# Patient Record
Sex: FEMALE | Race: WHITE | Employment: FULL TIME | URBAN - METROPOLITAN AREA
[De-identification: names, ages, dates, MRNs, and addresses within clinical notes are randomized per-mention and may not be internally consistent; named-entity substitution may affect disease eponyms.]

---

## 2017-06-13 ENCOUNTER — HOSPITAL ENCOUNTER (OUTPATIENT)
Dept: MAMMOGRAPHY | Age: 47
Discharge: HOME | End: 2017-06-13
Attending: OBSTETRICS & GYNECOLOGY
Payer: COMMERCIAL

## 2017-06-13 DIAGNOSIS — Z12.31 VISIT FOR SCREENING MAMMOGRAM: ICD-10-CM

## 2017-06-13 PROCEDURE — 77067 SCR MAMMO BI INCL CAD: CPT

## 2018-07-16 ENCOUNTER — HOSPITAL ENCOUNTER (OUTPATIENT)
Dept: MAMMOGRAPHY | Age: 48
Discharge: HOME | End: 2018-07-16
Attending: OBSTETRICS & GYNECOLOGY
Payer: COMMERCIAL

## 2018-07-16 DIAGNOSIS — Z12.39 SCREENING BREAST EXAMINATION: ICD-10-CM

## 2018-07-16 PROCEDURE — 77067 SCR MAMMO BI INCL CAD: CPT

## 2018-07-21 ENCOUNTER — APPOINTMENT (OUTPATIENT)
Dept: ULTRASOUND IMAGING | Age: 48
End: 2018-07-21
Attending: EMERGENCY MEDICINE
Payer: COMMERCIAL

## 2018-07-21 ENCOUNTER — HOSPITAL ENCOUNTER (EMERGENCY)
Age: 48
Discharge: HOME | End: 2018-07-21
Attending: EMERGENCY MEDICINE
Payer: COMMERCIAL

## 2018-07-21 VITALS
WEIGHT: 161.38 LBS | HEIGHT: 63 IN | DIASTOLIC BLOOD PRESSURE: 64 MMHG | SYSTOLIC BLOOD PRESSURE: 118 MMHG | OXYGEN SATURATION: 99 % | TEMPERATURE: 98.2 F | BODY MASS INDEX: 28.59 KG/M2 | RESPIRATION RATE: 16 BRPM | HEART RATE: 94 BPM

## 2018-07-21 DIAGNOSIS — R11.2 NAUSEA AND VOMITING, INTRACTABILITY OF VOMITING NOT SPECIFIED, UNSPECIFIED VOMITING TYPE: Primary | ICD-10-CM

## 2018-07-21 LAB
ALBUMIN SERPL-MCNC: 3.2 G/DL (ref 3.5–5)
ALBUMIN/GLOB SERPL: 0.9 {RATIO} (ref 1.1–2.2)
ALP SERPL-CCNC: 52 U/L (ref 45–117)
ALT SERPL-CCNC: 16 U/L (ref 12–78)
ANION GAP SERPL CALC-SCNC: 10 MMOL/L (ref 5–15)
APPEARANCE UR: ABNORMAL
AST SERPL-CCNC: 10 U/L (ref 15–37)
BACTERIA URNS QL MICRO: NEGATIVE /HPF
BASOPHILS # BLD: 0 K/UL (ref 0–0.1)
BASOPHILS NFR BLD: 0 % (ref 0–1)
BILIRUB SERPL-MCNC: 0.4 MG/DL (ref 0.2–1)
BILIRUB UR QL: NEGATIVE
BUN SERPL-MCNC: 9 MG/DL (ref 6–20)
BUN/CREAT SERPL: 13 (ref 12–20)
CALCIUM SERPL-MCNC: 8.6 MG/DL (ref 8.5–10.1)
CHLORIDE SERPL-SCNC: 106 MMOL/L (ref 97–108)
CO2 SERPL-SCNC: 23 MMOL/L (ref 21–32)
COLOR UR: YELLOW
CREAT SERPL-MCNC: 0.71 MG/DL (ref 0.55–1.02)
DIFFERENTIAL METHOD BLD: ABNORMAL
EOSINOPHIL # BLD: 0.1 K/UL (ref 0–0.4)
EOSINOPHIL NFR BLD: 1 % (ref 0–7)
EPITH CASTS URNS QL MICRO: ABNORMAL /LPF
ERYTHROCYTE [DISTWIDTH] IN BLOOD BY AUTOMATED COUNT: 12.4 % (ref 11.5–14.5)
GLOBULIN SER CALC-MCNC: 3.6 G/DL (ref 2–4)
GLUCOSE SERPL-MCNC: 122 MG/DL (ref 65–100)
GLUCOSE UR STRIP.AUTO-MCNC: NEGATIVE MG/DL
HCT VFR BLD AUTO: 42.1 % (ref 35–47)
HGB BLD-MCNC: 14.8 G/DL (ref 11.5–16)
HGB UR QL STRIP: ABNORMAL
HYALINE CASTS URNS QL MICRO: ABNORMAL /LPF (ref 0–5)
IMM GRANULOCYTES # BLD: 0 K/UL (ref 0–0.04)
IMM GRANULOCYTES NFR BLD AUTO: 0 % (ref 0–0.5)
KETONES UR QL STRIP.AUTO: 15 MG/DL
LACTATE SERPL-SCNC: 1.7 MMOL/L (ref 0.4–2)
LEUKOCYTE ESTERASE UR QL STRIP.AUTO: NEGATIVE
LIPASE SERPL-CCNC: 200 U/L (ref 73–393)
LYMPHOCYTES # BLD: 1.6 K/UL (ref 0.8–3.5)
LYMPHOCYTES NFR BLD: 13 % (ref 12–49)
MCH RBC QN AUTO: 31.2 PG (ref 26–34)
MCHC RBC AUTO-ENTMCNC: 35.2 G/DL (ref 30–36.5)
MCV RBC AUTO: 88.8 FL (ref 80–99)
MONOCYTES # BLD: 0.4 K/UL (ref 0–1)
MONOCYTES NFR BLD: 4 % (ref 5–13)
NEUTS SEG # BLD: 9.7 K/UL (ref 1.8–8)
NEUTS SEG NFR BLD: 82 % (ref 32–75)
NITRITE UR QL STRIP.AUTO: NEGATIVE
NRBC # BLD: 0 K/UL (ref 0–0.01)
NRBC BLD-RTO: 0 PER 100 WBC
PH UR STRIP: 6 [PH] (ref 5–8)
PLATELET # BLD AUTO: 334 K/UL (ref 150–400)
PMV BLD AUTO: 11.5 FL (ref 8.9–12.9)
POTASSIUM SERPL-SCNC: 3.8 MMOL/L (ref 3.5–5.1)
PROT SERPL-MCNC: 6.8 G/DL (ref 6.4–8.2)
PROT UR STRIP-MCNC: 30 MG/DL
RBC # BLD AUTO: 4.74 M/UL (ref 3.8–5.2)
RBC #/AREA URNS HPF: ABNORMAL /HPF (ref 0–5)
SODIUM SERPL-SCNC: 139 MMOL/L (ref 136–145)
SP GR UR REFRACTOMETRY: 1.03 (ref 1–1.03)
UA: UC IF INDICATED,UAUC: ABNORMAL
UROBILINOGEN UR QL STRIP.AUTO: 0.2 EU/DL (ref 0.2–1)
WBC # BLD AUTO: 11.9 K/UL (ref 3.6–11)
WBC URNS QL MICRO: ABNORMAL /HPF (ref 0–4)

## 2018-07-21 PROCEDURE — 83690 ASSAY OF LIPASE: CPT | Performed by: EMERGENCY MEDICINE

## 2018-07-21 PROCEDURE — C9113 INJ PANTOPRAZOLE SODIUM, VIA: HCPCS | Performed by: EMERGENCY MEDICINE

## 2018-07-21 PROCEDURE — 36415 COLL VENOUS BLD VENIPUNCTURE: CPT | Performed by: EMERGENCY MEDICINE

## 2018-07-21 PROCEDURE — 81001 URINALYSIS AUTO W/SCOPE: CPT | Performed by: EMERGENCY MEDICINE

## 2018-07-21 PROCEDURE — 96375 TX/PRO/DX INJ NEW DRUG ADDON: CPT

## 2018-07-21 PROCEDURE — 99283 EMERGENCY DEPT VISIT LOW MDM: CPT

## 2018-07-21 PROCEDURE — 80053 COMPREHEN METABOLIC PANEL: CPT | Performed by: EMERGENCY MEDICINE

## 2018-07-21 PROCEDURE — 74011250636 HC RX REV CODE- 250/636: Performed by: EMERGENCY MEDICINE

## 2018-07-21 PROCEDURE — 96361 HYDRATE IV INFUSION ADD-ON: CPT

## 2018-07-21 PROCEDURE — 76705 ECHO EXAM OF ABDOMEN: CPT

## 2018-07-21 PROCEDURE — 96374 THER/PROPH/DIAG INJ IV PUSH: CPT

## 2018-07-21 PROCEDURE — 96372 THER/PROPH/DIAG INJ SC/IM: CPT

## 2018-07-21 PROCEDURE — 83605 ASSAY OF LACTIC ACID: CPT | Performed by: EMERGENCY MEDICINE

## 2018-07-21 PROCEDURE — 85025 COMPLETE CBC W/AUTO DIFF WBC: CPT | Performed by: EMERGENCY MEDICINE

## 2018-07-21 RX ORDER — PROCHLORPERAZINE EDISYLATE 5 MG/ML
10 INJECTION INTRAMUSCULAR; INTRAVENOUS
Status: COMPLETED | OUTPATIENT
Start: 2018-07-21 | End: 2018-07-21

## 2018-07-21 RX ORDER — DICYCLOMINE HYDROCHLORIDE 10 MG/ML
20 INJECTION INTRAMUSCULAR
Status: COMPLETED | OUTPATIENT
Start: 2018-07-21 | End: 2018-07-21

## 2018-07-21 RX ORDER — ONDANSETRON 2 MG/ML
4 INJECTION INTRAMUSCULAR; INTRAVENOUS
Status: COMPLETED | OUTPATIENT
Start: 2018-07-21 | End: 2018-07-21

## 2018-07-21 RX ORDER — PANTOPRAZOLE SODIUM 40 MG/10ML
40 INJECTION, POWDER, LYOPHILIZED, FOR SOLUTION INTRAVENOUS
Status: COMPLETED | OUTPATIENT
Start: 2018-07-21 | End: 2018-07-21

## 2018-07-21 RX ORDER — METOCLOPRAMIDE HYDROCHLORIDE 5 MG/ML
10 INJECTION INTRAMUSCULAR; INTRAVENOUS
Status: COMPLETED | OUTPATIENT
Start: 2018-07-21 | End: 2018-07-21

## 2018-07-21 RX ORDER — OMEPRAZOLE 40 MG/1
40 CAPSULE, DELAYED RELEASE ORAL DAILY
Qty: 30 CAP | Refills: 0 | Status: SHIPPED | OUTPATIENT
Start: 2018-07-21 | End: 2019-09-19

## 2018-07-21 RX ORDER — DICYCLOMINE HYDROCHLORIDE 20 MG/1
20 TABLET ORAL EVERY 6 HOURS
Qty: 20 TAB | Refills: 0 | Status: SHIPPED | OUTPATIENT
Start: 2018-07-21 | End: 2018-07-26

## 2018-07-21 RX ORDER — LORAZEPAM 2 MG/ML
1 INJECTION INTRAMUSCULAR
Status: COMPLETED | OUTPATIENT
Start: 2018-07-21 | End: 2018-07-21

## 2018-07-21 RX ORDER — ONDANSETRON 4 MG/1
4 TABLET, ORALLY DISINTEGRATING ORAL
Qty: 10 TAB | Refills: 0 | Status: SHIPPED | OUTPATIENT
Start: 2018-07-21 | End: 2019-09-19

## 2018-07-21 RX ADMIN — METOCLOPRAMIDE 10 MG: 5 INJECTION, SOLUTION INTRAMUSCULAR; INTRAVENOUS at 09:40

## 2018-07-21 RX ADMIN — PANTOPRAZOLE SODIUM 40 MG: 40 INJECTION, POWDER, FOR SOLUTION INTRAVENOUS at 06:13

## 2018-07-21 RX ADMIN — SODIUM CHLORIDE 1000 ML: 900 INJECTION, SOLUTION INTRAVENOUS at 06:42

## 2018-07-21 RX ADMIN — SODIUM CHLORIDE 1000 ML: 900 INJECTION, SOLUTION INTRAVENOUS at 05:30

## 2018-07-21 RX ADMIN — ONDANSETRON 4 MG: 2 INJECTION INTRAMUSCULAR; INTRAVENOUS at 05:30

## 2018-07-21 RX ADMIN — DICYCLOMINE HYDROCHLORIDE 20 MG: 20 INJECTION, SOLUTION INTRAMUSCULAR at 06:00

## 2018-07-21 RX ADMIN — LORAZEPAM 1 MG: 2 INJECTION INTRAMUSCULAR; INTRAVENOUS at 06:04

## 2018-07-21 RX ADMIN — PROCHLORPERAZINE EDISYLATE 10 MG: 5 INJECTION INTRAMUSCULAR; INTRAVENOUS at 07:25

## 2018-07-21 NOTE — DISCHARGE INSTRUCTIONS
Nausea and Vomiting: Care Instructions  Your Care Instructions    When you are nauseated, you may feel weak and sweaty and notice a lot of saliva in your mouth. Nausea often leads to vomiting. Most of the time you do not need to worry about nausea and vomiting, but they can be signs of other illnesses. Two common causes of nausea and vomiting are stomach flu and food poisoning. Nausea and vomiting from viral stomach flu will usually start to improve within 24 hours. Nausea and vomiting from food poisoning may last from 12 to 48 hours. The doctor has checked you carefully, but problems can develop later. If you notice any problems or new symptoms, get medical treatment right away. Follow-up care is a key part of your treatment and safety. Be sure to make and go to all appointments, and call your doctor if you are having problems. It's also a good idea to know your test results and keep a list of the medicines you take. How can you care for yourself at home? · To prevent dehydration, drink plenty of fluids, enough so that your urine is light yellow or clear like water. Choose water and other caffeine-free clear liquids until you feel better. If you have kidney, heart, or liver disease and have to limit fluids, talk with your doctor before you increase the amount of fluids you drink. · Rest in bed until you feel better. · When you are able to eat, try clear soups, mild foods, and liquids until all symptoms are gone for 12 to 48 hours. Other good choices include dry toast, crackers, cooked cereal, and gelatin dessert, such as Jell-O. When should you call for help? Call 911 anytime you think you may need emergency care. For example, call if:    · You passed out (lost consciousness).    Call your doctor now or seek immediate medical care if:    · You have symptoms of dehydration, such as:  ¨ Dry eyes and a dry mouth. ¨ Passing only a little dark urine.   ¨ Feeling thirstier than usual.     · You have new or worsening belly pain.     · You have a new or higher fever.     · You vomit blood or what looks like coffee grounds.    Watch closely for changes in your health, and be sure to contact your doctor if:    · You have ongoing nausea and vomiting.     · Your vomiting is getting worse.     · Your vomiting lasts longer than 2 days.     · You are not getting better as expected. Where can you learn more? Go to http://dominic-arnold.info/. Enter 25 [id number] in the search box to learn more about \"Nausea and Vomiting: Care Instructions. \"  Current as of: November 20, 2017  Content Version: 11.7  © 5824-6997 [employer]. Care instructions adapted under license by "[employer] (which disclaims liability or warranty for this information). If you have questions about a medical condition or this instruction, always ask your healthcare professional. [address] any warranty or liability for your use of this information.

## 2018-07-21 NOTE — ED NOTES
Assumed care of pt from triage. Pt complaining of generalized abdominal pain x12 hr and 4-5 episodes vomiting in past 5 hr. Pt denies fever. Pt reports taking zantac and ibuprofen at home. Pt in no acute distress at this time. VSS. Will continue to monitor. Call bell within reach.

## 2018-07-21 NOTE — ED NOTES
Bedside shift change report given to Johnell Phoenix., RN (oncoming nurse) by Nika Goel RN (offgoing nurse). Report included the following information SBAR, Kardex, ED Summary, Intake/Output, MAR and Recent Results.

## 2018-07-21 NOTE — ED PROVIDER NOTES
EMERGENCY DEPARTMENT HISTORY AND PHYSICAL EXAM 
 
 
Date: 7/21/2018 Patient Name: Clare Mendez History of Presenting Illness Chief Complaint Patient presents with  Abdominal Pain Ambulatory c/o \"spasms\" to upper abd x 12 hrs with vomiting x last 6 hrs History Provided By: Patient HPI: Clare Mendez, 46 y.o. female presents ambulatory to the ED with cc of gradual onset abdominal pain, that pt describes as spasmatic and cramping, became onset at ~1800 on 07/20/2018. Pt reports she had similar sxs ~3-4 months ago. Pt reports that at ~0000 pta she experienced nausea and 1 episode of vomiting. Pt reports having gallbladder and appendix. Pt reports eating out on 07/20/2018 for lunch at ~1400 for lunch. Pt reports her LNMP was ~2 weeks ago and denies pregnancy. She specifically denies any fevers, chills, chest pain, shortness of breath, headache, rash, diarrhea, sweating or weight loss. There are no other complaints, changes, or physical findings at this time. PCP: Keke Beverly MD 
 
Current Outpatient Prescriptions Medication Sig Dispense Refill  ondansetron (ZOFRAN ODT) 4 mg disintegrating tablet Take 1 Tab by mouth every eight (8) hours as needed for Nausea. 10 Tab 0  
 omeprazole (PRILOSEC) 40 mg capsule Take 1 Cap by mouth daily. 30 Cap 0  
 dicyclomine (BENTYL) 20 mg tablet Take 1 Tab by mouth every six (6) hours for 20 doses. 20 Tab 0 Past History Past Medical History: No past medical history on file. Past Surgical History: No past surgical history on file. Family History: 
Family History Problem Relation Age of Onset  Breast Cancer Paternal Grandmother 56 Social History: 
Social History Substance Use Topics  Smoking status: Not on file  Smokeless tobacco: Not on file  Alcohol use Not on file Allergies: 
No Known Allergies Review of Systems Review of Systems Constitutional: Negative for activity change, appetite change, fatigue and fever. HENT: Negative. Negative for congestion, rhinorrhea and sore throat. Respiratory: Negative. Negative for cough, shortness of breath and wheezing. Cardiovascular: Negative. Negative for chest pain and leg swelling. Gastrointestinal: Positive for abdominal pain (upper), nausea and vomiting. Negative for abdominal distention, constipation and diarrhea. Endocrine: Negative. Genitourinary: Negative for difficulty urinating, dysuria, menstrual problem, vaginal bleeding and vaginal discharge. Musculoskeletal: Negative. Negative for arthralgias, joint swelling and myalgias. Skin: Negative. Negative for rash. Neurological: Negative. Negative for dizziness, weakness, light-headedness and headaches. Psychiatric/Behavioral: Negative. Physical Exam  
Physical Exam  
Constitutional: She is oriented to person, place, and time. She appears well-developed and well-nourished. tired appearing but non-toxic; stable vital signs HENT:  
Head: Atraumatic. Eyes: EOM are normal.  
Cardiovascular: Normal rate, regular rhythm, normal heart sounds and intact distal pulses. Exam reveals no gallop and no friction rub. No murmur heard. Pulmonary/Chest: Effort normal and breath sounds normal. No respiratory distress. She has no wheezes. She has no rales. She exhibits no tenderness. Abdominal: Soft. Bowel sounds are normal. She exhibits no distension and no mass. There is tenderness in the epigastric area. There is no rebound and no guarding. Musculoskeletal: Normal range of motion. She exhibits no edema or tenderness. Neurological: She is oriented to person, place, and time. Skin: Skin is warm. Psychiatric: She has a normal mood and affect. Nursing note and vitals reviewed. Diagnostic Study Results Labs - Recent Results (from the past 12 hour(s)) CBC WITH AUTOMATED DIFF  Collection Time: 07/21/18  5:21 AM  
Result Value Ref Range WBC 11.9 (H) 3.6 - 11.0 K/uL  
 RBC 4.74 3.80 - 5.20 M/uL  
 HGB 14.8 11.5 - 16.0 g/dL HCT 42.1 35.0 - 47.0 % MCV 88.8 80.0 - 99.0 FL  
 MCH 31.2 26.0 - 34.0 PG  
 MCHC 35.2 30.0 - 36.5 g/dL  
 RDW 12.4 11.5 - 14.5 % PLATELET 812 670 - 527 K/uL MPV 11.5 8.9 - 12.9 FL  
 NRBC 0.0 0  WBC ABSOLUTE NRBC 0.00 0.00 - 0.01 K/uL NEUTROPHILS 82 (H) 32 - 75 % LYMPHOCYTES 13 12 - 49 % MONOCYTES 4 (L) 5 - 13 % EOSINOPHILS 1 0 - 7 % BASOPHILS 0 0 - 1 % IMMATURE GRANULOCYTES 0 0.0 - 0.5 % ABS. NEUTROPHILS 9.7 (H) 1.8 - 8.0 K/UL  
 ABS. LYMPHOCYTES 1.6 0.8 - 3.5 K/UL  
 ABS. MONOCYTES 0.4 0.0 - 1.0 K/UL  
 ABS. EOSINOPHILS 0.1 0.0 - 0.4 K/UL  
 ABS. BASOPHILS 0.0 0.0 - 0.1 K/UL  
 ABS. IMM. GRANS. 0.0 0.00 - 0.04 K/UL  
 DF AUTOMATED URINALYSIS W/ REFLEX CULTURE Collection Time: 07/21/18  5:21 AM  
Result Value Ref Range Color YELLOW Appearance CLOUDY (A) CLEAR Specific gravity 1.027 1.003 - 1.030    
 pH (UA) 6.0 5.0 - 8.0 Protein 30 (A) NEG mg/dL Glucose NEGATIVE  NEG mg/dL Ketone 15 (A) NEG mg/dL Bilirubin NEGATIVE  NEG Blood TRACE (A) NEG Urobilinogen 0.2 0.2 - 1.0 EU/dL Nitrites NEGATIVE  NEG Leukocyte Esterase NEGATIVE  NEG    
 WBC 0-4 0 - 4 /hpf  
 RBC 0-5 0 - 5 /hpf Epithelial cells FEW FEW /lpf Bacteria NEGATIVE  NEG /hpf  
 UA:UC IF INDICATED CULTURE NOT INDICATED BY UA RESULT CNI Hyaline cast 0-2 0 - 5 /lpf LACTIC ACID Collection Time: 07/21/18  5:25 AM  
Result Value Ref Range Lactic acid 1.7 0.4 - 2.0 MMOL/L  
METABOLIC PANEL, COMPREHENSIVE Collection Time: 07/21/18  5:55 AM  
Result Value Ref Range Sodium 139 136 - 145 mmol/L Potassium 3.8 3.5 - 5.1 mmol/L Chloride 106 97 - 108 mmol/L  
 CO2 23 21 - 32 mmol/L Anion gap 10 5 - 15 mmol/L Glucose 122 (H) 65 - 100 mg/dL BUN 9 6 - 20 MG/DL Creatinine 0.71 0.55 - 1.02 MG/DL  
 BUN/Creatinine ratio 13 12 - 20  GFR est AA >60 >60 ml/min/1.73m2 GFR est non-AA >60 >60 ml/min/1.73m2 Calcium 8.6 8.5 - 10.1 MG/DL Bilirubin, total 0.4 0.2 - 1.0 MG/DL  
 ALT (SGPT) 16 12 - 78 U/L  
 AST (SGOT) 10 (L) 15 - 37 U/L Alk. phosphatase 52 45 - 117 U/L Protein, total 6.8 6.4 - 8.2 g/dL Albumin 3.2 (L) 3.5 - 5.0 g/dL Globulin 3.6 2.0 - 4.0 g/dL A-G Ratio 0.9 (L) 1.1 - 2.2 LIPASE Collection Time: 07/21/18  5:55 AM  
Result Value Ref Range Lipase 200 73 - 393 U/L Radiologic Studies -  
US ABD LTD Final Result IMPRESSION: No acute findings. Medical Decision Making I am the first provider for this patient. I reviewed the vital signs, available nursing notes, past medical history, past surgical history, family history and social history. Vital Signs-Reviewed the patient's vital signs. Patient Vitals for the past 12 hrs: 
 Temp Pulse Resp BP SpO2  
07/21/18 0658 - - - - 99 % 07/21/18 0630 - - - 118/64 96 %  
07/21/18 0502 98.2 °F (36.8 °C) 94 16 (!) 148/98 99 % Pulse Oximetry Analysis - 99% on RA Cardiac Monitor:  
Rate: 94 bpm 
Rhythm: Normal Sinus Rhythm Records Reviewed: Nursing Notes and Old Medical Records Provider Notes (Medical Decision Making):  
Gastritis, PUD, GERD, viral syndrome, food poisoning, pancreatitis, lower suspicion for colicystitis as pt has no RUQ tenderness; dehydration, electrolyte abnormality. ED Course:  
Initial assessment performed. The patients presenting problems have been discussed, and they are in agreement with the care plan formulated and outlined with them. I have encouraged them to ask questions as they arise throughout their visit. Progress Note: 
7:10 AM 
Pt was feeling better, but on way to bathroom she felt nauseated. Will re-administer auto medic and sign-out. SIGN OUT: 
9:01 AM 
Patient's presentation, labs/imaging and plan of care was reviewed with Robert Lira MD as part of sign out.   They will re-evaluate the pt and disposition as part of the plan discussed with the patient. Robert Cazares MD's assistance in completion of this plan is greatly appreciated but it should be noted that I will be the provider of record for this patient. Jefry Villarreal MD 
 
9:01 AM 
Will additionally order US of abdomen. Disposition: 
Discharge Note: 
10:41 AM 
The pt is ready for discharge. The pt's signs, symptoms, diagnosis, and discharge instructions have been discussed and pt has conveyed their understanding. The pt is to follow up as recommended or return to ER should their symptoms worsen. Plan has been discussed and pt is in agreement. PLAN: 
1. Current Discharge Medication List  
  
START taking these medications Details  
ondansetron (ZOFRAN ODT) 4 mg disintegrating tablet Take 1 Tab by mouth every eight (8) hours as needed for Nausea. Qty: 10 Tab, Refills: 0  
  
omeprazole (PRILOSEC) 40 mg capsule Take 1 Cap by mouth daily. Qty: 30 Cap, Refills: 0  
  
dicyclomine (BENTYL) 20 mg tablet Take 1 Tab by mouth every six (6) hours for 20 doses. Qty: 20 Tab, Refills: 0  
  
  
 
2. Follow-up Information None Return to ED if worse Diagnosis Clinical Impression: 1. Nausea and vomiting, intractability of vomiting not specified, unspecified vomiting type Attestations: This note is prepared by Blanca Arzola, acting as Scribe for Jefry Villarreal MD. 
 
Jefry Villarreal MD: The scribe's documentation has been prepared under my direction and personally reviewed by me in its entirety. I confirm that the note above accurately reflects all work, treatment, procedures, and medical decision making performed by me.

## 2018-07-21 NOTE — ED NOTES
Discharge instructions reviewed w/ pt and copy given by Dr. Orville Anne. Pt discharged ambulatory from the ED to the care of .

## 2019-09-18 ENCOUNTER — OFFICE VISIT (OUTPATIENT)
Dept: SURGERY | Age: 49
End: 2019-09-18

## 2019-09-18 VITALS
HEIGHT: 63 IN | HEART RATE: 79 BPM | WEIGHT: 141.5 LBS | BODY MASS INDEX: 25.07 KG/M2 | OXYGEN SATURATION: 98 % | TEMPERATURE: 97.8 F | DIASTOLIC BLOOD PRESSURE: 64 MMHG | SYSTOLIC BLOOD PRESSURE: 108 MMHG | RESPIRATION RATE: 16 BRPM

## 2019-09-18 DIAGNOSIS — K80.20 SYMPTOMATIC CHOLELITHIASIS: Primary | ICD-10-CM

## 2019-09-18 RX ORDER — BISMUTH SUBSALICYLATE 262 MG
1 TABLET,CHEWABLE ORAL DAILY
COMMUNITY
End: 2019-10-15

## 2019-09-18 RX ORDER — CETIRIZINE HCL 10 MG
TABLET ORAL
COMMUNITY
End: 2019-10-15

## 2019-09-18 RX ORDER — ETHYNODIOL DIACETATE AND ETHINYL ESTRADIOL 1 MG-35MCG
KIT ORAL DAILY
Refills: 2 | COMMUNITY
Start: 2019-08-29 | End: 2019-10-15

## 2019-09-18 NOTE — LETTER
9/18/19 Patient: Radha Judge YOB: 1970 Date of Visit: 9/18/2019 Ronit Queen MD 
[address] VIA Facsimile: [phone] Dear Ronit Queen MD, Thank you for referring Ms. Radha Judge to [address] [facility] for evaluation. My notes for this consultation are attached. If you have questions, please do not hesitate to call me. I look forward to following your patient along with you. Sincerely, Medina Simons MD

## 2019-09-18 NOTE — PROGRESS NOTES
Subjective:      Jonathan Olmstead  is a 44 y.o.  female who was referred by Napoleon TELLO for evaluation of gallbladder. Pt notes that she had first episode of abdominal pain about 1 year ago with nausea, vomiting, and pain lasting 4-5 hours. She notes these episodes were happening every 3-4 months, and has had 2 bad episodes in 7 day period. Pt reports the pain from the last episode never fully subsided. HIDA scan on 09/16/19 showed slightly delayed gallbladder emptying with no gallbladder response to CCK. Past Medical History:   Diagnosis Date    Symptomatic cholelithiasis 9/18/2019       No past surgical history on file. Social History     Tobacco Use    Smoking status: Never Smoker    Smokeless tobacco: Never Used   Substance Use Topics    Alcohol use: Yes     Comment: once a month       Family History   Problem Relation Age of Onset    Breast Cancer Paternal Grandmother 61       Current Outpatient Medications on File Prior to Visit   Medication Sig Dispense Refill    ZOVIA 1/35E, 28, 1-35 mg-mcg tab TAKE 1 TABLET BY MOUTH EVERY DAY  2    cetirizine (ZYRTEC) 10 mg tablet Take  by mouth.  multivitamin (ONE A DAY) tablet Take 1 Tab by mouth daily.  ondansetron (ZOFRAN ODT) 4 mg disintegrating tablet Take 1 Tab by mouth every eight (8) hours as needed for Nausea. 10 Tab 0    omeprazole (PRILOSEC) 40 mg capsule Take 1 Cap by mouth daily. 30 Cap 0     No current facility-administered medications on file prior to visit. No Known Allergies    Review of Systems:    Pertinent items are noted in the History of Present Illness.     Objective:     Visit Vitals  /64 (BP 1 Location: Right arm, BP Patient Position: Sitting)   Pulse 79   Temp 97.8 °F (36.6 °C) (Oral)   Resp 16   Ht 5' 3\" (1.6 m)   Wt 141 lb 8 oz (64.2 kg)   SpO2 98%   BMI 25.07 kg/m²        Physical Exam:  GENERAL: alert, cooperative, no distress, appears stated age  LUNG: clear to auscultation bilaterally  HEART: regular rate and rhythm, S1, S2 normal, no murmur, click, rub or gallop   ABDOMEN: Tender in the epigastrium. Labs: No results found for this or any previous visit (from the past 24 hour(s)). Assessment and Plan:       ICD-10-CM ICD-9-CM    1. Symptomatic cholelithiasis K80.20 574.20        I recommend pt have a laparoscopic cholecystectomy to be done as an outpatient. I thoroughly explained their diagnosis, discussed the procedure, and discussed what they should expect for recovery. I advised them to take a least 3-4 days off from work to allow ample time for them to recover. This can be done at their convenience. All questions were answered. She agrees with this plan and will schedule this accordingly. This document was scribed by José Frank as dictated by Dr. Susan Rod.      Signed By: Talia Mills MD     09/18/19

## 2019-09-18 NOTE — PROGRESS NOTES
1. Have you been to the ER, urgent care clinic since your last visit? Hospitalized since your last visit? No    2. Have you seen or consulted any other health care providers outside of the [facility] since your last visit? Include any pap smears or colon screening.  GI for abdominal pain work up

## 2019-09-19 ENCOUNTER — DOCUMENTATION ONLY (OUTPATIENT)
Dept: SURGERY | Age: 49
End: 2019-09-19

## 2019-09-19 NOTE — PERIOP NOTES
PAT PHONE INTERVIEW COMPLETED WITH PT.  PT WAS GIVEN INFECTION PREVENTION INFORMATION VERBALLY; PT VOICED UNDERSTANDING. PT WAS GIVEN THE OPPORTUNITY TO ASK ADDITIONAL QUESTIONS. PT ADVISED TO GET 2 BOTTLES OF CHG SOAP TO USE THE NIGHT BEFORE SURGERY, AND THE MORNING OF SURGERY. ALL DIRECTIONS FOR HOW TO USE SOAP PRE OP GIVEN TO PT. 
 
PT WOULD NEED A CBC PER ANESTHESIA PROTOCOL, MESSAGE SENT TO EDEN GONCALVES-NURSE FOR DR. RALPH TO SEE IF HE WANTS TO ENTER THE ORDER FOR CBC, AND ANYTHING ELSE HE MAY WANT; OR SEND ME THE ORDERS.

## 2019-09-19 NOTE — PROGRESS NOTES
Chelsea Tena, RN  Eddie Villar, LPN             PT WILL NEED AT LEAST A CBC PER ANESTHESIA PROTOCOL, WOULD YOU MIND CHECKING WITH DR. RALPH TO SEE IF HE WOULD LIKE TO ENTER THIS AND ANYTHING ELSE, OR SEND ME AN ORDER? Rell Pi RN   [phone]      I returned Alie's call from pre admission testing [phone] and left a message letting her know I was faxing CBC, CMP NM Hepabil imaging from [facility] to her to fax # [phone]. This was done and confirmation was received.  This was given to Consuela Lombard to scan into the system

## 2019-09-20 ENCOUNTER — HOSPITAL ENCOUNTER (OUTPATIENT)
Age: 49
Setting detail: OUTPATIENT SURGERY
Discharge: HOME | End: 2019-09-20
Attending: SURGERY | Admitting: SURGERY
Payer: COMMERCIAL

## 2019-09-20 ENCOUNTER — ANESTHESIA (OUTPATIENT)
Dept: SURGERY | Age: 49
End: 2019-09-20
Payer: COMMERCIAL

## 2019-09-20 ENCOUNTER — ANESTHESIA EVENT (OUTPATIENT)
Dept: SURGERY | Age: 49
End: 2019-09-20
Payer: COMMERCIAL

## 2019-09-20 VITALS
BODY MASS INDEX: 24.98 KG/M2 | HEART RATE: 89 BPM | WEIGHT: 141 LBS | HEIGHT: 63 IN | DIASTOLIC BLOOD PRESSURE: 74 MMHG | TEMPERATURE: 98.2 F | OXYGEN SATURATION: 97 % | SYSTOLIC BLOOD PRESSURE: 118 MMHG | RESPIRATION RATE: 11 BRPM

## 2019-09-20 DIAGNOSIS — C56.3 MALIGNANT NEOPLASM OF BOTH OVARIES (HCC): Primary | ICD-10-CM

## 2019-09-20 DIAGNOSIS — C56.3 MALIGNANT NEOPLASM OF BOTH OVARIES (HCC): ICD-10-CM

## 2019-09-20 DIAGNOSIS — R10.84 GENERALIZED ABDOMINAL PAIN: Primary | ICD-10-CM

## 2019-09-20 LAB — HCG UR QL: NEGATIVE

## 2019-09-20 PROCEDURE — 76010000149 HC OR TIME 1 TO 1.5 HR: Performed by: SURGERY

## 2019-09-20 PROCEDURE — 77030040361 HC SLV COMPR DVT MDII -B: Performed by: SURGERY

## 2019-09-20 PROCEDURE — 88305 TISSUE EXAM BY PATHOLOGIST: CPT

## 2019-09-20 PROCEDURE — 74011250636 HC RX REV CODE- 250/636: Performed by: NURSE ANESTHETIST, CERTIFIED REGISTERED

## 2019-09-20 PROCEDURE — 77030018876 HC APPL CLP LIG4 COVD -B: Performed by: SURGERY

## 2019-09-20 PROCEDURE — 81025 URINE PREGNANCY TEST: CPT

## 2019-09-20 PROCEDURE — 77030039266 HC ADH SKN EXOFIN S2SG -A: Performed by: SURGERY

## 2019-09-20 PROCEDURE — 77030002895 HC DEV VASC CLOSR COVD -B: Performed by: SURGERY

## 2019-09-20 PROCEDURE — 77030037032 HC INSRT SCIS CLICKLLINE DISP STOR -B: Performed by: SURGERY

## 2019-09-20 PROCEDURE — 74011250637 HC RX REV CODE- 250/637: Performed by: NURSE ANESTHETIST, CERTIFIED REGISTERED

## 2019-09-20 PROCEDURE — 74011250636 HC RX REV CODE- 250/636: Performed by: ANESTHESIOLOGY

## 2019-09-20 PROCEDURE — 77030035029 HC NDL INSUF VERES DISP COVD -B: Performed by: SURGERY

## 2019-09-20 PROCEDURE — 77030017006 HC DISECTR CRV1 J&J -B: Performed by: SURGERY

## 2019-09-20 PROCEDURE — 77030008684 HC TU ET CUF COVD -B: Performed by: ANESTHESIOLOGY

## 2019-09-20 PROCEDURE — 77030009852 HC PCH RTVR ENDOSC COVD -B: Performed by: SURGERY

## 2019-09-20 PROCEDURE — 77030003481 HC NDL BIOP GUN BARD -B: Performed by: SURGERY

## 2019-09-20 PROCEDURE — 77030011640 HC PAD GRND REM COVD -A: Performed by: SURGERY

## 2019-09-20 PROCEDURE — 88307 TISSUE EXAM BY PATHOLOGIST: CPT

## 2019-09-20 PROCEDURE — 77030020782 HC GWN BAIR PAWS FLX 3M -B

## 2019-09-20 PROCEDURE — 77030020829: Performed by: SURGERY

## 2019-09-20 PROCEDURE — 74011000250 HC RX REV CODE- 250: Performed by: SURGERY

## 2019-09-20 PROCEDURE — 74011250637 HC RX REV CODE- 250/637: Performed by: ANESTHESIOLOGY

## 2019-09-20 PROCEDURE — 88342 IMHCHEM/IMCYTCHM 1ST ANTB: CPT

## 2019-09-20 PROCEDURE — 77030008608 HC TRCR ENDOSC SMTH AMR -B: Performed by: SURGERY

## 2019-09-20 PROCEDURE — 76210000021 HC REC RM PH II 0.5 TO 1 HR: Performed by: SURGERY

## 2019-09-20 PROCEDURE — 77030026438 HC STYL ET INTUB CARD -A: Performed by: ANESTHESIOLOGY

## 2019-09-20 PROCEDURE — 77030012770 HC TRCR OPT FX AMR -B: Performed by: SURGERY

## 2019-09-20 PROCEDURE — 76210000017 HC OR PH I REC 1.5 TO 2 HR: Performed by: SURGERY

## 2019-09-20 PROCEDURE — 88112 CYTOPATH CELL ENHANCE TECH: CPT

## 2019-09-20 PROCEDURE — 88341 IMHCHEM/IMCYTCHM EA ADD ANTB: CPT

## 2019-09-20 PROCEDURE — 76060000033 HC ANESTHESIA 1 TO 1.5 HR: Performed by: SURGERY

## 2019-09-20 PROCEDURE — 74011250636 HC RX REV CODE- 250/636: Performed by: SURGERY

## 2019-09-20 PROCEDURE — 77030031139 HC SUT VCRL2 J&J -A: Performed by: SURGERY

## 2019-09-20 PROCEDURE — 77030002933 HC SUT MCRYL J&J -A: Performed by: SURGERY

## 2019-09-20 PROCEDURE — 77030018836 HC SOL IRR NACL ICUM -A: Performed by: SURGERY

## 2019-09-20 PROCEDURE — 74011000250 HC RX REV CODE- 250: Performed by: NURSE ANESTHETIST, CERTIFIED REGISTERED

## 2019-09-20 PROCEDURE — 77030008756 HC TU IRR SUC STRY -B: Performed by: SURGERY

## 2019-09-20 PROCEDURE — 77030020747 HC TU INSUF ENDOSC TELE -A: Performed by: SURGERY

## 2019-09-20 PROCEDURE — 77030010507 HC ADH SKN DERMBND J&J -B: Performed by: SURGERY

## 2019-09-20 RX ORDER — DEXMEDETOMIDINE HYDROCHLORIDE 100 UG/ML
INJECTION, SOLUTION INTRAVENOUS AS NEEDED
Status: DISCONTINUED | OUTPATIENT
Start: 2019-09-20 | End: 2019-09-20 | Stop reason: HOSPADM

## 2019-09-20 RX ORDER — NEOSTIGMINE METHYLSULFATE 1 MG/ML
INJECTION INTRAVENOUS AS NEEDED
Status: DISCONTINUED | OUTPATIENT
Start: 2019-09-20 | End: 2019-09-20 | Stop reason: HOSPADM

## 2019-09-20 RX ORDER — CEFAZOLIN SODIUM/WATER 2 G/20 ML
2 SYRINGE (ML) INTRAVENOUS ONCE
Status: COMPLETED | OUTPATIENT
Start: 2019-09-20 | End: 2019-09-20

## 2019-09-20 RX ORDER — DIPHENHYDRAMINE HYDROCHLORIDE 50 MG/ML
12.5 INJECTION, SOLUTION INTRAMUSCULAR; INTRAVENOUS AS NEEDED
Status: DISCONTINUED | OUTPATIENT
Start: 2019-09-20 | End: 2019-09-20 | Stop reason: HOSPADM

## 2019-09-20 RX ORDER — SODIUM CHLORIDE, SODIUM LACTATE, POTASSIUM CHLORIDE, CALCIUM CHLORIDE 600; 310; 30; 20 MG/100ML; MG/100ML; MG/100ML; MG/100ML
125 INJECTION, SOLUTION INTRAVENOUS CONTINUOUS
Status: DISCONTINUED | OUTPATIENT
Start: 2019-09-20 | End: 2019-09-20 | Stop reason: HOSPADM

## 2019-09-20 RX ORDER — MIDAZOLAM HYDROCHLORIDE 1 MG/ML
0.5 INJECTION, SOLUTION INTRAMUSCULAR; INTRAVENOUS
Status: DISCONTINUED | OUTPATIENT
Start: 2019-09-20 | End: 2019-09-20 | Stop reason: HOSPADM

## 2019-09-20 RX ORDER — MIDAZOLAM HYDROCHLORIDE 1 MG/ML
1 INJECTION, SOLUTION INTRAMUSCULAR; INTRAVENOUS AS NEEDED
Status: DISCONTINUED | OUTPATIENT
Start: 2019-09-20 | End: 2019-09-20 | Stop reason: HOSPADM

## 2019-09-20 RX ORDER — SODIUM CHLORIDE 0.9 % (FLUSH) 0.9 %
5-40 SYRINGE (ML) INJECTION EVERY 8 HOURS
Status: DISCONTINUED | OUTPATIENT
Start: 2019-09-20 | End: 2019-09-20 | Stop reason: HOSPADM

## 2019-09-20 RX ORDER — ONDANSETRON 2 MG/ML
INJECTION INTRAMUSCULAR; INTRAVENOUS AS NEEDED
Status: DISCONTINUED | OUTPATIENT
Start: 2019-09-20 | End: 2019-09-20 | Stop reason: HOSPADM

## 2019-09-20 RX ORDER — HYDROMORPHONE HYDROCHLORIDE 1 MG/ML
0.2 INJECTION, SOLUTION INTRAMUSCULAR; INTRAVENOUS; SUBCUTANEOUS
Status: DISCONTINUED | OUTPATIENT
Start: 2019-09-20 | End: 2019-09-20 | Stop reason: HOSPADM

## 2019-09-20 RX ORDER — SODIUM CHLORIDE 9 MG/ML
1000 INJECTION, SOLUTION INTRAVENOUS CONTINUOUS
Status: DISCONTINUED | OUTPATIENT
Start: 2019-09-20 | End: 2019-09-20 | Stop reason: HOSPADM

## 2019-09-20 RX ORDER — ONDANSETRON 2 MG/ML
4 INJECTION INTRAMUSCULAR; INTRAVENOUS AS NEEDED
Status: DISCONTINUED | OUTPATIENT
Start: 2019-09-20 | End: 2019-09-20 | Stop reason: HOSPADM

## 2019-09-20 RX ORDER — LIDOCAINE HYDROCHLORIDE 10 MG/ML
0.1 INJECTION, SOLUTION EPIDURAL; INFILTRATION; INTRACAUDAL; PERINEURAL AS NEEDED
Status: DISCONTINUED | OUTPATIENT
Start: 2019-09-20 | End: 2019-09-20 | Stop reason: HOSPADM

## 2019-09-20 RX ORDER — SODIUM CHLORIDE 0.9 % (FLUSH) 0.9 %
5-40 SYRINGE (ML) INJECTION AS NEEDED
Status: DISCONTINUED | OUTPATIENT
Start: 2019-09-20 | End: 2019-09-20 | Stop reason: HOSPADM

## 2019-09-20 RX ORDER — MORPHINE SULFATE 2 MG/ML
2 INJECTION, SOLUTION INTRAMUSCULAR; INTRAVENOUS
Status: DISCONTINUED | OUTPATIENT
Start: 2019-09-20 | End: 2019-09-20 | Stop reason: HOSPADM

## 2019-09-20 RX ORDER — KETOROLAC TROMETHAMINE 30 MG/ML
INJECTION, SOLUTION INTRAMUSCULAR; INTRAVENOUS AS NEEDED
Status: DISCONTINUED | OUTPATIENT
Start: 2019-09-20 | End: 2019-09-20 | Stop reason: HOSPADM

## 2019-09-20 RX ORDER — FENTANYL CITRATE 50 UG/ML
50 INJECTION, SOLUTION INTRAMUSCULAR; INTRAVENOUS AS NEEDED
Status: DISCONTINUED | OUTPATIENT
Start: 2019-09-20 | End: 2019-09-20 | Stop reason: HOSPADM

## 2019-09-20 RX ORDER — ROCURONIUM BROMIDE 10 MG/ML
INJECTION, SOLUTION INTRAVENOUS AS NEEDED
Status: DISCONTINUED | OUTPATIENT
Start: 2019-09-20 | End: 2019-09-20 | Stop reason: HOSPADM

## 2019-09-20 RX ORDER — ACETAMINOPHEN 325 MG/1
650 TABLET ORAL ONCE
Status: COMPLETED | OUTPATIENT
Start: 2019-09-20 | End: 2019-09-20

## 2019-09-20 RX ORDER — HYDROCODONE BITARTRATE AND ACETAMINOPHEN 5; 325 MG/1; MG/1
1 TABLET ORAL
Qty: 60 TAB | Refills: 0 | Status: SHIPPED | OUTPATIENT
Start: 2019-09-20 | End: 2019-10-03

## 2019-09-20 RX ORDER — PROPOFOL 10 MG/ML
INJECTION, EMULSION INTRAVENOUS AS NEEDED
Status: DISCONTINUED | OUTPATIENT
Start: 2019-09-20 | End: 2019-09-20 | Stop reason: HOSPADM

## 2019-09-20 RX ORDER — SCOLOPAMINE TRANSDERMAL SYSTEM 1 MG/1
PATCH, EXTENDED RELEASE TRANSDERMAL AS NEEDED
Status: DISCONTINUED | OUTPATIENT
Start: 2019-09-20 | End: 2019-09-20 | Stop reason: HOSPADM

## 2019-09-20 RX ORDER — OXYCODONE AND ACETAMINOPHEN 5; 325 MG/1; MG/1
1 TABLET ORAL AS NEEDED
Status: DISCONTINUED | OUTPATIENT
Start: 2019-09-20 | End: 2019-09-20 | Stop reason: HOSPADM

## 2019-09-20 RX ORDER — SODIUM CHLORIDE, SODIUM LACTATE, POTASSIUM CHLORIDE, CALCIUM CHLORIDE 600; 310; 30; 20 MG/100ML; MG/100ML; MG/100ML; MG/100ML
INJECTION, SOLUTION INTRAVENOUS
Status: DISCONTINUED | OUTPATIENT
Start: 2019-09-20 | End: 2019-09-20 | Stop reason: HOSPADM

## 2019-09-20 RX ORDER — FENTANYL CITRATE 50 UG/ML
INJECTION, SOLUTION INTRAMUSCULAR; INTRAVENOUS AS NEEDED
Status: DISCONTINUED | OUTPATIENT
Start: 2019-09-20 | End: 2019-09-20 | Stop reason: HOSPADM

## 2019-09-20 RX ORDER — GLYCOPYRROLATE 0.2 MG/ML
INJECTION INTRAMUSCULAR; INTRAVENOUS AS NEEDED
Status: DISCONTINUED | OUTPATIENT
Start: 2019-09-20 | End: 2019-09-20 | Stop reason: HOSPADM

## 2019-09-20 RX ORDER — FENTANYL CITRATE 50 UG/ML
25 INJECTION, SOLUTION INTRAMUSCULAR; INTRAVENOUS
Status: DISCONTINUED | OUTPATIENT
Start: 2019-09-20 | End: 2019-09-20 | Stop reason: HOSPADM

## 2019-09-20 RX ORDER — SODIUM CHLORIDE 9 MG/ML
50 INJECTION, SOLUTION INTRAVENOUS CONTINUOUS
Status: DISCONTINUED | OUTPATIENT
Start: 2019-09-20 | End: 2019-09-20 | Stop reason: HOSPADM

## 2019-09-20 RX ORDER — BUPIVACAINE HYDROCHLORIDE AND EPINEPHRINE 5; 5 MG/ML; UG/ML
30 INJECTION, SOLUTION EPIDURAL; INTRACAUDAL; PERINEURAL ONCE
Status: COMPLETED | OUTPATIENT
Start: 2019-09-20 | End: 2019-09-20

## 2019-09-20 RX ORDER — EPHEDRINE SULFATE/0.9% NACL/PF 50 MG/5 ML
5 SYRINGE (ML) INTRAVENOUS AS NEEDED
Status: DISCONTINUED | OUTPATIENT
Start: 2019-09-20 | End: 2019-09-20 | Stop reason: HOSPADM

## 2019-09-20 RX ORDER — DEXAMETHASONE SODIUM PHOSPHATE 4 MG/ML
INJECTION, SOLUTION INTRA-ARTICULAR; INTRALESIONAL; INTRAMUSCULAR; INTRAVENOUS; SOFT TISSUE AS NEEDED
Status: DISCONTINUED | OUTPATIENT
Start: 2019-09-20 | End: 2019-09-20 | Stop reason: HOSPADM

## 2019-09-20 RX ORDER — LIDOCAINE HYDROCHLORIDE 20 MG/ML
INJECTION, SOLUTION EPIDURAL; INFILTRATION; INTRACAUDAL; PERINEURAL AS NEEDED
Status: DISCONTINUED | OUTPATIENT
Start: 2019-09-20 | End: 2019-09-20 | Stop reason: HOSPADM

## 2019-09-20 RX ORDER — ONDANSETRON 4 MG/1
4 TABLET, ORALLY DISINTEGRATING ORAL
Qty: 42 TAB | Refills: 1 | Status: SHIPPED | OUTPATIENT
Start: 2019-09-20 | End: 2019-10-04

## 2019-09-20 RX ADMIN — ONDANSETRON HYDROCHLORIDE 4 MG: 2 INJECTION, SOLUTION INTRAMUSCULAR; INTRAVENOUS at 13:17

## 2019-09-20 RX ADMIN — DEXAMETHASONE SODIUM PHOSPHATE 4 MG: 4 INJECTION, SOLUTION INTRAMUSCULAR; INTRAVENOUS at 13:17

## 2019-09-20 RX ADMIN — DEXMEDETOMIDINE HYDROCHLORIDE 4 MCG: 100 INJECTION, SOLUTION, CONCENTRATE INTRAVENOUS at 14:01

## 2019-09-20 RX ADMIN — FENTANYL CITRATE 50 MCG: 50 INJECTION, SOLUTION INTRAMUSCULAR; INTRAVENOUS at 13:52

## 2019-09-20 RX ADMIN — DEXMEDETOMIDINE HYDROCHLORIDE 4 MCG: 100 INJECTION, SOLUTION, CONCENTRATE INTRAVENOUS at 14:00

## 2019-09-20 RX ADMIN — LIDOCAINE HYDROCHLORIDE 60 MG: 20 INJECTION, SOLUTION EPIDURAL; INFILTRATION; INTRACAUDAL; PERINEURAL at 13:08

## 2019-09-20 RX ADMIN — DEXMEDETOMIDINE HYDROCHLORIDE 4 MCG: 100 INJECTION, SOLUTION, CONCENTRATE INTRAVENOUS at 14:02

## 2019-09-20 RX ADMIN — ACETAMINOPHEN 650 MG: 325 TABLET, FILM COATED ORAL at 11:50

## 2019-09-20 RX ADMIN — SODIUM CHLORIDE, POTASSIUM CHLORIDE, SODIUM LACTATE AND CALCIUM CHLORIDE: 600; 310; 30; 20 INJECTION, SOLUTION INTRAVENOUS at 13:00

## 2019-09-20 RX ADMIN — KETOROLAC TROMETHAMINE 30 MG: 30 INJECTION, SOLUTION INTRAMUSCULAR; INTRAVENOUS at 14:06

## 2019-09-20 RX ADMIN — PROPOFOL 150 MG: 10 INJECTION, EMULSION INTRAVENOUS at 13:08

## 2019-09-20 RX ADMIN — SCOPALAMINE 1 PATCH: 1 PATCH, EXTENDED RELEASE TRANSDERMAL at 13:03

## 2019-09-20 RX ADMIN — PROPOFOL 50 MG: 10 INJECTION, EMULSION INTRAVENOUS at 13:52

## 2019-09-20 RX ADMIN — NEOSTIGMINE METHYLSULFATE 2 MG: 1 INJECTION, SOLUTION INTRAMUSCULAR; INTRAVENOUS; SUBCUTANEOUS at 14:04

## 2019-09-20 RX ADMIN — FENTANYL CITRATE 50 MCG: 50 INJECTION, SOLUTION INTRAMUSCULAR; INTRAVENOUS at 13:08

## 2019-09-20 RX ADMIN — ROCURONIUM BROMIDE 30 MG: 10 SOLUTION INTRAVENOUS at 13:08

## 2019-09-20 RX ADMIN — SODIUM CHLORIDE, SODIUM LACTATE, POTASSIUM CHLORIDE, AND CALCIUM CHLORIDE 125 ML/HR: 600; 310; 30; 20 INJECTION, SOLUTION INTRAVENOUS at 11:47

## 2019-09-20 RX ADMIN — GLYCOPYRROLATE 0.4 MG: 0.2 INJECTION, SOLUTION INTRAMUSCULAR; INTRAVENOUS at 14:04

## 2019-09-20 RX ADMIN — HYDROMORPHONE HYDROCHLORIDE 0.2 MG: 1 INJECTION, SOLUTION INTRAMUSCULAR; INTRAVENOUS; SUBCUTANEOUS at 14:47

## 2019-09-20 RX ADMIN — Medication 2 G: at 13:17

## 2019-09-20 NOTE — DISCHARGE INSTRUCTIONS
Patient Discharge Instructions    Ashlyn David / [id number] : 1970    Admitted 2019 Discharged: 2019     Take Home Medications            · It is important that you take the medication exactly as they are prescribed. · Keep your medication in the bottles provided by the pharmacist and keep a list of the medication names, dosages, and times to be taken in your wallet. · Do not take other medications without consulting your doctor. · Do not take additional tylenol/acetaminophen at the same time as the prescribed hydrocodone with acetaminophen. If you desire to look up any additional information, Dr. Jose Barajas recommends the [address] [place], [place] [address] website and the [facility] website. What to do at Home    Recommended diet: Regular Diet,     Recommended activity: Activity as tolerated, may shower whenever you wish          Follow-up Appointments   Procedures    FOLLOW UP VISIT Appointment in: One Week With Dr. Jose Barajas     With Dr. Jose Barajas     Standing Status:   Standing     Number of Occurrences:   1     Order Specific Question:   Appointment in     Answer: One Week           Information obtained by :  I understand that if any problems occur once I am at home I am to contact my physician. I understand and acknowledge receipt of the instructions indicated above.                                                                                                                                            Physician's or R.N.'s Signature                                                                  Date/Time                                                                                                                                              Patient or Representative Signature                                                          Date/Time

## 2019-09-20 NOTE — PERIOP NOTES
Patient: Mary Knife MRN: [id number]  SSN: [id number] YOB: 1970  Age: 44 y.o. Sex: female Patient is status post Procedure(s): 
Diagnostic Laparoscopy, Biopsy of  right Ovary, Aspiration of peritoneal fluid. [facility] Surgeon(s) and Role: Ani Celaya MD - Primary Salem Burkitt, MD - Assisting Peripheral IV 09/20/19 Right Hand (Active) Dressing/Packing:  Wound Abdomen Anterior-Dressing Type: Topical skin adhesive/glue(4 lapsites with dermabond. ) (09/20/19 1410) Splint/Cast:  ]

## 2019-09-20 NOTE — OP NOTES
Gynecologic Oncology Operative Report Dwight Briscoe 9/20/2019 Pre-operative dx:  Cholelithiasis Post-operative dx:  Ovarian cancer Procedure:  Diagnostic laparoscopy, right ovarian biopsy Surgeon:  Zenaida Pruitt MD 
 
Assistant:  Windy Chacko MD  
 
Anesthesia:  GETA 
 
EBL:  5 cc Complications:  None Implants:  None Specimens:   
ID Type Source Tests Collected by Time Destination 1 : Right ovary biopsy  Fresh Ovary  Stana Dandy, MD 9/20/2019 1351 Pathology 1 : Peritoneal fluid  Fresh Abdomen  Stana Dandy, MD 9/20/2019 1354 Cytology Operative indications:  43 yo WF with presumed symptomatic cholelithiasis. She had been having abdominal pain associated with nausea/vomiting for the last year and has been seen in the ER on several occasions. She had a HIDA scan on 09/16/19 that showed slightly delayed gallbladder emptying with no gallbladder response to CCK. Dr. Marguerite King had recommended a laparoscopic cholecystectomy. Operative findings:  Small volume ascites consisting of cloudy yellow fluid. Miliary peritoneal implants along the anterior abdominal wall, bladder and uterine serosa, and bilateral diaphragmatic surfaces. Bilaterally enlarged, complex ovarian masses, consistent with ovarian carcinoma. Omentum densely adherent to the right ovary and contained some disease . Visualized portions of small and large bowel appeared normal.  Liver surface appeared normal.  Spleen not visualized. Procedure in detail:  I was consulted intraoperatively by Dr. Windy Chacko due to a likely diagnosis of ovarian cancer. At the time that I scrubbed into the procedure, Dr. Marguerite King had already placed 4 laparoscopic trocars in preparation for cholecystectomy. She was in slight reverse Trendelenburg position at the time. The above mentioned upper abdominal findings were noted.   I then had anesthesia place her in Trendelenburg so that I could better evaluate the pelvis. The above mentioned pelvic findings were noted. The omentum was the freed from its attachments to the right ovary using laparoscopic scissors and cautery as needed for hemostasis. The right ovary was then biopsied with a cup biopsy forcep. Several biopsies were obtained to ensure adequate volume of material.  Since I did not have a confirmed diagnosis at that time, and she was not counseled for a debulking procedure, I felt that the best thing to do would be to wait on the confirmatory biopsies before making any decision on surgery. The case was handed back over to Dr. Carie Hoff for closure.    
 
Kenny Nobles MD 
9/20/2019 
2:43 PM

## 2019-09-20 NOTE — H&P
Subjective:  
  
Christ Dickerson  is a 44 y.o.  female who was referred by [facility] for evaluation of gallbladder. Pt notes that she had first episode of abdominal pain about 1 year ago with nausea, vomiting, and pain lasting 4-5 hours. She notes these episodes were happening every 3-4 months, and has had 2 bad episodes in 7 day period. Pt reports the pain from the last episode never fully subsided. HIDA scan on 09/16/19 showed slightly delayed gallbladder emptying with no gallbladder response to CCK. Past Medical History:  
Diagnosis Date  Symptomatic cholelithiasis 9/18/2019 No past surgical history on file. Social History Tobacco Use  Smoking status: Never Smoker  Smokeless tobacco: Never Used Substance Use Topics  Alcohol use: Yes Comment: once a month Family History Problem Relation Age of Onset  Breast Cancer Paternal Grandmother 56 Current Outpatient Medications on File Prior to Visit Medication Sig Dispense Refill  ZOVIA 1/35E, 28, 1-35 mg-mcg tab TAKE 1 TABLET BY MOUTH EVERY DAY   2  
 cetirizine (ZYRTEC) 10 mg tablet Take  by mouth.  multivitamin (ONE A DAY) tablet Take 1 Tab by mouth daily.  ondansetron (ZOFRAN ODT) 4 mg disintegrating tablet Take 1 Tab by mouth every eight (8) hours as needed for Nausea. 10 Tab 0  
 omeprazole (PRILOSEC) 40 mg capsule Take 1 Cap by mouth daily. 30 Cap 0 No current facility-administered medications on file prior to visit. No Known Allergies Review of Systems:   
Pertinent items are noted in the History of Present Illness. Objective:  
  
Visit Vitals /64 (BP 1 Location: Right arm, BP Patient Position: Sitting) Pulse 79 Temp 97.8 °F (36.6 °C) (Oral) Resp 16 Ht 5' 3\" (1.6 m) Wt 141 lb 8 oz (64.2 kg) SpO2 98% BMI 25.07 kg/m² Physical Exam: GENERAL: alert, cooperative, no distress, appears stated age LUNG: clear to auscultation bilaterally HEART: regular rate and rhythm, S1, S2 normal, no murmur, click, rub or gallop ABDOMEN: Tender in the epigastrium. Labs:   
Recent Results No results found for this or any previous visit (from the past 24 hour(s)). Assessment and Plan: ICD-10-CM ICD-9-CM 1. Symptomatic cholelithiasis K80.20 574.20 I recommend pt have a laparoscopic cholecystectomy to be done as an outpatient. I thoroughly explained their diagnosis, discussed the procedure, and discussed what they should expect for recovery. I advised them to take a least 3-4 days off from work to allow ample time for them to recover. This can be done at their convenience. All questions were answered. She agrees with this plan and will schedule this accordingly.   
  
 
  
   
   
  
   
  
 
 
 
·

## 2019-09-20 NOTE — ANESTHESIA PREPROCEDURE EVALUATION
Relevant Problems No relevant active problems Anesthetic History PONV Review of Systems / Medical History Patient summary reviewed, nursing notes reviewed and pertinent labs reviewed Pulmonary Within defined limits Neuro/Psych Within defined limits Cardiovascular Within defined limits Exercise tolerance: >4 METS 
  
GI/Hepatic/Renal 
Within defined limits Endo/Other Within defined limits Other Findings Physical Exam 
 
Airway Mallampati: I 
TM Distance: > 6 cm Neck ROM: normal range of motion Mouth opening: Normal 
 
 Cardiovascular Regular rate and rhythm,  S1 and S2 normal,  no murmur, click, rub, or gallop Dental 
No notable dental hx Pulmonary Breath sounds clear to auscultation Abdominal 
GI exam deferred Other Findings Anesthetic Plan ASA: 1 Anesthesia type: general 
 
 
 
 
Induction: Intravenous Anesthetic plan and risks discussed with: Patient

## 2019-09-20 NOTE — BRIEF OP NOTE
BRIEF OPERATIVE NOTE Date of Procedure: 9/20/2019 Preoperative Diagnosis: CHOLELITHIASIS Postoperative Diagnosis: CHOLELITHIASIS Procedure(s): 
Diagnostic Laparoscopy, Biopsy of  right Ovary, Aspiration of peritoneal fluid. Surgeon(s) and Role: Kirsten Hawkins MD - Primary Mukesh Menard MD - CoSurgeon Surgical Assistant: Kandis Blue Surgical Staff: 
Circ-Wilver Kellogg RN Scrub Tech-1: Susan Hurtado Surg Asst-1: Burgess Veloz Event Time In Time Out Incision Start  Incision Close 1410 Anesthesia: General  
Estimated Blood Loss: minimal 
Specimens:  
ID Type Source Tests Collected by Time Destination 1 : Right ovary biopsy  Fresh Ovary  Garth Jett MD 9/20/2019 1351 Pathology 1 : Peritoneal fluid  Fresh Abdomen  Garth Jett MD 9/20/2019 1352 Cytology Findings: peritoneal seeding, especially both diaphragms and small akount in the omentum; bilateral ovarian masses. Complications: none Implants: * No implants in log * 
 
[id number]

## 2019-09-21 NOTE — OP NOTES
[address] [facility] 
OPERATIVE REPORT Name:  Dustin Sebastian 
MR#:  [id number] :  1970 ACCOUNT #:  [de-identified] DATE OF SERVICE:  2019 PREOPERATIVE DIAGNOSES:  Biliary dyskinesia, possible cholelithiasis. POSTOPERATIVE DIAGNOSIS:  Stage III ovarian cancer. PROCEDURES PERFORMED:  Diagnostic laparoscopy, aspiration of ascitic fluid, and (by Dr. Adina Bautista) biopsy of right ovary. SURGEON:  Arelis Orlando MD 
 
CO-SURGEON:  Adina Bautista MD 
 
ASSISTANT:  Stephani Kinney ANESTHESIA:  General, supplemented with 0.5% Marcaine with epinephrine. COMPLICATIONS:  None. SPECIMENS REMOVED:  Right ovarian biopsies and peritoneal fluid. IMPLANTS:  None. ESTIMATED BLOOD LOSS:  Minimal. 
 
CONDITION:  Good to the PACU. FINDINGS:  Peritoneal seeding, especially in both diaphragms, right greater than left. Minimal seeding in the omentum and bilateral ovarian masses with some omentum stuck to the right ovary. PROCEDURE:  With the patient supine and suitably anesthetized, the abdomen was prepared with ChloraPrep and draped as a field. Marcaine 0.5% with epinephrine was infiltrated in appropriate places. A subumbilical incision was made. Skin was grasped with Kocher clamps, Veress needle was inserted, and pneumoperitoneum was established. The needle was removed, and a 5 mm trocar was placed and then the camera was inserted, and two 5 mm trocars were placed in the right upper quadrant and 12 mm was placed in the midline superiorly, and then upon quick inspection, it was clear there was a lot of peritoneal fluid in there and thick ascites. There was studding of the right and left diaphragms. The gallbladder appeared to be absolutely normal.  There was no encroachment on the small-bowel by nodules.   At this point, I asked Dr. Whitney Olmstead to consult and he entered the operating room, scrubbed in, and he will, I am sure, dictate a separate note for what he did. Basically, a biopsy of the right ovary and did a thorough assessment, especially with camera and laparoscopy. At this point, we decided to close and to leave the gallbladder alone, assuming it was just malfunctioning because of the underlying ovarian cancer. Therefore, the 12 mm defect site was closed with 0 Vicryl suture passed with the Endoclose device. Operative trocars were removed under direct vision, there was no bleeding seen. The pneumoperitoneum was released and the umbilical port was removed. All the port sites were re-infiltrated with 0.5% Marcaine with epinephrine, and then they were closed with subcuticular Monocryl, followed by Dermabond. At the termination of the procedure, all counts were correct. The patient tolerated this well, was brought to the PACU in satisfactory condition. Mesha Peralta MD 
 
 
[id number] 
D:  09/20/2019 15:27 
T:  09/20/2019 23:13 
JOB #:  [id number] CC:  MD Laila Maciel MD

## 2019-09-23 ENCOUNTER — TELEPHONE (OUTPATIENT)
Dept: GYNECOLOGY | Age: 49
End: 2019-09-23

## 2019-09-23 ENCOUNTER — HOSPITAL ENCOUNTER (OUTPATIENT)
Dept: CT IMAGING | Age: 49
Discharge: HOME | End: 2019-09-23
Attending: OBSTETRICS & GYNECOLOGY
Payer: COMMERCIAL

## 2019-09-23 DIAGNOSIS — C56.3 MALIGNANT NEOPLASM OF BOTH OVARIES (HCC): ICD-10-CM

## 2019-09-23 DIAGNOSIS — C56.3 MALIGNANT NEOPLASM OF BOTH OVARIES (HCC): Primary | ICD-10-CM

## 2019-09-23 PROCEDURE — 74011636320 HC RX REV CODE- 636/320: Performed by: RADIOLOGY

## 2019-09-23 PROCEDURE — 74177 CT ABD & PELVIS W/CONTRAST: CPT

## 2019-09-23 PROCEDURE — 71260 CT THORAX DX C+: CPT

## 2019-09-23 PROCEDURE — 74011000258 HC RX REV CODE- 258: Performed by: RADIOLOGY

## 2019-09-23 RX ORDER — SODIUM CHLORIDE 0.9 % (FLUSH) 0.9 %
10 SYRINGE (ML) INJECTION
Status: COMPLETED | OUTPATIENT
Start: 2019-09-23 | End: 2019-09-23

## 2019-09-23 RX ORDER — PROCHLORPERAZINE MALEATE 10 MG
10 TABLET ORAL
Qty: 30 TAB | Refills: 1 | Status: SHIPPED | OUTPATIENT
Start: 2019-09-23 | End: 2019-10-15

## 2019-09-23 RX ADMIN — SODIUM CHLORIDE 100 ML: 900 INJECTION, SOLUTION INTRAVENOUS at 15:13

## 2019-09-23 RX ADMIN — Medication 10 ML: at 15:13

## 2019-09-23 RX ADMIN — IOPAMIDOL 100 ML: 755 INJECTION, SOLUTION INTRAVENOUS at 15:13

## 2019-09-23 NOTE — TELEPHONE ENCOUNTER
Patient having CT scan today and c/o n/v. Per terrance from  a prescription for Compazine was sent to the [facility] pharmacy.

## 2019-09-24 ENCOUNTER — OFFICE VISIT (OUTPATIENT)
Dept: GYNECOLOGY | Age: 49
End: 2019-09-24

## 2019-09-24 ENCOUNTER — HOSPITAL ENCOUNTER (INPATIENT)
Age: 49
LOS: 9 days | Discharge: HOME | DRG: 330 | End: 2019-10-03
Attending: OBSTETRICS & GYNECOLOGY | Admitting: OBSTETRICS & GYNECOLOGY
Payer: COMMERCIAL

## 2019-09-24 VITALS
WEIGHT: 141 LBS | HEIGHT: 63 IN | DIASTOLIC BLOOD PRESSURE: 87 MMHG | BODY MASS INDEX: 24.98 KG/M2 | SYSTOLIC BLOOD PRESSURE: 128 MMHG | HEART RATE: 85 BPM

## 2019-09-24 DIAGNOSIS — G89.18 POSTOPERATIVE PAIN: Primary | ICD-10-CM

## 2019-09-24 DIAGNOSIS — C56.3 MALIGNANT NEOPLASM OF BOTH OVARIES (HCC): Primary | ICD-10-CM

## 2019-09-24 PROBLEM — C56.9 OVARIAN CANCER (HCC): Status: ACTIVE | Noted: 2019-09-24

## 2019-09-24 PROBLEM — K56.609 SMALL BOWEL OBSTRUCTION (HCC): Status: ACTIVE | Noted: 2019-09-24

## 2019-09-24 PROCEDURE — 86900 BLOOD TYPING SEROLOGIC ABO: CPT

## 2019-09-24 PROCEDURE — 74011250636 HC RX REV CODE- 250/636: Performed by: OBSTETRICS & GYNECOLOGY

## 2019-09-24 PROCEDURE — 36415 COLL VENOUS BLD VENIPUNCTURE: CPT

## 2019-09-24 PROCEDURE — 65410000002 HC RM PRIVATE OB

## 2019-09-24 PROCEDURE — 86923 COMPATIBILITY TEST ELECTRIC: CPT

## 2019-09-24 RX ORDER — DIPHENHYDRAMINE HYDROCHLORIDE 50 MG/ML
12.5 INJECTION, SOLUTION INTRAMUSCULAR; INTRAVENOUS
Status: DISCONTINUED | OUTPATIENT
Start: 2019-09-24 | End: 2019-09-26

## 2019-09-24 RX ORDER — HYDROMORPHONE HYDROCHLORIDE 1 MG/ML
1 INJECTION, SOLUTION INTRAMUSCULAR; INTRAVENOUS; SUBCUTANEOUS
Status: DISCONTINUED | OUTPATIENT
Start: 2019-09-24 | End: 2019-09-26

## 2019-09-24 RX ORDER — ONDANSETRON 2 MG/ML
4 INJECTION INTRAMUSCULAR; INTRAVENOUS
Status: DISCONTINUED | OUTPATIENT
Start: 2019-09-24 | End: 2019-09-26

## 2019-09-24 RX ORDER — SODIUM CHLORIDE 0.9 % (FLUSH) 0.9 %
5-40 SYRINGE (ML) INJECTION AS NEEDED
Status: DISCONTINUED | OUTPATIENT
Start: 2019-09-24 | End: 2019-09-26

## 2019-09-24 RX ORDER — LORAZEPAM 2 MG/ML
1 INJECTION INTRAMUSCULAR
Status: DISCONTINUED | OUTPATIENT
Start: 2019-09-24 | End: 2019-09-26

## 2019-09-24 RX ORDER — KETOROLAC TROMETHAMINE 30 MG/ML
30 INJECTION, SOLUTION INTRAMUSCULAR; INTRAVENOUS
Status: DISCONTINUED | OUTPATIENT
Start: 2019-09-24 | End: 2019-09-26

## 2019-09-24 RX ORDER — OXYCODONE AND ACETAMINOPHEN 5; 325 MG/1; MG/1
1 TABLET ORAL
Status: DISCONTINUED | OUTPATIENT
Start: 2019-09-24 | End: 2019-09-26

## 2019-09-24 RX ORDER — SODIUM CHLORIDE AND POTASSIUM CHLORIDE .9; .15 G/100ML; G/100ML
SOLUTION INTRAVENOUS CONTINUOUS
Status: DISCONTINUED | OUTPATIENT
Start: 2019-09-24 | End: 2019-09-26

## 2019-09-24 RX ORDER — PROCHLORPERAZINE EDISYLATE 5 MG/ML
10 INJECTION INTRAMUSCULAR; INTRAVENOUS
Status: DISCONTINUED | OUTPATIENT
Start: 2019-09-24 | End: 2019-09-24 | Stop reason: SDUPTHER

## 2019-09-24 RX ORDER — SODIUM CHLORIDE 0.9 % (FLUSH) 0.9 %
5-40 SYRINGE (ML) INJECTION EVERY 8 HOURS
Status: DISCONTINUED | OUTPATIENT
Start: 2019-09-24 | End: 2019-09-26

## 2019-09-24 RX ADMIN — ONDANSETRON 4 MG: 2 INJECTION INTRAMUSCULAR; INTRAVENOUS at 20:59

## 2019-09-24 RX ADMIN — POTASSIUM CHLORIDE AND SODIUM CHLORIDE: 900; 150 INJECTION, SOLUTION INTRAVENOUS at 22:02

## 2019-09-24 RX ADMIN — Medication 10 ML: at 22:03

## 2019-09-24 RX ADMIN — HYDROMORPHONE HYDROCHLORIDE 1 MG: 1 INJECTION, SOLUTION INTRAMUSCULAR; INTRAVENOUS; SUBCUTANEOUS at 20:59

## 2019-09-24 NOTE — LETTER
2019 4:16 PM 
 
Patient:  Nan Garvey YOB: 1970 Date of Visit: 2019 Dear Ovidio Sanchez MD 
[address] [facility]  [facility] [address] [id number] VIA In Basket MD Lavonne GranadosJersey Shore University Medical Centernickie  [address] [facility]. VIA Facsimile: [phone] Abby Rangel MD 
[address] [facility] [address] [address] VIA In Basket 
 : Thank you for referring Ms. Nan Garvey to me for evaluation/treatment. Below are the relevant portions of my assessment and plan of care. Hospital follow up to review ct scan results. Patient c/o nausea and vomiting. She is taking the Compazine. [address], [address] [facility], [address] 
P [phone]  F [phone] Office Note Patient ID: 
Name:  Nan Garvey MRN:  [id number] :  1970/49 y.o. Date:  2019 HISTORY OF PRESENT ILLNESS: 
Nan Garvey is a 44 y.o.  perimenopausal female who I was consulted on intraoperatively by Dr. Eva Lawson for what appeared to be an ovarian carcinoma. She had been having nausea/vomiting and abdominal pain for months. A HIDA scan suggested that her gallbladder was likely the source of her pain and Dr. Zuleyma Barber took her to the OR for a laparoscopic cholecystectomy. At the time of surgery she was noted to have peritoneal carcinomatosis with bilaterally enlarged ovaries. Cytology was obtained and I performed a biopsy of the right ovary. We decided that it would be best to wait on the final pathology before proceeding with any aggressive debulking surgery, as we might also consider neoadjuvant chemotherapy. I arranged for her to have a CT of the chest/abdomen/pelvis following her laparoscopy. I ordered a CA-125 to be done in the PACU, but it was not drawn. She is followed by Dr. Artemio Florian with [phone] for her gynecologic care. She was actually seen and evaluated there in June 2019 for some irregular bleeding. She had been on OCPs to help control her symptoms. A pelvic ultrasound demonstrated a normal appearing uterus. The endometrium was not thickened. Her right ovary contained a 3.2 cm simple cyst.  The left ovary was not seen. There was a small amount of free fluid. She presents today to review her CT and pathology results and to discuss a definitive plan. The CT scan demonstrates dilated loops of small bowel, suggesting partial obstruction. There is mucosal thickening of the terminal ileum. The appendix is not visualized. In addition to the gastrointestinal findings, there is bilateral ovarian enlargement, omental thickening, carcinomatosis, and ascites. Radiologic findings and operative findings would suggest and ovarian or peritoneal carcinoma. Preliminary pathology is a bit confusing, however. I spoke with Dr. Kip Bowman from pathology. The right ovarian biopsy demonstrated an adenocarcinoma, but it wasn't clear histologically as to what it was. The tumor was CK7 and PAX8 negative, while also being CK20 positive. These findings would go against it being an ovarian cancer. ROS: 
 and GI review:  Negative Cardiopulmonary review:  Negative Musculoskeletal:  Negative Pertinent items are noted in the History of Present Illness. , 10 point ROS 
 
 
OB/GYN ROS: 
[id number] There is no history of significant gyn problems or procedures. Patient denies any abnormal bleeding or vaginal discharge. Problem List: 
Patient Active Problem List  
 Diagnosis Date Noted  Malignant neoplasm of both ovaries ([address] 09/20/2019  Symptomatic cholelithiasis 09/18/2019 PMH: 
Past Medical History:  
Diagnosis Date  Nausea & vomiting  Symptomatic cholelithiasis 9/18/2019 PSH: 
Past Surgical History:  
Procedure Laterality Date  HX GYN  2003 CYST ON OVARY Social History: 
Social History Tobacco Use  Smoking status: Never Smoker  Smokeless tobacco: Never Used Substance Use Topics  Alcohol use: Yes Comment: once a month Family History: 
Family History Problem Relation Age of Onset  Breast Cancer Paternal Grandmother 56  Crohn's Disease Mother  Heart Disease Mother  Cancer Father BLADDER  
 Diabetes Father  No Known Problems Son  No Known Problems Daughter  Anesth Problems Neg Hx Medications: (reviewed) Current Outpatient Medications Medication Sig  prochlorperazine (COMPAZINE) 10 mg tablet Take 1 Tab by mouth every six (6) hours as needed for Nausea for up to 15 doses. Indications: Nausea and Vomiting  HYDROcodone-acetaminophen (NORCO) 5-325 mg per tablet Take 1 Tab by mouth every six (6) hours as needed for Pain for up to 14 days. Max Daily Amount: 4 Tabs.  ondansetron (ZOFRAN ODT) 4 mg disintegrating tablet Take 1 Tab by mouth every eight (8) hours as needed for Nausea for up to 14 days.  ZOVIA 1/35E, 28, 1-35 mg-mcg tab daily.  cetirizine (ZYRTEC) 10 mg tablet Take  by mouth daily as needed.  multivitamin (ONE A DAY) tablet Take 1 Tab by mouth daily. No current facility-administered medications for this visit. Allergies: (reviewed) No Known Allergies OBJECTIVE: 
 
Physical Exam: VITAL SIGNS: Vitals:  
 09/24/19 1439 BP: 128/87 Pulse: 85 Weight: 141 lb (64 kg) Height: 5' 2.99\" (1.6 m) Body mass index is 24.98 kg/m². GENERAL MARTINA: Conversant, alert, oriented. No acute distress. HEENT: HEENT. No thyroid enlargement. No JVD. Neck: Supple without restrictions. RESPIRATORY: Clear to auscultation and percussion to the bases. No CVAT. CARDIOVASC: RRR without murmur/rub. GASTROINT: Mildly distended, diffusely tender. MUSCULOSKEL: no joint tenderness, deformity or swelling EXTREMITIES: extremities normal, atraumatic, no cyanosis or edema PELVIC: Deferred RECTAL: Deferred SRIDEVI SURVEY: No suspicious lymphadenopathy or edema noted. NEURO: Grossly intact. No acute deficit. Lab Data: 
 
Lab Results Component Value Date/Time WBC 11.9 (H) 07/21/2018 05:21 AM  
 HGB 14.8 07/21/2018 05:21 AM  
 HCT 42.1 07/21/2018 05:21 AM  
 PLATELET 055 59/57/1582 05:21 AM  
 MCV 88.8 07/21/2018 05:21 AM  
 
Lab Results Component Value Date/Time Sodium 139 07/21/2018 05:55 AM  
 Potassium 3.8 07/21/2018 05:55 AM  
 Chloride 106 07/21/2018 05:55 AM  
 CO2 23 07/21/2018 05:55 AM  
 Anion gap 10 07/21/2018 05:55 AM  
 Glucose 122 (H) 07/21/2018 05:55 AM  
 BUN 9 07/21/2018 05:55 AM  
 Creatinine 0.71 07/21/2018 05:55 AM  
 BUN/Creatinine ratio 13 07/21/2018 05:55 AM  
 GFR est AA >60 07/21/2018 05:55 AM  
 GFR est non-AA >60 07/21/2018 05:55 AM  
 Calcium 8.6 07/21/2018 05:55 AM  
No results found for: Ty Escobedo, [id number] CT of chest/abdomen/pelvis (9/23/19) THYROID: No nodule. MEDIASTINUM: No mass or lymphadenopathy. ABNER: No mass or lymphadenopathy. THORACIC AORTA: No aneurysm. MAIN PULMONARY ARTERY: Normal in caliber. No central pulmonary embolism. HEART: Normal in size. ESOPHAGUS: Small sliding-type hiatal hernia. Enteric contrast in the esophagus 
may represent gastroesophageal reflux. TRACHEA/BRONCHI: Patent. PLEURA: No effusion or pneumothorax. LUNGS: Mild dependent atelectasis in the bilateral lower lobes. No lung mass or 
suspicious nodule. No pneumonia. 
  
Liver: There are nonenhancing cysts in the left hepatic lobe. Largest cyst is 
lateral in segment 3 and measures 2 cm. There is focal fatty infiltration 
adjacent to the falciform ligament. No evidence of pathologically enhancing 
hepatic mass. Gallbladder: Unremarkable. Spleen: Normal size and enhancement. Pancreas: No mass or ductal dilatation. Adrenals: Unremarkable. Kidneys: No mass, calculus, or hydronephrosis. Stomach: Unremarkable. Small bowel: Duodenum is not dilated. Small bowel is dilated from the mid jejunum to the distal ileum. Maximum diameter of fluid filled small bowel 
measures 2.8 cm. There is mural thickening of the terminal ileum. Colon: Incomplete distention limits evaluation. No obstruction. Appendix: Not clearly visible, difficult to differentiate from right ovarian 
mass. Peritoneum: Trace ascites. Pneumoperitoneum is related to recent laparoscopic 
surgery. There are innumerable soft tissue nodules in the greater omentum. No 
measurable omental mass. Retroperitoneum: No lymphadenopathy or aortic aneurysm. Reproductive organs: Uterus is mildly heterogeneous. Heterogeneously enhancing 
mass in the right adnexa measures approximately 5.7 x 4.8 x 4.5 cm. Close 
approximation to the nonvisualized appendix and thickened terminal ileum as well 
as the nondistended cecum. Heterogeneously enhancing mass in the left adnexa measures 4.2 x 3.8 x 4.0 cm. Urinary bladder: Nodular mural thickening of the dome of the urinary bladder may 
represent metastatic implants. Bones: No destructive bone lesion. Additional comments: Edema in the right oblique musculature is related to recent 
surgery and may cause pain. No abscess. 
  
IMPRESSION:  
1. Ovarian carcinoma with peritoneal metastasis. Right ovarian mass measures 5.7 
x 4.8 x 4.5 cm. Left ovarian mass measures 4.2 x 3.8 x 4.0 cm. 
2. Small bowel obstruction due to terminal ileum mucosal thickening, likely 
involved by ovarian carcinoma. 3. Omental carcinomatosis. Trace ascites. 4. Right oblique soft tissue edema due to recent surgery. No drainable abscess. However, this could be a source of right abdominal pain. 5. Hepatic cysts. No evidence of hepatic metastatic disease. 6. No evidence of osseous, pulmonary, or thoracic metastatic disease. IMPRESSION/PLAN: 
Radha Judge is a 44 y.o. female with a working diagnosis of likely ovarian cancer, but pathology is not definitive.   I reviewed with Veronica Merritt Lin Cho her medical records, physical exam, and review of symptoms. She is becoming more and more symptomatic and appears to be developing a distal small bowel obstruction. Based upon symptoms alone, she needs surgery to correct her obstruction. She also need surgery to help definitively identify the source of her malignancy. I have recommended X-lap, SIERRA, BSO, omentectomy, tumor debulking, with likely ileocecal resection. She was counseled on the risks, benefits, indications, and alternatives of the procedure. Her questions were answered and she wishes to proceed as planned. I am going to check tumor markers before surgery, specifically CA-125, CEA, CA 19-9, inhibin A, and inhibin B. Due to her difficulty in keeping things down, I don't think she will tolerate a bowel prep. She hasn't been eating much the last few days anyway. I am going to admit her to the hospital prior to surgery so that we make sure she is adequately hydrated before her procedure. We will also make sure she has plenty of IV antiemetics and analgesics available for her. Signed By: Lucina Knowles MD   
 9/24/2019/9:46 AM  
 
 
 
If you have questions, please do not hesitate to call me. I look forward to following Ms. Lin Cho along with you.  
 
 
 
Sincerely, 
 
 
Lucina Knowles MD

## 2019-09-24 NOTE — H&P
[facility] [address]  P [phone]  F [phone]      Patient ID:  Name:  Chase Elias  MRN:  [id number]  :  1970/49 y.o. Date:  2019      HISTORY OF PRESENT ILLNESS:  Chase Elais is a 44 y.o.  perimenopausal female who I was consulted on intraoperatively by Dr. Amaury Neri for what appeared to be an ovarian carcinoma. She had been having nausea/vomiting and abdominal pain for months. A HIDA scan suggested that her gallbladder was likely the source of her pain and Dr. Missy Cruz took her to the OR for a laparoscopic cholecystectomy. At the time of surgery she was noted to have peritoneal carcinomatosis with bilaterally enlarged ovaries. Cytology was obtained and I performed a biopsy of the right ovary. We decided that it would be best to wait on the final pathology before proceeding with any aggressive debulking surgery, as we might also consider neoadjuvant chemotherapy. I arranged for her to have a CT of the chest/abdomen/pelvis following her laparoscopy. I ordered a CA-125 to be done in the PACU, but it was not drawn. She is followed by Dr. Willa Flanagan with [phone] for her gynecologic care. She was actually seen and evaluated there in 2019 for some irregular bleeding. She had been on OCPs to help control her symptoms. A pelvic ultrasound demonstrated a normal appearing uterus. The endometrium was not thickened. Her right ovary contained a 3.2 cm simple cyst.  The left ovary was not seen. There was a small amount of free fluid. She presented to the office today to review her CT and pathology results and to discuss a definitive plan. The CT scan demonstrates dilated loops of small bowel, suggesting partial obstruction. There is mucosal thickening of the terminal ileum. The appendix is not visualized.   In addition to the gastrointestinal findings, there is bilateral ovarian enlargement, omental thickening, carcinomatosis, and ascites. Radiologic findings and operative findings would suggest and ovarian or peritoneal carcinoma. Preliminary pathology is a bit confusing, however. I spoke with Dr. Aminta Iraheta from pathology. The right ovarian biopsy demonstrated an adenocarcinoma, but it wasn't clear histologically as to what it was. The tumor was CK7 and PAX8 negative, while also being CK20 positive. These findings would go against it being an ovarian cancer. ROS:   and GI review:  Negative  Cardiopulmonary review:  Negative   Musculoskeletal:  Negative    Pertinent items are noted in the History of Present Illness. , 10 point ROS      OB/GYN ROS:    There is no history of significant gyn problems or procedures. Patient denies any abnormal bleeding or vaginal discharge. Problem List:  Patient Active Problem List    Diagnosis Date Noted    Malignant neoplasm of both ovaries ([facility] [address] 2019    Symptomatic cholelithiasis 2019     PMH:  Past Medical History:   Diagnosis Date    Nausea & vomiting     Symptomatic cholelithiasis 2019      PSH:  Past Surgical History:   Procedure Laterality Date    HX GYN  2003    CYST ON OVARY      Social History:  Social History     Tobacco Use    Smoking status: Never Smoker    Smokeless tobacco: Never Used   Substance Use Topics    Alcohol use: Yes     Comment: once a month      Family History:  Family History   Problem Relation Age of Onset    Breast Cancer Paternal Grandmother 61    Crohn's Disease Mother     Heart Disease Mother     Cancer Father         BLADDER    Diabetes Father     No Known Problems Son     No Known Problems Daughter     Anesth Problems Neg Hx       Medications: (reviewed)  No current facility-administered medications for this encounter.       Current Outpatient Medications   Medication Sig    prochlorperazine (COMPAZINE) 10 mg tablet Take 1 Tab by mouth every six (6) hours as needed for Nausea for up to 15 doses. Indications: Nausea and Vomiting    HYDROcodone-acetaminophen (NORCO) 5-325 mg per tablet Take 1 Tab by mouth every six (6) hours as needed for Pain for up to 14 days. Max Daily Amount: 4 Tabs.  ondansetron (ZOFRAN ODT) 4 mg disintegrating tablet Take 1 Tab by mouth every eight (8) hours as needed for Nausea for up to 14 days.  ZOVIA 1/35E, 28, 1-35 mg-mcg tab daily.  cetirizine (ZYRTEC) 10 mg tablet Take  by mouth daily as needed.  multivitamin (ONE A DAY) tablet Take 1 Tab by mouth daily. Allergies: (reviewed)  No Known Allergies       OBJECTIVE:    Physical Exam:  VITAL SIGNS: There were no vitals filed for this visit. There is no height or weight on file to calculate BMI. GENERAL MARTINA: Conversant, alert, oriented. No acute distress. HEENT: HEENT. No thyroid enlargement. No JVD. Neck: Supple without restrictions. RESPIRATORY: Clear to auscultation and percussion to the bases. No CVAT. CARDIOVASC: RRR without murmur/rub. GASTROINT: Mildly distended, diffusely tender. MUSCULOSKEL: no joint tenderness, deformity or swelling   EXTREMITIES: extremities normal, atraumatic, no cyanosis or edema   PELVIC: Deferred   RECTAL: Deferred   SRIDEVI SURVEY: No suspicious lymphadenopathy or edema noted. NEURO: Grossly intact. No acute deficit.        Lab Data:    Lab Results   Component Value Date/Time    WBC 11.9 (H) 07/21/2018 05:21 AM    HGB 14.8 07/21/2018 05:21 AM    HCT 42.1 07/21/2018 05:21 AM    PLATELET 442 70/61/7138 05:21 AM    MCV 88.8 07/21/2018 05:21 AM     Lab Results   Component Value Date/Time    Sodium 139 07/21/2018 05:55 AM    Potassium 3.8 07/21/2018 05:55 AM    Chloride 106 07/21/2018 05:55 AM    CO2 23 07/21/2018 05:55 AM    Anion gap 10 07/21/2018 05:55 AM    Glucose 122 (H) 07/21/2018 05:55 AM    BUN 9 07/21/2018 05:55 AM    Creatinine 0.71 07/21/2018 05:55 AM    BUN/Creatinine ratio 13 07/21/2018 05:55 AM    GFR est AA >60 07/21/2018 05:55 AM    GFR est non-AA >60 07/21/2018 05:55 AM    Calcium 8.6 07/21/2018 05:55 AM   No results found for: C125, FFEV400      CT of chest/abdomen/pelvis (9/23/19)  THYROID: No nodule. MEDIASTINUM: No mass or lymphadenopathy. BANER: No mass or lymphadenopathy. THORACIC AORTA: No aneurysm. MAIN PULMONARY ARTERY: Normal in caliber. No central pulmonary embolism. HEART: Normal in size. ESOPHAGUS: Small sliding-type hiatal hernia. Enteric contrast in the esophagus  may represent gastroesophageal reflux. TRACHEA/BRONCHI: Patent. PLEURA: No effusion or pneumothorax. LUNGS: Mild dependent atelectasis in the bilateral lower lobes. No lung mass or  suspicious nodule. No pneumonia.     Liver: There are nonenhancing cysts in the left hepatic lobe. Largest cyst is  lateral in segment 3 and measures 2 cm. There is focal fatty infiltration  adjacent to the falciform ligament. No evidence of pathologically enhancing  hepatic mass. Gallbladder: Unremarkable. Spleen: Normal size and enhancement. Pancreas: No mass or ductal dilatation. Adrenals: Unremarkable. Kidneys: No mass, calculus, or hydronephrosis. Stomach: Unremarkable. Small bowel: Duodenum is not dilated. Small bowel is dilated from the mid  jejunum to the distal ileum. Maximum diameter of fluid filled small bowel  measures 2.8 cm. There is mural thickening of the terminal ileum. Colon: Incomplete distention limits evaluation. No obstruction. Appendix: Not clearly visible, difficult to differentiate from right ovarian  mass. Peritoneum: Trace ascites. Pneumoperitoneum is related to recent laparoscopic  surgery. There are innumerable soft tissue nodules in the greater omentum. No  measurable omental mass. Retroperitoneum: No lymphadenopathy or aortic aneurysm. Reproductive organs: Uterus is mildly heterogeneous. Heterogeneously enhancing  mass in the right adnexa measures approximately 5.7 x 4.8 x 4.5 cm.  Close  approximation to the nonvisualized appendix and thickened terminal ileum as well  as the nondistended cecum. Heterogeneously enhancing mass in the left adnexa measures 4.2 x 3.8 x 4.0 cm. Urinary bladder: Nodular mural thickening of the dome of the urinary bladder may  represent metastatic implants. Bones: No destructive bone lesion. Additional comments: Edema in the right oblique musculature is related to recent  surgery and may cause pain. No abscess.     IMPRESSION:   1. Ovarian carcinoma with peritoneal metastasis. Right ovarian mass measures 5.7  x 4.8 x 4.5 cm. Left ovarian mass measures 4.2 x 3.8 x 4.0 cm.  2. Small bowel obstruction due to terminal ileum mucosal thickening, likely  involved by ovarian carcinoma. 3. Omental carcinomatosis. Trace ascites. 4. Right oblique soft tissue edema due to recent surgery. No drainable abscess. However, this could be a source of right abdominal pain. 5. Hepatic cysts. No evidence of hepatic metastatic disease. 6. No evidence of osseous, pulmonary, or thoracic metastatic disease. IMPRESSION/PLAN:  Yvon Montiel is a 44 y.o. female with a working diagnosis of likely ovarian cancer, but pathology is not definitive. I reviewed with Yvon Montiel her medical records, physical exam, and review of symptoms. She is becoming more and more symptomatic and appears to be developing a distal small bowel obstruction. Based upon symptoms alone, she needs surgery to correct her obstruction. She also need surgery to help definitively identify the source of her malignancy. I have recommended X-lap, SIERRA, BSO, omentectomy, tumor debulking, with likely ileocecal resection. She was counseled on the risks, benefits, indications, and alternatives of the procedure. Her questions were answered and she wishes to proceed as planned. I am going to check tumor markers before surgery, specifically CA-125, CEA, CA 19-9, inhibin A, and inhibin B.     Due to her difficulty in keeping things down, I don't think she will tolerate a bowel prep. She hasn't been eating much the last few days anyway. I am going to admit her to the hospital prior to surgery so that we make sure she is adequately hydrated before her procedure. We will also make sure she has plenty of IV antiemetics and analgesics available for her.         Signed By: Kacy Mendes MD     9/24/2019/9:46 AM

## 2019-09-24 NOTE — PROGRESS NOTES
Hospital follow up to review ct scan results. Patient c/o nausea and vomiting. She is taking the Compazine.

## 2019-09-24 NOTE — PROGRESS NOTES
[address], [address]  P [phone]  F [phone]    Office Note  Patient ID:  Name:  Ca Chi  MRN:  [id number]  :  1970/49 y.o. Date:  2019      HISTORY OF PRESENT ILLNESS:  Ca Chi is a 44 y.o.  perimenopausal female who I was consulted on intraoperatively by Dr. Joelle Sotelo for what appeared to be an ovarian carcinoma. She had been having nausea/vomiting and abdominal pain for months. A HIDA scan suggested that her gallbladder was likely the source of her pain and Dr. Keren Malcolm took her to the OR for a laparoscopic cholecystectomy. At the time of surgery she was noted to have peritoneal carcinomatosis with bilaterally enlarged ovaries. Cytology was obtained and I performed a biopsy of the right ovary. We decided that it would be best to wait on the final pathology before proceeding with any aggressive debulking surgery, as we might also consider neoadjuvant chemotherapy. I arranged for her to have a CT of the chest/abdomen/pelvis following her laparoscopy. I ordered a CA-125 to be done in the PACU, but it was not drawn. She is followed by Dr. Miguelito Ascencio with [phone] for her gynecologic care. She was actually seen and evaluated there in 2019 for some irregular bleeding. She had been on OCPs to help control her symptoms. A pelvic ultrasound demonstrated a normal appearing uterus. The endometrium was not thickened. Her right ovary contained a 3.2 cm simple cyst.  The left ovary was not seen. There was a small amount of free fluid. She presents today to review her CT and pathology results and to discuss a definitive plan. The CT scan demonstrates dilated loops of small bowel, suggesting partial obstruction. There is mucosal thickening of the terminal ileum. The appendix is not visualized.   In addition to the gastrointestinal findings, there is bilateral ovarian enlargement, omental thickening, carcinomatosis, and ascites. Radiologic findings and operative findings would suggest and ovarian or peritoneal carcinoma. Preliminary pathology is a bit confusing, however. I spoke with Dr. Naida Reza from pathology. The right ovarian biopsy demonstrated an adenocarcinoma, but it wasn't clear histologically as to what it was. The tumor was CK7 and PAX8 negative, while also being CK20 positive. These findings would go against it being an ovarian cancer. ROS:   and GI review:  Negative  Cardiopulmonary review:  Negative   Musculoskeletal:  Negative    Pertinent items are noted in the History of Present Illness. , 10 point ROS      OB/GYN ROS:    There is no history of significant gyn problems or procedures. Patient denies any abnormal bleeding or vaginal discharge. Problem List:  Patient Active Problem List    Diagnosis Date Noted    Malignant neoplasm of both ovaries ([facility] [address] 2019    Symptomatic cholelithiasis 2019     PMH:  Past Medical History:   Diagnosis Date    Nausea & vomiting     Symptomatic cholelithiasis 2019      PSH:  Past Surgical History:   Procedure Laterality Date    HX GYN  2003    CYST ON OVARY      Social History:  Social History     Tobacco Use    Smoking status: Never Smoker    Smokeless tobacco: Never Used   Substance Use Topics    Alcohol use: Yes     Comment: once a month      Family History:  Family History   Problem Relation Age of Onset    Breast Cancer Paternal Grandmother 61    Crohn's Disease Mother     Heart Disease Mother     Cancer Father         BLADDER    Diabetes Father     No Known Problems Son     No Known Problems Daughter     Anesth Problems Neg Hx       Medications: (reviewed)  Current Outpatient Medications   Medication Sig    prochlorperazine (COMPAZINE) 10 mg tablet Take 1 Tab by mouth every six (6) hours as needed for Nausea for up to 15 doses.  Indications: Nausea and Vomiting    HYDROcodone-acetaminophen (NORCO) 5-325 mg per tablet Take 1 Tab by mouth every six (6) hours as needed for Pain for up to 14 days. Max Daily Amount: 4 Tabs.  ondansetron (ZOFRAN ODT) 4 mg disintegrating tablet Take 1 Tab by mouth every eight (8) hours as needed for Nausea for up to 14 days.  ZOVIA 1/35E, 28, 1-35 mg-mcg tab daily.  cetirizine (ZYRTEC) 10 mg tablet Take  by mouth daily as needed.  multivitamin (ONE A DAY) tablet Take 1 Tab by mouth daily. No current facility-administered medications for this visit. Allergies: (reviewed)  No Known Allergies       OBJECTIVE:    Physical Exam:  VITAL SIGNS: Vitals:    09/24/19 1439   BP: 128/87   Pulse: 85   Weight: 141 lb (64 kg)   Height: 5' 2.99\" (1.6 m)     Body mass index is 24.98 kg/m². GENERAL MARTINA: Conversant, alert, oriented. No acute distress. HEENT: HEENT. No thyroid enlargement. No JVD. Neck: Supple without restrictions. RESPIRATORY: Clear to auscultation and percussion to the bases. No CVAT. CARDIOVASC: RRR without murmur/rub. GASTROINT: Mildly distended, diffusely tender. MUSCULOSKEL: no joint tenderness, deformity or swelling   EXTREMITIES: extremities normal, atraumatic, no cyanosis or edema   PELVIC: Deferred   RECTAL: Deferred   SRIDEVI SURVEY: No suspicious lymphadenopathy or edema noted. NEURO: Grossly intact. No acute deficit.        Lab Data:    Lab Results   Component Value Date/Time    WBC 11.9 (H) 07/21/2018 05:21 AM    HGB 14.8 07/21/2018 05:21 AM    HCT 42.1 07/21/2018 05:21 AM    PLATELET 358 02/70/3972 05:21 AM    MCV 88.8 07/21/2018 05:21 AM     Lab Results   Component Value Date/Time    Sodium 139 07/21/2018 05:55 AM    Potassium 3.8 07/21/2018 05:55 AM    Chloride 106 07/21/2018 05:55 AM    CO2 23 07/21/2018 05:55 AM    Anion gap 10 07/21/2018 05:55 AM    Glucose 122 (H) 07/21/2018 05:55 AM    BUN 9 07/21/2018 05:55 AM    Creatinine 0.71 07/21/2018 05:55 AM    BUN/Creatinine ratio 13 07/21/2018 05:55 AM GFR est AA >60 07/21/2018 05:55 AM    GFR est non-AA >60 07/21/2018 05:55 AM    Calcium 8.6 07/21/2018 05:55 AM   No results found for: C125, [id number]      CT of chest/abdomen/pelvis (9/23/19)  THYROID: No nodule. MEDIASTINUM: No mass or lymphadenopathy. ABNER: No mass or lymphadenopathy. THORACIC AORTA: No aneurysm. MAIN PULMONARY ARTERY: Normal in caliber. No central pulmonary embolism. HEART: Normal in size. ESOPHAGUS: Small sliding-type hiatal hernia. Enteric contrast in the esophagus  may represent gastroesophageal reflux. TRACHEA/BRONCHI: Patent. PLEURA: No effusion or pneumothorax. LUNGS: Mild dependent atelectasis in the bilateral lower lobes. No lung mass or  suspicious nodule. No pneumonia.     Liver: There are nonenhancing cysts in the left hepatic lobe. Largest cyst is  lateral in segment 3 and measures 2 cm. There is focal fatty infiltration  adjacent to the falciform ligament. No evidence of pathologically enhancing  hepatic mass. Gallbladder: Unremarkable. Spleen: Normal size and enhancement. Pancreas: No mass or ductal dilatation. Adrenals: Unremarkable. Kidneys: No mass, calculus, or hydronephrosis. Stomach: Unremarkable. Small bowel: Duodenum is not dilated. Small bowel is dilated from the mid  jejunum to the distal ileum. Maximum diameter of fluid filled small bowel  measures 2.8 cm. There is mural thickening of the terminal ileum. Colon: Incomplete distention limits evaluation. No obstruction. Appendix: Not clearly visible, difficult to differentiate from right ovarian  mass. Peritoneum: Trace ascites. Pneumoperitoneum is related to recent laparoscopic  surgery. There are innumerable soft tissue nodules in the greater omentum. No  measurable omental mass. Retroperitoneum: No lymphadenopathy or aortic aneurysm. Reproductive organs: Uterus is mildly heterogeneous. Heterogeneously enhancing  mass in the right adnexa measures approximately 5.7 x 4.8 x 4.5 cm. Close  approximation to the nonvisualized appendix and thickened terminal ileum as well  as the nondistended cecum. Heterogeneously enhancing mass in the left adnexa measures 4.2 x 3.8 x 4.0 cm. Urinary bladder: Nodular mural thickening of the dome of the urinary bladder may  represent metastatic implants. Bones: No destructive bone lesion. Additional comments: Edema in the right oblique musculature is related to recent  surgery and may cause pain. No abscess.     IMPRESSION:   1. Ovarian carcinoma with peritoneal metastasis. Right ovarian mass measures 5.7  x 4.8 x 4.5 cm. Left ovarian mass measures 4.2 x 3.8 x 4.0 cm.  2. Small bowel obstruction due to terminal ileum mucosal thickening, likely  involved by ovarian carcinoma. 3. Omental carcinomatosis. Trace ascites. 4. Right oblique soft tissue edema due to recent surgery. No drainable abscess. However, this could be a source of right abdominal pain. 5. Hepatic cysts. No evidence of hepatic metastatic disease. 6. No evidence of osseous, pulmonary, or thoracic metastatic disease. IMPRESSION/PLAN:  Ca Chi is a 44 y.o. female with a working diagnosis of likely ovarian cancer, but pathology is not definitive. I reviewed with Ca Chi her medical records, physical exam, and review of symptoms. She is becoming more and more symptomatic and appears to be developing a distal small bowel obstruction. Based upon symptoms alone, she needs surgery to correct her obstruction. She also need surgery to help definitively identify the source of her malignancy. I have recommended X-lap, SIERRA, BSO, omentectomy, tumor debulking, with likely ileocecal resection. She was counseled on the risks, benefits, indications, and alternatives of the procedure. Her questions were answered and she wishes to proceed as planned. I am going to check tumor markers before surgery, specifically CA-125, CEA, CA 19-9, inhibin A, and inhibin B.     Due to her difficulty in keeping things down, I don't think she will tolerate a bowel prep. She hasn't been eating much the last few days anyway. I am going to admit her to the hospital prior to surgery so that we make sure she is adequately hydrated before her procedure. We will also make sure she has plenty of IV antiemetics and analgesics available for her.         Signed By: Rachel Duke MD     9/24/2019/9:46 AM

## 2019-09-25 ENCOUNTER — ANESTHESIA (OUTPATIENT)
Dept: SURGERY | Age: 49
DRG: 330 | End: 2019-09-25
Payer: COMMERCIAL

## 2019-09-25 ENCOUNTER — ANESTHESIA EVENT (OUTPATIENT)
Dept: SURGERY | Age: 49
DRG: 330 | End: 2019-09-25
Payer: COMMERCIAL

## 2019-09-25 LAB
ANION GAP BLD CALC-SCNC: 15 MMOL/L (ref 10–20)
BUN BLD-MCNC: 4 MG/DL (ref 9–20)
CA-I BLD-MCNC: 1.14 MMOL/L (ref 1.12–1.32)
CHLORIDE BLD-SCNC: 105 MMOL/L (ref 98–107)
CO2 BLD-SCNC: 22 MMOL/L (ref 21–32)
COMMENT, HOLDF: NORMAL
CREAT BLD-MCNC: 0.5 MG/DL (ref 0.6–1.3)
GLUCOSE BLD-MCNC: 85 MG/DL (ref 65–100)
HCT VFR BLD AUTO: 32.3 % (ref 35–47)
HCT VFR BLD CALC: 32 % (ref 35–47)
HGB BLD-MCNC: 11 G/DL (ref 11.5–16)
POTASSIUM BLD-SCNC: 3.7 MMOL/L (ref 3.5–5.1)
SAMPLES BEING HELD,HOLD: NORMAL
SERVICE CMNT-IMP: ABNORMAL
SODIUM BLD-SCNC: 137 MMOL/L (ref 136–145)

## 2019-09-25 PROCEDURE — 88360 TUMOR IMMUNOHISTOCHEM/MANUAL: CPT

## 2019-09-25 PROCEDURE — 77030002966 HC SUT PDS J&J -A: Performed by: OBSTETRICS & GYNECOLOGY

## 2019-09-25 PROCEDURE — 77030014008 HC SPNG HEMSTAT J&J -C: Performed by: OBSTETRICS & GYNECOLOGY

## 2019-09-25 PROCEDURE — 65410000002 HC RM PRIVATE OB

## 2019-09-25 PROCEDURE — 0UT70ZZ RESECTION OF BILATERAL FALLOPIAN TUBES, OPEN APPROACH: ICD-10-PCS | Performed by: OBSTETRICS & GYNECOLOGY

## 2019-09-25 PROCEDURE — 77030032060 HC PWDR HEMSTAT ARISTA ASRB 3GM BARD -C: Performed by: OBSTETRICS & GYNECOLOGY

## 2019-09-25 PROCEDURE — 77030036731 HC STPLR ENDOSC J&J -F: Performed by: OBSTETRICS & GYNECOLOGY

## 2019-09-25 PROCEDURE — 74011000250 HC RX REV CODE- 250: Performed by: NURSE ANESTHETIST, CERTIFIED REGISTERED

## 2019-09-25 PROCEDURE — 77030026438 HC STYL ET INTUB CARD -A: Performed by: ANESTHESIOLOGY

## 2019-09-25 PROCEDURE — 77030008771 HC TU NG SALEM SUMP -A: Performed by: ANESTHESIOLOGY

## 2019-09-25 PROCEDURE — 88309 TISSUE EXAM BY PATHOLOGIST: CPT

## 2019-09-25 PROCEDURE — 77030012935 HC DRSG AQUACEL BMS -B: Performed by: OBSTETRICS & GYNECOLOGY

## 2019-09-25 PROCEDURE — 88307 TISSUE EXAM BY PATHOLOGIST: CPT

## 2019-09-25 PROCEDURE — 36600 WITHDRAWAL OF ARTERIAL BLOOD: CPT

## 2019-09-25 PROCEDURE — 77030031139 HC SUT VCRL2 J&J -A: Performed by: OBSTETRICS & GYNECOLOGY

## 2019-09-25 PROCEDURE — 77030008684 HC TU ET CUF COVD -B: Performed by: ANESTHESIOLOGY

## 2019-09-25 PROCEDURE — 77030011640 HC PAD GRND REM COVD -A: Performed by: OBSTETRICS & GYNECOLOGY

## 2019-09-25 PROCEDURE — 88331 PATH CONSLTJ SURG 1 BLK 1SPC: CPT

## 2019-09-25 PROCEDURE — 0DBU0ZZ EXCISION OF OMENTUM, OPEN APPROACH: ICD-10-PCS | Performed by: OBSTETRICS & GYNECOLOGY

## 2019-09-25 PROCEDURE — P9040 RBC LEUKOREDUCED IRRADIATED: HCPCS

## 2019-09-25 PROCEDURE — 77030011278 HC ELECTRD LIG IMPT COVD -F: Performed by: OBSTETRICS & GYNECOLOGY

## 2019-09-25 PROCEDURE — 76010000134 HC OR TIME 3.5 TO 4 HR: Performed by: OBSTETRICS & GYNECOLOGY

## 2019-09-25 PROCEDURE — 74011250636 HC RX REV CODE- 250/636: Performed by: OBSTETRICS & GYNECOLOGY

## 2019-09-25 PROCEDURE — 36415 COLL VENOUS BLD VENIPUNCTURE: CPT

## 2019-09-25 PROCEDURE — 82803 BLOOD GASES ANY COMBINATION: CPT

## 2019-09-25 PROCEDURE — 74011250636 HC RX REV CODE- 250/636: Performed by: NURSE ANESTHETIST, CERTIFIED REGISTERED

## 2019-09-25 PROCEDURE — 77030009979 HC RELD STPLR TCR J&J -C: Performed by: OBSTETRICS & GYNECOLOGY

## 2019-09-25 PROCEDURE — 80047 BASIC METABLC PNL IONIZED CA: CPT

## 2019-09-25 PROCEDURE — 30233K1 TRANSFUSION OF NONAUTOLOGOUS FROZEN PLASMA INTO PERIPHERAL VEIN, PERCUTANEOUS APPROACH: ICD-10-PCS | Performed by: OBSTETRICS & GYNECOLOGY

## 2019-09-25 PROCEDURE — 77030011264 HC ELECTRD BLD EXT COVD -A: Performed by: OBSTETRICS & GYNECOLOGY

## 2019-09-25 PROCEDURE — 74011000250 HC RX REV CODE- 250: Performed by: OBSTETRICS & GYNECOLOGY

## 2019-09-25 PROCEDURE — 0DTH0ZZ RESECTION OF CECUM, OPEN APPROACH: ICD-10-PCS | Performed by: OBSTETRICS & GYNECOLOGY

## 2019-09-25 PROCEDURE — 85018 HEMOGLOBIN: CPT

## 2019-09-25 PROCEDURE — P9045 ALBUMIN (HUMAN), 5%, 250 ML: HCPCS | Performed by: NURSE ANESTHETIST, CERTIFIED REGISTERED

## 2019-09-25 PROCEDURE — 0UT20ZZ RESECTION OF BILATERAL OVARIES, OPEN APPROACH: ICD-10-PCS | Performed by: OBSTETRICS & GYNECOLOGY

## 2019-09-25 PROCEDURE — 83605 ASSAY OF LACTIC ACID: CPT

## 2019-09-25 PROCEDURE — P9059 PLASMA, FRZ BETWEEN 8-24HOUR: HCPCS

## 2019-09-25 PROCEDURE — 86644 CMV ANTIBODY: CPT

## 2019-09-25 PROCEDURE — 76210000016 HC OR PH I REC 1 TO 1.5 HR: Performed by: OBSTETRICS & GYNECOLOGY

## 2019-09-25 PROCEDURE — 77030018846 HC SOL IRR STRL H20 ICUM -A: Performed by: OBSTETRICS & GYNECOLOGY

## 2019-09-25 PROCEDURE — 77030018836 HC SOL IRR NACL ICUM -A: Performed by: OBSTETRICS & GYNECOLOGY

## 2019-09-25 PROCEDURE — 74011250636 HC RX REV CODE- 250/636: Performed by: ANESTHESIOLOGY

## 2019-09-25 PROCEDURE — 74011000258 HC RX REV CODE- 258: Performed by: OBSTETRICS & GYNECOLOGY

## 2019-09-25 PROCEDURE — 0UT90ZL RESECTION OF UTERUS, SUPRACERVICAL, OPEN APPROACH: ICD-10-PCS | Performed by: OBSTETRICS & GYNECOLOGY

## 2019-09-25 PROCEDURE — 77030040830 HC CATH URETH FOL MDII -A: Performed by: OBSTETRICS & GYNECOLOGY

## 2019-09-25 PROCEDURE — 77030008467 HC STPLR SKN COVD -B: Performed by: OBSTETRICS & GYNECOLOGY

## 2019-09-25 PROCEDURE — 76060000038 HC ANESTHESIA 3.5 TO 4 HR: Performed by: OBSTETRICS & GYNECOLOGY

## 2019-09-25 RX ORDER — FENTANYL CITRATE 50 UG/ML
50 INJECTION, SOLUTION INTRAMUSCULAR; INTRAVENOUS AS NEEDED
Status: DISCONTINUED | OUTPATIENT
Start: 2019-09-25 | End: 2019-09-25 | Stop reason: HOSPADM

## 2019-09-25 RX ORDER — SODIUM CHLORIDE 0.9 % (FLUSH) 0.9 %
5-40 SYRINGE (ML) INJECTION AS NEEDED
Status: DISCONTINUED | OUTPATIENT
Start: 2019-09-25 | End: 2019-09-25 | Stop reason: HOSPADM

## 2019-09-25 RX ORDER — SODIUM CHLORIDE 9 MG/ML
75 INJECTION, SOLUTION INTRAVENOUS CONTINUOUS
Status: DISCONTINUED | OUTPATIENT
Start: 2019-09-25 | End: 2019-10-01

## 2019-09-25 RX ORDER — ROCURONIUM BROMIDE 10 MG/ML
INJECTION, SOLUTION INTRAVENOUS AS NEEDED
Status: DISCONTINUED | OUTPATIENT
Start: 2019-09-25 | End: 2019-09-25 | Stop reason: HOSPADM

## 2019-09-25 RX ORDER — SODIUM CHLORIDE 9 MG/ML
25 INJECTION, SOLUTION INTRAVENOUS CONTINUOUS
Status: DISCONTINUED | OUTPATIENT
Start: 2019-09-25 | End: 2019-09-25 | Stop reason: HOSPADM

## 2019-09-25 RX ORDER — SODIUM CHLORIDE, SODIUM LACTATE, POTASSIUM CHLORIDE, CALCIUM CHLORIDE 600; 310; 30; 20 MG/100ML; MG/100ML; MG/100ML; MG/100ML
75 INJECTION, SOLUTION INTRAVENOUS CONTINUOUS
Status: DISCONTINUED | OUTPATIENT
Start: 2019-09-25 | End: 2019-09-26 | Stop reason: HOSPADM

## 2019-09-25 RX ORDER — HYDROMORPHONE HYDROCHLORIDE 1 MG/ML
1 INJECTION, SOLUTION INTRAMUSCULAR; INTRAVENOUS; SUBCUTANEOUS
Status: DISCONTINUED | OUTPATIENT
Start: 2019-09-25 | End: 2019-09-28 | Stop reason: SDUPTHER

## 2019-09-25 RX ORDER — DEXAMETHASONE SODIUM PHOSPHATE 4 MG/ML
INJECTION, SOLUTION INTRA-ARTICULAR; INTRALESIONAL; INTRAMUSCULAR; INTRAVENOUS; SOFT TISSUE AS NEEDED
Status: DISCONTINUED | OUTPATIENT
Start: 2019-09-25 | End: 2019-09-25 | Stop reason: HOSPADM

## 2019-09-25 RX ORDER — SODIUM CHLORIDE 0.9 % (FLUSH) 0.9 %
5-40 SYRINGE (ML) INJECTION EVERY 8 HOURS
Status: DISCONTINUED | OUTPATIENT
Start: 2019-09-25 | End: 2019-09-25 | Stop reason: HOSPADM

## 2019-09-25 RX ORDER — SODIUM CHLORIDE, SODIUM LACTATE, POTASSIUM CHLORIDE, CALCIUM CHLORIDE 600; 310; 30; 20 MG/100ML; MG/100ML; MG/100ML; MG/100ML
125 INJECTION, SOLUTION INTRAVENOUS CONTINUOUS
Status: DISCONTINUED | OUTPATIENT
Start: 2019-09-25 | End: 2019-09-25 | Stop reason: HOSPADM

## 2019-09-25 RX ORDER — MIDAZOLAM HYDROCHLORIDE 1 MG/ML
1 INJECTION, SOLUTION INTRAMUSCULAR; INTRAVENOUS AS NEEDED
Status: DISCONTINUED | OUTPATIENT
Start: 2019-09-25 | End: 2019-09-25 | Stop reason: HOSPADM

## 2019-09-25 RX ORDER — SODIUM CHLORIDE 9 MG/ML
25 INJECTION, SOLUTION INTRAVENOUS CONTINUOUS
Status: DISCONTINUED | OUTPATIENT
Start: 2019-09-25 | End: 2019-09-26 | Stop reason: HOSPADM

## 2019-09-25 RX ORDER — LORAZEPAM 2 MG/ML
1 INJECTION INTRAMUSCULAR
Status: DISCONTINUED | OUTPATIENT
Start: 2019-09-25 | End: 2019-10-03 | Stop reason: HOSPADM

## 2019-09-25 RX ORDER — LIDOCAINE HYDROCHLORIDE 10 MG/ML
0.1 INJECTION, SOLUTION EPIDURAL; INFILTRATION; INTRACAUDAL; PERINEURAL AS NEEDED
Status: DISCONTINUED | OUTPATIENT
Start: 2019-09-25 | End: 2019-09-25 | Stop reason: HOSPADM

## 2019-09-25 RX ORDER — CALCIUM CHLORIDE INJECTION 100 MG/ML
INJECTION, SOLUTION INTRAVENOUS AS NEEDED
Status: DISCONTINUED | OUTPATIENT
Start: 2019-09-25 | End: 2019-09-25 | Stop reason: HOSPADM

## 2019-09-25 RX ORDER — HYDROMORPHONE HYDROCHLORIDE 1 MG/ML
0.2 INJECTION, SOLUTION INTRAMUSCULAR; INTRAVENOUS; SUBCUTANEOUS
Status: DISCONTINUED | OUTPATIENT
Start: 2019-09-25 | End: 2019-09-26 | Stop reason: HOSPADM

## 2019-09-25 RX ORDER — DIPHENHYDRAMINE HYDROCHLORIDE 50 MG/ML
12.5 INJECTION, SOLUTION INTRAMUSCULAR; INTRAVENOUS
Status: DISCONTINUED | OUTPATIENT
Start: 2019-09-25 | End: 2019-10-03 | Stop reason: HOSPADM

## 2019-09-25 RX ORDER — ACETAMINOPHEN 10 MG/ML
1000 INJECTION, SOLUTION INTRAVENOUS EVERY 8 HOURS
Status: COMPLETED | OUTPATIENT
Start: 2019-09-25 | End: 2019-09-26

## 2019-09-25 RX ORDER — PROPOFOL 10 MG/ML
INJECTION, EMULSION INTRAVENOUS AS NEEDED
Status: DISCONTINUED | OUTPATIENT
Start: 2019-09-25 | End: 2019-09-25 | Stop reason: HOSPADM

## 2019-09-25 RX ORDER — NALOXONE HYDROCHLORIDE 0.4 MG/ML
0.4 INJECTION, SOLUTION INTRAMUSCULAR; INTRAVENOUS; SUBCUTANEOUS AS NEEDED
Status: DISCONTINUED | OUTPATIENT
Start: 2019-09-25 | End: 2019-10-03 | Stop reason: HOSPADM

## 2019-09-25 RX ORDER — METRONIDAZOLE 500 MG/100ML
500 INJECTION, SOLUTION INTRAVENOUS
Status: COMPLETED | OUTPATIENT
Start: 2019-09-25 | End: 2019-09-25

## 2019-09-25 RX ORDER — CEFAZOLIN SODIUM/WATER 2 G/20 ML
2 SYRINGE (ML) INTRAVENOUS EVERY 8 HOURS
Status: COMPLETED | OUTPATIENT
Start: 2019-09-26 | End: 2019-09-28

## 2019-09-25 RX ORDER — SODIUM CHLORIDE 0.9 % (FLUSH) 0.9 %
5-40 SYRINGE (ML) INJECTION EVERY 8 HOURS
Status: DISCONTINUED | OUTPATIENT
Start: 2019-09-25 | End: 2019-09-26 | Stop reason: HOSPADM

## 2019-09-25 RX ORDER — FENTANYL CITRATE 50 UG/ML
INJECTION, SOLUTION INTRAMUSCULAR; INTRAVENOUS AS NEEDED
Status: DISCONTINUED | OUTPATIENT
Start: 2019-09-25 | End: 2019-09-25 | Stop reason: HOSPADM

## 2019-09-25 RX ORDER — HYDROMORPHONE HCL/0.9% NACL/PF 0.5 MG/ML
PLASTIC BAG, INJECTION (ML) INTRAVENOUS CONTINUOUS
Status: DISCONTINUED | OUTPATIENT
Start: 2019-09-25 | End: 2019-09-28

## 2019-09-25 RX ORDER — DIPHENHYDRAMINE HYDROCHLORIDE 50 MG/ML
12.5 INJECTION, SOLUTION INTRAMUSCULAR; INTRAVENOUS AS NEEDED
Status: ACTIVE | OUTPATIENT
Start: 2019-09-25 | End: 2019-09-25

## 2019-09-25 RX ORDER — SODIUM CHLORIDE 9 MG/ML
INJECTION, SOLUTION INTRAVENOUS
Status: DISCONTINUED | OUTPATIENT
Start: 2019-09-25 | End: 2019-09-25 | Stop reason: HOSPADM

## 2019-09-25 RX ORDER — MORPHINE SULFATE 10 MG/ML
2 INJECTION, SOLUTION INTRAMUSCULAR; INTRAVENOUS
Status: DISCONTINUED | OUTPATIENT
Start: 2019-09-25 | End: 2019-09-26 | Stop reason: HOSPADM

## 2019-09-25 RX ORDER — SODIUM CHLORIDE 0.9 % (FLUSH) 0.9 %
5-40 SYRINGE (ML) INJECTION AS NEEDED
Status: DISCONTINUED | OUTPATIENT
Start: 2019-09-25 | End: 2019-09-26 | Stop reason: HOSPADM

## 2019-09-25 RX ORDER — ALBUMIN HUMAN 50 G/1000ML
SOLUTION INTRAVENOUS AS NEEDED
Status: DISCONTINUED | OUTPATIENT
Start: 2019-09-25 | End: 2019-09-25 | Stop reason: HOSPADM

## 2019-09-25 RX ORDER — ROPIVACAINE HYDROCHLORIDE 5 MG/ML
30 INJECTION, SOLUTION EPIDURAL; INFILTRATION; PERINEURAL ONCE
Status: DISCONTINUED | OUTPATIENT
Start: 2019-09-25 | End: 2019-09-25 | Stop reason: HOSPADM

## 2019-09-25 RX ORDER — SODIUM CHLORIDE 0.9 % (FLUSH) 0.9 %
5-40 SYRINGE (ML) INJECTION EVERY 8 HOURS
Status: DISCONTINUED | OUTPATIENT
Start: 2019-09-25 | End: 2019-10-03 | Stop reason: HOSPADM

## 2019-09-25 RX ORDER — ONDANSETRON 2 MG/ML
4 INJECTION INTRAMUSCULAR; INTRAVENOUS AS NEEDED
Status: DISCONTINUED | OUTPATIENT
Start: 2019-09-25 | End: 2019-09-26 | Stop reason: HOSPADM

## 2019-09-25 RX ORDER — ACETAMINOPHEN 325 MG/1
650 TABLET ORAL ONCE
Status: DISCONTINUED | OUTPATIENT
Start: 2019-09-25 | End: 2019-09-25 | Stop reason: HOSPADM

## 2019-09-25 RX ORDER — LIDOCAINE HYDROCHLORIDE 20 MG/ML
INJECTION, SOLUTION EPIDURAL; INFILTRATION; INTRACAUDAL; PERINEURAL AS NEEDED
Status: DISCONTINUED | OUTPATIENT
Start: 2019-09-25 | End: 2019-09-25 | Stop reason: HOSPADM

## 2019-09-25 RX ORDER — METRONIDAZOLE 500 MG/100ML
500 INJECTION, SOLUTION INTRAVENOUS EVERY 12 HOURS
Status: COMPLETED | OUTPATIENT
Start: 2019-09-26 | End: 2019-09-28

## 2019-09-25 RX ORDER — MIDAZOLAM HYDROCHLORIDE 1 MG/ML
0.5 INJECTION, SOLUTION INTRAMUSCULAR; INTRAVENOUS
Status: COMPLETED | OUTPATIENT
Start: 2019-09-25 | End: 2019-09-25

## 2019-09-25 RX ORDER — SODIUM CHLORIDE 9 MG/ML
250 INJECTION, SOLUTION INTRAVENOUS AS NEEDED
Status: DISCONTINUED | OUTPATIENT
Start: 2019-09-25 | End: 2019-09-26

## 2019-09-25 RX ORDER — DEXMEDETOMIDINE HYDROCHLORIDE 100 UG/ML
INJECTION, SOLUTION INTRAVENOUS AS NEEDED
Status: DISCONTINUED | OUTPATIENT
Start: 2019-09-25 | End: 2019-09-25 | Stop reason: HOSPADM

## 2019-09-25 RX ORDER — ONDANSETRON 2 MG/ML
INJECTION INTRAMUSCULAR; INTRAVENOUS AS NEEDED
Status: DISCONTINUED | OUTPATIENT
Start: 2019-09-25 | End: 2019-09-25 | Stop reason: HOSPADM

## 2019-09-25 RX ORDER — SODIUM CHLORIDE, SODIUM LACTATE, POTASSIUM CHLORIDE, CALCIUM CHLORIDE 600; 310; 30; 20 MG/100ML; MG/100ML; MG/100ML; MG/100ML
INJECTION, SOLUTION INTRAVENOUS
Status: DISCONTINUED | OUTPATIENT
Start: 2019-09-25 | End: 2019-09-25 | Stop reason: HOSPADM

## 2019-09-25 RX ORDER — HYDROMORPHONE HCL/0.9% NACL/PF 0.5 MG/ML
PLASTIC BAG, INJECTION (ML) INTRAVENOUS
Status: DISCONTINUED
Start: 2019-09-25 | End: 2019-09-26 | Stop reason: HOSPADM

## 2019-09-25 RX ORDER — ONDANSETRON 2 MG/ML
4 INJECTION INTRAMUSCULAR; INTRAVENOUS
Status: DISCONTINUED | OUTPATIENT
Start: 2019-09-25 | End: 2019-10-03 | Stop reason: HOSPADM

## 2019-09-25 RX ORDER — MIDAZOLAM HYDROCHLORIDE 1 MG/ML
INJECTION, SOLUTION INTRAMUSCULAR; INTRAVENOUS AS NEEDED
Status: DISCONTINUED | OUTPATIENT
Start: 2019-09-25 | End: 2019-09-25 | Stop reason: HOSPADM

## 2019-09-25 RX ORDER — SODIUM CHLORIDE 0.9 % (FLUSH) 0.9 %
5-40 SYRINGE (ML) INJECTION AS NEEDED
Status: DISCONTINUED | OUTPATIENT
Start: 2019-09-25 | End: 2019-10-03 | Stop reason: HOSPADM

## 2019-09-25 RX ORDER — FENTANYL CITRATE 50 UG/ML
25 INJECTION, SOLUTION INTRAMUSCULAR; INTRAVENOUS
Status: COMPLETED | OUTPATIENT
Start: 2019-09-25 | End: 2019-09-25

## 2019-09-25 RX ORDER — KETOROLAC TROMETHAMINE 30 MG/ML
30 INJECTION, SOLUTION INTRAMUSCULAR; INTRAVENOUS EVERY 6 HOURS
Status: DISPENSED | OUTPATIENT
Start: 2019-09-26 | End: 2019-09-30

## 2019-09-25 RX ORDER — PROCHLORPERAZINE EDISYLATE 5 MG/ML
10 INJECTION INTRAMUSCULAR; INTRAVENOUS
Status: DISCONTINUED | OUTPATIENT
Start: 2019-09-25 | End: 2019-09-25 | Stop reason: SDUPTHER

## 2019-09-25 RX ORDER — ACETAMINOPHEN 10 MG/ML
INJECTION, SOLUTION INTRAVENOUS AS NEEDED
Status: DISCONTINUED | OUTPATIENT
Start: 2019-09-25 | End: 2019-09-25 | Stop reason: HOSPADM

## 2019-09-25 RX ADMIN — POTASSIUM CHLORIDE AND SODIUM CHLORIDE: 900; 150 INJECTION, SOLUTION INTRAVENOUS at 06:25

## 2019-09-25 RX ADMIN — PHENYLEPHRINE HYDROCHLORIDE 120 MCG: 10 INJECTION INTRAVENOUS at 14:06

## 2019-09-25 RX ADMIN — SODIUM CHLORIDE, POTASSIUM CHLORIDE, SODIUM LACTATE AND CALCIUM CHLORIDE: 600; 310; 30; 20 INJECTION, SOLUTION INTRAVENOUS at 16:30

## 2019-09-25 RX ADMIN — PROPOFOL 150 MG: 10 INJECTION, EMULSION INTRAVENOUS at 13:34

## 2019-09-25 RX ADMIN — Medication: at 17:29

## 2019-09-25 RX ADMIN — HYDROMORPHONE HYDROCHLORIDE 1 MG: 1 INJECTION, SOLUTION INTRAMUSCULAR; INTRAVENOUS; SUBCUTANEOUS at 00:49

## 2019-09-25 RX ADMIN — MIDAZOLAM 2 MG: 1 INJECTION INTRAMUSCULAR; INTRAVENOUS at 13:26

## 2019-09-25 RX ADMIN — ONDANSETRON HYDROCHLORIDE 4 MG: 2 INJECTION, SOLUTION INTRAMUSCULAR; INTRAVENOUS at 13:54

## 2019-09-25 RX ADMIN — Medication 10 ML: at 22:00

## 2019-09-25 RX ADMIN — MIDAZOLAM 0.5 MG: 1 INJECTION INTRAMUSCULAR; INTRAVENOUS at 20:10

## 2019-09-25 RX ADMIN — SODIUM CHLORIDE, POTASSIUM CHLORIDE, SODIUM LACTATE AND CALCIUM CHLORIDE: 600; 310; 30; 20 INJECTION, SOLUTION INTRAVENOUS at 13:26

## 2019-09-25 RX ADMIN — HYDROMORPHONE HYDROCHLORIDE 0.2 MG: 1 INJECTION, SOLUTION INTRAMUSCULAR; INTRAVENOUS; SUBCUTANEOUS at 17:59

## 2019-09-25 RX ADMIN — MIDAZOLAM 0.5 MG: 1 INJECTION INTRAMUSCULAR; INTRAVENOUS at 18:39

## 2019-09-25 RX ADMIN — HYDROMORPHONE HYDROCHLORIDE 1 MG: 1 INJECTION, SOLUTION INTRAMUSCULAR; INTRAVENOUS; SUBCUTANEOUS at 09:34

## 2019-09-25 RX ADMIN — MORPHINE SULFATE 2 MG: 10 INJECTION, SOLUTION INTRAMUSCULAR; INTRAVENOUS at 18:15

## 2019-09-25 RX ADMIN — ROCURONIUM BROMIDE 20 MG: 10 SOLUTION INTRAVENOUS at 15:28

## 2019-09-25 RX ADMIN — MORPHINE SULFATE 2 MG: 10 INJECTION, SOLUTION INTRAMUSCULAR; INTRAVENOUS at 18:26

## 2019-09-25 RX ADMIN — PHENYLEPHRINE HYDROCHLORIDE 80 MCG/MIN: 10 INJECTION INTRAVENOUS at 14:08

## 2019-09-25 RX ADMIN — ACETAMINOPHEN 1000 MG: 10 INJECTION, SOLUTION INTRAVENOUS at 21:55

## 2019-09-25 RX ADMIN — FENTANYL CITRATE 50 MCG: 50 INJECTION, SOLUTION INTRAMUSCULAR; INTRAVENOUS at 17:09

## 2019-09-25 RX ADMIN — FENTANYL CITRATE 50 MCG: 50 INJECTION, SOLUTION INTRAMUSCULAR; INTRAVENOUS at 13:34

## 2019-09-25 RX ADMIN — HYDROMORPHONE HYDROCHLORIDE 0.2 MG: 1 INJECTION, SOLUTION INTRAMUSCULAR; INTRAVENOUS; SUBCUTANEOUS at 18:42

## 2019-09-25 RX ADMIN — CEFOTETAN DISODIUM 2 G: 2 INJECTION, POWDER, FOR SOLUTION INTRAMUSCULAR; INTRAVENOUS at 15:53

## 2019-09-25 RX ADMIN — HYDROMORPHONE HYDROCHLORIDE 0.2 MG: 1 INJECTION, SOLUTION INTRAMUSCULAR; INTRAVENOUS; SUBCUTANEOUS at 19:03

## 2019-09-25 RX ADMIN — CEFOTETAN DISODIUM 2 G: 2 INJECTION, POWDER, FOR SOLUTION INTRAMUSCULAR; INTRAVENOUS at 13:45

## 2019-09-25 RX ADMIN — FENTANYL CITRATE 25 MCG: 50 INJECTION INTRAMUSCULAR; INTRAVENOUS at 17:46

## 2019-09-25 RX ADMIN — ALBUMIN (HUMAN) 250 ML: 12.5 INJECTION, SOLUTION INTRAVENOUS at 15:15

## 2019-09-25 RX ADMIN — MORPHINE SULFATE 2 MG: 10 INJECTION, SOLUTION INTRAMUSCULAR; INTRAVENOUS at 19:03

## 2019-09-25 RX ADMIN — ROCURONIUM BROMIDE 50 MG: 10 SOLUTION INTRAVENOUS at 13:34

## 2019-09-25 RX ADMIN — HYDROMORPHONE HYDROCHLORIDE 0.2 MG: 1 INJECTION, SOLUTION INTRAMUSCULAR; INTRAVENOUS; SUBCUTANEOUS at 18:08

## 2019-09-25 RX ADMIN — FENTANYL CITRATE 25 MCG: 50 INJECTION INTRAMUSCULAR; INTRAVENOUS at 17:52

## 2019-09-25 RX ADMIN — LIDOCAINE HYDROCHLORIDE 60 MG: 20 INJECTION, SOLUTION EPIDURAL; INFILTRATION; INTRACAUDAL; PERINEURAL at 13:34

## 2019-09-25 RX ADMIN — ROCURONIUM BROMIDE 10 MG: 10 SOLUTION INTRAVENOUS at 14:40

## 2019-09-25 RX ADMIN — ALBUMIN (HUMAN) 250 ML: 12.5 INJECTION, SOLUTION INTRAVENOUS at 14:10

## 2019-09-25 RX ADMIN — SODIUM CHLORIDE 125 ML/HR: 900 INJECTION, SOLUTION INTRAVENOUS at 18:00

## 2019-09-25 RX ADMIN — FENTANYL CITRATE 25 MCG: 50 INJECTION INTRAMUSCULAR; INTRAVENOUS at 18:02

## 2019-09-25 RX ADMIN — SODIUM CHLORIDE: 900 INJECTION, SOLUTION INTRAVENOUS at 14:55

## 2019-09-25 RX ADMIN — Medication 10 ML: at 06:27

## 2019-09-25 RX ADMIN — SODIUM CHLORIDE, POTASSIUM CHLORIDE, SODIUM LACTATE AND CALCIUM CHLORIDE: 600; 310; 30; 20 INJECTION, SOLUTION INTRAVENOUS at 15:20

## 2019-09-25 RX ADMIN — DEXMEDETOMIDINE HYDROCHLORIDE 5 MCG: 100 INJECTION, SOLUTION, CONCENTRATE INTRAVENOUS at 14:22

## 2019-09-25 RX ADMIN — MIDAZOLAM 0.5 MG: 1 INJECTION INTRAMUSCULAR; INTRAVENOUS at 18:19

## 2019-09-25 RX ADMIN — HYDROMORPHONE HYDROCHLORIDE 0.2 MG: 1 INJECTION, SOLUTION INTRAMUSCULAR; INTRAVENOUS; SUBCUTANEOUS at 18:20

## 2019-09-25 RX ADMIN — MIDAZOLAM 0.5 MG: 1 INJECTION INTRAMUSCULAR; INTRAVENOUS at 18:50

## 2019-09-25 RX ADMIN — ALBUMIN (HUMAN) 250 ML: 12.5 INJECTION, SOLUTION INTRAVENOUS at 15:00

## 2019-09-25 RX ADMIN — FENTANYL CITRATE 25 MCG: 50 INJECTION INTRAMUSCULAR; INTRAVENOUS at 17:35

## 2019-09-25 RX ADMIN — DEXMEDETOMIDINE HYDROCHLORIDE 5 MCG: 100 INJECTION, SOLUTION, CONCENTRATE INTRAVENOUS at 14:40

## 2019-09-25 RX ADMIN — SUGAMMADEX 256 MG: 100 INJECTION, SOLUTION INTRAVENOUS at 16:51

## 2019-09-25 RX ADMIN — MORPHINE SULFATE 2 MG: 10 INJECTION, SOLUTION INTRAMUSCULAR; INTRAVENOUS at 18:43

## 2019-09-25 RX ADMIN — ONDANSETRON 4 MG: 2 INJECTION INTRAMUSCULAR; INTRAVENOUS at 09:34

## 2019-09-25 RX ADMIN — ALBUMIN (HUMAN) 250 ML: 12.5 INJECTION, SOLUTION INTRAVENOUS at 15:50

## 2019-09-25 RX ADMIN — ACETAMINOPHEN 1000 MG: 10 INJECTION, SOLUTION INTRAVENOUS at 14:20

## 2019-09-25 RX ADMIN — PHENYLEPHRINE HYDROCHLORIDE 40 MCG: 10 INJECTION INTRAVENOUS at 13:45

## 2019-09-25 RX ADMIN — DEXAMETHASONE SODIUM PHOSPHATE 4 MG: 4 INJECTION, SOLUTION INTRAMUSCULAR; INTRAVENOUS at 13:54

## 2019-09-25 RX ADMIN — METRONIDAZOLE 500 MG: 500 SOLUTION INTRAVENOUS at 13:53

## 2019-09-25 RX ADMIN — SODIUM CHLORIDE, POTASSIUM CHLORIDE, SODIUM LACTATE AND CALCIUM CHLORIDE: 600; 310; 30; 20 INJECTION, SOLUTION INTRAVENOUS at 16:49

## 2019-09-25 RX ADMIN — CALCIUM CHLORIDE 1 G: 100 INJECTION, SOLUTION INTRAVENOUS; INTRAVENTRICULAR at 15:55

## 2019-09-25 RX ADMIN — METRONIDAZOLE 500 MG: 500 SOLUTION INTRAVENOUS at 15:53

## 2019-09-25 RX ADMIN — ROCURONIUM BROMIDE 10 MG: 10 SOLUTION INTRAVENOUS at 16:06

## 2019-09-25 RX ADMIN — MORPHINE SULFATE 2 MG: 10 INJECTION, SOLUTION INTRAMUSCULAR; INTRAVENOUS at 18:02

## 2019-09-25 RX ADMIN — ONDANSETRON HYDROCHLORIDE 4 MG: 2 INJECTION, SOLUTION INTRAMUSCULAR; INTRAVENOUS at 17:05

## 2019-09-25 NOTE — ROUTINE PROCESS
Patient: Miguelina Randall MRN: [id number]  SSN: [id number]   YOB: 1970  Age: 44 y.o. Sex: female     Patient is status post Procedure(s):  EXPLORATORY LAPAROTOMY SIERRA BSO, TUMOR DEBULKING: BOWEL RESECTION. Surgeon(s) and Role:     Moshe Guevara MD - Primary    Local/Dose/Irrigation:                    Peripheral IV 09/25/19 Left Hand (Active)       Peripheral IV 09/25/19 Right Hand (Active)      Arterial Line 09/25/19 Left Radial artery (Active)        Orogastric Tube 09/25/19 (Active)      Airway - Endotracheal Tube 09/25/19 Oral (Active)                   Dressing/Packing:  Wound Abdomen-Dressing Type:  Other (Comment)(aquacel) (09/25/19 1762)    Splint/Cast:  ]    Other:  Pham; scds; arterial line, needs 1 channel pump; MD wants ICU/higher level care bed please

## 2019-09-25 NOTE — PROGRESS NOTES
Patient arrived on unit, Vital signs and assessment performed at this time. Called Dr. Vivien Saxena and orders were given.

## 2019-09-25 NOTE — PROGRESS NOTES
TRANSFER - IN REPORT:    Verbal report received from [facility] RN(name) on Stan Colbert  being received from [facility] for ordered procedure      Report consisted of patients Situation, Background, Assessment and   Recommendations(SBAR). Information from the following report(s) SBAR, Kardex, OR Summary, Procedure Summary, Intake/Output, MAR, Accordion, Recent Results and Med Rec Status was reviewed with the receiving nurse. Opportunity for questions and clarification was provided. Assessment completed upon patients arrival to unit and care assumed.

## 2019-09-25 NOTE — BRIEF OP NOTE
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    Procedure(s): EXPLORATORY LAPAROTOMY TOTAL ABDOMINAL HYSTERECTOMY, BILATERAL SALPINGOOOPHORECTOMY, OMENTECTOMY, TUMOR DEBULKING, ILEOCECAL RESECTION WITH ANASTOMOSIS  Surgeon(s) and Role:     Kayla Burk MD - Primary         Surgical Assistant: Tori Perez, [address] [facility]    Surgical Staff:  Circ-1: Loly Stewart RN  Circ-Relief: Irasema Lopez  Circ-Intern: Elizabeth Person  Physician Assistant: Gaylen Najjar, PA-C  Scrub Tech-1: Milagros Lui  Scrub RN-Relief: José Miguel Conley RN  Surg Asst-1: Darylene Hammersmith  Float Staff: José Miguel Conley RN  Event Time In Time Out   Incision Start 09/25/2019 1353    Incision Close 09/25/2019 1651      Anesthesia: General   Estimated Blood Loss: 1300 cc  Ascites: 500 cc  Fluid replacement: 3000 cc crystalloid, 1000 cc albumin, 3 units PRBCs, 2 units FFP  Specimens:   ID Type Source Tests Collected by Time Destination   1 : OMENTUM Fresh Omentum  Tomas Mendez MD 9/25/2019 1424 Pathology   2 : TERMINAL ILEUM, CECUM, AND APPENDIX FOR FROZEN Frozen Section Abdomen  Tomas Mendez MD 9/25/2019 1450 Pathology   3 : Uterus, supracervical hysterectomy, bilateral tubes, bilateral ovaries Frozen Section Uterus with Bilateral Fallopian tubes and Mylinda Coad, Vaishali Akins MD 9/25/2019 1516 Pathology   4 : Cervix Fresh Cervix  Tomas Mendez MD 9/25/2019 1536 Pathology      Findings: On bimanual pelvic exam she was noted to have a rock hard cervix and uterus, worrisome for disease infiltration. It was mobile. No parametrial involvement. The uterus was enlarged. Bilateral adnexal masses. The bladder was densely adherent the lower uterine segment and cervix. Mildly dilated small bowel. Rock hard appendix scarred to the underside of the cecum with tethering of the small bowel at that point.   Implants noted in the distal omentum. Small volume peritoneal disease scattered throughout the abdominal cavity involving the anterior abdominal wall, diaphragms, small and large bowel mesenteries. Frozen section pathology of the ileocecal resection suggested that this was the primary site of disease. There were some signet ring features noted. Frozen section pathology of the uterus demonstrated infiltration of the myometrium with the same process.   Complications: None  Implants: * No implants in log *    Tej Andrews MD

## 2019-09-25 NOTE — PERIOP NOTES
56 - Patient's family member (spouse, Katerina Roberts) called and updated on patient progression and start of procedure.

## 2019-09-25 NOTE — PROGRESS NOTES
Problem: Falls - Risk of  Goal: *Absence of Falls  Description  Document Nafisa Curtis Fall Risk and appropriate interventions in the flowsheet. Outcome: Progressing Towards Goal  Note:   Fall Risk Interventions:            Medication Interventions: Teach patient to arise slowly                   Problem: Patient Education: Go to Patient Education Activity  Goal: Patient/Family Education  Outcome: Progressing Towards Goal     Problem: Pain  Goal: *Control of Pain  Outcome: Progressing Towards Goal  Goal: *PALLIATIVE CARE:  Alleviation of Pain  Outcome: Progressing Towards Goal     Problem: Patient Education: Go to Patient Education Activity  Goal: Patient/Family Education  Outcome: Progressing Towards Goal     Problem: Pressure Injury - Risk of  Goal: *Prevention of pressure injury  Description  Document Temo Scale and appropriate interventions in the flowsheet.   Outcome: Progressing Towards Goal  Note:   Pressure Injury Interventions:                                            Problem: Patient Education: Go to Patient Education Activity  Goal: Patient/Family Education  Outcome: Progressing Towards Goal     Problem: General Infection Care Plan (Adult and Pediatric)  Goal: Improvement in signs and symptoms of infection  Outcome: Progressing Towards Goal  Goal: *Optimize nutritional status  Outcome: Progressing Towards Goal     Problem: Patient Education: Go to Patient Education Activity  Goal: Patient/Family Education  Outcome: Progressing Towards Goal     Problem: Discharge Planning  Goal: *Discharge to safe environment  Outcome: Progressing Towards Goal  Goal: *Knowledge of medication management  Outcome: Progressing Towards Goal  Goal: *Knowledge of discharge instructions  Outcome: Progressing Towards Goal     Problem: Patient Education: Go to Patient Education Activity  Goal: Patient/Family Education  Outcome: Progressing Towards Goal

## 2019-09-25 NOTE — ANESTHESIA PROCEDURE NOTES
Arterial Line Placement Start time: 9/25/2019 3:34 PM 
End time: 9/25/2019 3:38 PM 
Performed by: Preston Duane, MD 
Authorized by: Preston Duane, MD  
 
Pre-Procedure Indications:  Arterial pressure monitoring and blood sampling Preanesthetic Checklist: patient identified, risks and benefits discussed, patient being monitored, timeout performed and patient being monitored Timeout Time: 15:34 Procedure:  
Seldinger Technique?: Yes Orientation:  Left Location:  Radial artery Catheter size:  20 G Number of attempts:  1 Cont Cardiac Output Sensor: No   
 
Assessment:  
Post-procedure:  Line secured and sterile dressing applied Patient Tolerance:  Patient tolerated the procedure well with no immediate complications

## 2019-09-25 NOTE — PROGRESS NOTES
[address], [address], [address]  P [phone]  F [phone]       Patient Name: Yolande Sharp   Admit Date: 9/24/2019   OR Date: * No surgery found *   Visit Date: 9/25/2019        SUBJECTIVE:    Feels OK this morning. Had a good night. Received IV nausea medicine once and pain medicine twice. No emesis. OBJECTIVE:    Patient Vitals for the past 24 hrs:   Temp Pulse Resp BP SpO2   09/25/19 0411 98.2 °F (36.8 °C) 67 16 115/75 96 %   09/24/19 2014 98 °F (36.7 °C) 79 16 122/83 98 %       Date 09/24/19 0700 - 09/25/19 0659 09/25/19 0700 - 09/26/19 0659   Shift 8595-6251 4933-2528 24 Hour Total 3750-2255 3465-1681 24 Hour Total   INTAKE   I.V.  768.8 768.8        Volume (0.9% sodium chloride with KCl 20 mEq/L infusion)  768.8 768.8      Shift Total  768.8 768.8      OUTPUT   Urine           Urine Occurrence(s)  3 x 3 x      Shift Total         NET  768.8 768.8      Weight (kg)             Physical Exam     General:  alert, cooperative, no distress     Cardiac:  Regular rate and rhythm        Lungs:  clear to auscultation bilaterally  Abdomen:  Soft, mildly distended, diffusely tender       Wound:  clean, dry, no drainage   Extremity: extremities normal, atraumatic, no cyanosis or edema    Data Review  Lab Results   Component Value Date/Time    WBC 11.9 (H) 07/21/2018 05:21 AM    ABS.  NEUTROPHILS 9.7 (H) 07/21/2018 05:21 AM    HGB 14.8 07/21/2018 05:21 AM    HCT 42.1 07/21/2018 05:21 AM    MCV 88.8 07/21/2018 05:21 AM    MCH 31.2 07/21/2018 05:21 AM    PLATELET 523 31/46/4866 05:21 AM     Lab Results   Component Value Date/Time    Sodium 139 07/21/2018 05:55 AM    Potassium 3.8 07/21/2018 05:55 AM    Chloride 106 07/21/2018 05:55 AM    CO2 23 07/21/2018 05:55 AM    Glucose 122 (H) 07/21/2018 05:55 AM    BUN 9 07/21/2018 05:55 AM    Creatinine 0.71 07/21/2018 05:55 AM    Calcium 8.6 07/21/2018 05:55 AM    Albumin 3.2 (L) 07/21/2018 05:55 AM    Bilirubin, total 0.4 07/21/2018 05:55 AM    AST (SGOT) 10 (L) 07/21/2018 05:55 AM    ALT (SGPT) 16 07/21/2018 05:55 AM    Alk. phosphatase 52 07/21/2018 05:55 AM     IMPRESSION/PLAN:    * No surgery found *  for * No surgery found *    Oncologic:  Presumed ovarian cancer based upon imaging and findings at laparoscopy, however, preliminary pathology is not clear. Could be small bowel or appendiceal in origin. Negative colonoscopy one year ago. Tumor markers from yesterday are pending. Heme/CV:  Hemodynamically stable. Renal:  Normal creatinine. FEN/GI:  Has been NPO since midnight for surgery. On IVF. ID/Wound:  Afebrile. No evidence of infection. PPX:  SCDs. Dispostion:  Plan for OR later today for X-lap, SIERRA, BSO, omentectomy, tumor debulking, probable ileocecal resection. Questions again invited and answered.         Melina Isaacs MD

## 2019-09-25 NOTE — PERIOP NOTES
2,000 mL of 0.9% Normal Saline added to sterile field for PRN irrigation throughout procedure. 1,000 mL of Sterile Water added to field for cleaning of instrumentation.

## 2019-09-25 NOTE — ANESTHESIA PREPROCEDURE EVALUATION
Relevant Problems No relevant active problems Anesthetic History No history of anesthetic complications Review of Systems / Medical History Patient summary reviewed, nursing notes reviewed and pertinent labs reviewed Pulmonary Within defined limits Neuro/Psych Within defined limits Cardiovascular Within defined limits GI/Hepatic/Renal 
Within defined limits Endo/Other Within defined limits Other Findings Physical Exam 
 
Airway Mallampati: I 
TM Distance: > 6 cm Neck ROM: normal range of motion Mouth opening: Normal 
 
 Cardiovascular Regular rate and rhythm,  S1 and S2 normal,  no murmur, click, rub, or gallop Dental 
No notable dental hx Pulmonary Breath sounds clear to auscultation Abdominal 
GI exam deferred Other Findings Anesthetic Plan ASA: 2 Anesthesia type: general 
 
Monitoring Plan: Arterial line Induction: Intravenous Anesthetic plan and risks discussed with: Patient

## 2019-09-26 LAB
ABO + RH BLD: NORMAL
ANION GAP SERPL CALC-SCNC: 9 MMOL/L (ref 5–15)
ARTERIAL PATENCY WRIST A: ABNORMAL
BASE DEFICIT BLD-SCNC: 8 MMOL/L
BASOPHILS # BLD: 0 K/UL (ref 0–0.1)
BASOPHILS NFR BLD: 0 % (ref 0–1)
BDY SITE: ABNORMAL
BLD PROD TYP BPU: NORMAL
BLOOD GROUP ANTIBODIES SERPL: NORMAL
BPU ID: NORMAL
BUN SERPL-MCNC: 6 MG/DL (ref 6–20)
BUN/CREAT SERPL: 13 (ref 12–20)
CALCIUM SERPL-MCNC: 7.6 MG/DL (ref 8.5–10.1)
CHLORIDE SERPL-SCNC: 109 MMOL/L (ref 97–108)
CO2 SERPL-SCNC: 23 MMOL/L (ref 21–32)
CREAT SERPL-MCNC: 0.46 MG/DL (ref 0.55–1.02)
CROSSMATCH RESULT,%XM: NORMAL
DIFFERENTIAL METHOD BLD: ABNORMAL
EOSINOPHIL # BLD: 0 K/UL (ref 0–0.4)
EOSINOPHIL NFR BLD: 0 % (ref 0–7)
ERYTHROCYTE [DISTWIDTH] IN BLOOD BY AUTOMATED COUNT: 14.5 % (ref 11.5–14.5)
GAS FLOW.O2 O2 DELIVERY SYS: ABNORMAL L/MIN
GLUCOSE SERPL-MCNC: 127 MG/DL (ref 65–100)
HCO3 BLD-SCNC: 17.4 MMOL/L (ref 22–26)
HCT VFR BLD AUTO: 29.4 % (ref 35–47)
HGB BLD-MCNC: 10.1 G/DL (ref 11.5–16)
HGB BLD-MCNC: 10.8 G/DL (ref 11.5–16)
HGB BLD-MCNC: 12.1 G/DL (ref 11.5–16)
HGB BLD-MCNC: 8.3 G/DL (ref 11.5–16)
IMM GRANULOCYTES # BLD AUTO: 0 K/UL
IMM GRANULOCYTES NFR BLD AUTO: 0 %
LACTATE BLD-SCNC: 1.19 MMOL/L (ref 0.4–2)
LYMPHOCYTES # BLD: 0.3 K/UL (ref 0.8–3.5)
LYMPHOCYTES NFR BLD: 2 % (ref 12–49)
MCH RBC QN AUTO: 30.1 PG (ref 26–34)
MCHC RBC AUTO-ENTMCNC: 34.4 G/DL (ref 30–36.5)
MCV RBC AUTO: 87.8 FL (ref 80–99)
MONOCYTES # BLD: 0.6 K/UL (ref 0–1)
MONOCYTES NFR BLD: 5 % (ref 5–13)
NEUTS BAND NFR BLD MANUAL: 5 % (ref 0–6)
NEUTS SEG # BLD: 12 K/UL (ref 1.8–8)
NEUTS SEG NFR BLD: 88 % (ref 32–75)
NRBC # BLD: 0 K/UL (ref 0–0.01)
NRBC BLD-RTO: 0 PER 100 WBC
O2/TOTAL GAS SETTING VFR VENT: 0.6 %
PCO2 BLD: 30.1 MMHG (ref 35–45)
PH BLD: 7.37 [PH] (ref 7.35–7.45)
PLATELET # BLD AUTO: 181 K/UL (ref 150–400)
PMV BLD AUTO: 11.2 FL (ref 8.9–12.9)
PO2 BLD: 226 MMHG (ref 80–100)
POTASSIUM SERPL-SCNC: 3.8 MMOL/L (ref 3.5–5.1)
RBC # BLD AUTO: 3.35 M/UL (ref 3.8–5.2)
RBC MORPH BLD: ABNORMAL
RBC MORPH BLD: ABNORMAL
SAO2 % BLD: 100 % (ref 92–97)
SODIUM SERPL-SCNC: 141 MMOL/L (ref 136–145)
SPECIMEN EXP DATE BLD: NORMAL
SPECIMEN TYPE: ABNORMAL
STATUS OF UNIT,%ST: NORMAL
TOTAL RESP. RATE, ITRR: 14
UNIT DIVISION, %UDIV: 0
WBC # BLD AUTO: 12.9 K/UL (ref 3.6–11)

## 2019-09-26 PROCEDURE — 77030027138 HC INCENT SPIROMETER -A

## 2019-09-26 PROCEDURE — 36415 COLL VENOUS BLD VENIPUNCTURE: CPT

## 2019-09-26 PROCEDURE — 80048 BASIC METABOLIC PNL TOTAL CA: CPT

## 2019-09-26 PROCEDURE — 85025 COMPLETE CBC W/AUTO DIFF WBC: CPT

## 2019-09-26 PROCEDURE — 65410000002 HC RM PRIVATE OB

## 2019-09-26 PROCEDURE — 74011250636 HC RX REV CODE- 250/636: Performed by: OBSTETRICS & GYNECOLOGY

## 2019-09-26 RX ADMIN — Medication 10 ML: at 14:15

## 2019-09-26 RX ADMIN — Medication 2 G: at 23:55

## 2019-09-26 RX ADMIN — Medication 10 ML: at 05:08

## 2019-09-26 RX ADMIN — KETOROLAC TROMETHAMINE 30 MG: 30 INJECTION, SOLUTION INTRAMUSCULAR; INTRAVENOUS at 23:56

## 2019-09-26 RX ADMIN — KETOROLAC TROMETHAMINE 30 MG: 30 INJECTION, SOLUTION INTRAMUSCULAR; INTRAVENOUS at 05:01

## 2019-09-26 RX ADMIN — METRONIDAZOLE 500 MG: 500 INJECTION, SOLUTION INTRAVENOUS at 17:54

## 2019-09-26 RX ADMIN — KETOROLAC TROMETHAMINE 30 MG: 30 INJECTION, SOLUTION INTRAMUSCULAR; INTRAVENOUS at 11:33

## 2019-09-26 RX ADMIN — ACETAMINOPHEN 1000 MG: 10 INJECTION, SOLUTION INTRAVENOUS at 14:15

## 2019-09-26 RX ADMIN — SODIUM CHLORIDE 125 ML/HR: 900 INJECTION, SOLUTION INTRAVENOUS at 09:37

## 2019-09-26 RX ADMIN — KETOROLAC TROMETHAMINE 30 MG: 30 INJECTION, SOLUTION INTRAMUSCULAR; INTRAVENOUS at 00:29

## 2019-09-26 RX ADMIN — Medication 2 G: at 16:28

## 2019-09-26 RX ADMIN — Medication 20 ML: at 23:56

## 2019-09-26 RX ADMIN — SODIUM CHLORIDE 1000 ML: 900 INJECTION, SOLUTION INTRAVENOUS at 16:54

## 2019-09-26 RX ADMIN — Medication 2 G: at 08:23

## 2019-09-26 RX ADMIN — SODIUM CHLORIDE 125 ML/HR: 900 INJECTION, SOLUTION INTRAVENOUS at 16:26

## 2019-09-26 RX ADMIN — METRONIDAZOLE 500 MG: 500 INJECTION, SOLUTION INTRAVENOUS at 05:09

## 2019-09-26 RX ADMIN — Medication 2 G: at 00:29

## 2019-09-26 RX ADMIN — ACETAMINOPHEN 1000 MG: 10 INJECTION, SOLUTION INTRAVENOUS at 05:03

## 2019-09-26 RX ADMIN — KETOROLAC TROMETHAMINE 30 MG: 30 INJECTION, SOLUTION INTRAMUSCULAR; INTRAVENOUS at 17:58

## 2019-09-26 RX ADMIN — SODIUM CHLORIDE 125 ML/HR: 900 INJECTION, SOLUTION INTRAVENOUS at 17:53

## 2019-09-26 NOTE — PROGRESS NOTES
Bedside and Verbal shift change report given to Andrea Brown RN (oncoming nurse) by Tu Rolon (offgoing nurse). Report included the following information SBAR, Kardex, Intake/Output, MAR and Recent Results. 1020: Re-taught importance of early and frequent ambulation after surgery.  Pt voiced understanding but stated she wanted to wait until her next Toradol administration at 1200.     1145: pt ambulate in hallway, tolerated well

## 2019-09-26 NOTE — PERIOP NOTES
TRANSFER - OUT REPORT:    Verbal report given to Bekah Hilario RN on Mateochristiano Ellis  being transferred to [facility] for routine post - op       Report consisted of patients Situation, Background, Assessment and   Recommendations(SBAR). Time Pre op antibiotic given:1553  Anesthesia Stop time: 3416  Pham Present on Transfer to floor:yes  Order for Pham on Chart:yes  Discharge Prescriptions with Chart:no    Information from the following report(s) SBAR, OR Summary, Intake/Output, MAR and Recent Results was reviewed with the receiving nurse. Opportunity for questions and clarification was provided. Is the patient on 02? YES       L/Min 1         Other -    Is the patient on a monitor? NO    Is the nurse transporting with the patient? NO    Surgical Waiting Area notified of patient's transfer from PACU? YES      The following personal items collected during your admission accompanied patient upon transfer:   Dental Appliance: Dental Appliances: None  Vision:    Hearing Aid:    Jewelry: Jewelry: None  Clothing: Clothing: Other (comment)(Sent home with family)  Other Valuables: Other Valuables:  At bedside, Eyeglasses, With patient  Valuables sent to safe:

## 2019-09-26 NOTE — OP NOTES
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 carcinoma    Procedure:  Exploratory laparotomy, ileocecal resection with anastomosis, total abdominal hysterectomy, bilateral salpingooophorectomy, omentectomy, tumor debulking    Surgeon:  Jenelle Sims MD    Assistant:  Monica Keys SA     Anesthesia:  GETA    EBL:  1300 cc    Ascites:  500 cc    Fluid replacement:  3000 cc crystalloid, 1000 cc albumin, 3 units PRBCs, 2 units FFP    Complications:  None    Implants:  None    Specimens:   ID Type Source Tests Collected by Time Destination   1 : OMENTUM Fresh Omentum   Monica Mendez MD 9/25/2019 1424 Pathology   2 : TERMINAL ILEUM, CECUM, AND APPENDIX FOR FROZEN Frozen Section Abdomen   Monica Mendez MD 9/25/2019 1450 Pathology   3 : Uterus, supracervical hysterectomy, bilateral tubes, bilateral ovaries Frozen Section Uterus with Bilateral Fallopian tubes and Ovaries   Monica Mendez MD 9/25/2019 1516 Pathology   4 : Cervix Fresh Cervix   Monica Mendez MD 9/25/2019 1536 Pathology     Operative indications:  43 yo WF with advanced malignancy. Biopsy of right ovary at time of laparoscopy for cholecystectomy demonstrated adenocarcinoma. Site of primary disease was uncertain, but presume to be of ovarian origin. She was also having symptoms of small bowel obstruction. She was recommended laparotomy with tumor debulking, presuming her disease was an ovarian primary. Operative findings: On bimanual pelvic exam she was noted to have a rock hard cervix and uterus, worrisome for disease infiltration. It was mobile. No parametrial involvement. The uterus was enlarged. Bilateral adnexal masses. The bladder was densely adherent the lower uterine segment and cervix. Mildly dilated small bowel. Rock hard appendix scarred to the underside of the cecum with tethering of the small bowel at that point. Implants noted in the distal omentum. Small volume peritoneal disease scattered throughout the abdominal cavity involving the anterior abdominal wall, diaphragms, small and large bowel mesenteries. Frozen section pathology of the ileocecal resection suggested that this was the primary site of disease. There were some signet ring features noted. Frozen section pathology of the uterus demonstrated infiltration of the myometrium with the same process. Procedure in detail:    After the risks, benefits, indications, and alternatives of the procedure were discussed with the patient and informed consent was obtained, the patient was taken to the operating room where she was identified as the correct patient. She was then administered general anesthesia and placed in the dorsal lithotomy position in [address] [place] [address]. A pelvic exam under anesthesia was then performed. She was then prepped and draped in the usual fashion. A Pham catheter was inserted. The abdomen was then entered via vertical midline skin incision extending from the pubic symphysis to a several centimeters above the umbilicus. Upon entering the abdominal cavity, the above-mentioned findings were noted. A Bookwalter retractor was placed, and pelvic washings were obtained. We began by performing an omentectomy. There was some nodules noted in the distal omentum consistent with metastatic disease. The omentum as freed from its avascular attachments to the transverse colon with cautery. This allowed entry into the lesser sac. The omentum was somewhat adherent to the mesentery of the transverse colon. Once these adhesions were freed we then divided the omentum along the greater curvature of the stomach using the Ligasure Impact device. We worked from her right side toward the left side until we reached the hilum of the spleen. The remaining omentum was then divided with the Ligasure.   There was some oozing noted in this area and cautery was used for hemostasis. I then applied Surgicel and packed the area. We then packed the small bowel into the upper abdomen with the Bookwalter and then directed our attention to the right lower quadrant and pelvis. On inspection of the cecum and terminal ileum, there appeared to be a rock hard appendix on the underside of the cecum. This was likely what was causing her small bowel obstruction as it narrowed the terminal ileum at that point. I decided to proceed with ileocecal resection at that point. The peritoneum was opened lateral to the cecum. We actually started at the level of the round ligament on the right. It was divided, allowing entry into the retroperitoneal space. We continued opening the peritoneum along the lateral aspect of the cecum and ascending colon. I also mobilized the hepatic flexure. I then created a window through the mesentery of the ascending colon with cautery, immediately adjacent to the bowel. The colon was then divided with an Ethicon stapling device. A window was then created in the mesentery of the terminal ileum, immediately adjacent to the bowel. The small bowel was then divided with an Ethicon stapling device. The intervening mesentery was then divided with the Ligasure Enseal device. The edges of the staple lines were then cleared of fat. An opening was made at the antimesenteric border of the staple line on both segments of bowel. An Ethicon stapling device was then used to perform an anastomosis. The distal end of the staple line was reinforced with interrupted 3-0 silk sutures to prevent a zippering effect. The remaining enterotomy at the staple line was then close in two layers. First a 3-0 Vicryl was used to close the bowel, followed by a layer of interrupted 3-0 silk sutures to imbricate the Vycril stitch. The bowel was packed into the upper abdomen with moist lap sponges. We started with our dissection on the left side of the patient.  The left round ligament was identified. It was coagulated and transected with electrocautery, allowing entry into the retroperitoneal space, where the left ureter was identified. The left ovarian vessels were then isolated. A window was created in the posterior leaf of the broad ligament between the ureter and the left ovarian vessels. The ovarian vessels were then doubly clamped with curved Zeppelin clamps, transected in between, and then ligated on either side with Vicryl sutures. We then moved to the right side of the patient. The right round ligament had already been divided. We bluntly opened the retroperitoneal space, where the right ureter was identified. A window was then created in the posterior leaf of the broad ligament between the right ovarian vessels and the right ureter. The right ovarian vessels were then doubly clamped with curved Zeppelin clamps, transected in between, and then ligated on either side with Vicryl suture. We then moved anteriorly and began sharply dissecting the bladder off the lower uterine segment and cervix. The bladder was very adherent to the lower uterine segment and cervix. We then skeletonized the uterine arteries bilaterally along the lateral aspect of the cervix. Curved Zeppelin clamps were placed at the level of the internal cervical os on each side. The pedicles were transected and then ligated with Vicryl suture. We then moved down the cervix, taking straight Zeppelin clamp bites on each side of the cervix, staying medial to the last bite. Each of these pedicles was transected and ligated with Vicryl suture. Due to mobility difficulty and visualization, we elected to perform a supracervical hysterectomy at that point. The uterine fundus was amputated with cautery. The cervical stump was grasped with Kocher clamps. I place a finger in the vagina to help delineate the upper aspect of the anterior vaginal fornix. The vagina was then entered with cautery.   A circumferntial colpotomy was then performed with cautery onto my finger. The vaginal edges were then grasped with long Allis clamps. The vaginal cuff was then closed with a running 0 Vicryl suture. Additional hemostatic figure-of-eight sutures were required for hemostasis. Due to the amount of dissection with the bladder, the bladder was backfilled. There was no evidence of leak, but there was one area where the wall was then. This area was reinforced with a running 2-0 Vicryl suture. At this point, we irrigated the pelvis and made sure there was no evidence of active bleeding. Laina powder was then placed in the pelvis. We then removed all lap sponges and retractors, and the abdominal wall was then closed with a number 1 looped PDS suture in a running, nonlocking fashion. Subcutaneous tissues were made hemostatic with electrocautery, and the skin was closed with stainless steel staples. A sterile pressure dressing was then applied. The patient was then awakened from anesthesia and taken to the recovery room in stable condition. All sponge, lap, and needle counts were correct.       Jenelle Sims MD  9/26/2019  10:46 AM

## 2019-09-26 NOTE — PROGRESS NOTES
27 [facility] [address] [phone]  F [phone]       Patient Name: Mateo Ellis   Admit Date: 9/24/2019   OR Date: 9/25/2019   Visit Date: 9/26/2019        SUBJECTIVE:    Reports some left sided abdominal pain this morning. No nausea/vomiting.       OBJECTIVE:    Patient Vitals for the past 24 hrs:   Temp Pulse Resp BP SpO2   09/26/19 0700 -- 76 11 95/58 99 %   09/26/19 0600 -- 77 10 (!) 89/57 99 %   09/26/19 0500 -- 89 25 101/64 100 %   09/26/19 0400 97.9 °F (36.6 °C) 73 13 100/59 100 %   09/26/19 0300 -- 73 12 101/69 99 %   09/26/19 0200 -- 78 10 98/64 99 %   09/26/19 0100 -- 76 9 103/68 98 %   09/26/19 0000 -- 76 8 97/64 99 %   09/25/19 2307 -- (!) 111 17 -- 99 %   09/25/19 2305 98 °F (36.7 °C) (!) 103 19 109/71 99 %   09/25/19 2300 -- 85 8 -- 98 %   09/25/19 2230 -- 88 (!) 7 -- 98 %   09/25/19 2200 -- 89 9 -- 98 %   09/25/19 2130 -- (!) 103 10 -- 94 %   09/25/19 2100 -- (!) 104 8 101/67 92 %   09/25/19 2030 -- (!) 115 10 104/68 98 %   09/25/19 2015 -- (!) 104 8 99/68 98 %   09/25/19 2000 -- (!) 103 10 102/75 98 %   09/25/19 1945 -- 100 10 110/73 98 %   09/25/19 1930 98.8 °F (37.1 °C) (!) 109 11 109/71 98 %   09/25/19 1915 -- (!) 108 11 101/66 97 %   09/25/19 1900 -- (!) 114 13 105/72 98 %   09/25/19 1845 -- 99 12 104/65 98 %   09/25/19 1830 -- (!) 104 13 107/71 99 %   09/25/19 1815 -- 83 14 104/63 99 %   09/25/19 1800 -- 77 18 112/63 99 %   09/25/19 1745 -- 75 20 102/61 100 %   09/25/19 1730 99.4 °F (37.4 °C) 78 21 99/60 100 %   09/25/19 1725 99.7 °F (37.6 °C) 78 16 111/56 100 %   09/25/19 1720 -- 78 25 111/53 100 %   09/25/19 1715 -- 77 20 112/73 100 %   09/25/19 1710 -- -- -- 116/55 --   09/25/19 1143 -- 66 14 103/68 95 %   09/25/19 0943 98.1 °F (36.7 °C) 80 16 125/79 96 %       Date 09/25/19 0700 - 09/26/19 0659 09/26/19 0700 - 09/27/19 0659   Shift 4815-6135 1539-9483 24 Hour Total 4004-4758 9962-9196 24 Hour Total   INTAKE   I.V. 3500 1250 4750 375  375     Volume (lactated Ringers infusion) 2500  2500        Volume (0.9% sodium chloride infusion) 1000  1000        Volume (0.9% sodium chloride infusion)  1250 1250 375  375   Blood 1550  1550        Autotransfused 620  620        Volume (Packed RBC's) 310  310        Volume (Packed RBC's) 310  310        Volume (Packed RBC's) 310  310      Shift Total 5050 1250 6300 375  375   OUTPUT   Urine 500 1275 1775 300  300     Urine Output 500  500        Urine Output (mL) (Urinary Catheter 09/25/19 2- way; Pham)  1275 1275 300  300   Other 500  500        Other Output 500  500      Blood 1800  1800        Estimated Blood Loss 1800  1800      Shift Total 2800 1275 4075 300  300   NET 2250 -25 2225 75  75   Weight (kg)             Physical Exam     General:  alert, cooperative, no distress     Cardiac:  Regular rate and rhythm        Lungs:  clear to auscultation bilaterally  Abdomen:  soft, non-distended, appropriately tender       Wound:  clean, dry, no drainage   Extremity: extremities normal, atraumatic, no cyanosis or edema    Data Review  Lab Results   Component Value Date/Time    WBC 12.9 (H) 09/26/2019 05:12 AM    ABS. NEUTROPHILS 12.0 (H) 09/26/2019 05:12 AM    HGB 10.1 (L) 09/26/2019 05:12 AM    HCT 29.4 (L) 09/26/2019 05:12 AM    MCV 87.8 09/26/2019 05:12 AM    MCH 30.1 09/26/2019 05:12 AM    PLATELET 406 96/47/2741 05:12 AM     Lab Results   Component Value Date/Time    Sodium 141 09/26/2019 05:12 AM    Potassium 3.8 09/26/2019 05:12 AM    Chloride 109 (H) 09/26/2019 05:12 AM    CO2 23 09/26/2019 05:12 AM    Glucose 127 (H) 09/26/2019 05:12 AM    BUN 6 09/26/2019 05:12 AM    Creatinine 0.46 (L) 09/26/2019 05:12 AM    Calcium 7.6 (L) 09/26/2019 05:12 AM    Albumin 3.2 (L) 07/21/2018 05:55 AM    Bilirubin, total 0.4 07/21/2018 05:55 AM    AST (SGOT) 10 (L) 07/21/2018 05:55 AM    ALT (SGPT) 16 07/21/2018 05:55 AM    Alk.  phosphatase 52 07/21/2018 05:55 AM     IMPRESSION/PLAN:    1 Day Post-Op Procedure(s):  EXPLORATORY LAPAROTOMY SIERRA BSO, TUMOR DEBULKING: ILEOCECAL RESECTION; OMENTECTOMY for OVARIAN CANCER    Oncologic:  Advanced malignancy of unclear etiology. Initially thought to be of ovarian origin, but now looking more like an appendiceal primary, as appendix, terminal ileum, and cecum were densely involved with partial obstruction of terminal ileum. Bilateral adnexa, uterus, and cervix were also involved with tumor. Remainder of small bowel, colon, and stomach appeared normal.  Await final pathology. Heme/CV:  Hemodynamically stable. EBL of 1300 cc. Received 3 units PRBCs and 2 units FFP intraoperatively. Postoperative Hbg appropriate. BPs ok. No tachycardia. Will continue to monitor. Renal:  Good UOP. Normal creatinine. FEN/GI:  Will allow clears today. Await return of bowel function before advancing fully. ID/Wound:  Afebrile. Mildly elevated WBC not unexpected. No evidence of infection. Will monitor. PPX:  SCDs, IS, ambulation. Will hold Lovenox for another 24 hours. Dispostion:  Doing well postoperatively. Will transfer to [address] [place] [address] today, as she doesn't appear to need ICU level of care.           Kassidy Gerber MD

## 2019-09-26 NOTE — ANESTHESIA POSTPROCEDURE EVALUATION
Post-Anesthesia Evaluation and Assessment Patient: Jonathan Olmstead MRN: [id number]  SSN: [id number] YOB: 1970  Age: 44 y.o. Sex: female I have evaluated the patient and they are stable and ready for discharge from the PACU. Cardiovascular Function/Vital Signs Visit Vitals /67 Pulse (!) 103 Temp 37.1 °C (98.8 °F) Resp 10 SpO2 94% Patient is status post General anesthesia for Procedure(s): EXPLORATORY LAPAROTOMY SIERRA BSO, TUMOR DEBULKING: ILEOCECAL RESECTION; OMENTECTOMY. Nausea/Vomiting: None Postoperative hydration reviewed and adequate. Pain: 
Pain Scale 1: Numeric (0 - 10) (09/25/19 1930) Pain Intensity 1: 4 (09/25/19 1930) Managed Neurological Status:  
Neuro (WDL): Within Defined Limits (09/25/19 1930) Neuro LUE Motor Response: Purposeful (09/25/19 1930) LLE Motor Response: Purposeful (09/25/19 1930) RUE Motor Response: Purposeful (09/25/19 1930) RLE Motor Response: Purposeful (09/25/19 1930) At baseline Mental Status, Level of Consciousness: Alert and  oriented to person, place, and time Pulmonary Status:  
O2 Device: Room air (09/25/19 1930) Adequate oxygenation and airway patent Complications related to anesthesia: None Post-anesthesia assessment completed. No concerns Signed By: Thaddeus Padilla MD   
 September 25, 2019 Procedure(s): EXPLORATORY LAPAROTOMY SIERRA BSO, TUMOR DEBULKING: ILEOCECAL RESECTION; OMENTECTOMY. general 
 
<BSHSIANPOST> Vitals Value Taken Time /63 9/25/2019  6:15 PM  
Temp 37.4 °C (99.4 °F) 9/25/2019  5:30 PM  
Pulse 83 9/25/2019  6:19 PM  
Resp 13 9/25/2019  6:19 PM  
SpO2 99 % 9/25/2019  6:19 PM  
Vitals shown include unvalidated device data.

## 2019-09-26 NOTE — PERIOP NOTES
Dr. Alma Henderson on the floor rounding. Update of the evening was given. New order to return to [address] was given order noted.

## 2019-09-27 LAB
ANION GAP SERPL CALC-SCNC: 10 MMOL/L (ref 5–15)
BASOPHILS # BLD: 0 K/UL (ref 0–0.1)
BASOPHILS NFR BLD: 0 % (ref 0–1)
BUN SERPL-MCNC: 5 MG/DL (ref 6–20)
BUN/CREAT SERPL: 11 (ref 12–20)
CALCIUM SERPL-MCNC: 7.2 MG/DL (ref 8.5–10.1)
CHLORIDE SERPL-SCNC: 105 MMOL/L (ref 97–108)
CO2 SERPL-SCNC: 21 MMOL/L (ref 21–32)
CREAT SERPL-MCNC: 0.44 MG/DL (ref 0.55–1.02)
DIFFERENTIAL METHOD BLD: ABNORMAL
EOSINOPHIL # BLD: 0.2 K/UL (ref 0–0.4)
EOSINOPHIL NFR BLD: 1 % (ref 0–7)
ERYTHROCYTE [DISTWIDTH] IN BLOOD BY AUTOMATED COUNT: 15.1 % (ref 11.5–14.5)
GLUCOSE SERPL-MCNC: 103 MG/DL (ref 65–100)
HCT VFR BLD AUTO: 26.9 % (ref 35–47)
HGB BLD-MCNC: 9.1 G/DL (ref 11.5–16)
IMM GRANULOCYTES # BLD AUTO: 0 K/UL
IMM GRANULOCYTES NFR BLD AUTO: 0 %
LYMPHOCYTES # BLD: 0 K/UL (ref 0.8–3.5)
LYMPHOCYTES NFR BLD: 0 % (ref 12–49)
MCH RBC QN AUTO: 30.2 PG (ref 26–34)
MCHC RBC AUTO-ENTMCNC: 33.8 G/DL (ref 30–36.5)
MCV RBC AUTO: 89.4 FL (ref 80–99)
MONOCYTES # BLD: 0.6 K/UL (ref 0–1)
MONOCYTES NFR BLD: 3 % (ref 5–13)
NEUTS SEG # BLD: 20 K/UL (ref 1.8–8)
NEUTS SEG NFR BLD: 96 % (ref 32–75)
NRBC # BLD: 0 K/UL (ref 0–0.01)
NRBC BLD-RTO: 0 PER 100 WBC
PLATELET # BLD AUTO: 217 K/UL (ref 150–400)
PMV BLD AUTO: 11.6 FL (ref 8.9–12.9)
POTASSIUM SERPL-SCNC: 3.4 MMOL/L (ref 3.5–5.1)
RBC # BLD AUTO: 3.01 M/UL (ref 3.8–5.2)
RBC MORPH BLD: ABNORMAL
RBC MORPH BLD: ABNORMAL
SODIUM SERPL-SCNC: 136 MMOL/L (ref 136–145)
WBC # BLD AUTO: 20.8 K/UL (ref 3.6–11)

## 2019-09-27 PROCEDURE — 80048 BASIC METABOLIC PNL TOTAL CA: CPT

## 2019-09-27 PROCEDURE — 36415 COLL VENOUS BLD VENIPUNCTURE: CPT

## 2019-09-27 PROCEDURE — 74011000250 HC RX REV CODE- 250: Performed by: OBSTETRICS & GYNECOLOGY

## 2019-09-27 PROCEDURE — 74011250636 HC RX REV CODE- 250/636: Performed by: OBSTETRICS & GYNECOLOGY

## 2019-09-27 PROCEDURE — 65410000002 HC RM PRIVATE OB

## 2019-09-27 PROCEDURE — C9113 INJ PANTOPRAZOLE SODIUM, VIA: HCPCS | Performed by: OBSTETRICS & GYNECOLOGY

## 2019-09-27 PROCEDURE — 85025 COMPLETE CBC W/AUTO DIFF WBC: CPT

## 2019-09-27 RX ORDER — PANTOPRAZOLE SODIUM 40 MG/10ML
40 INJECTION, POWDER, LYOPHILIZED, FOR SOLUTION INTRAVENOUS DAILY
Status: DISCONTINUED | OUTPATIENT
Start: 2019-09-27 | End: 2019-09-27 | Stop reason: SDUPTHER

## 2019-09-27 RX ADMIN — KETOROLAC TROMETHAMINE 30 MG: 30 INJECTION, SOLUTION INTRAMUSCULAR; INTRAVENOUS at 23:59

## 2019-09-27 RX ADMIN — SODIUM CHLORIDE 125 ML/HR: 900 INJECTION, SOLUTION INTRAVENOUS at 10:23

## 2019-09-27 RX ADMIN — METRONIDAZOLE 500 MG: 500 INJECTION, SOLUTION INTRAVENOUS at 17:42

## 2019-09-27 RX ADMIN — SODIUM CHLORIDE 125 ML/HR: 900 INJECTION, SOLUTION INTRAVENOUS at 16:55

## 2019-09-27 RX ADMIN — Medication 10 ML: at 06:12

## 2019-09-27 RX ADMIN — METRONIDAZOLE 500 MG: 500 INJECTION, SOLUTION INTRAVENOUS at 06:10

## 2019-09-27 RX ADMIN — Medication 2 G: at 23:59

## 2019-09-27 RX ADMIN — Medication 10 ML: at 21:05

## 2019-09-27 RX ADMIN — SODIUM CHLORIDE 40 MG: 9 INJECTION, SOLUTION INTRAMUSCULAR; INTRAVENOUS; SUBCUTANEOUS at 10:23

## 2019-09-27 RX ADMIN — Medication 10 ML: at 17:43

## 2019-09-27 RX ADMIN — KETOROLAC TROMETHAMINE 30 MG: 30 INJECTION, SOLUTION INTRAMUSCULAR; INTRAVENOUS at 06:10

## 2019-09-27 RX ADMIN — Medication 2 G: at 08:29

## 2019-09-27 RX ADMIN — Medication 2 G: at 17:00

## 2019-09-27 RX ADMIN — KETOROLAC TROMETHAMINE 30 MG: 30 INJECTION, SOLUTION INTRAMUSCULAR; INTRAVENOUS at 16:55

## 2019-09-27 NOTE — PROGRESS NOTES
09/27/19 1205   Vitals   Temp 98.2 °F (36.8 °C)   Temp Source Oral   Pulse (Heart Rate) (!) 102   Heart Rate Source Monitor   Resp Rate 16   O2 Sat (%) 98 %   Level of Consciousness Alert   /69   MAP (Calculated) 79   BP 1 Location Right arm   BP 1 Method Automatic   BP Patient Position At rest   MEWS Score 3   Dr Yolanda Richards called    Dr Magda Servin aware of above data and also aware of sepsis screen pop up due to WBC

## 2019-09-27 NOTE — PROGRESS NOTES
27 [facility] [address], [address] [phone]  F [phone]       Patient Name: Rubi Valle   Admit Date: 9/24/2019   OR Date: 9/25/2019   Visit Date: 9/27/2019        SUBJECTIVE:    Feeling a little better this morning. Pain controlled. Ambulated in hallway twice yesterday. She notes some indigestion. No nausea/vomiting. No flatus or BM. OBJECTIVE:    Patient Vitals for the past 24 hrs:   Temp Pulse Resp BP SpO2   09/27/19 0416 98.1 °F (36.7 °C) 99 16 99/66 97 %   09/26/19 2356 98.8 °F (37.1 °C) 100 18 98/62 96 %   09/26/19 2001 98.3 °F (36.8 °C) 100 16 101/71 96 %   09/26/19 1922 -- 98 -- 101/71 --   09/26/19 1602 97.8 °F (36.6 °C) 98 16 97/68 98 %   09/26/19 1215 -- -- -- -- 97 %   09/26/19 1210 97.8 °F (36.6 °C) (!) 109 18 93/67 92 %   09/26/19 1132 98 °F (36.7 °C) (!) 104 18 104/74 96 %   09/26/19 1105 97.6 °F (36.4 °C) (!) 101 20 (!) 89/65 97 %   09/26/19 1005 97.5 °F (36.4 °C) 76 18 105/53 97 %   09/26/19 0908 97.9 °F (36.6 °C) (!) 102 -- 104/70 96 %   09/26/19 0808 98.3 °F (36.8 °C) -- -- -- 97 %   09/26/19 0800 -- 90 21 102/61 97 %   09/26/19 0700 -- 76 11 95/58 99 %       Date 09/26/19 0700 - 09/27/19 0659 09/27/19 0700 - 09/28/19 0659   Shift 2071-6211 8457-3101 24 Hour Total 7078-7826 2842-7670 24 Hour Total   INTAKE   P.O.  240 240        P. O.  240 240      I.V. 1002.1 2031. 3 3033.3        Volume (0.9% sodium chloride infusion) 1002.1 2031. 3 3033.3      Shift Total 1002.1 2271.3 3273.3      OUTPUT   Urine 475 500 975        Urine Output (mL) (Urinary Catheter 09/25/19 2- way; Pham) 475 500 975      Shift Total 475 500 975      .1 1771.3 2298.3      Weight (kg)             Physical Exam     General:  alert, cooperative, no distress     Cardiac:  Regular rate and rhythm        Lungs:  clear to auscultation bilaterally  Abdomen:  soft, non-distended, appropriately tender       Wound:  clean, dry, no drainage   Extremity: extremities normal, atraumatic, no cyanosis or edema    Data Review  Lab Results   Component Value Date/Time    WBC 20.8 (H) 09/27/2019 04:26 AM    ABS. NEUTROPHILS 20.0 (H) 09/27/2019 04:26 AM    HGB 9.1 (L) 09/27/2019 04:26 AM    HCT 26.9 (L) 09/27/2019 04:26 AM    MCV 89.4 09/27/2019 04:26 AM    MCH 30.2 09/27/2019 04:26 AM    PLATELET 425 39/88/9547 04:26 AM     Lab Results   Component Value Date/Time    Sodium 136 09/27/2019 04:26 AM    Potassium 3.4 (L) 09/27/2019 04:26 AM    Chloride 105 09/27/2019 04:26 AM    CO2 21 09/27/2019 04:26 AM    Glucose 103 (H) 09/27/2019 04:26 AM    BUN 5 (L) 09/27/2019 04:26 AM    Creatinine 0.44 (L) 09/27/2019 04:26 AM    Calcium 7.2 (L) 09/27/2019 04:26 AM    Albumin 3.2 (L) 07/21/2018 05:55 AM    Bilirubin, total 0.4 07/21/2018 05:55 AM    AST (SGOT) 10 (L) 07/21/2018 05:55 AM    ALT (SGPT) 16 07/21/2018 05:55 AM    Alk. phosphatase 52 07/21/2018 05:55 AM     IMPRESSION/PLAN:    2 Day Post-Op Procedure(s):  EXPLORATORY LAPAROTOMY SIERRA BSO, TUMOR DEBULKING: ILEOCECAL RESECTION; OMENTECTOMY for OVARIAN CANCER    Oncologic:  Advanced malignancy of unclear etiology. Initially thought to be of ovarian origin, but now looking more like an appendiceal primary, as appendix, terminal ileum, and cecum were densely involved with partial obstruction of terminal ileum. Bilateral adnexa, uterus, and cervix were also involved with tumor. Remainder of small bowel, colon, and stomach appeared normal.  Await final pathology. Heme/CV:  Hemodynamically stable. EBL of 1300 cc. Received 3 units PRBCs and 2 units FFP intraoperatively. Hgb down to 9.1 this AM.  BPs ok. No tachycardia. Will continue to monitor. Renal:  Adequate UOP. Urine is a little ivelisse colored. Normal creatinine. FEN/GI:  Continue clears. Continue IV fluids. Await return of bowel function before advancing fully. Will add Protonix for GI prophylaxis. ID/Wound:  Afebrile.   WBC up to 20.8 this AM.  No obvious infection. I suspect this is still reactive and tumor related. She had a mildly elevated WBC before surgery. Will continue perioperative antibiotics and monitor. PPX:  SCDs, IS, ambulation. Will continue to hold Lovenox. Dispostion:  Doing as expected postoperatively. Awaiting return of bowel function. Ambulation encouraged. Will plan on presenting her at cancer conference once final pathology is back.             Lj Joseph MD

## 2019-09-27 NOTE — PROGRESS NOTES
Music Therapy Assessment  [facility]    Yvon Montiel [id number]     1970  44 y.o.  female    Patient Telephone Number: [phone] (home)   Rastafari Affiliation: Yarsanism   Language: English   Patient Active Problem List    Diagnosis Date Noted    Ovarian cancer ([facility] [address] 09/24/2019    Small bowel obstruction ([facility] [address] [facility] 09/24/2019    Malignant neoplasm of both ovaries ([facility] [address] 09/20/2019    Symptomatic cholelithiasis 09/18/2019        Date: 9/27/2019            Total Time (in minutes): 40          [facility] [facility]    Mental Status:   [X] Alert [  ] Jenean Estrella [  ]  Confused  [  ] Minimally responsive  [  ] Sleeping    Communication Status: N/A: Please see Session Observations below. [  ] Impaired Speech [  ] Nonverbal     Physical Status:   N/A: Please see Session Observations below.    [  ] Oxygen in use [  ] Hard of Hearing [  ] Vision Impaired  [  ] Ambulatory  [  ] Ambulatory with assistance [  ] Non-ambulatory     Music Preferences, Background: Folk-Rock    Clinical Problem addressed: _Socio-emotional Support    Goal(s) met in session:  Physical/Pain management (Scale of 1-10):    Pre-session rating ___________    Post-session rating __________   [ X] Increased relaxation   [  ] Affected breathing patterns  [  ] Decreased muscle tension   [  ] Decreased agitation  [  ] Affected heart rate    [  ] Increased alertness     Emotional/Psychological:  [  ] Increased self-expression   [  ] Decreased aggressive behavior   [  ] Decreased feelings of stress  [  ] Discussed healthy coping skills     [  ] Improved mood    [  ] Decreased withdrawn behavior     Social:  [  ] Decreased feelings of isolation/loneliness [X] Positive social interaction   [ X ] Provided support and/or comfort for family/friends    Spiritual:  [  ] Spiritual support    [  ] Expressed peace  [  ] Expressed hay    [  ] Discussed beliefs    Techniques Utilized (Check all that apply):   [  ] Procedural support MARY Lukei ] Music for relaxation [  ] Patient preferred music  [  ] Gracia analysis  [  ] Jose David Dubois choice  [  ] Music for validation  [  ] Entrainment  [  ] Movement to music [  ] Guided visualization  [  ] Maude Ann  [  ] Patient instrument playing [  ] Jose David Dubois writing  [X] Rico Bellis along   [  ] Isidor Leticia  [  ] Sensory stimulation  [  ] Active Listening  [  ] Music for spiritual support [  ] Making of CDs as gifts    Session Observations:  Referred by [facility] Patient Advocate. Ms. Alfredo Gibbs was in bed, with a visitor standing nearby. Patient Alfredo Gibbs welcomed the MT into the room, and stated she was feeling better today than she did yesterday. She preferred to hear [employer] rock, so [employer] Girl was played, and nurse, patient and visitor sang along on the chorus. After some discussion, the suggestion of Nicki Horne came up, and Angelica was played, with the visitor keeping time on the egg shaker. After more conversation and social interaction, MT provided resource of the [facility] to Ms. Alfredo Gibbs. Will follow up with MT as able.     Rey Powell MT-BC (Music Therapist-Board Certified)  Spiritual Care Services  Referral- based service

## 2019-09-27 NOTE — PROGRESS NOTES
Bedside and Verbal shift change report given to VEDA Estrada RN (oncoming nurse) by Naina Schulte RN (offgoing nurse). Report included the following information SBAR, Kardex, OR Summary, Procedure Summary, Intake/Output, MAR, Accordion and Recent Results.

## 2019-09-27 NOTE — PROGRESS NOTES
Bedside and Verbal shift change report given to Liana Hanks RN (oncoming nurse) by Justin Maldonado RN (offgoing nurse). Report included the following information SBAR, Kardex, Intake/Output, MAR and Recent Results.

## 2019-09-27 NOTE — PROGRESS NOTES
Bedside and Verbal shift change report given to Tushar Sousa (oncoming nurse) by Alison Luna (offgoing nurse). Report included the following information SBAR, Kardex, OR Summary, Procedure Summary, Intake/Output, MAR and Recent Results.

## 2019-09-28 LAB
ANION GAP SERPL CALC-SCNC: 9 MMOL/L (ref 5–15)
BASOPHILS # BLD: 0 K/UL (ref 0–0.1)
BASOPHILS NFR BLD: 0 % (ref 0–1)
BUN SERPL-MCNC: 5 MG/DL (ref 6–20)
BUN/CREAT SERPL: 13 (ref 12–20)
CALCIUM SERPL-MCNC: 7.4 MG/DL (ref 8.5–10.1)
CHLORIDE SERPL-SCNC: 107 MMOL/L (ref 97–108)
CO2 SERPL-SCNC: 21 MMOL/L (ref 21–32)
CREAT SERPL-MCNC: 0.4 MG/DL (ref 0.55–1.02)
DIFFERENTIAL METHOD BLD: ABNORMAL
EOSINOPHIL # BLD: 0.6 K/UL (ref 0–0.4)
EOSINOPHIL NFR BLD: 3 % (ref 0–7)
ERYTHROCYTE [DISTWIDTH] IN BLOOD BY AUTOMATED COUNT: 14.7 % (ref 11.5–14.5)
GLUCOSE SERPL-MCNC: 85 MG/DL (ref 65–100)
HCT VFR BLD AUTO: 21.3 % (ref 35–47)
HGB BLD-MCNC: 7.3 G/DL (ref 11.5–16)
IMM GRANULOCYTES # BLD AUTO: 0 K/UL
IMM GRANULOCYTES NFR BLD AUTO: 0 %
LYMPHOCYTES # BLD: 0.8 K/UL (ref 0.8–3.5)
LYMPHOCYTES NFR BLD: 4 % (ref 12–49)
MCH RBC QN AUTO: 30.4 PG (ref 26–34)
MCHC RBC AUTO-ENTMCNC: 34.3 G/DL (ref 30–36.5)
MCV RBC AUTO: 88.8 FL (ref 80–99)
MONOCYTES # BLD: 1 K/UL (ref 0–1)
MONOCYTES NFR BLD: 5 % (ref 5–13)
NEUTS SEG # BLD: 17.6 K/UL (ref 1.8–8)
NEUTS SEG NFR BLD: 88 % (ref 32–75)
NRBC # BLD: 0 K/UL (ref 0–0.01)
NRBC BLD-RTO: 0 PER 100 WBC
PLATELET # BLD AUTO: 290 K/UL (ref 150–400)
PMV BLD AUTO: 10.4 FL (ref 8.9–12.9)
POTASSIUM SERPL-SCNC: 2.9 MMOL/L (ref 3.5–5.1)
RBC # BLD AUTO: 2.4 M/UL (ref 3.8–5.2)
RBC MORPH BLD: ABNORMAL
SODIUM SERPL-SCNC: 137 MMOL/L (ref 136–145)
WBC # BLD AUTO: 20 K/UL (ref 3.6–11)

## 2019-09-28 PROCEDURE — 30233N1 TRANSFUSION OF NONAUTOLOGOUS RED BLOOD CELLS INTO PERIPHERAL VEIN, PERCUTANEOUS APPROACH: ICD-10-PCS | Performed by: OBSTETRICS & GYNECOLOGY

## 2019-09-28 PROCEDURE — 86644 CMV ANTIBODY: CPT

## 2019-09-28 PROCEDURE — 86900 BLOOD TYPING SEROLOGIC ABO: CPT

## 2019-09-28 PROCEDURE — 65410000002 HC RM PRIVATE OB

## 2019-09-28 PROCEDURE — 74011250637 HC RX REV CODE- 250/637: Performed by: OBSTETRICS & GYNECOLOGY

## 2019-09-28 PROCEDURE — 86923 COMPATIBILITY TEST ELECTRIC: CPT

## 2019-09-28 PROCEDURE — 77030012890

## 2019-09-28 PROCEDURE — 74011250636 HC RX REV CODE- 250/636: Performed by: OBSTETRICS & GYNECOLOGY

## 2019-09-28 PROCEDURE — 74011000250 HC RX REV CODE- 250: Performed by: OBSTETRICS & GYNECOLOGY

## 2019-09-28 PROCEDURE — C9113 INJ PANTOPRAZOLE SODIUM, VIA: HCPCS | Performed by: OBSTETRICS & GYNECOLOGY

## 2019-09-28 PROCEDURE — 36430 TRANSFUSION BLD/BLD COMPNT: CPT

## 2019-09-28 PROCEDURE — 36415 COLL VENOUS BLD VENIPUNCTURE: CPT

## 2019-09-28 PROCEDURE — 85025 COMPLETE CBC W/AUTO DIFF WBC: CPT

## 2019-09-28 PROCEDURE — P9040 RBC LEUKOREDUCED IRRADIATED: HCPCS

## 2019-09-28 PROCEDURE — 80048 BASIC METABOLIC PNL TOTAL CA: CPT

## 2019-09-28 RX ORDER — ACETAMINOPHEN 325 MG/1
975 TABLET ORAL EVERY 6 HOURS
Status: DISCONTINUED | OUTPATIENT
Start: 2019-09-29 | End: 2019-09-28

## 2019-09-28 RX ORDER — ACETAMINOPHEN 325 MG/1
650 TABLET ORAL ONCE
Status: COMPLETED | OUTPATIENT
Start: 2019-09-28 | End: 2019-09-28

## 2019-09-28 RX ORDER — OXYCODONE HYDROCHLORIDE 5 MG/1
5 TABLET ORAL
Status: DISCONTINUED | OUTPATIENT
Start: 2019-09-28 | End: 2019-10-03 | Stop reason: HOSPADM

## 2019-09-28 RX ORDER — POTASSIUM CHLORIDE 7.45 MG/ML
10 INJECTION INTRAVENOUS
Status: COMPLETED | OUTPATIENT
Start: 2019-09-28 | End: 2019-09-28

## 2019-09-28 RX ORDER — SIMETHICONE 80 MG
80 TABLET,CHEWABLE ORAL
Status: DISCONTINUED | OUTPATIENT
Start: 2019-09-28 | End: 2019-10-03 | Stop reason: HOSPADM

## 2019-09-28 RX ORDER — HYDROMORPHONE HYDROCHLORIDE 1 MG/ML
1 INJECTION, SOLUTION INTRAMUSCULAR; INTRAVENOUS; SUBCUTANEOUS
Status: DISCONTINUED | OUTPATIENT
Start: 2019-09-28 | End: 2019-10-03 | Stop reason: HOSPADM

## 2019-09-28 RX ORDER — POTASSIUM CHLORIDE 29.8 MG/ML
40 INJECTION INTRAVENOUS ONCE
Status: DISCONTINUED | OUTPATIENT
Start: 2019-09-28 | End: 2019-09-28 | Stop reason: SDUPTHER

## 2019-09-28 RX ORDER — ACETAMINOPHEN 325 MG/1
650 TABLET ORAL
Status: DISCONTINUED | OUTPATIENT
Start: 2019-09-28 | End: 2019-09-28

## 2019-09-28 RX ORDER — SODIUM CHLORIDE 9 MG/ML
250 INJECTION, SOLUTION INTRAVENOUS AS NEEDED
Status: DISCONTINUED | OUTPATIENT
Start: 2019-09-28 | End: 2019-10-03 | Stop reason: HOSPADM

## 2019-09-28 RX ORDER — POTASSIUM CHLORIDE 29.8 MG/ML
20 INJECTION INTRAVENOUS
Status: DISCONTINUED | OUTPATIENT
Start: 2019-09-28 | End: 2019-09-28 | Stop reason: SDUPTHER

## 2019-09-28 RX ORDER — ACETAMINOPHEN 325 MG/1
975 TABLET ORAL EVERY 6 HOURS
Status: DISCONTINUED | OUTPATIENT
Start: 2019-09-29 | End: 2019-10-03 | Stop reason: HOSPADM

## 2019-09-28 RX ORDER — DIPHENHYDRAMINE HCL 25 MG
25 CAPSULE ORAL ONCE
Status: COMPLETED | OUTPATIENT
Start: 2019-09-28 | End: 2019-09-28

## 2019-09-28 RX ORDER — POTASSIUM CHLORIDE 750 MG/1
20 TABLET, FILM COATED, EXTENDED RELEASE ORAL DAILY
Status: DISCONTINUED | OUTPATIENT
Start: 2019-09-28 | End: 2019-10-01 | Stop reason: SDUPTHER

## 2019-09-28 RX ADMIN — ACETAMINOPHEN 650 MG: 325 TABLET, FILM COATED ORAL at 14:10

## 2019-09-28 RX ADMIN — SODIUM CHLORIDE 40 MG: 9 INJECTION, SOLUTION INTRAMUSCULAR; INTRAVENOUS; SUBCUTANEOUS at 09:37

## 2019-09-28 RX ADMIN — SODIUM CHLORIDE 125 ML/HR: 900 INJECTION, SOLUTION INTRAVENOUS at 03:01

## 2019-09-28 RX ADMIN — METRONIDAZOLE 500 MG: 500 INJECTION, SOLUTION INTRAVENOUS at 18:57

## 2019-09-28 RX ADMIN — KETOROLAC TROMETHAMINE 30 MG: 30 INJECTION, SOLUTION INTRAMUSCULAR; INTRAVENOUS at 05:37

## 2019-09-28 RX ADMIN — ACETAMINOPHEN 650 MG: 325 TABLET, FILM COATED ORAL at 20:06

## 2019-09-28 RX ADMIN — Medication 10 ML: at 20:20

## 2019-09-28 RX ADMIN — KETOROLAC TROMETHAMINE 30 MG: 30 INJECTION, SOLUTION INTRAMUSCULAR; INTRAVENOUS at 11:58

## 2019-09-28 RX ADMIN — Medication 10 ML: at 08:30

## 2019-09-28 RX ADMIN — Medication 2 G: at 18:13

## 2019-09-28 RX ADMIN — ONDANSETRON 4 MG: 2 INJECTION INTRAMUSCULAR; INTRAVENOUS at 14:05

## 2019-09-28 RX ADMIN — POTASSIUM CHLORIDE 10 MEQ: 10 INJECTION, SOLUTION INTRAVENOUS at 11:58

## 2019-09-28 RX ADMIN — POTASSIUM CHLORIDE 10 MEQ: 10 INJECTION, SOLUTION INTRAVENOUS at 08:14

## 2019-09-28 RX ADMIN — Medication 10 ML: at 05:39

## 2019-09-28 RX ADMIN — POTASSIUM CHLORIDE 20 MEQ: 750 TABLET, FILM COATED, EXTENDED RELEASE ORAL at 16:28

## 2019-09-28 RX ADMIN — POTASSIUM CHLORIDE 10 MEQ: 10 INJECTION, SOLUTION INTRAVENOUS at 18:18

## 2019-09-28 RX ADMIN — Medication 2 G: at 08:30

## 2019-09-28 RX ADMIN — DIPHENHYDRAMINE HYDROCHLORIDE 25 MG: 25 CAPSULE ORAL at 14:10

## 2019-09-28 RX ADMIN — OXYCODONE HYDROCHLORIDE 5 MG: 5 TABLET ORAL at 18:36

## 2019-09-28 RX ADMIN — Medication 10 ML: at 14:07

## 2019-09-28 RX ADMIN — Medication 10 ML: at 18:14

## 2019-09-28 RX ADMIN — METRONIDAZOLE 500 MG: 500 INJECTION, SOLUTION INTRAVENOUS at 05:37

## 2019-09-28 RX ADMIN — POTASSIUM CHLORIDE 10 MEQ: 10 INJECTION, SOLUTION INTRAVENOUS at 10:15

## 2019-09-28 RX ADMIN — KETOROLAC TROMETHAMINE 30 MG: 30 INJECTION, SOLUTION INTRAMUSCULAR; INTRAVENOUS at 18:14

## 2019-09-28 NOTE — PROGRESS NOTES
Problem: Falls - Risk of  Goal: *Absence of Falls  Description  Document [facility] [place] Fall Risk and appropriate interventions in the flowsheet.   Outcome: Progressing Towards Goal  Note:   Fall Risk Interventions:  Mobility Interventions: Patient to call before getting OOB         Medication Interventions: Patient to call before getting OOB, Teach patient to arise slowly    Elimination Interventions: Call light in reach              Problem: Patient Education: Go to Patient Education Activity  Goal: Patient/Family Education  Outcome: Progressing Towards Goal     Problem: Patient Education: Go to Patient Education Activity  Goal: Patient/Family Education  Outcome: Progressing Towards Goal

## 2019-09-28 NOTE — PROGRESS NOTES
27 [facility] [address] [phone]  F [phone]       Patient Name: Chase Elias   Admit Date: 9/24/2019   OR Date: 9/25/2019   Visit Date: 9/28/2019        SUBJECTIVE:    Continues to feel better each day. Pain controlled. Ambulating well (4 times yesterday). Reports flatus. Denies BM. Denies fevers/chills. Denies nausea/vomiting. OBJECTIVE:    Patient Vitals for the past 24 hrs:   Temp Pulse Resp BP SpO2   09/28/19 0537 98.5 °F (36.9 °C) 93 16 100/64 95 %   09/27/19 2359 98.9 °F (37.2 °C) 98 16 101/66 96 %   09/27/19 2005 98.2 °F (36.8 °C) (!) 103 16 100/66 99 %   09/27/19 1615 99.5 °F (37.5 °C) (!) 103 16 102/69 98 %   09/27/19 1227 -- 92 16 -- --   09/27/19 1205 98.2 °F (36.8 °C) (!) 102 16 100/69 98 %   09/27/19 0802 98.1 °F (36.7 °C) 87 16 99/66 96 %       Date 09/27/19 0700 - 09/28/19 0659 09/28/19 0700 - 09/29/19 0659   Shift 8660-5926 1175-6763 24 Hour Total 7640-9967 3262-8000 24 Hour Total   INTAKE   P.O. 450 240 690        P. O. 450 240 690      I.V.  3168.8 3168.8        Volume (0.9% sodium chloride infusion)  3168.8 3168.8      Shift Total 450 3408.8 3858.8      OUTPUT   Urine 550 1400 1950        Urine Output (mL) (Urinary Catheter 09/25/19 2- way; Pham) 440 [facility] [employer] Total 550 330 [facility] 2008. 8 1908.8      Weight (kg)             Physical Exam     General:  alert, cooperative, no distress     Cardiac:  Regular rate and rhythm        Lungs:  clear to auscultation bilaterally  Abdomen:  soft, non-distended, appropriately tender, hypoactive bowel sounds       Wound:  clean, dry, no drainage   Extremity: extremities normal, atraumatic, no cyanosis or edema    Data Review  Lab Results   Component Value Date/Time    WBC 20.0 (H) 09/28/2019 05:48 AM    ABS.  NEUTROPHILS 17.6 (H) 09/28/2019 05:48 AM    HGB 7.3 (L) 09/28/2019 05:48 AM    HCT 21.3 (L) 09/28/2019 05:48 AM    MCV 88.8 09/28/2019 05:48 AM    MCH 30.4 09/28/2019 05:48 AM    PLATELET 345 13/05/8615 05:48 AM     Lab Results   Component Value Date/Time    Sodium 137 09/28/2019 05:48 AM    Potassium 2.9 (L) 09/28/2019 05:48 AM    Chloride 107 09/28/2019 05:48 AM    CO2 21 09/28/2019 05:48 AM    Glucose 85 09/28/2019 05:48 AM    BUN 5 (L) 09/28/2019 05:48 AM    Creatinine 0.40 (L) 09/28/2019 05:48 AM    Calcium 7.4 (L) 09/28/2019 05:48 AM    Albumin 3.2 (L) 07/21/2018 05:55 AM    Bilirubin, total 0.4 07/21/2018 05:55 AM    AST (SGOT) 10 (L) 07/21/2018 05:55 AM    ALT (SGPT) 16 07/21/2018 05:55 AM    Alk. phosphatase 52 07/21/2018 05:55 AM     IMPRESSION/PLAN:    2 Day Post-Op Procedure(s):  EXPLORATORY LAPAROTOMY SIERRA BSO, TUMOR DEBULKING: ILEOCECAL RESECTION; OMENTECTOMY for OVARIAN CANCER    Oncologic:  Advanced malignancy of unclear etiology. Initially thought to be of ovarian origin, but now looking more like an appendiceal primary, as appendix, terminal ileum, and cecum were densely involved with partial obstruction of terminal ileum. Bilateral adnexa, uterus, and cervix were also involved with tumor. Remainder of small bowel, colon, and stomach appeared normal.  Await final pathology. Heme/CV:  Hemodynamically stable. EBL of 1300 cc. Received 3 units PRBCs and 2 units FFP intraoperatively. Hgb down to 9.1 yesterday, 7.3 today. BPs ok. No tachycardia. Plan 1 unit PRBC today to aid healing. She is likely equilibrating now. Renal:  Adequate UOP. Appears to be self diuresing. Normal creatinine. Pham to remain in today, likely to remove tomorrow morning. FEN/GI:  Tolerating clears. Advance to full liquid diet. Decrease IVFs to 100cc/hr. Await return of bowel function before advancing fully. Continue Protonix for GI prophylaxis. ID/Wound:  Afebrile. Leukocytosis still at 20. No obvious infection. Patient appears well. Likely inflammatory from surgery and possibly tumor related. She had a mildly elevated WBC before surgery.   Will continue perioperative antibiotics and monitor. PPX:  SCDs, IS, ambulation. Will continue to hold Lovenox until recheck on Hgb following transfusion. Dispostion:  Doing as expected postoperatively. + Flatus, no BM. Awaiting return of bowel function. Tolerating PO. Will discontinue PCA and transition to PO pain regimen. Encourage ambulation and IS. Consider Lovenox VTE prophylaxis is Hgb stable after transfusion. Will plan on presenting her at cancer conference once final pathology is back.             Kristy Bacon MD

## 2019-09-28 NOTE — PROGRESS NOTES
Bedside and Verbal shift change report given to Mery Brown RN (oncoming nurse) by Jill Mendoza RN (offgoing nurse). Report included the following information SBAR, Kardex, Intake/Output, MAR and Recent Results.

## 2019-09-28 NOTE — PROGRESS NOTES
Bedside and Verbal shift change report given to Vikas Maldonado RN and Leatha YEN (oncoming nurse) by Jorge Vazquez (offgoing nurse). Report included the following information SBAR, Kardex, OR Summary, Intake/Output, MAR and Recent Results.

## 2019-09-28 NOTE — PROGRESS NOTES
Blood bank called PRBC not ready. @ 12:55 pm Blood bank called back to [address] blood is ready.

## 2019-09-29 LAB
ABO + RH BLD: NORMAL
ANION GAP SERPL CALC-SCNC: 11 MMOL/L (ref 5–15)
BASOPHILS # BLD: 0 K/UL (ref 0–0.1)
BASOPHILS # BLD: 0 K/UL (ref 0–0.1)
BASOPHILS NFR BLD: 0 % (ref 0–1)
BASOPHILS NFR BLD: 0 % (ref 0–1)
BLD PROD TYP BPU: NORMAL
BLOOD GROUP ANTIBODIES SERPL: NORMAL
BPU ID: NORMAL
BUN SERPL-MCNC: 5 MG/DL (ref 6–20)
BUN/CREAT SERPL: 14 (ref 12–20)
CALCIUM SERPL-MCNC: 7.6 MG/DL (ref 8.5–10.1)
CHLORIDE SERPL-SCNC: 110 MMOL/L (ref 97–108)
CO2 SERPL-SCNC: 19 MMOL/L (ref 21–32)
CREAT SERPL-MCNC: 0.36 MG/DL (ref 0.55–1.02)
CROSSMATCH RESULT,%XM: NORMAL
DIFFERENTIAL METHOD BLD: ABNORMAL
DIFFERENTIAL METHOD BLD: ABNORMAL
EOSINOPHIL # BLD: 0 K/UL (ref 0–0.4)
EOSINOPHIL # BLD: 0 K/UL (ref 0–0.4)
EOSINOPHIL NFR BLD: 0 % (ref 0–7)
EOSINOPHIL NFR BLD: 0 % (ref 0–7)
ERYTHROCYTE [DISTWIDTH] IN BLOOD BY AUTOMATED COUNT: 14.5 % (ref 11.5–14.5)
ERYTHROCYTE [DISTWIDTH] IN BLOOD BY AUTOMATED COUNT: 15.3 % (ref 11.5–14.5)
GLUCOSE SERPL-MCNC: 90 MG/DL (ref 65–100)
HCT VFR BLD AUTO: 27.5 % (ref 35–47)
HCT VFR BLD AUTO: 29.3 % (ref 35–47)
HGB BLD-MCNC: 9.6 G/DL (ref 11.5–16)
HGB BLD-MCNC: 9.8 G/DL (ref 11.5–16)
IMM GRANULOCYTES # BLD AUTO: 0 K/UL
IMM GRANULOCYTES # BLD AUTO: 0 K/UL
IMM GRANULOCYTES NFR BLD AUTO: 0 %
IMM GRANULOCYTES NFR BLD AUTO: 0 %
LYMPHOCYTES # BLD: 0.6 K/UL (ref 0.8–3.5)
LYMPHOCYTES # BLD: 0.8 K/UL (ref 0.8–3.5)
LYMPHOCYTES NFR BLD: 4 % (ref 12–49)
LYMPHOCYTES NFR BLD: 6 % (ref 12–49)
MCH RBC QN AUTO: 30.1 PG (ref 26–34)
MCH RBC QN AUTO: 30.2 PG (ref 26–34)
MCHC RBC AUTO-ENTMCNC: 33.4 G/DL (ref 30–36.5)
MCHC RBC AUTO-ENTMCNC: 34.9 G/DL (ref 30–36.5)
MCV RBC AUTO: 86.5 FL (ref 80–99)
MCV RBC AUTO: 89.9 FL (ref 80–99)
MONOCYTES # BLD: 0.5 K/UL (ref 0–1)
MONOCYTES # BLD: 1.2 K/UL (ref 0–1)
MONOCYTES NFR BLD: 5 % (ref 5–13)
MONOCYTES NFR BLD: 6 % (ref 5–13)
NEUTS BAND NFR BLD MANUAL: 2 % (ref 0–6)
NEUTS BAND NFR BLD MANUAL: 3 % (ref 0–6)
NEUTS SEG # BLD: 18.1 K/UL (ref 1.8–8)
NEUTS SEG # BLD: 8.9 K/UL (ref 1.8–8)
NEUTS SEG NFR BLD: 87 % (ref 32–75)
NEUTS SEG NFR BLD: 87 % (ref 32–75)
NRBC # BLD: 0.03 K/UL (ref 0–0.01)
NRBC # BLD: 0.05 K/UL (ref 0–0.01)
NRBC BLD-RTO: 0.2 PER 100 WBC
NRBC BLD-RTO: 0.3 PER 100 WBC
PHYSICIAN INSTRUCTIO,%PI: NORMAL
PLATELET # BLD AUTO: 382 K/UL (ref 150–400)
PLATELET # BLD AUTO: 387 K/UL (ref 150–400)
PMV BLD AUTO: 9.8 FL (ref 8.9–12.9)
PMV BLD AUTO: 9.8 FL (ref 8.9–12.9)
POTASSIUM SERPL-SCNC: 3.3 MMOL/L (ref 3.5–5.1)
RBC # BLD AUTO: 3.18 M/UL (ref 3.8–5.2)
RBC # BLD AUTO: 3.26 M/UL (ref 3.8–5.2)
RBC MORPH BLD: ABNORMAL
SODIUM SERPL-SCNC: 140 MMOL/L (ref 136–145)
SPECIMEN EXP DATE BLD: NORMAL
STATUS OF UNIT,%ST: NORMAL
UNIT DIVISION, %UDIV: 0
WBC # BLD AUTO: 10 K/UL (ref 3.6–11)
WBC # BLD AUTO: 20.1 K/UL (ref 3.6–11)

## 2019-09-29 PROCEDURE — 74011250636 HC RX REV CODE- 250/636: Performed by: OBSTETRICS & GYNECOLOGY

## 2019-09-29 PROCEDURE — 36415 COLL VENOUS BLD VENIPUNCTURE: CPT

## 2019-09-29 PROCEDURE — 85025 COMPLETE CBC W/AUTO DIFF WBC: CPT

## 2019-09-29 PROCEDURE — 74011000250 HC RX REV CODE- 250: Performed by: OBSTETRICS & GYNECOLOGY

## 2019-09-29 PROCEDURE — 74011250637 HC RX REV CODE- 250/637: Performed by: OBSTETRICS & GYNECOLOGY

## 2019-09-29 PROCEDURE — 80048 BASIC METABOLIC PNL TOTAL CA: CPT

## 2019-09-29 PROCEDURE — C9113 INJ PANTOPRAZOLE SODIUM, VIA: HCPCS | Performed by: OBSTETRICS & GYNECOLOGY

## 2019-09-29 PROCEDURE — 65410000002 HC RM PRIVATE OB

## 2019-09-29 RX ORDER — ENOXAPARIN SODIUM 100 MG/ML
40 INJECTION SUBCUTANEOUS EVERY 24 HOURS
Status: DISCONTINUED | OUTPATIENT
Start: 2019-09-29 | End: 2019-10-03 | Stop reason: HOSPADM

## 2019-09-29 RX ADMIN — Medication 10 ML: at 23:45

## 2019-09-29 RX ADMIN — KETOROLAC TROMETHAMINE 30 MG: 30 INJECTION, SOLUTION INTRAMUSCULAR; INTRAVENOUS at 17:22

## 2019-09-29 RX ADMIN — ACETAMINOPHEN 975 MG: 325 TABLET, FILM COATED ORAL at 20:34

## 2019-09-29 RX ADMIN — KETOROLAC TROMETHAMINE 30 MG: 30 INJECTION, SOLUTION INTRAMUSCULAR; INTRAVENOUS at 00:10

## 2019-09-29 RX ADMIN — SODIUM CHLORIDE 40 MG: 9 INJECTION, SOLUTION INTRAMUSCULAR; INTRAVENOUS; SUBCUTANEOUS at 09:11

## 2019-09-29 RX ADMIN — KETOROLAC TROMETHAMINE 30 MG: 30 INJECTION, SOLUTION INTRAMUSCULAR; INTRAVENOUS at 12:35

## 2019-09-29 RX ADMIN — ENOXAPARIN SODIUM 40 MG: 40 INJECTION SUBCUTANEOUS at 09:12

## 2019-09-29 RX ADMIN — KETOROLAC TROMETHAMINE 30 MG: 30 INJECTION, SOLUTION INTRAMUSCULAR; INTRAVENOUS at 06:14

## 2019-09-29 RX ADMIN — ACETAMINOPHEN 975 MG: 325 TABLET, FILM COATED ORAL at 15:36

## 2019-09-29 RX ADMIN — OXYCODONE HYDROCHLORIDE 5 MG: 5 TABLET ORAL at 06:57

## 2019-09-29 RX ADMIN — KETOROLAC TROMETHAMINE 30 MG: 30 INJECTION, SOLUTION INTRAMUSCULAR; INTRAVENOUS at 23:45

## 2019-09-29 RX ADMIN — ACETAMINOPHEN 975 MG: 325 TABLET, FILM COATED ORAL at 03:07

## 2019-09-29 RX ADMIN — ONDANSETRON 4 MG: 2 INJECTION INTRAMUSCULAR; INTRAVENOUS at 17:22

## 2019-09-29 RX ADMIN — Medication 10 ML: at 06:14

## 2019-09-29 RX ADMIN — POTASSIUM CHLORIDE 20 MEQ: 750 TABLET, FILM COATED, EXTENDED RELEASE ORAL at 09:11

## 2019-09-29 RX ADMIN — ACETAMINOPHEN 975 MG: 325 TABLET, FILM COATED ORAL at 09:11

## 2019-09-29 NOTE — PROGRESS NOTES
Bedside and Verbal shift change report given to VEDA Mckeon RN (oncoming nurse) by Dennis Iverson RN, Kahlil Coreas SN (offgoing nurse). Report included the following information SBAR.

## 2019-09-29 NOTE — PROGRESS NOTES
[address] [facility] [place] [address] [phone]  F [phone]       Patient Name: Yolande Sharp   Admit Date: 9/24/2019   OR Date: 9/25/2019   Visit Date: 9/29/2019        SUBJECTIVE:    Continues to feel better each day. Pain controlled. Ambulating well (4 times yesterday). No BM. Denies fevers/chills. Some mild nausea with lunch yesterday. No further nausea. Tolerated some broth for dinner. OBJECTIVE:    Patient Vitals for the past 24 hrs:   Temp Pulse Resp BP SpO2   09/29/19 0307 98 °F (36.7 °C) 98 16 109/67 96 %   09/28/19 1905 98.7 °F (37.1 °C) 96 14 97/66 94 %   09/28/19 1804 98.2 °F (36.8 °C) 92 18 103/68 97 %   09/28/19 1742 98.1 °F (36.7 °C) 93 16 101/67 97 %   09/28/19 1643 98.1 °F (36.7 °C) 89 18 102/68 96 %   09/28/19 1545 98.8 °F (37.1 °C) 94 18 94/64 100 %   09/28/19 1523 99.7 °F (37.6 °C) 98 18 95/65 100 %   09/28/19 1508 99.6 °F (37.6 °C) 94 16 97/64 98 %   09/28/19 1443 98 °F (36.7 °C) 94 16 106/71 99 %   09/28/19 1206 98.7 °F (37.1 °C) 96 16 99/66 100 %   09/28/19 0802 98.4 °F (36.9 °C) 97 16 (!) 85/58 97 %       Date 09/28/19 0700 - 09/29/19 0659 09/29/19 0700 - 09/30/19 0659   Shift 8240-0171 9312-7424 24 Hour Total 6941-4499 3524-9028 24 Hour Total   INTAKE   P.O.  480 480        P. O.  480 480      I.V.  2193.3 2193.3        Volume (0.9% sodium chloride infusion)  2193.3 2193.3      Blood 310  310        Volume (TRANSFUSE PACKED RBC'S) 310  310      Shift Total(mL/kg) 310 2673.3 2983.3      OUTPUT   Urine 1100 1550 2650        Urine Output (mL) ([REMOVED] Urinary Catheter 09/25/19 2- way; Pham) 1100 1550 2650      Shift Total(mL/kg) 1100 1550 2650      NET -790 1123.3 333.3      Weight (kg)    64 64 64       Physical Exam     General:  alert, cooperative, no distress     Cardiac:  Regular rate and rhythm        Lungs:  clear to auscultation bilaterally  Abdomen:  soft, non-distended, appropriately tender, hypoactive/absent bowel sounds       Wound:  clean, dry, no drainage   Extremity: extremities normal, atraumatic, no cyanosis or edema    Data Review  Lab Results   Component Value Date/Time    WBC 20.1 (H) 09/29/2019 06:28 AM    ABS. NEUTROPHILS 18.1 (H) 09/29/2019 06:28 AM    HGB 9.8 (L) 09/29/2019 06:28 AM    HCT 29.3 (L) 09/29/2019 06:28 AM    MCV 89.9 09/29/2019 06:28 AM    MCH 30.1 09/29/2019 06:28 AM    PLATELET 973 26/89/6684 06:28 AM     Lab Results   Component Value Date/Time    Sodium 137 09/28/2019 05:48 AM    Potassium 2.9 (L) 09/28/2019 05:48 AM    Chloride 107 09/28/2019 05:48 AM    CO2 21 09/28/2019 05:48 AM    Glucose 85 09/28/2019 05:48 AM    BUN 5 (L) 09/28/2019 05:48 AM    Creatinine 0.40 (L) 09/28/2019 05:48 AM    Calcium 7.4 (L) 09/28/2019 05:48 AM    Albumin 3.2 (L) 07/21/2018 05:55 AM    Bilirubin, total 0.4 07/21/2018 05:55 AM    AST (SGOT) 10 (L) 07/21/2018 05:55 AM    ALT (SGPT) 16 07/21/2018 05:55 AM    Alk. phosphatase 52 07/21/2018 05:55 AM     IMPRESSION/PLAN:    2 Day Post-Op Procedure(s):  EXPLORATORY LAPAROTOMY SIERRA BSO, TUMOR DEBULKING: ILEOCECAL RESECTION; OMENTECTOMY for OVARIAN CANCER    Oncologic:  Advanced malignancy of unclear etiology. Initially thought to be of ovarian origin, but now looking more like an appendiceal primary, as appendix, terminal ileum, and cecum were densely involved with partial obstruction of terminal ileum. Bilateral adnexa, uterus, and cervix were also involved with tumor. Remainder of small bowel, colon, and stomach appeared normal.  Await final pathology. Heme/CV:  Hemodynamically stable. EBL of 1300 cc. Received 3 units PRBCs and 2 units FFP intraoperatively. Hgb down to 9.1 yesterday, 7.3 9/28/19; now s/p additional 1 unit PRBC, hgb 9.8 today. VSS WNL. Renal:  Adequate UOP. Appears to be self diuresing some. Pham out this morning. Awaiting void trial. F/u morning BMP       FEN/GI:  Tolerating some full liquid diet. Will not advance right now. Continue IVFs. Await return of bowel function before advancing fully. Continue Protonix for GI prophylaxis. ID/Wound:  Afebrile. Leukocytosis still at 20. No obvious infection. Patient appears well. Likely inflammatory from surgery and possibly tumor related. She had a mildly elevated WBC before surgery. Will continue perioperative antibiotics and monitor. PPX:  SCDs, IS, ambulation. CBC stable. Will add Lovenox VTE prophylaxis. Dispostion:  Doing as expected postoperatively. Awaiting return of bowel function. Tolerating PO full liquid diet. Tolerating PO pain regimen. Encourage ambulation and IS. Lovenox VTE prophylaxis. Dr. Jeremiah Graf plans on presenting her at cancer conference once final pathology is back.             Yaz Souza MD

## 2019-09-29 NOTE — PROGRESS NOTES
Bedside and Verbal shift change report given to [address] [place] [address] [address] (oncoming nurse) by Raj Ortiz RN (offgoing nurse). Report included the following information SBAR, Kardex, Intake/Output, MAR and Recent Results.

## 2019-09-29 NOTE — PROGRESS NOTES
Problem: Pain  Goal: *Control of Pain  Outcome: Progressing Towards Goal     Problem: Surgical Pathway Post-Op Day 2 through Discharge  Goal: Activity/Safety  Outcome: Progressing Towards Goal

## 2019-09-29 NOTE — PROGRESS NOTES
1541 Bedside and Verbal shift change report given to Alma John RN (oncoming nurse) by Sha Isidro RN (offgoing nurse). Report included the following information SBAR, Kardex, Procedure Summary, Intake/Output, MAR, Accordion, Recent Results and Med Rec Status.

## 2019-09-30 LAB
ANION GAP SERPL CALC-SCNC: 12 MMOL/L (ref 5–15)
BASOPHILS # BLD: 0 K/UL (ref 0–0.1)
BASOPHILS NFR BLD: 0 % (ref 0–1)
BUN SERPL-MCNC: 4 MG/DL (ref 6–20)
BUN/CREAT SERPL: 12 (ref 12–20)
CALCIUM SERPL-MCNC: 8 MG/DL (ref 8.5–10.1)
CHLORIDE SERPL-SCNC: 107 MMOL/L (ref 97–108)
CO2 SERPL-SCNC: 20 MMOL/L (ref 21–32)
CREAT SERPL-MCNC: 0.33 MG/DL (ref 0.55–1.02)
DIFFERENTIAL METHOD BLD: ABNORMAL
EOSINOPHIL # BLD: 0.3 K/UL (ref 0–0.4)
EOSINOPHIL NFR BLD: 1 % (ref 0–7)
ERYTHROCYTE [DISTWIDTH] IN BLOOD BY AUTOMATED COUNT: 14.6 % (ref 11.5–14.5)
GLUCOSE SERPL-MCNC: 73 MG/DL (ref 65–100)
HCT VFR BLD AUTO: 28.2 % (ref 35–47)
HGB BLD-MCNC: 9.7 G/DL (ref 11.5–16)
IMM GRANULOCYTES # BLD AUTO: 0 K/UL
IMM GRANULOCYTES NFR BLD AUTO: 0 %
LYMPHOCYTES # BLD: 1 K/UL (ref 0.8–3.5)
LYMPHOCYTES NFR BLD: 4 % (ref 12–49)
MCH RBC QN AUTO: 29.8 PG (ref 26–34)
MCHC RBC AUTO-ENTMCNC: 34.4 G/DL (ref 30–36.5)
MCV RBC AUTO: 86.8 FL (ref 80–99)
MONOCYTES # BLD: 1.8 K/UL (ref 0–1)
MONOCYTES NFR BLD: 7 % (ref 5–13)
NEUTS BAND NFR BLD MANUAL: 3 % (ref 0–6)
NEUTS SEG # BLD: 22.9 K/UL (ref 1.8–8)
NEUTS SEG NFR BLD: 85 % (ref 32–75)
NRBC # BLD: 0.07 K/UL (ref 0–0.01)
NRBC BLD-RTO: 0.3 PER 100 WBC
PLATELET # BLD AUTO: 457 K/UL (ref 150–400)
PMV BLD AUTO: 10.4 FL (ref 8.9–12.9)
POTASSIUM SERPL-SCNC: 2.8 MMOL/L (ref 3.5–5.1)
RBC # BLD AUTO: 3.25 M/UL (ref 3.8–5.2)
RBC MORPH BLD: ABNORMAL
SODIUM SERPL-SCNC: 139 MMOL/L (ref 136–145)
WBC # BLD AUTO: 26 K/UL (ref 3.6–11)

## 2019-09-30 PROCEDURE — 74011250636 HC RX REV CODE- 250/636: Performed by: OBSTETRICS & GYNECOLOGY

## 2019-09-30 PROCEDURE — 85025 COMPLETE CBC W/AUTO DIFF WBC: CPT

## 2019-09-30 PROCEDURE — 74011250637 HC RX REV CODE- 250/637: Performed by: OBSTETRICS & GYNECOLOGY

## 2019-09-30 PROCEDURE — 80048 BASIC METABOLIC PNL TOTAL CA: CPT

## 2019-09-30 PROCEDURE — 74011000250 HC RX REV CODE- 250: Performed by: OBSTETRICS & GYNECOLOGY

## 2019-09-30 PROCEDURE — 65410000002 HC RM PRIVATE OB

## 2019-09-30 PROCEDURE — 36415 COLL VENOUS BLD VENIPUNCTURE: CPT

## 2019-09-30 PROCEDURE — C9113 INJ PANTOPRAZOLE SODIUM, VIA: HCPCS | Performed by: OBSTETRICS & GYNECOLOGY

## 2019-09-30 RX ORDER — POTASSIUM CHLORIDE 14.9 MG/ML
10 INJECTION INTRAVENOUS
Status: COMPLETED | OUTPATIENT
Start: 2019-09-30 | End: 2019-09-30

## 2019-09-30 RX ORDER — KETOROLAC TROMETHAMINE 30 MG/ML
30 INJECTION, SOLUTION INTRAMUSCULAR; INTRAVENOUS EVERY 6 HOURS
Status: COMPLETED | OUTPATIENT
Start: 2019-09-30 | End: 2019-10-03

## 2019-09-30 RX ORDER — FUROSEMIDE 10 MG/ML
20 INJECTION INTRAMUSCULAR; INTRAVENOUS ONCE
Status: COMPLETED | OUTPATIENT
Start: 2019-09-30 | End: 2019-09-30

## 2019-09-30 RX ADMIN — ACETAMINOPHEN 975 MG: 325 TABLET, FILM COATED ORAL at 14:28

## 2019-09-30 RX ADMIN — KETOROLAC TROMETHAMINE 30 MG: 30 INJECTION, SOLUTION INTRAMUSCULAR at 20:32

## 2019-09-30 RX ADMIN — POTASSIUM CHLORIDE 10 MEQ: 200 INJECTION, SOLUTION INTRAVENOUS at 17:17

## 2019-09-30 RX ADMIN — SODIUM CHLORIDE 75 ML/HR: 900 INJECTION, SOLUTION INTRAVENOUS at 23:27

## 2019-09-30 RX ADMIN — ACETAMINOPHEN 975 MG: 325 TABLET, FILM COATED ORAL at 03:55

## 2019-09-30 RX ADMIN — ACETAMINOPHEN 975 MG: 325 TABLET, FILM COATED ORAL at 09:16

## 2019-09-30 RX ADMIN — KETOROLAC TROMETHAMINE 30 MG: 30 INJECTION, SOLUTION INTRAMUSCULAR; INTRAVENOUS at 07:12

## 2019-09-30 RX ADMIN — POTASSIUM CHLORIDE 10 MEQ: 200 INJECTION, SOLUTION INTRAVENOUS at 11:25

## 2019-09-30 RX ADMIN — ENOXAPARIN SODIUM 40 MG: 40 INJECTION SUBCUTANEOUS at 09:24

## 2019-09-30 RX ADMIN — FUROSEMIDE 20 MG: 10 INJECTION, SOLUTION INTRAMUSCULAR; INTRAVENOUS at 09:21

## 2019-09-30 RX ADMIN — Medication 10 ML: at 14:30

## 2019-09-30 RX ADMIN — SODIUM CHLORIDE 40 MG: 9 INJECTION, SOLUTION INTRAMUSCULAR; INTRAVENOUS; SUBCUTANEOUS at 09:17

## 2019-09-30 RX ADMIN — POTASSIUM CHLORIDE 10 MEQ: 200 INJECTION, SOLUTION INTRAVENOUS at 13:00

## 2019-09-30 RX ADMIN — Medication 10 ML: at 07:14

## 2019-09-30 RX ADMIN — POTASSIUM CHLORIDE 20 MEQ: 750 TABLET, FILM COATED, EXTENDED RELEASE ORAL at 09:15

## 2019-09-30 RX ADMIN — ONDANSETRON 4 MG: 2 INJECTION INTRAMUSCULAR; INTRAVENOUS at 03:59

## 2019-09-30 RX ADMIN — KETOROLAC TROMETHAMINE 30 MG: 30 INJECTION, SOLUTION INTRAMUSCULAR at 14:29

## 2019-09-30 RX ADMIN — SODIUM CHLORIDE 75 ML/HR: 900 INJECTION, SOLUTION INTRAVENOUS at 09:16

## 2019-09-30 RX ADMIN — POTASSIUM CHLORIDE 10 MEQ: 200 INJECTION, SOLUTION INTRAVENOUS at 13:19

## 2019-09-30 RX ADMIN — Medication 10 ML: at 22:00

## 2019-09-30 NOTE — PROGRESS NOTES
27 [facility] [address] [place] [address] [phone]  F [phone]       Patient Name: Michelle Jason   Admit Date: 9/24/2019   OR Date: 9/25/2019   Visit Date: 9/30/2019        SUBJECTIVE:    Continues to feel better each day. Pain controlled. Ambulating well. No BM, but does report flatus. Denies fevers/chills. An episode of emesis yesterday, but none since. OBJECTIVE:    Patient Vitals for the past 24 hrs:   Temp Pulse Resp BP SpO2   09/30/19 0038 98.2 °F (36.8 °C) 74 14 97/62 95 %   09/29/19 1603 97.7 °F (36.5 °C) 85 14 96/58 98 %   09/29/19 1223 98.2 °F (36.8 °C) 95 16 102/69 97 %   09/29/19 0753 97.8 °F (36.6 °C) 97 16 99/68 95 %       Date 09/29/19 0700 - 09/30/19 0659 09/30/19 0700 - 10/01/19 0659   Shift 7463-7682 7333-8989 24 Hour Total 6623-3834 2980-8615 24 Hour Total   INTAKE   P.O.  500 500        P. O.  500 500      I. V.(mL/kg/hr) 676.3(0.9)  676.3(0.4)        Volume (0.9% sodium chloride infusion) 676.3  676.3      Shift Total(mL/kg) 676. 3(10.6) 500(7.8) 1176. 3(18.4)      OUTPUT   Urine(mL/kg/hr) 600(0.8) 600(0.8) 1200(0.8)        Urine Voided       Emesis/NG output 200  200        Emesis 200  200      Shift Total(mL/kg) 800(12.5) 600(9.4) 1400(21.9)      NET -123.8 -100 -223.8      Weight (kg) 64 64 64 64 64 64       Physical Exam     General:  alert, cooperative, no distress     Cardiac:  Regular rate and rhythm        Lungs:  clear to auscultation bilaterally  Abdomen:  soft, non-distended, appropriately tender, hypoactive/absent bowel sounds       Wound:  clean, dry, no drainage   Extremity: extremities normal, atraumatic, no cyanosis or edema    Data Review  Lab Results   Component Value Date/Time    WBC 26.0 (H) 09/30/2019 04:07 AM    ABS.  NEUTROPHILS 22.9 (H) 09/30/2019 04:07 AM    HGB 9.7 (L) 09/30/2019 04:07 AM    HCT 28.2 (L) 09/30/2019 04:07 AM    MCV 86.8 09/30/2019 04:07 AM    MCH 29.8 09/30/2019 04:07 AM PLATELET 612 (H) 77/50/5547 04:07 AM     Lab Results   Component Value Date/Time    Sodium 139 09/30/2019 04:07 AM    Potassium 2.8 (L) 09/30/2019 04:07 AM    Chloride 107 09/30/2019 04:07 AM    CO2 20 (L) 09/30/2019 04:07 AM    Glucose 73 09/30/2019 04:07 AM    BUN 4 (L) 09/30/2019 04:07 AM    Creatinine 0.33 (L) 09/30/2019 04:07 AM    Calcium 8.0 (L) 09/30/2019 04:07 AM    Albumin 3.2 (L) 07/21/2018 05:55 AM    Bilirubin, total 0.4 07/21/2018 05:55 AM    AST (SGOT) 10 (L) 07/21/2018 05:55 AM    ALT (SGPT) 16 07/21/2018 05:55 AM    Alk. phosphatase 52 07/21/2018 05:55 AM     IMPRESSION/PLAN:    4 Day Post-Op Procedure(s):  EXPLORATORY LAPAROTOMY SIERRA BSO, TUMOR DEBULKING: ILEOCECAL RESECTION; OMENTECTOMY for OVARIAN CANCER    Oncologic:  Advanced malignancy of unclear etiology. Initially thought to be of ovarian origin, but now looking more like an appendiceal primary, as appendix, terminal ileum, and cecum were densely involved with partial obstruction of terminal ileum. Bilateral adnexa, uterus, and cervix were also involved with tumor. Remainder of small bowel, colon, and stomach appeared normal.    FINAL PATHOLOGIC DIAGNOSIS   1. 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 SPECIMEN   Procedure: Ileocecectomy   TUMOR   Tumor Site: Diffusely involving appendix   Histologic Type: Mucinous adenocarcinoma   Histologic Type Comments: Extensive signet ring cell component   Histologic Grade: G3: Poorly differentiated   Tumor Size: 4.5 cm   Tumor Deposits: Present   Number of Deposits: 1   Tumor Extension: Tumor directly invades adjacent organs or structures: Uterus, cervix,   bilateral ovaries and fallopian tubes, and omentum   Lymphovascular Invasion: Not identified   Perineural Invasion: Present, extensive   MARGINS   Proximal, distal and radial margins uninvolved by invasive carcinoma.    LYMPH NODES   Number of Lymph Nodes Involved: 11   Number of Lymph Nodes Examined: 11   PATHOLOGIC STAGE CLASSIFICATION (pTNM, AJCC 8th Edition)   Primary Tumor (pT): pT4b   Regional Lymph Nodes (pN): pN2   Distant Metastasis (pM): pM1c   Site(s): Uterus, bilateral fallopian tubes, and ovaries   2. Uterus, bilateral fallopian tubes and ovaries, supracervical hysterectomy bilateral salpingo-oophorectomy:   Serosa: Positive for metastatic mucinous adenocarcinoma involving fibrous adhesions. Myometrium: Extensive transmural involvement by metastatic mucinous adenocarcinoma. Adenomyosis. Endometrium: Inactive endometrium with metastatic mucinous adenocarcinoma. Bilateral fallopian tubes: Metastatic mucinous adenocarcinoma. Bilateral fallopian tubes: Metastatic mucinous adenocarcinoma. 3. Omentum, omentectomy:   Positive for metastatic mucinous adenocarcinoma. Splenule. 4. Cervix, completion total hysterectomy:   Positive for metastatic mucinous adenocarcinoma. Comment   Immunohistochemical staining of the previously submitted as ovarian biopsy supports the diagnosis. Ancillary studies are pending and will be reported. Heme/CV:  Hemodynamically stable. EBL of 1300 cc. Received 3 units PRBCs and 2 units FFP intraoperatively. Hgb down to 9.1 yesterday, 7.3 9/28/19; now s/p additional 1 unit PRBC. Hgb stable. VSS WNL. Renal:  Adequate UOP. Creatinine stable. Pham out. FEN/GI:  Tolerating some full liquid diet. Will not advance right now, but will allow some crackers. Continue IVFs. Await return of bowel function before advancing fully. Continue Protonix for GI prophylaxis. Will replace potassium. ID/Wound:  Remains febrile, though leukocytosis up to 26. No obvious infection and patient overall appears well. Likely inflammatory from surgery and possibly tumor related. She did have a mildly elevated WBC even before surgery. Will continue to monitor. PPX:  SCDs, IS, ambulation. CBC stable.  Will add Lovenox VTE prophylaxis. Dispostion:  Doing as expected postoperatively. Awaiting return of bowel function. Tolerating PO full liquid diet. Tolerating PO pain regimen. Encourage ambulation and IS. Lovenox VTE prophylaxis. Pathology discussed with patient.           Jimi Dos Santos MD

## 2019-09-30 NOTE — PROGRESS NOTES
Bedside shift change report given to SARA Champion Rn (oncoming nurse) by Cherry Belcher RN (offgoing nurse). Report included the following information SBAR, Kardex, OR Summary, Procedure Summary, Intake/Output and MAR.

## 2019-09-30 NOTE — PROGRESS NOTES
0750 Bedside and Verbal shift change report given to Willian Mccray RN / Milli [place], [address] [facility] (oncoming nurse) by Renaldo Michelle RN (offgoing nurse). Report included the following information SBAR, Kardex, Procedure Summary, Intake/Output, MAR, Accordion, Recent Results and Med Rec Status. 1130 Potassium Chloride 10 mEq IV ordered x4. First dose started at 994 27 783, pt immediately complains of discomfort at IV site and requested rate to be slowed. Lowered rate to 25 ml/hr. 1200 Pt continues to complain of IV site pain from potassium. Stopped IV and applied ice. 1215 Restarted IV Potassium and diluted with continuous fluids. Pt seems to tolerate better.

## 2019-10-01 LAB
ALBUMIN SERPL-MCNC: 1.3 G/DL (ref 3.5–5)
ALBUMIN/GLOB SERPL: 0.4 {RATIO} (ref 1.1–2.2)
ALP SERPL-CCNC: 82 U/L (ref 45–117)
ALT SERPL-CCNC: 7 U/L (ref 12–78)
ANION GAP SERPL CALC-SCNC: 10 MMOL/L (ref 5–15)
AST SERPL-CCNC: 14 U/L (ref 15–37)
BASOPHILS # BLD: 0 K/UL (ref 0–0.1)
BASOPHILS NFR BLD: 0 % (ref 0–1)
BILIRUB SERPL-MCNC: 0.5 MG/DL (ref 0.2–1)
BUN SERPL-MCNC: 3 MG/DL (ref 6–20)
BUN/CREAT SERPL: 13 (ref 12–20)
CALCIUM SERPL-MCNC: 7.6 MG/DL (ref 8.5–10.1)
CANCER AG125 SERPL-ACNC: 253 U/ML (ref 0–38.1)
CANCER AG19-9 SERPL-ACNC: 5 U/ML (ref 0–35)
CEA SERPL-MCNC: 0.6 NG/ML (ref 0–4.7)
CHLORIDE SERPL-SCNC: 109 MMOL/L (ref 97–108)
CO2 SERPL-SCNC: 21 MMOL/L (ref 21–32)
CREAT SERPL-MCNC: 0.23 MG/DL (ref 0.55–1.02)
DIFFERENTIAL METHOD BLD: ABNORMAL
EOSINOPHIL # BLD: 0.5 K/UL (ref 0–0.4)
EOSINOPHIL NFR BLD: 2 % (ref 0–7)
ERYTHROCYTE [DISTWIDTH] IN BLOOD BY AUTOMATED COUNT: 14.5 % (ref 11.5–14.5)
GLOBULIN SER CALC-MCNC: 3.2 G/DL (ref 2–4)
GLUCOSE SERPL-MCNC: 84 MG/DL (ref 65–100)
HCT VFR BLD AUTO: 25.2 % (ref 35–47)
HGB BLD-MCNC: 8.9 G/DL (ref 11.5–16)
IMM GRANULOCYTES # BLD AUTO: 0 K/UL
IMM GRANULOCYTES NFR BLD AUTO: 0 %
INHIBIN A SERPL-MCNC: 1.3 PG/ML
INHIBIN B SERPL-MCNC: <7 PG/ML (ref 0–16.9)
LYMPHOCYTES # BLD: 0.8 K/UL (ref 0.8–3.5)
LYMPHOCYTES NFR BLD: 3 % (ref 12–49)
MCH RBC QN AUTO: 30.1 PG (ref 26–34)
MCHC RBC AUTO-ENTMCNC: 35.3 G/DL (ref 30–36.5)
MCV RBC AUTO: 85.1 FL (ref 80–99)
MONOCYTES # BLD: 1.3 K/UL (ref 0–1)
MONOCYTES NFR BLD: 5 % (ref 5–13)
NEUTS BAND NFR BLD MANUAL: 1 % (ref 0–6)
NEUTS SEG # BLD: 23.5 K/UL (ref 1.8–8)
NEUTS SEG NFR BLD: 89 % (ref 32–75)
NRBC # BLD: 0.08 K/UL (ref 0–0.01)
NRBC BLD-RTO: 0.3 PER 100 WBC
PLATELET # BLD AUTO: 531 K/UL (ref 150–400)
PLATELET COMMENTS,PCOM: ABNORMAL
PMV BLD AUTO: 10.2 FL (ref 8.9–12.9)
POTASSIUM SERPL-SCNC: 3 MMOL/L (ref 3.5–5.1)
PROT SERPL-MCNC: 4.5 G/DL (ref 6.4–8.2)
RBC # BLD AUTO: 2.96 M/UL (ref 3.8–5.2)
RBC MORPH BLD: ABNORMAL
RBC MORPH BLD: ABNORMAL
SODIUM SERPL-SCNC: 140 MMOL/L (ref 136–145)
WBC # BLD AUTO: 26.1 K/UL (ref 3.6–11)
WBC MORPH BLD: ABNORMAL

## 2019-10-01 PROCEDURE — C9113 INJ PANTOPRAZOLE SODIUM, VIA: HCPCS | Performed by: OBSTETRICS & GYNECOLOGY

## 2019-10-01 PROCEDURE — 74011250637 HC RX REV CODE- 250/637: Performed by: OBSTETRICS & GYNECOLOGY

## 2019-10-01 PROCEDURE — 80053 COMPREHEN METABOLIC PANEL: CPT

## 2019-10-01 PROCEDURE — 65410000002 HC RM PRIVATE OB

## 2019-10-01 PROCEDURE — 74011000250 HC RX REV CODE- 250: Performed by: OBSTETRICS & GYNECOLOGY

## 2019-10-01 PROCEDURE — 85025 COMPLETE CBC W/AUTO DIFF WBC: CPT

## 2019-10-01 PROCEDURE — 74011250636 HC RX REV CODE- 250/636: Performed by: OBSTETRICS & GYNECOLOGY

## 2019-10-01 PROCEDURE — 36415 COLL VENOUS BLD VENIPUNCTURE: CPT

## 2019-10-01 RX ORDER — POTASSIUM CHLORIDE AND SODIUM CHLORIDE 900; 300 MG/100ML; MG/100ML
INJECTION, SOLUTION INTRAVENOUS CONTINUOUS
Status: DISCONTINUED | OUTPATIENT
Start: 2019-10-01 | End: 2019-10-03 | Stop reason: HOSPADM

## 2019-10-01 RX ORDER — FUROSEMIDE 10 MG/ML
20 INJECTION INTRAMUSCULAR; INTRAVENOUS ONCE
Status: COMPLETED | OUTPATIENT
Start: 2019-10-01 | End: 2019-10-01

## 2019-10-01 RX ORDER — POTASSIUM CHLORIDE 750 MG/1
20 TABLET, FILM COATED, EXTENDED RELEASE ORAL 2 TIMES DAILY
Status: DISCONTINUED | OUTPATIENT
Start: 2019-10-01 | End: 2019-10-03 | Stop reason: HOSPADM

## 2019-10-01 RX ADMIN — ACETAMINOPHEN 975 MG: 325 TABLET, FILM COATED ORAL at 14:21

## 2019-10-01 RX ADMIN — ACETAMINOPHEN 975 MG: 325 TABLET, FILM COATED ORAL at 08:40

## 2019-10-01 RX ADMIN — SODIUM CHLORIDE AND POTASSIUM CHLORIDE: 9; 2.98 INJECTION, SOLUTION INTRAVENOUS at 22:24

## 2019-10-01 RX ADMIN — SODIUM CHLORIDE 40 MG: 9 INJECTION, SOLUTION INTRAMUSCULAR; INTRAVENOUS; SUBCUTANEOUS at 08:04

## 2019-10-01 RX ADMIN — KETOROLAC TROMETHAMINE 30 MG: 30 INJECTION, SOLUTION INTRAMUSCULAR at 02:37

## 2019-10-01 RX ADMIN — ONDANSETRON 4 MG: 2 INJECTION INTRAMUSCULAR; INTRAVENOUS at 08:40

## 2019-10-01 RX ADMIN — POTASSIUM CHLORIDE 20 MEQ: 750 TABLET, FILM COATED, EXTENDED RELEASE ORAL at 08:05

## 2019-10-01 RX ADMIN — POTASSIUM CHLORIDE 20 MEQ: 750 TABLET, FILM COATED, EXTENDED RELEASE ORAL at 19:07

## 2019-10-01 RX ADMIN — Medication 10 ML: at 14:21

## 2019-10-01 RX ADMIN — ACETAMINOPHEN 975 MG: 325 TABLET, FILM COATED ORAL at 02:38

## 2019-10-01 RX ADMIN — KETOROLAC TROMETHAMINE 30 MG: 30 INJECTION, SOLUTION INTRAMUSCULAR at 19:07

## 2019-10-01 RX ADMIN — KETOROLAC TROMETHAMINE 30 MG: 30 INJECTION, SOLUTION INTRAMUSCULAR at 14:21

## 2019-10-01 RX ADMIN — ENOXAPARIN SODIUM 40 MG: 40 INJECTION SUBCUTANEOUS at 08:05

## 2019-10-01 RX ADMIN — Medication 10 ML: at 21:10

## 2019-10-01 RX ADMIN — KETOROLAC TROMETHAMINE 30 MG: 30 INJECTION, SOLUTION INTRAMUSCULAR at 08:05

## 2019-10-01 RX ADMIN — FUROSEMIDE 20 MG: 10 INJECTION, SOLUTION INTRAMUSCULAR; INTRAVENOUS at 08:05

## 2019-10-01 RX ADMIN — Medication 10 ML: at 02:42

## 2019-10-01 RX ADMIN — SODIUM CHLORIDE AND POTASSIUM CHLORIDE: 9; 2.98 INJECTION, SOLUTION INTRAVENOUS at 08:40

## 2019-10-01 RX ADMIN — ACETAMINOPHEN 975 MG: 325 TABLET, FILM COATED ORAL at 21:09

## 2019-10-01 NOTE — PROGRESS NOTES
Bedside and Verbal shift change report given to Charles Sharma RN (oncoming nurse) by Kierra Briceño (offgoing nurse). Report included the following information SBAR, Kardex, OR Summary, Procedure Summary, Intake/Output, MAR, Accordion and Recent Results.

## 2019-10-01 NOTE — PROGRESS NOTES
Bedside and Verbal shift change report given to VEDA Rodrigues RN (oncoming nurse) by Usman Bourgeois RN (offgoing nurse). Report included the following information SBAR, Kardex, Procedure Summary, Intake/Output, MAR, Accordion and Recent Results.

## 2019-10-01 NOTE — PROGRESS NOTES
Bedside and Verbal shift change report given to Edilma Farnsworth. (oncoming nurse) by Darlene Salomon (offgoing nurse). Report included the following information SBAR, Kardex, OR Summary, Procedure Summary, Intake/Output, MAR and Recent Results.

## 2019-10-01 NOTE — PROGRESS NOTES
27 [facility] [address] [phone]  F [phone]       Patient Name: Africa Graham   Admit Date: 9/24/2019   OR Date: 9/25/2019   Visit Date: 10/1/2019        SUBJECTIVE:    Rested well last night. Pain controlled. Ambulating well. No BM, but does report flatus, although not much in the last 24 hours. No nausea/vomiting. Denies fevers/chills. OBJECTIVE:    Patient Vitals for the past 24 hrs:   Temp Pulse Resp BP SpO2   10/01/19 0252 98.7 °F (37.1 °C) 86 16 110/71 97 %   09/30/19 2001 98.3 °F (36.8 °C) 84 15 113/71 98 %   09/30/19 1444 97.7 °F (36.5 °C) 97 14 107/72 98 %   09/30/19 0920 97.6 °F (36.4 °C) 87 16 109/73 99 %       Date 09/30/19 0700 - 10/01/19 0659 10/01/19 0700 - 10/02/19 0659   Shift 5511-8537 8675-2225 24 Hour Total 2794-9995 1797-7527 24 Hour Total   INTAKE   P.O.  360 360        P. O.  360 360      I. V.(mL/kg/hr) 700(0.9) 688.8(0.9) 1388.8(0.9)        Volume (0.9% sodium chloride infusion) 600 638.8 1238.8        Volume (potassium chloride 10 mEq in 50 ml IVPB) 100 50 150      Shift Total(mL/kg) 700(10.9) 1048.8(16.4) 1748.8(27.3)      OUTPUT   Urine(mL/kg/hr) 1600(2.1) 600(0.8) 2200(1.4)        Urine Voided 6606 385 7464        Urine Occurrence(s) 3 x  3 x      Shift Total(mL/kg) 1600(25) 600(9.4) 2200(34.4)      NET -900 448.8 -451. 3      Weight (kg) 64 64 64 64 64 64       Physical Exam     General:  alert, cooperative, no distress     Cardiac:  Regular rate and rhythm        Lungs:  clear to auscultation bilaterally  Abdomen:  soft, non-distended, appropriately tender, hypoactive/absent bowel sounds       Wound:  clean, dry, no drainage   Extremity: extremities normal, atraumatic, no cyanosis or edema    Data Review  Lab Results   Component Value Date/Time    WBC 26.0 (H) 09/30/2019 04:07 AM    ABS.  NEUTROPHILS 22.9 (H) 09/30/2019 04:07 AM    HGB 9.7 (L) 09/30/2019 04:07 AM    HCT 28.2 (L) 09/30/2019 04:07 AM MCV 86.8 09/30/2019 04:07 AM    MCH 29.8 09/30/2019 04:07 AM    PLATELET 139 (H) 47/65/3892 04:07 AM     Lab Results   Component Value Date/Time    Sodium 140 10/01/2019 06:33 AM    Potassium 3.0 (L) 10/01/2019 06:33 AM    Chloride 109 (H) 10/01/2019 06:33 AM    CO2 21 10/01/2019 06:33 AM    Glucose 84 10/01/2019 06:33 AM    BUN 3 (L) 10/01/2019 06:33 AM    Creatinine 0.23 (L) 10/01/2019 06:33 AM    Calcium 7.6 (L) 10/01/2019 06:33 AM    Albumin 1.3 (L) 10/01/2019 06:33 AM    Bilirubin, total 0.5 10/01/2019 06:33 AM    AST (SGOT) 14 (L) 10/01/2019 06:33 AM    ALT (SGPT) 7 (L) 10/01/2019 06:33 AM    Alk. phosphatase 82 10/01/2019 06:33 AM     IMPRESSION/PLAN:    5 Day Post-Op Procedure(s):  EXPLORATORY LAPAROTOMY SIERRA BSO, TUMOR DEBULKING: ILEOCECAL RESECTION; OMENTECTOMY for OVARIAN CANCER    Oncologic:  Advanced malignancy of unclear etiology. Initially thought to be of ovarian origin, but now looking more like an appendiceal primary, as appendix, terminal ileum, and cecum were densely involved with partial obstruction of terminal ileum. Bilateral adnexa, uterus, and cervix were also involved with tumor. Remainder of small bowel, colon, and stomach appeared normal.  Will plan on her seeing medical oncology once she has recovered from her surgery. FINAL PATHOLOGIC DIAGNOSIS   1. 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    SPECIMEN   Procedure: Ileocecectomy   TUMOR   Tumor Site: Diffusely involving appendix   Histologic Type: Mucinous adenocarcinoma   Histologic Type Comments: Extensive signet ring cell component   Histologic Grade: G3: Poorly differentiated   Tumor Size: 4.5 cm   Tumor Deposits: Present   Number of Deposits: 1   Tumor Extension: Tumor directly invades adjacent organs or structures: Uterus, cervix,   bilateral ovaries and fallopian tubes, and omentum   Lymphovascular Invasion: Not identified Perineural Invasion: Present, extensive   MARGINS   Proximal, distal and radial margins uninvolved by invasive carcinoma. LYMPH NODES   Number of Lymph Nodes Involved: 11   Number of Lymph Nodes Examined: 11   PATHOLOGIC STAGE CLASSIFICATION (pTNM, AJCC 8th Edition)   Primary Tumor (pT): pT4b   Regional Lymph Nodes (pN): pN2   Distant Metastasis (pM): pM1c   Site(s): Uterus, bilateral fallopian tubes, and ovaries   2. Uterus, bilateral fallopian tubes and ovaries, supracervical hysterectomy bilateral salpingo-oophorectomy:   Serosa: Positive for metastatic mucinous adenocarcinoma involving fibrous adhesions. Myometrium: Extensive transmural involvement by metastatic mucinous adenocarcinoma. Adenomyosis. Endometrium: Inactive endometrium with metastatic mucinous adenocarcinoma. Bilateral fallopian tubes: Metastatic mucinous adenocarcinoma. Bilateral fallopian tubes: Metastatic mucinous adenocarcinoma. 3. Omentum, omentectomy:   Positive for metastatic mucinous adenocarcinoma. Splenule. 4. Cervix, completion total hysterectomy:   Positive for metastatic mucinous adenocarcinoma. Comment   Immunohistochemical staining of the previously submitted as ovarian biopsy supports the diagnosis. Ancillary studies are pending and will be reported. Heme/CV:  Hemodynamically stable. EBL of 1300 cc. Received 3 units PRBCs and 2 units FFP intraoperatively, and an additional unit PRBCs postoperatively. Hgb stable. VSS WNL. Renal:  Adequate UOP. Creatinine normal.  Pham out. FEN/GI:  Tolerating full liquid diet. Will not advance right now, but will allow some crackers. Continue IVFs. Will add potassium to fluids, as well as PO supplement. Await return of bowel function before advancing fully. Continue Protonix for GI prophylaxis. ID/Wound:  Remains febrile, though leukocytosis up to 26 yesterday. WBC pending this AM.  No obvious infection and patient overall appears well.   Likely inflammatory from surgery and possibly tumor related. She did have a mildly elevated WBC even before surgery. Will continue to monitor. PPX:  SCDs, IS, ambulation. CBC stable. Will add Lovenox VTE prophylaxis. Dispostion:  Doing as expected postoperatively. Awaiting return of bowel function. Tolerating PO full liquid diet. Tolerating PO pain regimen. Encourage ambulation and IS. Lovenox VTE prophylaxis. Pathology discussed with patient. Mingo Garcia MD     Addendum  WBC stable at 26.1. Will continue to monitor.

## 2019-10-02 LAB
ALBUMIN SERPL-MCNC: 1.4 G/DL (ref 3.5–5)
ALBUMIN/GLOB SERPL: 0.4 {RATIO} (ref 1.1–2.2)
ALP SERPL-CCNC: 108 U/L (ref 45–117)
ALT SERPL-CCNC: 10 U/L (ref 12–78)
ANION GAP SERPL CALC-SCNC: 11 MMOL/L (ref 5–15)
AST SERPL-CCNC: 24 U/L (ref 15–37)
BASOPHILS # BLD: 0 K/UL (ref 0–0.1)
BASOPHILS NFR BLD: 0 % (ref 0–1)
BILIRUB SERPL-MCNC: 0.6 MG/DL (ref 0.2–1)
BUN SERPL-MCNC: 3 MG/DL (ref 6–20)
BUN/CREAT SERPL: 13 (ref 12–20)
CALCIUM SERPL-MCNC: 7.7 MG/DL (ref 8.5–10.1)
CHLORIDE SERPL-SCNC: 107 MMOL/L (ref 97–108)
CO2 SERPL-SCNC: 22 MMOL/L (ref 21–32)
CREAT SERPL-MCNC: 0.23 MG/DL (ref 0.55–1.02)
DIFFERENTIAL METHOD BLD: ABNORMAL
EOSINOPHIL # BLD: 0.5 K/UL (ref 0–0.4)
EOSINOPHIL NFR BLD: 2 % (ref 0–7)
ERYTHROCYTE [DISTWIDTH] IN BLOOD BY AUTOMATED COUNT: 14.7 % (ref 11.5–14.5)
GLOBULIN SER CALC-MCNC: 3.2 G/DL (ref 2–4)
GLUCOSE SERPL-MCNC: 82 MG/DL (ref 65–100)
HCT VFR BLD AUTO: 24.9 % (ref 35–47)
HGB BLD-MCNC: 8.6 G/DL (ref 11.5–16)
IMM GRANULOCYTES # BLD AUTO: 0 K/UL
IMM GRANULOCYTES NFR BLD AUTO: 0 %
LYMPHOCYTES # BLD: 0.9 K/UL (ref 0.8–3.5)
LYMPHOCYTES NFR BLD: 4 % (ref 12–49)
MAGNESIUM SERPL-MCNC: 1.6 MG/DL (ref 1.6–2.4)
MCH RBC QN AUTO: 30.2 PG (ref 26–34)
MCHC RBC AUTO-ENTMCNC: 34.5 G/DL (ref 30–36.5)
MCV RBC AUTO: 87.4 FL (ref 80–99)
MONOCYTES # BLD: 1.6 K/UL (ref 0–1)
MONOCYTES NFR BLD: 7 % (ref 5–13)
NEUTS SEG # BLD: 19.6 K/UL (ref 1.8–8)
NEUTS SEG NFR BLD: 87 % (ref 32–75)
NRBC # BLD: 0.11 K/UL (ref 0–0.01)
NRBC BLD-RTO: 0.5 PER 100 WBC
PLATELET # BLD AUTO: 603 K/UL (ref 150–400)
PMV BLD AUTO: 10.2 FL (ref 8.9–12.9)
POTASSIUM SERPL-SCNC: 3.4 MMOL/L (ref 3.5–5.1)
PROT SERPL-MCNC: 4.6 G/DL (ref 6.4–8.2)
RBC # BLD AUTO: 2.85 M/UL (ref 3.8–5.2)
RBC MORPH BLD: ABNORMAL
SODIUM SERPL-SCNC: 140 MMOL/L (ref 136–145)
WBC # BLD AUTO: 22.6 K/UL (ref 3.6–11)

## 2019-10-02 PROCEDURE — 65410000002 HC RM PRIVATE OB

## 2019-10-02 PROCEDURE — 74011250636 HC RX REV CODE- 250/636: Performed by: PHYSICIAN ASSISTANT

## 2019-10-02 PROCEDURE — 74011250637 HC RX REV CODE- 250/637: Performed by: OBSTETRICS & GYNECOLOGY

## 2019-10-02 PROCEDURE — 74011000250 HC RX REV CODE- 250: Performed by: OBSTETRICS & GYNECOLOGY

## 2019-10-02 PROCEDURE — 74011250637 HC RX REV CODE- 250/637: Performed by: PHYSICIAN ASSISTANT

## 2019-10-02 PROCEDURE — 74011250636 HC RX REV CODE- 250/636: Performed by: OBSTETRICS & GYNECOLOGY

## 2019-10-02 PROCEDURE — 80053 COMPREHEN METABOLIC PANEL: CPT

## 2019-10-02 PROCEDURE — 83735 ASSAY OF MAGNESIUM: CPT

## 2019-10-02 PROCEDURE — 36415 COLL VENOUS BLD VENIPUNCTURE: CPT

## 2019-10-02 PROCEDURE — 85025 COMPLETE CBC W/AUTO DIFF WBC: CPT

## 2019-10-02 RX ORDER — FACIAL-BODY WIPES
10 EACH TOPICAL ONCE
Status: COMPLETED | OUTPATIENT
Start: 2019-10-02 | End: 2019-10-02

## 2019-10-02 RX ORDER — PANTOPRAZOLE SODIUM 40 MG/1
40 TABLET, DELAYED RELEASE ORAL DAILY
Status: DISCONTINUED | OUTPATIENT
Start: 2019-10-02 | End: 2019-10-03 | Stop reason: HOSPADM

## 2019-10-02 RX ADMIN — POTASSIUM CHLORIDE 20 MEQ: 750 TABLET, FILM COATED, EXTENDED RELEASE ORAL at 18:44

## 2019-10-02 RX ADMIN — KETOROLAC TROMETHAMINE 30 MG: 30 INJECTION, SOLUTION INTRAMUSCULAR at 14:12

## 2019-10-02 RX ADMIN — ACETAMINOPHEN 975 MG: 325 TABLET, FILM COATED ORAL at 03:03

## 2019-10-02 RX ADMIN — PROCHLORPERAZINE EDISYLATE 10 MG: 5 INJECTION INTRAMUSCULAR; INTRAVENOUS at 08:44

## 2019-10-02 RX ADMIN — BISACODYL 10 MG: 10 SUPPOSITORY RECTAL at 07:09

## 2019-10-02 RX ADMIN — ACETAMINOPHEN 975 MG: 325 TABLET, FILM COATED ORAL at 08:43

## 2019-10-02 RX ADMIN — ACETAMINOPHEN 975 MG: 325 TABLET, FILM COATED ORAL at 22:06

## 2019-10-02 RX ADMIN — SODIUM CHLORIDE AND POTASSIUM CHLORIDE: 9; 2.98 INJECTION, SOLUTION INTRAVENOUS at 22:06

## 2019-10-02 RX ADMIN — ACETAMINOPHEN 975 MG: 325 TABLET, FILM COATED ORAL at 15:46

## 2019-10-02 RX ADMIN — PANTOPRAZOLE SODIUM 40 MG: 40 TABLET, DELAYED RELEASE ORAL at 08:43

## 2019-10-02 RX ADMIN — KETOROLAC TROMETHAMINE 30 MG: 30 INJECTION, SOLUTION INTRAMUSCULAR at 18:44

## 2019-10-02 RX ADMIN — POTASSIUM CHLORIDE 20 MEQ: 750 TABLET, FILM COATED, EXTENDED RELEASE ORAL at 08:43

## 2019-10-02 RX ADMIN — ENOXAPARIN SODIUM 40 MG: 40 INJECTION SUBCUTANEOUS at 08:43

## 2019-10-02 RX ADMIN — KETOROLAC TROMETHAMINE 30 MG: 30 INJECTION, SOLUTION INTRAMUSCULAR at 07:01

## 2019-10-02 RX ADMIN — KETOROLAC TROMETHAMINE 30 MG: 30 INJECTION, SOLUTION INTRAMUSCULAR at 00:55

## 2019-10-02 NOTE — PROGRESS NOTES
Bedside and Verbal shift change report given to VEDA Reddy RN, STEFANY Francis RN (oncoming nurse) by Jaiden Granados RN (offgoing nurse). Report included the following information SBAR, Kardex, Procedure Summary, Intake/Output, MAR, Accordion and Recent Results.

## 2019-10-02 NOTE — PROGRESS NOTES
Bedside and Verbal shift change report given to Parth LAI (oncoming nurse) by Maged Beckman (offgoing nurse). Report included the following information SBAR, Kardex, OR Summary, Procedure Summary, Intake/Output, MAR and Recent Results. Pt had small BM. Request if diet could be advanced,     0815- PA Loc notified in regards to pt BM, wishes to hold diet at full liquids at this time and code status order given.

## 2019-10-02 NOTE — PROGRESS NOTES
27 [facility] [address]  P [phone]  F [phone]       Patient Name: Sal Bailey   Admit Date: 9/24/2019   OR Date: 9/25/2019   Visit Date: 10/2/2019        SUBJECTIVE:    No pain. No N/V, but no appetite. Tolerating liquids, modest intake. +flatus, feels she needs to have a BM. No F/C. Ambulating x 4. Feels swollen. No SOB. Using IS somewhat. OBJECTIVE:    Patient Vitals for the past 24 hrs:   Temp Pulse Resp BP SpO2   10/02/19 0304 98.1 °F (36.7 °C) 88 14 112/74 97 %   10/01/19 2340 98 °F (36.7 °C) 94 12 122/76 96 %   10/01/19 2038 98.3 °F (36.8 °C) 87 16 112/75 97 %   10/01/19 1640 98.1 °F (36.7 °C) (!) 103 16 112/75 98 %   10/01/19 0750 97.7 °F (36.5 °C) 86 16 112/75 98 %       Date 10/01/19 0700 - 10/02/19 0659 10/02/19 0700 - 10/03/19 0659   Shift 9816-8463 5872-0745 24 Hour Total 8855-9063 8763-9157 24 Hour Total   INTAKE   P.O.  480 480        P. O.  480 480      I. V.(mL/kg/hr)  1380(1.8) 1380(0.9)        Volume (0.9% sodium chloride with KCl 40 mEq/L infusion)  1380 1380      Shift Total(mL/kg)  1860(29.1) 1860(29.1)      OUTPUT   Urine(mL/kg/hr) 1600(2.1)  1600(1)        Urine Voided 1600  1600        Urine Occurrence(s)  4 x 4 x      Shift Total(mL/kg) 1600(25)  1600(25)      NET -1600 1860 260      Weight (kg) 64 64 64 64 64 64       Physical Exam     General:  alert, cooperative, no distress     Cardiac:  Regular rate and rhythm        Lungs:  Light crackles left base. Otherwise CTA. Abdomen:  soft, non-distended, non TTP.  BS+. Wound:  clean, dry, no drainage   Extremity: 1+ edema to thighs. Compression hose in place. Data Review  Lab Results   Component Value Date/Time    WBC 22.6 (H) 10/02/2019 03:07 AM    ABS.  NEUTROPHILS 19.6 (H) 10/02/2019 03:07 AM    HGB 8.6 (L) 10/02/2019 03:07 AM    HCT 24.9 (L) 10/02/2019 03:07 AM    MCV 87.4 10/02/2019 03:07 AM    MCH 30.2 10/02/2019 03:07 AM    PLATELET 526 (H) 10/02/2019 03:07 AM     Lab Results   Component Value Date/Time    Sodium 140 10/02/2019 03:07 AM    Potassium 3.4 (L) 10/02/2019 03:07 AM    Chloride 107 10/02/2019 03:07 AM    CO2 22 10/02/2019 03:07 AM    Glucose 82 10/02/2019 03:07 AM    BUN 3 (L) 10/02/2019 03:07 AM    Creatinine 0.23 (L) 10/02/2019 03:07 AM    Calcium 7.7 (L) 10/02/2019 03:07 AM    Albumin 1.4 (L) 10/02/2019 03:07 AM    Bilirubin, total 0.6 10/02/2019 03:07 AM    AST (SGOT) 24 10/02/2019 03:07 AM    ALT (SGPT) 10 (L) 10/02/2019 03:07 AM    Alk. phosphatase 108 10/02/2019 03:07 AM     IMPRESSION/PLAN:    6 Day Post-Op Procedure(s):  EXPLORATORY LAPAROTOMY SIERRA BSO, TUMOR DEBULKING: ILEOCECAL RESECTION; OMENTECTOMY for OVARIAN CANCER    Oncologic:  Advanced malignancy of unclear etiology. Initially thought to be of ovarian origin, but now looking more like an appendiceal primary, as appendix, terminal ileum, and cecum were densely involved with partial obstruction of terminal ileum. Bilateral adnexa, uterus, and cervix were also involved with tumor. Remainder of small bowel, colon, and stomach appeared normal.  Will plan on her seeing medical oncology once she has recovered from her surgery. FINAL PATHOLOGIC DIAGNOSIS   1. 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    SPECIMEN   Procedure: Ileocecectomy   TUMOR   Tumor Site: Diffusely involving appendix   Histologic Type: Mucinous adenocarcinoma   Histologic Type Comments: Extensive signet ring cell component   Histologic Grade: G3: Poorly differentiated   Tumor Size: 4.5 cm   Tumor Deposits: Present   Number of Deposits: 1   Tumor Extension: Tumor directly invades adjacent organs or structures: Uterus, cervix,   bilateral ovaries and fallopian tubes, and omentum   Lymphovascular Invasion: Not identified   Perineural Invasion: Present, extensive   MARGINS   Proximal, distal and radial margins uninvolved by invasive carcinoma. LYMPH NODES   Number of Lymph Nodes Involved: 11   Number of Lymph Nodes Examined: 11   PATHOLOGIC STAGE CLASSIFICATION (pTNM, AJCC 8th Edition)   Primary Tumor (pT): pT4b   Regional Lymph Nodes (pN): pN2   Distant Metastasis (pM): pM1c   Site(s): Uterus, bilateral fallopian tubes, and ovaries   2. Uterus, bilateral fallopian tubes and ovaries, supracervical hysterectomy bilateral salpingo-oophorectomy:   Serosa: Positive for metastatic mucinous adenocarcinoma involving fibrous adhesions. Myometrium: Extensive transmural involvement by metastatic mucinous adenocarcinoma. Adenomyosis. Endometrium: Inactive endometrium with metastatic mucinous adenocarcinoma. Bilateral fallopian tubes: Metastatic mucinous adenocarcinoma. Bilateral fallopian tubes: Metastatic mucinous adenocarcinoma. 3. Omentum, omentectomy:   Positive for metastatic mucinous adenocarcinoma. Splenule. 4. Cervix, completion total hysterectomy:   Positive for metastatic mucinous adenocarcinoma. Comment   Immunohistochemical staining of the previously submitted as ovarian biopsy supports the diagnosis. Ancillary studies are pending and will be reported. Heme/CV:  Hemodynamically stable. EBL of 1300 cc. Received 3 units PRBCs and 2 units FFP intraoperatively, and an additional unit PRBCs postoperatively. Hgb stable. VSS WNL. Renal:  Adequate UOP. Creatinine normal.  Pham out. FEN/GI:  Tolerating full liquid diet. Will not advance right now, but will allow some crackers/hard candy. Reduce IVFs. Hypokalemia supplement. Hold on diuresis today. Protonix while on toradol. Rectal suppository. GI function in tact. Constipation could be causing her anorexia. ID/Wound: Remains febrile, leukocytosis improved to 20. Afebrile, abd benign appearing. Follow. She did have a mildly elevated WBC even before surgery. PPX:  SCDs/compression hose, IS, ambulation.   Lovenox VTE prophylaxis. Dispostion:  Doing as expected postoperatively. Awaiting better appetite, leukocytosis improvement. Pathology discussed with patient. To f/u with heme/onc on discharge.          OMER Skaggs

## 2019-10-02 NOTE — PROGRESS NOTES
Bedside and Verbal shift change report given to Aris. (oncoming nurse) by Mauro Melgar (offgoing nurse). Report included the following information SBAR, Kardex, OR Summary, Procedure Summary, Intake/Output, MAR and Recent Results.

## 2019-10-03 VITALS
SYSTOLIC BLOOD PRESSURE: 120 MMHG | DIASTOLIC BLOOD PRESSURE: 76 MMHG | OXYGEN SATURATION: 96 % | TEMPERATURE: 98.2 F | BODY MASS INDEX: 24.98 KG/M2 | RESPIRATION RATE: 16 BRPM | HEART RATE: 100 BPM | WEIGHT: 141 LBS

## 2019-10-03 PROBLEM — C18.1 MUCINOUS ADENOCARCINOMA OF APPENDIX (HCC): Status: ACTIVE | Noted: 2019-09-24

## 2019-10-03 PROBLEM — C79.60 MALIGNANT NEOPLASM METASTATIC TO OVARY (HCC): Status: ACTIVE | Noted: 2019-09-20

## 2019-10-03 LAB
ALBUMIN SERPL-MCNC: 1.5 G/DL (ref 3.5–5)
ALBUMIN/GLOB SERPL: 0.6 {RATIO} (ref 1.1–2.2)
ALP SERPL-CCNC: 136 U/L (ref 45–117)
ALT SERPL-CCNC: 16 U/L (ref 12–78)
ANION GAP SERPL CALC-SCNC: 9 MMOL/L (ref 5–15)
AST SERPL-CCNC: 30 U/L (ref 15–37)
BASOPHILS # BLD: 0 K/UL (ref 0–0.1)
BASOPHILS NFR BLD: 0 % (ref 0–1)
BILIRUB SERPL-MCNC: 0.5 MG/DL (ref 0.2–1)
BUN SERPL-MCNC: 3 MG/DL (ref 6–20)
BUN/CREAT SERPL: 16 (ref 12–20)
CALCIUM SERPL-MCNC: 7.8 MG/DL (ref 8.5–10.1)
CHLORIDE SERPL-SCNC: 105 MMOL/L (ref 97–108)
CO2 SERPL-SCNC: 24 MMOL/L (ref 21–32)
CREAT SERPL-MCNC: 0.19 MG/DL (ref 0.55–1.02)
DIFFERENTIAL METHOD BLD: ABNORMAL
EOSINOPHIL # BLD: 0.5 K/UL (ref 0–0.4)
EOSINOPHIL NFR BLD: 3 % (ref 0–7)
ERYTHROCYTE [DISTWIDTH] IN BLOOD BY AUTOMATED COUNT: 14.6 % (ref 11.5–14.5)
GLOBULIN SER CALC-MCNC: 2.7 G/DL (ref 2–4)
GLUCOSE SERPL-MCNC: 84 MG/DL (ref 65–100)
HCT VFR BLD AUTO: 24.9 % (ref 35–47)
HGB BLD-MCNC: 8.6 G/DL (ref 11.5–16)
IMM GRANULOCYTES # BLD AUTO: 0.7 K/UL (ref 0–0.04)
IMM GRANULOCYTES NFR BLD AUTO: 4 % (ref 0–0.5)
LYMPHOCYTES # BLD: 1.4 K/UL (ref 0.8–3.5)
LYMPHOCYTES NFR BLD: 8 % (ref 12–49)
MCH RBC QN AUTO: 29.6 PG (ref 26–34)
MCHC RBC AUTO-ENTMCNC: 34.5 G/DL (ref 30–36.5)
MCV RBC AUTO: 85.6 FL (ref 80–99)
MONOCYTES # BLD: 1.2 K/UL (ref 0–1)
MONOCYTES NFR BLD: 7 % (ref 5–13)
NEUTS SEG # BLD: 13.5 K/UL (ref 1.8–8)
NEUTS SEG NFR BLD: 78 % (ref 32–75)
NRBC # BLD: 0.22 K/UL (ref 0–0.01)
NRBC BLD-RTO: 1.3 PER 100 WBC
PLATELET # BLD AUTO: 593 K/UL (ref 150–400)
PMV BLD AUTO: 10 FL (ref 8.9–12.9)
POTASSIUM SERPL-SCNC: 4.1 MMOL/L (ref 3.5–5.1)
PROT SERPL-MCNC: 4.2 G/DL (ref 6.4–8.2)
RBC # BLD AUTO: 2.91 M/UL (ref 3.8–5.2)
RBC MORPH BLD: ABNORMAL
RBC MORPH BLD: ABNORMAL
SODIUM SERPL-SCNC: 138 MMOL/L (ref 136–145)
WBC # BLD AUTO: 17.3 K/UL (ref 3.6–11)

## 2019-10-03 PROCEDURE — 74011250637 HC RX REV CODE- 250/637: Performed by: OBSTETRICS & GYNECOLOGY

## 2019-10-03 PROCEDURE — 74011250636 HC RX REV CODE- 250/636: Performed by: OBSTETRICS & GYNECOLOGY

## 2019-10-03 PROCEDURE — 85025 COMPLETE CBC W/AUTO DIFF WBC: CPT

## 2019-10-03 PROCEDURE — 36415 COLL VENOUS BLD VENIPUNCTURE: CPT

## 2019-10-03 PROCEDURE — 74011250637 HC RX REV CODE- 250/637: Performed by: PHYSICIAN ASSISTANT

## 2019-10-03 PROCEDURE — 80053 COMPREHEN METABOLIC PANEL: CPT

## 2019-10-03 RX ORDER — ENOXAPARIN SODIUM 100 MG/ML
40 INJECTION SUBCUTANEOUS DAILY
Qty: 21 SYRINGE | Refills: 0 | Status: SHIPPED | OUTPATIENT
Start: 2019-10-03 | End: 2019-10-24

## 2019-10-03 RX ORDER — OXYCODONE HYDROCHLORIDE 5 MG/1
5 TABLET ORAL
Qty: 25 TAB | Refills: 0 | Status: SHIPPED | OUTPATIENT
Start: 2019-10-03 | End: 2019-10-08

## 2019-10-03 RX ORDER — IBUPROFEN 200 MG
600 TABLET ORAL
Qty: 50 TAB | Refills: 0 | Status: SHIPPED | OUTPATIENT
Start: 2019-10-03 | End: 2020-01-01

## 2019-10-03 RX ORDER — ACETAMINOPHEN 325 MG/1
650 TABLET ORAL
Qty: 60 TAB | Refills: 0 | Status: SHIPPED | OUTPATIENT
Start: 2019-10-03 | End: 2019-10-07

## 2019-10-03 RX ORDER — AMOXICILLIN 250 MG
1 CAPSULE ORAL DAILY
Status: DISCONTINUED | OUTPATIENT
Start: 2019-10-03 | End: 2019-10-03 | Stop reason: HOSPADM

## 2019-10-03 RX ORDER — AMOXICILLIN 250 MG
1 CAPSULE ORAL DAILY
Qty: 30 TAB | Refills: 1 | Status: SHIPPED | OUTPATIENT
Start: 2019-10-03 | End: 2019-10-15

## 2019-10-03 RX ADMIN — ACETAMINOPHEN 975 MG: 325 TABLET, FILM COATED ORAL at 09:31

## 2019-10-03 RX ADMIN — ENOXAPARIN SODIUM 40 MG: 40 INJECTION SUBCUTANEOUS at 09:31

## 2019-10-03 RX ADMIN — POTASSIUM CHLORIDE 20 MEQ: 750 TABLET, FILM COATED, EXTENDED RELEASE ORAL at 09:31

## 2019-10-03 RX ADMIN — PANTOPRAZOLE SODIUM 40 MG: 40 TABLET, DELAYED RELEASE ORAL at 09:31

## 2019-10-03 RX ADMIN — KETOROLAC TROMETHAMINE 30 MG: 30 INJECTION, SOLUTION INTRAMUSCULAR at 01:21

## 2019-10-03 RX ADMIN — SENNOSIDES, DOCUSATE SODIUM 1 TABLET: 50; 8.6 TABLET, FILM COATED ORAL at 09:31

## 2019-10-03 RX ADMIN — KETOROLAC TROMETHAMINE 30 MG: 30 INJECTION, SOLUTION INTRAMUSCULAR at 07:21

## 2019-10-03 NOTE — DISCHARGE SUMMARY
27 [facility] [address], [address]  P [phone]  F [phone]        Discharge Summary  Patient ID:  Carla Saenz  [id number]  76 y.o.  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 found to have ovarian masses present at time of attempted cholecystectomy. Workup suggested an ovarian or other primary site of disease. She developed symptomatic partial SBO and was admitted with anticipated diagnostic and therapeutic debulking procedure. She was admitted and taken to surgery the following day. See op note for details, but briefly On bimanual pelvic exam she was noted to have a rock hard cervix and uterus, worrisome for disease infiltration. It was mobile. No parametrial involvement. The uterus was enlarged. Bilateral adnexal masses. The bladder was densely adherent the lower uterine segment and cervix. Mildly dilated small bowel. Rock hard appendix scarred to the underside of the cecum with tethering of the small bowel at that point. Implants noted in the distal omentum.  Small volume peritoneal disease scattered throughout the abdominal cavity involving the anterior abdominal wall, diaphragms, small and large bowel mesenteries. Frozen section pathology of the ileocecal resection suggested that this was the primary site of disease. There were some signet ring features noted. Frozen section pathology of the uterus demonstrated infiltration of the myometrium with the same process. Final path returned as below. She convalesced postop on general hernandez. Received 3 units PRBCs and 2 units FFP intraoperatively, and an additional unit PRBCs postoperatively. Her diet was advanced slowly until signs of GI function. Her abdomen remained benign. Remained afebrile, leukocytosis improving and decreasing to 17 on day of discharge. She did have a mildly elevated WBC before surgery, likely cytokine mediated/reactive. She was deemed ready for release on POD 7 afebrile with a benign abd and wound. Followup, meds and education provided as below. Pathology: FINAL PATHOLOGIC DIAGNOSIS   1. 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 SPECIMEN   Procedure: Ileocecectomy   TUMOR   Tumor Site: Diffusely involving appendix   Histologic Type: Mucinous adenocarcinoma   Histologic Type Comments: Extensive signet ring cell component   Histologic Grade: G3: Poorly differentiated   Tumor Size: 4.5 cm   Tumor Deposits: Present   Number of Deposits: 1   Tumor Extension: Tumor directly invades adjacent organs or structures: Uterus, cervix,   bilateral ovaries and fallopian tubes, and omentum   Lymphovascular Invasion: Not identified   Perineural Invasion: Present, extensive   MARGINS   Proximal, distal and radial margins uninvolved by invasive carcinoma.    LYMPH NODES   Number of Lymph Nodes Involved: 11   Number of Lymph Nodes Examined: 11   PATHOLOGIC STAGE CLASSIFICATION (pTNM, AJCC 8th Edition)   Primary Tumor (pT): pT4b   Regional Lymph Nodes (pN): pN2   Distant Metastasis (pM): pM1c Site(s): Uterus, bilateral fallopian tubes, and ovaries   2. Uterus, bilateral fallopian tubes and ovaries, supracervical hysterectomy bilateral salpingo-oophorectomy:   Serosa: Positive for metastatic mucinous adenocarcinoma involving fibrous adhesions. Myometrium: Extensive transmural involvement by metastatic mucinous adenocarcinoma. Adenomyosis. Endometrium: Inactive endometrium with metastatic mucinous adenocarcinoma. Bilateral fallopian tubes: Metastatic mucinous adenocarcinoma. Bilateral fallopian tubes: Metastatic mucinous adenocarcinoma. 3. Omentum, omentectomy:   Positive for metastatic mucinous adenocarcinoma. Splenule. 4. Cervix, completion total hysterectomy:   Positive for metastatic mucinous adenocarcinoma. Comment   Immunohistochemical staining of the previously submitted as ovarian biopsy supports the diagnosis. Ancillary studies are pending and will be reported. Consults: None    Significant Diagnostic Studies:  Lab Results   Component Value Date/Time    WBC 17.3 (H) 10/03/2019 07:09 AM    ABS. NEUTROPHILS 13.5 (H) 10/03/2019 07:09 AM    HGB 8.6 (L) 10/03/2019 07:09 AM    HCT 24.9 (L) 10/03/2019 07:09 AM    MCV 85.6 10/03/2019 07:09 AM    MCH 29.6 10/03/2019 07:09 AM    PLATELET 050 (H) 99/13/9532 07:09 AM     Lab Results   Component Value Date/Time    Sodium 138 10/03/2019 07:09 AM    Potassium 4.1 10/03/2019 07:09 AM    Chloride 105 10/03/2019 07:09 AM    CO2 24 10/03/2019 07:09 AM    Glucose 84 10/03/2019 07:09 AM    BUN 3 (L) 10/03/2019 07:09 AM    Creatinine 0.19 (L) 10/03/2019 07:09 AM    Calcium 7.8 (L) 10/03/2019 07:09 AM    Albumin 1.5 (L) 10/03/2019 07:09 AM    Bilirubin, total 0.5 10/03/2019 07:09 AM    AST (SGOT) 30 10/03/2019 07:09 AM    ALT (SGPT) 16 10/03/2019 07:09 AM    Alk.  phosphatase 136 (H) 10/03/2019 07:09 AM       Condition on Discharge: good    Disposition: home    Patient Instructions:   Current Discharge Medication List      START taking these medications    Details   oxyCODONE IR (ROXICODONE) 5 mg immediate release tablet Take 1 Tab by mouth every six (6) hours as needed for Pain for up to 5 days. Max Daily Amount: 20 mg.  Qty: 25 Tab, Refills: 0    Associated Diagnoses: Postoperative pain      acetaminophen (TYLENOL) 325 mg tablet Take 2 Tabs by mouth every four (4) hours as needed for Pain for up to 4 days. Indications: pain  Qty: 60 Tab, Refills: 0      ibuprofen (MOTRIN) 200 mg tablet Take 3 Tabs by mouth every eight (8) hours as needed for Pain. Qty: 50 Tab, Refills: 0      enoxaparin (LOVENOX) 40 mg/0.4 mL 0.4 mL by SubCUTAneous route daily for 21 days. Avoid incisions, rotate sites. Indications: Deep Vein Thrombosis Prevention  Qty: 21 Syringe, Refills: 0      senna-docusate (PERICOLACE) 8.6-50 mg per tablet Take 1 Tab by mouth daily. Hold for loose stools  Qty: 30 Tab, Refills: 1         CONTINUE these medications which have NOT CHANGED    Details   prochlorperazine (COMPAZINE) 10 mg tablet Take 1 Tab by mouth every six (6) hours as needed for Nausea for up to 15 doses. Indications: Nausea and Vomiting  Qty: 30 Tab, Refills: 1    Associated Diagnoses: Malignant neoplasm of both ovaries (HCC)      ondansetron (ZOFRAN ODT) 4 mg disintegrating tablet Take 1 Tab by mouth every eight (8) hours as needed for Nausea for up to 14 days. Qty: 42 Tab, Refills: 1    Associated Diagnoses: Malignant neoplasm of both ovaries ([address]      ZOVIA 1/35E, 28, 1-35 mg-mcg tab daily. Refills: 2      cetirizine (ZYRTEC) 10 mg tablet Take  by mouth daily as needed. multivitamin (ONE A DAY) tablet Take 1 Tab by mouth daily. STOP taking these medications       HYDROcodone-acetaminophen (NORCO) 5-325 mg per tablet Comments:   Reason for Stopping:             Activity: no driving for 2 weeks and/or while on analgesics, no heavy lifting for 6 weeks.   Nothing per vagina for 6 weeks  Walk four or more times daily  Showers okay, no tub bathing for 2 weeks if surgery  Stool softner for constipation  Diet: Regular Diet and Encourage fluids  Wound Care: Keep wound clean and dry    Follow-up with Coco Villaseñor MD in 10 days.     Signed:  OMER Mendoza  10/3/2019/7:20 AM

## 2019-10-03 NOTE — PROGRESS NOTES
Bedside and Verbal shift change report given to VEAD Bah RN (oncoming nurse) by Eve Pelayo RN (offgoing nurse). Report included the following information SBAR, Kardex, Procedure Summary, Intake/Output, MAR and Accordion.

## 2019-10-03 NOTE — DISCHARGE INSTRUCTIONS
27 [facility] [address]  P [phone]  F [phone]     [place] [address]      Dear Ms. Glenny Meade,      Please review your instructions with your nurse and ask any questions so you have all the information you need to recover well at home. If you do not feel you have everything you need to succeed and be safe after you leave the hospital, please discuss these concerns with your nurse. As always, call for any questions at home. Your doctor: Clementina Beckford MD  Diagnosis: Small bowel obstruction ([address] [S24.502]  [facility]var[facility] ([facility] [C56.9]  Procedure: Procedure(s):  EXPLORATORY LAPAROTOMY SIERRA BSO, TUMOR DEBULKING: ILEOCECAL RESECTION; OMENTECTOMY  Date of Discharge: 10/3/2019      Take Home Medications     See Discharge Medication Review provided to you by your nurse. If you did not receive one, request this prior to your discharge. · It is important that you take your medications as they are prescribed. · Keep your medications in the bottles provided by the pharmacist and keep a list of the medication names, dosages, times to be taken and what they are for in your wallet. · Do not take other medications without consulting your doctor. · If you are prescribed enoxaparin (Lovenox) and are taking a baby aspirin daily, please hold this medication until your course of enoxaparin is completed. · If you no longer need your prescribed pain medication, you may take Tylenol or Aleve alone. · You should take a daily gentle stool softener such as a colace pill or dulcolax suppository for constipation as this is not uncommon after surgery and/or while on pain medication. If not prescribed, this can be found over the counter. If constipation persists for >24 hours you should take a dose of Milk of Magnesia. Call if your constipation continues. Diet    · Stay hydrated and drink fluids such as gatorade and water.  This will also help prevent constipation and dehydration. Limit somewhat any usual caffeine intake of beverages such as soda, tea and coffee as this may serve to dehydrate you. · For the first 2-3 days keep a low fiber diet avoiding raw vegetables or fruits with skin. A diet consisting of soup, cereal, yogurt, eggs, fish, Boost/Ensure. Avoid fatty/greasy foods. · If nauseated, keep your diet limited to liquids and call if this persists. Activity    · If possible, have someone with you at all times until you feel stable. · Gradually increase your activity each day. There are generally no restrictions on walking, climbing stairs or riding in a car. Try to walk at least 4 times per day. · Showers are okay. If you have an incision, no tub bathing/swimming for two weeks. Walking will help reduce the risk of blood clots and constipation. · No driving for 2 week and/or while on pain medication. · The following restrictions apply:  1. No heavy lifting greater than 15 lbs for 8 weeks. 2. Nothing per vagina for 8 weeks. 3. You may experience vaginal spotting. Should the bleeding require more that 4 pads a day please call our office. Incision    · You should expect some discomfort in the area of your incision, particularly as you increase your activity. If you notice an area of increasing redness or new drainage, please call your doctor. · If you have staples, they are generally removed in 10-14 days at your appointment   · Many incisions will have buried, absorbable sutures, which do not need to be removed and are covered by protective glue. This will come off over time. Causes For Concern    If any of the following occur, please call our office and speak with the Nurse/aid who will help you with your problem or ask the doctor to call you.  Problems with the incision, including increasing pain, swelling, redness or drainage.     Inability to pass urine    Increasing abdominal pain despite medication   Persisting nausea or vomiting.  Fever or chills and a temperature >101F   Constipation (no bowel movement for three days).  Diarrhea (more than three watery stools within 24 hours).  Excessive vaginal or wound bleeding.  If after hours and you cannot reach an on-call physician, call 911. Follow-Up    Call [phone] to schedule an appointment with Moshe Leigh MD in 10 days for staple removal.   We will discuss referral to hematology/oncology at your appointment. Information obtained by :  I understand that if any problems occur once I am at home I am to contact my physician. I understand and acknowledge receipt of the instructions indicated above. Physician's or R.N.'s Signature                                                                  Date/Time                                                                                                                                              Patient or Representative Signature                                                          Date/Time    [employer] Activation    Thank you for requesting access to [employer]. Please follow the instructions below to securely access and download your online medical record. [employer] allows you to send messages to your doctor, view your test results, renew your prescriptions, schedule appointments, and more. How Do I Sign Up? 1. In your internet browser, go to [email or web address]  2. Click on the First Time User? Click Here link in the Sign In box. You will be redirect to the New Member Sign Up page. 3. Enter your [employer] Access Code exactly as it appears below. You will not need to use this code after youve completed the sign-up process. If you do not sign up before the expiration date, you must request a new code.     [employer] Access Code: [email or web address]  Expires: 10/31/2019  7:59 AM (This is the date your [employer] access code will )    4. Enter the last four digits of your Social Security Number (xxxx) and Date of Birth (mm/dd/yyyy) as indicated and click Submit. You will be taken to the next sign-up page. 5. Create a [employer] ID. This will be your [employer] login ID and cannot be changed, so think of one that is secure and easy to remember. 6. Create a [employer] password. You can change your password at any time. 7. Enter your Password Reset Question and Answer. This can be used at a later time if you forget your password. 8. Enter your e-mail address. You will receive e-mail notification when new information is available in [address] 9. Click Sign Up. You can now view and download portions of your medical record. 10. Click the Download Summary menu link to download a portable copy of your medical information. Additional Information    If you have questions, please visit the Frequently Asked Questions section of the [employer] website at [email or web address] Remember, [employer] is NOT to be used for urgent needs. For medical emergencies, dial 911.

## 2019-10-03 NOTE — PROGRESS NOTES
[facility] [address], [address]  P [phone]  F [phone]       Patient Name: Miguelina Late   Admit Date: 9/24/2019   OR Date: 9/25/2019   Visit Date: 10/3/2019        SUBJECTIVE:  +Bm, ate well, no N/V. Tolerating reg diet. +flatus. No F/C. Ambulating well. Feels swollen. No SOB. OBJECTIVE:    Patient Vitals for the past 24 hrs:   Temp Pulse Resp BP SpO2   10/03/19 0130 98 °F (36.7 °C) 88 18 112/73 99 %   10/02/19 2035 98.1 °F (36.7 °C) 95 16 116/78 98 %   10/02/19 1323 97.5 °F (36.4 °C) 86 16 113/73 98 %   10/02/19 0915 97.4 °F (36.3 °C) 100 14 111/73 98 %       Date 10/02/19 0700 - 10/03/19 0659 10/03/19 0700 - 10/04/19 0659   Shift 8036-6395 8214-0147 24 Hour Total 3538-4273 0386-9993 24 Hour Total   INTAKE   P.O. 360 240 600        P. O. 360 240 600      I. V.(mL/kg/hr) 526.7(0.7) 229.2(0.3) 755.8(0.5)        Volume (0.9% sodium chloride with KCl 40 mEq/L infusion) 526.7 229.2 755.8      Shift Total(mL/kg) 886. 7(13.9) 469.2(7.3) 1355. 8(21.2)      OUTPUT   Urine(mL/kg/hr)           Urine Occurrence(s) 1 x 1 x 2 x      Stool           Stool Occurrence(s) 1 x  1 x      Shift Total(mL/kg)         .7 469.2 1355.8      Weight (kg) 64 64 64 64 64 64       Physical Exam     General:  alert, cooperative, no distress     Cardiac:  Regular rate and rhythm        Lungs:  CTA. Abdomen:  soft, non-distended, non TTP.  BS+. Wound:  clean, dry, no drainage   Extremity: 1+ edema to thighs. Compression hose in place. Data Review  Lab Results   Component Value Date/Time    WBC 22.6 (H) 10/02/2019 03:07 AM    ABS.  NEUTROPHILS 19.6 (H) 10/02/2019 03:07 AM    HGB 8.6 (L) 10/02/2019 03:07 AM    HCT 24.9 (L) 10/02/2019 03:07 AM    MCV 87.4 10/02/2019 03:07 AM    MCH 30.2 10/02/2019 03:07 AM    PLATELET 107 (H) 36/16/7832 03:07 AM     Lab Results   Component Value Date/Time    Sodium 140 10/02/2019 03:07 AM    Potassium 3.4 (L) 10/02/2019 03:07 AM    Chloride 107 10/02/2019 03:07 AM    CO2 22 10/02/2019 03:07 AM    Glucose 82 10/02/2019 03:07 AM    BUN 3 (L) 10/02/2019 03:07 AM    Creatinine 0.23 (L) 10/02/2019 03:07 AM    Calcium 7.7 (L) 10/02/2019 03:07 AM    Albumin 1.4 (L) 10/02/2019 03:07 AM    Bilirubin, total 0.6 10/02/2019 03:07 AM    AST (SGOT) 24 10/02/2019 03:07 AM    ALT (SGPT) 10 (L) 10/02/2019 03:07 AM    Alk. phosphatase 108 10/02/2019 03:07 AM     IMPRESSION/PLAN:    7 Day Post-Op Procedure(s):  EXPLORATORY LAPAROTOMY SIERRA BSO, TUMOR DEBULKING: ILEOCECAL RESECTION; OMENTECTOMY for OVARIAN CANCER    Oncologic:  Advanced malignancy of unclear etiology. Initially thought to be of ovarian origin, but now looking more like an appendiceal primary, as appendix, terminal ileum, and cecum were densely involved with partial obstruction of terminal ileum. Bilateral adnexa, uterus, and cervix were also involved with tumor. Remainder of small bowel, colon, and stomach appeared normal.  Will plan on her seeing medical oncology once she has recovered from her surgery. FINAL PATHOLOGIC DIAGNOSIS   1. 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 SPECIMEN   Procedure: Ileocecectomy   TUMOR   Tumor Site: Diffusely involving appendix   Histologic Type: Mucinous adenocarcinoma   Histologic Type Comments: Extensive signet ring cell component   Histologic Grade: G3: Poorly differentiated   Tumor Size: 4.5 cm   Tumor Deposits: Present   Number of Deposits: 1   Tumor Extension: Tumor directly invades adjacent organs or structures: Uterus, cervix,   bilateral ovaries and fallopian tubes, and omentum   Lymphovascular Invasion: Not identified   Perineural Invasion: Present, extensive   MARGINS   Proximal, distal and radial margins uninvolved by invasive carcinoma.    LYMPH NODES   Number of Lymph Nodes Involved: 11   Number of Lymph Nodes Examined: 21 PATHOLOGIC STAGE CLASSIFICATION (pTNM, AJCC 8th Edition)   Primary Tumor (pT): pT4b   Regional Lymph Nodes (pN): pN2   Distant Metastasis (pM): pM1c   Site(s): Uterus, bilateral fallopian tubes, and ovaries   2. Uterus, bilateral fallopian tubes and ovaries, supracervical hysterectomy bilateral salpingo-oophorectomy:   Serosa: Positive for metastatic mucinous adenocarcinoma involving fibrous adhesions. Myometrium: Extensive transmural involvement by metastatic mucinous adenocarcinoma. Adenomyosis. Endometrium: Inactive endometrium with metastatic mucinous adenocarcinoma. Bilateral fallopian tubes: Metastatic mucinous adenocarcinoma. Bilateral fallopian tubes: Metastatic mucinous adenocarcinoma. 3. Omentum, omentectomy:   Positive for metastatic mucinous adenocarcinoma. Splenule. 4. Cervix, completion total hysterectomy:   Positive for metastatic mucinous adenocarcinoma. Comment   Immunohistochemical staining of the previously submitted as ovarian biopsy supports the diagnosis. Ancillary studies are pending and will be reported. Heme/CV:  Hemodynamically stable. EBL of 1300 cc. Received 3 units PRBCs and 2 units FFP intraoperatively, and an additional unit PRBCs postoperatively. Hgb stable. VSS WNL. Renal:  Adequate UOP. Creatinine normal.  Pham out. FEN/GI:  Tolerating full liquid diet. Will not advance right now, but will allow some crackers/hard candy. Reduce IVFs. Hypokalemia supplement. Hold on diuresis. Protonix while on toradol. GI function in tact. Continue stool cathartics. ID/Wound: Remains febrile, leukocytosis improving, awaiting labs today. Afebrile, abd benign appearing. Follow. She did have a mildly elevated WBC before surgery. PPX:  SCDs/compression hose, IS, ambulation. Lovenox VTE prophylaxis home 2-3 weeks. Dispostion:  Doing as expected postoperatively. Labs pending today. If well, expect DC.  Meds, Instructions, f/u and referral to heme/oncology discussed. F/u for staple removal. Pathology was discussed with patient.           OMER Reynolds

## 2019-10-09 ENCOUNTER — OFFICE VISIT (OUTPATIENT)
Dept: GYNECOLOGY | Age: 49
End: 2019-10-09

## 2019-10-09 VITALS
DIASTOLIC BLOOD PRESSURE: 73 MMHG | HEIGHT: 63 IN | HEART RATE: 93 BPM | BODY MASS INDEX: 26.54 KG/M2 | SYSTOLIC BLOOD PRESSURE: 106 MMHG | WEIGHT: 149.8 LBS

## 2019-10-09 DIAGNOSIS — C18.1 MUCINOUS ADENOCARCINOMA OF APPENDIX (HCC): Primary | ICD-10-CM

## 2019-10-09 NOTE — PROGRESS NOTES
Staple Removal Nursing Note:    Staples removed without difficulty, steri strips applied. Reviewed wound care, diet, and bowel regimen. There was not evidence of infection. Wound edges were well approximated. 2 week surgical follow up appointment made.

## 2019-10-14 ENCOUNTER — DOCUMENTATION ONLY (OUTPATIENT)
Dept: ONCOLOGY | Age: 49
End: 2019-10-14

## 2019-10-14 ENCOUNTER — HOSPITAL ENCOUNTER (OUTPATIENT)
Dept: GENERAL RADIOLOGY | Age: 49
Discharge: HOME | End: 2019-10-14
Payer: COMMERCIAL

## 2019-10-14 ENCOUNTER — OFFICE VISIT (OUTPATIENT)
Dept: ONCOLOGY | Age: 49
End: 2019-10-14

## 2019-10-14 VITALS
SYSTOLIC BLOOD PRESSURE: 117 MMHG | DIASTOLIC BLOOD PRESSURE: 83 MMHG | TEMPERATURE: 98.8 F | HEART RATE: 99 BPM | OXYGEN SATURATION: 98 % | HEIGHT: 62 IN | WEIGHT: 143.8 LBS | BODY MASS INDEX: 26.46 KG/M2

## 2019-10-14 DIAGNOSIS — C18.1 MUCINOUS ADENOCARCINOMA OF APPENDIX (HCC): Primary | ICD-10-CM

## 2019-10-14 DIAGNOSIS — K56.609 SMALL BOWEL OBSTRUCTION (HCC): ICD-10-CM

## 2019-10-14 DIAGNOSIS — R11.2 NAUSEA AND VOMITING, INTRACTABILITY OF VOMITING NOT SPECIFIED, UNSPECIFIED VOMITING TYPE: ICD-10-CM

## 2019-10-14 DIAGNOSIS — C18.1 MUCINOUS ADENOCARCINOMA OF APPENDIX (HCC): ICD-10-CM

## 2019-10-14 DIAGNOSIS — R07.81 RIB PAIN ON LEFT SIDE: ICD-10-CM

## 2019-10-14 DIAGNOSIS — R06.02 SOB (SHORTNESS OF BREATH): ICD-10-CM

## 2019-10-14 DIAGNOSIS — Z98.890 STATUS POST SURGERY: ICD-10-CM

## 2019-10-14 DIAGNOSIS — J90 PLEURAL EFFUSION ON LEFT: ICD-10-CM

## 2019-10-14 DIAGNOSIS — G47.00 INSOMNIA, UNSPECIFIED TYPE: ICD-10-CM

## 2019-10-14 PROCEDURE — 71110 X-RAY EXAM RIBS BIL 3 VIEWS: CPT

## 2019-10-14 PROCEDURE — 71046 X-RAY EXAM CHEST 2 VIEWS: CPT

## 2019-10-14 RX ORDER — LORAZEPAM 0.5 MG/1
0.5 TABLET ORAL
Qty: 30 TAB | Refills: 0 | Status: SHIPPED | OUTPATIENT
Start: 2019-10-14 | End: 2019-11-06

## 2019-10-14 NOTE — LETTER
10/15/2019 8:19 AM 
 
Patient:  Nicho Ge YOB: 1970 Date of Visit: 10/14/2019 Dear No Recipients: Thank you for referring Ms. Nicho Ge to me for evaluation/treatment. Below are the relevant portions of my assessment and plan of care. If you have questions, please do not hesitate to call me. I look forward to following Ms. Laron Lantigua along with you. Sincerely, Jemal Cee, DO

## 2019-10-14 NOTE — LETTER
10/15/19 Patient: Michelle Jason YOB: 1970 Date of Visit: 10/14/2019 Yumiko Villarreal MD 
[address] VIA Facsimile: [phone] Dear Yumiko Villarreal MD, Thank you for referring Ms. Michelle Jason to [address] [place] [address] for evaluation. My notes for this consultation are attached. If you have questions, please do not hesitate to call me. I look forward to following your patient along with you. Sincerely, Lucero Villalobos, DO

## 2019-10-14 NOTE — PROGRESS NOTES
[facility] [place] at [address]  [address] [facility], [address] [place], [address] [place]: [phone]  F: [phone]    Reason for Visit:   Africa Graham is a 44 y.o. female who is seen in consultation at the request of Dr. Brian Pierre for evaluation of appendix cancer. Treatment History:   EXPLORATORY LAPAROTOMY SIERRA BSO, TUMOR DEBULKING: ILEOCECAL RESECTION; OMENTECTOMY    STAGE: 4 with carcinomatosis    History of Present Illness:     Pt seen today for office consult for metastatic appendiceal cancer post EXPLORATORY LAPAROTOMY SIERRA BSO, TUMOR DEBULKING: ILEOCECAL RESECTION; OMENTECTOMY. Pt was initially thought to have ovarian cancer. She had been having nausea/vomiting and abdominal pain for months. A HIDA scan suggested that her gallbladder was likely the source of her pain and Dr. Kemal Yang took her to the OR for a laparoscopic cholecystectomy. At the time of surgery she was noted to have peritoneal carcinomatosis with bilaterally enlarged ovaries. Pt was seen by GYN/ONC intraoperatively. Biopsy of ovary 9/20/19:     Right ovary, biopsy:   Positive for malignancy, poorly differentiated adenocarcinoma with scattered signet ring cells. Comment   The histologic section has fragments of fibrous tissue infiltrated by poorly differentiated malignant cells and rare signet ring cells. The malignant cells are positive for cytokeratin 20 and CDX2 and negative for cytokeratin 7, PAX8, GATA3, p63, ER, VA, LCA, CD 56, and . The overall findings favor metastasis from a lower gastrointestinal tract primary neoplasm. CYTOLOGIC INTERPRETATION:   Peritoneal fluid, ThinPrep:   Benign mesothelial cells. CTs done 9/23/19. Pt has obstruction and could not eat. IMPRESSION:   1. Ovarian carcinoma with peritoneal metastasis. Right ovarian mass measures 5.7  x 4.8 x 4.5 cm.  Left ovarian mass measures 4.2 x 3.8 x 4.0 cm.  2. Small bowel obstruction due to terminal ileum mucosal thickening, likely  involved by ovarian carcinoma. 3. Omental carcinomatosis. Trace ascites. 4. Right oblique soft tissue edema due to recent surgery. No drainable abscess. However, this could be a source of right abdominal pain. 5. Hepatic cysts. No evidence of hepatic metastatic disease. 6. No evidence of osseous, pulmonary, or thoracic metastatic disease. EPIC email sent to Dr. Dominique Dominguez at the time of the dictation. Due to obstruction/ symptoms, pt went to OR>   Pt then had EXPLORATORY LAPAROTOMY SIERRA BSO, TUMOR DEBULKING: ILEOCECAL RESECTION; OMENTECTOMY on 9/25/19. FINAL PATHOLOGIC DIAGNOSIS   1. 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 SPECIMEN   Procedure: Ileocecectomy   TUMOR   Tumor Site: Diffusely involving appendix   Histologic Type: Mucinous adenocarcinoma   Histologic Type Comments: Extensive signet ring cell component   Histologic Grade: G3: Poorly differentiated   Tumor Size: 4.5 cm   Tumor Deposits: Present   Number of Deposits: 1   Tumor Extension: Tumor directly invades adjacent organs or structures: Uterus, cervix,   bilateral ovaries and fallopian tubes, and omentum   Lymphovascular Invasion: Not identified   Perineural Invasion: Present, extensive   MARGINS   Proximal, distal and radial margins uninvolved by invasive carcinoma. LYMPH NODES   Number of Lymph Nodes Involved: 11   Number of Lymph Nodes Examined: 11   PATHOLOGIC STAGE CLASSIFICATION (pTNM, AJCC 8th Edition)   Primary Tumor (pT): pT4b   Regional Lymph Nodes (pN): pN2   Distant Metastasis (pM): pM1c   Site(s): Uterus, bilateral fallopian tubes, and ovaries   2. Uterus, bilateral fallopian tubes and ovaries, supracervical hysterectomy bilateral salpingo-oophorectomy:   Serosa: Positive for metastatic mucinous adenocarcinoma involving fibrous adhesions.    Myometrium: Extensive transmural involvement by metastatic mucinous adenocarcinoma. Adenomyosis. Endometrium: Inactive endometrium with metastatic mucinous adenocarcinoma. [facility] Patient Name: Orquidea Jorge Specimen #: [id number]   Patient Name: Orquidea Jorge Page 3 of 5 Printed: 09/30/19 12:51 PM SURGICAL PATHOLOGY REPORT  Bilateral fallopian tubes: Metastatic mucinous adenocarcinoma. Bilateral fallopian tubes: Metastatic mucinous adenocarcinoma. 3. Omentum, omentectomy:   Positive for metastatic mucinous adenocarcinoma. Splenule. 4. Cervix, completion total hysterectomy:   Positive for metastatic mucinous adenocarcinoma. Path report c/w metastatic appendix cancer with omental involvement. Pt is still recovering from surgery. Feels weak and tried overall. Vomiting since Friday once a day. Able to eat some. C/o left rib pain so bad cannot sleep. Unsure about rib pain. C/o SOB also with moving. Able to walk slowly but gets SOB. Here with  who is supportive. Pt is going to [facility] for another opinion. [facility] going to [facility]. Pt/  are open to all treatment options. No port yet. Asking about PET. Past Medical History:   Diagnosis Date    Nausea & vomiting     Symptomatic cholelithiasis 9/18/2019      Past Surgical History:   Procedure Laterality Date    HX GYN  2003    CYST ON OVARY      Social History     Tobacco Use    Smoking status: Never Smoker    Smokeless tobacco: Never Used   Substance Use Topics    Alcohol use: Yes     Comment: once a month      Family History   Problem Relation Age of Onset    Breast Cancer Paternal Grandmother 61    Crohn's Disease Mother     Heart Disease Mother     Cancer Father         BLADDER    Diabetes Father     No Known Problems Son     No Known Problems Daughter     Anesth Problems Neg Hx      Current Outpatient Medications   Medication Sig    LORazepam (ATIVAN) 0.5 mg tablet Take 1 Tab by mouth every eight (8) hours as needed for Anxiety. Max Daily Amount: 1.5 mg. Indications: Nausea and Vomiting caused by Cancer Drugs, difficulty sleeping    enoxaparin (LOVENOX) 40 mg/0.4 mL 0.4 mL by SubCUTAneous route daily for 21 days. Avoid incisions, rotate sites. Indications: Deep Vein Thrombosis Prevention    senna-docusate (PERICOLACE) 8.6-50 mg per tablet Take 1 Tab by mouth daily. Hold for loose stools    ZOVIA 1/35E, 28, 1-35 mg-mcg tab daily.  multivitamin (ONE A DAY) tablet Take 1 Tab by mouth daily.  ibuprofen (MOTRIN) 200 mg tablet Take 3 Tabs by mouth every eight (8) hours as needed for Pain.  prochlorperazine (COMPAZINE) 10 mg tablet Take 1 Tab by mouth every six (6) hours as needed for Nausea for up to 15 doses. Indications: Nausea and Vomiting    cetirizine (ZYRTEC) 10 mg tablet Take  by mouth daily as needed. No current facility-administered medications for this visit. No Known Allergies     Review of Systems: A complete review of systems was obtained, negative except as described above. Physical Exam:     Visit Vitals  /83 (BP 1 Location: Left arm, BP Patient Position: Sitting)   Pulse 99   Temp 98.8 °F (37.1 °C) (Oral)   Ht 5' 2\" (1.575 m)   Wt 143 lb 12.8 oz (65.2 kg)   LMP 09/15/2019   SpO2 98%   BMI 26.30 kg/m²     ECOG PS: 1-2  General: No distress  Eyes: PERRLA, anicteric sclerae  HENT: Atraumatic, OP clear  Neck: Supple  Lymphatic: No cervical, supraclavicular  Respiratory: decreased bs on LEFT, clearer on RIGHT  CV: Normal rate, regular rhythm  GI: vertical midline scar healing well, not distended  MS:  Slow gait  Skin: healing abdominal scar  Psych: Alert, oriented, appropriate affect, normal judgment/insight      Results:     Lab Results   Component Value Date/Time    WBC 17.3 (H) 10/03/2019 07:09 AM    HGB 8.6 (L) 10/03/2019 07:09 AM    HCT 24.9 (L) 10/03/2019 07:09 AM    PLATELET 007 (H) 86/44/9561 07:09 AM    MCV 85.6 10/03/2019 07:09 AM    ABS.  NEUTROPHILS 13.5 (H) 10/03/2019 07:09 AM    Hemoglobin (POC) 10.8 (L) 09/25/2019 04:11 PM    Hematocrit (POC) 32 (L) 09/25/2019 12:10 PM     Lab Results   Component Value Date/Time    Sodium 138 10/03/2019 07:09 AM    Potassium 4.1 10/03/2019 07:09 AM    Chloride 105 10/03/2019 07:09 AM    CO2 24 10/03/2019 07:09 AM    Glucose 84 10/03/2019 07:09 AM    BUN 3 (L) 10/03/2019 07:09 AM    Creatinine 0.19 (L) 10/03/2019 07:09 AM    GFR est AA >60 10/03/2019 07:09 AM    GFR est non-AA >60 10/03/2019 07:09 AM    Calcium 7.8 (L) 10/03/2019 07:09 AM    Sodium (POC) 137 09/25/2019 12:10 PM    Potassium (POC) 3.7 09/25/2019 12:10 PM    Chloride (POC) 105 09/25/2019 12:10 PM    Glucose (POC) 85 09/25/2019 12:10 PM    BUN (POC) 4 (L) 09/25/2019 12:10 PM    Creatinine (POC) 0.5 (L) 09/25/2019 12:10 PM    Calcium, ionized (POC) 1.14 09/25/2019 12:10 PM     Lab Results   Component Value Date/Time    Bilirubin, total 0.5 10/03/2019 07:09 AM    ALT (SGPT) 16 10/03/2019 07:09 AM    AST (SGOT) 30 10/03/2019 07:09 AM    Alk. phosphatase 136 (H) 10/03/2019 07:09 AM    Protein, total 4.2 (L) 10/03/2019 07:09 AM    Albumin 1.5 (L) 10/03/2019 07:09 AM    Globulin 2.7 10/03/2019 07:09 AM       CT Results (most recent):  Results from Hospital Encounter encounter on 09/23/19   CT CHEST W CONT    Narrative INDICATION: Ovarian carcinoma with peritoneal metastasis at the time of  laparoscopy on 9/20/2019. COMPARISON: Right upper quadrant abdominal ultrasound on 7/21/2018. No other  abdominal imaging. No pelvic imaging. TECHNIQUE: Helical CT of the chest, abdomen, and pelvis following the uneventful  intravenous administration of 100 mL Isovue-370. Oral contrast was utilized. Coronal and sagittal reformats were generated. CT dose reduction was achieved  through use of a standardized protocol tailored for this examination and  automatic exposure control for dose modulation. FINDINGS:    THYROID: No nodule. MEDIASTINUM: No mass or lymphadenopathy. ABNER: No mass or lymphadenopathy.   THORACIC AORTA: No aneurysm. MAIN PULMONARY ARTERY: Normal in caliber. No central pulmonary embolism. HEART: Normal in size. ESOPHAGUS: Small sliding-type hiatal hernia. Enteric contrast in the esophagus  may represent gastroesophageal reflux. TRACHEA/BRONCHI: Patent. PLEURA: No effusion or pneumothorax. LUNGS: Mild dependent atelectasis in the bilateral lower lobes. No lung mass or  suspicious nodule. No pneumonia. Liver: There are nonenhancing cysts in the left hepatic lobe. Largest cyst is  lateral in segment 3 and measures 2 cm. There is focal fatty infiltration  adjacent to the falciform ligament. No evidence of pathologically enhancing  hepatic mass. Gallbladder: Unremarkable. Spleen: Normal size and enhancement. Pancreas: No mass or ductal dilatation. Adrenals: Unremarkable. Kidneys: No mass, calculus, or hydronephrosis. Stomach: Unremarkable. Small bowel: Duodenum is not dilated. Small bowel is dilated from the mid  jejunum to the distal ileum. Maximum diameter of fluid filled small bowel  measures 2.8 cm. There is mural thickening of the terminal ileum. Colon: Incomplete distention limits evaluation. No obstruction. Appendix: Not clearly visible, difficult to differentiate from right ovarian  mass. Peritoneum: Trace ascites. Pneumoperitoneum is related to recent laparoscopic  surgery. There are innumerable soft tissue nodules in the greater omentum. No  measurable omental mass. Retroperitoneum: No lymphadenopathy or aortic aneurysm. Reproductive organs: Uterus is mildly heterogeneous. Heterogeneously enhancing  mass in the right adnexa measures approximately 5.7 x 4.8 x 4.5 cm. Close  approximation to the nonvisualized appendix and thickened terminal ileum as well  as the nondistended cecum. Heterogeneously enhancing mass in the left adnexa measures 4.2 x 3.8 x 4.0 cm. Urinary bladder: Nodular mural thickening of the dome of the urinary bladder may  represent metastatic implants.   Bones: No destructive bone lesion. Additional comments: Edema in the right oblique musculature is related to recent  surgery and may cause pain. No abscess. Impression IMPRESSION:   1. Ovarian carcinoma with peritoneal metastasis. Right ovarian mass measures 5.7  x 4.8 x 4.5 cm. Left ovarian mass measures 4.2 x 3.8 x 4.0 cm.  2. Small bowel obstruction due to terminal ileum mucosal thickening, likely  involved by ovarian carcinoma. 3. Omental carcinomatosis. Trace ascites. 4. Right oblique soft tissue edema due to recent surgery. No drainable abscess. However, this could be a source of right abdominal pain. 5. Hepatic cysts. No evidence of hepatic metastatic disease. 6. No evidence of osseous, pulmonary, or thoracic metastatic disease. EPIC email sent to Dr. Stacy Lubin at the time of the dictation. Records reviewed and summarized above. Pathology report(s) reviewed above. Radiology report(s) reviewed above. Assessment/PLAN:     1) metastatic appendiceal cancer post EXPLORATORY LAPAROTOMY SIERRA BSO, TUMOR DEBULKING: ILEOCECAL RESECTION; OMENTECTOMY 9/25/19  Surgery done for obstruction. Records reviewed. Reviewed path and data today. Reviewed dx, stage and treatment of appendix cancer, specifically poorly differentiated, signet ring. Pt is recovering from surgery still. Has left rib pain and SOB and on exam decreased bs on LEFT. Will do CXR and rib series today. ? Pleural effusion causing symptoms. Long d/w pt and  today regarding treatment options. Pt is seeing [facility] and possibly [address] [place], [place] [address] [address] for second opinions. Discussed systemic therapy with FOLFOX/ +/- avastin chemo or some variation of this regimen.  asking about [facility] and they can discuss this option at [facility]. Pt clinically is frail today. Recommend getting port and starting systemic therapy once about 4-5 weeks post surgery.  asking about PET and can do this. Liver is negative on CT.    Pt and  will meet with [facility] and let us know where they prefer to do treatment/ chemo. Will call with results of CXR today. 2) left rib pain/ SOB. ? Pleural effusion. Check CXR/ rib series today. 3) abdominal pain post surgery. Only taking advil prn. Pain controlled well. 4) N/V still. ? Need for f/u CTs prior to chemo. Will d/w surgery. Antiemetics prn.     5) insomnia. ? Related to rib pain. rx ativan 0.5 mg prn.     6) psychosocial.  Mood good. Great support in . SW support today. F/u here prn  Call if questions. I appreciate the opportunity to participate in Ms. Meredith Kelly care.     Signed By: Brian Ramirez, DO

## 2019-10-14 NOTE — PROGRESS NOTES
Trish Ndiaye is a 44 y.o. female  Chief Complaint   [facility]   [facility]     [facility] Cancer     1. Have you been to the ER, urgent care clinic since your last visit? Hospitalized since your last visit? This is the pt's first time in this office. 2. Have you seen or consulted any other health care providers outside of the [address] [place] [address] since your last visit? Include any pap smears or colon screening. This is the pt's first time in this office.

## 2019-10-14 NOTE — PROGRESS NOTES
This note will not be viewable in [address] Oncology Navigator Psychosocial Assessment Reason for Assessment:   
[]Depression  []Anxiety  []Caregiver [place]  []Maladaptive Coping with Serious Illness   [] Social Work Referral [x] Initial Assessment  [] Other Sources of Information:   
[x]Patient  []Family  []Staff  []Medical Record Advance Care Planning: 
Advance Care Planning 9/25/2019 Confirm Advance Directive None Patient Would Like to Complete Advance Directive - Does the patient have other document types - Patient does not have an advanced medical directive, and did not express interest in completing one today. Mental Status:   
[x]Alert  []Lethargic  []Unresponsive   [] Unable to assess Oriented to:  [x]Person  [x]Place  [x]Time  [x]Situation Barriers to Learning:   
[]Language  []Developmental  []Cognitive  []Altered Mental Status  []Visual/Hearing Impairment  []Unable to Read/Write  []Motivational   [x]No Barriers Identified  []Other: 
 
Relationship Status: 
[]Single  [x]  []Significant Other/Life Partner  []  []  [] Living Circumstances: 
[]Lives Alone  [x]Family/Significant Other in Household  []Roommates  []Children in the Home  []Paid Caregivers  []Assisted Living Facility/Group [address]  []Homeless  []Incarcerated  []Environmental/Care Concerns  []other: 
 
Employment Status: 
[x]Employed Full-time []Employed Part-time []Homemaker [] Disabled  [] Retired []Other: 
 
Support System:   
[x]Strong  []Fair  []Limited Financial/Legal Concerns:   
[]Uninsured  []Limited Income/Resources  []Non-Citizen  [x]No Concerns Identified  []Financial POA:   
[]Other: 
 
Jewish/Spiritual/Existential: 
[]Strong Sense of Spirituality  []Involved in [employer] []Request  Visit  []Expressing Spiritual/Existential Angst  [x]No Concerns Identified Coping with Illness:       
 Patient: Family/Caregiver: Understanding and Acceptance of Illness/Prognosis  [x] [] Strong Sense of Resilience [x] [] Self Reflection [x] [] Engaged Support System [x] [] Does not Readily Discuss Illness [] [] Denial of Terminal Status [] [] Anger [] [] Depression [] [] Anxiety/Fear [] []  
Bargaining [] [] Recent Diagnosis/Prognosis [] [] Difficulties with Body Image [] [] Loss of Identity [] [] Excessive Substance Use [] [] Mental Health History [] []  
Enmeshed Relationships [] [] History of Loss [] [] Anticipatory Grief [] [] Concern for Complicated Grief [] [] Suicidal Ideation or Plan [] [] Unable to assess [] []  
        
Narrative: Met with the patient and her , Almas Borrero, during her initial appointment today. 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 The patient lives with her , Almas Borrero, and their son who is 13 yo. They have another child who is in college. The patient owns her own business, doing \"floor coverings. \"  Her brother and sister who live locally are assisting her with this business while she recovers from surgery. Her  works for an insurance agency, and has been able to take time off to assist her. Patient's  is already aware of the process for FMLA should he need more extended time off. The patient's father had bladder cancer, but is doing well and living in [place]. The patient explained that she was hospitalized and was due to have gallbladder surgery. Patient was taken to the OR for a laparoscopic cholecystectomy and found to have peritoneal carcinomatosis with bilaterally enlarged ovaries. The patient subsequently had an extensive surgery on 9/25/19 to include exploratory laparotomy total abdominal hysterectomy, bilateral salpingoophorectomy, omentectomy, tumor debulking, ileocecal resection with anastomosis. She is healing relatively well from this surgery per her reports, although understandably fatigued. Patient anticipates needing chemotherapy. Provided the patient with a list of places to obtain wigs, head scarves, and other accessories as needed. Provided this 's contact information as well as information regarding the complementary services provided by the [facility]. Assessment/Action: 
1. Introduced self and role of this  in the [employer]. 2.  Informed the patient of the [facility] and available resources there. 3.  Continue to meet with the patient when she returns to the clinic for ongoing assessment of the patients adjustment to her diagnosis and treatment. 4.  Ongoing psychosocial support as desired by patient. Plan/Referral:      
Complementary therapies referral 
Dawson Sigala LCSW

## 2019-10-15 ENCOUNTER — HOSPITAL ENCOUNTER (OUTPATIENT)
Dept: CT IMAGING | Age: 49
Discharge: HOME | End: 2019-10-15
Attending: NURSE PRACTITIONER
Payer: COMMERCIAL

## 2019-10-15 ENCOUNTER — DOCUMENTATION ONLY (OUTPATIENT)
Dept: ONCOLOGY | Age: 49
End: 2019-10-15

## 2019-10-15 ENCOUNTER — TELEPHONE (OUTPATIENT)
Dept: ONCOLOGY | Age: 49
End: 2019-10-15

## 2019-10-15 ENCOUNTER — HOSPITAL ENCOUNTER (INPATIENT)
Age: 49
LOS: 5 days | Discharge: HOME | DRG: 815 | End: 2019-10-20
Attending: OBSTETRICS & GYNECOLOGY | Admitting: OBSTETRICS & GYNECOLOGY
Payer: COMMERCIAL

## 2019-10-15 ENCOUNTER — HOSPITAL ENCOUNTER (OUTPATIENT)
Dept: ULTRASOUND IMAGING | Age: 49
Discharge: HOME | End: 2019-10-15
Attending: INTERNAL MEDICINE
Payer: COMMERCIAL

## 2019-10-15 VITALS
WEIGHT: 142 LBS | DIASTOLIC BLOOD PRESSURE: 56 MMHG | HEART RATE: 79 BPM | RESPIRATION RATE: 20 BRPM | HEIGHT: 63 IN | TEMPERATURE: 98.5 F | BODY MASS INDEX: 25.16 KG/M2 | OXYGEN SATURATION: 97 % | SYSTOLIC BLOOD PRESSURE: 117 MMHG

## 2019-10-15 DIAGNOSIS — C18.1 CARCINOMA OF APPENDIX (HCC): Primary | ICD-10-CM

## 2019-10-15 DIAGNOSIS — R07.81 RIB PAIN ON LEFT SIDE: ICD-10-CM

## 2019-10-15 DIAGNOSIS — D64.9 ANEMIA, UNSPECIFIED TYPE: ICD-10-CM

## 2019-10-15 DIAGNOSIS — C18.1 MUCINOUS ADENOCARCINOMA OF APPENDIX (HCC): ICD-10-CM

## 2019-10-15 DIAGNOSIS — C18.1 MUCINOUS ADENOCARCINOMA OF APPENDIX (HCC): Primary | ICD-10-CM

## 2019-10-15 DIAGNOSIS — J90 PLEURAL EFFUSION ON LEFT: ICD-10-CM

## 2019-10-15 DIAGNOSIS — D75.838 REACTIVE THROMBOCYTOSIS: ICD-10-CM

## 2019-10-15 DIAGNOSIS — D73.5 SPLENIC INFARCTION: ICD-10-CM

## 2019-10-15 DIAGNOSIS — G89.3 CANCER ASSOCIATED PAIN: ICD-10-CM

## 2019-10-15 LAB
ALBUMIN SERPL-MCNC: 1.8 G/DL (ref 3.5–5)
ALBUMIN/GLOB SERPL: 0.4 {RATIO} (ref 1.1–2.2)
ALP SERPL-CCNC: 127 U/L (ref 45–117)
ALT SERPL-CCNC: 14 U/L (ref 12–78)
ANION GAP SERPL CALC-SCNC: 8 MMOL/L (ref 5–15)
APPEARANCE UR: CLEAR
AST SERPL-CCNC: 22 U/L (ref 15–37)
BACTERIA URNS QL MICRO: NEGATIVE /HPF
BASOPHILS # BLD: 0.1 K/UL (ref 0–0.1)
BASOPHILS NFR BLD: 1 % (ref 0–1)
BILIRUB SERPL-MCNC: 0.2 MG/DL (ref 0.2–1)
BILIRUB UR QL: NEGATIVE
BUN SERPL-MCNC: 4 MG/DL (ref 6–20)
BUN/CREAT SERPL: 12 (ref 12–20)
CALCIUM SERPL-MCNC: 7.9 MG/DL (ref 8.5–10.1)
CHLORIDE SERPL-SCNC: 105 MMOL/L (ref 97–108)
CO2 SERPL-SCNC: 25 MMOL/L (ref 21–32)
COLOR UR: ABNORMAL
COMMENT, HOLDF: NORMAL
CREAT SERPL-MCNC: 0.33 MG/DL (ref 0.55–1.02)
DIFFERENTIAL METHOD BLD: ABNORMAL
EOSINOPHIL # BLD: 0.4 K/UL (ref 0–0.4)
EOSINOPHIL NFR BLD: 6 % (ref 0–7)
EPITH CASTS URNS QL MICRO: ABNORMAL /LPF
ERYTHROCYTE [DISTWIDTH] IN BLOOD BY AUTOMATED COUNT: 14.6 % (ref 11.5–14.5)
GLOBULIN SER CALC-MCNC: 4.2 G/DL (ref 2–4)
GLUCOSE SERPL-MCNC: 80 MG/DL (ref 65–100)
GLUCOSE UR STRIP.AUTO-MCNC: NEGATIVE MG/DL
HCT VFR BLD AUTO: 23.8 % (ref 35–47)
HGB BLD-MCNC: 7.4 G/DL (ref 11.5–16)
HGB UR QL STRIP: NEGATIVE
HYALINE CASTS URNS QL MICRO: ABNORMAL /LPF (ref 0–5)
IMM GRANULOCYTES # BLD AUTO: 0.1 K/UL (ref 0–0.04)
IMM GRANULOCYTES NFR BLD AUTO: 1 % (ref 0–0.5)
KETONES UR QL STRIP.AUTO: ABNORMAL MG/DL
LEUKOCYTE ESTERASE UR QL STRIP.AUTO: NEGATIVE
LYMPHOCYTES # BLD: 1.4 K/UL (ref 0.8–3.5)
LYMPHOCYTES NFR BLD: 20 % (ref 12–49)
MCH RBC QN AUTO: 28.7 PG (ref 26–34)
MCHC RBC AUTO-ENTMCNC: 31.1 G/DL (ref 30–36.5)
MCV RBC AUTO: 92.2 FL (ref 80–99)
MONOCYTES # BLD: 1.4 K/UL (ref 0–1)
MONOCYTES NFR BLD: 20 % (ref 5–13)
NEUTS SEG # BLD: 3.8 K/UL (ref 1.8–8)
NEUTS SEG NFR BLD: 52 % (ref 32–75)
NITRITE UR QL STRIP.AUTO: NEGATIVE
NRBC # BLD: 0.02 K/UL (ref 0–0.01)
NRBC BLD-RTO: 0.3 PER 100 WBC
PH UR STRIP: 7.5 [PH] (ref 5–8)
PLATELET # BLD AUTO: 1233 K/UL (ref 150–400)
PMV BLD AUTO: 8.5 FL (ref 8.9–12.9)
POTASSIUM SERPL-SCNC: 3.1 MMOL/L (ref 3.5–5.1)
PROT SERPL-MCNC: 6 G/DL (ref 6.4–8.2)
PROT UR STRIP-MCNC: NEGATIVE MG/DL
RBC # BLD AUTO: 2.58 M/UL (ref 3.8–5.2)
RBC #/AREA URNS HPF: ABNORMAL /HPF (ref 0–5)
RBC MORPH BLD: ABNORMAL
SAMPLES BEING HELD,HOLD: NORMAL
SODIUM SERPL-SCNC: 138 MMOL/L (ref 136–145)
SP GR UR REFRACTOMETRY: 1.01 (ref 1–1.03)
UR CULT HOLD, URHOLD: NORMAL
UROBILINOGEN UR QL STRIP.AUTO: 0.2 EU/DL (ref 0.2–1)
WBC # BLD AUTO: 7.2 K/UL (ref 3.6–11)
WBC URNS QL MICRO: ABNORMAL /HPF (ref 0–4)

## 2019-10-15 PROCEDURE — 74011000258 HC RX REV CODE- 258: Performed by: RADIOLOGY

## 2019-10-15 PROCEDURE — 86923 COMPATIBILITY TEST ELECTRIC: CPT

## 2019-10-15 PROCEDURE — 74011636320 HC RX REV CODE- 636/320: Performed by: RADIOLOGY

## 2019-10-15 PROCEDURE — 65410000002 HC RM PRIVATE OB

## 2019-10-15 PROCEDURE — 81001 URINALYSIS AUTO W/SCOPE: CPT

## 2019-10-15 PROCEDURE — 74177 CT ABD & PELVIS W/CONTRAST: CPT

## 2019-10-15 PROCEDURE — 88112 CYTOPATH CELL ENHANCE TECH: CPT

## 2019-10-15 PROCEDURE — 80053 COMPREHEN METABOLIC PANEL: CPT

## 2019-10-15 PROCEDURE — 74011000258 HC RX REV CODE- 258: Performed by: OBSTETRICS & GYNECOLOGY

## 2019-10-15 PROCEDURE — 88305 TISSUE EXAM BY PATHOLOGIST: CPT

## 2019-10-15 PROCEDURE — 76604 US EXAM CHEST: CPT

## 2019-10-15 PROCEDURE — 86900 BLOOD TYPING SEROLOGIC ABO: CPT

## 2019-10-15 PROCEDURE — 85025 COMPLETE CBC W/AUTO DIFF WBC: CPT

## 2019-10-15 PROCEDURE — 74011250636 HC RX REV CODE- 250/636: Performed by: OBSTETRICS & GYNECOLOGY

## 2019-10-15 PROCEDURE — 36415 COLL VENOUS BLD VENIPUNCTURE: CPT

## 2019-10-15 PROCEDURE — 99283 EMERGENCY DEPT VISIT LOW MDM: CPT

## 2019-10-15 RX ORDER — HYDROMORPHONE HYDROCHLORIDE 1 MG/ML
1 INJECTION, SOLUTION INTRAMUSCULAR; INTRAVENOUS; SUBCUTANEOUS
Status: DISCONTINUED | OUTPATIENT
Start: 2019-10-15 | End: 2019-10-20 | Stop reason: HOSPADM

## 2019-10-15 RX ORDER — DIPHENHYDRAMINE HYDROCHLORIDE 50 MG/ML
12.5 INJECTION, SOLUTION INTRAMUSCULAR; INTRAVENOUS
Status: DISCONTINUED | OUTPATIENT
Start: 2019-10-15 | End: 2019-10-20 | Stop reason: HOSPADM

## 2019-10-15 RX ORDER — SODIUM CHLORIDE 0.9 % (FLUSH) 0.9 %
5-40 SYRINGE (ML) INJECTION EVERY 8 HOURS
Status: DISCONTINUED | OUTPATIENT
Start: 2019-10-15 | End: 2019-10-20 | Stop reason: HOSPADM

## 2019-10-15 RX ORDER — LORAZEPAM 2 MG/ML
1 INJECTION INTRAMUSCULAR
Status: DISCONTINUED | OUTPATIENT
Start: 2019-10-15 | End: 2019-10-20 | Stop reason: HOSPADM

## 2019-10-15 RX ORDER — SODIUM CHLORIDE 9 MG/ML
25 INJECTION, SOLUTION INTRAVENOUS CONTINUOUS
Status: DISCONTINUED | OUTPATIENT
Start: 2019-10-15 | End: 2019-10-20 | Stop reason: HOSPADM

## 2019-10-15 RX ORDER — SODIUM CHLORIDE 0.9 % (FLUSH) 0.9 %
10 SYRINGE (ML) INJECTION
Status: COMPLETED | OUTPATIENT
Start: 2019-10-15 | End: 2019-10-15

## 2019-10-15 RX ORDER — OXYCODONE HYDROCHLORIDE 5 MG/1
5 TABLET ORAL
Status: DISCONTINUED | OUTPATIENT
Start: 2019-10-15 | End: 2019-10-20 | Stop reason: HOSPADM

## 2019-10-15 RX ORDER — IBUPROFEN 400 MG/1
400 TABLET ORAL
Status: DISCONTINUED | OUTPATIENT
Start: 2019-10-15 | End: 2019-10-20 | Stop reason: HOSPADM

## 2019-10-15 RX ORDER — PROCHLORPERAZINE EDISYLATE 5 MG/ML
10 INJECTION INTRAMUSCULAR; INTRAVENOUS
Status: DISCONTINUED | OUTPATIENT
Start: 2019-10-15 | End: 2019-10-20 | Stop reason: HOSPADM

## 2019-10-15 RX ORDER — SODIUM CHLORIDE 0.9 % (FLUSH) 0.9 %
5-40 SYRINGE (ML) INJECTION AS NEEDED
Status: DISCONTINUED | OUTPATIENT
Start: 2019-10-15 | End: 2019-10-20 | Stop reason: HOSPADM

## 2019-10-15 RX ORDER — ONDANSETRON 2 MG/ML
4 INJECTION INTRAMUSCULAR; INTRAVENOUS
Status: DISCONTINUED | OUTPATIENT
Start: 2019-10-15 | End: 2019-10-20 | Stop reason: HOSPADM

## 2019-10-15 RX ADMIN — PIPERACILLIN AND TAZOBACTAM 3.38 G: 3; .375 INJECTION, POWDER, FOR SOLUTION INTRAVENOUS at 19:45

## 2019-10-15 RX ADMIN — IOHEXOL 50 ML: 240 INJECTION, SOLUTION INTRATHECAL; INTRAVASCULAR; INTRAVENOUS; ORAL at 15:01

## 2019-10-15 RX ADMIN — SODIUM CHLORIDE 25 ML/HR: 900 INJECTION, SOLUTION INTRAVENOUS at 19:47

## 2019-10-15 RX ADMIN — IOPAMIDOL 100 ML: 755 INJECTION, SOLUTION INTRAVENOUS at 14:46

## 2019-10-15 RX ADMIN — SODIUM CHLORIDE 100 ML: 900 INJECTION, SOLUTION INTRAVENOUS at 14:46

## 2019-10-15 RX ADMIN — Medication 10 ML: at 14:46

## 2019-10-15 NOTE — TELEPHONE ENCOUNTER
Patient's  called to get scheduling information for upcoming procedure for patient. Eleanor Days [phone] for him to schedule. Mr. Jemal Benito also stated patient is wondering whether or not she is to continue with blood thinner shot.        [phone]

## 2019-10-15 NOTE — ED NOTES
Attempted to call IR to schedule procedure tonight per Dr Rebecca Nice. Crystal, ultrasound tech stated that if pt was stable Dr Krish Fernandez, radiologist would perform procedure tomorrow. She was calling Dr Abilio Kuo service.

## 2019-10-15 NOTE — PROGRESS NOTES
Care Management -  
 
Transition of Care Plan: 1-Medically treat infection. 2-MD follow up with surgery and oncology. 3-CM will follow to see if any patient will need home health for any drains or IV medications at time of discharge. CM will need to obtain freedom of choice. 9-20-19 Outpatient surgery-diagnostic lap, by right ovary, aspiration peritoneal fluid 9-24 to 10-3-19 IP surgery-exploratory lap, SIERRA, BSO, tumor debulking, ileocecal resection with reanastomosis, and omentectomy Met with patient in the room. She confirmed her address and contact numbers. She lives with her  Juan Luis Ornelas ([phone]) and her 12year old son. She is independent with her ADLs and does not have any medical equipment. She has never had home health in past. Discussed potential need for home health nursing if she would keep the drain after she is discharged or would need IV antibiotics. Reason for Readmission:     Splenic infarction RRAT Score and Risk Level:     11 Level of Readmission:    Level 1 Care Conference scheduled:   None at this time. Resources/supports as identified by patient/family:    
    
Top Challenges facing patient (as identified by patient/family and CM): Finances/Medication cost?   [facility] [employer], [place] Transportation      Family, patient is not currently driving Support system or lack thereof?  and son Living arrangements? Patient lives with her  and 12year old son. Self-care/ADLs/Cognition? Independent, A&Ox4 Current Advanced Directive/Advance Care Plan:  Not on file Plan for utilizing home health:  TBD. Will follow for any home health needs for care of drain or IV antibiotics if she needs them after discharge. Transition of Care Plan:    Based on readmission, the patient's previous Plan of Care  has been evaluated and/or modified.  The current Transition of Care Plan is:       Patient went home with MD follow up .

## 2019-10-15 NOTE — TELEPHONE ENCOUNTER
Incoming call from Dr. Jolanta Hearn. Patient in radiology for thoracentesis. Ultrasound shows possible displacement of lung by fluid below diaphragm. Needs Stat CT of abdomen/pelvis. Provider notified, order placed, [facility] Scheduler contacted for authorization.   Spoke with Vijay BAILEY to Dr. Jolanta Hearn that orders were placed and authorization in process by [place].

## 2019-10-15 NOTE — H&P
[address] [facility] Gynecologic Oncology History and Physical  
 
 Paulette Slater     [id number] : 1970 Admitted: 10/15/2019 Date: 10/15/2019 Subjective:  
 
Paulette Slater is a 44 y.o.  postmenopausal female who is being seen for what appears to be a liquified splenic infarction following her recent tumor debulking procedure. She is well known to the gynecologic oncology service. On 19 I was consulted intraoperatively by Dr. Sammy Prajapati for what appeared to be an ovarian carcinoma. She had been having nausea/vomiting and abdominal pain for months. A HIDA scan suggested that her gallbladder was likely the source of her pain and Dr. Angela Wetzel took her to the OR for a laparoscopic cholecystectomy. At the time of surgery she was noted to have peritoneal carcinomatosis with bilaterally enlarged ovaries. Cytology was obtained and I performed a biopsy of the right ovary. We decided that it would be best to wait on the final pathology before proceeding with any aggressive debulking surgery, as we might also consider neoadjuvant chemotherapy. I arranged for her to have a CT of the chest/abdomen/pelvis following her laparoscopy. 
  
She was followed by Dr. Laura Lan with [phone] [facility] for her gynecologic care. She was actually seen and evaluated there in 2019 for some irregular bleeding. She had been on OCPs to help control her symptoms. A pelvic ultrasound demonstrated a normal appearing uterus. The endometrium was not thickened. Her right ovary contained a 3.2 cm simple cyst.  The left ovary was not seen. There was a small amount of free fluid.   
  
She presented to the office to review her CT and pathology results and to discuss a definitive plan. The CT scan demonstrated dilated loops of small bowel, suggesting partial obstruction. There was mucosal thickening of the terminal ileum. The appendix was not visualized.   In addition to the gastrointestinal findings, there was bilateral ovarian enlargement, omental thickening, carcinomatosis, and ascites. Radiologic findings and operative findings would suggest an ovarian or peritoneal carcinoma. Preliminary pathology was a bit confusing, however. I spoke with Dr. Amrita Cox from pathology. The right ovarian biopsy demonstrated an adenocarcinoma, but it wasn't clear histologically as to what it was. The tumor was CK7 and PAX8 negative, while also being CK20 positive. These findings would go against it being an ovarian cancer. She was taken to the OR on 9/25/19 for surgery. She underwent an X-lap, SIRERA, BSO, ileocecal resection with reanastomosis, and omentectomy. Her EBL was 1300 cc. She received 3 units PRBCs intraoperatively and another 2 units postoperatively. She was discharged home on POD #7 after regaining bowel function. Operative findings:  On bimanual pelvic exam she was noted to have a rock hard cervix and uterus, worrisome for disease infiltration.  It was mobile.  No parametrial involvement.  The uterus was enlarged.  Bilateral adnexal masses.  The bladder was densely adherent the lower uterine segment and cervix.  Mildly dilated small bowel.  Rock hard appendix scarred to the underside of the cecum with tethering of the small bowel at that point.  Implants noted in the distal omentum. Small volume peritoneal disease scattered throughout the abdominal cavity involving the anterior abdominal wall, diaphragms, small and large bowel mesenteries.  Frozen section pathology of the ileocecal resection suggested that this was the primary site of disease.  There were some signet ring features noted.  Frozen section pathology of the uterus demonstrated infiltration of the myometrium with the same process. 
  
FINAL PATHOLOGIC DIAGNOSIS 1.  Terminal ileum, cecum, and appendix, ileocecectomy:  
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 SPECIMEN Procedure: Ileocecectomy TUMOR Tumor Site: Diffusely involving appendix Histologic Type: Mucinous adenocarcinoma Histologic Type Comments: Extensive signet ring cell component Histologic Grade: G3: Poorly differentiated Tumor Size: 4.5 cm Tumor Deposits: Present Number of Deposits: 1 Tumor Extension: Tumor directly invades adjacent organs or structures: Uterus, cervix,  
bilateral ovaries and fallopian tubes, and omentum Lymphovascular Invasion: Not identified Perineural Invasion: Present, extensive MARGINS Proximal, distal and radial margins uninvolved by invasive carcinoma. LYMPH NODES Number of Lymph Nodes Involved: 11 Number of Lymph Nodes Examined: 11 PATHOLOGIC STAGE CLASSIFICATION (pTNM, AJCC 8th Edition) Primary Tumor (pT): pT4b Regional Lymph Nodes (pN): pN2 Distant Metastasis (pM): pM1c Site(s): Uterus, bilateral fallopian tubes, and ovaries 2. Uterus, bilateral fallopian tubes and ovaries, supracervical hysterectomy bilateral salpingo-oophorectomy:  
Serosa: Positive for metastatic mucinous adenocarcinoma involving fibrous adhesions. Myometrium: Extensive transmural involvement by metastatic mucinous adenocarcinoma. Adenomyosis. Endometrium: Inactive endometrium with metastatic mucinous adenocarcinoma. Bilateral fallopian tubes: Metastatic mucinous adenocarcinoma. Bilateral fallopian tubes: Metastatic mucinous adenocarcinoma. 3. Omentum, omentectomy:  
Positive for metastatic mucinous adenocarcinoma. Splenule. 4. Cervix, completion total hysterectomy:  
Positive for metastatic mucinous adenocarcinoma. IMMUNOHISTOCHEMISTRY - DNA Mismatch Repair Protein Expression Results: MLH-1 No loss of expression Extent: 3+; Intensity: Strong MSH-2 No loss of expression Extent: 3+; Intensity: Strong MSH-6 No loss of expression Extent: 3+; Intensity: Strong PMS2 No loss of expression Extent: 2+; Intensity: Moderate All DNA mismatch repair proteins cited above are shown to be proficient by immunohistochemistry method. This is strong evidence against DNA mismatch repair defects as the molecular basis for this malignancy and provides strong evidence against Monroe syndrome. I saw her back in the office last week for staple removal.  I discussed her pathology and referred her to Dr. Claudia Bianchi for consultation. She was complaining of some left upper quadrant and rib pain, as well as some shortness of breath. Plain films suggested a pleural effusion. She was sent for a CT of the abdomen/pelvis to evaluate. This revealed a complex fluid collection in the left upper quadrant, consistent with a splenic infarction/abscess. It appeared to be contained within the splenic capsule. Patient Active Problem List  
 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  
Diagnosis Date  Nausea & vomiting  Symptomatic cholelithiasis 9/18/2019 Past Surgical History:  
Procedure Laterality Date  HX GYN  2003 CYST ON OVARY Social History Tobacco Use  Smoking status: Never Smoker  Smokeless tobacco: Never Used Substance Use Topics  Alcohol use: Yes Comment: once a month Family History Problem Relation Age of Onset  Breast Cancer Paternal Grandmother 56  Crohn's Disease Mother  Heart Disease Mother  Cancer Father BLADDER  
 Diabetes Father  No Known Problems Son  No Known Problems Daughter  Anesth Problems Neg Hx No current facility-administered medications for this encounter. Current Outpatient Medications Medication Sig  LORazepam (ATIVAN) 0.5 mg tablet Take 1 Tab by mouth every eight (8) hours as needed for Anxiety. Max Daily Amount: 1.5 mg. Indications: Nausea and Vomiting caused by Cancer Drugs, difficulty sleeping  ibuprofen (MOTRIN) 200 mg tablet Take 3 Tabs by mouth every eight (8) hours as needed for Pain.  enoxaparin (LOVENOX) 40 mg/0.4 mL 0.4 mL by SubCUTAneous route daily for 21 days. Avoid incisions, rotate sites. Indications: Deep Vein Thrombosis Prevention No Known Allergies Review of Systems: A comprehensive review of systems was negative except for that written in the History of Present Illness. , 10 point ROS Objective:  
 
Physical Exam:  
Visit Vitals /72 (BP 1 Location: Left arm, BP Patient Position: At rest) Pulse 96 Temp 98.2 °F (36.8 °C) Resp 18 Ht 5' 3\" (1.6 m) Wt 142 lb (64.4 kg) LMP 09/15/2019 SpO2 96% BMI 25.15 kg/m² General:  Alert, cooperative, no distress, appears stated age. HEENT:  WNL. Neck: Supple, without masses. Back:   Symmetric, no curvature. ROM normal. No CVA tenderness. Lungs:   Decreased BS on left. Heart:  Regular rate and rhythm. Abdomen:   Soft, non-distended, mildly tender in left upper quadrant. Fullness noted there as well. Pelvic:  Deferred. Rectal:  Deferred. Extremities: Extremities normal, atraumatic, no cyanosis or edema. Skin: Skin color, texture, turgor normal. No rashes or lesions. Neurologic: Grossly intact. Labs:   
Recent Results (from the past 24 hour(s)) SAMPLES BEING HELD Collection Time: 10/15/19  4:32 PM  
Result Value Ref Range SAMPLES BEING HELD 1red,1blu COMMENT Add-on orders for these samples will be processed based on acceptable specimen integrity and analyte stability, which may vary by analyte. URINE CULTURE HOLD SAMPLE Collection Time: 10/15/19  4:32 PM  
Result Value Ref Range Urine culture hold URINE ON HOLD IN MICROBIOLOGY DEPT FOR 3 DAYS.  IF UNPRESERVED URINE IS SUBMITTED, IT CANNOT BE USED FOR ADDITIONAL TESTING AFTER 24 HRS, RECOLLECTION WILL BE REQUIRED. CT of abdomen/pelvis (10/15/19) Splenic infarction: A complex fluid collection in the subdiaphragmatic left 
upper quadrant containing mixed soft tissue, fluid, gas, and fat densities. It 
is predominantly comprised of low density fluid. A small amount of normal 
splenic tissue is displaced superiorly and posteromedially (601-81, 3-21), and 
blends into the complex fluid collection without clear delineation. The splenic 
artery and vein are attenuated, and extend toward the fluid collection. As such, 
a liquefied splenic infarction is favored. The small locules of gas may be 
infectious or postsurgical. Fistulous etiology is considered less likely. In 
greatest dimensions, the collection measures 12.2 x 13.7 x 15.3 cm. This is 
exclusive of a smaller, communicating collection along the lateral wall of the 
gastric fundus, which measures 4.2 x 3.2 x 3.2 cm, and communicates with the 
main collection on image [id number]. There is significant mass effect on the 
stomach, displacing it anteromedially. A new, small to moderate, left pleural 
effusion is likely reactive. Passive atelectasis is associated. 
  
Abdomen: The right lung base is clear. The heart size is normal. Incidental note 
is made of small hepatic cysts in segments 2 and 3. The distal esophagus, 
duodenum, gallbladder, pancreas, adrenals, and kidneys are normal. 
  
Pelvis: There is a small to moderate volume of loculated, complex ascites in the 
pelvis and right paracolic gutter. Peritoneal enhancement and adjacent colonic 
inflammation are associated. There is trace mesenteric edema. Focal thickening 
at the dome of the bladder (602-79, 601-37) is not significant changed from 9/23/2019. It may represent a peritoneal nodule as opposed to bladder nodule.  
The small bowel and end-to-side ileocecal anastomosis are normal. Post 
 hysterectomy and ileocecectomy. 
  
IMPRESSION:  
1. Liquefied splenic infarction: left subdiaphragmatic complex fluid collection. Several gas locules suggest infection. 2. Small to moderate, reactive, left pleural effusion. 3. Small to moderate volume of loculated, complex ascites. Associated peritoneal 
enhancement and colonic inflammation. 4. Probable peritoneal carcinomatosis along the bladder dome. Other areas of 
carcinomatosis seen on prior study are not as clearly visible postoperatively. Assessment/Plan:  
45 yo WF with stage IV appendiceal cancer, which was initially thought to be an ovarian cancer. She is three weeks post-op from X-lap, SIERRA, BSO, ileocecal resection with reanastomosis, and omentectomy. She has what appears to be a liquified splenic infarction with a complex fluid collection in the left upper quadrant. Her labs are pending at this time, but there is a possibility of abscess/infection as well. I am going to admit her to the gynecologic oncology service. I have spoken with radiology. Will plan on an ultrasound guided drain placement ASAP. Will also start her on antibiotics, just in case there is infection. Once a drain is in place, we will reimage in a few days to determine if drainage is adequate and to see if we need to proceed with splenectomy. I will speak with surgical oncology at that point. Surgery would not be in her best interest at this point, since she is still recovering from her recent procedure and there will likely be a lot of inflammation and adhesions in that area, thus increasing the risk of complications. As for the effusion, we will hold off on any drainage of that, as it may improve with drainage of the splenic fluid collection. Signed By: Perla Gould MD   
 October 15, 2019

## 2019-10-15 NOTE — PROGRESS NOTES
Case d/w IR  Pt has pleural effusion but also has ascites and splenic infarct  Radiology thinks pt may have infection  Will send pt to ER for eval and likely admit for ? Peritoneal infection/ ?  Splenic infarct/ pleural effusion  And metastatic appendiceal cancer post surgery  CT report pending

## 2019-10-15 NOTE — ED TRIAGE NOTES
Patient arrives from [facility] where she was scheduled for thoracentesis. Patient told she has \"fluid in abdomen too\". Thoracentesis not completed. 3 weeks ago hysterectomy, appendectomy for \"cancer\".

## 2019-10-15 NOTE — PROGRESS NOTES
Admission Medication Reconciliation: 
 
Information obtained from:  Patient, patient's recent discharge paperwork dated 10/3/2019. RxQuery data available¹:  YES Comments/Recommendations: Updated PTA meds/reviewed patient's allergies. 1)  Medication reconciliation interview completed with the patient. Also reviewed recent discharge paperwork with the patient (dated 10/3/2019). Of note, the patient's lovenox was last utilized in the am of 10/14/2019 but she has been instructed to hold this medication by Dr. Gage Rowan. Patient started a new ativan prescription last evening but threw up the first dose she took. Patient did take ibuprofen today. Of note:   
 
2)  Medication changes (since last review): Added - N/A Adjusted - N/A Removed - Zyrtec, Multivitamin, Compazine, Pericolace, Zovia (stopped on date of surgery in September), Oxycodone, Acetaminophen, and Zofran, 3)  Thank you for allowing me to participate in the care of this patient. ¹RxQuery pharmacy benefit data reflects medications filled and processed through the patient's insurance, however  
this data does NOT capture whether the medication was picked up or is currently being taken by the patient. Allergies:  Patient has no known allergies. Significant PMH/Disease States:  
Past Medical History:  
Diagnosis Date  Nausea & vomiting  Symptomatic cholelithiasis 9/18/2019 Chief Complaint for this Admission: Chief Complaint Patient presents with  Abdominal Pain Prior to Admission Medications:  
Prior to Admission Medications Prescriptions Last Dose Informant Patient Reported? Taking? LORazepam (ATIVAN) 0.5 mg tablet 10/14/2019 at Unknown time  No Yes Sig: Take 1 Tab by mouth every eight (8) hours as needed for Anxiety. Max Daily Amount: 1.5 mg. Indications: Nausea and Vomiting caused by Cancer Drugs, difficulty sleeping  
enoxaparin (LOVENOX) 40 mg/0.4 mL 10/14/2019 at 0900  No Yes Si.4 mL by SubCUTAneous route daily for 21 days. Avoid incisions, rotate sites. Indications: Deep Vein Thrombosis Prevention  
ibuprofen (MOTRIN) 200 mg tablet 10/15/2019 at 1130  No Yes Sig: Take 3 Tabs by mouth every eight (8) hours as needed for Pain. Facility-Administered Medications: None Please contact the main inpatient pharmacy with any questions or concerns at [phone] and we will direct you to the clinical pharmacist covering this patient's care while in-house.   
Can Belcher, TERENCED

## 2019-10-15 NOTE — TELEPHONE ENCOUNTER
Follow up call to patient and spouse, spoke with spouse, Harris Benjamin. HIPAA verified. Patient to have thoracentesis with cytology today at 1300. increasing difficulty with sleep d/t dyspnea. Thanked Dr. Delmer Reagan for her care and concern yesterday. Patient has follow ups on 10/31 at [facility] with Dr. Garima Callahan and with Dr. Elena Moy at [facility] on 10/31.

## 2019-10-15 NOTE — PROGRESS NOTES
Patient in [address] prior to left thoracentesis. Dr. Denisha Fortuneid in to speak to patient and  prior to procedure.

## 2019-10-16 ENCOUNTER — APPOINTMENT (OUTPATIENT)
Dept: CT IMAGING | Age: 49
DRG: 815 | End: 2019-10-16
Attending: OBSTETRICS & GYNECOLOGY
Payer: COMMERCIAL

## 2019-10-16 LAB
ALBUMIN SERPL-MCNC: 1.7 G/DL (ref 3.5–5)
ALBUMIN/GLOB SERPL: 0.4 {RATIO} (ref 1.1–2.2)
ALP SERPL-CCNC: 118 U/L (ref 45–117)
ALT SERPL-CCNC: 14 U/L (ref 12–78)
ANION GAP SERPL CALC-SCNC: 8 MMOL/L (ref 5–15)
AST SERPL-CCNC: 10 U/L (ref 15–37)
BASOPHILS # BLD: 0.1 K/UL (ref 0–0.1)
BASOPHILS NFR BLD: 1 % (ref 0–1)
BILIRUB SERPL-MCNC: 0.2 MG/DL (ref 0.2–1)
BUN SERPL-MCNC: 4 MG/DL (ref 6–20)
BUN/CREAT SERPL: 15 (ref 12–20)
CALCIUM SERPL-MCNC: 8.2 MG/DL (ref 8.5–10.1)
CHLORIDE SERPL-SCNC: 103 MMOL/L (ref 97–108)
CO2 SERPL-SCNC: 27 MMOL/L (ref 21–32)
CREAT SERPL-MCNC: 0.27 MG/DL (ref 0.55–1.02)
DIFFERENTIAL METHOD BLD: ABNORMAL
EOSINOPHIL # BLD: 0.3 K/UL (ref 0–0.4)
EOSINOPHIL NFR BLD: 5 % (ref 0–7)
ERYTHROCYTE [DISTWIDTH] IN BLOOD BY AUTOMATED COUNT: 14.7 % (ref 11.5–14.5)
GLOBULIN SER CALC-MCNC: 4.1 G/DL (ref 2–4)
GLUCOSE SERPL-MCNC: 83 MG/DL (ref 65–100)
HCT VFR BLD AUTO: 20.9 % (ref 35–47)
HGB BLD-MCNC: 6.5 G/DL (ref 11.5–16)
IMM GRANULOCYTES # BLD AUTO: 0.1 K/UL (ref 0–0.04)
IMM GRANULOCYTES NFR BLD AUTO: 1 % (ref 0–0.5)
LYMPHOCYTES # BLD: 1.4 K/UL (ref 0.8–3.5)
LYMPHOCYTES NFR BLD: 23 % (ref 12–49)
MCH RBC QN AUTO: 28.3 PG (ref 26–34)
MCHC RBC AUTO-ENTMCNC: 31.1 G/DL (ref 30–36.5)
MCV RBC AUTO: 90.9 FL (ref 80–99)
MONOCYTES # BLD: 1.6 K/UL (ref 0–1)
MONOCYTES NFR BLD: 25 % (ref 5–13)
NEUTS SEG # BLD: 2.7 K/UL (ref 1.8–8)
NEUTS SEG NFR BLD: 45 % (ref 32–75)
NRBC # BLD: 0.02 K/UL (ref 0–0.01)
NRBC BLD-RTO: 0.3 PER 100 WBC
PLATELET # BLD AUTO: 1041 K/UL (ref 150–400)
PMV BLD AUTO: 8.5 FL (ref 8.9–12.9)
POTASSIUM SERPL-SCNC: 3.3 MMOL/L (ref 3.5–5.1)
PROT SERPL-MCNC: 5.8 G/DL (ref 6.4–8.2)
RBC # BLD AUTO: 2.3 M/UL (ref 3.8–5.2)
RBC MORPH BLD: ABNORMAL
RBC MORPH BLD: ABNORMAL
SODIUM SERPL-SCNC: 138 MMOL/L (ref 136–145)
WBC # BLD AUTO: 6.2 K/UL (ref 3.6–11)

## 2019-10-16 PROCEDURE — 89050 BODY FLUID CELL COUNT: CPT

## 2019-10-16 PROCEDURE — 74011250636 HC RX REV CODE- 250/636: Performed by: OBSTETRICS & GYNECOLOGY

## 2019-10-16 PROCEDURE — 87077 CULTURE AEROBIC IDENTIFY: CPT

## 2019-10-16 PROCEDURE — 86644 CMV ANTIBODY: CPT

## 2019-10-16 PROCEDURE — 85025 COMPLETE CBC W/AUTO DIFF WBC: CPT

## 2019-10-16 PROCEDURE — 87205 SMEAR GRAM STAIN: CPT

## 2019-10-16 PROCEDURE — 87186 SC STD MICRODIL/AGAR DIL: CPT

## 2019-10-16 PROCEDURE — 94760 N-INVAS EAR/PLS OXIMETRY 1: CPT

## 2019-10-16 PROCEDURE — 36415 COLL VENOUS BLD VENIPUNCTURE: CPT

## 2019-10-16 PROCEDURE — 0W9G30Z DRAINAGE OF PERITONEAL CAVITY WITH DRAINAGE DEVICE, PERCUTANEOUS APPROACH: ICD-10-PCS | Performed by: RADIOLOGY

## 2019-10-16 PROCEDURE — 74011250637 HC RX REV CODE- 250/637: Performed by: OBSTETRICS & GYNECOLOGY

## 2019-10-16 PROCEDURE — 80053 COMPREHEN METABOLIC PANEL: CPT

## 2019-10-16 PROCEDURE — 74011000258 HC RX REV CODE- 258: Performed by: OBSTETRICS & GYNECOLOGY

## 2019-10-16 PROCEDURE — BW201ZZ COMPUTERIZED TOMOGRAPHY (CT SCAN) OF ABDOMEN USING LOW OSMOLAR CONTRAST: ICD-10-PCS | Performed by: RADIOLOGY

## 2019-10-16 PROCEDURE — 36430 TRANSFUSION BLD/BLD COMPNT: CPT

## 2019-10-16 PROCEDURE — P9040 RBC LEUKOREDUCED IRRADIATED: HCPCS

## 2019-10-16 PROCEDURE — 87075 CULTR BACTERIA EXCEPT BLOOD: CPT

## 2019-10-16 PROCEDURE — 99152 MOD SED SAME PHYS/QHP 5/>YRS: CPT

## 2019-10-16 PROCEDURE — 65410000002 HC RM PRIVATE OB

## 2019-10-16 PROCEDURE — C1729 CATH, DRAINAGE: HCPCS

## 2019-10-16 PROCEDURE — 77030020268 HC MISC GENERAL SUPPLY

## 2019-10-16 PROCEDURE — 74011250636 HC RX REV CODE- 250/636: Performed by: RADIOLOGY

## 2019-10-16 PROCEDURE — 77030010546 HC BG URIN DRNG URES -B

## 2019-10-16 RX ORDER — DIPHENHYDRAMINE HYDROCHLORIDE 50 MG/ML
25 INJECTION, SOLUTION INTRAMUSCULAR; INTRAVENOUS ONCE
Status: COMPLETED | OUTPATIENT
Start: 2019-10-16 | End: 2019-10-16

## 2019-10-16 RX ORDER — FENTANYL CITRATE 50 UG/ML
25 INJECTION, SOLUTION INTRAMUSCULAR; INTRAVENOUS
Status: DISCONTINUED | OUTPATIENT
Start: 2019-10-16 | End: 2019-10-16

## 2019-10-16 RX ORDER — SODIUM CHLORIDE 9 MG/ML
25 INJECTION, SOLUTION INTRAVENOUS ONCE
Status: COMPLETED | OUTPATIENT
Start: 2019-10-16 | End: 2019-10-16

## 2019-10-16 RX ORDER — ACETAMINOPHEN 325 MG/1
650 TABLET ORAL
Status: DISCONTINUED | OUTPATIENT
Start: 2019-10-16 | End: 2019-10-20 | Stop reason: HOSPADM

## 2019-10-16 RX ORDER — SODIUM CHLORIDE 9 MG/ML
250 INJECTION, SOLUTION INTRAVENOUS AS NEEDED
Status: DISCONTINUED | OUTPATIENT
Start: 2019-10-16 | End: 2019-10-20 | Stop reason: HOSPADM

## 2019-10-16 RX ORDER — ENOXAPARIN SODIUM 100 MG/ML
40 INJECTION SUBCUTANEOUS EVERY 24 HOURS
Status: DISCONTINUED | OUTPATIENT
Start: 2019-10-16 | End: 2019-10-20 | Stop reason: HOSPADM

## 2019-10-16 RX ORDER — MIDAZOLAM HYDROCHLORIDE 1 MG/ML
1 INJECTION, SOLUTION INTRAMUSCULAR; INTRAVENOUS
Status: DISCONTINUED | OUTPATIENT
Start: 2019-10-16 | End: 2019-10-16

## 2019-10-16 RX ADMIN — MIDAZOLAM 1 MG: 1 INJECTION INTRAMUSCULAR; INTRAVENOUS at 15:35

## 2019-10-16 RX ADMIN — FENTANYL CITRATE 25 MCG: 50 INJECTION INTRAMUSCULAR; INTRAVENOUS at 15:00

## 2019-10-16 RX ADMIN — ACETAMINOPHEN 650 MG: 325 TABLET, FILM COATED ORAL at 14:00

## 2019-10-16 RX ADMIN — FENTANYL CITRATE 25 MCG: 50 INJECTION INTRAMUSCULAR; INTRAVENOUS at 16:20

## 2019-10-16 RX ADMIN — Medication 10 ML: at 06:12

## 2019-10-16 RX ADMIN — OXYCODONE HYDROCHLORIDE 5 MG: 5 TABLET ORAL at 23:50

## 2019-10-16 RX ADMIN — LORAZEPAM 1 MG: 2 INJECTION INTRAMUSCULAR; INTRAVENOUS at 06:11

## 2019-10-16 RX ADMIN — DIPHENHYDRAMINE HYDROCHLORIDE 25 MG: 50 INJECTION, SOLUTION INTRAMUSCULAR; INTRAVENOUS at 09:14

## 2019-10-16 RX ADMIN — HYDROMORPHONE HYDROCHLORIDE 1 MG: 1 INJECTION, SOLUTION INTRAMUSCULAR; INTRAVENOUS; SUBCUTANEOUS at 00:40

## 2019-10-16 RX ADMIN — FENTANYL CITRATE 25 MCG: 50 INJECTION INTRAMUSCULAR; INTRAVENOUS at 15:47

## 2019-10-16 RX ADMIN — ONDANSETRON 4 MG: 2 INJECTION INTRAMUSCULAR; INTRAVENOUS at 00:40

## 2019-10-16 RX ADMIN — SODIUM CHLORIDE 25 ML/HR: 900 INJECTION, SOLUTION INTRAVENOUS at 19:16

## 2019-10-16 RX ADMIN — PIPERACILLIN AND TAZOBACTAM 3.38 G: 3; .375 INJECTION, POWDER, FOR SOLUTION INTRAVENOUS at 22:03

## 2019-10-16 RX ADMIN — MIDAZOLAM 1 MG: 1 INJECTION INTRAMUSCULAR; INTRAVENOUS at 15:25

## 2019-10-16 RX ADMIN — SODIUM CHLORIDE 25 ML/HR: 900 INJECTION, SOLUTION INTRAVENOUS at 14:45

## 2019-10-16 RX ADMIN — FENTANYL CITRATE 25 MCG: 50 INJECTION INTRAMUSCULAR; INTRAVENOUS at 15:25

## 2019-10-16 RX ADMIN — PIPERACILLIN AND TAZOBACTAM 3.38 G: 3; .375 INJECTION, POWDER, FOR SOLUTION INTRAVENOUS at 05:41

## 2019-10-16 RX ADMIN — MIDAZOLAM 1 MG: 1 INJECTION INTRAMUSCULAR; INTRAVENOUS at 15:47

## 2019-10-16 RX ADMIN — ENOXAPARIN SODIUM 40 MG: 40 INJECTION SUBCUTANEOUS at 18:55

## 2019-10-16 NOTE — ROUTINE PROCESS
TRANSFER - OUT REPORT: 
 
Verbal report given to Darien Alvarez RN(name) on Blaine Henry  being transferred to [facility](unit) for routine progression of care Report consisted of patients Situation, Background, Assessment and  
Recommendations(SBAR). Information from the following report(s) SBAR, Kardex, Intake/Output, MAR and Recent Results was reviewed with the receiving nurse. Lines:  
Peripheral IV 10/15/19 Right Antecubital (Active) Site Assessment Clean, dry, & intact 10/15/2019  4:08 PM  
Phlebitis Assessment 0 10/15/2019  4:08 PM  
Infiltration Assessment 0 10/15/2019  4:08 PM  
Dressing Status Clean, dry, & intact 10/15/2019  4:08 PM  
Hub Color/Line Status Blue;Capped 10/15/2019  4:08 PM  
Action Taken Blood drawn 10/15/2019  4:08 PM  
  
 
Opportunity for questions and clarification was provided.

## 2019-10-16 NOTE — H&P
Interventional and Vascular Radiology History and Physical 
 
Patient: Sal Bailey 44 y.o. female Chief Complaint: Abdominal Pain History of Present Illness: left peritoneal fluid collection History: 
 
Past Medical History:  
Diagnosis Date  Nausea & vomiting  Symptomatic cholelithiasis 9/18/2019 Family History Problem Relation Age of Onset  Breast Cancer Paternal Grandmother 56  Crohn's Disease Mother  Heart Disease Mother  Cancer Father BLADDER  
 Diabetes Father  No Known Problems Son  No Known Problems Daughter  Anesth Problems Neg Hx Social History Socioeconomic History  Marital status:  Spouse name: Not on file  Number of children: Not on file  Years of education: Not on file  Highest education level: Not on file Occupational History  Not on file Social Needs  Financial resource strain: Not on file  Food insecurity:  
  Worry: Not on file Inability: Not on file  Transportation needs:  
  Medical: Not on file Non-medical: Not on file Tobacco Use  Smoking status: Never Smoker  Smokeless tobacco: Never Used Substance and Sexual Activity  Alcohol use: Yes Comment: once a month  Drug use: Not Currently  Sexual activity: Not on file Lifestyle  Physical activity:  
  Days per week: Not on file Minutes per session: Not on file  Stress: Not on file Relationships  Social connections:  
  Talks on phone: Not on file Gets together: Not on file Attends Latter day service: Not on file Active member of club or organization: Not on file Attends meetings of clubs or organizations: Not on file Relationship status: Not on file  Intimate partner violence:  
  Fear of current or ex partner: Not on file Emotionally abused: Not on file Physically abused: Not on file Forced sexual activity: Not on file Other Topics Concern  Not on file Social History Narrative  Not on file Allergies: No Known Allergies Current Medications: 
Current Facility-Administered Medications Medication Dose Route Frequency  0.9% sodium chloride infusion 250 mL  250 mL IntraVENous PRN  
 acetaminophen (TYLENOL) tablet 650 mg  650 mg Oral Q4H PRN  
 fentaNYL citrate (PF) injection 25 mcg  25 mcg IntraVENous RAD PRN  
 midazolam (VERSED) injection 1 mg  1 mg IntraVENous RAD PRN  
 sodium chloride (NS) flush 5-40 mL  5-40 mL IntraVENous Q8H  
 sodium chloride (NS) flush 5-40 mL  5-40 mL IntraVENous PRN  
 0.9% sodium chloride infusion  25 mL/hr IntraVENous CONTINUOUS  
 HYDROmorphone (PF) (DILAUDID) injection 1 mg  1 mg IntraVENous Q2H PRN  
 oxyCODONE IR (ROXICODONE) tablet 5 mg  5 mg Oral Q4H PRN  
 diphenhydrAMINE (BENADRYL) injection 12.5 mg  12.5 mg IntraVENous Q4H PRN  
 ondansetron (ZOFRAN) injection 4 mg  4 mg IntraVENous Q4H PRN  prochlorperazine (COMPAZINE) injection 10 mg  10 mg IntraVENous Q6H PRN  
 LORazepam (ATIVAN) injection 1 mg  1 mg IntraVENous Q6H PRN  
 ibuprofen (MOTRIN) tablet 400 mg  400 mg Oral Q4H PRN  piperacillin-tazobactam (ZOSYN) 3.375 g in 0.9% sodium chloride (MBP/ADV) 100 mL  3.375 g IntraVENous Q8H Physical Exam: 
Blood pressure 122/63, pulse 96, temperature 98.5 °F (36.9 °C), resp. rate 23, height 5' 3\" (1.6 m), weight 64.4 kg (142 lb), SpO2 99 %. LUNG: clear to auscultation bilaterally, HEART: regular rate and rhythm, S1, S2 normal, no murmur, click, rub or gallop Alerts:   
Hospital Problems  Date Reviewed: 10/9/2019 Codes Class Noted POA Splenic infarction ICD-10-CM: D73.5 ICD-9-CM: 289.59  10/15/2019 Unknown Carcinoma of appendix ([facility] [address] ICD-10-CM: C18.1 ICD-9-CM: 153.5  10/15/2019 Unknown Laboratory:     
Recent Labs 10/16/19 
4070 HGB 6.5* HCT 20.9* WBC 6.2 PLT 1,041* BUN 4*  
CREA 0.27* K 3.3*  
 
 
 
 Plan of Care/Planned Procedure: 
Risks, benefits, and alternatives reviewed with patient and she agrees to proceed with the procedure. Conscious sedation will be performed with IV fentanyl and versed. Plan is for CT guided abdominal drainage Karis Valencia MD

## 2019-10-16 NOTE — PROGRESS NOTES
Bedside and Verbal shift change report given to [address]  (oncoming nurse) by Eloisa Urena (offgoing nurse). Report included the following information SBAR and Kardex.

## 2019-10-16 NOTE — PROGRESS NOTES
Report called to [facility] regarding drain placement, medications given, VS, pain level, and post orders. Blood transfusion completed. States pain is better, \"I can just feel like something is here\" pointing to the drain site. To 319 via stretcher.

## 2019-10-16 NOTE — PROGRESS NOTES
[address], [address] [place], [address] 
P [phone]  F [phone] Patient Name: Stan Colbert Admit Date: 10/15/2019 OR Date: * No surgery found * Visit Date: 10/16/2019 SUBJECTIVE: 
 
Feels OK this morning. Pain minimal.  No nausea/vomiting. OBJECTIVE: 
 
Patient Vitals for the past 24 hrs: 
 Temp Pulse Resp BP SpO2  
10/16/19 0543 98.8 °F (37.1 °C) 91 16 113/71 94 % 10/15/19 2102 98.6 °F (37 °C) (!) 104 16 128/80 96 % 10/15/19 2000 99.7 °F (37.6 °C) (!) 106 18 138/78 96 % 10/15/19 1945    127/77 96 % 10/15/19 1924 99.5 °F (37.5 °C) (!) 109 16 132/70 95 % 10/15/19 1815    116/65 95 % 10/15/19 1800    121/53 95 % 10/15/19 1730    129/70 98 % 10/15/19 1715    129/68 97 % 10/15/19 1700    128/69 95 % 10/15/19 1645    128/72 95 % 10/15/19 1605 98.2 °F (36.8 °C) 96 18 124/72 96 % Date 10/15/19 0700 - 10/16/19 8165 10/16/19 0700 - 10/17/19 4741 Shift 3016-1203 3597-6079 24 Hour Total 4079-6384 4368-3007 24 Hour Total  
INTAKE  
I.V.(mL/kg/hr)  0(0) 0(0) Volume (0.9% sodium chloride infusion)  0 0 Shift Total(mL/kg)  0(0) 0(0) OUTPUT Shift Total(mL/kg) NET  0 0 Weight (kg) 64.4 64.4 64.4 64.4 64.4 64.4 Physical Exam 
   General:  alert, cooperative, no distress Cardiac:  Regular rate and rhythm Lungs:  Decreased BS on left. Abdomen:  Soft, mildly tender in LUQ; fullness in LUQ Wound:  clean, dry, no drainage Extremity: extremities normal, atraumatic, no cyanosis or edema Data Review Lab Results Component Value Date/Time WBC 6.2 10/16/2019 05:53 AM  
 ABS. NEUTROPHILS PENDING 10/16/2019 05:53 AM  
 HGB 6.5 (L) 10/16/2019 05:53 AM  
 HCT 20.9 (L) 10/16/2019 05:53 AM  
 MCV 90.9 10/16/2019 05:53 AM  
 MCH 28.3 10/16/2019 05:53 AM  
 PLATELET 7,145 (Ashok Mendez) 26/95/6742 05:53 AM  
 
Lab Results Component Value Date/Time Sodium 138 10/16/2019 05:53 AM  
 Potassium 3.3 (L) 10/16/2019 05:53 AM  
 Chloride 103 10/16/2019 05:53 AM  
 CO2 27 10/16/2019 05:53 AM  
 Glucose 83 10/16/2019 05:53 AM  
 BUN 4 (L) 10/16/2019 05:53 AM  
 Creatinine 0.27 (L) 10/16/2019 05:53 AM  
 Calcium 8.2 (L) 10/16/2019 05:53 AM  
 Albumin 1.7 (L) 10/16/2019 05:53 AM  
 Bilirubin, total 0.2 10/16/2019 05:53 AM  
 AST (SGOT) 10 (L) 10/16/2019 05:53 AM  
 ALT (SGPT) 14 10/16/2019 05:53 AM  
 Alk. phosphatase 118 (H) 10/16/2019 05:53 AM  
 
IMPRESSION/PLAN: 
 
Oncologic:  Stage IV appendiceal cancer. 3 weeks s/p tumor debulking. Extensive disease. Has seen Dr. Lucy Valdez with medical oncology. Recommendations would be FOLFOX/Avastin. Seeking a second opinion. 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 Hgb down to 6.5. Will transfuse 2 units. 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 Etiology unclear, but likely due to disruption of blood supply during omentectomy. IR to place a drain today. May need to consider splenectomy, although I would like to avoid surgery at this time. Will start on anticoagulation after the procedure. Will likely need to be on long-term aspirin after we have ruled out taking her back to the OR. We may also need to vaccinate for encapsulated bacteria. Renal:  Normal creatinine. FEN/GI:  NPO for procedure. Will allow diet after that. ID/Wound:  Afebrile. Normal WBC. Started on Zosyn yesterday out of concern for abscess. We will see what drains from the collection today. If sterile, or shows no signs of abscess, we will stop the antibiotic. PPX:  Ambulation. Holding Lovenox for procedure. Will resume after that is complete. Dispostion:  Clinically stable. Will speak with surgical oncology today to see what their thoughts are.   If drain is placed today, will plan on repeat CT scan in a few days to follow resolution and determine the need for additional procedures.    
 
  
Elzbieta Madison MD

## 2019-10-16 NOTE — PROGRESS NOTES
Problem: Falls - Risk of 
Goal: *Absence of Falls Description Document Ottoniel Horn Fall Risk and appropriate interventions in the flowsheet. Outcome: Progressing Towards Goal 
Note:  
Fall Risk Interventions: 
  
 
  
 
Medication Interventions: Patient to call before getting OOB

## 2019-10-16 NOTE — PROGRESS NOTES
Bedside and Verbal shift change report given to Parth LAI (oncoming nurse) by Urbano Mauricio RN  (offgoing nurse). Report included the following information SBAR, Kardex, ED Summary, Intake/Output, MAR and Recent Results. 5463- blood bank called in regards to status of blood processing, will call when blood is ready for  1030- Angio called in regards to procedure ordered, was made aware order placed wrong and needs to be updated. 1038- Orders for drain placement updated. 1040- Reach out to CT, made aware of STAT procedure ordered. Informed pt. Will be fit in.  
 
1044- MD Melgoza updated with status of CT procedure. 0- MD Melgoza update in regards to pt. Temp. Slowly rising, orders to give PO Tylenol. . Tylenol given. 1415- pt off floor for procedure. Receiving RN transporting and updated with pt status.

## 2019-10-16 NOTE — PROGRESS NOTES
[facility] [place] [address] [address], [address] W: [phone]  F: [phone] Reason for Visit:  
Lang Suarez is a 44 y.o. female who is seen in consultation at the request of Dr. Bebeto Flores for evaluation of appendix cancer. Treatment History:  
EXPLORATORY LAPAROTOMY SIERRA BSO, TUMOR DEBULKING: ILEOCECAL RESECTION; OMENTECTOMY STAGE: 4 with carcinomatosis History of Present Illness:  
 
Pt seen today for hospital consult for metastatic appendiceal cancer admitted with peritoneal fluid collection/ left side pain and thrombocytosis and anemia. Pt is on GYN/ONC service. Pt had ultrasound then CT done due to pain and this revealed fluid collection. Likely for IR drain. Pt is in bed. Quiet. More comfortable. Visit in office for record from 10/14/19:  
Pt seen today for office consult for metastatic appendiceal cancer post EXPLORATORY LAPAROTOMY SIERRA BSO, TUMOR DEBULKING: ILEOCECAL RESECTION; OMENTECTOMY. Pt was initially thought to have ovarian cancer. She had been having nausea/vomiting and abdominal pain for months. A HIDA scan suggested that her gallbladder was likely the source of her pain and Dr. Poornima Merrill took her to the OR for a laparoscopic cholecystectomy. At the time of surgery she was noted to have peritoneal carcinomatosis with bilaterally enlarged ovaries. Pt was seen by GYN/ONC intraoperatively. Biopsy of ovary 9/20/19: 
 
 Right ovary, biopsy:  
Positive for malignancy, poorly differentiated adenocarcinoma with scattered signet ring cells. Comment The histologic section has fragments of fibrous tissue infiltrated by poorly differentiated malignant cells and rare signet ring cells. The malignant cells are positive for cytokeratin 20 and CDX2 and negative for cytokeratin 7, PAX8, GATA3, p63, ER, FL, LCA, CD 56, and . The overall findings favor metastasis from a lower gastrointestinal tract primary neoplasm. CYTOLOGIC INTERPRETATION:  
Peritoneal fluid, ThinPrep:  
Benign mesothelial cells. CTs done 9/23/19. Pt has obstruction and could not eat. IMPRESSION:  
1. Ovarian carcinoma with peritoneal metastasis. Right ovarian mass measures 5.7 
x 4.8 x 4.5 cm. Left ovarian mass measures 4.2 x 3.8 x 4.0 cm. 
2. Small bowel obstruction due to terminal ileum mucosal thickening, likely 
involved by ovarian carcinoma. 3. Omental carcinomatosis. Trace ascites. 4. Right oblique soft tissue edema due to recent surgery. No drainable abscess. However, this could be a source of right abdominal pain. 5. Hepatic cysts. No evidence of hepatic metastatic disease. 6. No evidence of osseous, pulmonary, or thoracic metastatic disease. EPIC email sent to Dr. Jeremiah Graf at the time of the dictation. Due to obstruction/ symptoms, pt went to OR> Pt then had EXPLORATORY LAPAROTOMY SIERRA BSO, TUMOR DEBULKING: ILEOCECAL RESECTION; OMENTECTOMY on 9/25/19. FINAL PATHOLOGIC DIAGNOSIS 1. Terminal ileum, cecum, and appendix, ileocecectomy:  
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 SPECIMEN Procedure: Ileocecectomy TUMOR Tumor Site: Diffusely involving appendix Histologic Type: Mucinous adenocarcinoma Histologic Type Comments: Extensive signet ring cell component Histologic Grade: G3: Poorly differentiated Tumor Size: 4.5 cm Tumor Deposits: Present Number of Deposits: 1 Tumor Extension: Tumor directly invades adjacent organs or structures: Uterus, cervix,  
bilateral ovaries and fallopian tubes, and omentum Lymphovascular Invasion: Not identified Perineural Invasion: Present, extensive MARGINS Proximal, distal and radial margins uninvolved by invasive carcinoma. LYMPH NODES Number of Lymph Nodes Involved: 11 Number of Lymph Nodes Examined: 11 PATHOLOGIC STAGE CLASSIFICATION (pTNM, AJCC 8th Edition) Primary Tumor (pT): pT4b Regional Lymph Nodes (pN): pN2 Distant Metastasis (pM): pM1c Site(s): Uterus, bilateral fallopian tubes, and ovaries 2. Uterus, bilateral fallopian tubes and ovaries, supracervical hysterectomy bilateral salpingo-oophorectomy:  
Serosa: Positive for metastatic mucinous adenocarcinoma involving fibrous adhesions. Myometrium: Extensive transmural involvement by metastatic mucinous adenocarcinoma. Adenomyosis. Endometrium: Inactive endometrium with metastatic mucinous adenocarcinoma. [address] [facility] Patient Name: Madison Gonzalez Specimen #: [id number] Patient Name: Madison Gonzalez Page 3 of 5 Printed: 09/30/19 12:51 PM SURGICAL PATHOLOGY REPORT Bilateral fallopian tubes: Metastatic mucinous adenocarcinoma. Bilateral fallopian tubes: Metastatic mucinous adenocarcinoma. 3. Omentum, omentectomy:  
Positive for metastatic mucinous adenocarcinoma. Splenule. 4. Cervix, completion total hysterectomy:  
Positive for metastatic mucinous adenocarcinoma. Path report c/w metastatic appendix cancer with omental involvement. Pt is still recovering from surgery. Feels weak and tried overall. Vomiting since Friday once a day. Able to eat some. C/o left rib pain so bad cannot sleep. Unsure about rib pain. C/o SOB also with moving. Able to walk slowly but gets SOB. Here with  who is supportive. Pt is going to [facility] for another opinion. [facility] going to [facility]. Pt/  are open to all treatment options. No port yet. Asking about PET. Past Medical History:  
Diagnosis Date  Nausea & vomiting  Symptomatic cholelithiasis 9/18/2019 Past Surgical History:  
Procedure Laterality Date  HX GYN  2003 CYST ON OVARY Social History Tobacco Use  Smoking status: Never Smoker  Smokeless tobacco: Never Used Substance Use Topics  Alcohol use: Yes Comment: once a month Family History Problem Relation Age of Onset  Breast Cancer Paternal Grandmother 56  Crohn's Disease Mother  Heart Disease Mother  Cancer Father BLADDER  
 Diabetes Father  No Known Problems Son  No Known Problems Daughter  Anesth Problems Neg Hx Current Facility-Administered Medications Medication Dose Route Frequency  0.9% sodium chloride infusion 250 mL  250 mL IntraVENous PRN  
 acetaminophen (TYLENOL) tablet 650 mg  650 mg Oral Q4H PRN  
 enoxaparin (LOVENOX) injection 40 mg  40 mg SubCUTAneous Q24H  
 sodium chloride (NS) flush 5-40 mL  5-40 mL IntraVENous Q8H  
 sodium chloride (NS) flush 5-40 mL  5-40 mL IntraVENous PRN  
 0.9% sodium chloride infusion  25 mL/hr IntraVENous CONTINUOUS  
 HYDROmorphone (PF) (DILAUDID) injection 1 mg  1 mg IntraVENous Q2H PRN  
 oxyCODONE IR (ROXICODONE) tablet 5 mg  5 mg Oral Q4H PRN  
 diphenhydrAMINE (BENADRYL) injection 12.5 mg  12.5 mg IntraVENous Q4H PRN  
 ondansetron (ZOFRAN) injection 4 mg  4 mg IntraVENous Q4H PRN  prochlorperazine (COMPAZINE) injection 10 mg  10 mg IntraVENous Q6H PRN  
 LORazepam (ATIVAN) injection 1 mg  1 mg IntraVENous Q6H PRN  
 ibuprofen (MOTRIN) tablet 400 mg  400 mg Oral Q4H PRN  piperacillin-tazobactam (ZOSYN) 3.375 g in 0.9% sodium chloride (MBP/ADV) 100 mL  3.375 g IntraVENous Q8H No Known Allergies Review of Systems: A complete review of systems was obtained, negative except as described above. Physical Exam:  
 
Visit Vitals /80 (BP 1 Location: Right arm, BP Patient Position: At rest) Pulse 100 Temp 98.3 °F (36.8 °C) Resp 20 Ht 5' 3\" (1.6 m) Wt 142 lb (64.4 kg) SpO2 94% BMI 25.15 kg/m² ECOG PS: 1-2 General: No distress Eyes:  anicteric sclerae HENT: Atraumatic Neck: Supple Respiratory: decreased bs on LEFT, clearer on RIGHT 
CV: Normal rate, regular rhythm GI: vertical midline scar healing well, not distended MS: in bed moving Skin: warm dry Psych: Alert, oriented, appropriate affect, normal judgment/insight Results:  
 
Lab Results Component Value Date/Time WBC 6.2 10/16/2019 05:53 AM  
 HGB 6.5 (L) 10/16/2019 05:53 AM  
 HCT 20.9 (L) 10/16/2019 05:53 AM  
 PLATELET 0,683 (HH) 43/62/7804 05:53 AM  
 MCV 90.9 10/16/2019 05:53 AM  
 ABS. NEUTROPHILS 2.7 10/16/2019 05:53 AM  
 Hemoglobin (POC) 10.8 (L) 09/25/2019 04:11 PM  
 Hematocrit (POC) 32 (L) 09/25/2019 12:10 PM  
 
Lab Results Component Value Date/Time Sodium 138 10/16/2019 05:53 AM  
 Potassium 3.3 (L) 10/16/2019 05:53 AM  
 Chloride 103 10/16/2019 05:53 AM  
 CO2 27 10/16/2019 05:53 AM  
 Glucose 83 10/16/2019 05:53 AM  
 BUN 4 (L) 10/16/2019 05:53 AM  
 Creatinine 0.27 (L) 10/16/2019 05:53 AM  
 GFR est AA >60 10/16/2019 05:53 AM  
 GFR est non-AA >60 10/16/2019 05:53 AM  
 Calcium 8.2 (L) 10/16/2019 05:53 AM  
 Sodium (POC) 137 09/25/2019 12:10 PM  
 Potassium (POC) 3.7 09/25/2019 12:10 PM  
 Chloride (POC) 105 09/25/2019 12:10 PM  
 Glucose (POC) 85 09/25/2019 12:10 PM  
 BUN (POC) 4 (L) 09/25/2019 12:10 PM  
 Creatinine (POC) 0.5 (L) 09/25/2019 12:10 PM  
 Calcium, ionized (POC) 1.14 09/25/2019 12:10 PM  
 
Lab Results Component Value Date/Time Bilirubin, total 0.2 10/16/2019 05:53 AM  
 ALT (SGPT) 14 10/16/2019 05:53 AM  
 AST (SGOT) 10 (L) 10/16/2019 05:53 AM  
 Alk. phosphatase 118 (H) 10/16/2019 05:53 AM  
 Protein, total 5.8 (L) 10/16/2019 05:53 AM  
 Albumin 1.7 (L) 10/16/2019 05:53 AM  
 Globulin 4.1 (H) 10/16/2019 05:53 AM  
 
 
CT Results (most recent): 
Results from Hospital Encounter encounter on 10/15/19 CT GUIDE CATH FLUID DRAIN SOFT TISSUE Narrative PROCEDURE: CT-guided abdominal drainage ESTIMATED BLOOD LOSS: Less than 5mL OPERATING PHYSICIAN: JESSA Johnson Riis: 900 cc of bloody fluid PRE PROCEDURE DIAGNOSIS:  Large left subdiaphragmatic peritoneal fluid 
collection POST PROCEDURE DIAGNOSIS: SAME Procedure and findings: CT dose reduction was achieved through use of a 
standardized protocol tailored for this examination and automatic exposure 
control for dose modulation. The risks and benefits of the procedure were 
discussed with the patient. Written consent was obtained. Preliminary CT imaging 
of the abdomen again demonstrated large left subdiaphragmatic peritoneal fluid 
collection . An appropriate site for drainage was marked on the skin. The 
patient was prepped and draped in sterile fashion. 1% lidocaine was injected 
locally. A small dermatotomy was made. An 18 gauge coaxial introducer needle was 
then advanced into the fluid collection under CT guidance. A 0.035 inch 
guidewire was then inserted through the needle. The tract was dilated, and a 10 Spanish locking pigtail catheter was placed over the guidewire. The catheter was 
draining bloody fluid. Approximately 900 cc of fluid was obtained. Fluid 
specimens were transmitted to pathology for analysis. A low suction drainage bag 
was connected to the catheter. The catheter was sutured to the skin and dressed 
appropriately. The patient tolerated the procedure well. There were no immediate 
complications. Postprocedure CT imaging of abdomen demonstrated appropriate position of the 
pigtail drainage catheter within the targeted fluid collection which is now near 
completely resolved. Conscious sedation was achieved using intravenous Versed and fentanyl. The 
patient was monitored during the procedure. There were no sedation related 
complications. Impression IMPRESSION:  
 
Successful CT-guided drainage of large left subdiaphragmatic peritoneal fluid 
collection with placement of a 10 Spanish pigtail drainage catheter. Approximately 900 cc of bloody fluid was removed. A sample was sent for culture. There were no immediate complications. Records reviewed and summarized above. Pathology report(s) reviewed above. Radiology report(s) reviewed above. Assessment/PLAN:  
 
1) metastatic appendiceal cancer post EXPLORATORY LAPAROTOMY SIERRA BSO, TUMOR DEBULKING: ILEOCECAL RESECTION; OMENTECTOMY 9/25/19 Surgery done for obstruction. Pt is recovering from surgery still. Had left rib pain and SOB and on exam decreased bs on LEFT. Got CXR which showed pleural effusion then ultrasound which showed elevated diaphragm and ? Free air. Then got CT which shows fluid collection and pt admitted for this. IR seeing pt for possible drain Pt is in bed and comfortable at my visit this am.  
 
Pt is seeing [facility] and possibly [address] [place], [place] [address] for second opinions. Discussed systemic therapy with FOLFOX/ +/- avastin chemo or some variation of this regimen.  asking about [facility] and they can discuss this option at [facility]. 2) left rib pain controlled at my visit. 3)  Thrombocytosis. likely reactive due to infection/ abscess. Monitor and should go down with treatment. 4) N/V still. Antiemetics prn.  
 
5) anemia. Has been low but lower now. Would transfuse per surgery or hgb less than 7 or symptoms. Can do anemia w/u also to see if iron low. We will follow as needed. D/w GYN/ONC. Pt will most likely f/u with [facility] Med/ONC at d/c Call if questions I appreciate the opportunity to participate in Ms. Red sorto.  
 
Signed By: Sherren Mire, DO

## 2019-10-17 LAB
ABO + RH BLD: NORMAL
ALBUMIN SERPL-MCNC: 1.7 G/DL (ref 3.5–5)
ALBUMIN/GLOB SERPL: 0.4 {RATIO} (ref 1.1–2.2)
ALP SERPL-CCNC: 124 U/L (ref 45–117)
ALT SERPL-CCNC: 14 U/L (ref 12–78)
ANION GAP SERPL CALC-SCNC: 6 MMOL/L (ref 5–15)
APPEARANCE FLD: ABNORMAL
AST SERPL-CCNC: 13 U/L (ref 15–37)
BASOPHILS # BLD: 0.1 K/UL (ref 0–0.1)
BASOPHILS NFR BLD: 1 % (ref 0–1)
BILIRUB SERPL-MCNC: 0.4 MG/DL (ref 0.2–1)
BLD PROD TYP BPU: NORMAL
BLD PROD TYP BPU: NORMAL
BLOOD GROUP ANTIBODIES SERPL: NORMAL
BPU ID: NORMAL
BPU ID: NORMAL
BUN SERPL-MCNC: 4 MG/DL (ref 6–20)
BUN/CREAT SERPL: 11 (ref 12–20)
CALCIUM SERPL-MCNC: 8.1 MG/DL (ref 8.5–10.1)
CHLORIDE SERPL-SCNC: 102 MMOL/L (ref 97–108)
CO2 SERPL-SCNC: 27 MMOL/L (ref 21–32)
COLOR FLD: ABNORMAL
CREAT SERPL-MCNC: 0.35 MG/DL (ref 0.55–1.02)
CROSSMATCH RESULT,%XM: NORMAL
CROSSMATCH RESULT,%XM: NORMAL
DIFFERENTIAL METHOD BLD: ABNORMAL
EOSINOPHIL # BLD: 0.5 K/UL (ref 0–0.4)
EOSINOPHIL NFR BLD: 6 % (ref 0–7)
ERYTHROCYTE [DISTWIDTH] IN BLOOD BY AUTOMATED COUNT: 16.1 % (ref 11.5–14.5)
GLOBULIN SER CALC-MCNC: 4.1 G/DL (ref 2–4)
GLUCOSE SERPL-MCNC: 94 MG/DL (ref 65–100)
HCT VFR BLD AUTO: 29.6 % (ref 35–47)
HGB BLD-MCNC: 9.8 G/DL (ref 11.5–16)
IMM GRANULOCYTES # BLD AUTO: 0.2 K/UL (ref 0–0.04)
IMM GRANULOCYTES NFR BLD AUTO: 2 % (ref 0–0.5)
LYMPHOCYTES # BLD: 1.3 K/UL (ref 0.8–3.5)
LYMPHOCYTES NFR BLD: 17 % (ref 12–49)
MCH RBC QN AUTO: 28.5 PG (ref 26–34)
MCHC RBC AUTO-ENTMCNC: 33.1 G/DL (ref 30–36.5)
MCV RBC AUTO: 86 FL (ref 80–99)
MONOCYTES # BLD: 1.3 K/UL (ref 0–1)
MONOCYTES NFR BLD: 17 % (ref 5–13)
NEUTROPHILS NFR FLD: 0 %
NEUTS SEG # BLD: 4.3 K/UL (ref 1.8–8)
NEUTS SEG NFR BLD: 57 % (ref 32–75)
NRBC # BLD: 0.03 K/UL (ref 0–0.01)
NRBC BLD-RTO: 0.4 PER 100 WBC
NUC CELL # FLD: ABNORMAL /CU MM
PHYSICIAN INSTRUCTIO,%PI: NORMAL
PLATELET # BLD AUTO: 696 K/UL (ref 150–400)
PMV BLD AUTO: 8.2 FL (ref 8.9–12.9)
POTASSIUM SERPL-SCNC: 3.1 MMOL/L (ref 3.5–5.1)
PROT SERPL-MCNC: 5.8 G/DL (ref 6.4–8.2)
RBC # BLD AUTO: 3.44 M/UL (ref 3.8–5.2)
RBC # FLD: >100 /CU MM
RBC MORPH BLD: ABNORMAL
SODIUM SERPL-SCNC: 135 MMOL/L (ref 136–145)
SPECIMEN EXP DATE BLD: NORMAL
SPECIMEN SOURCE FLD: ABNORMAL
STATUS OF UNIT,%ST: NORMAL
STATUS OF UNIT,%ST: NORMAL
TOTAL CELLS COUNTED SPEC: 0
UNIT DIVISION, %UDIV: 0
UNIT DIVISION, %UDIV: 0
WBC # BLD AUTO: 7.7 K/UL (ref 3.6–11)

## 2019-10-17 PROCEDURE — 74011250636 HC RX REV CODE- 250/636: Performed by: OBSTETRICS & GYNECOLOGY

## 2019-10-17 PROCEDURE — 36415 COLL VENOUS BLD VENIPUNCTURE: CPT

## 2019-10-17 PROCEDURE — 65410000002 HC RM PRIVATE OB

## 2019-10-17 PROCEDURE — 85025 COMPLETE CBC W/AUTO DIFF WBC: CPT

## 2019-10-17 PROCEDURE — 94760 N-INVAS EAR/PLS OXIMETRY 1: CPT

## 2019-10-17 PROCEDURE — 74011000258 HC RX REV CODE- 258: Performed by: OBSTETRICS & GYNECOLOGY

## 2019-10-17 PROCEDURE — 80053 COMPREHEN METABOLIC PANEL: CPT

## 2019-10-17 PROCEDURE — 74011250637 HC RX REV CODE- 250/637: Performed by: OBSTETRICS & GYNECOLOGY

## 2019-10-17 RX ORDER — POTASSIUM CHLORIDE 750 MG/1
20 TABLET, FILM COATED, EXTENDED RELEASE ORAL 2 TIMES DAILY
Status: DISCONTINUED | OUTPATIENT
Start: 2019-10-17 | End: 2019-10-20 | Stop reason: HOSPADM

## 2019-10-17 RX ADMIN — PIPERACILLIN AND TAZOBACTAM 3.38 G: 3; .375 INJECTION, POWDER, FOR SOLUTION INTRAVENOUS at 14:10

## 2019-10-17 RX ADMIN — ENOXAPARIN SODIUM 40 MG: 40 INJECTION SUBCUTANEOUS at 18:20

## 2019-10-17 RX ADMIN — POTASSIUM CHLORIDE 20 MEQ: 750 TABLET, FILM COATED, EXTENDED RELEASE ORAL at 18:20

## 2019-10-17 RX ADMIN — PIPERACILLIN AND TAZOBACTAM 3.38 G: 3; .375 INJECTION, POWDER, FOR SOLUTION INTRAVENOUS at 06:02

## 2019-10-17 RX ADMIN — HYDROMORPHONE HYDROCHLORIDE 1 MG: 1 INJECTION, SOLUTION INTRAMUSCULAR; INTRAVENOUS; SUBCUTANEOUS at 01:59

## 2019-10-17 RX ADMIN — PIPERACILLIN AND TAZOBACTAM 3.38 G: 3; .375 INJECTION, POWDER, FOR SOLUTION INTRAVENOUS at 21:39

## 2019-10-17 RX ADMIN — POTASSIUM CHLORIDE 20 MEQ: 750 TABLET, FILM COATED, EXTENDED RELEASE ORAL at 10:38

## 2019-10-17 RX ADMIN — Medication 10 ML: at 14:10

## 2019-10-17 RX ADMIN — Medication 10 ML: at 21:39

## 2019-10-17 NOTE — PROGRESS NOTES
Bedside shift change report given to SARA Champion RN (oncoming nurse) by Charlee Murritea. Isiah Leon RN (offgoing nurse). Report included the following information SBAR, Kardex, OR Summary, Procedure Summary, Intake/Output and MAR.  
 
 
1400: RN at bedside to flush and drain pts drain, drainage was dark and thick like sludge. Had to flush the line in both directions, as well as tried to clean the valve in order to allow drainage to flow out. Had to milk pouch to get 75 mL drainage out. Lines flushed easily. RN will continue to monitor.

## 2019-10-17 NOTE — PROGRESS NOTES
[address], [address] [place], [address] 
P [phone]  F [phone] Patient Name: Salud Coello Admit Date: 10/15/2019 OR Date: 9/25/19 (Prior admission) Visit Date: 10/17/2019 SUBJECTIVE: 
 
Feels much better this morning. Pain better. No nausea/vomiting. Was able to eat last night. OBJECTIVE: 
 
Patient Vitals for the past 24 hrs: 
 Temp Pulse Resp BP SpO2  
10/17/19 0540 98.4 °F (36.9 °C) 84 16 129/79 92 % 10/17/19 0007 100.1 °F (37.8 °C) (!) 102 16 143/81 91 % 10/16/19 2030 98.5 °F (36.9 °C) 95 16 138/87 93 % 10/16/19 1950     93 % 10/16/19 1930 98 °F (36.7 °C) 90 16 117/71 95 % 10/16/19 1827 98.1 °F (36.7 °C) 82 18 133/81 94 % 10/16/19 1728 98.3 °F (36.8 °C) 100 20 130/80 94 % 10/16/19 1700  98 20 146/81 97 % 10/16/19 1645  96 20 143/75 97 % 10/16/19 1630  94 20 134/82 96 % 10/16/19 1615  94 22 133/83 97 % 10/16/19 1608  96 23 128/67 100 % 10/16/19 1600 98.1 °F (36.7 °C) 96 23 122/63 99 % 10/16/19 1555  95 25 128/64 99 % 10/16/19 1550  93 24 129/68 98 % 10/16/19 1545  92 24 127/63 98 % 10/16/19 1540  98 26 130/65 99 % 10/16/19 1535  96 24 128/63 100 % 10/16/19 1530  99 24 137/86 100 % 10/16/19 1525  100 28 128/70 99 % 10/16/19 1512  100  130/75 93 % 10/16/19 1500 98.5 °F (36.9 °C) (!) 104  140/82 92 % 10/16/19 1425 98.9 °F (37.2 °C) 100 25 148/80   
10/16/19 1410 99.1 °F (37.3 °C) 100 16 141/82   
10/16/19 1354 100 °F (37.8 °C) 98 16 130/78   
10/16/19 1340 99 °F (37.2 °C) 100 16 134/81   
10/16/19 1305 98.9 °F (37.2 °C) 95 16 138/77   
10/16/19 1229 97.5 °F (36.4 °C) 99 16 133/78 92 % 10/16/19 1145 98.6 °F (37 °C) 100 16 128/77 92 % 10/16/19 1047 97.6 °F (36.4 °C) 95 16 133/76 93 % 10/16/19 1019 97.3 °F (36.3 °C) 98 16 129/72   
10/16/19 1001 98.5 °F (36.9 °C) 97 16 118/73   
10/16/19 0925 98.4 °F (36.9 °C) 95 16 124/74  10/16/19 0859 98.3 °F (36.8 °C) 91 16 121/75 91 % Date 10/16/19 0700 - 10/17/19 0298 10/17/19 0700 - 10/18/19 1479 Shift 2244-0927 4398-3470 24 Hour Total 9802-5804 1898-9259 24 Hour Total  
INTAKE  
I.V.(mL/kg/hr)  897.5(1.2) 897.5(0.6) Volume (0.9% sodium chloride infusion)  597.5 597.5 Volume (piperacillin-tazobactam (ZOSYN) 3.375 g in 0.9% sodium chloride (MBP/ADV) 100 mL)  300 300 Blood 705.8  705.8 Volume (TRANSFUSE PACKED RBC'S) 395.8  395.8 Volume (TRANSFUSE PACKED RBC'S) 310  310 Shift Total(mL/kg) 705.8(11) 897. 5(13.9) 1603. 3(24.9) OUTPUT Urine(mL/kg/hr)  600(0.8) 600(0.4) Urine Voided  600 600 Drains  50 50 Output (ml) (Drain 10 fr Resolve Locking drainage catheter Left Abdomen)  50 50 Other 900  900 Other Output 900  900 Shift Total(mL/kg) 900(14) 650(10.1) 1550(24.1) NET -194.2 247.5 53.3 Weight (kg) 64.4 64.4 64.4 64.4 64.4 64.4 Physical Exam 
   General:  alert, cooperative, no distress Cardiac:  Regular rate and rhythm Lungs:  Decreased BS on left. Abdomen:  Soft, mildly tender in LUQ; fullness in LUQ Wound:  clean, dry, no drainage Extremity: extremities normal, atraumatic, no cyanosis or edema Data Review Recent Results (from the past 24 hour(s)) CULTURE, BODY FLUID W GRAM STAIN Collection Time: 10/16/19  3:45 PM  
Result Value Ref Range Special Requests: NO SPECIAL REQUESTS    
 GRAM STAIN RARE WBCS SEEN    
 GRAM STAIN NO ORGANISMS SEEN Culture result: PENDING   
CELL COUNT, BODY FLUID Collection Time: 10/16/19  3:45 PM  
Result Value Ref Range BODY FLUID TYPE ABDOMINAL FLUID FLUID COLOR RED    
 FLUID APPEARANCE OPAQUE   
 FLUID RBC CT. >100 (H) 0 /cu mm FLUID NUCLEATED CELLS 102,400 /cu mm  
 FLD NEUTROPHILS 0 (A) NRRE % TOTAL CELLS COUNTED 0    
CBC WITH AUTOMATED DIFF Collection Time: 10/17/19  6:10 AM  
Result Value Ref Range WBC 7.7 3.6 - 11.0 K/uL  
 RBC 3.44 (L) 3.80 - 5.20 M/uL HGB 9.8 (L) 11.5 - 16.0 g/dL HCT 29.6 (L) 35.0 - 47.0 % MCV 86.0 80.0 - 99.0 FL  
 MCH 28.5 26.0 - 34.0 PG  
 MCHC 33.1 30.0 - 36.5 g/dL  
 RDW 16.1 (H) 11.5 - 14.5 % PLATELET 074 (H) 059 - 400 K/uL MPV 8.2 (L) 8.9 - 12.9 FL  
 NRBC 0.4 (H) 0  WBC ABSOLUTE NRBC 0.03 (H) 0.00 - 0.01 K/uL NEUTROPHILS 57 32 - 75 % LYMPHOCYTES 17 12 - 49 % MONOCYTES 17 (H) 5 - 13 % EOSINOPHILS 6 0 - 7 % BASOPHILS 1 0 - 1 % IMMATURE GRANULOCYTES 2 (H) 0.0 - 0.5 % ABS. NEUTROPHILS 4.3 1.8 - 8.0 K/UL  
 ABS. LYMPHOCYTES 1.3 0.8 - 3.5 K/UL  
 ABS. MONOCYTES 1.3 (H) 0.0 - 1.0 K/UL  
 ABS. EOSINOPHILS 0.5 (H) 0.0 - 0.4 K/UL  
 ABS. BASOPHILS 0.1 0.0 - 0.1 K/UL  
 ABS. IMM. GRANS. 0.2 (H) 0.00 - 0.04 K/UL  
 DF AUTOMATED    
 RBC COMMENTS ANISOCYTOSIS 1+ 
    
 RBC COMMENTS POLYCHROMASIA PRESENT 
MACROCYTOSIS 1+ 
    
 RBC COMMENTS TARGET CELLS 
PRESENT 
    
METABOLIC PANEL, COMPREHENSIVE Collection Time: 10/17/19  6:10 AM  
Result Value Ref Range Sodium 135 (L) 136 - 145 mmol/L Potassium 3.1 (L) 3.5 - 5.1 mmol/L Chloride 102 97 - 108 mmol/L  
 CO2 27 21 - 32 mmol/L Anion gap 6 5 - 15 mmol/L Glucose 94 65 - 100 mg/dL BUN 4 (L) 6 - 20 MG/DL Creatinine 0.35 (L) 0.55 - 1.02 MG/DL  
 BUN/Creatinine ratio 11 (L) 12 - 20 GFR est AA >60 >60 ml/min/1.73m2 GFR est non-AA >60 >60 ml/min/1.73m2 Calcium 8.1 (L) 8.5 - 10.1 MG/DL Bilirubin, total 0.4 0.2 - 1.0 MG/DL  
 ALT (SGPT) 14 12 - 78 U/L  
 AST (SGOT) 13 (L) 15 - 37 U/L Alk. phosphatase 124 (H) 45 - 117 U/L Protein, total 5.8 (L) 6.4 - 8.2 g/dL Albumin 1.7 (L) 3.5 - 5.0 g/dL Globulin 4.1 (H) 2.0 - 4.0 g/dL A-G Ratio 0.4 (L) 1.1 - 2.2 IMPRESSION/PLAN: 
 
Oncologic:  Stage IV appendiceal cancer. 3 weeks s/p tumor debulking. Extensive disease. Has seen Dr. Irma Crook with medical oncology. Recommendations would be FOLFOX/Avastin. Seeking a second opinion. 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 Hgb down to 6.5 shortly after admission. Now up to 9.8 following 2 units PRBCs. 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 Etiology unclear, but likely due to disruption of blood supply during omentectomy. IR placed a drain on 10/16. It has drained about 1500 cc so far. May need to consider splenectomy, although I would like to avoid surgery at this time. Spoke with surgical oncology. They agree that drainage should be sufficient. Will need to consider long-term aspirin due to the thrombocytosis, after we have ruled out taking her back to the OR. We may also need to vaccinate for encapsulated bacteria. Pulmonary:  Right pleural effusion, which is most likely reactive. Doubtful that it is secondary to malignancy, since it was not present prior to her surgery. Incentive spirometry encouraged to help expand her lung. Expect effusion will resolve spontaneously. Would hold off on thoracentesis. Renal:  Normal creatinine. FEN/GI:  Diet as tolerated. Hypokalemia this morning. Will replace with PO. Mild hyponatremia. Will monitor. ID/Wound:  Afebrile currently. Low-grade temp of 100.1 at midnight last night. Normal WBC. Started on Zosyn at time of admission out of concern for possible abscess. We will await cultures from drainage. If sterile, or shows no signs of abscess, we will stop the antibiotic. Gram stain negative. Appears consistent with a hematoma. PPX:  Ambulation. Started back on Lovenox following procedure. IS. Dispostion:  Clinically stable. Will speak with surgical oncology today to see what their thoughts are.   If drain is placed today, will plan on repeat CT scan in a few days to follow resolution and determine the need for additional procedures.    
 
  
Chico Barajas MD

## 2019-10-18 ENCOUNTER — APPOINTMENT (OUTPATIENT)
Dept: CT IMAGING | Age: 49
DRG: 815 | End: 2019-10-18
Attending: OBSTETRICS & GYNECOLOGY
Payer: COMMERCIAL

## 2019-10-18 LAB
ALBUMIN SERPL-MCNC: 1.6 G/DL (ref 3.5–5)
ALBUMIN/GLOB SERPL: 0.5 {RATIO} (ref 1.1–2.2)
ALP SERPL-CCNC: 119 U/L (ref 45–117)
ALT SERPL-CCNC: 16 U/L (ref 12–78)
ANION GAP SERPL CALC-SCNC: 8 MMOL/L (ref 5–15)
AST SERPL-CCNC: 19 U/L (ref 15–37)
BACTERIA SPEC CULT: ABNORMAL
BACTERIA SPEC CULT: NORMAL
BASOPHILS # BLD: 0.1 K/UL (ref 0–0.1)
BASOPHILS NFR BLD: 1 % (ref 0–1)
BILIRUB SERPL-MCNC: 0.3 MG/DL (ref 0.2–1)
BUN SERPL-MCNC: 3 MG/DL (ref 6–20)
BUN/CREAT SERPL: 10 (ref 12–20)
CALCIUM SERPL-MCNC: 8 MG/DL (ref 8.5–10.1)
CHLORIDE SERPL-SCNC: 104 MMOL/L (ref 97–108)
CO2 SERPL-SCNC: 27 MMOL/L (ref 21–32)
CREAT SERPL-MCNC: 0.31 MG/DL (ref 0.55–1.02)
DIFFERENTIAL METHOD BLD: ABNORMAL
EOSINOPHIL # BLD: 0.6 K/UL (ref 0–0.4)
EOSINOPHIL NFR BLD: 8 % (ref 0–7)
ERYTHROCYTE [DISTWIDTH] IN BLOOD BY AUTOMATED COUNT: 15.8 % (ref 11.5–14.5)
FERRITIN SERPL-MCNC: 483 NG/ML (ref 26–388)
GLOBULIN SER CALC-MCNC: 3.5 G/DL (ref 2–4)
GLUCOSE SERPL-MCNC: 80 MG/DL (ref 65–100)
GRAM STN SPEC: ABNORMAL
GRAM STN SPEC: ABNORMAL
HCT VFR BLD AUTO: 27 % (ref 35–47)
HGB BLD-MCNC: 9 G/DL (ref 11.5–16)
IMM GRANULOCYTES # BLD AUTO: 0.2 K/UL (ref 0–0.04)
IMM GRANULOCYTES NFR BLD AUTO: 3 % (ref 0–0.5)
IRON SERPL-MCNC: 22 UG/DL (ref 35–150)
LYMPHOCYTES # BLD: 1.6 K/UL (ref 0.8–3.5)
LYMPHOCYTES NFR BLD: 22 % (ref 12–49)
MCH RBC QN AUTO: 28.9 PG (ref 26–34)
MCHC RBC AUTO-ENTMCNC: 33.3 G/DL (ref 30–36.5)
MCV RBC AUTO: 86.8 FL (ref 80–99)
MONOCYTES # BLD: 1.3 K/UL (ref 0–1)
MONOCYTES NFR BLD: 18 % (ref 5–13)
NEUTS SEG # BLD: 3.4 K/UL (ref 1.8–8)
NEUTS SEG NFR BLD: 48 % (ref 32–75)
NRBC # BLD: 0 K/UL (ref 0–0.01)
NRBC BLD-RTO: 0 PER 100 WBC
PLATELET # BLD AUTO: 598 K/UL (ref 150–400)
PMV BLD AUTO: 8.7 FL (ref 8.9–12.9)
POTASSIUM SERPL-SCNC: 3.8 MMOL/L (ref 3.5–5.1)
PROT SERPL-MCNC: 5.1 G/DL (ref 6.4–8.2)
RBC # BLD AUTO: 3.11 M/UL (ref 3.8–5.2)
RBC MORPH BLD: ABNORMAL
RBC MORPH BLD: ABNORMAL
SERVICE CMNT-IMP: ABNORMAL
SERVICE CMNT-IMP: NORMAL
SODIUM SERPL-SCNC: 139 MMOL/L (ref 136–145)
WBC # BLD AUTO: 7.2 K/UL (ref 3.6–11)

## 2019-10-18 PROCEDURE — 80053 COMPREHEN METABOLIC PANEL: CPT

## 2019-10-18 PROCEDURE — 36415 COLL VENOUS BLD VENIPUNCTURE: CPT

## 2019-10-18 PROCEDURE — 74011636320 HC RX REV CODE- 636/320: Performed by: RADIOLOGY

## 2019-10-18 PROCEDURE — 74011250637 HC RX REV CODE- 250/637: Performed by: OBSTETRICS & GYNECOLOGY

## 2019-10-18 PROCEDURE — 65410000002 HC RM PRIVATE OB

## 2019-10-18 PROCEDURE — 85025 COMPLETE CBC W/AUTO DIFF WBC: CPT

## 2019-10-18 PROCEDURE — 82728 ASSAY OF FERRITIN: CPT

## 2019-10-18 PROCEDURE — 74177 CT ABD & PELVIS W/CONTRAST: CPT

## 2019-10-18 PROCEDURE — 74011000258 HC RX REV CODE- 258: Performed by: OBSTETRICS & GYNECOLOGY

## 2019-10-18 PROCEDURE — 84466 ASSAY OF TRANSFERRIN: CPT

## 2019-10-18 PROCEDURE — 74011000258 HC RX REV CODE- 258: Performed by: RADIOLOGY

## 2019-10-18 PROCEDURE — 74011250636 HC RX REV CODE- 250/636: Performed by: OBSTETRICS & GYNECOLOGY

## 2019-10-18 PROCEDURE — 83540 ASSAY OF IRON: CPT

## 2019-10-18 RX ORDER — SODIUM CHLORIDE 0.9 % (FLUSH) 0.9 %
10 SYRINGE (ML) INJECTION
Status: COMPLETED | OUTPATIENT
Start: 2019-10-18 | End: 2019-10-18

## 2019-10-18 RX ADMIN — Medication 10 ML: at 14:05

## 2019-10-18 RX ADMIN — PIPERACILLIN AND TAZOBACTAM 3.38 G: 3; .375 INJECTION, POWDER, FOR SOLUTION INTRAVENOUS at 14:05

## 2019-10-18 RX ADMIN — ENOXAPARIN SODIUM 40 MG: 40 INJECTION SUBCUTANEOUS at 18:15

## 2019-10-18 RX ADMIN — Medication 10 ML: at 09:35

## 2019-10-18 RX ADMIN — IOPAMIDOL 100 ML: 755 INJECTION, SOLUTION INTRAVENOUS at 10:19

## 2019-10-18 RX ADMIN — HYDROMORPHONE HYDROCHLORIDE 1 MG: 1 INJECTION, SOLUTION INTRAMUSCULAR; INTRAVENOUS; SUBCUTANEOUS at 00:08

## 2019-10-18 RX ADMIN — HYDROMORPHONE HYDROCHLORIDE 1 MG: 1 INJECTION, SOLUTION INTRAMUSCULAR; INTRAVENOUS; SUBCUTANEOUS at 22:56

## 2019-10-18 RX ADMIN — Medication 10 ML: at 05:41

## 2019-10-18 RX ADMIN — POTASSIUM CHLORIDE 20 MEQ: 750 TABLET, FILM COATED, EXTENDED RELEASE ORAL at 18:15

## 2019-10-18 RX ADMIN — Medication 10 ML: at 18:17

## 2019-10-18 RX ADMIN — SODIUM CHLORIDE 100 ML: 900 INJECTION, SOLUTION INTRAVENOUS at 10:19

## 2019-10-18 RX ADMIN — Medication 10 ML: at 10:19

## 2019-10-18 RX ADMIN — Medication 10 ML: at 22:50

## 2019-10-18 RX ADMIN — POTASSIUM CHLORIDE 20 MEQ: 750 TABLET, FILM COATED, EXTENDED RELEASE ORAL at 12:43

## 2019-10-18 RX ADMIN — PIPERACILLIN AND TAZOBACTAM 3.38 G: 3; .375 INJECTION, POWDER, FOR SOLUTION INTRAVENOUS at 22:50

## 2019-10-18 RX ADMIN — PIPERACILLIN AND TAZOBACTAM 3.38 G: 3; .375 INJECTION, POWDER, FOR SOLUTION INTRAVENOUS at 05:41

## 2019-10-18 NOTE — PROGRESS NOTES
Bedside and Verbal shift change report given to VEDA Rodrigues RN (oncoming nurse) by Barbara Wilks RN (offgoing nurse). Report included the following information SBAR, Kardex, Procedure Summary, Intake/Output, MAR, Accordion and Recent Results.

## 2019-10-18 NOTE — PROGRESS NOTES
Music Therapy Assessment [address] Renetta Ching [phone]    
1970  44 y.o.  female Patient Telephone Number: [phone] (home) [place] Affiliation: [employer]  
Language: [place] Patient Active Problem List  
 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  
 
Physical Status:   Some discomfort in lung area 
[  ] Oxygen in use [  ] Hard of Hearing [  ] Vision Impaired [  ] Ambulatory  [  ] Ambulatory with assistance [  ] Non-ambulatory Music Preferences, Background: Chose relaxing ambiant music Clinical Problem addressed: Socio-emotional Support and Relaxation Goal(s) met in session: 
Physical/Pain management (Scale of 1-10): Pre-session rating ___________    Post-session rating __________  
[X] Increased relaxation   [  ] Affected breathing patterns [  ] Decreased muscle tension   [  ] Decreased agitation [  ] Affected heart rate    [  ] Increased alertness Emotional/Psychological: 
[X] Increased self-expression   [  ] Decreased aggressive behavior [  ] Decreased feelings of stress  [  ] Discussed healthy coping skills [  ] Improved mood    [  ] Decreased withdrawn behavior Social: 
[  ] Decreased feelings of isolation/loneliness [ X] Positive social interaction [  ] Provided support and/or comfort for family/friends Spiritual: 
[  ] Spiritual support    [  ] Expressed peace [  ] Expressed hay    [X] Discussed beliefs Techniques Utilized (Check all that apply):  
[  ] Procedural support MT [X] Music for relaxation [  ] Patient preferred music 
[  ] Gracia analysis  [  ] Song choice  [  ] Music for validation [  ] Entrainment  [  ] Movement to music [X] Guided visualization [  ] Larance Art  [  ] Patient instrument playing [  ] Mason Elias writing [  ] Cristy Holman along   [  ] Marcos Steven  [  ] Sensory stimulation [  ] Active Listening  [  ] Music for spiritual support [  ] Making of CDs as gifts Session Observations:  Ms. Gin Ta is known to MT and [facility] from previous hospital admission. She is currently coping with a new dx and was receptive to MT for a relaxing experience to, in her words, \"escape her troubles. \" Ms. Gin Ta was resting comfortably in her bed, and discussed w/MT what natural setting she would like to visualize. Pt chose the beach, and was encouraged to match her breathing to the sound of the guitar, which represented ocean waves. After the visualization, Ms. Gin Ta stated it was very relaxing, and thanked the MT for the music. She stated that at first, the beach memories of real life experiences made her feel emotional, and as the music continued she was able to find peace and relaxation. After continued discussion about health, family and coping/support services offered for persons with a cancer dx, Ms Gin Ta seemed calm and relaxed. Pt was informed of [facility] supportive services, and was encouraged to participate in these programs if she would like, as part of her wellness plan upon d/c from hospital. 
 
Jesica Lopez MA, MT-BC (Music Therapist-Board Certified) Spiritual Care Services Referral- based service

## 2019-10-18 NOTE — PROGRESS NOTES
Bedside shift change report given to SARA Champion RN (oncoming nurse) by Rosa Isela Vogel RN (offgoing nurse). Report included the following information SBAR, Kardex, OR Summary, Procedure Summary, Intake/Output and MAR.

## 2019-10-18 NOTE — PROGRESS NOTES
Heme/ONC f/u Stopped by to see pt but she was in BR Platelets much better Pt feeling much better To see [facility] next week F/u with us prn

## 2019-10-18 NOTE — PROGRESS NOTES
[address] [facility], [address] 
P [phone]  F [phone] Patient Name: Xiang Ceron Admit Date: 10/15/2019 OR Date: 9/25/19 (Prior admission) Visit Date: 10/18/2019 SUBJECTIVE: 
 
Feels good this morning. Pain continues to improve. No nausea/vomiting. Tolerating PO. OBJECTIVE: 
 
Patient Vitals for the past 24 hrs: 
 Temp Pulse Resp BP SpO2  
10/18/19 0541 98.6 °F (37 °C) 91 16 124/72 98 % 10/17/19 2029 98.3 °F (36.8 °C) 97 16 129/79 96 % 10/17/19 1940     96 % 10/17/19 1648 98.3 °F (36.8 °C) 100 16 113/74 93 % 10/17/19 1245 99 °F (37.2 °C) 85 16 120/79 94 % 10/17/19 0912 98.1 °F (36.7 °C) 97 16 107/69 94 % Date 10/17/19 0700 - 10/18/19 2799 10/18/19 0700 - 10/19/19 9909 Shift 3378-8842 5575-8293 24 Hour Total 2946-3007 0266-8435 24 Hour Total  
INTAKE  
[address]  500     
  P. O. 500  500     
I. V.(mL/kg/hr) 420(0.5) 322.1(0.4) 742.1(0.5) Volume (0.9% sodium chloride infusion) 420 322.1 742.1 NG/GT  10 10 Intake (ml) (Drain 10 fr Resolve Locking drainage catheter Left Abdomen)  10 10 Shift Total(mL/kg) 920(14.3) 332. 1(5.2) 1252. 1(19.4) OUTPUT Urine(mL/kg/hr) Urine Occurrence(s)  1 x 1 x Drains 75 100 175 Output (ml) (Drain 10 fr Resolve Locking drainage catheter Left Abdomen) 75 100 175 Stool Stool Occurrence(s)  3 x 3 x Shift Total(mL/kg) 75(1.2) 100(1.6) 175(2.7)  232.1 1077.1 Weight (kg) 64.4 64.4 64.4 64.4 64.4 64.4 Physical Exam 
   General:  alert, cooperative, no distress Cardiac:  Regular rate and rhythm Lungs:  Decreased BS on left. Abdomen:  Soft, mildly tender in LUQ; fullness in LUQ Wound:  clean, dry, no drainage Extremity: extremities normal, atraumatic, no cyanosis or edema Data Review Recent Results (from the past 24 hour(s)) CBC WITH AUTOMATED DIFF  
 Collection Time: 10/18/19  5:49 AM  
Result Value Ref Range WBC 7.2 3.6 - 11.0 K/uL  
 RBC 3.11 (L) 3.80 - 5.20 M/uL HGB 9.0 (L) 11.5 - 16.0 g/dL HCT 27.0 (L) 35.0 - 47.0 % MCV 86.8 80.0 - 99.0 FL  
 MCH 28.9 26.0 - 34.0 PG  
 MCHC 33.3 30.0 - 36.5 g/dL  
 RDW 15.8 (H) 11.5 - 14.5 % PLATELET 032 (H) 818 - 400 K/uL MPV 8.7 (L) 8.9 - 12.9 FL  
 NRBC 0.0 0  WBC ABSOLUTE NRBC 0.00 0.00 - 0.01 K/uL NEUTROPHILS 48 32 - 75 % LYMPHOCYTES 22 12 - 49 % MONOCYTES 18 (H) 5 - 13 % EOSINOPHILS 8 (H) 0 - 7 % BASOPHILS 1 0 - 1 % IMMATURE GRANULOCYTES 3 (H) 0.0 - 0.5 % ABS. NEUTROPHILS 3.4 1.8 - 8.0 K/UL  
 ABS. LYMPHOCYTES 1.6 0.8 - 3.5 K/UL  
 ABS. MONOCYTES 1.3 (H) 0.0 - 1.0 K/UL  
 ABS. EOSINOPHILS 0.6 (H) 0.0 - 0.4 K/UL  
 ABS. BASOPHILS 0.1 0.0 - 0.1 K/UL  
 ABS. IMM. GRANS. 0.2 (H) 0.00 - 0.04 K/UL  
 DF SMEAR SCANNED    
 RBC COMMENTS ANISOCYTOSIS 1+ 
    
 RBC COMMENTS HYPOCHROMIA 2+ FERRITIN Collection Time: 10/18/19  5:49 AM  
Result Value Ref Range Ferritin 483 (H) 26 - 388 NG/ML  
IRON Collection Time: 10/18/19  5:49 AM  
Result Value Ref Range Iron 22 (L) 35 - 150 ug/dL IMPRESSION/PLAN: 
 
Oncologic:  Stage IV appendiceal cancer. 3 weeks s/p tumor debulking. Extensive disease. Has seen Dr. Cristiano Harper with medical oncology. Recommendations would be FOLFOX/Avastin. Seeking a second opinion. 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 Hgb down to 6.5 shortly after admission. Improved following 2 units PRBCs and appears stable. 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 Etiology unclear, but likely due to disruption of blood supply during omentectomy. Platelet count markedly improved since IR placed a drain on 10/16. It has drained about 1500 cc so far.   Output is decreasing. May need to consider splenectomy, although I would like to avoid surgery at this time. Spoke with surgical oncology. They agree that drainage should be sufficient. Will need to consider long-term aspirin due to the thrombocytosis, after we have ruled out taking her back to the OR. We may also need to vaccinate for encapsulated bacteria. Pulmonary:  Right pleural effusion, which is most likely reactive. Doubtful that it is secondary to malignancy, since it was not present prior to her surgery. Incentive spirometry encouraged to help expand her lung. Expect effusion will resolve spontaneously. Would hold off on thoracentesis. Renal:  Normal creatinine. FEN/GI:  Diet as tolerated. Hypokalemia this morning. Will replace with PO. Mild hyponatremia. Will monitor. ID/Wound:  Afebrile currently. Normal WBC. Started on Zosyn at time of admission out of concern for possible abscess. We will await final cultures from drainage. Preliminary with possible light streptococcus. If sterile, or shows no signs of abscess, we will stop the antibiotic. Gram stain negative. Appears consistent with a hematoma. PPX:  Ambulation. Started back on Lovenox following procedure. IS. Dispostion:  Clinically stable. Plan on repeat CT scan today to follow resolution and determine the need for additional procedures or continued drainage. If no significant fluid remaining, may consider pulling drain and discharging home. Rosey Soler MD  
 
 
Addendum CT of abdomen/pelvis (10/18/19) LIVER: Stable left hepatic lobe cyst and other low-density left hepatic lesion 
too small to characterize. No new focal liver abnormality. No biliary ductal 
dilatation GALLBLADDER: No calcified gallstone SPLEEN: Hypoattenuation involving two thirds of the anterior spleen likely 
evolving infarction.  Interval placement of percutaneous left upper abdominal 
 pigtail drainage catheter with near complete resolution of the previous large 
subdiaphragmatic fluid and gas collection. Small amount of fluid and gas 
surrounding the native spleen. PANCREAS: No mass or ductal dilatation. ADRENALS: Unremarkable. KIDNEYS/URETERS: Normal symmetric nephrograms without evidence for  focal mass. No hydronephrosis PERITONEUM: No abdominal lymphadenopathy. Small amount of ascites. COLON: Mild nonspecific bowel wall thickening involving the descending colon, 
sigmoid colon, and rectum APPENDIX: Not clearly seen. SMALL BOWEL: No small bowel obstruction. Mild bowel wall thickening involving 
the distal small bowel may be reactive. STOMACH: Nonspecific wall thickening involving the posterior gastric body PELVIS: No pelvic lymphadenopathy. Focal fluid in the anterior lower pelvis and 
anterior to the rectum. Focal nodularity anterior to the bladder is unchanged. BONES: No destructive bone lesion. VISUALIZED THORAX: Stable moderate left pleural effusion with lingular and left 
lower lobe airspace disease. ADDITIONAL COMMENTS: N/A IMPRESSION: 
Hypoattenuation involving two thirds of the anterior spleen likely evolving 
infarction. Interval placement of percutaneous left upper abdominal pigtail 
drainage catheter with near complete resolution of the previous large 
subdiaphragmatic fluid and gas collection. Small amount of fluid and gas 
surrounding the residual spleen.  
  
Nonspecific bowel wall thickening involving the distal small bowel and left 
colon which may be reactive. Wall thickening also seen in the posterior gastric 
body. 
  
Small amount of fluid in the pelvis is unchanged. Focal nodularity anterior to 
the bladder is unchanged 
  
Stable moderate left pleural effusion with left basilar airspace disease. A/P:  Will leave drain in place for now. Possible d/c over the weekend. Patient has a second opinion scheduled with [facility] on Monday.   
 
Kamran Longo MD

## 2019-10-19 LAB — TRANSFERRIN SERPL-MCNC: 101 MG/DL (ref 200–370)

## 2019-10-19 PROCEDURE — 74011250637 HC RX REV CODE- 250/637: Performed by: OBSTETRICS & GYNECOLOGY

## 2019-10-19 PROCEDURE — 74011250636 HC RX REV CODE- 250/636: Performed by: OBSTETRICS & GYNECOLOGY

## 2019-10-19 PROCEDURE — 65410000002 HC RM PRIVATE OB

## 2019-10-19 PROCEDURE — 74011000258 HC RX REV CODE- 258: Performed by: OBSTETRICS & GYNECOLOGY

## 2019-10-19 RX ADMIN — Medication 10 ML: at 09:59

## 2019-10-19 RX ADMIN — POTASSIUM CHLORIDE 20 MEQ: 750 TABLET, FILM COATED, EXTENDED RELEASE ORAL at 17:51

## 2019-10-19 RX ADMIN — POTASSIUM CHLORIDE 20 MEQ: 750 TABLET, FILM COATED, EXTENDED RELEASE ORAL at 09:56

## 2019-10-19 RX ADMIN — HYDROMORPHONE HYDROCHLORIDE 1 MG: 1 INJECTION, SOLUTION INTRAMUSCULAR; INTRAVENOUS; SUBCUTANEOUS at 22:01

## 2019-10-19 RX ADMIN — PIPERACILLIN AND TAZOBACTAM 3.38 G: 3; .375 INJECTION, POWDER, FOR SOLUTION INTRAVENOUS at 13:47

## 2019-10-19 RX ADMIN — ENOXAPARIN SODIUM 40 MG: 40 INJECTION SUBCUTANEOUS at 17:51

## 2019-10-19 RX ADMIN — Medication 10 ML: at 09:58

## 2019-10-19 RX ADMIN — Medication 10 ML: at 05:53

## 2019-10-19 RX ADMIN — PIPERACILLIN AND TAZOBACTAM 3.38 G: 3; .375 INJECTION, POWDER, FOR SOLUTION INTRAVENOUS at 05:53

## 2019-10-19 RX ADMIN — PIPERACILLIN AND TAZOBACTAM 3.38 G: 3; .375 INJECTION, POWDER, FOR SOLUTION INTRAVENOUS at 22:01

## 2019-10-19 NOTE — PROGRESS NOTES
Problem: Falls - Risk of 
Goal: *Absence of Falls Description Document Gareth Henley Fall Risk and appropriate interventions in the flowsheet. Outcome: Progressing Towards Goal 
Note:  
Fall Risk Interventions: 
  
 
  
 
Medication Interventions: Teach patient to arise slowly Problem: Pressure Injury - Risk of 
Goal: *Prevention of pressure injury Description Document Etmo Scale and appropriate interventions in the flowsheet. Outcome: Progressing Towards Goal 
Note:  
Pressure Injury Interventions: Activity Interventions: Increase time out of bed Nutrition Interventions: Document food/fluid/supplement intake

## 2019-10-19 NOTE — PROGRESS NOTES
Bedside and Verbal shift change report given to [id number] (oncoming nurse) by Reena Spivey RN and Nickie Mccain (offgoing nurse). Report included SBAR, Kardex, Intake/Output, MAR, Recent Results and Progress of Care. I accept this patient to my care at this time. Any subsequent documentation in this Progress Note is intended to be information adjunct to other components of the patient's electronic medical record. Refer to accompanying timestamp(s) for chronology.

## 2019-10-19 NOTE — PROGRESS NOTES
[address] [facility], [address] [place], [address] [place] [address] 
P [phone]  F [phone] Patient Name: José Miguel Simmons Admit Date: 10/15/2019 OR Date: 9/25/19 (Prior admission) Visit Date: 10/19/2019 SUBJECTIVE: 
 
Feels good this morning. Pain continues to improve. No nausea/vomiting. Tolerating PO. Does report a lot of gas after eating. OBJECTIVE: 
 
Patient Vitals for the past 24 hrs: 
 Temp Pulse Resp BP SpO2  
10/19/19 0757 98.2 °F (36.8 °C) 89 16 124/80 96 % 10/19/19 0604 98.4 °F (36.9 °C)    94 % 10/19/19 0005 98.4 °F (36.9 °C) 89 14 118/75 95 % 10/18/19 2301 98.4 °F (36.9 °C)      
10/18/19 2018 98.2 °F (36.8 °C) 89 16 120/72 97 % Date 10/18/19 0700 - 10/19/19 0343 10/19/19 0700 - 10/20/19 2781 Shift 2421-0680 1123-3712 24 Hour Total 4186-3429 1207-0312 24 Hour Total  
INTAKE  
P.O. 250 150 400     
  P. O. 250 150 400     
I. V.(mL/kg/hr) 96.7(0.1) 1228.8(1.6) 1325.4(0.9) Volume (0.9% sodium chloride infusion) 96.7 528.8 625.4 Volume (piperacillin-tazobactam (ZOSYN) 3.375 g in 0.9% sodium chloride (MBP/ADV) 100 mL)  700 700 NG/GT 10 10 20 Intake (ml) (Drain 10 fr Resolve Locking drainage catheter Left Abdomen) 10 10 20 Shift Total(mL/kg) 356.7(5.5) 2932.7(33.3) 1745. 4(27.1) OUTPUT Urine(mL/kg/hr) Urine Occurrence(s)  3 x 3 x Drains 50 50 100 Output (ml) (Drain 10 fr Resolve Locking drainage catheter Left Abdomen) 50 50 100 Shift Total(mL/kg) 50(0.8) 50(0.8) 100(1.6) .7 1338.8 1645.4 Weight (kg) 64.4 64.4 64.4 64.4 64.4 64.4 Physical Exam 
   General:  alert, cooperative, no distress Cardiac:  Regular rate and rhythm Lungs:  Decreased BS on left. Abdomen:  Soft, mildly tender in LUQ; fullness in LUQ Wound:  clean, dry, no drainage Extremity: extremities normal, atraumatic, no cyanosis or edema Data Review No results found for this or any previous visit (from the past 24 hour(s)). CT of abdomen/pelvis (10/18/19) LIVER: Stable left hepatic lobe cyst and other low-density left hepatic lesion 
too small to characterize. No new focal liver abnormality. No biliary ductal 
dilatation GALLBLADDER: No calcified gallstone SPLEEN: Hypoattenuation involving two thirds of the anterior spleen likely 
evolving infarction. Interval placement of percutaneous left upper abdominal 
pigtail drainage catheter with near complete resolution of the previous large 
subdiaphragmatic fluid and gas collection. Small amount of fluid and gas 
surrounding the native spleen. PANCREAS: No mass or ductal dilatation. ADRENALS: Unremarkable. KIDNEYS/URETERS: Normal symmetric nephrograms without evidence for  focal mass. No hydronephrosis PERITONEUM: No abdominal lymphadenopathy. Small amount of ascites. COLON: Mild nonspecific bowel wall thickening involving the descending colon, 
sigmoid colon, and rectum APPENDIX: Not clearly seen. SMALL BOWEL: No small bowel obstruction. Mild bowel wall thickening involving 
the distal small bowel may be reactive. STOMACH: Nonspecific wall thickening involving the posterior gastric body PELVIS: No pelvic lymphadenopathy. Focal fluid in the anterior lower pelvis and 
anterior to the rectum. Focal nodularity anterior to the bladder is unchanged. BONES: No destructive bone lesion. VISUALIZED THORAX: Stable moderate left pleural effusion with lingular and left 
lower lobe airspace disease. ADDITIONAL COMMENTS: N/A IMPRESSION: 
Hypoattenuation involving two thirds of the anterior spleen likely evolving 
infarction. Interval placement of percutaneous left upper abdominal pigtail 
drainage catheter with near complete resolution of the previous large 
subdiaphragmatic fluid and gas collection.  Small amount of fluid and gas 
surrounding the residual spleen.  
  
 Nonspecific bowel wall thickening involving the distal small bowel and left 
colon which may be reactive. Wall thickening also seen in the posterior gastric 
body. 
  
Small amount of fluid in the pelvis is unchanged. Focal nodularity anterior to 
the bladder is unchanged 
  
Stable moderate left pleural effusion with left basilar airspace disease. IMPRESSION/PLAN: 
 
Oncologic:  Stage IV appendiceal cancer. 3 weeks s/p tumor debulking. Extensive disease. Has seen Dr. Irma Crook with medical oncology. Recommendations would be FOLFOX/Avastin. Seeking a second opinion. 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 Hgb down to 6.5 shortly after admission. Improved following 2 units PRBCs and appears stable. 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 Etiology unclear, but likely due to disruption of blood supply during omentectomy. Platelet count markedly improved since IR placed a drain on 10/16. Output is decreasing. May need to consider splenectomy, although I would like to avoid surgery at this time. Spoke with surgical oncology. They agree that drainage should be sufficient. Will need to consider long-term aspirin due to the thrombocytosis, after we have ruled out taking her back to the OR. We may also need to vaccinate for encapsulated bacteria. Pulmonary:  Right pleural effusion, which is most likely reactive. Doubtful that it is secondary to malignancy, since it was not present prior to her surgery. Incentive spirometry encouraged to help expand her lung. Expect effusion will resolve spontaneously. Would hold off on thoracentesis. Renal:  Normal creatinine. FEN/GI:  Diet as tolerated. ID/Wound:  Afebrile currently. Normal WBC. Started on Zosyn at time of admission out of concern for possible abscess.   Cultures from drainag with light enterococcus durans. Appears consistent with a hematoma. Will stop abx at discharge. PPX:  Ambulation. Started back on Lovenox following procedure. IS. Dispostion:  Clinically stable. Probable d/c home tomorrow, possibly without drain.     
 
  
Stevie Christopher MD

## 2019-10-20 VITALS
WEIGHT: 142 LBS | OXYGEN SATURATION: 94 % | SYSTOLIC BLOOD PRESSURE: 126 MMHG | RESPIRATION RATE: 16 BRPM | TEMPERATURE: 98.1 F | BODY MASS INDEX: 25.16 KG/M2 | HEART RATE: 84 BPM | DIASTOLIC BLOOD PRESSURE: 80 MMHG | HEIGHT: 63 IN

## 2019-10-20 LAB
ALBUMIN SERPL-MCNC: 1.9 G/DL (ref 3.5–5)
ALBUMIN/GLOB SERPL: 0.5 {RATIO} (ref 1.1–2.2)
ALP SERPL-CCNC: 108 U/L (ref 45–117)
ALT SERPL-CCNC: 16 U/L (ref 12–78)
ANION GAP SERPL CALC-SCNC: 8 MMOL/L (ref 5–15)
AST SERPL-CCNC: 14 U/L (ref 15–37)
BASOPHILS # BLD: 0.1 K/UL (ref 0–0.1)
BASOPHILS NFR BLD: 1 % (ref 0–1)
BILIRUB SERPL-MCNC: 0.2 MG/DL (ref 0.2–1)
BUN SERPL-MCNC: 2 MG/DL (ref 6–20)
BUN/CREAT SERPL: 5 (ref 12–20)
CALCIUM SERPL-MCNC: 8.6 MG/DL (ref 8.5–10.1)
CHLORIDE SERPL-SCNC: 106 MMOL/L (ref 97–108)
CO2 SERPL-SCNC: 26 MMOL/L (ref 21–32)
CREAT SERPL-MCNC: 0.39 MG/DL (ref 0.55–1.02)
DIFFERENTIAL METHOD BLD: ABNORMAL
EOSINOPHIL # BLD: 0.7 K/UL (ref 0–0.4)
EOSINOPHIL NFR BLD: 9 % (ref 0–7)
ERYTHROCYTE [DISTWIDTH] IN BLOOD BY AUTOMATED COUNT: 15.1 % (ref 11.5–14.5)
GLOBULIN SER CALC-MCNC: 4.2 G/DL (ref 2–4)
GLUCOSE SERPL-MCNC: 82 MG/DL (ref 65–100)
HCT VFR BLD AUTO: 30.7 % (ref 35–47)
HGB BLD-MCNC: 9.5 G/DL (ref 11.5–16)
IMM GRANULOCYTES # BLD AUTO: 0.3 K/UL (ref 0–0.04)
IMM GRANULOCYTES NFR BLD AUTO: 4 % (ref 0–0.5)
LYMPHOCYTES # BLD: 1.6 K/UL (ref 0.8–3.5)
LYMPHOCYTES NFR BLD: 19 % (ref 12–49)
MCH RBC QN AUTO: 27.7 PG (ref 26–34)
MCHC RBC AUTO-ENTMCNC: 30.9 G/DL (ref 30–36.5)
MCV RBC AUTO: 89.5 FL (ref 80–99)
MONOCYTES # BLD: 1 K/UL (ref 0–1)
MONOCYTES NFR BLD: 12 % (ref 5–13)
NEUTS SEG # BLD: 4.6 K/UL (ref 1.8–8)
NEUTS SEG NFR BLD: 55 % (ref 32–75)
NRBC # BLD: 0 K/UL (ref 0–0.01)
NRBC BLD-RTO: 0 PER 100 WBC
PLATELET # BLD AUTO: 570 K/UL (ref 150–400)
PMV BLD AUTO: 8.9 FL (ref 8.9–12.9)
POTASSIUM SERPL-SCNC: 3.8 MMOL/L (ref 3.5–5.1)
PROT SERPL-MCNC: 6.1 G/DL (ref 6.4–8.2)
RBC # BLD AUTO: 3.43 M/UL (ref 3.8–5.2)
RBC MORPH BLD: ABNORMAL
SODIUM SERPL-SCNC: 140 MMOL/L (ref 136–145)
WBC # BLD AUTO: 8.3 K/UL (ref 3.6–11)

## 2019-10-20 PROCEDURE — 74011250637 HC RX REV CODE- 250/637: Performed by: OBSTETRICS & GYNECOLOGY

## 2019-10-20 PROCEDURE — 74011000258 HC RX REV CODE- 258: Performed by: OBSTETRICS & GYNECOLOGY

## 2019-10-20 PROCEDURE — 80053 COMPREHEN METABOLIC PANEL: CPT

## 2019-10-20 PROCEDURE — 36415 COLL VENOUS BLD VENIPUNCTURE: CPT

## 2019-10-20 PROCEDURE — 74011250636 HC RX REV CODE- 250/636: Performed by: OBSTETRICS & GYNECOLOGY

## 2019-10-20 PROCEDURE — 85025 COMPLETE CBC W/AUTO DIFF WBC: CPT

## 2019-10-20 RX ORDER — OXYCODONE HYDROCHLORIDE 5 MG/1
5 TABLET ORAL
Qty: 30 TAB | Refills: 0 | Status: SHIPPED | OUTPATIENT
Start: 2019-10-20 | End: 2019-10-28

## 2019-10-20 RX ADMIN — POTASSIUM CHLORIDE 20 MEQ: 750 TABLET, FILM COATED, EXTENDED RELEASE ORAL at 10:28

## 2019-10-20 RX ADMIN — PIPERACILLIN AND TAZOBACTAM 3.38 G: 3; .375 INJECTION, POWDER, FOR SOLUTION INTRAVENOUS at 06:26

## 2019-10-20 NOTE — PROGRESS NOTES
Problem: Falls - Risk of 
Goal: *Absence of Falls Description Document Shekhar Alexander Fall Risk and appropriate interventions in the flowsheet. Outcome: Progressing Towards Goal 
Note:  
Fall Risk Interventions: 
  
 
  
 
Medication Interventions: Teach patient to arise slowly Problem: Patient Education: Go to Patient Education Activity Goal: Patient/Family Education Outcome: Progressing Towards Goal 
  
Problem: Pain Goal: *Control of Pain Outcome: Progressing Towards Goal 
  
Problem: Patient Education: Go to Patient Education Activity Goal: Patient/Family Education Outcome: Progressing Towards Goal 
  
Problem: Pressure Injury - Risk of 
Goal: *Prevention of pressure injury Description Document Temo Scale and appropriate interventions in the flowsheet. Outcome: Progressing Towards Goal 
Note:  
Pressure Injury Interventions: Activity Interventions: Increase time out of bed Nutrition Interventions: Document food/fluid/supplement intake Problem: Patient Education: Go to Patient Education Activity Goal: Patient/Family Education Outcome: Progressing Towards Goal 
  
Problem: General Medical Care Plan Goal: *Vital signs within specified parameters Outcome: Progressing Towards Goal 
Goal: *Labs within defined limits Outcome: Progressing Towards Goal 
Goal: *Absence of infection signs and symptoms Outcome: Progressing Towards Goal 
Goal: *Optimal pain control at patient's stated goal 
Outcome: Progressing Towards Goal 
Goal: *Skin integrity maintained Outcome: Progressing Towards Goal 
Goal: *Fluid volume balance Outcome: Progressing Towards Goal 
Goal: *Optimize nutritional status Outcome: Progressing Towards Goal 
Goal: *Anxiety reduced or absent Outcome: Progressing Towards Goal 
Goal: *Progressive mobility and function (eg: ADL's) Outcome: Progressing Towards Goal 
  
Problem: Patient Education: Go to Patient Education Activity Goal: Patient/Family Education Outcome: Progressing Towards Goal

## 2019-10-20 NOTE — DISCHARGE INSTRUCTIONS
[address] [facility] [address] [phone]  F [phone]      Patient Discharge Instructions    Fatuma Richards / [id number] : 1970    Admitted 10/15/2019 Discharged: 10/20/2019     Take Home Medications     No current facility-administered medications on file prior to encounter. Current Outpatient Medications on File Prior to Encounter   Medication Sig Dispense Refill    LORazepam (ATIVAN) 0.5 mg tablet Take 1 Tab by mouth every eight (8) hours as needed for Anxiety. Max Daily Amount: 1.5 mg. Indications: Nausea and Vomiting caused by Cancer Drugs, difficulty sleeping 30 Tab 0    ibuprofen (MOTRIN) 200 mg tablet Take 3 Tabs by mouth every eight (8) hours as needed for Pain. 50 Tab 0    enoxaparin (LOVENOX) 40 mg/0.4 mL 0.4 mL by SubCUTAneous route daily for 21 days. Avoid incisions, rotate sites. Indications: Deep Vein Thrombosis Prevention 21 Syringe 0       · It is important that you take the medication exactly as they are prescribed. · Keep your medication in the bottles provided by the pharmacist and keep a list of the medication names, dosages, and times to be taken in your wallet. · Do not take other medications without consulting your doctor. What to do at Home    Recommended diet: Regular Diet, stay hydrated. You should take a stool softener such as colace for constipation. If constipation persists for >24 hours you should take a dose of Milk of Magnesia. Call if your constipation persists. If you have had a hysterectomy or vaginal surgery you may experience vaginal spotting. This is acceptable. Should the bleeding require more that 4 pads a day please call our office. Nothing per vagina for 6-8 weeks. Recommended activity:   No driving for 1 week and/or while on pain medication  Walk at least 6 times per day  Showers okay, no tub bathing/swimming for two weeks  Activity as able, stairs okay.   If you had a laparoscopic procedure, no lifting greater than 25 lbs for 3 weeks. If you had an open procedure, no heavy lifting greater than 15 lbs for 6 weeks. If you experience any of the following persisting symptoms:    Nausea/vomiting, fever > 101 F, diarrhea/constipation, increasing pain despite medication, increasing vaginal discharge or any concerns, please call our office. Follow-up with Dr. Benita Mendes in 3-5 days. Call [phone] for an appointment. Information obtained by :  I understand that if any problems occur once I am at home I am to contact my physician. I understand and acknowledge receipt of the instructions indicated above.                                                                                                                                            Physician's or R.N.'s Signature                                                                  Date/Time                                                                                                                                              Patient or Representative Signature                                                          Date/Time

## 2019-10-20 NOTE — PROGRESS NOTES
Bedside and Verbal shift change report given to Rebeca and Melyssa YEN (oncoming nurse) by Yovany Longoria RN and Bindu Jovel (offgoing nurse). Report included the following information SBAR, Kardex, Intake/Output, MAR and Recent Results 1130 - I have reviewed discharge instructions with the patient and spouse. The patient and spouse verbalized understanding. Pt. given copy of d/c instructions, accordion drain supplies and prescription. Pt. able to demonstrate proper flushing and empyting of accordion drain.

## 2019-10-20 NOTE — PROGRESS NOTES
[address] [facility], [address] [place], [address] 
P [phone]  F [phone] Patient Name: Lang Suarez Admit Date: 10/15/2019 OR Date: 9/25/19 (Prior admission) Visit Date: 10/20/2019 SUBJECTIVE: 
 
Feels good this morning. Pain continues to improve. No nausea/vomiting. Tolerating PO. Feels ready to go home. Looking forward to her consultation at [facility] tomorrow. OBJECTIVE: 
 
Patient Vitals for the past 24 hrs: 
 Temp Pulse Resp BP SpO2  
10/20/19 0820 98.1 °F (36.7 °C) 84 16 126/80 94 % 10/20/19 0417 98.7 °F (37.1 °C) 81 18 107/69 92 % 10/19/19 1952 98.2 °F (36.8 °C) 86 16 122/74 95 % 10/19/19 1602 98 °F (36.7 °C) 95 16 128/81 96 % Date 10/19/19 0700 - 10/20/19 1373 10/20/19 0700 - 10/21/19 8939 Shift 2613-4808 4409-2910 24 Hour Total 3349-6503 7007-0928 24 Hour Total  
INTAKE  
I.V.(mL/kg/hr)  539.6(0.7) 539.6(0.3) Volume (0.9% sodium chloride infusion)  539.6 539.6 NG/GT 10  10 Intake (ml) (Drain 10 fr Resolve Locking drainage catheter Left Abdomen) 10  10 Shift Total(mL/kg) 10(0.2) 539.6(8.4) 549.6(8.5) OUTPUT Drains 25  25 Output (ml) (Drain 10 fr Resolve Locking drainage catheter Left Abdomen) 25  25 Stool Stool Occurrence(s)  1 x 1 x Shift Total(mL/kg) 25(0.4)  25(0.4) NET -15 539.6 524.6 Weight (kg) 64.4 64.4 64.4 64.4 64.4 64.4 Physical Exam 
   General:  alert, cooperative, no distress Cardiac:  Regular rate and rhythm Lungs:  Decreased BS on left. Abdomen:  Soft, mildly tender in LUQ; fullness in LUQ Wound:  clean, dry, no drainage Extremity: extremities normal, atraumatic, no cyanosis or edema Data Review Recent Results (from the past 24 hour(s)) METABOLIC PANEL, COMPREHENSIVE Collection Time: 10/20/19  4:15 AM  
Result Value Ref Range Sodium 140 136 - 145 mmol/L  Potassium 3.8 3.5 - 5.1 mmol/L  
 Chloride 106 97 - 108 mmol/L  
 CO2 26 21 - 32 mmol/L Anion gap 8 5 - 15 mmol/L Glucose 82 65 - 100 mg/dL BUN 2 (L) 6 - 20 MG/DL Creatinine 0.39 (L) 0.55 - 1.02 MG/DL  
 BUN/Creatinine ratio 5 (L) 12 - 20 GFR est AA >60 >60 ml/min/1.73m2 GFR est non-AA >60 >60 ml/min/1.73m2 Calcium 8.6 8.5 - 10.1 MG/DL Bilirubin, total 0.2 0.2 - 1.0 MG/DL  
 ALT (SGPT) 16 12 - 78 U/L  
 AST (SGOT) 14 (L) 15 - 37 U/L Alk. phosphatase 108 45 - 117 U/L Protein, total 6.1 (L) 6.4 - 8.2 g/dL Albumin 1.9 (L) 3.5 - 5.0 g/dL Globulin 4.2 (H) 2.0 - 4.0 g/dL A-G Ratio 0.5 (L) 1.1 - 2.2    
CBC WITH AUTOMATED DIFF Collection Time: 10/20/19  4:15 AM  
Result Value Ref Range WBC 8.3 3.6 - 11.0 K/uL  
 RBC 3.43 (L) 3.80 - 5.20 M/uL HGB 9.5 (L) 11.5 - 16.0 g/dL HCT 30.7 (L) 35.0 - 47.0 % MCV 89.5 80.0 - 99.0 FL  
 MCH 27.7 26.0 - 34.0 PG  
 MCHC 30.9 30.0 - 36.5 g/dL  
 RDW 15.1 (H) 11.5 - 14.5 % PLATELET 316 (H) 252 - 400 K/uL MPV 8.9 8.9 - 12.9 FL  
 NRBC 0.0 0  WBC ABSOLUTE NRBC 0.00 0.00 - 0.01 K/uL NEUTROPHILS PENDING % LYMPHOCYTES PENDING % MONOCYTES PENDING % EOSINOPHILS PENDING % BASOPHILS PENDING % IMMATURE GRANULOCYTES PENDING %  
 ABS. NEUTROPHILS PENDING K/UL  
 ABS. LYMPHOCYTES PENDING K/UL  
 ABS. MONOCYTES PENDING K/UL  
 ABS. EOSINOPHILS PENDING K/UL  
 ABS. BASOPHILS PENDING K/UL  
 ABS. IMM. GRANS. PENDING K/UL  
 DF PENDING   
  
 
 
CT of abdomen/pelvis (10/18/19) LIVER: Stable left hepatic lobe cyst and other low-density left hepatic lesion 
too small to characterize. No new focal liver abnormality. No biliary ductal 
dilatation GALLBLADDER: No calcified gallstone SPLEEN: Hypoattenuation involving two thirds of the anterior spleen likely 
evolving infarction. Interval placement of percutaneous left upper abdominal 
pigtail drainage catheter with near complete resolution of the previous large subdiaphragmatic fluid and gas collection. Small amount of fluid and gas 
surrounding the native spleen. PANCREAS: No mass or ductal dilatation. ADRENALS: Unremarkable. KIDNEYS/URETERS: Normal symmetric nephrograms without evidence for  focal mass. No hydronephrosis PERITONEUM: No abdominal lymphadenopathy. Small amount of ascites. COLON: Mild nonspecific bowel wall thickening involving the descending colon, 
sigmoid colon, and rectum APPENDIX: Not clearly seen. SMALL BOWEL: No small bowel obstruction. Mild bowel wall thickening involving 
the distal small bowel may be reactive. STOMACH: Nonspecific wall thickening involving the posterior gastric body PELVIS: No pelvic lymphadenopathy. Focal fluid in the anterior lower pelvis and 
anterior to the rectum. Focal nodularity anterior to the bladder is unchanged. BONES: No destructive bone lesion. VISUALIZED THORAX: Stable moderate left pleural effusion with lingular and left 
lower lobe airspace disease. ADDITIONAL COMMENTS: N/A IMPRESSION: 
Hypoattenuation involving two thirds of the anterior spleen likely evolving 
infarction. Interval placement of percutaneous left upper abdominal pigtail 
drainage catheter with near complete resolution of the previous large 
subdiaphragmatic fluid and gas collection. Small amount of fluid and gas 
surrounding the residual spleen.  
  
Nonspecific bowel wall thickening involving the distal small bowel and left 
colon which may be reactive. Wall thickening also seen in the posterior gastric 
body. 
  
Small amount of fluid in the pelvis is unchanged. Focal nodularity anterior to 
the bladder is unchanged 
  
Stable moderate left pleural effusion with left basilar airspace disease. IMPRESSION/PLAN: 
 
Oncologic:  Stage IV appendiceal cancer. 3 weeks s/p tumor debulking. Extensive disease. Has seen Dr. Deb Eldridge with medical oncology. Recommendations would be FOLFOX/Avastin. Seeking a second opinion at [facility] - has an appointment tomorrow. 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 Hgb down to 6.5 shortly after admission. Improved following 2 units PRBCs and has remained stable. 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 Etiology unclear, but possibly due to disruption of blood supply during omentectomy. Platelet count markedly improved since IR placed a drain on 10/16. Output from drain is decreasing. May need to consider splenectomy, although I would like to avoid surgery at this time. I have spoken with surgical oncology. They agree that drainage should be sufficient. Will need to consider long-term aspirin due to the thrombocytosis, after we have ruled out taking her back to the OR. We may also need to vaccinate for encapsulated bacteria. Pulmonary:  Right pleural effusion, which is most likely reactive. Doubtful that it is secondary to malignancy, since it was not present prior to her surgery. Incentive spirometry encouraged to help expand her lung. Expect effusion will resolve spontaneously. Would hold off on thoracentesis. Renal:  Normal creatinine. FEN/GI:  Diet as tolerated. ID/Wound:  Afebrile currently. Normal WBC. Started on Zosyn at time of admission out of concern for possible abscess. Cultures from drainage with  light enterococcus durans. Appears consistent with a hematoma. Will stop abx at discharge. PPX:  Ambulation. Started back on Lovenox following procedure. IS. Dispostion:  Clinically stable. Will d/c home today with drain. She is to follow up later this week for me to evaluate the drain and possibly remove it. She is to record daily output.     
 
  
Mingo Garcia MD

## 2019-10-23 ENCOUNTER — OFFICE VISIT (OUTPATIENT)
Dept: GYNECOLOGY | Age: 49
End: 2019-10-23

## 2019-10-23 VITALS
DIASTOLIC BLOOD PRESSURE: 77 MMHG | HEART RATE: 83 BPM | SYSTOLIC BLOOD PRESSURE: 115 MMHG | WEIGHT: 128 LBS | HEIGHT: 63 IN | BODY MASS INDEX: 22.68 KG/M2

## 2019-10-23 DIAGNOSIS — C18.1 CARCINOMA OF APPENDIX (HCC): Primary | ICD-10-CM

## 2019-10-23 NOTE — PROGRESS NOTES
[address] [facility] [address]  P [phone]  F [phone]    Office Note  Patient ID:  Name:  Eloise Bernard  MRN:  [id number]  :  1970/49 y.o. Date:  10/23/2019      HISTORY OF PRESENT ILLNESS:  Eloise Bernard is a 44 y.o.  postmenopausal female with a diagnosis or stage IV appendiceal cancer. On 19 I was consulted intraoperatively by Dr. Rogers Madsen for what appeared to be an ovarian carcinoma. She had been having nausea/vomiting and abdominal pain for months. A HIDA scan suggested that her gallbladder was likely the source of her pain and Dr. Bogdan Doss took her to the OR for a laparoscopic cholecystectomy. At the time of surgery she was noted to have peritoneal carcinomatosis with bilaterally enlarged ovaries. Cytology was obtained and I performed a biopsy of the right ovary. We decided that it would be best to wait on the final pathology before proceeding with any aggressive debulking surgery, as we might also consider neoadjuvant chemotherapy. I arranged for her to have a CT of the chest/abdomen/pelvis following her laparoscopy.     She was followed by Dr. Ailyn Carroll with [address] [facility] for her gynecologic care. She was actually seen and evaluated there in 2019 for some irregular bleeding. She had been on OCPs to help control her symptoms. A pelvic ultrasound demonstrated a normal appearing uterus. The endometrium was not thickened. Her right ovary contained a 3.2 cm simple cyst.  The left ovary was not seen. There was a small amount of free fluid.       She presented to the office to review her CT and pathology results and to discuss a definitive plan. The CT scan demonstrated dilated loops of small bowel, suggesting partial obstruction. There was mucosal thickening of the terminal ileum. The appendix was not visualized.   In addition to the gastrointestinal findings, there was bilateral ovarian enlargement, omental thickening, carcinomatosis, and ascites. Radiologic findings and operative findings would suggest an ovarian or peritoneal carcinoma. Preliminary pathology was a bit confusing, however. I spoke with Dr. Darek Alfred from pathology. The right ovarian biopsy demonstrated an adenocarcinoma, but it wasn't clear histologically as to what it was. The tumor was CK7 and PAX8 negative, while also being CK20 positive. These findings would go against it being an ovarian cancer. She was taken to the OR on 9/25/19 for surgery. She underwent an X-lap, SIERRA, BSO, ileocecal resection with reanastomosis, and omentectomy. Her EBL was 1300 cc. She received 3 units PRBCs intraoperatively and another 2 units postoperatively. She was discharged home on POD #7 after regaining bowel function. Operative findings:  On bimanual pelvic exam she was noted to have a rock hard cervix and uterus, worrisome for disease infiltration.  It was mobile.  No parametrial involvement.  The uterus was enlarged.  Bilateral adnexal masses.  The bladder was densely adherent the lower uterine segment and cervix.  Mildly dilated small bowel.  Rock hard appendix scarred to the underside of the cecum with tethering of the small bowel at that point.  Implants noted in the distal omentum. Small volume peritoneal disease scattered throughout the abdominal cavity involving the anterior abdominal wall, diaphragms, small and large bowel mesenteries.  Frozen section pathology of the ileocecal resection suggested that this was the primary site of disease.  There were some signet ring features noted.  Frozen section pathology of the uterus demonstrated infiltration of the myometrium with the same process.     FINAL PATHOLOGIC DIAGNOSIS   1. 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    SPECIMEN   Procedure: Ileocecectomy   TUMOR   Tumor Site: Diffusely involving appendix   Histologic Type: Mucinous adenocarcinoma   Histologic Type Comments: Extensive signet ring cell component   Histologic Grade: G3: Poorly differentiated   Tumor Size: 4.5 cm   Tumor Deposits: Present   Number of Deposits: 1   Tumor Extension: Tumor directly invades adjacent organs or structures: Uterus, cervix,   bilateral ovaries and fallopian tubes, and omentum   Lymphovascular Invasion: Not identified   Perineural Invasion: Present, extensive   MARGINS   Proximal, distal and radial margins uninvolved by invasive carcinoma. LYMPH NODES   Number of Lymph Nodes Involved: 11   Number of Lymph Nodes Examined: 11   PATHOLOGIC STAGE CLASSIFICATION (pTNM, AJCC 8th Edition)   Primary Tumor (pT): pT4b   Regional Lymph Nodes (pN): pN2   Distant Metastasis (pM): pM1c   Site(s): Uterus, bilateral fallopian tubes, and ovaries   2. Uterus, bilateral fallopian tubes and ovaries, supracervical hysterectomy bilateral salpingo-oophorectomy:   Serosa: Positive for metastatic mucinous adenocarcinoma involving fibrous adhesions. Myometrium: Extensive transmural involvement by metastatic mucinous adenocarcinoma. Adenomyosis. Endometrium: Inactive endometrium with metastatic mucinous adenocarcinoma. Bilateral fallopian tubes: Metastatic mucinous adenocarcinoma. Bilateral fallopian tubes: Metastatic mucinous adenocarcinoma. 3. Omentum, omentectomy:   Positive for metastatic mucinous adenocarcinoma. Splenule. 4. Cervix, completion total hysterectomy:   Positive for metastatic mucinous adenocarcinoma. IMMUNOHISTOCHEMISTRY - DNA Mismatch Repair Protein Expression   Results:   MLH-1 No loss of expression   Extent: 3+; Intensity: Strong   MSH-2 No loss of expression   Extent: 3+; Intensity: Strong   MSH-6 No loss of expression   Extent: 3+; Intensity: Strong   PMS2 No loss of expression   Extent: 2+;  Intensity: Moderate   All DNA mismatch repair proteins cited above are shown to be proficient by immunohistochemistry method. This is strong evidence against DNA mismatch repair defects as the molecular basis for this malignancy and provides strong evidence against Monroe syndrome. I saw her back in the office on 10/9 for staple removal.  I discussed her pathology and referred her to Dr. Claudia Bianchi for consultation. She was complaining of some left upper quadrant and rib pain, as well as some shortness of breath. Plain films suggested a pleural effusion. She was sent for a CT of the abdomen/pelvis to evaluate. This revealed a complex fluid collection in the left upper quadrant, consistent with a splenic infarction/abscess. It appeared to be contained within the splenic capsule. A CT guided drainage was performed. The fluid which drained appeared consistent with a hematoma. There was no evidence of infection. An accordion drain was left in place. In drained 900 cc immediately and then a few hundred more ccs over the next few days. She was discharged home on 10/20 with the drain in place. The output has steadily decreased  Over the last 24 hours it has produced less than 25 cc. She presents today to have the drain removed. She was seen at [address] [place] [address] on 10/21 for consultation. They recommended the same treatment as Dr. Laurita Renee, FOLFOX +/- Avastin. She is slowly feeling better.       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 ([facility] [address] 09/20/2019    Symptomatic cholelithiasis 09/18/2019     Past Medical History:   Diagnosis Date    Nausea & vomiting     Symptomatic cholelithiasis 9/18/2019      Past Surgical History:   Procedure Laterality Date    HX GYN  2003    CYST ON OVARY      Social History     Tobacco Use    Smoking status: Never Smoker    Smokeless tobacco: Never Used   Substance Use Topics    Alcohol use: Yes     Comment: once a month      Family History   Problem Relation Age of Onset    Breast Cancer Paternal Grandmother 61    Crohn's Disease Mother     Heart Disease Mother     Cancer Father         BLADDER    Diabetes Father     No Known Problems Son     No Known Problems Daughter     Anesth Problems Neg Hx       Current Outpatient Medications   Medication Sig    LORazepam (ATIVAN) 0.5 mg tablet Take 1 Tab by mouth every eight (8) hours as needed for Anxiety. Max Daily Amount: 1.5 mg. Indications: Nausea and Vomiting caused by Cancer Drugs, difficulty sleeping    ibuprofen (MOTRIN) 200 mg tablet Take 3 Tabs by mouth every eight (8) hours as needed for Pain.  enoxaparin (LOVENOX) 40 mg/0.4 mL 0.4 mL by SubCUTAneous route daily for 21 days. Avoid incisions, rotate sites. Indications: Deep Vein Thrombosis Prevention    oxyCODONE IR (ROXICODONE) 5 mg immediate release tablet Take 1 Tab by mouth every four (4) hours as needed for Pain for up to 7 days. Max Daily Amount: 30 mg. No current facility-administered medications for this visit. No Known Allergies     Review of Systems:  A comprehensive review of systems was negative except for that written in the History of Present Illness. , 10 point ROS    Objective:     Physical Exam:   Visit Vitals  /77 (BP 1 Location: Right arm, BP Patient Position: Sitting)   Pulse 83   Ht 5' 2.99\" (1.6 m)   Wt 128 lb (58.1 kg)   BMI 22.68 kg/m²     General:  Alert, cooperative, no distress, appears stated age. HEENT:  WNL. Neck: Supple, without masses. Back:   Symmetric, no curvature. ROM normal. No CVA tenderness. Lungs:   Decreased BS on left. CTA on the right. Heart:  Regular rate and rhythm. Abdomen:   Soft, non-distended, non-tender. Accordion drain in left upper back. This was removed without difficulty. Pelvic:  Deferred. Rectal:  Deferred.    Extremities: Extremities normal, atraumatic, no cyanosis or edema. Skin: Skin color, texture, turgor normal. No rashes or lesions. Neurologic: Grossly intact. Labs:    No results found for this or any previous visit (from the past 24 hour(s)). CT of abdomen/pelvis (10/15/19)  Splenic infarction: A complex fluid collection in the subdiaphragmatic left  upper quadrant containing mixed soft tissue, fluid, gas, and fat densities. It  is predominantly comprised of low density fluid. A small amount of normal  splenic tissue is displaced superiorly and posteromedially (601-81, 3-21), and  blends into the complex fluid collection without clear delineation. The splenic  artery and vein are attenuated, and extend toward the fluid collection. As such,  a liquefied splenic infarction is favored. The small locules of gas may be  infectious or postsurgical. Fistulous etiology is considered less likely. In  greatest dimensions, the collection measures 12.2 x 13.7 x 15.3 cm. This is  exclusive of a smaller, communicating collection along the lateral wall of the  gastric fundus, which measures 4.2 x 3.2 x 3.2 cm, and communicates with the  main collection on image [id number]. There is significant mass effect on the  stomach, displacing it anteromedially. A new, small to moderate, left pleural  effusion is likely reactive. Passive atelectasis is associated.     Abdomen: The right lung base is clear. The heart size is normal. Incidental note  is made of small hepatic cysts in segments 2 and 3. The distal esophagus,  duodenum, gallbladder, pancreas, adrenals, and kidneys are normal.     Pelvis: There is a small to moderate volume of loculated, complex ascites in the  pelvis and right paracolic gutter. Peritoneal enhancement and adjacent colonic  inflammation are associated. There is trace mesenteric edema. Focal thickening  at the dome of the bladder (602-79, 601-37) is not significant changed from  9/23/2019.  It may represent a peritoneal nodule as opposed to bladder nodule. The small bowel and end-to-side ileocecal anastomosis are normal. Post  hysterectomy and ileocecectomy.     IMPRESSION:   1. Liquefied splenic infarction: left subdiaphragmatic complex fluid collection. Several gas locules suggest infection. 2. Small to moderate, reactive, left pleural effusion. 3. Small to moderate volume of loculated, complex ascites. Associated peritoneal  enhancement and colonic inflammation. 4. Probable peritoneal carcinomatosis along the bladder dome. Other areas of  carcinomatosis seen on prior study are not as clearly visible postoperatively. CT of abdomen/pelvis (10/18/19)  LIVER: Stable left hepatic lobe cyst and other low-density left hepatic lesion  too small to characterize. No new focal liver abnormality. No biliary ductal  dilatation   GALLBLADDER: No calcified gallstone  SPLEEN: Hypoattenuation involving two thirds of the anterior spleen likely  evolving infarction. Interval placement of percutaneous left upper abdominal  pigtail drainage catheter with near complete resolution of the previous large  subdiaphragmatic fluid and gas collection. Small amount of fluid and gas  surrounding the native spleen. PANCREAS: No mass or ductal dilatation. ADRENALS: Unremarkable. KIDNEYS/URETERS: Normal symmetric nephrograms without evidence for  focal mass. No hydronephrosis   PERITONEUM: No abdominal lymphadenopathy. Small amount of ascites. COLON: Mild nonspecific bowel wall thickening involving the descending colon,  sigmoid colon, and rectum  APPENDIX: Not clearly seen. SMALL BOWEL: No small bowel obstruction. Mild bowel wall thickening involving  the distal small bowel may be reactive. STOMACH: Nonspecific wall thickening involving the posterior gastric body  PELVIS: No pelvic lymphadenopathy. Focal fluid in the anterior lower pelvis and  anterior to the rectum. Focal nodularity anterior to the bladder is unchanged.   BONES: No destructive bone lesion. VISUALIZED THORAX: Stable moderate left pleural effusion with lingular and left  lower lobe airspace disease. ADDITIONAL COMMENTS: N/A     IMPRESSION:  Hypoattenuation involving two thirds of the anterior spleen likely evolving  infarction. Interval placement of percutaneous left upper abdominal pigtail  drainage catheter with near complete resolution of the previous large  subdiaphragmatic fluid and gas collection. Small amount of fluid and gas  surrounding the residual spleen.      Nonspecific bowel wall thickening involving the distal small bowel and left  colon which may be reactive. Wall thickening also seen in the posterior gastric  body.     Small amount of fluid in the pelvis is unchanged. Focal nodularity anterior to  the bladder is unchanged     Stable moderate left pleural effusion with left basilar airspace disease. Assessment/Plan:   43 yo WF with stage IV appendiceal cancer, which was initially thought to be an ovarian cancer. She is 4 weeks post-op from X-lap, SIERRA, BSO, ileocecal resection with reanastomosis, and omentectomy. The plan is for her to begin FOLFOX chemotherapy sometime in the next few weeks. They just have not decided if they want to have their treatment at [facility] or her at [facility]. She would like to go ahead and have an IV port placed and they have asked that I do that. We can schedule that at their convenience. I would like to check a CXR to see if her effusion is getting better. Clinically it sounds improved. I also want to check a CBC to see if the thrombocytosis has resolved. If the platelet count remains elevated, we may need to start her on an aspirin for clot risk. We also may need to consider vaccinations for encapsulated bacteria. I will probably repeat a CT scan to reevaluate the spleen. She is scheduled for another opinion at [facility] next week.         Signed By: Austyn Live MD     October 23, 2019

## 2019-10-28 ENCOUNTER — HOSPITAL ENCOUNTER (OUTPATIENT)
Dept: GENERAL RADIOLOGY | Age: 49
Discharge: HOME | End: 2019-10-28
Attending: OBSTETRICS & GYNECOLOGY
Payer: COMMERCIAL

## 2019-10-28 ENCOUNTER — HOSPITAL ENCOUNTER (OUTPATIENT)
Dept: PREADMISSION TESTING | Age: 49
Discharge: HOME | End: 2019-10-28
Payer: COMMERCIAL

## 2019-10-28 VITALS
HEART RATE: 94 BPM | HEIGHT: 63 IN | SYSTOLIC BLOOD PRESSURE: 109 MMHG | WEIGHT: 123 LBS | BODY MASS INDEX: 21.79 KG/M2 | DIASTOLIC BLOOD PRESSURE: 75 MMHG | TEMPERATURE: 97.8 F

## 2019-10-28 LAB
ALBUMIN SERPL-MCNC: 2.7 G/DL (ref 3.5–5)
ALBUMIN/GLOB SERPL: 0.6 {RATIO} (ref 1.1–2.2)
ALP SERPL-CCNC: 120 U/L (ref 45–117)
ALT SERPL-CCNC: 18 U/L (ref 12–78)
ANION GAP SERPL CALC-SCNC: 7 MMOL/L (ref 5–15)
AST SERPL-CCNC: 18 U/L (ref 15–37)
BASOPHILS # BLD: 0 K/UL (ref 0–0.1)
BASOPHILS NFR BLD: 0 % (ref 0–1)
BILIRUB SERPL-MCNC: 0.3 MG/DL (ref 0.2–1)
BUN SERPL-MCNC: 5 MG/DL (ref 6–20)
BUN/CREAT SERPL: 12 (ref 12–20)
CALCIUM SERPL-MCNC: 9.4 MG/DL (ref 8.5–10.1)
CHLORIDE SERPL-SCNC: 102 MMOL/L (ref 97–108)
CO2 SERPL-SCNC: 27 MMOL/L (ref 21–32)
CREAT SERPL-MCNC: 0.42 MG/DL (ref 0.55–1.02)
DIFFERENTIAL METHOD BLD: ABNORMAL
EOSINOPHIL # BLD: 0.1 K/UL (ref 0–0.4)
EOSINOPHIL NFR BLD: 1 % (ref 0–7)
ERYTHROCYTE [DISTWIDTH] IN BLOOD BY AUTOMATED COUNT: 14.6 % (ref 11.5–14.5)
GLOBULIN SER CALC-MCNC: 4.5 G/DL (ref 2–4)
GLUCOSE SERPL-MCNC: 95 MG/DL (ref 65–100)
HCT VFR BLD AUTO: 37 % (ref 35–47)
HGB BLD-MCNC: 11.5 G/DL (ref 11.5–16)
IMM GRANULOCYTES # BLD AUTO: 0 K/UL (ref 0–0.04)
IMM GRANULOCYTES NFR BLD AUTO: 0 % (ref 0–0.5)
LYMPHOCYTES # BLD: 1.4 K/UL (ref 0.8–3.5)
LYMPHOCYTES NFR BLD: 11 % (ref 12–49)
MCH RBC QN AUTO: 28 PG (ref 26–34)
MCHC RBC AUTO-ENTMCNC: 31.1 G/DL (ref 30–36.5)
MCV RBC AUTO: 90 FL (ref 80–99)
MONOCYTES # BLD: 0.9 K/UL (ref 0–1)
MONOCYTES NFR BLD: 7 % (ref 5–13)
NEUTS SEG # BLD: 10.3 K/UL (ref 1.8–8)
NEUTS SEG NFR BLD: 81 % (ref 32–75)
NRBC # BLD: 0 K/UL (ref 0–0.01)
NRBC BLD-RTO: 0 PER 100 WBC
PLATELET # BLD AUTO: 722 K/UL (ref 150–400)
PMV BLD AUTO: 9.7 FL (ref 8.9–12.9)
POTASSIUM SERPL-SCNC: 3.8 MMOL/L (ref 3.5–5.1)
PROT SERPL-MCNC: 7.2 G/DL (ref 6.4–8.2)
RBC # BLD AUTO: 4.11 M/UL (ref 3.8–5.2)
RBC MORPH BLD: ABNORMAL
RBC MORPH BLD: ABNORMAL
SODIUM SERPL-SCNC: 136 MMOL/L (ref 136–145)
WBC # BLD AUTO: 12.7 K/UL (ref 3.6–11)

## 2019-10-28 PROCEDURE — 85025 COMPLETE CBC W/AUTO DIFF WBC: CPT

## 2019-10-28 PROCEDURE — 80053 COMPREHEN METABOLIC PANEL: CPT

## 2019-10-28 PROCEDURE — 71046 X-RAY EXAM CHEST 2 VIEWS: CPT

## 2019-10-28 RX ORDER — PROCHLORPERAZINE MALEATE 10 MG
10 TABLET ORAL
COMMUNITY
End: 2019-11-06

## 2019-10-28 NOTE — DISCHARGE SUMMARY
Discharge Summary Patient: Fay Castañeda MRN: [id number]  SSN: [id number] YOB: 1970  Age: 44 y.o. 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  
Problem List as of 10/20/2019 Date Reviewed: 10/9/2019 Codes Class Noted - Resolved Symptomatic cholelithiasis ICD-10-CM: K80.20 ICD-9-CM: 574.20  9/18/2019 - Present Splenic infarction ICD-10-CM: D73.5 ICD-9-CM: 289.59  10/15/2019 - Present Carcinoma of appendix ([facility] [address] ICD-10-CM: C18.1 ICD-9-CM: 153.5  10/15/2019 - Present Mucinous adenocarcinoma of appendix ([facility] [address] ICD-10-CM: C18.1 ICD-9-CM: 153.5  9/24/2019 - Present Small bowel obstruction ([facility] [address] ICD-10-CM: F66.982 ICD-9-CM: 560.9  9/24/2019 - Present 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  postmenopausal female who is being seen for what appears to be a liquified splenic infarction following her recent tumor debulking procedure. She is well known to the gynecologic oncology service. On 9/20/19 I was consulted intraoperatively by Dr. Allyson Lucas for what appeared to be an ovarian carcinoma. She had been having nausea/vomiting and abdominal pain for months. A HIDA scan suggested that her gallbladder was likely the source of her pain and Dr. Igor Dumont took her to the OR for a laparoscopic cholecystectomy. At the time of surgery she was noted to have peritoneal carcinomatosis with bilaterally enlarged ovaries. Cytology was obtained and I performed a biopsy of the right ovary.   We decided that it would be best to wait on the final pathology before proceeding with any aggressive debulking surgery, as we might also consider neoadjuvant chemotherapy. I arranged for her to have a CT of the chest/abdomen/pelvis following her laparoscopy. She was followed by Dr. Thaddeus Pierre with [phone] for her gynecologic care. She was actually seen and evaluated there in June 2019 for some irregular bleeding. She had been on OCPs to help control her symptoms. A pelvic ultrasound demonstrated a normal appearing uterus. The endometrium was not thickened. Her right ovary contained a 3.2 cm simple cyst.  The left ovary was not seen. There was a small amount of free fluid. She presented to the office to review her CT and pathology results and to discuss a definitive plan. The CT scan demonstrated dilated loops of small bowel, suggesting partial obstruction. There was mucosal thickening of the terminal ileum. The appendix was not visualized. In addition to the gastrointestinal findings, there was bilateral ovarian enlargement, omental thickening, carcinomatosis, and ascites. Radiologic findings and operative findings would suggest an ovarian or peritoneal carcinoma. Preliminary pathology was a bit confusing, however. I spoke with Dr. Zoila Keith from pathology. The right ovarian biopsy demonstrated an adenocarcinoma, but it wasn't clear histologically as to what it was. The tumor was CK7 and PAX8 negative, while also being CK20 positive. These findings would go against it being an ovarian cancer. She was taken to the OR on 9/25/19 for surgery. She underwent an X-lap, SIERRA, BSO, ileocecal resection with reanastomosis, and omentectomy. Her EBL was 1300 cc. She received 3 units PRBCs intraoperatively and another 2 units postoperatively. She was discharged home on POD #7 after regaining bowel function. Operative findings: On bimanual pelvic exam she was noted to have a rock hard cervix and uterus, worrisome for disease infiltration.   It was mobile. No parametrial involvement. The uterus was enlarged. Bilateral adnexal masses. The bladder was densely adherent the lower uterine segment and cervix. Mildly dilated small bowel. Rock hard appendix scarred to the underside of the cecum with tethering of the small bowel at that point. Implants noted in the distal omentum. Small volume peritoneal disease scattered throughout the abdominal cavity involving the anterior abdominal wall, diaphragms, small and large bowel mesenteries. Frozen section pathology of the ileocecal resection suggested that this was the primary site of disease. There were some signet ring features noted. Frozen section pathology of the uterus demonstrated infiltration of the myometrium with the same process. FINAL PATHOLOGIC DIAGNOSIS 1. Terminal ileum, cecum, and appendix, ileocecectomy:  
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 SPECIMEN Procedure: Ileocecectomy TUMOR Tumor Site: Diffusely involving appendix Histologic Type: Mucinous adenocarcinoma Histologic Type Comments: Extensive signet ring cell component Histologic Grade: G3: Poorly differentiated Tumor Size: 4.5 cm Tumor Deposits: Present Number of Deposits: 1 Tumor Extension: Tumor directly invades adjacent organs or structures: Uterus, cervix,  
bilateral ovaries and fallopian tubes, and omentum Lymphovascular Invasion: Not identified Perineural Invasion: Present, extensive MARGINS Proximal, distal and radial margins uninvolved by invasive carcinoma. LYMPH NODES Number of Lymph Nodes Involved: 11 Number of Lymph Nodes Examined: 11 PATHOLOGIC STAGE CLASSIFICATION (pTNM, AJCC 8th Edition) Primary Tumor (pT): pT4b Regional Lymph Nodes (pN): pN2 Distant Metastasis (pM): pM1c Site(s): Uterus, bilateral fallopian tubes, and ovaries 2.  Uterus, bilateral fallopian tubes and ovaries, supracervical hysterectomy bilateral salpingo-oophorectomy:  
Serosa: Positive for metastatic mucinous adenocarcinoma involving fibrous adhesions. Myometrium: Extensive transmural involvement by metastatic mucinous adenocarcinoma. Adenomyosis. Endometrium: Inactive endometrium with metastatic mucinous adenocarcinoma. Bilateral fallopian tubes: Metastatic mucinous adenocarcinoma. Bilateral fallopian tubes: Metastatic mucinous adenocarcinoma. 3. Omentum, omentectomy:  
Positive for metastatic mucinous adenocarcinoma. Splenule. 4. Cervix, completion total hysterectomy:  
Positive for metastatic mucinous adenocarcinoma. IMMUNOHISTOCHEMISTRY - DNA Mismatch Repair Protein Expression Results: MLH-1 No loss of expression Extent: 3+; Intensity: Strong MSH-2 No loss of expression Extent: 3+; Intensity: Strong MSH-6 No loss of expression Extent: 3+; Intensity: Strong PMS2 No loss of expression Extent: 2+; Intensity: Moderate All DNA mismatch repair proteins cited above are shown to be proficient by immunohistochemistry method. This is strong evidence against DNA mismatch repair defects as the molecular basis for this malignancy and provides strong evidence against Monroe syndrome. I saw her back in the office last week for staple removal.  I discussed her pathology and referred her to Dr. Elroy Anderson for consultation. She was complaining of some left upper quadrant and rib pain, as well as some shortness of breath. Plain films suggested a pleural effusion. She was sent for a CT of the abdomen/pelvis to evaluate. This revealed a complex fluid collection in the left upper quadrant, consistent with a splenic infarction/abscess. It appeared to be contained within the splenic capsule. She was admitted from the ER and CT guided drainage of the splenic fluid collection was performed.   About 900 cc of thick bloody fluid was removed almost immediately. It drained several hundred more cc's over the course of the next several days. She felt better almost immediately after drainage and her appetite returned. After several days she was still having drainage from the site and I felt it was a bit too much to pull the drain at that time. We decided to send her home with the drain, with her recording daily output. She will follow up with me in the office the upcoming week to consider drain removal. 
 
Consults: None Significant Diagnostic Studies:  
 
CT of abdomen/pelvis (10/15/19) Splenic infarction: A complex fluid collection in the subdiaphragmatic left 
upper quadrant containing mixed soft tissue, fluid, gas, and fat densities. It 
is predominantly comprised of low density fluid. A small amount of normal 
splenic tissue is displaced superiorly and posteromedially (601-81, 3-21), and 
blends into the complex fluid collection without clear delineation. The splenic 
artery and vein are attenuated, and extend toward the fluid collection. As such, 
a liquefied splenic infarction is favored. The small locules of gas may be 
infectious or postsurgical. Fistulous etiology is considered less likely. In 
greatest dimensions, the collection measures 12.2 x 13.7 x 15.3 cm. This is 
exclusive of a smaller, communicating collection along the lateral wall of the 
gastric fundus, which measures 4.2 x 3.2 x 3.2 cm, and communicates with the 
main collection on image [id number]. There is significant mass effect on the 
stomach, displacing it anteromedially. A new, small to moderate, left pleural 
effusion is likely reactive. Passive atelectasis is associated. 
  
Abdomen: The right lung base is clear. The heart size is normal. Incidental note 
is made of small hepatic cysts in segments 2 and 3. The distal esophagus, 
duodenum, gallbladder, pancreas, adrenals, and kidneys are normal. 
  
Pelvis:  There is a small to moderate volume of loculated, complex ascites in the 
pelvis and right paracolic gutter. Peritoneal enhancement and adjacent colonic 
inflammation are associated. There is trace mesenteric edema. Focal thickening 
at the dome of the bladder (602-79, 601-37) is not significant changed from 9/23/2019. It may represent a peritoneal nodule as opposed to bladder nodule. The small bowel and end-to-side ileocecal anastomosis are normal. Post 
hysterectomy and ileocecectomy. 
  
IMPRESSION:  
1. Liquefied splenic infarction: left subdiaphragmatic complex fluid collection. Several gas locules suggest infection. 2. Small to moderate, reactive, left pleural effusion. 3. Small to moderate volume of loculated, complex ascites. Associated peritoneal 
enhancement and colonic inflammation. 4. Probable peritoneal carcinomatosis along the bladder dome. Other areas of 
carcinomatosis seen on prior study are not as clearly visible postoperatively. CT of abdomen/pelvis (10/18/19) LIVER: Stable left hepatic lobe cyst and other low-density left hepatic lesion 
too small to characterize. No new focal liver abnormality. No biliary ductal 
dilatation GALLBLADDER: No calcified gallstone SPLEEN: Hypoattenuation involving two thirds of the anterior spleen likely 
evolving infarction. Interval placement of percutaneous left upper abdominal 
pigtail drainage catheter with near complete resolution of the previous large 
subdiaphragmatic fluid and gas collection. Small amount of fluid and gas 
surrounding the native spleen. PANCREAS: No mass or ductal dilatation. ADRENALS: Unremarkable. KIDNEYS/URETERS: Normal symmetric nephrograms without evidence for  focal mass. No hydronephrosis PERITONEUM: No abdominal lymphadenopathy. Small amount of ascites. COLON: Mild nonspecific bowel wall thickening involving the descending colon, 
sigmoid colon, and rectum APPENDIX: Not clearly seen. SMALL BOWEL: No small bowel obstruction. Mild bowel wall thickening involving the distal small bowel may be reactive. STOMACH: Nonspecific wall thickening involving the posterior gastric body PELVIS: No pelvic lymphadenopathy. Focal fluid in the anterior lower pelvis and 
anterior to the rectum. Focal nodularity anterior to the bladder is unchanged. BONES: No destructive bone lesion. VISUALIZED THORAX: Stable moderate left pleural effusion with lingular and left 
lower lobe airspace disease. ADDITIONAL COMMENTS: N/A 
  
IMPRESSION: 
Hypoattenuation involving two thirds of the anterior spleen likely evolving 
infarction. Interval placement of percutaneous left upper abdominal pigtail 
drainage catheter with near complete resolution of the previous large 
subdiaphragmatic fluid and gas collection. Small amount of fluid and gas 
surrounding the residual spleen.  
  
Nonspecific bowel wall thickening involving the distal small bowel and left 
colon which may be reactive. Wall thickening also seen in the posterior gastric 
body. 
  
Small amount of fluid in the pelvis is unchanged. Focal nodularity anterior to 
the bladder is unchanged 
  
Stable moderate left pleural effusion with left basilar airspace disease. Disposition: home Discharge Medications:  
Cannot display discharge medications since this patient is not currently admitted. Activity: Activity as tolerated Diet: Regular Diet Wound Care: Drain management. Follow-up Appointments Procedures  FOLLOW UP VISIT Appointment in: 3 - 5 Days Drain evaluation/removal.  
  Drain evaluation/removal.  
  Standing Status:   Standing Number of Occurrences:   1 Order Specific Question:   Appointment in Answer:   3 - 5 Days Signed By: Breonna Montalvo MD   
 October 28, 2019

## 2019-10-28 NOTE — PERIOP NOTES
PATIENT GIVEN SURGICAL SITE INFECTION FAQ HANDOUT AND HAND WASHING TIP SHEET. PREOP INSTRUCTIONS REVIEWED AND PATIENT VERBALIZES UNDERSTANDING OF INSTRUCTIONS. PATIENT HAS BEEN GIVEN THE OPPORTUNITY TO ASK ADDITIONAL QUESTIONS. GAVE PATIENT 2 BOTTLES OF CHG CLEANSER AND INSTRUCTIONS, PATIENT VERBALIZED UNDERSTANDING. SENT PATIENT TO [facility] FOR PREOP CHEST XRAY PER SURGEONS ORDERS.

## 2019-10-29 ENCOUNTER — TELEPHONE (OUTPATIENT)
Dept: GYNECOLOGY | Age: 49
End: 2019-10-29

## 2019-10-29 NOTE — PERIOP NOTES
THE FOLLOWING ABNORMAL LABS WERE CALLED TO [facility] IN DR. Kwame Castillo OFFICE: 
 
WBC-12.7 [facility] CHEST XRAY IMPRESSION: MODERATE LEFT PLEURAL EFFUSION, SOMEWHAT DIMINISHED COMPARED TO PRIOR STUDY. ALL LABS AND CXR FAXED TO OFFICE WITH CONFIRMATION RECEIPT RECEIVED. SURGERY IS SCHEDULED FOR 11/01/19. [facility] STATED THAT DR. STEELE IS AWARE OF ALL ABNORMAL RESULTS AND PATIENT'S SURGERY MIGHT BE MOVED TO A LATER DAY NEXT WEEK, POSSIBLY 11/04/19.

## 2019-10-29 NOTE — TELEPHONE ENCOUNTER
Called the patients  to advise per  that it is okay to move the port insertion to next week and that he would like to repeat a scan next week. I advised to begin the baby aspirin after finishing the Lovenox for the elevated platelets. He will call back to reschedule after he speaks with the patient.

## 2019-10-29 NOTE — TELEPHONE ENCOUNTER
The patients  is calling to see about waiting until next week before getting the port inserted. They will be traveling Thursday to [facility] and she is still very weak, continues to lose weight, no appetite and loose stools. He also is calling for her lab results.

## 2019-10-30 DIAGNOSIS — D73.5 SPLENIC INFARCTION: ICD-10-CM

## 2019-10-30 DIAGNOSIS — C18.1 CARCINOMA OF APPENDIX (HCC): Primary | ICD-10-CM

## 2019-11-02 ENCOUNTER — HOSPITAL ENCOUNTER (OUTPATIENT)
Dept: CT IMAGING | Age: 49
Discharge: HOME | End: 2019-11-02
Attending: OBSTETRICS & GYNECOLOGY
Payer: COMMERCIAL

## 2019-11-02 DIAGNOSIS — C18.1 CARCINOMA OF APPENDIX (HCC): ICD-10-CM

## 2019-11-02 DIAGNOSIS — D73.5 SPLENIC INFARCTION: ICD-10-CM

## 2019-11-02 PROCEDURE — 74011000258 HC RX REV CODE- 258: Performed by: RADIOLOGY

## 2019-11-02 PROCEDURE — 74011636320 HC RX REV CODE- 636/320: Performed by: RADIOLOGY

## 2019-11-02 PROCEDURE — 74177 CT ABD & PELVIS W/CONTRAST: CPT

## 2019-11-02 RX ORDER — SODIUM CHLORIDE 0.9 % (FLUSH) 0.9 %
10 SYRINGE (ML) INJECTION
Status: DISCONTINUED | OUTPATIENT
Start: 2019-11-02 | End: 2019-11-02

## 2019-11-02 RX ORDER — SODIUM CHLORIDE 0.9 % (FLUSH) 0.9 %
10 SYRINGE (ML) INJECTION
Status: COMPLETED | OUTPATIENT
Start: 2019-11-02 | End: 2019-11-02

## 2019-11-02 RX ADMIN — SODIUM CHLORIDE 100 ML: 900 INJECTION, SOLUTION INTRAVENOUS at 13:43

## 2019-11-02 RX ADMIN — Medication 10 ML: at 13:43

## 2019-11-02 RX ADMIN — IOHEXOL 50 ML: 240 INJECTION, SOLUTION INTRATHECAL; INTRAVASCULAR; INTRAVENOUS; ORAL at 13:43

## 2019-11-02 RX ADMIN — IOPAMIDOL 100 ML: 755 INJECTION, SOLUTION INTRAVENOUS at 13:43

## 2019-11-04 ENCOUNTER — TELEPHONE (OUTPATIENT)
Dept: GYNECOLOGY | Age: 49
End: 2019-11-04

## 2019-11-04 ENCOUNTER — TELEPHONE (OUTPATIENT)
Dept: ONCOLOGY | Age: 49
End: 2019-11-04

## 2019-11-04 DIAGNOSIS — C79.60 MALIGNANT NEOPLASM METASTATIC TO OVARY, UNSPECIFIED LATERALITY (HCC): Primary | ICD-10-CM

## 2019-11-04 DIAGNOSIS — R50.9 LOW GRADE FEVER: ICD-10-CM

## 2019-11-04 NOTE — TELEPHONE ENCOUNTER
Per phone call from [facility], Dr. Lester Rosas wants repeat labs on patient due to she was running a low grade fever over the weekend, I called and spoke with Clarisa Lozano, ok per DONNIE on file, to find which lab gigi they would like to go to, they will go the the one on [facility] that is not attached to the hospital, fax # [phone]

## 2019-11-05 DIAGNOSIS — C18.1 CARCINOMA OF APPENDIX (HCC): Primary | ICD-10-CM

## 2019-11-05 DIAGNOSIS — D73.5 SPLENIC INFARCTION: ICD-10-CM

## 2019-11-05 LAB
ALBUMIN SERPL-MCNC: 3.9 G/DL (ref 3.5–5.5)
ALBUMIN/GLOB SERPL: 1.1 {RATIO} (ref 1.2–2.2)
ALP SERPL-CCNC: 110 IU/L (ref 39–117)
ALT SERPL-CCNC: 14 IU/L (ref 0–32)
AST SERPL-CCNC: 14 IU/L (ref 0–40)
BASOPHILS # BLD AUTO: 0 X10E3/UL (ref 0–0.2)
BASOPHILS NFR BLD AUTO: 1 %
BILIRUB SERPL-MCNC: <0.2 MG/DL (ref 0–1.2)
BUN SERPL-MCNC: 4 MG/DL (ref 6–24)
BUN/CREAT SERPL: 8 (ref 9–23)
CALCIUM SERPL-MCNC: 10 MG/DL (ref 8.7–10.2)
CHLORIDE SERPL-SCNC: 99 MMOL/L (ref 96–106)
CO2 SERPL-SCNC: 25 MMOL/L (ref 20–29)
CREAT SERPL-MCNC: 0.5 MG/DL (ref 0.57–1)
EOSINOPHIL # BLD AUTO: 0.3 X10E3/UL (ref 0–0.4)
EOSINOPHIL NFR BLD AUTO: 4 %
ERYTHROCYTE [DISTWIDTH] IN BLOOD BY AUTOMATED COUNT: 13.8 % (ref 12.3–15.4)
GLOBULIN SER CALC-MCNC: 3.5 G/DL (ref 1.5–4.5)
GLUCOSE SERPL-MCNC: 90 MG/DL (ref 65–99)
HCT VFR BLD AUTO: 39.3 % (ref 34–46.6)
HGB BLD-MCNC: 12.4 G/DL (ref 11.1–15.9)
IMM GRANULOCYTES # BLD AUTO: 0 X10E3/UL (ref 0–0.1)
IMM GRANULOCYTES NFR BLD AUTO: 1 %
LYMPHOCYTES # BLD AUTO: 1.5 X10E3/UL (ref 0.7–3.1)
LYMPHOCYTES NFR BLD AUTO: 24 %
MCH RBC QN AUTO: 27.7 PG (ref 26.6–33)
MCHC RBC AUTO-ENTMCNC: 31.6 G/DL (ref 31.5–35.7)
MCV RBC AUTO: 88 FL (ref 79–97)
MONOCYTES # BLD AUTO: 0.6 X10E3/UL (ref 0.1–0.9)
MONOCYTES NFR BLD AUTO: 9 %
NEUTROPHILS # BLD AUTO: 4 X10E3/UL (ref 1.4–7)
NEUTROPHILS NFR BLD AUTO: 61 %
PLATELET # BLD AUTO: 857 X10E3/UL (ref 150–450)
POTASSIUM SERPL-SCNC: 4 MMOL/L (ref 3.5–5.2)
PROT SERPL-MCNC: 7.4 G/DL (ref 6–8.5)
RBC # BLD AUTO: 4.47 X10E6/UL (ref 3.77–5.28)
SODIUM SERPL-SCNC: 139 MMOL/L (ref 134–144)
WBC # BLD AUTO: 6.4 X10E3/UL (ref 3.4–10.8)

## 2019-11-05 NOTE — PROGRESS NOTES
Labs from yesterday demonstrate a normal WBC, though her platelet count >570. She continues to have low-grade temps. CT from a few days ago demonstrates persistent fluid collection in the spleen. I recommended repeat CT guided drainage. We will plan to leave the drain in longer this time. She is scheduled for an IV port. We will hold off on that for now. We will reschedule that once she is feeling better.      Arvind Harper MD

## 2019-11-06 ENCOUNTER — HOSPITAL ENCOUNTER (OUTPATIENT)
Dept: GENERAL RADIOLOGY | Age: 49
Discharge: HOME | End: 2019-11-06
Attending: RADIOLOGY
Payer: COMMERCIAL

## 2019-11-06 ENCOUNTER — HOSPITAL ENCOUNTER (OUTPATIENT)
Dept: CT IMAGING | Age: 49
Discharge: HOME | End: 2019-11-06
Attending: OBSTETRICS & GYNECOLOGY
Payer: COMMERCIAL

## 2019-11-06 ENCOUNTER — HOSPITAL ENCOUNTER (OUTPATIENT)
Dept: INTERVENTIONAL RADIOLOGY/VASCULAR | Age: 49
Discharge: HOME | End: 2019-11-06
Attending: OBSTETRICS & GYNECOLOGY
Payer: COMMERCIAL

## 2019-11-06 VITALS
SYSTOLIC BLOOD PRESSURE: 116 MMHG | TEMPERATURE: 97.5 F | HEART RATE: 74 BPM | BODY MASS INDEX: 21.62 KG/M2 | WEIGHT: 122 LBS | OXYGEN SATURATION: 98 % | HEIGHT: 63 IN | DIASTOLIC BLOOD PRESSURE: 78 MMHG | RESPIRATION RATE: 23 BRPM

## 2019-11-06 DIAGNOSIS — D73.5 SPLENIC INFARCTION: ICD-10-CM

## 2019-11-06 DIAGNOSIS — C18.1 CARCINOMA OF APPENDIX (HCC): ICD-10-CM

## 2019-11-06 PROCEDURE — 32555 ASPIRATE PLEURA W/ IMAGING: CPT

## 2019-11-06 PROCEDURE — 71045 X-RAY EXAM CHEST 1 VIEW: CPT

## 2019-11-06 RX ORDER — ACETAMINOPHEN 325 MG/1
650 TABLET ORAL
COMMUNITY
End: 2021-01-01

## 2019-11-06 RX ORDER — FENTANYL CITRATE 50 UG/ML
200 INJECTION, SOLUTION INTRAMUSCULAR; INTRAVENOUS
Status: DISCONTINUED | OUTPATIENT
Start: 2019-11-06 | End: 2019-11-06

## 2019-11-06 RX ORDER — SODIUM CHLORIDE 9 MG/ML
500 INJECTION, SOLUTION INTRAVENOUS CONTINUOUS
Status: DISCONTINUED | OUTPATIENT
Start: 2019-11-06 | End: 2019-11-10 | Stop reason: HOSPADM

## 2019-11-06 RX ORDER — MIDAZOLAM HYDROCHLORIDE 1 MG/ML
5 INJECTION, SOLUTION INTRAMUSCULAR; INTRAVENOUS
Status: DISCONTINUED | OUTPATIENT
Start: 2019-11-06 | End: 2019-11-06

## 2019-11-06 NOTE — PROGRESS NOTES
Dr Mikayla Fan performed ultrasound guided left thoracentesis and drained 200ml clear yellow fluid. Portable cxr obtained post procedure. Instructed to ambulate and pulmonary toilet with incentive spirometer. Band aid applied to site, clean and dry at time of discharge. Discharge instructions reviewed and copy given. Home with .

## 2019-11-06 NOTE — DISCHARGE INSTRUCTIONS
[facility] [address] [place] [address]  Department of Interventional Radiology  [facility]  [phone]  THORACENTESIS DISCHARGE INSTRUCTIONS    General Information:  During this procedures, the doctor will insert a needle into the body to drain fluid from the chest. After the procedure, you will be able to take a deep breath much easier. The site of the puncture may ooze the first day. This will decrease and eventually stop. With the Thoracentesis (draining fluid from the chest), there is a risk of air leaking into the chest around the lung, and risk of bleeding into the chest, with the resulting pressure on the lung possibly making it collapse. A chest x-ray is done after the procedure to detect possible complications. Home Care Instructions:  Keep the puncture site clean and dry. No tub baths or swimming until puncture site heals. Showering is acceptable. Resume your normal diet, and resume your normal activity slowly and as you tolerate. If you are short of breath, rest. If shortness of breath does not ease, please call your ordering doctor. Fluid can re-accumulate in the chest and/or in the abdomen. If this should occur, your doctor needs to know as you may need to have the procedure done again. Call If:     You should call your Physician and/or the Radiology Nurse if you notice any signs of infection, like pus draining, or if it is swollen or reddened. Also call if you have a fever, or if you are bleeding from the puncture site more than a small amount on the dressing. Call if the puncture site keeps draining fluid. Some oozing is to be expected, but should slow and then stop. Call if you feel like you have pressure in your abdomen. SEEK IMMEDIATE CARE OR CALL 911 IF YOU SUDDENLY HAVE TROUBLE BREATHING, OR IF YOUR LIPS TURN BLUE, OR IF YOU NOTICE BLOOD IN YOUR SPUTUM. Follow-Up Instructions: Please see your ordering doctor as he/she has requested. To Reach Us:    Side effects of sedation medications and other medications used today have been reviewed. Notify us of nausea, itching, hives, dizziness, or anything else out of the ordinary. Should you experience any of these significant changes, please call [phone] between the hours of 7:30 am and 10 pm or [phone] after hours.  After hours, ask the  to page the [address] Technologist, and describe the problem to the technologist.     Date: 11/6/2019  Discharging Nurse: Vero Arechiga RN

## 2019-11-08 ENCOUNTER — TELEPHONE (OUTPATIENT)
Dept: ONCOLOGY | Age: 49
End: 2019-11-08

## 2019-11-08 NOTE — TELEPHONE ENCOUNTER
Call to spouse, HIPAA verified. Per spouse, MD suggested that care here could be coordinated with [facility] via Dr. Elodia George. Had recent surgery with Dr. Benita Mendes and continues to run fevers, but is following with them. Would like patient to receive chemo at [facility] here, patient too fragile and unstable to travel for chemo. Not a candidate for HIPEC, [facility] recommends systemic chemo at this time. Dr. Boyce [facility] can be reached at [phone]. To provider.

## 2019-11-11 ENCOUNTER — OFFICE VISIT (OUTPATIENT)
Dept: GYNECOLOGY | Age: 49
End: 2019-11-11

## 2019-11-11 VITALS
DIASTOLIC BLOOD PRESSURE: 75 MMHG | SYSTOLIC BLOOD PRESSURE: 110 MMHG | WEIGHT: 126.2 LBS | HEART RATE: 73 BPM | BODY MASS INDEX: 22.36 KG/M2 | HEIGHT: 63 IN

## 2019-11-11 DIAGNOSIS — C18.1 MUCINOUS ADENOCARCINOMA OF APPENDIX (HCC): Primary | ICD-10-CM

## 2019-11-11 RX ORDER — LORAZEPAM 0.5 MG/1
0.5 TABLET ORAL
COMMUNITY
Start: 2019-10-14 | End: 2019-11-26 | Stop reason: SDUPTHER

## 2019-11-11 NOTE — LETTER
11/11/19 Patient: Africa Graham YOB: 1970 Date of Visit: 11/11/2019 Nora Fernandez MD 
[place] [address] [address] [facility] VIA Facsimile: [phone] Dear Nora Fernandez MD, Thank you for referring Ms. Africa Graham to [facility] for evaluation. My notes for this consultation are attached. If you have questions, please do not hesitate to call me. I look forward to following your patient along with you.  
 
 
Sincerely, 
 
Mingo Garcia MD

## 2019-11-11 NOTE — PROGRESS NOTES
[facility] [address]  P [phone]  F [phone]    Office Note  Patient ID:  Name:  Aramis Messina  MRN:  [id number]  :  1970/49 y.o. Date:  2019      HISTORY OF PRESENT ILLNESS:  Aramis Messina is a 44 y.o.  postmenopausal female with a diagnosis or stage IV appendiceal cancer. On 19 I was consulted intraoperatively by Dr. Mariella Montoya for what appeared to be an ovarian carcinoma. She had been having nausea/vomiting and abdominal pain for months. A HIDA scan suggested that her gallbladder was likely the source of her pain and Dr. Clovis Palacios took her to the OR for a laparoscopic cholecystectomy. At the time of surgery she was noted to have peritoneal carcinomatosis with bilaterally enlarged ovaries. Cytology was obtained and I performed a biopsy of the right ovary. We decided that it would be best to wait on the final pathology before proceeding with any aggressive debulking surgery, as we might also consider neoadjuvant chemotherapy. I arranged for her to have a CT of the chest/abdomen/pelvis following her laparoscopy.     She was followed by Dr. Zahida Rothman with [facility] for her gynecologic care. She was actually seen and evaluated there in 2019 for some irregular bleeding. She had been on OCPs to help control her symptoms. A pelvic ultrasound demonstrated a normal appearing uterus. The endometrium was not thickened. Her right ovary contained a 3.2 cm simple cyst.  The left ovary was not seen. There was a small amount of free fluid.       She presented to the office to review her CT and pathology results and to discuss a definitive plan. The CT scan demonstrated dilated loops of small bowel, suggesting partial obstruction. There was mucosal thickening of the terminal ileum. The appendix was not visualized.   In addition to the gastrointestinal findings, there was bilateral ovarian enlargement, omental thickening, carcinomatosis, and ascites. Radiologic findings and operative findings would suggest an ovarian or peritoneal carcinoma. Preliminary pathology was a bit confusing, however. I spoke with Dr. Flor Ward from pathology. The right ovarian biopsy demonstrated an adenocarcinoma, but it wasn't clear histologically as to what it was. The tumor was CK7 and PAX8 negative, while also being CK20 positive. These findings would go against it being an ovarian cancer. She was taken to the OR on 9/25/19 for surgery. She underwent an X-lap, SIERRA, BSO, ileocecal resection with reanastomosis, and omentectomy. Her EBL was 1300 cc. She received 3 units PRBCs intraoperatively and another 2 units postoperatively. She was discharged home on POD #7 after regaining bowel function. Operative findings:  On bimanual pelvic exam she was noted to have a rock hard cervix and uterus, worrisome for disease infiltration.  It was mobile.  No parametrial involvement.  The uterus was enlarged.  Bilateral adnexal masses.  The bladder was densely adherent the lower uterine segment and cervix.  Mildly dilated small bowel.  Rock hard appendix scarred to the underside of the cecum with tethering of the small bowel at that point.  Implants noted in the distal omentum. Small volume peritoneal disease scattered throughout the abdominal cavity involving the anterior abdominal wall, diaphragms, small and large bowel mesenteries.  Frozen section pathology of the ileocecal resection suggested that this was the primary site of disease.  There were some signet ring features noted.  Frozen section pathology of the uterus demonstrated infiltration of the myometrium with the same process.     FINAL PATHOLOGIC DIAGNOSIS   1. 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    SPECIMEN   Procedure: Ileocecectomy   TUMOR   Tumor Site: Diffusely involving appendix   Histologic Type: Mucinous adenocarcinoma   Histologic Type Comments: Extensive signet ring cell component   Histologic Grade: G3: Poorly differentiated   Tumor Size: 4.5 cm   Tumor Deposits: Present   Number of Deposits: 1   Tumor Extension: Tumor directly invades adjacent organs or structures: Uterus, cervix,   bilateral ovaries and fallopian tubes, and omentum   Lymphovascular Invasion: Not identified   Perineural Invasion: Present, extensive   MARGINS   Proximal, distal and radial margins uninvolved by invasive carcinoma. LYMPH NODES   Number of Lymph Nodes Involved: 11   Number of Lymph Nodes Examined: 11   PATHOLOGIC STAGE CLASSIFICATION (pTNM, AJCC 8th Edition)   Primary Tumor (pT): pT4b   Regional Lymph Nodes (pN): pN2   Distant Metastasis (pM): pM1c   Site(s): Uterus, bilateral fallopian tubes, and ovaries   2. Uterus, bilateral fallopian tubes and ovaries, supracervical hysterectomy bilateral salpingo-oophorectomy:   Serosa: Positive for metastatic mucinous adenocarcinoma involving fibrous adhesions. Myometrium: Extensive transmural involvement by metastatic mucinous adenocarcinoma. Adenomyosis. Endometrium: Inactive endometrium with metastatic mucinous adenocarcinoma. Bilateral fallopian tubes: Metastatic mucinous adenocarcinoma. Bilateral fallopian tubes: Metastatic mucinous adenocarcinoma. 3. Omentum, omentectomy:   Positive for metastatic mucinous adenocarcinoma. Splenule. 4. Cervix, completion total hysterectomy:   Positive for metastatic mucinous adenocarcinoma. IMMUNOHISTOCHEMISTRY - DNA Mismatch Repair Protein Expression   Results:   MLH-1 No loss of expression   Extent: 3+; Intensity: Strong   MSH-2 No loss of expression   Extent: 3+; Intensity: Strong   MSH-6 No loss of expression   Extent: 3+; Intensity: Strong   PMS2 No loss of expression   Extent: 2+;  Intensity: Moderate   All DNA mismatch repair proteins cited above are shown to be proficient by immunohistochemistry method. This is strong evidence against DNA mismatch repair defects as the molecular basis for this malignancy and provides strong evidence against Monroe syndrome. I saw her back in the office on 10/9 for staple removal.  I discussed her pathology and referred her to Dr. Tha Madera for consultation. She was complaining of some left upper quadrant and rib pain, as well as some shortness of breath. Plain films suggested a pleural effusion. She was sent for a CT of the abdomen/pelvis to evaluate. This revealed a complex fluid collection in the left upper quadrant, consistent with a splenic infarction/abscess. It appeared to be contained within the splenic capsule. A CT guided drainage was performed. The fluid which drained appeared consistent with a hematoma and there was no evidence of infection. An accordion drain was left in place. In drained 900 cc immediately and then a few hundred more ccs over the next few days. She was discharged home on 10/20 with the drain in place. The output steadily decreased I removed the drain on 10/23. She was seen at [facility] on 10/21 for consultation and subsequently at [facility]. They both recommended the same treatment as Dr. Carlos Carver, FOLFOX +/- Avastin. I sent her for a repeat CT of the abdomen/pelvis on 11/2/19. The splenic fluid collection was still there. I had ordered another CT guided drainage of the area, however, interventional radiology suggested that we hold off, as it did not appear infected at the time. She did get a left-sided thoracentesis at the time. She is feeling better. Her low-grade temps have resolved. Her appetite is improving.         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 ([facility] [address] 09/20/2019    Symptomatic cholelithiasis 09/18/2019     Past Medical History:   Diagnosis Date    [facility])     APPENDIX CANCER     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 ([facility] [address] 2019    Nausea & vomiting     Splenic infarction 2019    Symptomatic cholelithiasis 9/18/2019      Past Surgical History:   Procedure Laterality Date    HX APPENDECTOMY      HX GI      ILEOCECECTOMY    HX GYN  2003    CYST ON OVARY    HX HYSTERECTOMY      IR THORACENTESIS CATH W IMAGE  11/6/2019      Social History     Tobacco Use    Smoking status: Never Smoker    Smokeless tobacco: Never Used   Substance Use Topics    Alcohol use: Yes     Comment: once a month      Family History   Problem Relation Age of Onset    Breast Cancer Paternal Grandmother 61    Crohn's Disease Mother     Heart Disease Mother     Cancer Father         BLADDER    Diabetes Father     No Known Problems Son     No Known Problems Daughter     Anesth Problems Neg Hx       Current Outpatient Medications   Medication Sig    LORazepam (ATIVAN) 0.5 mg tablet TAKE 1 TABLET BY MOUTH EVERY 8 HOURS AS NEEDED FOR ANXIETY    acetaminophen (TYLENOL) 325 mg tablet Take 650 mg by mouth every four (4) hours as needed for Pain. Indications: pain    ibuprofen (MOTRIN) 200 mg tablet Take 3 Tabs by mouth every eight (8) hours as needed for Pain. No current facility-administered medications for this visit. No Known Allergies     Review of Systems:  A comprehensive review of systems was negative except for that written in the History of Present Illness. , 10 point ROS    Objective:     Physical Exam:   Visit Vitals  /75 (BP 1 Location: Right arm, BP Patient Position: Sitting)   Pulse 73   Ht 5' 2.99\" (1.6 m)   Wt 126 lb 3.2 oz (57.2 kg)   LMP 09/15/2019   BMI 22.36 kg/m²     General:  Alert, cooperative, no distress, appears stated age. HEENT:  WNL. Neck: Supple, without masses. Back:   Symmetric, no curvature.  ROM normal. No CVA tenderness. Lungs:   Decreased BS on left. CTA on the right. Heart:  Regular rate and rhythm. Abdomen:   Soft, non-distended, non-tender. Accordion drain in left upper back. This was removed without difficulty. Pelvic:  Deferred. Rectal:  Deferred. Extremities: Extremities normal, atraumatic, no cyanosis or edema. Skin: Skin color, texture, turgor normal. No rashes or lesions. Neurologic: Grossly intact. Labs:    No results found for this or any previous visit (from the past 24 hour(s)). CT of abdomen/pelvis (10/15/19)  Splenic infarction: A complex fluid collection in the subdiaphragmatic left  upper quadrant containing mixed soft tissue, fluid, gas, and fat densities. It  is predominantly comprised of low density fluid. A small amount of normal  splenic tissue is displaced superiorly and posteromedially (601-81, 3-21), and  blends into the complex fluid collection without clear delineation. The splenic  artery and vein are attenuated, and extend toward the fluid collection. As such,  a liquefied splenic infarction is favored. The small locules of gas may be  infectious or postsurgical. Fistulous etiology is considered less likely. In  greatest dimensions, the collection measures 12.2 x 13.7 x 15.3 cm. This is  exclusive of a smaller, communicating collection along the lateral wall of the  gastric fundus, which measures 4.2 x 3.2 x 3.2 cm, and communicates with the  main collection on image [id number]. There is significant mass effect on the  stomach, displacing it anteromedially. A new, small to moderate, left pleural  effusion is likely reactive. Passive atelectasis is associated.     Abdomen: The right lung base is clear. The heart size is normal. Incidental note  is made of small hepatic cysts in segments 2 and 3. The distal esophagus,  duodenum, gallbladder, pancreas, adrenals, and kidneys are normal.     Pelvis:  There is a small to moderate volume of loculated, complex ascites in the  pelvis and right paracolic gutter. Peritoneal enhancement and adjacent colonic  inflammation are associated. There is trace mesenteric edema. Focal thickening  at the dome of the bladder (602-79, 601-37) is not significant changed from  9/23/2019. It may represent a peritoneal nodule as opposed to bladder nodule. The small bowel and end-to-side ileocecal anastomosis are normal. Post  hysterectomy and ileocecectomy.     IMPRESSION:   1. Liquefied splenic infarction: left subdiaphragmatic complex fluid collection. Several gas locules suggest infection. 2. Small to moderate, reactive, left pleural effusion. 3. Small to moderate volume of loculated, complex ascites. Associated peritoneal  enhancement and colonic inflammation. 4. Probable peritoneal carcinomatosis along the bladder dome. Other areas of  carcinomatosis seen on prior study are not as clearly visible postoperatively. CT of abdomen/pelvis (10/18/19)  LIVER: Stable left hepatic lobe cyst and other low-density left hepatic lesion  too small to characterize. No new focal liver abnormality. No biliary ductal  dilatation   GALLBLADDER: No calcified gallstone  SPLEEN: Hypoattenuation involving two thirds of the anterior spleen likely  evolving infarction. Interval placement of percutaneous left upper abdominal  pigtail drainage catheter with near complete resolution of the previous large  subdiaphragmatic fluid and gas collection. Small amount of fluid and gas  surrounding the native spleen. PANCREAS: No mass or ductal dilatation. ADRENALS: Unremarkable. KIDNEYS/URETERS: Normal symmetric nephrograms without evidence for  focal mass. No hydronephrosis   PERITONEUM: No abdominal lymphadenopathy. Small amount of ascites. COLON: Mild nonspecific bowel wall thickening involving the descending colon,  sigmoid colon, and rectum  APPENDIX: Not clearly seen. SMALL BOWEL: No small bowel obstruction.  Mild bowel wall thickening involving  the distal small bowel may be reactive. STOMACH: Nonspecific wall thickening involving the posterior gastric body  PELVIS: No pelvic lymphadenopathy. Focal fluid in the anterior lower pelvis and  anterior to the rectum. Focal nodularity anterior to the bladder is unchanged. BONES: No destructive bone lesion. VISUALIZED THORAX: Stable moderate left pleural effusion with lingular and left  lower lobe airspace disease. ADDITIONAL COMMENTS: N/A     IMPRESSION:  Hypoattenuation involving two thirds of the anterior spleen likely evolving  infarction. Interval placement of percutaneous left upper abdominal pigtail  drainage catheter with near complete resolution of the previous large  subdiaphragmatic fluid and gas collection. Small amount of fluid and gas  surrounding the residual spleen.      Nonspecific bowel wall thickening involving the distal small bowel and left  colon which may be reactive. Wall thickening also seen in the posterior gastric  body.     Small amount of fluid in the pelvis is unchanged. Focal nodularity anterior to  the bladder is unchanged     Stable moderate left pleural effusion with left basilar airspace disease. CT of abdomen/pelvis (11/2/19)  LUNG BASES: Left pleural effusion and left basilar atelectasis or infiltrate are  stable. INCIDENTALLY IMAGED HEART AND MEDIASTINUM: Unremarkable. LIVER: Tiny hypodensity in the left hepatic lobe too small to characterize but  stable. Stable larger hepatic cyst in the left lobe. No new hepatic abnormality. GALLBLADDER: Unremarkable. SPLEEN: The previously described splenic infarction is again noted, with a  slight increase in volume of fluid and decrease in associated air bubbles. The  left upper lobe collection which was previously drained shows persistent air  bubbles at the site of the drainage catheter and probable slight increase in  volume of fluid at the drainage site.  Small amount of fluid tracks anteriorly  along the gastric wall, also slightly increased. PANCREAS: No mass or ductal dilatation. ADRENALS: Unremarkable. KIDNEYS: No mass, calculus, or hydronephrosis. STOMACH: Unremarkable. SMALL BOWEL: No dilatation or wall thickening. COLON: No dilatation or wall thickening. The splenic flexure may show mild wall  thickening at the site of the left upper quadrant fluid collection. Surgical  clips at the cecal tip where ileocecal ectomy was performed. APPENDIX: Surgically absent. PERITONEUM: Small ascites, stable. RETROPERITONEUM: No lymphadenopathy or aortic aneurysm. REPRODUCTIVE ORGANS: The uterus is surgically absent. URINARY BLADDER: No mass or calculus. BONES: No destructive bone lesion. ADDITIONAL COMMENTS: N/A     IMPRESSION:  1. Slight increase in volume of fluid at the site of splenic infarction and left  upper quadrant collection since the prior study 34/49/6581, of uncertain  significance. Minimal residual gas bubbles at the site of the previous catheter  tip. Continued follow-up suggested. 2. Other stable incidental and postoperative changes, including small ascites,  stable. 3. Stable left pleural effusion and left basilar atelectasis or infiltrate,  probably sympathetic. Assessment/Plan:   43 yo WF with stage IV appendiceal cancer, which was initially thought to be an ovarian cancer. She is 6 weeks post-op from X-lap, SIERRA, BSO, ileocecal resection with reanastomosis, and omentectomy. The plan is for her to begin FOLFOX chemotherapy sometime in the next few weeks. She is likely to have treatment here with Dr. Augusto Perry, but have that coordinated with [place]. She would like to proceed with IV port placement to facilitate treatment and they have asked that I do that. We can schedule that at their convenience. She is hoping to begin chemotherapy by the first week of December.       Signed By: Edgar Laureano MD     November 11, 2019

## 2019-11-14 ENCOUNTER — OFFICE VISIT (OUTPATIENT)
Dept: ONCOLOGY | Age: 49
End: 2019-11-14

## 2019-11-14 ENCOUNTER — DOCUMENTATION ONLY (OUTPATIENT)
Dept: ONCOLOGY | Age: 49
End: 2019-11-14

## 2019-11-14 VITALS
DIASTOLIC BLOOD PRESSURE: 78 MMHG | SYSTOLIC BLOOD PRESSURE: 127 MMHG | WEIGHT: 125.6 LBS | HEART RATE: 77 BPM | TEMPERATURE: 97.9 F | OXYGEN SATURATION: 97 % | BODY MASS INDEX: 23.11 KG/M2 | HEIGHT: 62 IN

## 2019-11-14 DIAGNOSIS — C79.60 MALIGNANT NEOPLASM METASTATIC TO OVARY, UNSPECIFIED LATERALITY (HCC): ICD-10-CM

## 2019-11-14 DIAGNOSIS — D73.5 SPLENIC INFARCTION: ICD-10-CM

## 2019-11-14 DIAGNOSIS — R06.02 SOB (SHORTNESS OF BREATH): ICD-10-CM

## 2019-11-14 DIAGNOSIS — C18.1 MUCINOUS ADENOCARCINOMA OF APPENDIX (HCC): Primary | ICD-10-CM

## 2019-11-14 DIAGNOSIS — J90 PLEURAL EFFUSION ON LEFT: ICD-10-CM

## 2019-11-14 DIAGNOSIS — G47.00 INSOMNIA, UNSPECIFIED TYPE: ICD-10-CM

## 2019-11-14 DIAGNOSIS — C18.1 CARCINOMA OF APPENDIX (HCC): ICD-10-CM

## 2019-11-14 DIAGNOSIS — R10.84 GENERALIZED ABDOMINAL PAIN: ICD-10-CM

## 2019-11-14 DIAGNOSIS — K56.609 SMALL BOWEL OBSTRUCTION (HCC): ICD-10-CM

## 2019-11-14 RX ORDER — DEXAMETHASONE 4 MG/1
TABLET ORAL
Qty: 60 TAB | Refills: 0 | Status: SHIPPED | OUTPATIENT
Start: 2019-11-14 | End: 2020-01-01 | Stop reason: SDUPTHER

## 2019-11-14 RX ORDER — ONDANSETRON 4 MG/1
4 TABLET, ORALLY DISINTEGRATING ORAL
Qty: 60 TAB | Refills: 1 | Status: SHIPPED | OUTPATIENT
Start: 2019-11-14

## 2019-11-14 RX ORDER — LIDOCAINE AND PRILOCAINE 25; 25 MG/G; MG/G
CREAM TOPICAL AS NEEDED
Qty: 30 G | Refills: 0 | Status: SHIPPED | OUTPATIENT
Start: 2019-11-14

## 2019-11-14 RX ORDER — PROCHLORPERAZINE MALEATE 5 MG
TABLET ORAL
Qty: 30 TAB | Refills: 1 | Status: SHIPPED | OUTPATIENT
Start: 2019-11-14 | End: 2020-01-01 | Stop reason: SDUPTHER

## 2019-11-14 NOTE — LETTER
11/14/19 Patient: Fay Castañeda YOB: 1970 Date of Visit: 11/14/2019 Isabella Bocanegra MD 
[place] [address] [address] [place] [address] VIA Facsimile: [phone] Dear Isabella Bocanegra MD, Thank you for referring Ms. Fay Castañeda to [address] [place] [address] for evaluation. My notes for this consultation are attached. If you have questions, please do not hesitate to call me. I look forward to following your patient along with you. Sincerely, Jung Ruiz NP

## 2019-11-14 NOTE — PROGRESS NOTES
[facility] at [address], [address] [place], [address] Patricia Milena: [phone]  F: [phone]    Reason for Visit:   Carmen Blue is a 44 y.o. female who is seen in consultation at the request of Dr. 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 Treatment History:   EXPLORATORY LAPAROTOMY SIERRA BSO, TUMOR DEBULKING: ILEOCECAL RESECTION; OMENTECTOMY    STAGE: 4 with carcinomatosis    History of Present Illness:     Pt seen today for f/u of metastatic appendix cancer. [facility] and recommendations agree with FOLFOX. Had a thoracentesis a few days ago with no cytology sent. Port on Monday. Pt and  want to start FOLFOX after thanksgiving. Having some pain in mid stomach. Thinks may be associated with needing to eat. Takes advil prn. Does not need narcotics. Has diarrhea and takes immodium. Taking lorazepam prn insomnia and working. Last visit: hospital consult for metastatic appendiceal cancer admitted with peritoneal fluid collection/ left side pain and thrombocytosis and anemia. Pt is on GYN/ONC service. Pt had ultrasound then CT done due to pain and this revealed fluid collection. Likely for IR drain. Pt is in bed. Quiet. More comfortable. Visit in office for record from 10/14/19:   Pt seen today for office consult for metastatic appendiceal cancer post EXPLORATORY LAPAROTOMY SIERRA BSO, TUMOR DEBULKING: ILEOCECAL RESECTION; OMENTECTOMY. Pt was initially thought to have ovarian cancer. She had been having nausea/vomiting and abdominal pain for months. A HIDA scan suggested that her gallbladder was likely the source of her pain and Dr. Alyssia Wong took her to the OR for a laparoscopic cholecystectomy. At the time of surgery she was noted to have peritoneal carcinomatosis with bilaterally enlarged ovaries. Pt was seen by GYN/ONC intraoperatively.    Biopsy of ovary 9/20/19:     Right ovary, biopsy:   Positive for malignancy, poorly differentiated adenocarcinoma with scattered signet ring cells. Comment   The histologic section has fragments of fibrous tissue infiltrated by poorly differentiated malignant cells and rare signet ring cells. The malignant cells are positive for cytokeratin 20 and CDX2 and negative for cytokeratin 7, PAX8, GATA3, p63, ER, TX, LCA, CD 56, and . The overall findings favor metastasis from a lower gastrointestinal tract primary neoplasm. CYTOLOGIC INTERPRETATION:   Peritoneal fluid, ThinPrep:   Benign mesothelial cells. CTs done 9/23/19. Pt has obstruction and could not eat. IMPRESSION:   1. Ovarian carcinoma with peritoneal metastasis. Right ovarian mass measures 5.7  x 4.8 x 4.5 cm. Left ovarian mass measures 4.2 x 3.8 x 4.0 cm.  2. Small bowel obstruction due to terminal ileum mucosal thickening, likely  involved by ovarian carcinoma. 3. Omental carcinomatosis. Trace ascites. 4. Right oblique soft tissue edema due to recent surgery. No drainable abscess. However, this could be a source of right abdominal pain. 5. Hepatic cysts. No evidence of hepatic metastatic disease. 6. No evidence of osseous, pulmonary, or thoracic metastatic disease. EPIC email sent to Dr. Laney Woody at the time of the dictation. Due to obstruction/ symptoms, pt went to OR>   Pt then had EXPLORATORY LAPAROTOMY SIERRA BSO, TUMOR DEBULKING: ILEOCECAL RESECTION; OMENTECTOMY on 9/25/19. FINAL PATHOLOGIC DIAGNOSIS   1. 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    SPECIMEN   Procedure: Ileocecectomy   TUMOR   Tumor Site: Diffusely involving appendix   Histologic Type: Mucinous adenocarcinoma   Histologic Type Comments: Extensive signet ring cell component   Histologic Grade: G3: Poorly differentiated   Tumor Size: 4.5 cm   Tumor Deposits: Present   Number of Deposits: 1   Tumor Extension: Tumor directly invades adjacent organs or structures: Uterus, cervix,   bilateral ovaries and fallopian tubes, and omentum   Lymphovascular Invasion: Not identified   Perineural Invasion: Present, extensive   MARGINS   Proximal, distal and radial margins uninvolved by invasive carcinoma. LYMPH NODES   Number of Lymph Nodes Involved: 11   Number of Lymph Nodes Examined: 11   PATHOLOGIC STAGE CLASSIFICATION (pTNM, AJCC 8th Edition)   Primary Tumor (pT): pT4b   Regional Lymph Nodes (pN): pN2   Distant Metastasis (pM): pM1c   Site(s): Uterus, bilateral fallopian tubes, and ovaries   2. Uterus, bilateral fallopian tubes and ovaries, supracervical hysterectomy bilateral salpingo-oophorectomy:   Serosa: Positive for metastatic mucinous adenocarcinoma involving fibrous adhesions. Myometrium: Extensive transmural involvement by metastatic mucinous adenocarcinoma. Adenomyosis. Endometrium: Inactive endometrium with metastatic mucinous adenocarcinoma. [address] [place] [address] Patient Name: Haydee Rivera Specimen #: [id number]   Patient Name: Haydee Rivera Page 3 of 5 Printed: 09/30/19 12:51 PM SURGICAL PATHOLOGY REPORT  Bilateral fallopian tubes: Metastatic mucinous adenocarcinoma. Bilateral fallopian tubes: Metastatic mucinous adenocarcinoma. 3. Omentum, omentectomy:   Positive for metastatic mucinous adenocarcinoma. Splenule. 4. Cervix, completion total hysterectomy:   Positive for metastatic mucinous adenocarcinoma. Path report c/w metastatic appendix cancer with omental involvement. Pt is still recovering from surgery. Feels weak and tried overall. Vomiting since Friday once a day. Able to eat some. C/o left rib pain so bad cannot sleep. Unsure about rib pain. C/o SOB also with moving. Able to walk slowly but gets SOB. Here with  who is supportive. Pt is going to [facility] for another opinion. [facility] also. Pt/  are open to all treatment options. No port yet. Asking about PET. Past Medical History:   Diagnosis Date    Cancer [facility])     APPENDIX CANCER     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 ([facility] [address] 2019    Nausea & vomiting     Splenic infarction 2019    Symptomatic cholelithiasis 9/18/2019      Past Surgical History:   Procedure Laterality Date    HX APPENDECTOMY      HX GI      ILEOCECECTOMY    HX GYN  2003    CYST ON OVARY    HX HYSTERECTOMY      IR THORACENTESIS CATH W IMAGE  11/6/2019      Social History     Tobacco Use    Smoking status: Never Smoker    Smokeless tobacco: Never Used   Substance Use Topics    Alcohol use: Yes     Comment: once a month      Family History   Problem Relation Age of Onset    Breast Cancer Paternal Grandmother 61    Crohn's Disease Mother     Heart Disease Mother     Cancer Father         BLADDER    Diabetes Father     No Known Problems Son     No Known Problems Daughter     Anesth Problems Neg Hx      Current Outpatient Medications   Medication Sig    LORazepam (ATIVAN) 0.5 mg tablet TAKE 1 TABLET BY MOUTH EVERY 8 HOURS AS NEEDED FOR ANXIETY    acetaminophen (TYLENOL) 325 mg tablet Take 650 mg by mouth every four (4) hours as needed for Pain. Indications: pain    ibuprofen (MOTRIN) 200 mg tablet Take 3 Tabs by mouth every eight (8) hours as needed for Pain. No current facility-administered medications for this visit. No Known Allergies     Review of Systems: A complete review of systems was obtained, negative except as described above.     Physical Exam:     Visit Vitals  /78 (BP 1 Location: Right arm, BP Patient Position: Sitting)   Pulse 77   Temp 97.9 °F (36.6 °C) (Oral)   Ht 5' 2\" (1.575 m)   Wt 125 lb 9.6 oz (57 kg)   LMP 09/15/2019   SpO2 97%   BMI 22.97 kg/m²     ECOG PS: 1-2  General: No distress  Eyes:  anicteric sclerae  HENT: Atraumatic  Neck: Supple  Respiratory: slight decrease only in left base, otherwise clear  CV: Normal rate, regular rhythm  GI: soft, not distended NT  MS: ambulatory  Skin: warm dry  Psych: Alert, oriented, appropriate affect, normal judgment/insight      Results:     Lab Results   Component Value Date/Time    WBC 6.4 11/04/2019 11:37 AM    HGB 12.4 11/04/2019 11:37 AM    HCT 39.3 11/04/2019 11:37 AM    PLATELET 404 (HH) 00/71/1117 11:37 AM    MCV 88 11/04/2019 11:37 AM    ABS. NEUTROPHILS 4.0 11/04/2019 11:37 AM    Hemoglobin (POC) 10.8 (L) 09/25/2019 04:11 PM    Hematocrit (POC) 32 (L) 09/25/2019 12:10 PM     Lab Results   Component Value Date/Time    Sodium 139 11/04/2019 11:37 AM    Potassium 4.0 11/04/2019 11:37 AM    Chloride 99 11/04/2019 11:37 AM    CO2 25 11/04/2019 11:37 AM    Glucose 90 11/04/2019 11:37 AM    BUN 4 (L) 11/04/2019 11:37 AM    Creatinine 0.50 (L) 11/04/2019 11:37 AM    GFR est  11/04/2019 11:37 AM    GFR est non- 11/04/2019 11:37 AM    Calcium 10.0 11/04/2019 11:37 AM    Sodium (POC) 137 09/25/2019 12:10 PM    Potassium (POC) 3.7 09/25/2019 12:10 PM    Chloride (POC) 105 09/25/2019 12:10 PM    Glucose (POC) 85 09/25/2019 12:10 PM    BUN (POC) 4 (L) 09/25/2019 12:10 PM    Creatinine (POC) 0.5 (L) 09/25/2019 12:10 PM    Calcium, ionized (POC) 1.14 09/25/2019 12:10 PM     Lab Results   Component Value Date/Time    Bilirubin, total <0.2 11/04/2019 11:37 AM    ALT (SGPT) 14 11/04/2019 11:37 AM    AST (SGOT) 14 11/04/2019 11:37 AM    Alk. phosphatase 110 11/04/2019 11:37 AM    Protein, total 7.4 11/04/2019 11:37 AM    Albumin 3.9 11/04/2019 11:37 AM    Globulin 4.5 (H) 10/28/2019 10:50 AM       CT Results (most recent):  Results from Hospital Encounter encounter on 11/02/19   CT ABD PELV W CONT    Narrative EXAM: CT ABD PELV W CONT    INDICATION: appendix cancer; splenic infarction 5 days status post removal of  left upper quadrant drainage catheter    COMPARISON: 10/18/2019     CONTRAST: 100 mL of Isovue-370.     TECHNIQUE:   Following the uneventful intravenous administration of contrast, thin axial  images were obtained through the abdomen and pelvis. Coronal and sagittal  reconstructions were generated. Oral contrast was administered. CT dose  reduction was achieved through use of a standardized protocol tailored for this  examination and automatic exposure control for dose modulation. FINDINGS:   LUNG BASES: Left pleural effusion and left basilar atelectasis or infiltrate are  stable. INCIDENTALLY IMAGED HEART AND MEDIASTINUM: Unremarkable. LIVER: Tiny hypodensity in the left hepatic lobe too small to characterize but  stable. Stable larger hepatic cyst in the left lobe. No new hepatic abnormality. GALLBLADDER: Unremarkable. SPLEEN: The previously described splenic infarction is again noted, with a  slight increase in volume of fluid and decrease in associated air bubbles. The  left upper lobe collection which was previously drained shows persistent air  bubbles at the site of the drainage catheter and probable slight increase in  volume of fluid at the drainage site. Small amount of fluid tracks anteriorly  along the gastric wall, also slightly increased. PANCREAS: No mass or ductal dilatation. ADRENALS: Unremarkable. KIDNEYS: No mass, calculus, or hydronephrosis. STOMACH: Unremarkable. SMALL BOWEL: No dilatation or wall thickening. COLON: No dilatation or wall thickening. The splenic flexure may show mild wall  thickening at the site of the left upper quadrant fluid collection. Surgical  clips at the cecal tip where ileocecal ectomy was performed. APPENDIX: Surgically absent. PERITONEUM: Small ascites, stable. RETROPERITONEUM: No lymphadenopathy or aortic aneurysm. REPRODUCTIVE ORGANS: The uterus is surgically absent. URINARY BLADDER: No mass or calculus. BONES: No destructive bone lesion. ADDITIONAL COMMENTS: N/A      Impression IMPRESSION:    1. Slight increase in volume of fluid at the site of splenic infarction and left  upper quadrant collection since the prior study 89/43/9429, of uncertain  significance.  Minimal residual gas bubbles at the site of the previous catheter  tip. Continued follow-up suggested. 2. Other stable incidental and postoperative changes, including small ascites,  stable. 3. Stable left pleural effusion and left basilar atelectasis or infiltrate,  probably sympathetic. Records reviewed and summarized above. Pathology report(s) reviewed above. Radiology report(s) reviewed above. Assessment/PLAN:     1) metastatic appendiceal cancer post EXPLORATORY LAPAROTOMY SIERRA BSO, TUMOR DEBULKING: ILEOCECAL RESECTION; OMENTECTOMY 9/25/19  Surgery done for obstruction. Pt is recovering from surgery still. Had left rib pain and SOB and on exam decreased bs on LEFT. Got CXR which showed pleural effusion then ultrasound which showed elevated diaphragm and ? Free air. Then got CT which shows fluid collection and pt admitted for this. IR seeing pt for possible drain   Pt is in bed and comfortable at my visit this am.     Pt is seeing [facility] and possibly [address] for second opinions. Discussed systemic therapy with FOLFOX/ +/- avastin chemo or some variation of this regimen. possible HiPEC     2) left pleural effusion post thora again a few days ago. No cytology. Re check CXR. 3)  Thrombocytosis. likely reactive due to infection/ abscess/ cancer. Monitor and going down. 4) N/V still. Antiemetics prn.     5) anemia. Resolved with hgb 12 last check. 6) psychosocial.  Mood good. Coping well considering difficult disease. Great family support.  here. Call if questions  I appreciate the opportunity to participate in Ms. Thad Gutierrez macario.     Signed By: Rodger Price DO

## 2019-11-14 NOTE — PROGRESS NOTES
Home Cooper is a 44 y.o. female  Chief Complaint   Patient presents with    Follow-up     appendix cancer     1. Have you been to the ER, urgent care clinic since your last visit? Hospitalized since your last visit? No.    2. Have you seen or consulted any other health care providers outside of the [address] [place] [address] since your last visit? Include any pap smears or colon screening. No.    Pt states she had a blood clot in her spleen that had to get drained out and had fluid drained out her left lung.

## 2019-11-14 NOTE — PROGRESS NOTES
FOLFOX ordered in [address] [facility] to start 12/3/19. Anti-emetics, EMLA, and Decadron sent to pharmacy on file.

## 2019-11-15 NOTE — PERIOP NOTES
PAT TELEPHONE INTERVIEW COMPLETED. PRE-OP INSTRUCTIONS REVIEWED WITH PATIENT WHICH INCLUDED INSTRUCTIONS ON MEDICATIONS AND NPO AFTER MIDNIGHT. INSTRUCTIONS GIVEN ON THE USE OF CHLORHEXIDINE SOLUTION THE EVENING BEFOE AND THE MORNING OF SURGERY PATIENT VOICED UNDERSTANDING OF SAME.

## 2019-11-17 NOTE — H&P
[address] [facility], [place], [address] 
P [phone]  F [phone] Patient ID: 
Name:  Salud Coello MRN:  [id number] :  1970/49 y.o. Date:  2019 HISTORY OF PRESENT ILLNESS: 
Salud Coello is a 44 y.o.  postmenopausal female with a diagnosis or stage IV appendiceal cancer. On 19 I was consulted intraoperatively by Dr. Laverne Browne for what appeared to be an ovarian carcinoma. She had been having nausea/vomiting and abdominal pain for months. A HIDA scan suggested that her gallbladder was likely the source of her pain and Dr. Opal Yanez took her to the OR for a laparoscopic cholecystectomy. At the time of surgery she was noted to have peritoneal carcinomatosis with bilaterally enlarged ovaries. Cytology was obtained and I performed a biopsy of the right ovary. We decided that it would be best to wait on the final pathology before proceeding with any aggressive debulking surgery, as we might also consider neoadjuvant chemotherapy. I arranged for her to have a CT of the chest/abdomen/pelvis following her laparoscopy. 
  
She was followed by Dr. Brenda Roberts with [phone] [facility] for her gynecologic care. She was actually seen and evaluated there in 2019 for some irregular bleeding. She had been on OCPs to help control her symptoms. A pelvic ultrasound demonstrated a normal appearing uterus. The endometrium was not thickened. Her right ovary contained a 3.2 cm simple cyst.  The left ovary was not seen. There was a small amount of free fluid.   
  
She presented to the office to review her CT and pathology results and to discuss a definitive plan. The CT scan demonstrated dilated loops of small bowel, suggesting partial obstruction. There was mucosal thickening of the terminal ileum. The appendix was not visualized.   In addition to the gastrointestinal findings, there was bilateral ovarian enlargement, omental thickening, carcinomatosis, and ascites. Radiologic findings and operative findings would suggest an ovarian or peritoneal carcinoma. Preliminary pathology was a bit confusing, however. I spoke with Dr. Willian Agosto from pathology. The right ovarian biopsy demonstrated an adenocarcinoma, but it wasn't clear histologically as to what it was. The tumor was CK7 and PAX8 negative, while also being CK20 positive. These findings would go against it being an ovarian cancer. She was taken to the OR on 9/25/19 for surgery. She underwent an X-lap, SIERRA, BSO, ileocecal resection with reanastomosis, and omentectomy. Her EBL was 1300 cc. She received 3 units PRBCs intraoperatively and another 2 units postoperatively. She was discharged home on POD #7 after regaining bowel function. Operative findings:  On bimanual pelvic exam she was noted to have a rock hard cervix and uterus, worrisome for disease infiltration.  It was mobile.  No parametrial involvement.  The uterus was enlarged.  Bilateral adnexal masses.  The bladder was densely adherent the lower uterine segment and cervix.  Mildly dilated small bowel.  Rock hard appendix scarred to the underside of the cecum with tethering of the small bowel at that point.  Implants noted in the distal omentum. Small volume peritoneal disease scattered throughout the abdominal cavity involving the anterior abdominal wall, diaphragms, small and large bowel mesenteries.  Frozen section pathology of the ileocecal resection suggested that this was the primary site of disease.  There were some signet ring features noted.  Frozen section pathology of the uterus demonstrated infiltration of the myometrium with the same process. 
  
FINAL PATHOLOGIC DIAGNOSIS 1. Terminal ileum, cecum, and appendix, ileocecectomy:  
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 SPECIMEN Procedure: Ileocecectomy TUMOR  
 Tumor Site: Diffusely involving appendix Histologic Type: Mucinous adenocarcinoma Histologic Type Comments: Extensive signet ring cell component Histologic Grade: G3: Poorly differentiated Tumor Size: 4.5 cm Tumor Deposits: Present Number of Deposits: 1 Tumor Extension: Tumor directly invades adjacent organs or structures: Uterus, cervix,  
bilateral ovaries and fallopian tubes, and omentum Lymphovascular Invasion: Not identified Perineural Invasion: Present, extensive MARGINS Proximal, distal and radial margins uninvolved by invasive carcinoma. LYMPH NODES Number of Lymph Nodes Involved: 11 Number of Lymph Nodes Examined: 11 PATHOLOGIC STAGE CLASSIFICATION (pTNM, AJCC 8th Edition) Primary Tumor (pT): pT4b Regional Lymph Nodes (pN): pN2 Distant Metastasis (pM): pM1c Site(s): Uterus, bilateral fallopian tubes, and ovaries 2. Uterus, bilateral fallopian tubes and ovaries, supracervical hysterectomy bilateral salpingo-oophorectomy:  
Serosa: Positive for metastatic mucinous adenocarcinoma involving fibrous adhesions. Myometrium: Extensive transmural involvement by metastatic mucinous adenocarcinoma. Adenomyosis. Endometrium: Inactive endometrium with metastatic mucinous adenocarcinoma. Bilateral fallopian tubes: Metastatic mucinous adenocarcinoma. Bilateral fallopian tubes: Metastatic mucinous adenocarcinoma. 3. Omentum, omentectomy:  
Positive for metastatic mucinous adenocarcinoma. Splenule. 4. Cervix, completion total hysterectomy:  
Positive for metastatic mucinous adenocarcinoma. IMMUNOHISTOCHEMISTRY - DNA Mismatch Repair Protein Expression Results: MLH-1 No loss of expression Extent: 3+; Intensity: Strong MSH-2 No loss of expression Extent: 3+; Intensity: Strong MSH-6 No loss of expression Extent: 3+; Intensity: Strong PMS2 No loss of expression Extent: 2+; Intensity: Moderate All DNA mismatch repair proteins cited above are shown to be proficient by immunohistochemistry method. This is strong evidence against DNA mismatch repair defects as the molecular basis for this malignancy and provides strong evidence against Monroe syndrome. I saw her back in the office on 10/9 for staple removal.  I discussed her pathology and referred her to Dr. Astrid Perry for consultation. She was complaining of some left upper quadrant and rib pain, as well as some shortness of breath. Plain films suggested a pleural effusion. She was sent for a CT of the abdomen/pelvis to evaluate. This revealed a complex fluid collection in the left upper quadrant, consistent with a splenic infarction/abscess. It appeared to be contained within the splenic capsule. A CT guided drainage was performed. The fluid which drained appeared consistent with a hematoma and there was no evidence of infection. An accordion drain was left in place. In drained 900 cc immediately and then a few hundred more ccs over the next few days. She was discharged home on 10/20 with the drain in place. The output steadily decreased I removed the drain on 10/23. She was seen at [facility] on 10/21 for consultation and subsequently at [facility]. They both recommended the same treatment as Dr. Edmundo Solomon, FOLFOX +/- Avastin. I sent her for a repeat CT of the abdomen/pelvis on 11/2/19. The splenic fluid collection was still there. I had ordered another CT guided drainage of the area, however, interventional radiology suggested that we hold off, as it did not appear infected at the time. She did get a left-sided thoracentesis at the time. She is feeling better. Her low-grade temps have resolved. Her appetite is improving. Patient Active Problem List  
 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  
Diagnosis Date  Cancer ([facility] [address] APPENDIX CANCER   
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  
Procedure Laterality Date [address] UNLISTED  HX APPENDECTOMY  09/2019  HX GI  09/2019 ILEOCECECTOMY  HX GYN  2003 CYST ON OVARY  HX HYSTERECTOMY  2019  IR THORACENTESIS CATH W IMAGE  11/6/2019 Social History Tobacco Use  Smoking status: Never Smoker  Smokeless tobacco: Never Used Substance Use Topics  Alcohol use: Not Currently Family History Problem Relation Age of Onset  Breast Cancer Paternal Grandmother 56  Crohn's Disease Mother  Heart Disease Mother  Cancer Father BLADDER  
 Diabetes Father  No Known Problems Son  No Known Problems Daughter  Anesth Problems Neg Hx No current facility-administered medications for this encounter. Current Outpatient Medications Medication Sig  
 lidocaine-prilocaine (EMLA) topical cream Apply  to affected area as needed for Pain.  ondansetron (ZOFRAN ODT) 4 mg disintegrating tablet Take 1 Tab by mouth every eight (8) hours as needed for Nausea.  prochlorperazine (COMPAZINE) 5 mg tablet Take 1 tab by mouth every 6 hours as needed for nausea or vomiting  dexAMETHasone (DECADRON) 4 mg tablet Take 2 tabs (8mg) by mouth daily on days 2 and 3 after chemotherapy  LORazepam (ATIVAN) 0.5 mg tablet 0.5 mg nightly.  acetaminophen (TYLENOL) 325 mg tablet Take 650 mg by mouth every four (4) hours as needed for Pain. Indications: pain  ibuprofen (MOTRIN) 200 mg tablet Take 3 Tabs by mouth every eight (8) hours as needed for Pain. No Known Allergies Review of Systems: A comprehensive review of systems was negative except for that written in the History of Present Illness. , 10 point ROS Objective:  
 
Physical Exam:  
Visit Vitals Ht 5' 3\" (1.6 m) Wt 123 lb (55.8 kg) LMP 09/15/2019 BMI 21.79 kg/m² General:  Alert, cooperative, no distress, appears stated age. HEENT:  WNL. Neck: Supple, without masses. Back:   Symmetric, no curvature. ROM normal. No CVA tenderness. Lungs:   Decreased BS on left. CTA on the right. Heart:  Regular rate and rhythm. Abdomen:   Soft, non-distended, non-tender. Accordion drain in left upper back. This was removed without difficulty. Pelvic:  Deferred. Rectal:  Deferred. Extremities: Extremities normal, atraumatic, no cyanosis or edema. Skin: Skin color, texture, turgor normal. No rashes or lesions. Neurologic: Grossly intact. Labs:   
No results found for this or any previous visit (from the past 24 hour(s)). CT of abdomen/pelvis (10/15/19) Splenic infarction: A complex fluid collection in the subdiaphragmatic left 
upper quadrant containing mixed soft tissue, fluid, gas, and fat densities. It 
is predominantly comprised of low density fluid. A small amount of normal 
splenic tissue is displaced superiorly and posteromedially (601-81, 3-21), and 
blends into the complex fluid collection without clear delineation. The splenic 
artery and vein are attenuated, and extend toward the fluid collection. As such, 
a liquefied splenic infarction is favored. The small locules of gas may be 
infectious or postsurgical. Fistulous etiology is considered less likely. In 
greatest dimensions, the collection measures 12.2 x 13.7 x 15.3 cm. This is 
exclusive of a smaller, communicating collection along the lateral wall of the 
gastric fundus, which measures 4.2 x 3.2 x 3.2 cm, and communicates with the 
main collection on image [id number].  There is significant mass effect on the 
 stomach, displacing it anteromedially. A new, small to moderate, left pleural 
effusion is likely reactive. Passive atelectasis is associated. 
  
Abdomen: The right lung base is clear. The heart size is normal. Incidental note 
is made of small hepatic cysts in segments 2 and 3. The distal esophagus, 
duodenum, gallbladder, pancreas, adrenals, and kidneys are normal. 
  
Pelvis: There is a small to moderate volume of loculated, complex ascites in the 
pelvis and right paracolic gutter. Peritoneal enhancement and adjacent colonic 
inflammation are associated. There is trace mesenteric edema. Focal thickening 
at the dome of the bladder (602-79, 601-37) is not significant changed from 9/23/2019. It may represent a peritoneal nodule as opposed to bladder nodule. The small bowel and end-to-side ileocecal anastomosis are normal. Post 
hysterectomy and ileocecectomy. 
  
IMPRESSION:  
1. Liquefied splenic infarction: left subdiaphragmatic complex fluid collection. Several gas locules suggest infection. 2. Small to moderate, reactive, left pleural effusion. 3. Small to moderate volume of loculated, complex ascites. Associated peritoneal 
enhancement and colonic inflammation. 4. Probable peritoneal carcinomatosis along the bladder dome. Other areas of 
carcinomatosis seen on prior study are not as clearly visible postoperatively. CT of abdomen/pelvis (10/18/19) LIVER: Stable left hepatic lobe cyst and other low-density left hepatic lesion 
too small to characterize. No new focal liver abnormality. No biliary ductal 
dilatation GALLBLADDER: No calcified gallstone SPLEEN: Hypoattenuation involving two thirds of the anterior spleen likely 
evolving infarction. Interval placement of percutaneous left upper abdominal 
pigtail drainage catheter with near complete resolution of the previous large 
subdiaphragmatic fluid and gas collection. Small amount of fluid and gas 
surrounding the native spleen. PANCREAS: No mass or ductal dilatation. ADRENALS: Unremarkable. KIDNEYS/URETERS: Normal symmetric nephrograms without evidence for  focal mass. No hydronephrosis PERITONEUM: No abdominal lymphadenopathy. Small amount of ascites. COLON: Mild nonspecific bowel wall thickening involving the descending colon, 
sigmoid colon, and rectum APPENDIX: Not clearly seen. SMALL BOWEL: No small bowel obstruction. Mild bowel wall thickening involving 
the distal small bowel may be reactive. STOMACH: Nonspecific wall thickening involving the posterior gastric body PELVIS: No pelvic lymphadenopathy. Focal fluid in the anterior lower pelvis and 
anterior to the rectum. Focal nodularity anterior to the bladder is unchanged. BONES: No destructive bone lesion. VISUALIZED THORAX: Stable moderate left pleural effusion with lingular and left 
lower lobe airspace disease. ADDITIONAL COMMENTS: N/A 
  
IMPRESSION: 
Hypoattenuation involving two thirds of the anterior spleen likely evolving 
infarction. Interval placement of percutaneous left upper abdominal pigtail 
drainage catheter with near complete resolution of the previous large 
subdiaphragmatic fluid and gas collection. Small amount of fluid and gas 
surrounding the residual spleen.  
  
Nonspecific bowel wall thickening involving the distal small bowel and left 
colon which may be reactive. Wall thickening also seen in the posterior gastric 
body. 
  
Small amount of fluid in the pelvis is unchanged. Focal nodularity anterior to 
the bladder is unchanged 
  
Stable moderate left pleural effusion with left basilar airspace disease. CT of abdomen/pelvis (11/2/19) LUNG BASES: Left pleural effusion and left basilar atelectasis or infiltrate are 
stable. INCIDENTALLY IMAGED HEART AND MEDIASTINUM: Unremarkable. LIVER: Tiny hypodensity in the left hepatic lobe too small to characterize but 
stable. Stable larger hepatic cyst in the left lobe.  No new hepatic abnormality. GALLBLADDER: Unremarkable. SPLEEN: The previously described splenic infarction is again noted, with a 
slight increase in volume of fluid and decrease in associated air bubbles. The 
left upper lobe collection which was previously drained shows persistent air 
bubbles at the site of the drainage catheter and probable slight increase in 
volume of fluid at the drainage site. Small amount of fluid tracks anteriorly 
along the gastric wall, also slightly increased. PANCREAS: No mass or ductal dilatation. ADRENALS: Unremarkable. KIDNEYS: No mass, calculus, or hydronephrosis. STOMACH: Unremarkable. SMALL BOWEL: No dilatation or wall thickening. COLON: No dilatation or wall thickening. The splenic flexure may show mild wall 
thickening at the site of the left upper quadrant fluid collection. Surgical 
clips at the cecal tip where ileocecal ectomy was performed. APPENDIX: Surgically absent. PERITONEUM: Small ascites, stable. RETROPERITONEUM: No lymphadenopathy or aortic aneurysm. REPRODUCTIVE ORGANS: The uterus is surgically absent. URINARY BLADDER: No mass or calculus. BONES: No destructive bone lesion. ADDITIONAL COMMENTS: N/A 
  
IMPRESSION: 
1. Slight increase in volume of fluid at the site of splenic infarction and left 
upper quadrant collection since the prior study 04/13/0159, of uncertain 
significance. Minimal residual gas bubbles at the site of the previous catheter 
tip. Continued follow-up suggested. 2. Other stable incidental and postoperative changes, including small ascites, 
stable. 3. Stable left pleural effusion and left basilar atelectasis or infiltrate, 
probably sympathetic. Assessment/Plan:  
43 yo WF with stage IV appendiceal cancer, which was initially thought to be an ovarian cancer. She is 6 weeks post-op from X-lap, SIERRA, BSO, ileocecal resection with reanastomosis, and omentectomy.    The plan is for her to begin FOLFOX chemotherapy sometime in the next few weeks. She is likely to have treatment here with Dr. Shweta Sandoval, but have that coordinated with 3[address] Dr Niranjan Boyd. She would like to proceed with IV port placement to facilitate treatment and they have asked that I do that. We can schedule that at their convenience. She is hoping to begin chemotherapy by the first week of December. Signed By: Jenelle Sims MD   
 November 17, 2019

## 2019-11-18 ENCOUNTER — ANESTHESIA (OUTPATIENT)
Dept: SURGERY | Age: 49
End: 2019-11-18
Payer: COMMERCIAL

## 2019-11-18 ENCOUNTER — HOSPITAL ENCOUNTER (OUTPATIENT)
Age: 49
Setting detail: OUTPATIENT SURGERY
Discharge: HOME | End: 2019-11-18
Attending: OBSTETRICS & GYNECOLOGY | Admitting: OBSTETRICS & GYNECOLOGY
Payer: COMMERCIAL

## 2019-11-18 ENCOUNTER — APPOINTMENT (OUTPATIENT)
Dept: GENERAL RADIOLOGY | Age: 49
End: 2019-11-18
Attending: OBSTETRICS & GYNECOLOGY
Payer: COMMERCIAL

## 2019-11-18 ENCOUNTER — ANESTHESIA EVENT (OUTPATIENT)
Dept: SURGERY | Age: 49
End: 2019-11-18
Payer: COMMERCIAL

## 2019-11-18 VITALS
BODY MASS INDEX: 21.44 KG/M2 | HEART RATE: 76 BPM | HEIGHT: 63 IN | DIASTOLIC BLOOD PRESSURE: 65 MMHG | WEIGHT: 121 LBS | TEMPERATURE: 98.2 F | OXYGEN SATURATION: 96 % | SYSTOLIC BLOOD PRESSURE: 114 MMHG | RESPIRATION RATE: 18 BRPM

## 2019-11-18 DIAGNOSIS — G89.18 ACUTE POSTOPERATIVE PAIN: Primary | ICD-10-CM

## 2019-11-18 PROCEDURE — 74011250637 HC RX REV CODE- 250/637: Performed by: NURSE ANESTHETIST, CERTIFIED REGISTERED

## 2019-11-18 PROCEDURE — 74011000250 HC RX REV CODE- 250: Performed by: NURSE ANESTHETIST, CERTIFIED REGISTERED

## 2019-11-18 PROCEDURE — 77030020256 HC SOL INJ NACL 0.9%  500ML: Performed by: OBSTETRICS & GYNECOLOGY

## 2019-11-18 PROCEDURE — 76210000006 HC OR PH I REC 0.5 TO 1 HR: Performed by: OBSTETRICS & GYNECOLOGY

## 2019-11-18 PROCEDURE — 77030031139 HC SUT VCRL2 J&J -A: Performed by: OBSTETRICS & GYNECOLOGY

## 2019-11-18 PROCEDURE — 76060000032 HC ANESTHESIA 0.5 TO 1 HR: Performed by: OBSTETRICS & GYNECOLOGY

## 2019-11-18 PROCEDURE — C1788 PORT, INDWELLING, IMP: HCPCS | Performed by: OBSTETRICS & GYNECOLOGY

## 2019-11-18 PROCEDURE — 74011000250 HC RX REV CODE- 250: Performed by: ANESTHESIOLOGY

## 2019-11-18 PROCEDURE — 77030040922 HC BLNKT HYPOTHRM STRY -A

## 2019-11-18 PROCEDURE — 77030011640 HC PAD GRND REM COVD -A: Performed by: OBSTETRICS & GYNECOLOGY

## 2019-11-18 PROCEDURE — 76210000026 HC REC RM PH II 1 TO 1.5 HR: Performed by: OBSTETRICS & GYNECOLOGY

## 2019-11-18 PROCEDURE — 74011250637 HC RX REV CODE- 250/637: Performed by: ANESTHESIOLOGY

## 2019-11-18 PROCEDURE — 77030002986 HC SUT PROL J&J -A: Performed by: OBSTETRICS & GYNECOLOGY

## 2019-11-18 PROCEDURE — 74011250636 HC RX REV CODE- 250/636: Performed by: OBSTETRICS & GYNECOLOGY

## 2019-11-18 PROCEDURE — 77030013079 HC BLNKT BAIR HGGR 3M -A: Performed by: ANESTHESIOLOGY

## 2019-11-18 PROCEDURE — 74011000258 HC RX REV CODE- 258: Performed by: NURSE ANESTHETIST, CERTIFIED REGISTERED

## 2019-11-18 PROCEDURE — 77030002933 HC SUT MCRYL J&J -A: Performed by: OBSTETRICS & GYNECOLOGY

## 2019-11-18 PROCEDURE — 76010000138 HC OR TIME 0.5 TO 1 HR: Performed by: OBSTETRICS & GYNECOLOGY

## 2019-11-18 PROCEDURE — 74011250636 HC RX REV CODE- 250/636: Performed by: NURSE ANESTHETIST, CERTIFIED REGISTERED

## 2019-11-18 PROCEDURE — 71045 X-RAY EXAM CHEST 1 VIEW: CPT

## 2019-11-18 DEVICE — POWERPORT IMPLANTABLE PORT WITH ATTACHABLE 8F CHRONOFLEX OPEN-ENDED SINGLE-LUMEN VENOUS CATHETER INTERMEDIATE KIT (WITH SUTURE PLUGS)
Type: IMPLANTABLE DEVICE | Site: CHEST | Status: FUNCTIONAL
Brand: POWERPORT, CHRONOFLEX

## 2019-11-18 RX ORDER — HEPARIN 100 UNIT/ML
SYRINGE INTRAVENOUS AS NEEDED
Status: DISCONTINUED | OUTPATIENT
Start: 2019-11-18 | End: 2019-11-18 | Stop reason: HOSPADM

## 2019-11-18 RX ORDER — LIDOCAINE HYDROCHLORIDE 20 MG/ML
INJECTION, SOLUTION EPIDURAL; INFILTRATION; INTRACAUDAL; PERINEURAL AS NEEDED
Status: DISCONTINUED | OUTPATIENT
Start: 2019-11-18 | End: 2019-11-18 | Stop reason: HOSPADM

## 2019-11-18 RX ORDER — FENTANYL CITRATE 50 UG/ML
25 INJECTION, SOLUTION INTRAMUSCULAR; INTRAVENOUS
Status: DISCONTINUED | OUTPATIENT
Start: 2019-11-18 | End: 2019-11-18 | Stop reason: HOSPADM

## 2019-11-18 RX ORDER — ROPIVACAINE HYDROCHLORIDE 5 MG/ML
150 INJECTION, SOLUTION EPIDURAL; INFILTRATION; PERINEURAL AS NEEDED
Status: DISCONTINUED | OUTPATIENT
Start: 2019-11-18 | End: 2019-11-18 | Stop reason: HOSPADM

## 2019-11-18 RX ORDER — EPHEDRINE SULFATE/0.9% NACL/PF 50 MG/5 ML
SYRINGE (ML) INTRAVENOUS AS NEEDED
Status: DISCONTINUED | OUTPATIENT
Start: 2019-11-18 | End: 2019-11-18 | Stop reason: HOSPADM

## 2019-11-18 RX ORDER — LIDOCAINE HYDROCHLORIDE 10 MG/ML
0.1 INJECTION, SOLUTION EPIDURAL; INFILTRATION; INTRACAUDAL; PERINEURAL AS NEEDED
Status: DISCONTINUED | OUTPATIENT
Start: 2019-11-18 | End: 2019-11-18 | Stop reason: HOSPADM

## 2019-11-18 RX ORDER — MIDAZOLAM HYDROCHLORIDE 1 MG/ML
1 INJECTION, SOLUTION INTRAMUSCULAR; INTRAVENOUS AS NEEDED
Status: DISCONTINUED | OUTPATIENT
Start: 2019-11-18 | End: 2019-11-18 | Stop reason: HOSPADM

## 2019-11-18 RX ORDER — SODIUM CHLORIDE 9 MG/ML
25 INJECTION, SOLUTION INTRAVENOUS CONTINUOUS
Status: DISCONTINUED | OUTPATIENT
Start: 2019-11-18 | End: 2019-11-18 | Stop reason: HOSPADM

## 2019-11-18 RX ORDER — ACETAMINOPHEN 325 MG/1
650 TABLET ORAL ONCE
Status: COMPLETED | OUTPATIENT
Start: 2019-11-18 | End: 2019-11-18

## 2019-11-18 RX ORDER — MIDAZOLAM HYDROCHLORIDE 1 MG/ML
0.5 INJECTION, SOLUTION INTRAMUSCULAR; INTRAVENOUS
Status: DISCONTINUED | OUTPATIENT
Start: 2019-11-18 | End: 2019-11-18 | Stop reason: HOSPADM

## 2019-11-18 RX ORDER — DEXAMETHASONE SODIUM PHOSPHATE 4 MG/ML
INJECTION, SOLUTION INTRA-ARTICULAR; INTRALESIONAL; INTRAMUSCULAR; INTRAVENOUS; SOFT TISSUE AS NEEDED
Status: DISCONTINUED | OUTPATIENT
Start: 2019-11-18 | End: 2019-11-18 | Stop reason: HOSPADM

## 2019-11-18 RX ORDER — OXYCODONE HYDROCHLORIDE 5 MG/1
5 TABLET ORAL AS NEEDED
Status: DISCONTINUED | OUTPATIENT
Start: 2019-11-18 | End: 2019-11-18 | Stop reason: HOSPADM

## 2019-11-18 RX ORDER — PROPOFOL 10 MG/ML
INJECTION, EMULSION INTRAVENOUS AS NEEDED
Status: DISCONTINUED | OUTPATIENT
Start: 2019-11-18 | End: 2019-11-18 | Stop reason: HOSPADM

## 2019-11-18 RX ORDER — SODIUM CHLORIDE, SODIUM LACTATE, POTASSIUM CHLORIDE, CALCIUM CHLORIDE 600; 310; 30; 20 MG/100ML; MG/100ML; MG/100ML; MG/100ML
INJECTION, SOLUTION INTRAVENOUS
Status: DISCONTINUED | OUTPATIENT
Start: 2019-11-18 | End: 2019-11-18 | Stop reason: HOSPADM

## 2019-11-18 RX ORDER — EPHEDRINE SULFATE/0.9% NACL/PF 50 MG/5 ML
5 SYRINGE (ML) INTRAVENOUS AS NEEDED
Status: DISCONTINUED | OUTPATIENT
Start: 2019-11-18 | End: 2019-11-18 | Stop reason: HOSPADM

## 2019-11-18 RX ORDER — SCOLOPAMINE TRANSDERMAL SYSTEM 1 MG/1
PATCH, EXTENDED RELEASE TRANSDERMAL AS NEEDED
Status: DISCONTINUED | OUTPATIENT
Start: 2019-11-18 | End: 2019-11-18 | Stop reason: HOSPADM

## 2019-11-18 RX ORDER — HEPARIN SODIUM 200 [USP'U]/100ML
INJECTION, SOLUTION INTRAVENOUS
Status: DISCONTINUED | OUTPATIENT
Start: 2019-11-18 | End: 2019-11-18 | Stop reason: HOSPADM

## 2019-11-18 RX ORDER — SODIUM CHLORIDE, SODIUM LACTATE, POTASSIUM CHLORIDE, CALCIUM CHLORIDE 600; 310; 30; 20 MG/100ML; MG/100ML; MG/100ML; MG/100ML
1000 INJECTION, SOLUTION INTRAVENOUS CONTINUOUS
Status: DISCONTINUED | OUTPATIENT
Start: 2019-11-18 | End: 2019-11-18 | Stop reason: HOSPADM

## 2019-11-18 RX ORDER — ONDANSETRON 2 MG/ML
INJECTION INTRAMUSCULAR; INTRAVENOUS AS NEEDED
Status: DISCONTINUED | OUTPATIENT
Start: 2019-11-18 | End: 2019-11-18 | Stop reason: HOSPADM

## 2019-11-18 RX ORDER — ACETAMINOPHEN AND CODEINE PHOSPHATE 300; 30 MG/1; MG/1
1 TABLET ORAL
Qty: 20 TAB | Refills: 0 | Status: SHIPPED | OUTPATIENT
Start: 2019-11-18 | End: 2019-11-23

## 2019-11-18 RX ORDER — MORPHINE SULFATE 10 MG/ML
2 INJECTION, SOLUTION INTRAMUSCULAR; INTRAVENOUS
Status: DISCONTINUED | OUTPATIENT
Start: 2019-11-18 | End: 2019-11-18 | Stop reason: HOSPADM

## 2019-11-18 RX ORDER — HYDROMORPHONE HYDROCHLORIDE 1 MG/ML
0.2 INJECTION, SOLUTION INTRAMUSCULAR; INTRAVENOUS; SUBCUTANEOUS
Status: ACTIVE | OUTPATIENT
Start: 2019-11-18 | End: 2019-11-18

## 2019-11-18 RX ORDER — FENTANYL CITRATE 50 UG/ML
INJECTION, SOLUTION INTRAMUSCULAR; INTRAVENOUS AS NEEDED
Status: DISCONTINUED | OUTPATIENT
Start: 2019-11-18 | End: 2019-11-18 | Stop reason: HOSPADM

## 2019-11-18 RX ORDER — MIDAZOLAM HYDROCHLORIDE 1 MG/ML
INJECTION, SOLUTION INTRAMUSCULAR; INTRAVENOUS AS NEEDED
Status: DISCONTINUED | OUTPATIENT
Start: 2019-11-18 | End: 2019-11-18 | Stop reason: HOSPADM

## 2019-11-18 RX ORDER — SODIUM CHLORIDE, SODIUM LACTATE, POTASSIUM CHLORIDE, CALCIUM CHLORIDE 600; 310; 30; 20 MG/100ML; MG/100ML; MG/100ML; MG/100ML
100 INJECTION, SOLUTION INTRAVENOUS CONTINUOUS
Status: DISCONTINUED | OUTPATIENT
Start: 2019-11-18 | End: 2019-11-18 | Stop reason: HOSPADM

## 2019-11-18 RX ORDER — ONDANSETRON 2 MG/ML
4 INJECTION INTRAMUSCULAR; INTRAVENOUS AS NEEDED
Status: DISCONTINUED | OUTPATIENT
Start: 2019-11-18 | End: 2019-11-18 | Stop reason: HOSPADM

## 2019-11-18 RX ORDER — PROPOFOL 10 MG/ML
INJECTION, EMULSION INTRAVENOUS
Status: DISCONTINUED | OUTPATIENT
Start: 2019-11-18 | End: 2019-11-18 | Stop reason: HOSPADM

## 2019-11-18 RX ORDER — FENTANYL CITRATE 50 UG/ML
50 INJECTION, SOLUTION INTRAMUSCULAR; INTRAVENOUS AS NEEDED
Status: DISCONTINUED | OUTPATIENT
Start: 2019-11-18 | End: 2019-11-18 | Stop reason: HOSPADM

## 2019-11-18 RX ORDER — DIPHENHYDRAMINE HYDROCHLORIDE 50 MG/ML
12.5 INJECTION, SOLUTION INTRAMUSCULAR; INTRAVENOUS AS NEEDED
Status: DISCONTINUED | OUTPATIENT
Start: 2019-11-18 | End: 2019-11-18 | Stop reason: HOSPADM

## 2019-11-18 RX ADMIN — PROPOFOL 50 MG: 10 INJECTION, EMULSION INTRAVENOUS at 11:04

## 2019-11-18 RX ADMIN — ONDANSETRON HYDROCHLORIDE 4 MG: 2 INJECTION, SOLUTION INTRAMUSCULAR; INTRAVENOUS at 10:53

## 2019-11-18 RX ADMIN — ACETAMINOPHEN 650 MG: 325 TABLET, FILM COATED ORAL at 10:28

## 2019-11-18 RX ADMIN — LIDOCAINE HYDROCHLORIDE 60 MG: 20 INJECTION, SOLUTION EPIDURAL; INFILTRATION; INTRACAUDAL; PERINEURAL at 11:04

## 2019-11-18 RX ADMIN — SODIUM CHLORIDE, POTASSIUM CHLORIDE, SODIUM LACTATE AND CALCIUM CHLORIDE: 600; 310; 30; 20 INJECTION, SOLUTION INTRAVENOUS at 10:53

## 2019-11-18 RX ADMIN — PHENYLEPHRINE HYDROCHLORIDE 40 MCG: 10 INJECTION INTRAVENOUS at 11:22

## 2019-11-18 RX ADMIN — PROPOFOL 75 MCG/KG/MIN: 10 INJECTION, EMULSION INTRAVENOUS at 11:06

## 2019-11-18 RX ADMIN — PROPOFOL 50 MG: 10 INJECTION, EMULSION INTRAVENOUS at 11:06

## 2019-11-18 RX ADMIN — MIDAZOLAM HYDROCHLORIDE 2 MG: 1 INJECTION, SOLUTION INTRAMUSCULAR; INTRAVENOUS at 11:01

## 2019-11-18 RX ADMIN — DEXAMETHASONE SODIUM PHOSPHATE 8 MG: 4 INJECTION, SOLUTION INTRAMUSCULAR; INTRAVENOUS at 11:07

## 2019-11-18 RX ADMIN — MIDAZOLAM HYDROCHLORIDE 3 MG: 1 INJECTION, SOLUTION INTRAMUSCULAR; INTRAVENOUS at 10:53

## 2019-11-18 RX ADMIN — PHENYLEPHRINE HYDROCHLORIDE 40 MCG: 10 INJECTION INTRAVENOUS at 11:16

## 2019-11-18 RX ADMIN — SCOPALAMINE 1 PATCH: 1 PATCH, EXTENDED RELEASE TRANSDERMAL at 10:53

## 2019-11-18 RX ADMIN — MEPERIDINE HYDROCHLORIDE 25 MG: 50 INJECTION INTRAMUSCULAR; INTRAVENOUS; SUBCUTANEOUS at 10:53

## 2019-11-18 RX ADMIN — FENTANYL CITRATE 50 MCG: 50 INJECTION, SOLUTION INTRAMUSCULAR; INTRAVENOUS at 11:04

## 2019-11-18 RX ADMIN — SODIUM CHLORIDE 1 G: 900 INJECTION INTRAVENOUS at 11:00

## 2019-11-18 RX ADMIN — Medication 10 MG: at 11:26

## 2019-11-18 NOTE — DISCHARGE INSTRUCTIONS
______________________________________________________________________    Anesthesia Discharge Instructions    After general anesthesia or intervenous sedation, for 24 hours or while taking prescription Narcotics:  · Limit your activities  · Do not drive or operate hazardous machinery  · If you have not urinated within 8 hours after discharge, please contact your surgeon on call. · Do not make important personal or business decisions  · Do not drink alcoholic beverages    Report the following to your surgeon:  · Excessive pain, swelling, redness or odor of or around the surgical area  · Temperature over 100.5 degrees  · Nausea and vomiting lasting longer than 4 hours or if unable to take medication  · Any signs of decreased circulation or nerve impairment to extremity:  Change in color, persistent numbness, tingling, coldness or increased pain. · Any questions      * may remove dressing tomorrow.  Leave steri-strips (white strips of tape in place)

## 2019-11-18 NOTE — ANESTHESIA POSTPROCEDURE EVALUATION
Post-Anesthesia Evaluation and Assessment Patient: Luis Fernando Miller MRN: [id number]  SSN: [id number] YOB: 1970  Age: 44 y.o. Sex: female I have evaluated the patient and they are stable and ready for discharge from the PACU. Cardiovascular Function/Vital Signs Visit Vitals /61 Pulse 75 Temp 36.7 °C (98 °F) Resp 19 Ht 5' 3\" (1.6 m) Wt 54.9 kg (121 lb) SpO2 97% BMI 21.43 kg/m² Patient is status post MAC anesthesia for Procedure(s): PORT A CATH INSERTION (EIP TO FOLLOW). Nausea/Vomiting: None Postoperative hydration reviewed and adequate. Pain: 
Pain Scale 1: Visual (11/18/19 1145) Pain Intensity 1: 0 (11/18/19 1145) Managed Neurological Status:  
Neuro (WDL): Within Defined Limits (11/18/19 1145) Neuro LUE Motor Response: Spontaneous  (11/18/19 1145) LLE Motor Response: Spontaneous  (11/18/19 1145) RUE Motor Response: Spontaneous  (11/18/19 1145) RLE Motor Response: Spontaneous  (11/18/19 1145) At baseline Mental Status, Level of Consciousness: Alert and  oriented to person, place, and time Pulmonary Status:  
O2 Device: Room air (11/18/19 1150) Adequate oxygenation and airway patent Complications related to anesthesia: None Post-anesthesia assessment completed. No concerns Signed By: Sandy Harry MD   
 November 18, 2019 Procedure(s): PORT A CATH INSERTION (EIP TO FOLLOW). MAC 
 
<BSHSIANPOST> Vitals Value Taken Time /63 11/18/2019 12:15 PM  
Temp 36.7 °C (98 °F) 11/18/2019 11:48 AM  
Pulse 82 11/18/2019 12:17 PM  
Resp 18 11/18/2019 12:17 PM  
SpO2 97 % 11/18/2019 12:17 PM  
Vitals shown include unvalidated device data.

## 2019-11-18 NOTE — OP NOTES
Gynecologic Oncology Operative Report Alla Godwin 11/18/2019 Pre-operative diagnosis:   1) Appendix CA 2) Requires venous access for chemotherapy Post-operative diagnosis: 1) Appendix CA 2) Requires venous access for chemotherapy Procedure:  IV port placement Surgeon:  Margarito Fountain MD 
 
Assistant:  N/A Anesthesia:  MAC, plus local 
 
EBL:  5 cc Implants:  
Implant Name Type Inv. Item Serial No.  Lot No. LRB No. Used Action PORT ATTACH CATH POWERPORT 8FR --  - [id number]  PORT ATTACH CATH POWERPORT 8FR --  [id number] BARD [employer] V[employer] [id number] Left 1 Implanted Complications:  None Operative indications:  43 yo WF with advanced appendiceal cancer, initially thought to be ovarian cancer. She is scheduled to begin chemotherapy and needs IV access for treatment. Operative findings:  Normal anatomy. Procedure in detail:  After the risks, benefits, indications, and alternatives of the procedure were discussed with the patient and informed consent was obtained, the patient was taken to the operating room. She was then correctly identified, administered IV sedation per anesthesia, and then prepped and draped in the supine position in the usual fashion. Her arms were also tucked at each side. The patient was then placed in slight Trendelenburg tilt and the anatomy of the anterior thoracic wall was noted. 1% lidocaine without epinephrine was then injected just underneath the left mid clavicle to provide local anesthesia. The left subclavian vein was then located and entered with an 18-gauge needle. A guidewire was then inserted through the needle and proper placement was confirmed by fluoroscopy. A small incision was made with an #11-blade scalpel at the insertion site and the catheter introducer and dilator were inserted over the guidewire. The guidewire and dilator were then removed. The 8 [place]an catheter was then inserted through the introducer to a depth of 20 cm at skin level. Proper placement was confirmed by fluoroscopy with the catheter tip positioned in the superior vena cava just above the right atrium. The peel-away catheter introducer was then removed. Good flow and return were noted through the catheter. Attention was then directed to creation of the port pocket. The location of the pocket was selected and 1% lidocaine was injected. The site was approximately 4 cm caudad to the catheter insertion site. An  4 cm skin incision was made transversely with a #15-blade scalpel. The incision was then carried down to the pectoralis fascia with electro-cautery. A port pocket was then bluntly created, large enough to accommodate the standard-size Bard Powerport. The port was then secured to the pectoralis fascia with 2-0 Proline suture at three locations. The catheter was then tunneled from the insertion site to the port site using the tunneling device. The catheter was cut to fit and then attached to the port. The catheter was secured to the port using the locking device. The port was then accessed with a Springer needle and good glow and returned were noted. The port was then flushed with concentrated Heparin solution. The port pocket was then irrigated and made hemostatic with electro-cautery. The incision was then closed in two layers. The subcutaneous fat was reapproximated with a running 3-0 Vicryl. The skin was then reapproximated with a 4-0 Monocryl in a running subcuticular fashion. Steri-strips were then applied, followed by a sterile pressure dressing. The patient was then awakened from anesthesia and taken to the recovery room in stable condition. All instrument, sponge, and needle counts were correct. A chest X-ray was ordered for the recovery room, but the results are pending at the time of this dictation.  
 
 
Edgar Laureano MD 
11/18/2019 
11:39 AM

## 2019-11-18 NOTE — BRIEF OP NOTE
BRIEF OPERATIVE NOTE Date of Procedure: 11/18/2019 Preoperative Diagnosis: APPENDIX CANCER Postoperative Diagnosis: APPENDIX CANCER Procedure(s): PORT A CATH INSERTION Surgeon(s) and Role: Ashley Beasley MD - Primary Surgical Assistant: None Surgical Staff: 
Circ-1: Rishi Rivas Circ-2: Mukesh Morales RN Scrub Tech-1: German Saunders Surg Asst-1: Braydon Neville Event Time In Time Out Incision Start  Incision Close 1137 Anesthesia: MAC Estimated Blood Loss: 5 cc Specimens: * No specimens in log * Findings: Normal anatomy Complications: None Implants:  
Implant Name Type Inv. Item Serial No.  Lot No. LRB No. Used Action PORT ATTACH CATH POWERPORT 8FR --  - [id number]  PORT ATTACH CATH POWERPORT 8FR --  [id number] BARD [employer] [id number] Left 1 Implanted Telma Jaffe MD

## 2019-11-19 ENCOUNTER — DOCUMENTATION ONLY (OUTPATIENT)
Dept: ONCOLOGY | Age: 49
End: 2019-11-19

## 2019-11-19 NOTE — PROGRESS NOTES
Case d/w Dr Nicolas Torres at [facility] agrees with modified FOLFOX plan for 6 months  Then re eval for HiPEC at [facility]  Pt will follow with [facility] for CTs  Had CT here 11/2

## 2019-11-20 ENCOUNTER — TELEPHONE (OUTPATIENT)
Dept: ONCOLOGY | Age: 49
End: 2019-11-20

## 2019-11-20 NOTE — TELEPHONE ENCOUNTER
[facility] [place] at [address]   [address] [facility], [address]  Katharina Landis 103: [phone]  F: [phone]    Medical Nutrition Therapy      Telephone Encounter:  Called and spoke with patient. She has lost 20# since surgery at the end of September. She reports struggling with on/off diarrhea. Diet recall:  Breakfast- half english muffin with pb   Lunch: chicken salad on toast with a few chips  Dinner: cheese ravioli with a caesar salad. She usually takes imodium everyday but did not today because she hasn't had a bowel movement in 2 days. She feels the imodium helps increase absorption of nutrients. She does report being hungry every 2 hours. She heard she should not have dairy or red meat. Explained that there are no contraindications with dairy and red meat. I suspect red meat would be tolerated fine in small amounts. And we discussed incorporating dairy such as yogurt, cottage cheese, etc in effort to increase protein intake. She has tried ensure/boost products in the past stating they are ok. Discussed alternatives. Results:   Diagnosis: Metastatic appendiceal cancer   S/p exp lap, SIERRA, BSO, tumor debulking, ileocecal resection, omentectomy on 9/25/19   Plan for FOLFOX    Wt Readings from Last 5 Encounters:   11/18/19 121 lb (54.9 kg)   11/14/19 125 lb 9.6 oz (57 kg)   11/11/19 126 lb 3.2 oz (57.2 kg)   11/06/19 122 lb (55.3 kg)   10/28/19 123 lb (55.8 kg)     Estimated Nutrition Needs:   Calorie Range: 1890-2160kcal/day      Protein Range: 64-74g/day     Fluid Needs: 2000ml      Assessment:   Altered GI Function related to recent surgery and disease as evidence by diarrhea and anatomical changes. Involuntary weight loss related to surgery and disease as evidence by weight loss of 20# x 10 weeks. Plan:   · Eating small amounts several times a day verses large meals.     · Add protein and calories to favorite foods  (Ex. adding non-fat dry milk powder, butters, oils, whole milk, etc)  · Keep nutrient-dense foods close at hand and snack frequently   · Discussed consumption of nutrition supplements per day   · Ideas to make more calorically dense without increasing volume. · Monitor bowel movements - when they occur (before/after meals), when imodium is taken, etc.          I appreciate the opportunity to participate in Ms. Obey sorto.     Signed By: Yazmin Mitchell, [address] [place] [address], [address] [place] [address] , [address]     Contact: [phone]

## 2019-11-25 RX ORDER — ALBUTEROL SULFATE 0.83 MG/ML
2.5 SOLUTION RESPIRATORY (INHALATION) AS NEEDED
Status: CANCELLED
Start: 2019-12-03

## 2019-11-25 RX ORDER — DEXTROSE MONOHYDRATE 50 MG/ML
25 INJECTION, SOLUTION INTRAVENOUS CONTINUOUS
Status: CANCELLED
Start: 2019-12-03

## 2019-11-25 RX ORDER — HYDROCORTISONE SODIUM SUCCINATE 100 MG/2ML
100 INJECTION, POWDER, FOR SOLUTION INTRAMUSCULAR; INTRAVENOUS AS NEEDED
Status: CANCELLED | OUTPATIENT
Start: 2019-12-03

## 2019-11-25 RX ORDER — EPINEPHRINE 1 MG/ML
0.3 INJECTION, SOLUTION, CONCENTRATE INTRAVENOUS AS NEEDED
Status: CANCELLED | OUTPATIENT
Start: 2019-12-03

## 2019-11-25 RX ORDER — FLUOROURACIL 50 MG/ML
300 INJECTION, SOLUTION INTRAVENOUS ONCE
Status: CANCELLED
Start: 2019-12-03 | End: 2019-12-03

## 2019-11-25 RX ORDER — SODIUM CHLORIDE 0.9 % (FLUSH) 0.9 %
10 SYRINGE (ML) INJECTION AS NEEDED
Status: CANCELLED
Start: 2019-12-03

## 2019-11-25 RX ORDER — SODIUM CHLORIDE 0.9 % (FLUSH) 0.9 %
10 SYRINGE (ML) INJECTION AS NEEDED
Status: CANCELLED
Start: 2019-12-05

## 2019-11-25 RX ORDER — SODIUM CHLORIDE 9 MG/ML
10 INJECTION INTRAMUSCULAR; INTRAVENOUS; SUBCUTANEOUS AS NEEDED
Status: CANCELLED | OUTPATIENT
Start: 2019-12-03

## 2019-11-25 RX ORDER — HEPARIN 100 UNIT/ML
300-500 SYRINGE INTRAVENOUS AS NEEDED
Status: CANCELLED
Start: 2019-12-05

## 2019-11-25 RX ORDER — ONDANSETRON 2 MG/ML
8 INJECTION INTRAMUSCULAR; INTRAVENOUS AS NEEDED
Status: CANCELLED | OUTPATIENT
Start: 2019-12-03

## 2019-11-25 RX ORDER — ACETAMINOPHEN 325 MG/1
650 TABLET ORAL AS NEEDED
Status: CANCELLED
Start: 2019-12-03

## 2019-11-25 RX ORDER — HEPARIN 100 UNIT/ML
300-500 SYRINGE INTRAVENOUS AS NEEDED
Status: CANCELLED
Start: 2019-12-03

## 2019-11-25 RX ORDER — ONDANSETRON 2 MG/ML
8 INJECTION INTRAMUSCULAR; INTRAVENOUS ONCE
Status: CANCELLED | OUTPATIENT
Start: 2019-12-03

## 2019-11-25 RX ORDER — DIPHENHYDRAMINE HYDROCHLORIDE 50 MG/ML
50 INJECTION, SOLUTION INTRAMUSCULAR; INTRAVENOUS AS NEEDED
Status: CANCELLED
Start: 2019-12-03

## 2019-11-25 RX ORDER — SODIUM CHLORIDE 9 MG/ML
10 INJECTION INTRAMUSCULAR; INTRAVENOUS; SUBCUTANEOUS AS NEEDED
Status: CANCELLED | OUTPATIENT
Start: 2019-12-05

## 2019-11-26 ENCOUNTER — HOSPITAL ENCOUNTER (OUTPATIENT)
Dept: CT IMAGING | Age: 49
Discharge: HOME | End: 2019-11-26
Attending: INTERNAL MEDICINE
Payer: COMMERCIAL

## 2019-11-26 DIAGNOSIS — C18.1 MUCINOUS ADENOCARCINOMA OF APPENDIX (HCC): ICD-10-CM

## 2019-11-26 DIAGNOSIS — K56.609 SMALL BOWEL OBSTRUCTION (HCC): ICD-10-CM

## 2019-11-26 DIAGNOSIS — D73.5 SPLENIC INFARCTION: ICD-10-CM

## 2019-11-26 DIAGNOSIS — C18.1 MUCINOUS ADENOCARCINOMA OF APPENDIX (HCC): Primary | ICD-10-CM

## 2019-11-26 DIAGNOSIS — C79.60 MALIGNANT NEOPLASM METASTATIC TO OVARY, UNSPECIFIED LATERALITY (HCC): ICD-10-CM

## 2019-11-26 DIAGNOSIS — G47.00 INSOMNIA, UNSPECIFIED TYPE: ICD-10-CM

## 2019-11-26 DIAGNOSIS — R11.2 NAUSEA AND VOMITING, INTRACTABILITY OF VOMITING NOT SPECIFIED, UNSPECIFIED VOMITING TYPE: ICD-10-CM

## 2019-11-26 PROCEDURE — 71260 CT THORAX DX C+: CPT

## 2019-11-26 PROCEDURE — 74011000258 HC RX REV CODE- 258: Performed by: RADIOLOGY

## 2019-11-26 PROCEDURE — 74177 CT ABD & PELVIS W/CONTRAST: CPT

## 2019-11-26 PROCEDURE — 74011636320 HC RX REV CODE- 636/320: Performed by: RADIOLOGY

## 2019-11-26 RX ORDER — SODIUM CHLORIDE 0.9 % (FLUSH) 0.9 %
10 SYRINGE (ML) INJECTION
Status: COMPLETED | OUTPATIENT
Start: 2019-11-26 | End: 2019-11-26

## 2019-11-26 RX ORDER — LORAZEPAM 0.5 MG/1
0.5 TABLET ORAL
Qty: 30 TAB | Refills: 0 | Status: SHIPPED | OUTPATIENT
Start: 2019-11-26 | End: 2020-01-03 | Stop reason: SDUPTHER

## 2019-11-26 RX ADMIN — IOHEXOL 50 ML: 240 INJECTION, SOLUTION INTRATHECAL; INTRAVASCULAR; INTRAVENOUS; ORAL at 13:13

## 2019-11-26 RX ADMIN — Medication 10 ML: at 13:13

## 2019-11-26 RX ADMIN — SODIUM CHLORIDE 100 ML: 900 INJECTION, SOLUTION INTRAVENOUS at 13:13

## 2019-11-26 RX ADMIN — IOPAMIDOL 100 ML: 755 INJECTION, SOLUTION INTRAVENOUS at 13:13

## 2019-12-03 ENCOUNTER — OFFICE VISIT (OUTPATIENT)
Dept: ONCOLOGY | Age: 49
End: 2019-12-03

## 2019-12-03 ENCOUNTER — HOSPITAL ENCOUNTER (OUTPATIENT)
Dept: INFUSION THERAPY | Age: 49
Discharge: HOME | End: 2019-12-03
Payer: COMMERCIAL

## 2019-12-03 VITALS
HEART RATE: 73 BPM | BODY MASS INDEX: 21.26 KG/M2 | SYSTOLIC BLOOD PRESSURE: 137 MMHG | OXYGEN SATURATION: 98 % | RESPIRATION RATE: 16 BRPM | WEIGHT: 120 LBS | TEMPERATURE: 97 F | DIASTOLIC BLOOD PRESSURE: 83 MMHG | HEIGHT: 63 IN

## 2019-12-03 VITALS
HEIGHT: 63 IN | TEMPERATURE: 97.7 F | OXYGEN SATURATION: 95 % | DIASTOLIC BLOOD PRESSURE: 71 MMHG | SYSTOLIC BLOOD PRESSURE: 119 MMHG | WEIGHT: 120 LBS | HEART RATE: 73 BPM | BODY MASS INDEX: 21.26 KG/M2

## 2019-12-03 DIAGNOSIS — R10.84 GENERALIZED ABDOMINAL PAIN: ICD-10-CM

## 2019-12-03 DIAGNOSIS — C18.1 CARCINOMA OF APPENDIX (HCC): Primary | ICD-10-CM

## 2019-12-03 DIAGNOSIS — K56.609 SMALL BOWEL OBSTRUCTION (HCC): ICD-10-CM

## 2019-12-03 DIAGNOSIS — C18.1 MUCINOUS ADENOCARCINOMA OF APPENDIX (HCC): Primary | ICD-10-CM

## 2019-12-03 DIAGNOSIS — D73.5 SPLENIC INFARCTION: ICD-10-CM

## 2019-12-03 DIAGNOSIS — C79.60 MALIGNANT NEOPLASM METASTATIC TO OVARY, UNSPECIFIED LATERALITY (HCC): ICD-10-CM

## 2019-12-03 DIAGNOSIS — J90 PLEURAL EFFUSION ON LEFT: ICD-10-CM

## 2019-12-03 DIAGNOSIS — R06.02 SOB (SHORTNESS OF BREATH): ICD-10-CM

## 2019-12-03 LAB
ALBUMIN SERPL-MCNC: 2.9 G/DL (ref 3.5–5)
ALBUMIN/GLOB SERPL: 0.7 {RATIO} (ref 1.1–2.2)
ALP SERPL-CCNC: 93 U/L (ref 45–117)
ALT SERPL-CCNC: 10 U/L (ref 12–78)
ANION GAP SERPL CALC-SCNC: 7 MMOL/L (ref 5–15)
AST SERPL-CCNC: 11 U/L (ref 15–37)
BASOPHILS # BLD: 0 K/UL (ref 0–0.1)
BASOPHILS NFR BLD: 1 % (ref 0–1)
BILIRUB SERPL-MCNC: 0.3 MG/DL (ref 0.2–1)
BUN SERPL-MCNC: 6 MG/DL (ref 6–20)
BUN/CREAT SERPL: 14 (ref 12–20)
CALCIUM SERPL-MCNC: 9.2 MG/DL (ref 8.5–10.1)
CHLORIDE SERPL-SCNC: 103 MMOL/L (ref 97–108)
CO2 SERPL-SCNC: 28 MMOL/L (ref 21–32)
CREAT SERPL-MCNC: 0.44 MG/DL (ref 0.55–1.02)
DIFFERENTIAL METHOD BLD: ABNORMAL
EOSINOPHIL # BLD: 0.2 K/UL (ref 0–0.4)
EOSINOPHIL NFR BLD: 3 % (ref 0–7)
ERYTHROCYTE [DISTWIDTH] IN BLOOD BY AUTOMATED COUNT: 16.3 % (ref 11.5–14.5)
GLOBULIN SER CALC-MCNC: 4.2 G/DL (ref 2–4)
GLUCOSE SERPL-MCNC: 108 MG/DL (ref 65–100)
HCT VFR BLD AUTO: 34.7 % (ref 35–47)
HGB BLD-MCNC: 11 G/DL (ref 11.5–16)
IMM GRANULOCYTES # BLD AUTO: 0 K/UL (ref 0–0.04)
IMM GRANULOCYTES NFR BLD AUTO: 0 % (ref 0–0.5)
LYMPHOCYTES # BLD: 1.5 K/UL (ref 0.8–3.5)
LYMPHOCYTES NFR BLD: 24 % (ref 12–49)
MCH RBC QN AUTO: 27.8 PG (ref 26–34)
MCHC RBC AUTO-ENTMCNC: 31.7 G/DL (ref 30–36.5)
MCV RBC AUTO: 87.8 FL (ref 80–99)
MONOCYTES # BLD: 0.7 K/UL (ref 0–1)
MONOCYTES NFR BLD: 11 % (ref 5–13)
NEUTS SEG # BLD: 4 K/UL (ref 1.8–8)
NEUTS SEG NFR BLD: 61 % (ref 32–75)
NRBC # BLD: 0 K/UL (ref 0–0.01)
NRBC BLD-RTO: 0 PER 100 WBC
PLATELET # BLD AUTO: 483 K/UL (ref 150–400)
PMV BLD AUTO: 9.5 FL (ref 8.9–12.9)
POTASSIUM SERPL-SCNC: 3 MMOL/L (ref 3.5–5.1)
PROT SERPL-MCNC: 7.1 G/DL (ref 6.4–8.2)
RBC # BLD AUTO: 3.95 M/UL (ref 3.8–5.2)
SODIUM SERPL-SCNC: 138 MMOL/L (ref 136–145)
WBC # BLD AUTO: 6.4 K/UL (ref 3.6–11)

## 2019-12-03 PROCEDURE — 36415 COLL VENOUS BLD VENIPUNCTURE: CPT

## 2019-12-03 PROCEDURE — 96415 CHEMO IV INFUSION ADDL HR: CPT

## 2019-12-03 PROCEDURE — 85025 COMPLETE CBC W/AUTO DIFF WBC: CPT

## 2019-12-03 PROCEDURE — 96413 CHEMO IV INFUSION 1 HR: CPT

## 2019-12-03 PROCEDURE — 74011000258 HC RX REV CODE- 258: Performed by: INTERNAL MEDICINE

## 2019-12-03 PROCEDURE — 77030012965 HC NDL HUBR BBMI -A

## 2019-12-03 PROCEDURE — 96416 CHEMO PROLONG INFUSE W/PUMP: CPT

## 2019-12-03 PROCEDURE — 96367 TX/PROPH/DG ADDL SEQ IV INF: CPT

## 2019-12-03 PROCEDURE — 74011250636 HC RX REV CODE- 250/636: Performed by: NURSE PRACTITIONER

## 2019-12-03 PROCEDURE — 96366 THER/PROPH/DIAG IV INF ADDON: CPT

## 2019-12-03 PROCEDURE — 96375 TX/PRO/DX INJ NEW DRUG ADDON: CPT

## 2019-12-03 PROCEDURE — 80053 COMPREHEN METABOLIC PANEL: CPT

## 2019-12-03 PROCEDURE — 96368 THER/DIAG CONCURRENT INF: CPT

## 2019-12-03 PROCEDURE — 74011250636 HC RX REV CODE- 250/636: Performed by: INTERNAL MEDICINE

## 2019-12-03 PROCEDURE — 96411 CHEMO IV PUSH ADDL DRUG: CPT

## 2019-12-03 PROCEDURE — 74011000258 HC RX REV CODE- 258: Performed by: NURSE PRACTITIONER

## 2019-12-03 PROCEDURE — 74011250637 HC RX REV CODE- 250/637: Performed by: NURSE PRACTITIONER

## 2019-12-03 RX ORDER — POTASSIUM CHLORIDE 750 MG/1
40 TABLET, FILM COATED, EXTENDED RELEASE ORAL
Status: COMPLETED | OUTPATIENT
Start: 2019-12-03 | End: 2019-12-03

## 2019-12-03 RX ORDER — SODIUM CHLORIDE 9 MG/ML
10 INJECTION INTRAMUSCULAR; INTRAVENOUS; SUBCUTANEOUS AS NEEDED
Status: ACTIVE | OUTPATIENT
Start: 2019-12-03 | End: 2019-12-03

## 2019-12-03 RX ORDER — SODIUM CHLORIDE 0.9 % (FLUSH) 0.9 %
10 SYRINGE (ML) INJECTION AS NEEDED
Status: ACTIVE | OUTPATIENT
Start: 2019-12-03 | End: 2019-12-03

## 2019-12-03 RX ORDER — DEXTROSE MONOHYDRATE 50 MG/ML
25 INJECTION, SOLUTION INTRAVENOUS CONTINUOUS
Status: DISPENSED | OUTPATIENT
Start: 2019-12-03 | End: 2019-12-03

## 2019-12-03 RX ORDER — POTASSIUM CHLORIDE 7.45 MG/ML
10 INJECTION INTRAVENOUS ONCE
Status: COMPLETED | OUTPATIENT
Start: 2019-12-03 | End: 2019-12-03

## 2019-12-03 RX ORDER — FLUOROURACIL 50 MG/ML
200 INJECTION, SOLUTION INTRAVENOUS ONCE
Status: CANCELLED
Start: 2019-12-03

## 2019-12-03 RX ORDER — HEPARIN 100 UNIT/ML
300-500 SYRINGE INTRAVENOUS AS NEEDED
Status: ACTIVE | OUTPATIENT
Start: 2019-12-03 | End: 2019-12-03

## 2019-12-03 RX ORDER — ONDANSETRON 2 MG/ML
8 INJECTION INTRAMUSCULAR; INTRAVENOUS ONCE
Status: COMPLETED | OUTPATIENT
Start: 2019-12-03 | End: 2019-12-03

## 2019-12-03 RX ORDER — FLUOROURACIL 50 MG/ML
200 INJECTION, SOLUTION INTRAVENOUS ONCE
Status: COMPLETED | OUTPATIENT
Start: 2019-12-03 | End: 2019-12-03

## 2019-12-03 RX ADMIN — DEXTROSE MONOHYDRATE 316 MG: 5 INJECTION, SOLUTION INTRAVENOUS at 14:02

## 2019-12-03 RX ADMIN — OXALIPLATIN 67 MG: 5 INJECTION, SOLUTION INTRAVENOUS at 14:02

## 2019-12-03 RX ADMIN — DEXTROSE MONOHYDRATE 25 ML/HR: 5 INJECTION, SOLUTION INTRAVENOUS at 11:51

## 2019-12-03 RX ADMIN — DEXAMETHASONE SODIUM PHOSPHATE 12 MG: 4 INJECTION, SOLUTION INTRA-ARTICULAR; INTRALESIONAL; INTRAMUSCULAR; INTRAVENOUS; SOFT TISSUE at 13:08

## 2019-12-03 RX ADMIN — POTASSIUM CHLORIDE 10 MEQ: 7.46 INJECTION, SOLUTION INTRAVENOUS at 11:51

## 2019-12-03 RX ADMIN — POTASSIUM CHLORIDE 40 MEQ: 750 TABLET, EXTENDED RELEASE ORAL at 11:44

## 2019-12-03 RX ADMIN — FLUOROURACIL 1896 MG: 50 INJECTION, SOLUTION INTRAVENOUS at 16:25

## 2019-12-03 RX ADMIN — ONDANSETRON 8 MG: 2 INJECTION, SOLUTION INTRAMUSCULAR; INTRAVENOUS at 13:07

## 2019-12-03 RX ADMIN — FLUOROURACIL 316 MG: 50 INJECTION, SOLUTION INTRAVENOUS at 16:20

## 2019-12-03 NOTE — PROGRESS NOTES
Pharmacy Note- Chemotherapy Education    Luis Fernando Miller is a  44 y. o.female  diagnosed with appendix cancer here today for Cycle 1, Day 1 chemotherapy counseling. Ms. Adelaida Jimenez is being treated with mFOLFOX. Provided education on oxaliplatin, leucovorin, fluorouracil and premedications - ondansetron and dexamethasone. Provided patient a handout on home nausea/vomiting medications for treatment/prevention. Side effects of chemotherapy reviewed included s/s infection, anemia, appetite changes, thromboyctopenia, fatigue, hair loss/alopecia, bone pain, skin and nail changes, diarrhea/constipation, hand/foot syndrome and nerve sensitivities (cold sensitivity and peripheral neuropathy). Patient given ways to manage these side effects and when to contact office. [address] Handout of medications provided to patient. Ms. Adelaida Jimenez verbalized understanding of the information presented and all of the patient's questions were answered.     Elaine San, PharmD, BCPS, BCOP

## 2019-12-03 NOTE — PROGRESS NOTES
[facility] [place] at [facility]  [address], [address] [place], [address] [place] Stephanie Aj Liliane: [phone]  F: [phone]    Reason for Visit:   Aramis Messina is a 44 y.o. female who is seen in consultation at the request of Dr. Jay Millan for evaluation of appendix cancer. Treatment History:   EXPLORATORY LAPAROTOMY SIERRA BSO, TUMOR DEBULKING: ILEOCECAL RESECTION; OMENTECTOMY    STAGE: 4 with carcinomatosis    History of Present Illness:     Pt seen today for f/u of metastatic appendix cancer here for palliative FOLFOX. Port site ok. Pt is a little worried about chemo today due to weakness. Has occ abdominal pain but does not take anything. Still has vomiting but able to keep things down. Labs pending today. CT 11/19 showed same extensive disease. Here with  today. 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 ([facility] [address] 2019    Nausea & vomiting     Splenic infarction 2019    Symptomatic cholelithiasis 9/18/2019      Past Surgical History:   Procedure Laterality Date    ABDOMEN SURGERY PROC UNLISTED      HX APPENDECTOMY  09/2019    HX GI  09/2019    ILEOCECECTOMY    HX GYN  2003    CYST ON OVARY    HX HYSTERECTOMY  2019    IR THORACENTESIS CATH W IMAGE  11/6/2019      Social History     Tobacco Use    Smoking status: Never Smoker    Smokeless tobacco: Never Used   Substance Use Topics    Alcohol use: Not Currently      Family History   Problem Relation Age of Onset    Breast Cancer Paternal Grandmother 61    Crohn's Disease Mother     Heart Disease Mother     Cancer Father         BLADDER    Diabetes Father     No Known Problems Son     No Known Problems Daughter     Anesth Problems Neg Hx      Current Outpatient Medications   Medication Sig    LORazepam (ATIVAN) 0.5 mg tablet Take 1 Tab by mouth nightly as needed for Anxiety. Max Daily Amount: 0.5 mg.     prochlorperazine (COMPAZINE) 5 mg tablet Take 1 tab by mouth every 6 hours as needed for nausea or vomiting    dexAMETHasone (DECADRON) 4 mg tablet Take 2 tabs (8mg) by mouth daily on days 2 and 3 after chemotherapy    lidocaine-prilocaine (EMLA) topical cream Apply  to affected area as needed for Pain.  ondansetron (ZOFRAN ODT) 4 mg disintegrating tablet Take 1 Tab by mouth every eight (8) hours as needed for Nausea.  acetaminophen (TYLENOL) 325 mg tablet Take 650 mg by mouth every four (4) hours as needed for Pain. Indications: pain    ibuprofen (MOTRIN) 200 mg tablet Take 3 Tabs by mouth every eight (8) hours as needed for Pain. No current facility-administered medications for this visit. Facility-Administered Medications Ordered in Other Visits   Medication Dose Route Frequency    saline peripheral flush soln 10 mL  10 mL InterCATHeter PRN    0.9% sodium chloride injection 10 mL  10 mL IntraVENous PRN    heparin (porcine) pf 300-500 Units  300-500 Units InterCATHeter PRN      No Known Allergies     Review of Systems: A complete review of systems was obtained, negative except as described above. Physical Exam:     Visit Vitals  /71 (BP 1 Location: Right arm, BP Patient Position: Sitting)   Pulse 73   Temp 97.7 °F (36.5 °C) (Oral)   Ht 5' 3\" (1.6 m)   Wt 120 lb (54.4 kg)   LMP 09/15/2019   SpO2 95%   BMI 21.26 kg/m²     ECOG PS: 1-2  General: No distress  Eyes:  anicteric sclerae  HENT: Atraumatic  Neck: Supple  Respiratory: slight decrease only in left base, otherwise clear  CV: Normal rate, regular rhythm  GI: soft, mildly distended NT  MS: ambulatory  Skin: warm dry  Psych: Alert, oriented, appropriate affect, normal judgment/insight      Results:     Lab Results   Component Value Date/Time    WBC 6.4 12/03/2019 10:27 AM    HGB 11.0 (L) 12/03/2019 10:27 AM    HCT 34.7 (L) 12/03/2019 10:27 AM    PLATELET 664 (H) 68/33/1973 10:27 AM    MCV 87.8 12/03/2019 10:27 AM    ABS.  NEUTROPHILS 4.0 12/03/2019 10:27 AM Hemoglobin (POC) 10.8 (L) 09/25/2019 04:11 PM    Hematocrit (POC) 32 (L) 09/25/2019 12:10 PM     Lab Results   Component Value Date/Time    Sodium 139 11/04/2019 11:37 AM    Potassium 4.0 11/04/2019 11:37 AM    Chloride 99 11/04/2019 11:37 AM    CO2 25 11/04/2019 11:37 AM    Glucose 90 11/04/2019 11:37 AM    BUN 4 (L) 11/04/2019 11:37 AM    Creatinine 0.50 (L) 11/04/2019 11:37 AM    GFR est  11/04/2019 11:37 AM    GFR est non- 11/04/2019 11:37 AM    Calcium 10.0 11/04/2019 11:37 AM    Sodium (POC) 137 09/25/2019 12:10 PM    Potassium (POC) 3.7 09/25/2019 12:10 PM    Chloride (POC) 105 09/25/2019 12:10 PM    Glucose (POC) 85 09/25/2019 12:10 PM    BUN (POC) 4 (L) 09/25/2019 12:10 PM    Creatinine (POC) 0.5 (L) 09/25/2019 12:10 PM    Calcium, ionized (POC) 1.14 09/25/2019 12:10 PM     Lab Results   Component Value Date/Time    Bilirubin, total <0.2 11/04/2019 11:37 AM    ALT (SGPT) 14 11/04/2019 11:37 AM    AST (SGOT) 14 11/04/2019 11:37 AM    Alk. phosphatase 110 11/04/2019 11:37 AM    Protein, total 7.4 11/04/2019 11:37 AM    Albumin 3.9 11/04/2019 11:37 AM    Globulin 4.5 (H) 10/28/2019 10:50 AM       CT Results (most recent):  Results from Hospital Encounter encounter on 11/26/19   CT ABD PELV W CONT    Narrative EXAM: CT CHEST W CONT, CT ABD PELV W CONT    INDICATION: met appendix cancer f/u speen infarct/ clot pre chemo    COMPARISON: 11 2/19 and others    CONTRAST: 100 mL of Isovue-370. TECHNIQUE:   Following the uneventful intravenous administration of contrast, thin axial  images were obtained through the chest, abdomen and pelvis. Coronal and sagittal  reconstructions were generated. Oral contrast was administered. CT dose  reduction was achieved through use of a standardized protocol tailored for this  examination and automatic exposure control for dose modulation. FINDINGS:     THYROID: No nodule. MEDIASTINUM: No mass or lymphadenopathy. ABNER: No mass or lymphadenopathy.   THORACIC AORTA: No dissection or aneurysm. MAIN PULMONARY ARTERY: Normal in caliber. TRACHEA/BRONCHI: Patent. ESOPHAGUS: No wall thickening or dilatation. HEART: Normal in size. PLEURA: Stable left pleural effusion. LUNGS: No nodule, mass, or airspace disease. LIVER: Small hypodensities in the left lobe liver unchanged. There is  perihepatic fluid anterior to the left lobe. GALLBLADDER: Unremarkable. SPLEEN: Again noted is a fluid collection occupying the anterior aspect of the  spleen consistent with patient's history of infarction and liquefaction. There  is persistent locules of air within the collection  PANCREAS: No mass or ductal dilatation. ADRENALS: Unremarkable. KIDNEYS: No mass, calculus, or hydronephrosis. STOMACH: In the lateral fundus of the stomach, there is extensive soft tissue  thickening possibly reflecting tumor implant. The previously noted fluid  collection in the wall stomach has resolved. There is a questionable tract  communicating with the splenic fluid collection with multiple locules of air  noted within the splenic fluid collection. The overall size of the fluid  collection is decreased significantly since the prior study and now measures  approximately 42 x 44 mm. SMALL BOWEL: No dilatation or wall thickening. COLON: There is thickening of the splenic flexure with adjacent peritoneal soft  tissue thickening. Possible serosal implants are noted on the sigmoid colon with  irregular wall thickening. APPENDIX: Not visualized  PERITONEUM: In the anterior pelvis just superior bladder, there is irregular  soft tissue around loops of small bowel. Nondependent ascites is noted in the  upper abdomen. RETROPERITONEUM: No lymphadenopathy or aortic aneurysm. REPRODUCTIVE ORGANS:  URINARY BLADDER: Unremarkable  BONES: No destructive bone lesion. ADDITIONAL COMMENTS: N/A      Impression IMPRESSION:  1. Interval decreased size of splenic fluid collection.  There is extensive  irregular soft tissue around the fluid collection which tracks into the lateral  wall the gastric fundus. There is persistent locules of gas within the splenic  collection and a fistulous communication with the stomach is not entirely  excluded. In addition, there is increased soft tissue thickening around the  lateral wall the stomach suggestive of tumor deposit. 2.  Increased soft tissue thickening around the splenic flexure of the colon  with peritoneal thickening and nondependent studies suspicious for peritoneal  carcinomatosis. Additional soft tissue densities noted in the anterior pelvis  and around the sigmoid colon as well as loops of small bowel suspicious for  peritoneal carcinomatosis as well. 3.  Left pleural effusion is stable. Records reviewed and summarized above. Pathology report(s) reviewed above. Radiology report(s) reviewed above. Assessment/PLAN:     1) metastatic appendiceal cancer post EXPLORATORY LAPAROTOMY SIERRA BSO, TUMOR DEBULKING: ILEOCECAL RESECTION; OMENTECTOMY 9/25/19  Surgery done for obstruction. Had left rib pain and SOB and on exam decreased bs on LEFT. Got CXR which showed pleural effusion then ultrasound which showed elevated diaphragm and ? Free air. Then got CT which shows fluid collection and pt admitted for this. Was admitted for this via GYN/ONC. Had IR drain of splenic fluid collection. Feeling better since this. saw [facility] for second opinions. Discussed systemic therapy with FOLFOX/ +/- avastin chemo or some variation of this regimen. possible HiPEC down the road. Pt choose to have chemo locally and still see [address] [facility]. We discussed the risks and benefits of FOLFOX chemotherapy, including potential side effects.  These include but are not limited to fatigue, nausea vomiting, diarrhea, neuropathy, taste changes, esophageal spasm, cold intolerance, allergic reactions, alopecia, mucositis, myelosuppression, risk for infection, infertility and rarely, death. Rarely, a patient may have a condition where they do not metabolize fluorouracil appropriately (called DPD deficiency),  and they may have excessive toxicity. A Port-A-Cath will be required in order to deliver the continuous infusion. The patient has consented to beginning therapy. Pt is weak overall today and so will reduce by 50% today. Labs pending today. Pt clinically is ambulatory just frail overall. 2) left pleural effusion post thora x 2. No cytology. Stable on CT. 3)  Thrombocytosis. likely reactive due to infection/ abscess/ cancer. Monitor and going down. 4) N/V still. Antiemetics prn.     5) anemia. Resolved. 6) psychosocial.  Mood good. Coping well considering difficult disease. Great family support.  here. F/u here in 2 weeks  Call if questions  I appreciate the opportunity to participate in Ms. Rich Hem care.     Signed By: Rodger Woods, DO

## 2019-12-03 NOTE — PROGRESS NOTES
[facility] - Chemotherapy Progress Note    1000 Pt admit to [facility] for Folfox C1D1 ambulatory in stable condition. Assessment completed. No new concerns voiced. Port accessed with positive blood return. Labs drawn and sent for processing. Patient proceeded to scheduled MD appointment. Returned with no change in treatment. Results are within parameters to treat. Chemotherapy Flowsheet 12/3/2019   Cycle C1D1   Date 12/3/2019   Drug / Regimen Folfox   Pre Meds given       Visit Vitals  /76 (BP 1 Location: Left arm, BP Patient Position: Sitting)   Pulse 84   Temp 97.7 °F (36.5 °C)   Resp 16   Ht (P) 5' 3\" (1.6 m)   Wt 54.4 kg (120 lb)   LMP 09/15/2019   SpO2 98%   Breastfeeding No   BMI (P) 21.26 kg/m²     Patient Vitals for the past 12 hrs:   Temp Pulse Resp BP SpO2   12/03/19 1632 97 °F (36.1 °C) 73 16 137/83 98 %   12/03/19 1008 97.7 °F (36.5 °C) 84 16 118/76 98 %       Recent Results (from the past 12 hour(s))   CBC WITH AUTOMATED DIFF    Collection Time: 12/03/19 10:27 AM   Result Value Ref Range    WBC 6.4 3.6 - 11.0 K/uL    RBC 3.95 3.80 - 5.20 M/uL    HGB 11.0 (L) 11.5 - 16.0 g/dL    HCT 34.7 (L) 35.0 - 47.0 %    MCV 87.8 80.0 - 99.0 FL    MCH 27.8 26.0 - 34.0 PG    MCHC 31.7 30.0 - 36.5 g/dL    RDW 16.3 (H) 11.5 - 14.5 %    PLATELET 036 (H) 931 - 400 K/uL    MPV 9.5 8.9 - 12.9 FL    NRBC 0.0 0  WBC    ABSOLUTE NRBC 0.00 0.00 - 0.01 K/uL    NEUTROPHILS 61 32 - 75 %    LYMPHOCYTES 24 12 - 49 %    MONOCYTES 11 5 - 13 %    EOSINOPHILS 3 0 - 7 %    BASOPHILS 1 0 - 1 %    IMMATURE GRANULOCYTES 0 0.0 - 0.5 %    ABS. NEUTROPHILS 4.0 1.8 - 8.0 K/UL    ABS. LYMPHOCYTES 1.5 0.8 - 3.5 K/UL    ABS. MONOCYTES 0.7 0.0 - 1.0 K/UL    ABS. EOSINOPHILS 0.2 0.0 - 0.4 K/UL    ABS. BASOPHILS 0.0 0.0 - 0.1 K/UL    ABS. IMM.  GRANS. 0.0 0.00 - 0.04 K/UL    DF AUTOMATED     METABOLIC PANEL, COMPREHENSIVE    Collection Time: 12/03/19 10:27 AM   Result Value Ref Range    Sodium 138 136 - 145 mmol/L Potassium 3.0 (L) 3.5 - 5.1 mmol/L    Chloride 103 97 - 108 mmol/L    CO2 28 21 - 32 mmol/L    Anion gap 7 5 - 15 mmol/L    Glucose 108 (H) 65 - 100 mg/dL    BUN 6 6 - 20 MG/DL    Creatinine 0.44 (L) 0.55 - 1.02 MG/DL    BUN/Creatinine ratio 14 12 - 20      GFR est AA >60 >60 ml/min/1.73m2    GFR est non-AA >60 >60 ml/min/1.73m2    Calcium 9.2 8.5 - 10.1 MG/DL    Bilirubin, total 0.3 0.2 - 1.0 MG/DL    ALT (SGPT) 10 (L) 12 - 78 U/L    AST (SGOT) 11 (L) 15 - 37 U/L    Alk.  phosphatase 93 45 - 117 U/L    Protein, total 7.1 6.4 - 8.2 g/dL    Albumin 2.9 (L) 3.5 - 5.0 g/dL    Globulin 4.2 (H) 2.0 - 4.0 g/dL    A-G Ratio 0.7 (L) 1.1 - 2.2         Medications:  Medications Administered     dexamethasone (DECADRON) 12 mg in 0.9% sodium chloride 50 mL, overfill volume 5 mL IVPB     Admin Date  12/03/2019 Action  Given Dose  12 mg Rate  232 mL/hr Route  IntraVENous Administered By  Trixie Langford RN          dextrose 5% infusion     Admin Date  12/03/2019 Action  New Bag Dose  25 mL/hr Rate  25 mL/hr Route  IntraVENous Administered By  Trixie Langford RN          fluorouracil (ADRUCIL) 1,896 mg in 0.9% sodium chloride 100 mL CADD Cassette     Admin Date  12/03/2019 Action  New Bag Dose  1896 mg Rate  2.2 mL/hr Route  IntraVENous Administered By  Trixie Langford RN          fluorouracil (ADRUCIL) chemo syringe 316 mg     Admin Date  12/03/2019 Action  Given Dose  316 mg Rate  189.6 mL/hr Route  IntraVENous Administered By  Trixie Langford RN          leucovorin (WELLCOVORIN) 316 mg in dextrose 5% 250 mL, overfill volume 25 mL IVPB     Admin Date  12/03/2019 Action  New Bag Dose  316 mg Rate  145.4 mL/hr Route  IntraVENous Administered By  Trixie Langford RN          ondansetron [facility]) injection 8 mg     Admin Date  12/03/2019 Action  Given Dose  8 mg Route  IntraVENous Administered By  Trixie Langford RN          oxaliplatin (ELOXATIN) 67 mg in dextrose 5% 250 mL, overfill volume 25 mL chemo infusion     Admin Date  12/03/2019 Action  New Bag Dose  67 mg Rate  144.2 mL/hr Route  IntraVENous Administered By  Guillermo Abraham RN          potassium chloride 10 mEq in 100 ml IVPB     Admin Date  12/03/2019 Action  New Bag Dose  10 mEq Rate  100 mL/hr Route  IntraVENous Administered By  Guillermo Abraham RN          potassium chloride SR (KLOR-CON 10) tablet 40 mEq     Admin Date  12/03/2019 Action  Given Dose  40 mEq Route  Oral Administered By  Guillermo Abraham RN              [id number] Pt tolerated treatment well. Port maintained positive blood return throughout treatment, flushed with positive blood return at conclusion and trhoughout. Port connected to CADD pump. D/c home ambulatory in no distress. Pt aware of next appointment scheduled for 12/5/19.

## 2019-12-03 NOTE — PROGRESS NOTES
Michelle Jason is a 44 y.o. female  Chief Complaint   Patient presents with    Follow-up     appendix cancer     1. Have you been to the ER, urgent care clinic since your last visit? Hospitalized since your last visit? No.  2. Have you seen or consulted any other health care providers outside of the [address] [place] [address] since your last visit? Include any pap smears or colon screening. No.

## 2019-12-03 NOTE — LETTER
12/3/19 Patient: Aramis Messina YOB: 1970 Date of Visit: 12/3/2019 Jenifer Walters MD 
[address] [facility] 83. VIA Facsimile: [phone] Dear Jenifer Walters MD, Thank you for referring Ms. Aramis Messina to [address] [place] [address] for evaluation. My notes for this consultation are attached. If you have questions, please do not hesitate to call me. I look forward to following your patient along with you. Sincerely, Anastacia Warner NP

## 2019-12-05 ENCOUNTER — HOSPITAL ENCOUNTER (OUTPATIENT)
Dept: INFUSION THERAPY | Age: 49
Discharge: HOME | End: 2019-12-05
Payer: COMMERCIAL

## 2019-12-05 VITALS
TEMPERATURE: 97.6 F | SYSTOLIC BLOOD PRESSURE: 136 MMHG | OXYGEN SATURATION: 98 % | DIASTOLIC BLOOD PRESSURE: 85 MMHG | HEART RATE: 65 BPM | RESPIRATION RATE: 16 BRPM

## 2019-12-05 DIAGNOSIS — C18.1 CARCINOMA OF APPENDIX (HCC): Primary | ICD-10-CM

## 2019-12-05 PROCEDURE — 36591 DRAW BLOOD OFF VENOUS DEVICE: CPT

## 2019-12-05 PROCEDURE — 96523 IRRIG DRUG DELIVERY DEVICE: CPT

## 2019-12-05 PROCEDURE — 74011250636 HC RX REV CODE- 250/636: Performed by: INTERNAL MEDICINE

## 2019-12-05 RX ORDER — HEPARIN 100 UNIT/ML
300-500 SYRINGE INTRAVENOUS AS NEEDED
Status: DISCONTINUED | OUTPATIENT
Start: 2019-12-05 | End: 2019-12-06 | Stop reason: HOSPADM

## 2019-12-05 RX ORDER — SODIUM CHLORIDE 9 MG/ML
10 INJECTION INTRAMUSCULAR; INTRAVENOUS; SUBCUTANEOUS AS NEEDED
Status: DISCONTINUED | OUTPATIENT
Start: 2019-12-05 | End: 2019-12-06 | Stop reason: HOSPADM

## 2019-12-05 RX ORDER — SODIUM CHLORIDE 0.9 % (FLUSH) 0.9 %
10 SYRINGE (ML) INJECTION AS NEEDED
Status: DISCONTINUED | OUTPATIENT
Start: 2019-12-05 | End: 2019-12-06 | Stop reason: HOSPADM

## 2019-12-05 RX ADMIN — Medication 10 ML: at 15:06

## 2019-12-05 RX ADMIN — Medication 500 UNITS: at 15:06

## 2019-12-05 NOTE — PROGRESS NOTES
Outpatient Infusion Center Short Visit Progress Note    8726 Patient admitted to [facility] for Pump disconnect ambulatory in stable condition. Assessment completed. No new concerns voiced. Patient reports pump finished at 2:00 pm today. No visible medicine left in CADD pump. Vital Signs:  Visit Vitals  /85 (BP 1 Location: Left arm, BP Patient Position: Sitting)   Pulse 65   Temp 97.6 °F (36.4 °C)   Resp 16   LMP 09/15/2019   SpO2 98%     Medications:  Medications Administered     heparin (porcine) pf 300-500 Units     Admin Date  12/05/2019 Action  Given Dose  500 Units Route  InterCATHeter Administered By  Raquel Hicks RN          saline peripheral flush soln 10 mL     Admin Date  12/05/2019 Action  Given Dose  10 mL Route  InterCATHeter Administered By  Raquel Hicks RN              1027 Patient tolerated treatment well. Port flushed with positive blood return, heparinized, and de-accessed per protocol. Patient discharged from [place] [address] ambulatory in no distress at 1510. Patient aware of next appointment.     Future Appointments   Date Time Provider hTomas Espinoza   12/17/2019 10:00 AM [facility]   12/17/2019 10:45 AM BART Bur[facility] 6199   12/19/2019  2:00 PM BREMO INFUSION NURSE 2 [facility]   12/31/2019 10:00 AM [facility]   1/2/2020  2:00 PM [facility]   1/14/2020 10:00 AM [facility]   1/16/2020  2:00 PM [facility]

## 2019-12-06 ENCOUNTER — TELEPHONE (OUTPATIENT)
Dept: ONCOLOGY | Age: 49
End: 2019-12-06

## 2019-12-06 NOTE — TELEPHONE ENCOUNTER
Call to patient, left VM on self identifying line to contact MD office with questions, cares, concerns about illness/treatment.

## 2019-12-09 ENCOUNTER — TELEPHONE (OUTPATIENT)
Dept: ONCOLOGY | Age: 49
End: 2019-12-09

## 2019-12-09 ENCOUNTER — HOSPITAL ENCOUNTER (OUTPATIENT)
Dept: INFUSION THERAPY | Age: 49
Discharge: HOME | End: 2019-12-09
Payer: COMMERCIAL

## 2019-12-09 VITALS
HEART RATE: 68 BPM | SYSTOLIC BLOOD PRESSURE: 132 MMHG | RESPIRATION RATE: 18 BRPM | TEMPERATURE: 98.1 F | DIASTOLIC BLOOD PRESSURE: 84 MMHG

## 2019-12-09 DIAGNOSIS — C79.60 MALIGNANT NEOPLASM METASTATIC TO OVARY, UNSPECIFIED LATERALITY (HCC): ICD-10-CM

## 2019-12-09 DIAGNOSIS — R11.2 INTRACTABLE VOMITING WITH NAUSEA, UNSPECIFIED VOMITING TYPE: Primary | ICD-10-CM

## 2019-12-09 DIAGNOSIS — C18.1 CARCINOMA OF APPENDIX (HCC): ICD-10-CM

## 2019-12-09 DIAGNOSIS — C18.1 MUCINOUS ADENOCARCINOMA OF APPENDIX (HCC): ICD-10-CM

## 2019-12-09 LAB
ALBUMIN SERPL-MCNC: 3 G/DL (ref 3.5–5)
ALBUMIN/GLOB SERPL: 0.7 {RATIO} (ref 1.1–2.2)
ALP SERPL-CCNC: 89 U/L (ref 45–117)
ALT SERPL-CCNC: 20 U/L (ref 12–78)
ANION GAP SERPL CALC-SCNC: 9 MMOL/L (ref 5–15)
AST SERPL-CCNC: 17 U/L (ref 15–37)
BASOPHILS # BLD: 0 K/UL (ref 0–0.1)
BASOPHILS NFR BLD: 1 % (ref 0–1)
BILIRUB SERPL-MCNC: 0.5 MG/DL (ref 0.2–1)
BUN SERPL-MCNC: 9 MG/DL (ref 6–20)
BUN/CREAT SERPL: 19 (ref 12–20)
CALCIUM SERPL-MCNC: 9.4 MG/DL (ref 8.5–10.1)
CHLORIDE SERPL-SCNC: 98 MMOL/L (ref 97–108)
CO2 SERPL-SCNC: 25 MMOL/L (ref 21–32)
CREAT SERPL-MCNC: 0.48 MG/DL (ref 0.55–1.02)
DIFFERENTIAL METHOD BLD: ABNORMAL
EOSINOPHIL # BLD: 0.1 K/UL (ref 0–0.4)
EOSINOPHIL NFR BLD: 2 % (ref 0–7)
ERYTHROCYTE [DISTWIDTH] IN BLOOD BY AUTOMATED COUNT: 16.1 % (ref 11.5–14.5)
GLOBULIN SER CALC-MCNC: 4.4 G/DL (ref 2–4)
GLUCOSE SERPL-MCNC: 112 MG/DL (ref 65–100)
HCT VFR BLD AUTO: 34.7 % (ref 35–47)
HGB BLD-MCNC: 10.9 G/DL (ref 11.5–16)
IMM GRANULOCYTES # BLD AUTO: 0 K/UL (ref 0–0.04)
IMM GRANULOCYTES NFR BLD AUTO: 0 % (ref 0–0.5)
LYMPHOCYTES # BLD: 1 K/UL (ref 0.8–3.5)
LYMPHOCYTES NFR BLD: 18 % (ref 12–49)
MAGNESIUM SERPL-MCNC: 2.1 MG/DL (ref 1.6–2.4)
MCH RBC QN AUTO: 27.4 PG (ref 26–34)
MCHC RBC AUTO-ENTMCNC: 31.4 G/DL (ref 30–36.5)
MCV RBC AUTO: 87.2 FL (ref 80–99)
MONOCYTES # BLD: 0.9 K/UL (ref 0–1)
MONOCYTES NFR BLD: 16 % (ref 5–13)
NEUTS SEG # BLD: 3.8 K/UL (ref 1.8–8)
NEUTS SEG NFR BLD: 63 % (ref 32–75)
NRBC # BLD: 0 K/UL (ref 0–0.01)
NRBC BLD-RTO: 0 PER 100 WBC
PLATELET # BLD AUTO: 417 K/UL (ref 150–400)
PMV BLD AUTO: 9.6 FL (ref 8.9–12.9)
POTASSIUM SERPL-SCNC: 3.2 MMOL/L (ref 3.5–5.1)
PROT SERPL-MCNC: 7.4 G/DL (ref 6.4–8.2)
RBC # BLD AUTO: 3.98 M/UL (ref 3.8–5.2)
SODIUM SERPL-SCNC: 132 MMOL/L (ref 136–145)
WBC # BLD AUTO: 5.9 K/UL (ref 3.6–11)

## 2019-12-09 PROCEDURE — 74011250636 HC RX REV CODE- 250/636: Performed by: NURSE PRACTITIONER

## 2019-12-09 PROCEDURE — 80053 COMPREHEN METABOLIC PANEL: CPT

## 2019-12-09 PROCEDURE — 83735 ASSAY OF MAGNESIUM: CPT

## 2019-12-09 PROCEDURE — 36415 COLL VENOUS BLD VENIPUNCTURE: CPT

## 2019-12-09 PROCEDURE — 85025 COMPLETE CBC W/AUTO DIFF WBC: CPT

## 2019-12-09 PROCEDURE — 77030012965 HC NDL HUBR BBMI -A

## 2019-12-09 PROCEDURE — 96360 HYDRATION IV INFUSION INIT: CPT

## 2019-12-09 PROCEDURE — 96375 TX/PRO/DX INJ NEW DRUG ADDON: CPT

## 2019-12-09 RX ORDER — ONDANSETRON 2 MG/ML
8 INJECTION INTRAMUSCULAR; INTRAVENOUS ONCE
Status: COMPLETED | OUTPATIENT
Start: 2019-12-09 | End: 2019-12-09

## 2019-12-09 RX ORDER — ONDANSETRON 2 MG/ML
8 INJECTION INTRAMUSCULAR; INTRAVENOUS ONCE
Status: CANCELLED
Start: 2019-12-09

## 2019-12-09 RX ADMIN — ONDANSETRON 8 MG: 2 INJECTION INTRAMUSCULAR; INTRAVENOUS at 16:47

## 2019-12-09 RX ADMIN — SODIUM CHLORIDE 1000 ML: 900 INJECTION, SOLUTION INTRAVENOUS at 16:40

## 2019-12-09 NOTE — TELEPHONE ENCOUNTER
Discussed with Dr. Sherman robbins for 1 L NS over 60 minutes and 8 mg IV Zofran in [facility] today. Please call [facility] and see if any availability an get patient scheduled.

## 2019-12-09 NOTE — TELEPHONE ENCOUNTER
Returned spouse call, HIPAA verified. Reports patient vomiting clear fluid hourly, no BM X 7 days, has been using antiemetics, but still feels overwhelming nausea. Eating only BRAT diet type foods to try and minimize nausea. Available for IV fluids, would prefer to avoid ER.

## 2019-12-09 NOTE — PROGRESS NOTES
Outpatient Infusion Center Short Visit Progress Note    0640 Pt admit to [facility] for Hydration ambulatory in stable condition. Assessment completed and charted, Port accessed with good blood return, labs drawn and sent for processing. Patient Vitals for the past 12 hrs:   Temp Pulse Resp BP   12/09/19 1615 98.1 °F (36.7 °C) 68 18 132/84        Medications:  Medications Administered     ondansetron (ZOFRAN) injection 8 mg     Admin Date  12/09/2019 Action  Given Dose  8 mg Route  IntraVENous Administered By  Juan JARQUIN          sodium chloride 0.9 % bolus infusion 1,000 mL     Admin Date  12/09/2019 Action  New Bag Dose  1000 mL Rate  1,000 mL/hr Route  IntraVENous Administered By  Jono Lynn Pt tolerated treatment well, port flushed, heparinized and de-accessed, labs have not resulted yet, told patient that MD will call patient if labs are abnormal, D/c home ambulatory in no distress.

## 2019-12-09 NOTE — TELEPHONE ENCOUNTER
CAll to spouse, HIPAA verified. Apprised of [facility] appt today at 1600. Patient to keep pushing PO fluids and maybe some noodles, to try Miralax to encourage BM. Spouse verbalized understanding and stated would sent overnight [employer] message to update.

## 2019-12-09 NOTE — TELEPHONE ENCOUNTER
Geoffrey, patient's , is following up regarding message he left for Dr. Osman Canela via [address] [facility] [phone]

## 2019-12-09 NOTE — TELEPHONE ENCOUNTER
Orders placed in [facility] for 1 L NS over 60 minutes, 8 mg IV Zofran x1 dose, and labs.

## 2019-12-10 DIAGNOSIS — E87.6 HYPOKALEMIA: Primary | ICD-10-CM

## 2019-12-10 RX ORDER — HYDROCORTISONE SODIUM SUCCINATE 100 MG/2ML
100 INJECTION, POWDER, FOR SOLUTION INTRAMUSCULAR; INTRAVENOUS AS NEEDED
Status: CANCELLED | OUTPATIENT
Start: 2020-01-01

## 2019-12-10 RX ORDER — FLUOROURACIL 50 MG/ML
200 INJECTION, SOLUTION INTRAVENOUS ONCE
Status: CANCELLED
Start: 2020-01-09 | End: 2020-01-09

## 2019-12-10 RX ORDER — EPINEPHRINE 1 MG/ML
0.3 INJECTION, SOLUTION, CONCENTRATE INTRAVENOUS AS NEEDED
Status: CANCELLED | OUTPATIENT
Start: 2020-01-09

## 2019-12-10 RX ORDER — HEPARIN 100 UNIT/ML
300-500 SYRINGE INTRAVENOUS AS NEEDED
Status: CANCELLED
Start: 2020-01-12

## 2019-12-10 RX ORDER — FLUOROURACIL 50 MG/ML
200 INJECTION, SOLUTION INTRAVENOUS ONCE
Status: CANCELLED
Start: 2020-01-01 | End: 2020-01-01

## 2019-12-10 RX ORDER — HEPARIN 100 UNIT/ML
300-500 SYRINGE INTRAVENOUS AS NEEDED
Status: CANCELLED
Start: 2020-01-01

## 2019-12-10 RX ORDER — ACETAMINOPHEN 325 MG/1
650 TABLET ORAL AS NEEDED
Status: CANCELLED
Start: 2019-12-17

## 2019-12-10 RX ORDER — SODIUM CHLORIDE 0.9 % (FLUSH) 0.9 %
10 SYRINGE (ML) INJECTION AS NEEDED
Status: CANCELLED
Start: 2020-01-01

## 2019-12-10 RX ORDER — ALBUTEROL SULFATE 0.83 MG/ML
2.5 SOLUTION RESPIRATORY (INHALATION) AS NEEDED
Status: CANCELLED
Start: 2019-12-17

## 2019-12-10 RX ORDER — DIPHENHYDRAMINE HYDROCHLORIDE 50 MG/ML
50 INJECTION, SOLUTION INTRAMUSCULAR; INTRAVENOUS AS NEEDED
Status: CANCELLED
Start: 2020-01-01

## 2019-12-10 RX ORDER — ONDANSETRON 2 MG/ML
8 INJECTION INTRAMUSCULAR; INTRAVENOUS AS NEEDED
Status: CANCELLED | OUTPATIENT
Start: 2020-01-01

## 2019-12-10 RX ORDER — HEPARIN 100 UNIT/ML
300-500 SYRINGE INTRAVENOUS AS NEEDED
Status: CANCELLED
Start: 2019-12-17

## 2019-12-10 RX ORDER — FLUOROURACIL 50 MG/ML
200 INJECTION, SOLUTION INTRAVENOUS ONCE
Status: CANCELLED
Start: 2019-12-17

## 2019-12-10 RX ORDER — ONDANSETRON 2 MG/ML
8 INJECTION INTRAMUSCULAR; INTRAVENOUS AS NEEDED
Status: CANCELLED | OUTPATIENT
Start: 2019-12-17

## 2019-12-10 RX ORDER — ONDANSETRON 2 MG/ML
8 INJECTION INTRAMUSCULAR; INTRAVENOUS ONCE
Status: CANCELLED | OUTPATIENT
Start: 2019-12-17

## 2019-12-10 RX ORDER — HYDROCORTISONE SODIUM SUCCINATE 100 MG/2ML
100 INJECTION, POWDER, FOR SOLUTION INTRAMUSCULAR; INTRAVENOUS AS NEEDED
Status: CANCELLED | OUTPATIENT
Start: 2019-12-17

## 2019-12-10 RX ORDER — DIPHENHYDRAMINE HYDROCHLORIDE 50 MG/ML
50 INJECTION, SOLUTION INTRAMUSCULAR; INTRAVENOUS AS NEEDED
Status: CANCELLED
Start: 2020-01-09

## 2019-12-10 RX ORDER — DEXTROSE MONOHYDRATE 50 MG/ML
25 INJECTION, SOLUTION INTRAVENOUS CONTINUOUS
Status: CANCELLED
Start: 2019-12-17

## 2019-12-10 RX ORDER — ALBUTEROL SULFATE 0.83 MG/ML
2.5 SOLUTION RESPIRATORY (INHALATION) AS NEEDED
Status: CANCELLED
Start: 2020-01-01

## 2019-12-10 RX ORDER — HEPARIN 100 UNIT/ML
300-500 SYRINGE INTRAVENOUS AS NEEDED
Status: CANCELLED
Start: 2020-01-09

## 2019-12-10 RX ORDER — SODIUM CHLORIDE 0.9 % (FLUSH) 0.9 %
10 SYRINGE (ML) INJECTION AS NEEDED
Status: CANCELLED
Start: 2019-12-28

## 2019-12-10 RX ORDER — SODIUM CHLORIDE 9 MG/ML
10 INJECTION INTRAMUSCULAR; INTRAVENOUS; SUBCUTANEOUS AS NEEDED
Status: CANCELLED | OUTPATIENT
Start: 2020-01-01

## 2019-12-10 RX ORDER — EPINEPHRINE 1 MG/ML
0.3 INJECTION, SOLUTION, CONCENTRATE INTRAVENOUS AS NEEDED
Status: CANCELLED | OUTPATIENT
Start: 2019-12-17

## 2019-12-10 RX ORDER — HYDROCORTISONE SODIUM SUCCINATE 100 MG/2ML
100 INJECTION, POWDER, FOR SOLUTION INTRAMUSCULAR; INTRAVENOUS AS NEEDED
Status: CANCELLED | OUTPATIENT
Start: 2020-01-09

## 2019-12-10 RX ORDER — HEPARIN 100 UNIT/ML
300-500 SYRINGE INTRAVENOUS AS NEEDED
Status: CANCELLED
Start: 2019-12-28

## 2019-12-10 RX ORDER — SODIUM CHLORIDE 9 MG/ML
10 INJECTION INTRAMUSCULAR; INTRAVENOUS; SUBCUTANEOUS AS NEEDED
Status: CANCELLED | OUTPATIENT
Start: 2019-12-17

## 2019-12-10 RX ORDER — POTASSIUM CHLORIDE 750 MG/1
10 TABLET, EXTENDED RELEASE ORAL 2 TIMES DAILY
Qty: 6 TAB | Refills: 0 | Status: SHIPPED | OUTPATIENT
Start: 2019-12-10 | End: 2020-01-01 | Stop reason: SDUPTHER

## 2019-12-10 RX ORDER — DEXTROSE MONOHYDRATE 50 MG/ML
25 INJECTION, SOLUTION INTRAVENOUS CONTINUOUS
Status: CANCELLED
Start: 2020-01-01

## 2019-12-10 RX ORDER — ACETAMINOPHEN 325 MG/1
650 TABLET ORAL AS NEEDED
Status: CANCELLED
Start: 2020-01-09

## 2019-12-10 RX ORDER — SODIUM CHLORIDE 0.9 % (FLUSH) 0.9 %
10 SYRINGE (ML) INJECTION AS NEEDED
Status: CANCELLED
Start: 2020-01-09

## 2019-12-10 RX ORDER — ALBUTEROL SULFATE 0.83 MG/ML
2.5 SOLUTION RESPIRATORY (INHALATION) AS NEEDED
Status: CANCELLED
Start: 2020-01-09

## 2019-12-10 RX ORDER — ONDANSETRON 2 MG/ML
8 INJECTION INTRAMUSCULAR; INTRAVENOUS ONCE
Status: CANCELLED | OUTPATIENT
Start: 2019-12-11

## 2019-12-10 RX ORDER — SODIUM CHLORIDE 9 MG/ML
10 INJECTION INTRAMUSCULAR; INTRAVENOUS; SUBCUTANEOUS AS NEEDED
Status: CANCELLED | OUTPATIENT
Start: 2020-01-12

## 2019-12-10 RX ORDER — ONDANSETRON 2 MG/ML
8 INJECTION INTRAMUSCULAR; INTRAVENOUS AS NEEDED
Status: CANCELLED | OUTPATIENT
Start: 2020-01-09

## 2019-12-10 RX ORDER — ACETAMINOPHEN 325 MG/1
650 TABLET ORAL AS NEEDED
Status: CANCELLED
Start: 2020-01-01

## 2019-12-10 RX ORDER — DIPHENHYDRAMINE HYDROCHLORIDE 50 MG/ML
50 INJECTION, SOLUTION INTRAMUSCULAR; INTRAVENOUS AS NEEDED
Status: CANCELLED
Start: 2019-12-17

## 2019-12-10 RX ORDER — ONDANSETRON 2 MG/ML
8 INJECTION INTRAMUSCULAR; INTRAVENOUS ONCE
Status: CANCELLED | OUTPATIENT
Start: 2020-01-09

## 2019-12-10 RX ORDER — SODIUM CHLORIDE 0.9 % (FLUSH) 0.9 %
10 SYRINGE (ML) INJECTION AS NEEDED
Status: CANCELLED
Start: 2020-01-12

## 2019-12-10 RX ORDER — DEXTROSE MONOHYDRATE 50 MG/ML
25 INJECTION, SOLUTION INTRAVENOUS CONTINUOUS
Status: CANCELLED
Start: 2020-01-09

## 2019-12-10 RX ORDER — ONDANSETRON 2 MG/ML
8 INJECTION INTRAMUSCULAR; INTRAVENOUS ONCE
Status: CANCELLED | OUTPATIENT
Start: 2020-01-01

## 2019-12-10 RX ORDER — EPINEPHRINE 1 MG/ML
0.3 INJECTION, SOLUTION, CONCENTRATE INTRAVENOUS AS NEEDED
Status: CANCELLED | OUTPATIENT
Start: 2020-01-01

## 2019-12-10 RX ORDER — SODIUM CHLORIDE 0.9 % (FLUSH) 0.9 %
10 SYRINGE (ML) INJECTION AS NEEDED
Status: CANCELLED
Start: 2019-12-17

## 2019-12-10 RX ORDER — SODIUM CHLORIDE 9 MG/ML
10 INJECTION INTRAMUSCULAR; INTRAVENOUS; SUBCUTANEOUS AS NEEDED
Status: CANCELLED | OUTPATIENT
Start: 2020-01-09

## 2019-12-10 RX ORDER — SODIUM CHLORIDE 9 MG/ML
10 INJECTION INTRAMUSCULAR; INTRAVENOUS; SUBCUTANEOUS AS NEEDED
Status: CANCELLED | OUTPATIENT
Start: 2019-12-28

## 2019-12-11 ENCOUNTER — HOSPITAL ENCOUNTER (OUTPATIENT)
Dept: INFUSION THERAPY | Age: 49
Discharge: HOME | End: 2019-12-11
Payer: COMMERCIAL

## 2019-12-11 ENCOUNTER — TELEPHONE (OUTPATIENT)
Dept: INFUSION THERAPY | Age: 49
End: 2019-12-11

## 2019-12-11 VITALS
TEMPERATURE: 97.6 F | RESPIRATION RATE: 18 BRPM | HEART RATE: 85 BPM | SYSTOLIC BLOOD PRESSURE: 123 MMHG | OXYGEN SATURATION: 99 % | DIASTOLIC BLOOD PRESSURE: 82 MMHG

## 2019-12-11 DIAGNOSIS — C79.60 MALIGNANT NEOPLASM METASTATIC TO OVARY, UNSPECIFIED LATERALITY (HCC): ICD-10-CM

## 2019-12-11 DIAGNOSIS — C18.1 MUCINOUS ADENOCARCINOMA OF APPENDIX (HCC): ICD-10-CM

## 2019-12-11 DIAGNOSIS — C18.1 CARCINOMA OF APPENDIX (HCC): ICD-10-CM

## 2019-12-11 DIAGNOSIS — R11.2 INTRACTABLE VOMITING WITH NAUSEA, UNSPECIFIED VOMITING TYPE: Primary | ICD-10-CM

## 2019-12-11 PROCEDURE — 74011000250 HC RX REV CODE- 250: Performed by: INTERNAL MEDICINE

## 2019-12-11 PROCEDURE — 74011250636 HC RX REV CODE- 250/636: Performed by: INTERNAL MEDICINE

## 2019-12-11 PROCEDURE — 77030012965 HC NDL HUBR BBMI -A

## 2019-12-11 PROCEDURE — 96375 TX/PRO/DX INJ NEW DRUG ADDON: CPT

## 2019-12-11 PROCEDURE — 74011250636 HC RX REV CODE- 250/636: Performed by: NURSE PRACTITIONER

## 2019-12-11 PROCEDURE — 96360 HYDRATION IV INFUSION INIT: CPT

## 2019-12-11 RX ORDER — ONDANSETRON 2 MG/ML
8 INJECTION INTRAMUSCULAR; INTRAVENOUS ONCE
Status: COMPLETED | OUTPATIENT
Start: 2019-12-11 | End: 2019-12-11

## 2019-12-11 RX ORDER — SODIUM CHLORIDE 9 MG/ML
10 INJECTION INTRAMUSCULAR; INTRAVENOUS; SUBCUTANEOUS AS NEEDED
Status: DISCONTINUED | OUTPATIENT
Start: 2019-12-11 | End: 2019-12-12 | Stop reason: HOSPADM

## 2019-12-11 RX ORDER — HEPARIN 100 UNIT/ML
500 SYRINGE INTRAVENOUS AS NEEDED
Status: DISCONTINUED | OUTPATIENT
Start: 2019-12-11 | End: 2019-12-12 | Stop reason: HOSPADM

## 2019-12-11 RX ORDER — SODIUM CHLORIDE 0.9 % (FLUSH) 0.9 %
5-10 SYRINGE (ML) INJECTION AS NEEDED
Status: DISCONTINUED | OUTPATIENT
Start: 2019-12-11 | End: 2019-12-12 | Stop reason: HOSPADM

## 2019-12-11 RX ADMIN — HEPARIN 500 UNITS: 100 SYRINGE at 12:23

## 2019-12-11 RX ADMIN — ONDANSETRON 8 MG: 2 INJECTION INTRAMUSCULAR; INTRAVENOUS at 11:15

## 2019-12-11 RX ADMIN — SODIUM CHLORIDE 1000 ML: 900 INJECTION, SOLUTION INTRAVENOUS at 11:15

## 2019-12-11 RX ADMIN — Medication 10 ML: at 12:22

## 2019-12-11 RX ADMIN — SODIUM CHLORIDE 10 ML: 9 INJECTION, SOLUTION INTRAMUSCULAR; INTRAVENOUS; SUBCUTANEOUS at 11:15

## 2019-12-11 NOTE — PROGRESS NOTES
[address] [facility] @ [facility] VISIT NOTE     1031 Patient arrives for Hydration/Antiemetic therapy without acute problems. Please see connect care for complete assessment and education provided. Vital signs stable throughout and prior to discharge, Pt. Tolerated treatment well and discharged without incident. Patient is aware of next [facility] appointment on 12/17/2019. Appointment card given to patient. Medications Verified by Dasyi Hanks RN & Judge Hernandez RN via "[employer]:  1. NS 1,000 ml IV bolus over 1 hours  2. Zofran 8 mg IVP  3. NS IV flush via Port  4.  Heparin 500 units IV flush via [address]  Patient Vitals for the past 12 hrs:   Temp Pulse Resp BP SpO2   12/11/19 1222 97.6 °F (36.4 °C) 85 18 123/82 --   12/11/19 1107 97.5 °F (36.4 °C) 88 16 118/81 99 %

## 2019-12-11 NOTE — TELEPHONE ENCOUNTER
Call to spouse, HIPAA verified. Confirmed [facility] appt and directions for admission. Verbalized understanding, is familiar with Out Patient, Registration. States patient has not taken the prescribed potassium yet, as is still having \"gut pains\", is sipping Gatorade, encouraged pills in order to correct electrolyte imbalances. Verbalized understanding. Has had 2 more stools described as \"somewhat formed. \" abdominal pain continues, expressed concerns about lack of PO intake, weight loss.

## 2019-12-11 NOTE — TELEPHONE ENCOUNTER
Called and spoke with patients  Harris Benjamin in regards to [address] appointment today at 6. He stated that they will be there.

## 2019-12-12 ENCOUNTER — TELEPHONE (OUTPATIENT)
Dept: ONCOLOGY | Age: 49
End: 2019-12-12

## 2019-12-12 ENCOUNTER — HOSPITAL ENCOUNTER (INPATIENT)
Age: 49
LOS: 6 days | Discharge: HOME HEALTH | DRG: 374 | End: 2019-12-18
Attending: EMERGENCY MEDICINE | Admitting: STUDENT IN AN ORGANIZED HEALTH CARE EDUCATION/TRAINING PROGRAM
Payer: COMMERCIAL

## 2019-12-12 ENCOUNTER — APPOINTMENT (OUTPATIENT)
Dept: CT IMAGING | Age: 49
DRG: 374 | End: 2019-12-12
Attending: EMERGENCY MEDICINE
Payer: COMMERCIAL

## 2019-12-12 DIAGNOSIS — Z92.21 STATUS POST CHEMOTHERAPY, TIME SINCE LESS THAN 4 WEEKS: ICD-10-CM

## 2019-12-12 DIAGNOSIS — C18.1 CANCER OF APPENDIX METASTATIC TO INTRA-ABDOMINAL LYMPH NODE (HCC): ICD-10-CM

## 2019-12-12 DIAGNOSIS — D73.5 SPLENIC INFARCTION: ICD-10-CM

## 2019-12-12 DIAGNOSIS — C77.2 CANCER OF APPENDIX METASTATIC TO INTRA-ABDOMINAL LYMPH NODE (HCC): ICD-10-CM

## 2019-12-12 DIAGNOSIS — D73.3 SPLENIC ABSCESS: Primary | ICD-10-CM

## 2019-12-12 DIAGNOSIS — C18.1 CARCINOMA OF APPENDIX (HCC): ICD-10-CM

## 2019-12-12 DIAGNOSIS — R10.84 GENERALIZED ABDOMINAL PAIN: ICD-10-CM

## 2019-12-12 DIAGNOSIS — R11.2 INTRACTABLE VOMITING WITH NAUSEA, UNSPECIFIED VOMITING TYPE: ICD-10-CM

## 2019-12-12 DIAGNOSIS — C18.1 MUCINOUS ADENOCARCINOMA OF APPENDIX (HCC): ICD-10-CM

## 2019-12-12 DIAGNOSIS — C79.60 MALIGNANT NEOPLASM METASTATIC TO OVARY, UNSPECIFIED LATERALITY (HCC): ICD-10-CM

## 2019-12-12 LAB
ALBUMIN SERPL-MCNC: 3 G/DL (ref 3.5–5)
ALBUMIN/GLOB SERPL: 0.7 {RATIO} (ref 1.1–2.2)
ALP SERPL-CCNC: 97 U/L (ref 45–117)
ALT SERPL-CCNC: 20 U/L (ref 12–78)
ANION GAP SERPL CALC-SCNC: 9 MMOL/L (ref 5–15)
APPEARANCE UR: ABNORMAL
AST SERPL-CCNC: 13 U/L (ref 15–37)
BACTERIA URNS QL MICRO: NEGATIVE /HPF
BASOPHILS # BLD: 0 K/UL (ref 0–0.1)
BASOPHILS NFR BLD: 0 % (ref 0–1)
BILIRUB SERPL-MCNC: 0.3 MG/DL (ref 0.2–1)
BILIRUB UR QL CFM: NEGATIVE
BUN SERPL-MCNC: 6 MG/DL (ref 6–20)
BUN/CREAT SERPL: 11 (ref 12–20)
CALCIUM SERPL-MCNC: 9.4 MG/DL (ref 8.5–10.1)
CHLORIDE SERPL-SCNC: 100 MMOL/L (ref 97–108)
CO2 SERPL-SCNC: 27 MMOL/L (ref 21–32)
COLOR UR: ABNORMAL
COMMENT, HOLDF: NORMAL
CREAT SERPL-MCNC: 0.54 MG/DL (ref 0.55–1.02)
DIFFERENTIAL METHOD BLD: ABNORMAL
EOSINOPHIL # BLD: 0.2 K/UL (ref 0–0.4)
EOSINOPHIL NFR BLD: 4 % (ref 0–7)
EPITH CASTS URNS QL MICRO: ABNORMAL /LPF
ERYTHROCYTE [DISTWIDTH] IN BLOOD BY AUTOMATED COUNT: 16.2 % (ref 11.5–14.5)
GLOBULIN SER CALC-MCNC: 4.5 G/DL (ref 2–4)
GLUCOSE SERPL-MCNC: 96 MG/DL (ref 65–100)
GLUCOSE UR STRIP.AUTO-MCNC: NEGATIVE MG/DL
HCT VFR BLD AUTO: 37.4 % (ref 35–47)
HGB BLD-MCNC: 11.8 G/DL (ref 11.5–16)
HGB UR QL STRIP: NEGATIVE
IMM GRANULOCYTES # BLD AUTO: 0 K/UL
IMM GRANULOCYTES NFR BLD AUTO: 0 %
KETONES UR QL STRIP.AUTO: 15 MG/DL
LEUKOCYTE ESTERASE UR QL STRIP.AUTO: ABNORMAL
LIPASE SERPL-CCNC: 114 U/L (ref 73–393)
LYMPHOCYTES # BLD: 1.6 K/UL (ref 0.8–3.5)
LYMPHOCYTES NFR BLD: 28 % (ref 12–49)
MAGNESIUM SERPL-MCNC: 1.8 MG/DL (ref 1.6–2.4)
MCH RBC QN AUTO: 27.7 PG (ref 26–34)
MCHC RBC AUTO-ENTMCNC: 31.6 G/DL (ref 30–36.5)
MCV RBC AUTO: 87.8 FL (ref 80–99)
MONOCYTES # BLD: 1.1 K/UL (ref 0–1)
MONOCYTES NFR BLD: 19 % (ref 5–13)
MUCOUS THREADS URNS QL MICRO: ABNORMAL /LPF
NEUTS BAND NFR BLD MANUAL: 2 % (ref 0–6)
NEUTS SEG # BLD: 2.9 K/UL (ref 1.8–8)
NEUTS SEG NFR BLD: 47 % (ref 32–75)
NITRITE UR QL STRIP.AUTO: NEGATIVE
NRBC # BLD: 0 K/UL (ref 0–0.01)
NRBC BLD-RTO: 0 PER 100 WBC
PH UR STRIP: 6 [PH] (ref 5–8)
PLATELET # BLD AUTO: 452 K/UL (ref 150–400)
PMV BLD AUTO: 9.3 FL (ref 8.9–12.9)
POTASSIUM SERPL-SCNC: 3 MMOL/L (ref 3.5–5.1)
PROT SERPL-MCNC: 7.5 G/DL (ref 6.4–8.2)
PROT UR STRIP-MCNC: NEGATIVE MG/DL
RBC # BLD AUTO: 4.26 M/UL (ref 3.8–5.2)
RBC #/AREA URNS HPF: ABNORMAL /HPF (ref 0–5)
RBC MORPH BLD: ABNORMAL
RBC MORPH BLD: ABNORMAL
SAMPLES BEING HELD,HOLD: NORMAL
SODIUM SERPL-SCNC: 136 MMOL/L (ref 136–145)
SP GR UR REFRACTOMETRY: 1.02 (ref 1–1.03)
UR CULT HOLD, URHOLD: NORMAL
UROBILINOGEN UR QL STRIP.AUTO: 1 EU/DL (ref 0.2–1)
WBC # BLD AUTO: 5.8 K/UL (ref 3.6–11)
WBC MORPH BLD: ABNORMAL
WBC URNS QL MICRO: ABNORMAL /HPF (ref 0–4)

## 2019-12-12 PROCEDURE — 96375 TX/PRO/DX INJ NEW DRUG ADDON: CPT

## 2019-12-12 PROCEDURE — 85025 COMPLETE CBC W/AUTO DIFF WBC: CPT

## 2019-12-12 PROCEDURE — 83735 ASSAY OF MAGNESIUM: CPT

## 2019-12-12 PROCEDURE — 74011250637 HC RX REV CODE- 250/637: Performed by: EMERGENCY MEDICINE

## 2019-12-12 PROCEDURE — 99283 EMERGENCY DEPT VISIT LOW MDM: CPT

## 2019-12-12 PROCEDURE — 83690 ASSAY OF LIPASE: CPT

## 2019-12-12 PROCEDURE — 74177 CT ABD & PELVIS W/CONTRAST: CPT

## 2019-12-12 PROCEDURE — 36415 COLL VENOUS BLD VENIPUNCTURE: CPT

## 2019-12-12 PROCEDURE — 81001 URINALYSIS AUTO W/SCOPE: CPT

## 2019-12-12 PROCEDURE — 74011250636 HC RX REV CODE- 250/636: Performed by: EMERGENCY MEDICINE

## 2019-12-12 PROCEDURE — 96374 THER/PROPH/DIAG INJ IV PUSH: CPT

## 2019-12-12 PROCEDURE — 74011250636 HC RX REV CODE- 250/636: Performed by: STUDENT IN AN ORGANIZED HEALTH CARE EDUCATION/TRAINING PROGRAM

## 2019-12-12 PROCEDURE — 74011250637 HC RX REV CODE- 250/637: Performed by: STUDENT IN AN ORGANIZED HEALTH CARE EDUCATION/TRAINING PROGRAM

## 2019-12-12 PROCEDURE — 80053 COMPREHEN METABOLIC PANEL: CPT

## 2019-12-12 PROCEDURE — 74011000258 HC RX REV CODE- 258: Performed by: EMERGENCY MEDICINE

## 2019-12-12 PROCEDURE — 74011636320 HC RX REV CODE- 636/320: Performed by: RADIOLOGY

## 2019-12-12 PROCEDURE — 74011000258 HC RX REV CODE- 258: Performed by: RADIOLOGY

## 2019-12-12 PROCEDURE — 65270000029 HC RM PRIVATE

## 2019-12-12 RX ORDER — POTASSIUM CHLORIDE 750 MG/1
10 TABLET, FILM COATED, EXTENDED RELEASE ORAL 2 TIMES DAILY
Status: DISCONTINUED | OUTPATIENT
Start: 2019-12-13 | End: 2019-12-17

## 2019-12-12 RX ORDER — ONDANSETRON 2 MG/ML
4 INJECTION INTRAMUSCULAR; INTRAVENOUS
Status: COMPLETED | OUTPATIENT
Start: 2019-12-12 | End: 2019-12-12

## 2019-12-12 RX ORDER — SODIUM CHLORIDE 0.9 % (FLUSH) 0.9 %
5-40 SYRINGE (ML) INJECTION AS NEEDED
Status: DISCONTINUED | OUTPATIENT
Start: 2019-12-12 | End: 2019-12-18 | Stop reason: HOSPADM

## 2019-12-12 RX ORDER — LORAZEPAM 0.5 MG/1
0.5 TABLET ORAL
Status: DISCONTINUED | OUTPATIENT
Start: 2019-12-12 | End: 2019-12-18 | Stop reason: HOSPADM

## 2019-12-12 RX ORDER — MORPHINE SULFATE 2 MG/ML
2 INJECTION, SOLUTION INTRAMUSCULAR; INTRAVENOUS
Status: DISCONTINUED | OUTPATIENT
Start: 2019-12-12 | End: 2019-12-13

## 2019-12-12 RX ORDER — ONDANSETRON 2 MG/ML
4 INJECTION INTRAMUSCULAR; INTRAVENOUS
Status: DISCONTINUED | OUTPATIENT
Start: 2019-12-12 | End: 2019-12-14

## 2019-12-12 RX ORDER — POTASSIUM CHLORIDE 750 MG/1
40 TABLET, FILM COATED, EXTENDED RELEASE ORAL
Status: COMPLETED | OUTPATIENT
Start: 2019-12-12 | End: 2019-12-12

## 2019-12-12 RX ORDER — SODIUM CHLORIDE, SODIUM LACTATE, POTASSIUM CHLORIDE, CALCIUM CHLORIDE 600; 310; 30; 20 MG/100ML; MG/100ML; MG/100ML; MG/100ML
50 INJECTION, SOLUTION INTRAVENOUS CONTINUOUS
Status: DISCONTINUED | OUTPATIENT
Start: 2019-12-12 | End: 2019-12-18 | Stop reason: HOSPADM

## 2019-12-12 RX ORDER — SODIUM CHLORIDE 0.9 % (FLUSH) 0.9 %
5-40 SYRINGE (ML) INJECTION EVERY 8 HOURS
Status: DISCONTINUED | OUTPATIENT
Start: 2019-12-12 | End: 2019-12-18 | Stop reason: HOSPADM

## 2019-12-12 RX ORDER — OXYCODONE AND ACETAMINOPHEN 5; 325 MG/1; MG/1
1 TABLET ORAL
Status: COMPLETED | OUTPATIENT
Start: 2019-12-12 | End: 2019-12-12

## 2019-12-12 RX ORDER — SODIUM CHLORIDE 0.9 % (FLUSH) 0.9 %
10 SYRINGE (ML) INJECTION
Status: COMPLETED | OUTPATIENT
Start: 2019-12-12 | End: 2019-12-12

## 2019-12-12 RX ORDER — ACETAMINOPHEN 325 MG/1
650 TABLET ORAL
Status: DISCONTINUED | OUTPATIENT
Start: 2019-12-12 | End: 2019-12-18 | Stop reason: HOSPADM

## 2019-12-12 RX ADMIN — IOPAMIDOL 100 ML: 755 INJECTION, SOLUTION INTRAVENOUS at 14:45

## 2019-12-12 RX ADMIN — ONDANSETRON 4 MG: 2 INJECTION INTRAMUSCULAR; INTRAVENOUS at 14:33

## 2019-12-12 RX ADMIN — SODIUM CHLORIDE, SODIUM LACTATE, POTASSIUM CHLORIDE, AND CALCIUM CHLORIDE 100 ML/HR: 600; 310; 30; 20 INJECTION, SOLUTION INTRAVENOUS at 21:16

## 2019-12-12 RX ADMIN — POTASSIUM CHLORIDE 40 MEQ: 750 TABLET, FILM COATED, EXTENDED RELEASE ORAL at 19:59

## 2019-12-12 RX ADMIN — SODIUM CHLORIDE 1000 ML: 900 INJECTION, SOLUTION INTRAVENOUS at 14:28

## 2019-12-12 RX ADMIN — OXYCODONE HYDROCHLORIDE AND ACETAMINOPHEN 1 TABLET: 5; 325 TABLET ORAL at 14:33

## 2019-12-12 RX ADMIN — PIPERACILLIN SODIUM AND TAZOBACTAM SODIUM 3.38 G: 3; .375 INJECTION, POWDER, LYOPHILIZED, FOR SOLUTION INTRAVENOUS at 19:13

## 2019-12-12 RX ADMIN — MORPHINE SULFATE 2 MG: 2 INJECTION, SOLUTION INTRAMUSCULAR; INTRAVENOUS at 21:15

## 2019-12-12 RX ADMIN — ONDANSETRON 4 MG: 2 INJECTION INTRAMUSCULAR; INTRAVENOUS at 21:13

## 2019-12-12 RX ADMIN — SODIUM CHLORIDE 100 ML: 900 INJECTION, SOLUTION INTRAVENOUS at 14:45

## 2019-12-12 RX ADMIN — Medication 10 ML: at 21:20

## 2019-12-12 RX ADMIN — Medication 10 ML: at 14:45

## 2019-12-12 NOTE — TELEPHONE ENCOUNTER
Discussed with Dr. Leslie Baer. Please advise patient/ that she should proceed to the ER for evaluation. Likely needs abdominal imaging.

## 2019-12-12 NOTE — ED TRIAGE NOTES
TRIAGE NOTE:  Patient arrives with c/o abdominal pain since Monday night. Patient reports pain sometimes radiates. Reports Dr. Ada Cramer (oncologist) referred patient to ED. Patient reports nausea.

## 2019-12-12 NOTE — ED PROVIDER NOTES
The history is provided by the patient and a relative. No  was used. Abdominal Pain This is a recurrent problem. The current episode started 2 days ago. The problem occurs constantly. The problem has not changed since onset. The pain is associated with vomiting. The pain is located in the generalized abdominal region. The quality of the pain is aching and dull. The pain is at a severity of 8/10. The pain is severe. Associated symptoms include anorexia, belching, flatus, nausea and vomiting. Pertinent negatives include no fever, no diarrhea, no hematochezia, no melena, no constipation, no dysuria, no frequency, no hematuria, no headaches, no arthralgias, no myalgias, no trauma, no chest pain and no back pain. Nothing worsens the pain. The pain is relieved by nothing. Past workup includes CT scan, surgery. The patient's surgical history includes appendectomy and colectomy. Past Medical History:  
Diagnosis Date  Cancer ([facility] [address] APPENDIX CANCER   
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  
Procedure Laterality Date [address] [place] [address] UNLISTED  HX APPENDECTOMY  09/2019  HX GI  09/2019 ILEOCECECTOMY  HX GYN  2003 CYST ON OVARY  HX HYSTERECTOMY  2019  IR THORACENTESIS CATH W IMAGE  11/6/2019 Family History:  
Problem Relation Age of Onset  Breast Cancer Paternal Grandmother 56  Crohn's Disease Mother  Heart Disease Mother  Cancer Father BLADDER  
 Diabetes Father  No Known Problems Son  No Known Problems Daughter  Anesth Problems Neg Hx Social History Socioeconomic History  Marital status:  Spouse name: Not on file  Number of children: Not on file  Years of education: Not on file  Highest education level: Not on file Occupational History  Not on file Social Needs  Financial resource strain: Not on file  Food insecurity:  
  Worry: Not on file Inability: Not on file  Transportation needs:  
  Medical: Not on file Non-medical: Not on file Tobacco Use  Smoking status: Never Smoker  Smokeless tobacco: Never Used Substance and Sexual Activity  Alcohol use: Not Currently  Drug use: Not Currently  Sexual activity: Not on file Lifestyle  Physical activity:  
  Days per week: Not on file Minutes per session: Not on file  Stress: Not on file Relationships  Social connections:  
  Talks on phone: Not on file Gets together: Not on file Attends Christian service: Not on file Active member of club or organization: Not on file Attends meetings of clubs or organizations: Not on file Relationship status: Not on file  Intimate partner violence:  
  Fear of current or ex partner: Not on file Emotionally abused: Not on file Physically abused: Not on file Forced sexual activity: Not on file Other Topics Concern  Not on file Social History Narrative  Not on file ALLERGIES: Patient has no known allergies. Review of Systems Constitutional: Negative for activity change, chills and fever. HENT: Negative for nosebleeds, sore throat, trouble swallowing and voice change. Eyes: Negative for visual disturbance. Respiratory: Negative for shortness of breath. Cardiovascular: Negative for chest pain and palpitations. Gastrointestinal: Positive for abdominal pain, anorexia, flatus, nausea and vomiting. Negative for constipation, diarrhea, hematochezia and melena. Genitourinary: Negative for difficulty urinating, dysuria, frequency, hematuria and urgency. Musculoskeletal: Negative for arthralgias, back pain, myalgias, neck pain and neck stiffness. Skin: Negative for color change. Allergic/Immunologic: Negative for immunocompromised state. Neurological: Negative for dizziness, seizures, syncope, weakness, light-headedness, numbness and headaches. Psychiatric/Behavioral: Negative for behavioral problems, confusion, hallucinations, self-injury and suicidal ideas. Vitals:  
 12/12/19 1224 BP: 115/80 Pulse: 91  
Resp: 16 Temp: 98.2 °F (36.8 °C) SpO2: 98% Physical Exam 
Vitals signs and nursing note reviewed. Constitutional:   
   General: She is not in acute distress. Appearance: She is well-developed. She is not diaphoretic. HENT:  
   Head: Normocephalic and atraumatic. Eyes:  
   Pupils: Pupils are equal, round, and reactive to light. Neck: Musculoskeletal: Normal range of motion and neck supple. Cardiovascular:  
   Rate and Rhythm: Normal rate and regular rhythm. Heart sounds: Normal heart sounds. No murmur. No friction rub. No gallop. Pulmonary:  
   Effort: Pulmonary effort is normal. No respiratory distress. Breath sounds: Normal breath sounds. No wheezing. Abdominal:  
   General: Bowel sounds are normal. There is no distension. Palpations: Abdomen is soft. Tenderness: There is generalized tenderness. There is no guarding or rebound. Musculoskeletal: Normal range of motion. Skin: 
   General: Skin is warm. Findings: No rash. Neurological:  
   Mental Status: She is alert and oriented to person, place, and time. Psychiatric:     
   Behavior: Behavior normal.     
   Thought Content: Thought content normal.     
   Judgment: Judgment normal.  
 
  
 
MDM This is a 68-year-old female with past medical history, review of systems, physical exam as above, presenting with complaints of abdominal pain, nausea and vomiting, in the setting of a history of appendiceal cancer, status post appendectomy, ileectomy, status post reanastomosis.   Patient states abdominal pain began 4 days ago, preceded by nausea and vomiting that began several days prior, after first dose of chemotherapy last week. She denies fevers, chest pain, shortness of breath. She states scant bowel movements, since decreased oral intake. Physical exam is remarkable for chronically ill-appearing middle-aged female, in no acute distress, states that pain is waxing and waning, relieved by Percocet at home. She states she took pain medicine yesterday, and antiemetics today. She has generalized abdominal tenderness to palpation, without rebound or guarding, clear breath sounds, regular rate and rhythm without murmurs gallops or rubs, subcutaneous left chest port in place. Lab work ordered from triage without acute abnormality in CMP or CBC. Plan to add lipase, CT scan of the abdomen and pelvis, provide pain control, antiemetics, IV fluids, reassess, and make a disposition. Procedures 6:09 PM 
Patient discussed with Dr. Raiza Pink (General Surgery) who recommended discussing with Dr. Jeremías Guzman. Discussed with Dr. Inés Lenz (GYN) on call for Dr. Jeremías Guzman who will discuss with Dr. Raiza Pink directly. 6:45 PM 
Patient discussed with Dr. Inés Lenz (GYN) who recommended admission to the Hospitalist for further management, IV abx, drain placement by IR. States Dr. Jeremías Guzman will see tomorrow. Hospitalist Perfect Serve for Admission 6:47 PM 
 
ED Room Number: X09/T39 Patient Name and age:  Trish Ndiaye 44 y.o.  female Working Diagnosis: 1. Splenic abscess 2. Carcinoma of appendix ([address] 3. 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 Intractable vomiting with nausea, unspecified vomiting type 5. Splenic infarction 6. Generalized abdominal pain 7. Status post chemotherapy, time since less than 4 weeks Readmission: yes Isolation Requirements:  no 
Recommended Level of Care:  med/surg Code Status:  Full Code Department:[facility] Adult ED - [phone] Other:  Discussed with Dr. Portillo Altamirano

## 2019-12-12 NOTE — TELEPHONE ENCOUNTER
Call to patient, HIPAA verified. Relayed provider information to proceed to ER. Patient verbalized understanding.

## 2019-12-12 NOTE — TELEPHONE ENCOUNTER
Patient's , Aubrey Haider, called on behalf of his wife - she wanted to know if she could be seen by Hussain or Dr. Cassidy Dolan before this weekend. Stated patient is extremely weak despite hydration and she having a lot of abdominal pain.      Kelly Watt [phone]

## 2019-12-12 NOTE — PROGRESS NOTES
Cancer [place] at [facility] 
[address], [address] W: [phone]  F: [phone] HEME/ONC CONSULT Reason for Visit:  
Eloise Bernard is a 44 y.o. female who is seen in consultation at the request of Dr. Henok Muñoz ER for abdominal pain and met appendix cancer. Treatment History:  
EXPLORATORY LAPAROTOMY SIERRA BSO, TUMOR DEBULKING: ILEOCECAL RESECTION; OMENTECTOMY STAGE: 4 with carcinomatosis History of Present Illness:  
 
Pt seen today in ER consult for metastatic appendix cancer post cycle 1 of 50% dose reduced palliative FOLFOX chemo. In ER due to persistent N/V/ abdominal pain despite multiple outpt IVF treatments. Pt is in chair in large waiting room in ER. States abdominal pain is diffuse throughout abdomin. N/V and cannot each much at all. Able to walk. Labs stable. CT:  
 IMPRESSION: 
1. Persistent small left pleural effusion and left basilar atelectasis. 2. Interval increase in size of left and fluid containing left upper abdominal 
collection, now measuring 6 cm x 6.2 cm. This collection is likely contiguous 
with the descending colon. 3. Persistent irregular soft tissue in the pelvis, tethering multiple loops of 
bowel, unchanged, but consistent with carcinomatosis. Hx of fluid collection drain via IR. Past Medical History:  
Diagnosis Date  Cancer ([address] APPENDIX CANCER   
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  
Procedure Laterality Date [address] [place] [address] UNLISTED  HX APPENDECTOMY  09/2019  HX GI  09/2019 ILEOCECECTOMY  HX GYN  2003 CYST ON OVARY  HX HYSTERECTOMY  2019  IR THORACENTESIS CATH W IMAGE  11/6/2019 Social History Tobacco Use  Smoking status: Never Smoker  Smokeless tobacco: Never Used Substance Use Topics  Alcohol use: Not Currently Family History Problem Relation Age of Onset  Breast Cancer Paternal Grandmother 56  Crohn's Disease Mother  Heart Disease Mother  Cancer Father BLADDER  
 Diabetes Father  No Known Problems Son  No Known Problems Daughter  Anesth Problems Neg Hx No current facility-administered medications for this encounter. Current Outpatient Medications Medication Sig  potassium chloride (KLOR-CON) 10 mEq tablet Take 1 Tab by mouth two (2) times a day.  LORazepam (ATIVAN) 0.5 mg tablet Take 1 Tab by mouth nightly as needed for Anxiety. Max Daily Amount: 0.5 mg.  
 lidocaine-prilocaine (EMLA) topical cream Apply  to affected area as needed for Pain.  ondansetron (ZOFRAN ODT) 4 mg disintegrating tablet Take 1 Tab by mouth every eight (8) hours as needed for Nausea. (Patient taking differently: Take 8 mg by mouth every eight (8) hours as needed for Nausea. Indications: prevent nausea and vomiting from cancer chemotherapy)  prochlorperazine (COMPAZINE) 5 mg tablet Take 1 tab by mouth every 6 hours as needed for nausea or vomiting  dexAMETHasone (DECADRON) 4 mg tablet Take 2 tabs (8mg) by mouth daily on days 2 and 3 after chemotherapy  acetaminophen (TYLENOL) 325 mg tablet Take 650 mg by mouth every four (4) hours as needed for Pain. Indications: pain  ibuprofen (MOTRIN) 200 mg tablet Take 3 Tabs by mouth every eight (8) hours as needed for Pain. No Known Allergies Review of Systems: A complete review of systems was obtained, negative except as described above. Physical Exam:  
 
Visit Vitals /80 Pulse 91 Temp 98.2 °F (36.8 °C) Resp 16 LMP 09/15/2019 SpO2 98% ECOG PS: 1-2 General: No distress Eyes:  anicteric sclerae HENT: Atraumatic Neck: Supple GI: soft, mildly distended MS: ambulatory Skin: warm dry Psych: Alert, oriented, appropriate affect, normal judgment/insight Results:  
 
Lab Results Component Value Date/Time WBC 5.8 12/12/2019 12:35 PM  
 HGB 11.8 12/12/2019 12:35 PM  
 HCT 37.4 12/12/2019 12:35 PM  
 PLATELET 190 (H) 00/96/1021 12:35 PM  
 MCV 87.8 12/12/2019 12:35 PM  
 ABS. NEUTROPHILS 2.9 12/12/2019 12:35 PM  
 Hemoglobin (POC) 10.8 (L) 09/25/2019 04:11 PM  
 Hematocrit (POC) 32 (L) 09/25/2019 12:10 PM  
 
Lab Results Component Value Date/Time Sodium 136 12/12/2019 12:35 PM  
 Potassium 3.0 (L) 12/12/2019 12:35 PM  
 Chloride 100 12/12/2019 12:35 PM  
 CO2 27 12/12/2019 12:35 PM  
 Glucose 96 12/12/2019 12:35 PM  
 BUN 6 12/12/2019 12:35 PM  
 Creatinine 0.54 (L) 12/12/2019 12:35 PM  
 GFR est AA >60 12/12/2019 12:35 PM  
 GFR est non-AA >60 12/12/2019 12:35 PM  
 Calcium 9.4 12/12/2019 12:35 PM  
 Sodium (POC) 137 09/25/2019 12:10 PM  
 Potassium (POC) 3.7 09/25/2019 12:10 PM  
 Chloride (POC) 105 09/25/2019 12:10 PM  
 Glucose (POC) 85 09/25/2019 12:10 PM  
 BUN (POC) 4 (L) 09/25/2019 12:10 PM  
 Creatinine (POC) 0.5 (L) 09/25/2019 12:10 PM  
 Calcium, ionized (POC) 1.14 09/25/2019 12:10 PM  
 
Lab Results Component Value Date/Time Bilirubin, total 0.3 12/12/2019 12:35 PM  
 ALT (SGPT) 20 12/12/2019 12:35 PM  
 AST (SGOT) 13 (L) 12/12/2019 12:35 PM  
 Alk. phosphatase 97 12/12/2019 12:35 PM  
 Protein, total 7.5 12/12/2019 12:35 PM  
 Albumin 3.0 (L) 12/12/2019 12:35 PM  
 Globulin 4.5 (H) 12/12/2019 12:35 PM  
 
 
CT Results (most recent): 
Results from Hospital Encounter encounter on 12/12/19 CT ABD PELV W CONT Narrative INDICATION: Abdominal pain, history of appendiceal CA, recent surgery, CT of the abdomen and pelvis is performed with 5 mm collimation. Study is 
performed with 100 cc of nonionic Isovue 370. Sagittal and coronal reformatted 
images were also performed. CT dose reduction was achieved with the use of the standardized protocol tailored for this examination and automatic exposure control for dose 
modulation. Adaptive statistical iterative reconstruction (ASIR) was utilized. Direct comparison is made to prior CT dated 11/26/2019. Findings: 
 
Lung bases: There is a very small left pleural effusion, as seen on prior CT 
dated 11/2019. There is no right pleural fluid. There is persistent mild left 
basilar atelectasis. Liver: There is a 1.9 cm cyst in the left hepatic lobe. There is a 7 mm 
hypodensity in the left hepatic lobe, too small to further characterize, but 
unchanged compared to prior CT dated 11/2019. Adrenals: Adrenal glands are normal. 
 
Pancreas: The pancreas is normal. 
 
Gallbladder: The gallbladder is normal. 
 
Kidneys: The kidneys are normal. 
 
Bowel/Spleen: There is persistent hypodense attenuation within the anterior 
aspect of the spleen consistent patient's known history of infarction. There is 
a gas and fluid containing collection anterior to the spleen and measuring 
approximately 6 cm x 6.2 cm and measuring approximately 5.3 cm x 4.6 cm on prior CT dated November 2019. There is irregular stranding and soft tissue adjacent to 
this collection. There is persistent thickening of the colon at the level of the 
splenic flexure. There appears to be a connection between the left upper 
quadrant focal fluid collection and the descending colon. No dilated loop of 
large or small bowel is visualized. There is persistent irregular soft tissue in 
the pelvis tethering multiple loops of bowel. Urinary bladder: Urinary bladder is partially filled and grossly normal. 
  
 Impression IMPRESSION: 
1. Persistent small left pleural effusion and left basilar atelectasis. 2. Interval increase in size of left and fluid containing left upper abdominal 
collection, now measuring 6 cm x 6.2 cm. This collection is likely contiguous 
with the descending colon. 3. Persistent irregular soft tissue in the pelvis, tethering multiple loops of 
bowel, unchanged, but consistent with carcinomatosis. Records reviewed and summarized above. Pathology report(s) reviewed above. Radiology report(s) reviewed above. Assessment/PLAN:  
 
1) stage 4 / metastatic appendiceal cancer  
post EXPLORATORY LAPAROTOMY SIERRA BSO, TUMOR DEBULKING: ILEOCECAL RESECTION; OMENTECTOMY 9/25/19 Surgery done for obstruction. Had left rib pain and SOB and on exam decreased bs on LEFT. Got CXR which showed pleural effusion then ultrasound which showed elevated diaphragm and ? Free air. Then got CT which shows fluid collection and pt admitted for this. Had IR drain of splenic fluid collection. saw [facility] and [facility] for second opinions. Discussed systemic therapy with FOLFOX/ +/- avastin chemo or some variation of this regimen. possible [facility] down the road. Pt choose to have chemo locally and still see [facility]. Pt had cycle 1 of FOLFOX chemotherapy reduced by 50%. Labs good. Pt now in ER with N/V/abdominal pain. CT shows worsening fluid collection which was drained via IR in past.  
Unsure if this is cause of N/ V/ abdominal pain. Case d/w GYN/ONC  and ER attending today. Reviewed films personally with team.  
Likely pt will need hospitalist admit for eval for ? Worsening abscess. Pt and  are agreeable to doing whatever needs to be done. Goal of care is palliative. 2) left pleural effusion post thora x 2. No cytology. Stable on CT. 3)  Thrombocytosis. Better. 4) N/V still. Antiemetics prn.  
 
5) abdominal pain due to carcinomatosis. Consider palliative care consult while inpt. 6) psychosocial.  Mood good. Coping well considering difficult disease. Great family support.  here. We will follow Call if questions I appreciate the opportunity to participate in Ms. Debora sorto.  
 
Signed By: Elias Hill DO

## 2019-12-13 ENCOUNTER — APPOINTMENT (OUTPATIENT)
Dept: CT IMAGING | Age: 49
DRG: 374 | End: 2019-12-13
Attending: STUDENT IN AN ORGANIZED HEALTH CARE EDUCATION/TRAINING PROGRAM
Payer: COMMERCIAL

## 2019-12-13 LAB
ANION GAP SERPL CALC-SCNC: 7 MMOL/L (ref 5–15)
BUN SERPL-MCNC: 6 MG/DL (ref 6–20)
BUN/CREAT SERPL: 15 (ref 12–20)
CALCIUM SERPL-MCNC: 8.5 MG/DL (ref 8.5–10.1)
CHLORIDE SERPL-SCNC: 106 MMOL/L (ref 97–108)
CO2 SERPL-SCNC: 25 MMOL/L (ref 21–32)
CREAT SERPL-MCNC: 0.4 MG/DL (ref 0.55–1.02)
ERYTHROCYTE [DISTWIDTH] IN BLOOD BY AUTOMATED COUNT: 16.3 % (ref 11.5–14.5)
GLUCOSE SERPL-MCNC: 82 MG/DL (ref 65–100)
HCT VFR BLD AUTO: 30.3 % (ref 35–47)
HGB BLD-MCNC: 9.5 G/DL (ref 11.5–16)
MCH RBC QN AUTO: 27.5 PG (ref 26–34)
MCHC RBC AUTO-ENTMCNC: 31.4 G/DL (ref 30–36.5)
MCV RBC AUTO: 87.6 FL (ref 80–99)
NRBC # BLD: 0 K/UL (ref 0–0.01)
NRBC BLD-RTO: 0 PER 100 WBC
PLATELET # BLD AUTO: 404 K/UL (ref 150–400)
PMV BLD AUTO: 9.2 FL (ref 8.9–12.9)
POTASSIUM SERPL-SCNC: 3.6 MMOL/L (ref 3.5–5.1)
RBC # BLD AUTO: 3.46 M/UL (ref 3.8–5.2)
SODIUM SERPL-SCNC: 138 MMOL/L (ref 136–145)
WBC # BLD AUTO: 5 K/UL (ref 3.6–11)

## 2019-12-13 PROCEDURE — 36415 COLL VENOUS BLD VENIPUNCTURE: CPT

## 2019-12-13 PROCEDURE — 77030010546 HC BG URIN DRNG URES -B

## 2019-12-13 PROCEDURE — 77030003462 HC NDL BIOP BRST MDT -B

## 2019-12-13 PROCEDURE — 87185 SC STD ENZYME DETCJ PER NZM: CPT

## 2019-12-13 PROCEDURE — 74011000258 HC RX REV CODE- 258: Performed by: STUDENT IN AN ORGANIZED HEALTH CARE EDUCATION/TRAINING PROGRAM

## 2019-12-13 PROCEDURE — 74011250636 HC RX REV CODE- 250/636: Performed by: STUDENT IN AN ORGANIZED HEALTH CARE EDUCATION/TRAINING PROGRAM

## 2019-12-13 PROCEDURE — 80048 BASIC METABOLIC PNL TOTAL CA: CPT

## 2019-12-13 PROCEDURE — C1729 CATH, DRAINAGE: HCPCS

## 2019-12-13 PROCEDURE — 87186 SC STD MICRODIL/AGAR DIL: CPT

## 2019-12-13 PROCEDURE — 65270000029 HC RM PRIVATE

## 2019-12-13 PROCEDURE — 77030020268 HC MISC GENERAL SUPPLY

## 2019-12-13 PROCEDURE — 0W9F3ZZ DRAINAGE OF ABDOMINAL WALL, PERCUTANEOUS APPROACH: ICD-10-PCS | Performed by: RADIOLOGY

## 2019-12-13 PROCEDURE — 87077 CULTURE AEROBIC IDENTIFY: CPT

## 2019-12-13 PROCEDURE — 99152 MOD SED SAME PHYS/QHP 5/>YRS: CPT

## 2019-12-13 PROCEDURE — 74011250636 HC RX REV CODE- 250/636: Performed by: RADIOLOGY

## 2019-12-13 PROCEDURE — 87205 SMEAR GRAM STAIN: CPT

## 2019-12-13 PROCEDURE — 74011250636 HC RX REV CODE- 250/636: Performed by: INTERNAL MEDICINE

## 2019-12-13 PROCEDURE — 85027 COMPLETE CBC AUTOMATED: CPT

## 2019-12-13 PROCEDURE — 74011250637 HC RX REV CODE- 250/637: Performed by: STUDENT IN AN ORGANIZED HEALTH CARE EDUCATION/TRAINING PROGRAM

## 2019-12-13 PROCEDURE — 87075 CULTR BACTERIA EXCEPT BLOOD: CPT

## 2019-12-13 RX ORDER — POLYETHYLENE GLYCOL 3350 17 G/17G
17 POWDER, FOR SOLUTION ORAL DAILY
Status: DISCONTINUED | OUTPATIENT
Start: 2019-12-14 | End: 2019-12-18 | Stop reason: HOSPADM

## 2019-12-13 RX ORDER — SODIUM CHLORIDE 9 MG/ML
25 INJECTION, SOLUTION INTRAVENOUS CONTINUOUS
Status: DISCONTINUED | OUTPATIENT
Start: 2019-12-13 | End: 2019-12-13

## 2019-12-13 RX ORDER — FENTANYL CITRATE 50 UG/ML
100 INJECTION, SOLUTION INTRAMUSCULAR; INTRAVENOUS
Status: DISCONTINUED | OUTPATIENT
Start: 2019-12-13 | End: 2019-12-13

## 2019-12-13 RX ORDER — MIDAZOLAM HYDROCHLORIDE 1 MG/ML
5 INJECTION, SOLUTION INTRAMUSCULAR; INTRAVENOUS
Status: DISCONTINUED | OUTPATIENT
Start: 2019-12-13 | End: 2019-12-13

## 2019-12-13 RX ORDER — MORPHINE SULFATE 2 MG/ML
2 INJECTION, SOLUTION INTRAMUSCULAR; INTRAVENOUS
Status: DISCONTINUED | OUTPATIENT
Start: 2019-12-13 | End: 2019-12-18 | Stop reason: HOSPADM

## 2019-12-13 RX ADMIN — MORPHINE SULFATE 2 MG: 2 INJECTION, SOLUTION INTRAMUSCULAR; INTRAVENOUS at 02:03

## 2019-12-13 RX ADMIN — PIPERACILLIN AND TAZOBACTAM 3.38 G: 3; .375 INJECTION, POWDER, FOR SOLUTION INTRAVENOUS at 16:22

## 2019-12-13 RX ADMIN — PIPERACILLIN AND TAZOBACTAM 3.38 G: 3; .375 INJECTION, POWDER, FOR SOLUTION INTRAVENOUS at 02:02

## 2019-12-13 RX ADMIN — MIDAZOLAM 1 MG: 1 INJECTION INTRAMUSCULAR; INTRAVENOUS at 13:45

## 2019-12-13 RX ADMIN — SODIUM CHLORIDE 25 ML/HR: 900 INJECTION, SOLUTION INTRAVENOUS at 13:02

## 2019-12-13 RX ADMIN — FENTANYL CITRATE 25 MCG: 50 INJECTION INTRAMUSCULAR; INTRAVENOUS at 14:24

## 2019-12-13 RX ADMIN — FENTANYL CITRATE 25 MCG: 50 INJECTION INTRAMUSCULAR; INTRAVENOUS at 14:10

## 2019-12-13 RX ADMIN — SODIUM CHLORIDE, SODIUM LACTATE, POTASSIUM CHLORIDE, AND CALCIUM CHLORIDE 100 ML/HR: 600; 310; 30; 20 INJECTION, SOLUTION INTRAVENOUS at 16:14

## 2019-12-13 RX ADMIN — MIDAZOLAM 1 MG: 1 INJECTION INTRAMUSCULAR; INTRAVENOUS at 13:57

## 2019-12-13 RX ADMIN — Medication 10 ML: at 22:14

## 2019-12-13 RX ADMIN — MORPHINE SULFATE 2 MG: 2 INJECTION, SOLUTION INTRAMUSCULAR; INTRAVENOUS at 16:23

## 2019-12-13 RX ADMIN — FENTANYL CITRATE 25 MCG: 50 INJECTION INTRAMUSCULAR; INTRAVENOUS at 13:46

## 2019-12-13 RX ADMIN — POTASSIUM CHLORIDE 10 MEQ: 750 TABLET, FILM COATED, EXTENDED RELEASE ORAL at 17:14

## 2019-12-13 RX ADMIN — Medication 10 ML: at 16:14

## 2019-12-13 RX ADMIN — ONDANSETRON 4 MG: 2 INJECTION INTRAMUSCULAR; INTRAVENOUS at 16:22

## 2019-12-13 RX ADMIN — Medication 10 ML: at 02:02

## 2019-12-13 RX ADMIN — PIPERACILLIN AND TAZOBACTAM 3.38 G: 3; .375 INJECTION, POWDER, FOR SOLUTION INTRAVENOUS at 09:16

## 2019-12-13 RX ADMIN — MIDAZOLAM 1 MG: 1 INJECTION INTRAMUSCULAR; INTRAVENOUS at 14:25

## 2019-12-13 RX ADMIN — MORPHINE SULFATE 2 MG: 2 INJECTION, SOLUTION INTRAMUSCULAR; INTRAVENOUS at 22:13

## 2019-12-13 RX ADMIN — FENTANYL CITRATE 25 MCG: 50 INJECTION INTRAMUSCULAR; INTRAVENOUS at 13:59

## 2019-12-13 NOTE — PROGRESS NOTES
TRANSFER - OUT REPORT: 
 
Verbal report given to Kimberly Romero RN(name) on Hamzah Bettencourt  being transferred to 606(unit) for routine progression of care Report consisted of patients Situation, Background, Assessment and  
Recommendations(SBAR). Information from the following report(s) Procedure Summary and MAR was reviewed with the receiving nurse. Lines:  
Venous Access Device powerport 11/18/19 (Active) Peripheral IV 12/12/19 Left Antecubital (Active) Site Assessment Clean, dry, & intact 12/13/2019  1:16 AM  
Phlebitis Assessment 0 12/13/2019  1:16 AM  
Infiltration Assessment 0 12/13/2019  1:16 AM  
Dressing Status Clean, dry, & intact 12/13/2019  1:16 AM  
Dressing Type Transparent;Tape 12/13/2019  1:16 AM  
Hub Color/Line Status Pink; Infusing 12/13/2019  1:16 AM  
Action Taken Blood drawn 12/12/2019 12:44 PM  
Alcohol Cap Used Yes 12/13/2019  1:16 AM  
  
 
Opportunity for questions and clarification was provided. Keep accordion drainage bag charged and compressed. Irrigate each shift with 10 cc's NS (done @ 1500) and document on I/O flow sheet.

## 2019-12-13 NOTE — PROGRESS NOTES
1245 pm- Patient arrived on stretcher to Angio for drainage tube placement. Waiting for Dr. Opal Ziegler to talk with patient about procedure. 1315 pm- Dr. Bret Santos in to talk with patient and  about procedure. Patient evaluated and assessed for procedure. Consent signed.

## 2019-12-13 NOTE — H&P
HISTORY AND PHYSICAL Surya Scanlon MD 
 
 
 
PCP: Marcelino Fernandes MD 
 
 
 
Chief Complaint:  
Abdominal pain History of present illness:  
Patient is a 57-year-old female with history of metastatic appendiceal cancer s/p ex laparotomy, debulking surgery,SIERRA BSO and ileocectomy in 9/19 who presents to the emergency department with a chief complaint of abdominal pain. Patient complains of progressively worsening abdominal pain over the past 2 to 3 days, more on the LLQ, constant, dull aching in type, rated 6-7/10 and was associated with nausea and vomiting. Per chart review, postoperative course was complicated by splenic infection which was drained, however follow-up CT was remarkable for persistent fluid within the area and recent CT showed increased fluid collection. In the emergency department, oncology, surgery and IR were consulted, possible CT-guided drainage of the fluid tomorrow was planned for tomorrow. PMH/PSH: 
Past Medical History:  
Diagnosis Date  Cancer ([facility] APPENDIX CANCER   
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  
Procedure Laterality Date [address] UNLISTED  HX APPENDECTOMY  09/2019  HX GI  09/2019 ILEOCECECTOMY  HX GYN  2003 CYST ON OVARY  HX HYSTERECTOMY  2019  IR THORACENTESIS CATH W IMAGE  11/6/2019 Home meds:  
Prior to Admission medications Medication Sig Start Date End Date Taking? Authorizing Provider  
potassium chloride (KLOR-CON) 10 mEq tablet Take 1 Tab by mouth two (2) times a day. 12/10/19   Paul Klinefelter, NP  
LORazepam (ATIVAN) 0.5 mg tablet Take 1 Tab by mouth nightly as needed for Anxiety.  Max Daily Amount: 0.5 mg. 11/26/19   Paul Klinefelter, NP  
lidocaine-prilocaine (EMLA) topical cream Apply  to affected area as needed for Pain. 11/14/19   Alyce Veloz NP  
ondansetron (ZOFRAN ODT) 4 mg disintegrating tablet Take 1 Tab by mouth every eight (8) hours as needed for Nausea. Patient taking differently: Take 8 mg by mouth every eight (8) hours as needed for Nausea. Indications: prevent nausea and vomiting from cancer chemotherapy 11/14/19   Alyce Veloz NP  
prochlorperazine (COMPAZINE) 5 mg tablet Take 1 tab by mouth every 6 hours as needed for nausea or vomiting 11/14/19   Alyce Veloz NP  
dexAMETHasone (DECADRON) 4 mg tablet Take 2 tabs (8mg) by mouth daily on days 2 and 3 after chemotherapy 11/14/19   Alyce Veloz NP  
acetaminophen (TYLENOL) 325 mg tablet Take 650 mg by mouth every four (4) hours as needed for Pain. Indications: pain    Provider, Historical  
ibuprofen (MOTRIN) 200 mg tablet Take 3 Tabs by mouth every eight (8) hours as needed for Pain. 10/3/19   Akshat Laguna PA Allergies: 
No Known Allergies FH: 
Family History Problem Relation Age of Onset  Breast Cancer Paternal Grandmother 56  Crohn's Disease Mother  Heart Disease Mother  Cancer Father BLADDER  
 Diabetes Father  No Known Problems Son  No Known Problems Daughter  Anesth Problems Neg Hx SH: Social History Tobacco Use  Smoking status: Never Smoker  Smokeless tobacco: Never Used Substance Use Topics  Alcohol use: Not Currently Review of Systems Constitutional: Negative for chills and fever. HENT: Negative for ear pain, hearing loss and tinnitus. Eyes: Negative for blurred vision and double vision. Respiratory: Negative for cough and hemoptysis. Cardiovascular: Negative for chest pain and palpitations. Gastrointestinal: Negative for heartburn and nausea. Genitourinary: Negative for dysuria and urgency. Musculoskeletal: Negative for myalgias and neck pain. Skin: Negative for itching and rash. Neurological: Negative for dizziness and headaches. Endo/Heme/Allergies: Negative for environmental allergies. Does not bruise/bleed easily. Psychiatric/Behavioral: Negative for depression and suicidal ideas. All other systems reviewed and are negative. PHYSICAL EXAM: 
Visit Vitals /67 (BP 1 Location: Right arm, BP Patient Position: Sitting) Pulse (!) 105 Temp 98.4 °F (36.9 °C) Resp 18 Wt 54.8 kg (120 lb 13 oz) LMP 09/15/2019 SpO2 97% BMI 21.40 kg/m² General:          Alert, cooperative, no distress, appears stated age. HEENT:           Atraumatic, anicteric sclerae, pink conjunctivae No oral ulcers, mucosa moist, throat clear, dentition fair Neck:               Supple, symmetrical,  thyroid: non tender Lungs:             Clear to auscultation bilaterally. No Wheezing or Rhonchi. No rales. Chest wall:      No tenderness  No Accessory muscle use. Heart:              Regular  rhythm,  No  murmur   No edema Abdomen:        Soft,  Not distended,mild tenderness over the LLQ. No guarding rigidity or                       rebound tenderness Bowel sounds normal 
Extremities:     No cyanosis. No clubbing,   
                        Skin turgor normal, Capillary refill normal, Radial dial pulse 2+ Skin:                Not pale. Not Jaundiced  No rashes Psych:             Not anxious or agitated. Neurologic:     Alert and oriented to PPT, CNII-XII intact. Motor and sensory exam grossly intact. Labs/Imaging: 
Recent Results (from the past 24 hour(s)) CBC WITH AUTOMATED DIFF Collection Time: 12/12/19 12:35 PM  
Result Value Ref Range WBC 5.8 3.6 - 11.0 K/uL  
 RBC 4.26 3.80 - 5.20 M/uL  
 HGB 11.8 11.5 - 16.0 g/dL HCT 37.4 35.0 - 47.0 % MCV 87.8 80.0 - 99.0 FL  
 MCH 27.7 26.0 - 34.0 PG  
 MCHC 31.6 30.0 - 36.5 g/dL  
 RDW 16.2 (H) 11.5 - 14.5 % PLATELET 418 (H) 749 - 400 K/uL MPV 9.3 8.9 - 12.9 FL  
 NRBC 0.0 0  WBC ABSOLUTE NRBC 0.00 0.00 - 0.01 K/uL NEUTROPHILS 47 32 - 75 % BAND NEUTROPHILS 2 0 - 6 % LYMPHOCYTES 28 12 - 49 % MONOCYTES 19 (H) 5 - 13 % EOSINOPHILS 4 0 - 7 % BASOPHILS 0 0 - 1 % IMMATURE GRANULOCYTES 0 %  
 ABS. NEUTROPHILS 2.9 1.8 - 8.0 K/UL  
 ABS. LYMPHOCYTES 1.6 0.8 - 3.5 K/UL  
 ABS. MONOCYTES 1.1 (H) 0.0 - 1.0 K/UL  
 ABS. EOSINOPHILS 0.2 0.0 - 0.4 K/UL  
 ABS. BASOPHILS 0.0 0.0 - 0.1 K/UL  
 ABS. IMM. GRANS. 0.0 K/UL  
 DF MANUAL    
 RBC COMMENTS ANISOCYTOSIS 1+ 
    
 RBC COMMENTS HYPOCHROMIA 1+ WBC COMMENTS REACTIVE LYMPHS    
METABOLIC PANEL, COMPREHENSIVE Collection Time: 12/12/19 12:35 PM  
Result Value Ref Range Sodium 136 136 - 145 mmol/L Potassium 3.0 (L) 3.5 - 5.1 mmol/L Chloride 100 97 - 108 mmol/L  
 CO2 27 21 - 32 mmol/L Anion gap 9 5 - 15 mmol/L Glucose 96 65 - 100 mg/dL BUN 6 6 - 20 MG/DL Creatinine 0.54 (L) 0.55 - 1.02 MG/DL  
 BUN/Creatinine ratio 11 (L) 12 - 20 GFR est AA >60 >60 ml/min/1.73m2 GFR est non-AA >60 >60 ml/min/1.73m2 Calcium 9.4 8.5 - 10.1 MG/DL Bilirubin, total 0.3 0.2 - 1.0 MG/DL  
 ALT (SGPT) 20 12 - 78 U/L  
 AST (SGOT) 13 (L) 15 - 37 U/L Alk. phosphatase 97 45 - 117 U/L Protein, total 7.5 6.4 - 8.2 g/dL Albumin 3.0 (L) 3.5 - 5.0 g/dL Globulin 4.5 (H) 2.0 - 4.0 g/dL A-G Ratio 0.7 (L) 1.1 - 2.2 SAMPLES BEING HELD Collection Time: 12/12/19 12:35 PM  
Result Value Ref Range SAMPLES BEING HELD 1RED,1BLUE   
 COMMENT Add-on orders for these samples will be processed based on acceptable specimen integrity and analyte stability, which may vary by analyte. LIPASE Collection Time: 12/12/19 12:35 PM  
Result Value Ref Range Lipase 114 73 - 393 U/L MAGNESIUM Collection Time: 12/12/19 12:35 PM  
Result Value Ref Range Magnesium 1.8 1.6 - 2.4 mg/dL URINALYSIS W/MICROSCOPIC Collection Time: 12/12/19  2:32 PM  
Result Value Ref Range Color DARK YELLOW Appearance CLOUDY (A) CLEAR Specific gravity 1.025 1.003 - 1.030    
 pH (UA) 6.0 5.0 - 8.0 Protein NEGATIVE  NEG mg/dL Glucose NEGATIVE  NEG mg/dL Ketone 15 (A) NEG mg/dL Blood NEGATIVE  NEG Urobilinogen 1.0 0.2 - 1.0 EU/dL Nitrites NEGATIVE  NEG Leukocyte Esterase SMALL (A) NEG    
 WBC 0-4 0 - 4 /hpf  
 RBC 0-5 0 - 5 /hpf Epithelial cells FEW FEW /lpf Bacteria NEGATIVE  NEG /hpf Mucus 1+ (A) NEG /lpf URINE CULTURE HOLD SAMPLE Collection Time: 12/12/19  2:32 PM  
Result Value Ref Range Urine culture hold URINE ON HOLD IN MICROBIOLOGY DEPT FOR 3 DAYS. IF UNPRESERVED URINE IS SUBMITTED, IT CANNOT BE USED FOR ADDITIONAL TESTING AFTER 24 HRS, RECOLLECTION WILL BE REQUIRED. BILIRUBIN, CONFIRM Collection Time: 12/12/19  2:32 PM  
Result Value Ref Range Bilirubin UA, confirm NEGATIVE  NEG Recent Labs 12/12/19 
1235 WBC 5.8 HGB 11.8 HCT 37.4 * Recent Labs 12/12/19 
1235   
K 3.0*  
 CO2 27 BUN 6  
CREA 0.54* GLU 96  
CA 9.4 MG 1.8 Recent Labs 12/12/19 
1235 SGOT 13* ALT 20 AP 97 TBILI 0.3 TP 7.5 ALB 3.0*  
GLOB 4.5* LPSE 114 No results for input(s): CPK, CKNDX, TROIQ in the last 72 hours. No lab exists for component: CPKMB No results for input(s): INR, PTP, APTT, INREXT in the last 72 hours. No results for input(s): PH, PCO2, PO2 in the last 72 hours. CT ABD PELV W CONT Narrative: INDICATION: Abdominal pain, history of appendiceal CA, recent surgery, CT of the abdomen and pelvis is performed with 5 mm collimation. Study is 
performed with 100 cc of nonionic Isovue 370. Sagittal and coronal reformatted 
images were also performed. CT dose reduction was achieved with the use of the standardized protocol 
tailored for this examination and automatic exposure control for dose 
modulation. Adaptive statistical iterative reconstruction (ASIR) was utilized. Direct comparison is made to prior CT dated 11/26/2019. Findings: 
 
Lung bases: There is a very small left pleural effusion, as seen on prior CT 
dated 11/2019. There is no right pleural fluid. There is persistent mild left 
basilar atelectasis. Liver: There is a 1.9 cm cyst in the left hepatic lobe. There is a 7 mm 
hypodensity in the left hepatic lobe, too small to further characterize, but 
unchanged compared to prior CT dated 11/2019. Adrenals: Adrenal glands are normal. 
 
Pancreas: The pancreas is normal. 
 
Gallbladder: The gallbladder is normal. 
 
Kidneys: The kidneys are normal. 
 
Bowel/Spleen: There is persistent hypodense attenuation within the anterior 
aspect of the spleen consistent patient's known history of infarction. There is 
a gas and fluid containing collection anterior to the spleen and measuring 
approximately 6 cm x 6.2 cm and measuring approximately 5.3 cm x 4.6 cm on prior CT dated November 2019. There is irregular stranding and soft tissue adjacent to 
this collection. There is persistent thickening of the colon at the level of the 
splenic flexure. There appears to be a connection between the left upper 
quadrant focal fluid collection and the descending colon. No dilated loop of 
large or small bowel is visualized. There is persistent irregular soft tissue in 
the pelvis tethering multiple loops of bowel. Urinary bladder: Urinary bladder is partially filled and grossly normal. 
Impression: IMPRESSION: 
1. Persistent small left pleural effusion and left basilar atelectasis. 2. Interval increase in size of left and fluid containing left upper abdominal 
collection, now measuring 6 cm x 6.2 cm. This collection is likely contiguous 
with the descending colon. 3. Persistent irregular soft tissue in the pelvis, tethering multiple loops of 
bowel, unchanged, but consistent with carcinomatosis.  
  
  
 
Assessment & Plan: 
==================== 
 Abdominal fluid collection, LUQ with possible fistula In the setting of of  metastatic appendiceal cancer s/p ex laparotomy, debulking surgery,SIERRA BSO and ileocectomy in 9/19 CT remarkable for interval increase in the size of the fluid collection Oncology, surgery and IR on board, recommendations appreciated Possible CT-guided drainage tomorrow N.p.o. 
Empiric Zosyn Nausea and vomiting Likely from the above Antiemetics IV fluid Hypokalemia Replete Check magnesium Patient's Baseline: Ambulates with walking DVT ppx: SCD  
code status: Full Disposition: TBD Care plan discussed with patient/family and nurse. Signed By: Mayelin Mckinney MD   
 December 12, 2019

## 2019-12-13 NOTE — H&P
Interventional Radiology History and Physical (Inpatient) Patient: Beatrice Ahuja 44 y.o. female Referring Physician:  No ref. provider found Chief Complaint: Abdominal Pain History of Present Illness: conscious sedation (Versed and fentanyl) for LUQ abd drainage History: 
Past Medical History:  
Diagnosis Date  Cancer ([facility] [address] APPENDIX CANCER   
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  
 Diabetes Father  No Known Problems Son  No Known Problems Daughter  Anesth Problems Neg Hx Social History Socioeconomic History  Marital status:  Spouse name: Not on file  Number of children: Not on file  Years of education: Not on file  Highest education level: Not on file Occupational History  Not on file Social Needs  Financial resource strain: Not on file  Food insecurity:  
  Worry: Not on file Inability: Not on file  Transportation needs:  
  Medical: Not on file Non-medical: Not on file Tobacco Use  Smoking status: Never Smoker  Smokeless tobacco: Never Used Substance and Sexual Activity  Alcohol use: Not Currently  Drug use: Not Currently  Sexual activity: Not on file Lifestyle  Physical activity:  
  Days per week: Not on file Minutes per session: Not on file  Stress: Not on file Relationships  Social connections:  
  Talks on phone: Not on file Gets together: Not on file Attends Mosque service: Not on file Active member of club or organization: Not on file Attends meetings of clubs or organizations: Not on file Relationship status: Not on file  Intimate partner violence:  
  Fear of current or ex partner: Not on file Emotionally abused: Not on file Physically abused: Not on file Forced sexual activity: Not on file Other Topics Concern  Not on file Social History Narrative  Not on file Allergies: No Known Allergies Current Medications: 
Current Facility-Administered Medications Medication Dose Route Frequency  midazolam (VERSED) injection 5 mg  5 mg IntraVENous Multiple  fentaNYL citrate (PF) injection 100 mcg  100 mcg IntraVENous Multiple  0.9% sodium chloride infusion  25 mL/hr IntraVENous CONTINUOUS  
 LORazepam (ATIVAN) tablet 0.5 mg  0.5 mg Oral QHS PRN  potassium chloride SR (KLOR-CON 10) tablet 10 mEq  10 mEq Oral BID  sodium chloride (NS) flush 5-40 mL  5-40 mL IntraVENous Q8H  
 sodium chloride (NS) flush 5-40 mL  5-40 mL IntraVENous PRN  
 acetaminophen (TYLENOL) tablet 650 mg  650 mg Oral Q4H PRN  
 morphine injection 2 mg  2 mg IntraVENous Q4H PRN  
 ondansetron (ZOFRAN) injection 4 mg  4 mg IntraVENous Q4H PRN  
 lactated Ringers infusion  100 mL/hr IntraVENous CONTINUOUS  piperacillin-tazobactam (ZOSYN) 3.375 g in 0.9% sodium chloride (MBP/ADV) 100 mL  3.375 g IntraVENous Q8H Physical Exam: 
Blood pressure 119/67, pulse 74, temperature 98.5 °F (36.9 °C), resp. rate 22, weight 54.8 kg (120 lb 13 oz), last menstrual period 09/15/2019, SpO2 96 %. GENERAL: alert, cooperative, no distress, appears stated age, LUNG: clear to auscultation bilaterally, HEART: regular rate and rhythm Findings/Diagnosis: conscious sedation (Versed and fentanyl) for LUQ abd drainage Alerts:   
Hospital Problems  Date Reviewed: 11/11/2019 Codes Class Noted POA Splenic abscess ICD-10-CM: D73.3 ICD-9-CM: 289.59  12/12/2019 Unknown Laboratory:     
Recent Labs 12/13/19 0222 HGB 9.5* HCT 30.3* WBC 5.0  
* BUN 6  
CREA 0.40* K 3.6 Plan of Care/Planned Procedure: 
Risks, benefits, and alternatives reviewed with patient and she agrees to proceed with the procedure. Full dictated report to follow.

## 2019-12-13 NOTE — CONSULTS
[facility] Consultation Brianne Lyn     [id number] : 1970 Admitted: 2019 Date: 2019 Subjective:  
 
Brianne Lyn is a 44 y.o.  postmenopausal female who is being seen for consultation due to possible surgical intervention. She presented to the ER yesterday with abdominal pain associated with nausea/vomiting. The pain was severe and generalized. She denied fevers/chills and her WBC was normal in the ER. Her CT in the ER demonstrated a probable colonic perforation at the splenic flexure. It appeared to communicate with the previously noted splenic fluid collection. Oncology history: 
Brianne Lyn is a 44 y.o.  postmenopausal female with a diagnosis or stage IV appendiceal cancer.   On 19 I was consulted intraoperatively by Dr. Toya Vargas for what appeared to be an ovarian carcinoma. Lisa Pinzon had been having nausea/vomiting and abdominal pain for months.  A HIDA scan suggested that her gallbladder was likely the source of her pain and Dr. Pauline Ruelas took her to the OR for a laparoscopic cholecystectomy.   At the time of surgery she was noted to have peritoneal carcinomatosis with bilaterally enlarged ovaries.  Cytology was obtained and I performed a biopsy of the right ovary.  We decided that it would be best to wait on the final pathology before proceeding with any aggressive debulking surgery, as we might also consider neoadjuvant chemotherapy.  I arranged for her to have a CT of the chest/abdomen/pelvis following her laparoscopy. 
  
She was followed by Dr. Nickie Jordan with [id number] for her gynecologic care. Lisa Pinzon was actually seen and evaluated there in 2019 for some irregular bleeding.  She had been on OCPs to help control her symptoms.  A pelvic ultrasound demonstrated a normal appearing uterus.  The endometrium was not thickened.  Her right ovary contained a 3.2 cm simple cyst.  The left ovary was not seen. Hawa Aver was a small amount of free fluid.   
  
She presented to the office to review her CT and pathology results and to discuss a definitive plan.  The CT scan demonstrated dilated loops of small bowel, suggesting partial obstruction.  There was mucosal thickening of the terminal ileum.  The appendix was not visualized.  In addition to the gastrointestinal findings, there was bilateral ovarian enlargement, omental thickening, carcinomatosis, and ascites.  Radiologic findings and operative findings would suggest an ovarian or peritoneal carcinoma.  Preliminary pathology was a bit confusing, however.  I spoke with Dr. Darek Alfred from pathology. [facility] ovarian biopsy demonstrated an adenocarcinoma, but it wasn't clear histologically as to what it was.  The tumor was CK7 and PAX8 negative, while also being CK20 positive.  These findings would go against it being an ovarian cancer.   
 
She was taken to the OR on 9/25/19 for surgery. She underwent an X-lap, SIERRA, BSO, ileocecal resection with reanastomosis, and omentectomy. Her EBL was 1300 cc. She received 3 units PRBCs intraoperatively and another 2 units postoperatively. She was discharged home on POD #7 after regaining bowel function.   
  
Operative findings:  On bimanual pelvic exam she was noted to have a rock hard cervix and uterus, worrisome for disease infiltration.  It was mobile.  No parametrial involvement.  The uterus was enlarged.  Bilateral adnexal masses.  The bladder was densely adherent the lower uterine segment and cervix.  Mildly dilated small bowel.  Rock hard appendix scarred to the underside of the cecum with tethering of the small bowel at that point.  Implants noted in the distal omentum.  Small volume peritoneal disease scattered throughout the abdominal cavity involving the anterior abdominal wall, diaphragms, small and large bowel mesenteries.  Frozen section pathology of the ileocecal resection suggested that this was the primary site of disease.  There were some signet ring features noted.  Frozen section pathology of the uterus demonstrated infiltration of the myometrium with the same process. 
  
FINAL PATHOLOGIC DIAGNOSIS 1. Terminal ileum, cecum, and appendix, ileocecectomy:  
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 SPECIMEN Procedure: Ileocecectomy TUMOR Tumor Site: Diffusely involving appendix Histologic Type: Mucinous adenocarcinoma Histologic Type Comments: Extensive signet ring cell component Histologic Grade: G3: Poorly differentiated Tumor Size: 4.5 cm Tumor Deposits: Present Number of Deposits: 1 Tumor Extension: Tumor directly invades adjacent organs or structures: Uterus, cervix,  
bilateral ovaries and fallopian tubes, and omentum Lymphovascular Invasion: Not identified Perineural Invasion: Present, extensive MARGINS Proximal, distal and radial margins uninvolved by invasive carcinoma. LYMPH NODES Number of Lymph Nodes Involved: 11 Number of Lymph Nodes Examined: 11 PATHOLOGIC STAGE CLASSIFICATION (pTNM, AJCC 8th Edition) Primary Tumor (pT): pT4b Regional Lymph Nodes (pN): pN2 Distant Metastasis (pM): pM1c Site(s): Uterus, bilateral fallopian tubes, and ovaries 2. Uterus, bilateral fallopian tubes and ovaries, supracervical hysterectomy bilateral salpingo-oophorectomy:  
Serosa: Positive for metastatic mucinous adenocarcinoma involving fibrous adhesions. Myometrium: Extensive transmural involvement by metastatic mucinous adenocarcinoma. Adenomyosis. Endometrium: Inactive endometrium with metastatic mucinous adenocarcinoma. Bilateral fallopian tubes: Metastatic mucinous adenocarcinoma. Bilateral fallopian tubes: Metastatic mucinous adenocarcinoma. 3. Omentum, omentectomy:  
Positive for metastatic mucinous adenocarcinoma. Splenule. 4. Cervix, completion total hysterectomy:  
Positive for metastatic mucinous adenocarcinoma. IMMUNOHISTOCHEMISTRY - DNA Mismatch Repair Protein Expression Results: MLH-1 No loss of expression Extent: 3+; Intensity: Strong MSH-2 No loss of expression Extent: 3+; Intensity: Strong MSH-6 No loss of expression Extent: 3+; Intensity: Strong PMS2 No loss of expression Extent: 2+; Intensity: Moderate All DNA mismatch repair proteins cited above are shown to be proficient by immunohistochemistry method. This is strong evidence against DNA mismatch repair defects as the molecular basis for this malignancy and provides strong evidence against Monroe syndrome.   
  
I saw her back in the office on 10/9 for staple removal.  I discussed her pathology and referred her to Dr. Diane Ahumada for consultation. She was complaining of some left upper quadrant and rib pain, as well as some shortness of breath. Plain films suggested a pleural effusion. She was sent for a CT of the abdomen/pelvis to evaluate. This revealed a complex fluid collection in the left upper quadrant, consistent with a splenic infarction/abscess. It appeared to be contained within the splenic capsule. A CT guided drainage was performed. The fluid which drained appeared consistent with a hematoma and there was no evidence of infection. An accordion drain was left in place. In drained 900 cc immediately and then a few hundred more ccs over the next few days. She was discharged home on 10/20 with the drain in place. The output steadily decreased I removed the drain on 10/23. She was seen at [facility] on 10/21 for consultation and subsequently at [facility]. They both recommended the same treatment as Dr. Cristiano Harper, FOLFOX +/- Avastin. I sent her for a repeat CT of the abdomen/pelvis on 11/2/19. The splenic fluid collection was still there.   I had ordered another CT guided drainage of the area, however, interventional radiology suggested that we hold off, as it did not appear infected at the time. She did get a left-sided thoracentesis at the time. She received her first course of FOLFOX chemotherapy (without Avastin) on 12/3/19. Patient Active Problem List  
 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  
Diagnosis Date  Cancer ([address] APPENDIX CANCER   
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  
Procedure Laterality Date [address] UNLISTED  HX APPENDECTOMY  09/2019  HX GI  09/2019 ILEOCECECTOMY  HX GYN  2003 CYST ON OVARY  HX HYSTERECTOMY  2019  IR THORACENTESIS CATH W IMAGE  11/6/2019 Social History Tobacco Use  Smoking status: Never Smoker  Smokeless tobacco: Never Used Substance Use Topics  Alcohol use: Not Currently Family History Problem Relation Age of Onset  Breast Cancer Paternal Grandmother 56  Crohn's Disease Mother  Heart Disease Mother  Cancer Father BLADDER  
 Diabetes Father  No Known Problems Son  No Known Problems Daughter  Anesth Problems Neg Hx Current Facility-Administered Medications Medication Dose Route Frequency  LORazepam (ATIVAN) tablet 0.5 mg  0.5 mg Oral QHS PRN  potassium chloride SR (KLOR-CON 10) tablet 10 mEq  10 mEq Oral BID  sodium chloride (NS) flush 5-40 mL  5-40 mL IntraVENous Q8H  
  sodium chloride (NS) flush 5-40 mL  5-40 mL IntraVENous PRN  
 acetaminophen (TYLENOL) tablet 650 mg  650 mg Oral Q4H PRN  
 morphine injection 2 mg  2 mg IntraVENous Q4H PRN  
 ondansetron (ZOFRAN) injection 4 mg  4 mg IntraVENous Q4H PRN  
 lactated Ringers infusion  100 mL/hr IntraVENous CONTINUOUS  piperacillin-tazobactam (ZOSYN) 3.375 g in 0.9% sodium chloride (MBP/ADV) 100 mL  3.375 g IntraVENous Q8H No Known Allergies Review of Systems: A comprehensive review of systems was negative except for that written in the History of Present Illness. , 10 point ROS Objective:  
 
Physical Exam:  
Visit Vitals /69 (BP 1 Location: Right arm, BP Patient Position: At rest) Pulse 75 Temp 97.7 °F (36.5 °C) Resp 17 Wt 120 lb 13 oz (54.8 kg) LMP 09/15/2019 SpO2 98% BMI 21.40 kg/m² General:  Alert, cooperative, no distress, appears stated age. HEENT:  WNL. Lungs:   Clear to auscultation bilaterally. Heart:  Regular rate and rhythm. Breast Exam:  Deferred. Abdomen:   Soft, diffusely tender, but no rebound. Pelvic:  Deferred. Rectal:  Deferred. Extremities: Extremities normal, atraumatic, no cyanosis or edema. Skin: Skin color, texture, turgor normal. No rashes or lesions. Neurologic: Grossly intact. Labs:   
Recent Results (from the past 24 hour(s)) CBC WITH AUTOMATED DIFF Collection Time: 12/12/19 12:35 PM  
Result Value Ref Range WBC 5.8 3.6 - 11.0 K/uL  
 RBC 4.26 3.80 - 5.20 M/uL  
 HGB 11.8 11.5 - 16.0 g/dL HCT 37.4 35.0 - 47.0 % MCV 87.8 80.0 - 99.0 FL  
 MCH 27.7 26.0 - 34.0 PG  
 MCHC 31.6 30.0 - 36.5 g/dL  
 RDW 16.2 (H) 11.5 - 14.5 % PLATELET 241 (H) 106 - 400 K/uL MPV 9.3 8.9 - 12.9 FL  
 NRBC 0.0 0  WBC ABSOLUTE NRBC 0.00 0.00 - 0.01 K/uL NEUTROPHILS 47 32 - 75 % BAND NEUTROPHILS 2 0 - 6 % LYMPHOCYTES 28 12 - 49 % MONOCYTES 19 (H) 5 - 13 % EOSINOPHILS 4 0 - 7 % BASOPHILS 0 0 - 1 % IMMATURE GRANULOCYTES 0 %  
 ABS. NEUTROPHILS 2.9 1.8 - 8.0 K/UL  
 ABS. LYMPHOCYTES 1.6 0.8 - 3.5 K/UL  
 ABS. MONOCYTES 1.1 (H) 0.0 - 1.0 K/UL  
 ABS. EOSINOPHILS 0.2 0.0 - 0.4 K/UL  
 ABS. BASOPHILS 0.0 0.0 - 0.1 K/UL  
 ABS. IMM. GRANS. 0.0 K/UL  
 DF MANUAL    
 RBC COMMENTS ANISOCYTOSIS 1+ 
    
 RBC COMMENTS HYPOCHROMIA 1+ WBC COMMENTS REACTIVE LYMPHS    
METABOLIC PANEL, COMPREHENSIVE Collection Time: 12/12/19 12:35 PM  
Result Value Ref Range Sodium 136 136 - 145 mmol/L Potassium 3.0 (L) 3.5 - 5.1 mmol/L Chloride 100 97 - 108 mmol/L  
 CO2 27 21 - 32 mmol/L Anion gap 9 5 - 15 mmol/L Glucose 96 65 - 100 mg/dL BUN 6 6 - 20 MG/DL Creatinine 0.54 (L) 0.55 - 1.02 MG/DL  
 BUN/Creatinine ratio 11 (L) 12 - 20 GFR est AA >60 >60 ml/min/1.73m2 GFR est non-AA >60 >60 ml/min/1.73m2 Calcium 9.4 8.5 - 10.1 MG/DL Bilirubin, total 0.3 0.2 - 1.0 MG/DL  
 ALT (SGPT) 20 12 - 78 U/L  
 AST (SGOT) 13 (L) 15 - 37 U/L Alk. phosphatase 97 45 - 117 U/L Protein, total 7.5 6.4 - 8.2 g/dL Albumin 3.0 (L) 3.5 - 5.0 g/dL Globulin 4.5 (H) 2.0 - 4.0 g/dL A-G Ratio 0.7 (L) 1.1 - 2.2 SAMPLES BEING HELD Collection Time: 12/12/19 12:35 PM  
Result Value Ref Range SAMPLES BEING HELD 1RED,1BLUE   
 COMMENT Add-on orders for these samples will be processed based on acceptable specimen integrity and analyte stability, which may vary by analyte. LIPASE Collection Time: 12/12/19 12:35 PM  
Result Value Ref Range Lipase 114 73 - 393 U/L MAGNESIUM Collection Time: 12/12/19 12:35 PM  
Result Value Ref Range Magnesium 1.8 1.6 - 2.4 mg/dL URINALYSIS W/MICROSCOPIC Collection Time: 12/12/19  2:32 PM  
Result Value Ref Range Color DARK YELLOW Appearance CLOUDY (A) CLEAR Specific gravity 1.025 1.003 - 1.030    
 pH (UA) 6.0 5.0 - 8.0 Protein NEGATIVE  NEG mg/dL Glucose NEGATIVE  NEG mg/dL Ketone 15 (A) NEG mg/dL Blood NEGATIVE  NEG Urobilinogen 1.0 0.2 - 1.0 EU/dL Nitrites NEGATIVE  NEG Leukocyte Esterase SMALL (A) NEG    
 WBC 0-4 0 - 4 /hpf  
 RBC 0-5 0 - 5 /hpf Epithelial cells FEW FEW /lpf Bacteria NEGATIVE  NEG /hpf Mucus 1+ (A) NEG /lpf URINE CULTURE HOLD SAMPLE Collection Time: 12/12/19  2:32 PM  
Result Value Ref Range Urine culture hold URINE ON HOLD IN MICROBIOLOGY DEPT FOR 3 DAYS. IF UNPRESERVED URINE IS SUBMITTED, IT CANNOT BE USED FOR ADDITIONAL TESTING AFTER 24 HRS, RECOLLECTION WILL BE REQUIRED. BILIRUBIN, CONFIRM Collection Time: 12/12/19  2:32 PM  
Result Value Ref Range Bilirubin UA, confirm NEGATIVE  NEG    
METABOLIC PANEL, BASIC Collection Time: 12/13/19  2:22 AM  
Result Value Ref Range Sodium 138 136 - 145 mmol/L Potassium 3.6 3.5 - 5.1 mmol/L Chloride 106 97 - 108 mmol/L  
 CO2 25 21 - 32 mmol/L Anion gap 7 5 - 15 mmol/L Glucose 82 65 - 100 mg/dL BUN 6 6 - 20 MG/DL Creatinine 0.40 (L) 0.55 - 1.02 MG/DL  
 BUN/Creatinine ratio 15 12 - 20 GFR est AA >60 >60 ml/min/1.73m2 GFR est non-AA >60 >60 ml/min/1.73m2 Calcium 8.5 8.5 - 10.1 MG/DL  
CBC W/O DIFF Collection Time: 12/13/19  2:22 AM  
Result Value Ref Range WBC 5.0 3.6 - 11.0 K/uL  
 RBC 3.46 (L) 3.80 - 5.20 M/uL HGB 9.5 (L) 11.5 - 16.0 g/dL HCT 30.3 (L) 35.0 - 47.0 % MCV 87.6 80.0 - 99.0 FL  
 MCH 27.5 26.0 - 34.0 PG  
 MCHC 31.4 30.0 - 36.5 g/dL  
 RDW 16.3 (H) 11.5 - 14.5 % PLATELET 197 (H) 698 - 400 K/uL MPV 9.2 8.9 - 12.9 FL  
 NRBC 0.0 0  WBC ABSOLUTE NRBC 0.00 0.00 - 0.01 K/uL  
 
 
 
CT of abdomen/pelvis (12/12/19) Lung bases: There is a very small left pleural effusion, as seen on prior CT 
dated 11/2019. There is no right pleural fluid. There is persistent mild left 
basilar atelectasis.  
  
Liver: There is a 1.9 cm cyst in the left hepatic lobe. There is a 7 mm hypodensity in the left hepatic lobe, too small to further characterize, but 
unchanged compared to prior CT dated 11/2019.  
  
Adrenals: Adrenal glands are normal. 
  
Pancreas: The pancreas is normal. 
  
Gallbladder: The gallbladder is normal. 
  
Kidneys: The kidneys are normal. 
  
Bowel/Spleen: There is persistent hypodense attenuation within the anterior 
aspect of the spleen consistent patient's known history of infarction. There is 
a gas and fluid containing collection anterior to the spleen and measuring 
approximately 6 cm x 6.2 cm and measuring approximately 5.3 cm x 4.6 cm on prior CT dated November 2019. There is irregular stranding and soft tissue adjacent to 
this collection. There is persistent thickening of the colon at the level of the 
splenic flexure. There appears to be a connection between the left upper 
quadrant focal fluid collection and the descending colon. No dilated loop of 
large or small bowel is visualized. There is persistent irregular soft tissue in 
the pelvis tethering multiple loops of bowel. 
  
Urinary bladder: Urinary bladder is partially filled and grossly normal. 
  
IMPRESSION: 
1. Persistent small left pleural effusion and left basilar atelectasis. 2. Interval increase in size of left and fluid containing left upper abdominal 
collection, now measuring 6 cm x 6.2 cm. This collection is likely contiguous 
with the descending colon. 3. Persistent irregular soft tissue in the pelvis, tethering multiple loops of 
bowel, unchanged, but consistent with carcinomatosis. Assessment/Plan  
 
44 y.o. WF with stage IV appendiceal cancer, s/p surgical resection in September, which included ileocecal resection, SIERRA, BSO, and omentectomy. Postoperative course complicated by a splenic infarction with liquefaction of a portion of the spleen. This was managed conservatively. She has begun chemotherapy, receiving her first cycle of FOLFOX 10 days ago. Imaging now demonstrates a colonic perforation that is communicating with the splenic fluid collection. Case discussed with Dr. Lakeshia Schwartz with radiology and with Dr. Yobany Sanabria with surgical oncology. It is unlikely that the colonic perforation is secondary to her debulking surgery from September, as a bowel injury would have manifested within 7-10 days. This is most likely due to progressive tumor with direct extension through the bowel wall, which is a very ominous sign. Surgery to repair this would be quite extensive, would likely result in splenectomy, and would definitely require a colostomy. Dr. Jhonny Merlin and I both agree that CT-guided drainage of the area would be the best approach at this time. She appears to have the area walled off with adhesions, which is likely why she has not already become septic. We will continue to follow. Signed By: Travis Livingston MD   
 December 13, 2019

## 2019-12-13 NOTE — ROUTINE PROCESS
TRANSFER - OUT REPORT: 
 
Verbal report given to MING Humphrey(name) on Lang Kiss  being transferred to (unit) for routine progression of care Report consisted of patients Situation, Background, Assessment and  
Recommendations(SBAR). Information from the following report(s) SBAR, ED Summary, [facility] and Recent Results was reviewed with the receiving nurse. Lines:  
Venous Access Device powerport 11/18/19 (Active) Peripheral IV 12/12/19 Left Antecubital (Active) Site Assessment Clean, dry, & intact 12/12/2019 12:44 PM  
Phlebitis Assessment 0 12/12/2019 12:44 PM  
Infiltration Assessment 0 12/12/2019 12:44 PM  
Dressing Status Clean, dry, & intact 12/12/2019 12:44 PM  
Dressing Type Transparent 12/12/2019 12:44 PM  
Hub Color/Line Status Patent; Flushed;Capped;Pink 12/12/2019 12:44 PM  
Action Taken Blood drawn 12/12/2019 12:44 PM  
  
 
Opportunity for questions and clarification was provided. Patient transported with: 
 [employer]

## 2019-12-13 NOTE — PROGRESS NOTES
Gynecologic Oncology Quick Note I spoke with Dr. Jannet Martinez regarding Ms. Stan Colbert, who has advanced appendiceal cancer. I have reviewed the patient's chart, including reviewing her images personally. I got involved tonight as I was contacted by Dr. Jannet Martinez regarding a fluid collection by the spleen. Her vital signs are stable, she is afebrile, and her WBC is normal. However, she did recently have chemotherapy so she may not be able to mount a leukocytosis. I agree there may be a fistulous connection between the bowel and this fluid collection, although it is certainly not definitive: It may simply be an infectious fluid collection unrelated to the bowel as this area was previously drained. I have also discussed the case with my partner Dr. Nivia Howell, who performed her initial surgery. I have also discussed her case with her Medical Oncologist, Dr. Jasper Dotson. We are all in agreement that she requires admission with likely drain placement in this fluid collection either tonight or tomorrow. Depending on the output from the drain, further management decisions will be made. Dr. Nivia Howell will see the patient tomorrow. We will also plan to involve Surgical Oncology as she has and appendiceal cancer. Do not hesitate to contact our service with any further questions or concerns.  Camilo Peres MD

## 2019-12-13 NOTE — PROGRESS NOTES
TRANSFER - IN REPORT: 
 
Verbal report received from [facility]) on Marcial Billings  being received from ED(unit) for routine progression of care Report consisted of patients Situation, Background, Assessment and  
Recommendations(SBAR). Information from the following report(s) SBAR, Kardex, ED Summary, Intake/Output, MAR and Recent Results was reviewed with the receiving nurse. Opportunity for questions and clarification was provided. Assessment completed upon patients arrival to unit and care assumed.

## 2019-12-13 NOTE — CONSULTS
Surgery Consult Subjective:  
  
Renetta Ching is a 44 y.o. 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 Her postoperative course was complicated by a splenic infection. This was drained. She had the drain in for a bit and then it was taken out. Follow up ct  Scan has shown persistent fluid in the area. It was decided to not have it re drained. Subsequent CT have shown that the fluid collection has been getting larger. She is complaining of lower abdominal pain and pain in the LLQ. She has decreased PO intake and feels nauseated every time she eats. She has been at [facility] multiple times this week for hydration. Patient Active Problem List  
 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  
Diagnosis Date  Cancer ([address] APPENDIX CANCER   
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  
Procedure Laterality Date [facility] UNLISTED  HX APPENDECTOMY  09/2019  HX GI  09/2019 ILEOCECECTOMY  HX GYN  2003 CYST ON OVARY  HX HYSTERECTOMY  2019  IR THORACENTESIS CATH W IMAGE  11/6/2019 Social History Tobacco Use  Smoking status: Never Smoker  Smokeless tobacco: Never Used Substance Use Topics  Alcohol use: Not Currently Family History Problem Relation Age of Onset  Breast Cancer Paternal Grandmother 56  Crohn's Disease Mother  Heart Disease Mother  Cancer Father BLADDER  
 Diabetes Father  No Known Problems Son  No Known Problems Daughter  Anesth Problems Neg Hx Current Facility-Administered Medications Medication Dose Route Frequency  LORazepam (ATIVAN) tablet 0.5 mg  0.5 mg Oral QHS PRN  
 . PHARMACY TO SUBSTITUTE PER PROTOCOL (Reordered from: potassium chloride (KLOR-CON) 10 mEq tablet)    Per Protocol  sodium chloride (NS) flush 5-40 mL  5-40 mL IntraVENous Q8H  
 sodium chloride (NS) flush 5-40 mL  5-40 mL IntraVENous PRN  
 acetaminophen (TYLENOL) tablet 650 mg  650 mg Oral Q4H PRN  
 morphine injection 2 mg  2 mg IntraVENous Q4H PRN  
 ondansetron (ZOFRAN) injection 4 mg  4 mg IntraVENous Q4H PRN  
 lactated Ringers infusion  100 mL/hr IntraVENous CONTINUOUS  
 [START ON 12/13/2019] piperacillin-tazobactam (ZOSYN) 3.375 g in 0.9% sodium chloride (MBP/ADV) 100 mL  3.375 g IntraVENous Q8H Current Outpatient Medications Medication Sig  potassium chloride (KLOR-CON) 10 mEq tablet Take 1 Tab by mouth two (2) times a day.  LORazepam (ATIVAN) 0.5 mg tablet Take 1 Tab by mouth nightly as needed for Anxiety. Max Daily Amount: 0.5 mg.  
 lidocaine-prilocaine (EMLA) topical cream Apply  to affected area as needed for Pain.  ondansetron (ZOFRAN ODT) 4 mg disintegrating tablet Take 1 Tab by mouth every eight (8) hours as needed for Nausea. (Patient taking differently: Take 8 mg by mouth every eight (8) hours as needed for Nausea. Indications: prevent nausea and vomiting from cancer chemotherapy)  prochlorperazine (COMPAZINE) 5 mg tablet Take 1 tab by mouth every 6 hours as needed for nausea or vomiting  dexAMETHasone (DECADRON) 4 mg tablet Take 2 tabs (8mg) by mouth daily on days 2 and 3 after chemotherapy  acetaminophen (TYLENOL) 325 mg tablet Take 650 mg by mouth every four (4) hours as needed for Pain. Indications: pain  ibuprofen (MOTRIN) 200 mg tablet Take 3 Tabs by mouth every eight (8) hours as needed for Pain. No Known Allergies Review of Systems:   
Pertinent items are noted in the History of Present Illness. Objective:  
 
  
Visit Vitals /67 (BP 1 Location: Right arm, BP Patient Position: Sitting) Pulse (!) 105 Temp 98.4 °F (36.9 °C) Resp 18 Wt 120 lb 13 oz (54.8 kg) LMP 09/15/2019 SpO2 97% BMI 21.40 kg/m² Physical Exam: 
Examination has been deferred as there was no Private area in which to examine her. Imaging:  images and reports reviewed CT- Persistent small left pleural effusion and left basilar atelectasis. 2. Interval increase in size of left and fluid containing left upper abdominal 
collection, now measuring 6 cm x 6.2 cm. This collection is likely contiguous 
with the descending colon. 3. Persistent irregular soft tissue in the pelvis, tethering multiple loops of 
bowel, unchanged, but consistent with carcinomatosis. Lab/Data Review: All lab results for the last 24 hours reviewed. Recent Results (from the past 24 hour(s)) CBC WITH AUTOMATED DIFF Collection Time: 12/12/19 12:35 PM  
Result Value Ref Range WBC 5.8 3.6 - 11.0 K/uL  
 RBC 4.26 3.80 - 5.20 M/uL  
 HGB 11.8 11.5 - 16.0 g/dL HCT 37.4 35.0 - 47.0 % MCV 87.8 80.0 - 99.0 FL  
 MCH 27.7 26.0 - 34.0 PG  
 MCHC 31.6 30.0 - 36.5 g/dL  
 RDW 16.2 (H) 11.5 - 14.5 % PLATELET 502 (H) 879 - 400 K/uL MPV 9.3 8.9 - 12.9 FL  
 NRBC 0.0 0  WBC ABSOLUTE NRBC 0.00 0.00 - 0.01 K/uL NEUTROPHILS 47 32 - 75 % BAND NEUTROPHILS 2 0 - 6 % LYMPHOCYTES 28 12 - 49 % MONOCYTES 19 (H) 5 - 13 % EOSINOPHILS 4 0 - 7 % BASOPHILS 0 0 - 1 % IMMATURE GRANULOCYTES 0 %  
 ABS. NEUTROPHILS 2.9 1.8 - 8.0 K/UL  
 ABS. LYMPHOCYTES 1.6 0.8 - 3.5 K/UL  
 ABS. MONOCYTES 1.1 (H) 0.0 - 1.0 K/UL  
 ABS. EOSINOPHILS 0.2 0.0 - 0.4 K/UL  
 ABS. BASOPHILS 0.0 0.0 - 0.1 K/UL  
 ABS. IMM.  GRANS. 0.0 K/UL  
 DF MANUAL    
 RBC COMMENTS ANISOCYTOSIS 
1+ 
    
 RBC COMMENTS HYPOCHROMIA 1+ WBC COMMENTS REACTIVE LYMPHS    
METABOLIC PANEL, COMPREHENSIVE Collection Time: 12/12/19 12:35 PM  
Result Value Ref Range Sodium 136 136 - 145 mmol/L Potassium 3.0 (L) 3.5 - 5.1 mmol/L Chloride 100 97 - 108 mmol/L  
 CO2 27 21 - 32 mmol/L Anion gap 9 5 - 15 mmol/L Glucose 96 65 - 100 mg/dL BUN 6 6 - 20 MG/DL Creatinine 0.54 (L) 0.55 - 1.02 MG/DL  
 BUN/Creatinine ratio 11 (L) 12 - 20 GFR est AA >60 >60 ml/min/1.73m2 GFR est non-AA >60 >60 ml/min/1.73m2 Calcium 9.4 8.5 - 10.1 MG/DL Bilirubin, total 0.3 0.2 - 1.0 MG/DL  
 ALT (SGPT) 20 12 - 78 U/L  
 AST (SGOT) 13 (L) 15 - 37 U/L Alk. phosphatase 97 45 - 117 U/L Protein, total 7.5 6.4 - 8.2 g/dL Albumin 3.0 (L) 3.5 - 5.0 g/dL Globulin 4.5 (H) 2.0 - 4.0 g/dL A-G Ratio 0.7 (L) 1.1 - 2.2 SAMPLES BEING HELD Collection Time: 12/12/19 12:35 PM  
Result Value Ref Range SAMPLES BEING HELD 1RED,1BLUE   
 COMMENT Add-on orders for these samples will be processed based on acceptable specimen integrity and analyte stability, which may vary by analyte. LIPASE Collection Time: 12/12/19 12:35 PM  
Result Value Ref Range Lipase 114 73 - 393 U/L MAGNESIUM Collection Time: 12/12/19 12:35 PM  
Result Value Ref Range Magnesium 1.8 1.6 - 2.4 mg/dL URINALYSIS W/MICROSCOPIC Collection Time: 12/12/19  2:32 PM  
Result Value Ref Range Color DARK YELLOW Appearance CLOUDY (A) CLEAR Specific gravity 1.025 1.003 - 1.030    
 pH (UA) 6.0 5.0 - 8.0 Protein NEGATIVE  NEG mg/dL Glucose NEGATIVE  NEG mg/dL Ketone 15 (A) NEG mg/dL Blood NEGATIVE  NEG Urobilinogen 1.0 0.2 - 1.0 EU/dL Nitrites NEGATIVE  NEG Leukocyte Esterase SMALL (A) NEG    
 WBC 0-4 0 - 4 /hpf  
 RBC 0-5 0 - 5 /hpf Epithelial cells FEW FEW /lpf Bacteria NEGATIVE  NEG /hpf Mucus 1+ (A) NEG /lpf URINE CULTURE HOLD SAMPLE  
 Collection Time: 12/12/19  2:32 PM  
Result Value Ref Range Urine culture hold URINE ON HOLD IN MICROBIOLOGY DEPT FOR 3 DAYS. IF UNPRESERVED URINE IS SUBMITTED, IT CANNOT BE USED FOR ADDITIONAL TESTING AFTER 24 HRS, RECOLLECTION WILL BE REQUIRED. BILIRUBIN, CONFIRM Collection Time: 12/12/19  2:32 PM  
Result Value Ref Range Bilirubin UA, confirm NEGATIVE  NEG Assessment:Plan Continued fluid collection with possible fistula. Patient is scheduled to undergo possible drainage in IR tomorrow. May need to have Oral contrast to delineate if there is a fistula. I am not sure about the tethering near the bladder. I suspect that this may be contributing to her pain. Dr. Tong Cline to follow tomorrow.

## 2019-12-13 NOTE — PROGRESS NOTES
Radiology The patient returns with increase in LUQ subdiaphragmatic and perisplenic fluid with increase air/gas. She is afebrile and normal wbc's. ? immunodeficicient. Please order for CT drainage for tomorrow. Complex situation may require addition of oral contrast vs. Sinogram later to sort out possible bowel communications.

## 2019-12-13 NOTE — PROGRESS NOTES
Hospitalist Progress Note Autumn Benavides MD 
Answering service: [phone] OR [phone] from in house phone Date of Service:  2019 NAME:  Paulette Slater :  1970 MRN:  [id number] PCP: Brandy Watts MD 
 
Chief Complaint:  
Chief Complaint Patient presents with  Abdominal Pain Admission Summary:  
 
Paulette Slater is a 44 y.o. female who presented with abdominal pain Interval history / Subjective:  
Patient seen for Follow up of  chief complaint abdomen fluid collection, nausea and vomiting. Patient seen and examined by the bedside, Labs, images and notes reviewed Patient is a 68-year-old female with appendiceal cancer with metastasis stage IV. Came in abdominal pain, sharp, recurrent, intermittent, no obvious alleviating or exacerbating factors, CT scan read to have fluid collection, concerning for fistula although surgical team does not consider it to be a fistula and think that it is direct invasion of the bowel wall with a tumor. Patient will get CT-guided drain by IR today. Her nausea and vomiting is much better today. She is afebrile, no diarrhea. Discussed with nursing staff, orders reviewed Assessment & Plan: Abdominal fluid collection, LUQ with possible fistula In the setting of of  metastatic appendiceal cancer s/p ex laparotomy, debulking surgery,SIERRA BSO and ileocectomy in  CT remarkable for interval increase in the size of the fluid collection. Gynecology thinks it is due to progressive tumor with just direct extension to the bowel wall which is a very ominous sign. It is unlikely that it is a colonic perforation secondary to debulking surgery since it was in September. Discussed with oncology, surgical oncology and GYN oncology IR to place drain today. Cultures obtained. Resume diet after the drain placement. Oncology states that they will continue chemotherapy once acute issues have resolved. Empiric IV Zosyn, pain control Stage IV metastatic appendiceal cancer post exploratory laparotomy SIERRA/BSO, tumor debulking, ileocecal resection appendectomy 2019. Patient has received 1 cycle of FOLFOX chemotherapy 12/3/2019. Plan is to continue chemo if possible, due next week. Goal of care is palliative as per oncology. Left renal effusion post Thora x2, stable. Thrombocytosisstable 
  
Nausea and vomiting Likely from the above Antiemetics IV fluid Patient feels better, no nausea or vomiting since this morning. 
  
Hypokalemia Resolved, repeat lab in the morning. Code status: Full Code DVT prophylaxis: SCDs Care Plan discussed with: Discussed with patient's  and nursing staff Disposition: TBD Hospital Problems  Date Reviewed: 2019 Codes Class Noted POA Splenic abscess ICD-10-CM: D73.3 ICD-9-CM: 289.59  2019 Unknown Review of Systems: A comprehensive review of systems was negative. Physical Examination:  
 
General appearance: alert, cooperative, no distress, appears stated age Head: Normocephalic, without obvious abnormality, atraumatic Throat: Oral mucosa is dry Lungs: Clear to auscultation bilaterally, no wheezes, unlabored breathing Heart: RRR, no murmur gallops or rubs Abdomen: Soft, tenderness around the surgical site, the tenderness is diffuse, no obvious rebound or guarding. Bowel sounds are positive Extremities: No edema Neurologic: Alert and oriented X 3, normal strength and tone. Vital Signs:  
 Last 24hrs VS reviewed since prior progress note. Most recent are: 
 
Visit Vitals /67 (BP 1 Location: Right arm, BP Patient Position: At rest) Pulse 79 Temp 98.5 °F (36.9 °C) Resp 20 Wt 54.8 kg (120 lb 13 oz) SpO2 100% BMI 21.40 kg/m² No intake or output data in the 24 hours ending 19 1508 Tmax:  Temp (24hrs), Av.3 °F (36.8 °C), Min:97.7 °F (36.5 °C), Max:98.6 °F (37 °C) Data Review:  
Data reviewed by myself: 
Ct Chest W Cont Result Date: 11/26/2019 EXAM: CT CHEST W CONT, CT ABD PELV W CONT INDICATION: met appendix cancer f/u speen infarct/ clot pre chemo COMPARISON: 11 2/19 and others CONTRAST: 100 mL of Isovue-370. TECHNIQUE: Following the uneventful intravenous administration of contrast, thin axial images were obtained through the chest, abdomen and pelvis. Coronal and sagittal reconstructions were generated. Oral contrast was administered. CT dose reduction was achieved through use of a standardized protocol tailored for this examination and automatic exposure control for dose modulation. FINDINGS: THYROID: No nodule. MEDIASTINUM: No mass or lymphadenopathy. ABNER: No mass or lymphadenopathy. THORACIC AORTA: No dissection or aneurysm. MAIN PULMONARY ARTERY: Normal in caliber. TRACHEA/BRONCHI: Patent. ESOPHAGUS: No wall thickening or dilatation. HEART: Normal in size. PLEURA: Stable left pleural effusion. LUNGS: No nodule, mass, or airspace disease. LIVER: Small hypodensities in the left lobe liver unchanged. There is perihepatic fluid anterior to the left lobe. GALLBLADDER: Unremarkable. SPLEEN: Again noted is a fluid collection occupying the anterior aspect of the spleen consistent with patient's history of infarction and liquefaction. There is persistent locules of air within the collection PANCREAS: No mass or ductal dilatation. ADRENALS: Unremarkable. KIDNEYS: No mass, calculus, or hydronephrosis. STOMACH: In the lateral fundus of the stomach, there is extensive soft tissue thickening possibly reflecting tumor implant. The previously noted fluid collection in the wall stomach has resolved. There is a questionable tract communicating with the splenic fluid collection with multiple locules of air noted within the splenic fluid collection.  The overall size of the fluid collection is decreased significantly since the prior study and now measures approximately 42 x 44 mm. SMALL BOWEL: No dilatation or wall thickening. COLON: There is thickening of the splenic flexure with adjacent peritoneal soft tissue thickening. Possible serosal implants are noted on the sigmoid colon with irregular wall thickening. APPENDIX: Not visualized PERITONEUM: In the anterior pelvis just superior bladder, there is irregular soft tissue around loops of small bowel. Nondependent ascites is noted in the upper abdomen. RETROPERITONEUM: No lymphadenopathy or aortic aneurysm. REPRODUCTIVE ORGANS: URINARY BLADDER: Unremarkable BONES: No destructive bone lesion. ADDITIONAL COMMENTS: N/A IMPRESSION: 1. Interval decreased size of splenic fluid collection. There is extensive irregular soft tissue around the fluid collection which tracks into the lateral wall the gastric fundus. There is persistent locules of gas within the splenic collection and a fistulous communication with the stomach is not entirely excluded. In addition, there is increased soft tissue thickening around the lateral wall the stomach suggestive of tumor deposit. 2.  Increased soft tissue thickening around the splenic flexure of the colon with peritoneal thickening and nondependent studies suspicious for peritoneal carcinomatosis. Additional soft tissue densities noted in the anterior pelvis and around the sigmoid colon as well as loops of small bowel suspicious for peritoneal carcinomatosis as well. 3.  Left pleural effusion is stable. Ct Abd Pelv W Cont Result Date: 12/12/2019 INDICATION: Abdominal pain, history of appendiceal CA, recent surgery, CT of the abdomen and pelvis is performed with 5 mm collimation. Study is performed with 100 cc of nonionic Isovue 370. Sagittal and coronal reformatted images were also performed.  CT dose reduction was achieved with the use of the standardized protocol tailored for this examination and automatic exposure control for dose modulation. Adaptive statistical iterative reconstruction (ASIR) was utilized. Direct comparison is made to prior CT dated 11/26/2019. Findings: Lung bases: There is a very small left pleural effusion, as seen on prior CT dated 11/2019. There is no right pleural fluid. There is persistent mild left basilar atelectasis. Liver: There is a 1.9 cm cyst in the left hepatic lobe. There is a 7 mm hypodensity in the left hepatic lobe, too small to further characterize, but unchanged compared to prior CT dated 11/2019. Adrenals: Adrenal glands are normal. Pancreas: The pancreas is normal. Gallbladder: The gallbladder is normal. Kidneys: The kidneys are normal. Bowel/Spleen: There is persistent hypodense attenuation within the anterior aspect of the spleen consistent patient's known history of infarction. There is a gas and fluid containing collection anterior to the spleen and measuring approximately 6 cm x 6.2 cm and measuring approximately 5.3 cm x 4.6 cm on prior CT dated November 2019. There is irregular stranding and soft tissue adjacent to this collection. There is persistent thickening of the colon at the level of the splenic flexure. There appears to be a connection between the left upper quadrant focal fluid collection and the descending colon. No dilated loop of large or small bowel is visualized. There is persistent irregular soft tissue in the pelvis tethering multiple loops of bowel. Urinary bladder: Urinary bladder is partially filled and grossly normal.  
 
IMPRESSION: 1. Persistent small left pleural effusion and left basilar atelectasis. 2. Interval increase in size of left and fluid containing left upper abdominal collection, now measuring 6 cm x 6.2 cm. This collection is likely contiguous with the descending colon. 3. Persistent irregular soft tissue in the pelvis, tethering multiple loops of bowel, unchanged, but consistent with carcinomatosis. Ct Abd Pelv W Cont Result Date: 11/26/2019 EXAM: CT CHEST W CONT, CT ABD PELV W CONT INDICATION: met appendix cancer f/u speen infarct/ clot pre chemo COMPARISON: 11 2/19 and others CONTRAST: 100 mL of Isovue-370. TECHNIQUE: Following the uneventful intravenous administration of contrast, thin axial images were obtained through the chest, abdomen and pelvis. Coronal and sagittal reconstructions were generated. Oral contrast was administered. CT dose reduction was achieved through use of a standardized protocol tailored for this examination and automatic exposure control for dose modulation. FINDINGS: THYROID: No nodule. MEDIASTINUM: No mass or lymphadenopathy. ABNER: No mass or lymphadenopathy. THORACIC AORTA: No dissection or aneurysm. MAIN PULMONARY ARTERY: Normal in caliber. TRACHEA/BRONCHI: Patent. ESOPHAGUS: No wall thickening or dilatation. HEART: Normal in size. PLEURA: Stable left pleural effusion. LUNGS: No nodule, mass, or airspace disease. LIVER: Small hypodensities in the left lobe liver unchanged. There is perihepatic fluid anterior to the left lobe. GALLBLADDER: Unremarkable. SPLEEN: Again noted is a fluid collection occupying the anterior aspect of the spleen consistent with patient's history of infarction and liquefaction. There is persistent locules of air within the collection PANCREAS: No mass or ductal dilatation. ADRENALS: Unremarkable. KIDNEYS: No mass, calculus, or hydronephrosis. STOMACH: In the lateral fundus of the stomach, there is extensive soft tissue thickening possibly reflecting tumor implant. The previously noted fluid collection in the wall stomach has resolved. There is a questionable tract communicating with the splenic fluid collection with multiple locules of air noted within the splenic fluid collection. The overall size of the fluid collection is decreased significantly since the prior study and now measures approximately 42 x 44 mm.  SMALL BOWEL: No dilatation or wall thickening. COLON: There is thickening of the splenic flexure with adjacent peritoneal soft tissue thickening. Possible serosal implants are noted on the sigmoid colon with irregular wall thickening. APPENDIX: Not visualized PERITONEUM: In the anterior pelvis just superior bladder, there is irregular soft tissue around loops of small bowel. Nondependent ascites is noted in the upper abdomen. RETROPERITONEUM: No lymphadenopathy or aortic aneurysm. REPRODUCTIVE ORGANS: URINARY BLADDER: Unremarkable BONES: No destructive bone lesion. ADDITIONAL COMMENTS: N/A IMPRESSION: 1. Interval decreased size of splenic fluid collection. There is extensive irregular soft tissue around the fluid collection which tracks into the lateral wall the gastric fundus. There is persistent locules of gas within the splenic collection and a fistulous communication with the stomach is not entirely excluded. In addition, there is increased soft tissue thickening around the lateral wall the stomach suggestive of tumor deposit. 2.  Increased soft tissue thickening around the splenic flexure of the colon with peritoneal thickening and nondependent studies suspicious for peritoneal carcinomatosis. Additional soft tissue densities noted in the anterior pelvis and around the sigmoid colon as well as loops of small bowel suspicious for peritoneal carcinomatosis as well. 3.  Left pleural effusion is stable. Xr Chest [facility] Result Date: 11/18/2019 EXAM: XR CHEST PORT INDICATION: port placement COMPARISON: 11/6/2019 FINDINGS: A portable AP radiograph of the chest was obtained at 1145 hours. The patient is on a cardiac monitor. A new left subclavian Port-A-Cath has been place. The tip is in the region of the cavoatrial junction. No pneumothorax identified. There is a small left pleural effusion. . The cardiac and mediastinal contours and pulmonary vascularity are normal.  The bones and soft tissues are grossly within normal limits. IMPRESSION: New left subclavian Port-A-Cath without evidence of pneumothorax. Small left pleural effusion. Nc Xr Technologist Service Result Date: 11/19/2019 Fluoroscopy was utilized. IMPRESSION:  FLUOROSCOPY WAS USED. Fluoro Dose:  0.17 mGy vk Lab Results Component Value Date/Time Specimen Description: VAGINA 08/01/2009 11:45 AM  
 Specimen Description: URINE 08/01/2009 11:45 AM  
 
Lab Results Component Value Date/Time Culture result: LIGHT ENTEROCOCCUS DURANS/HIRAE (A) 10/16/2019 03:45 PM  
 Culture result: NO ANAEROBES ISOLATED 10/16/2019 03:45 PM  
 Culture result: NO GROWTH 1 DAY 08/01/2009 11:45 AM  
 
All Micro Results Procedure Component Value Units Date/Time CULTURE, BODY FLUID Lluvia Guajardo [[id number]] Collected:  12/13/19 1430 Order Status:  Completed Specimen:  Miscellaneous Fluid Updated:  12/13/19 1507 Bianca Carwin [[id number]] Collected:  12/13/19 1418 Order Status:  Completed Specimen:  Abdominal Fluid Updated:  12/13/19 1506 URINE CULTURE HOLD SAMPLE [[id number]] Collected:  12/12/19 1432 Order Status:  Completed Specimen:  Urine from Serum Updated:  12/12/19 1447 Urine culture hold URINE ON HOLD IN MICROBIOLOGY DEPT FOR 3 DAYS. IF UNPRESERVED URINE IS SUBMITTED, IT CANNOT BE USED FOR ADDITIONAL TESTING AFTER 24 HRS, RECOLLECTION WILL BE REQUIRED. Labs: reviewed by myself. Recent Labs 12/13/19 
0222 12/12/19 
1235 WBC 5.0 5.8 HGB 9.5* 11.8 HCT 30.3* 37.4 * 452* Recent Labs 12/13/19 
0222 12/12/19 
1235  136  
K 3.6 3.0*  
 100 CO2 25 27 BUN 6 6 CREA 0.40* 0.54* GLU 82 96  
CA 8.5 9.4 MG  --  1.8 Recent Labs 12/12/19 
1235 SGOT 13* ALT 20 AP 97 TBILI 0.3 TP 7.5 ALB 3.0*  
GLOB 4.5* LPSE 114 No results for input(s): INR, PTP, APTT, INREXT in the last 72 hours. No results for input(s): FE, TIBC, PSAT, FERR in the last 72 hours. No results found for: FOL, RBCF No results for input(s): PH, PCO2, PO2 in the last 72 hours. No results for input(s): CPK, CKNDX, TROIQ in the last 72 hours. No lab exists for component: CPKMB No results found for: CHOL, CHOLX, CHLST, CHOLV, HDL, HDLP, LDL, LDLC, DLDLP, TGLX, TRIGL, TRIGP, CHHD, CHHDX Lab Results Component Value Date/Time Glucose (POC) 85 09/25/2019 12:10 PM  
 
Lab Results Component Value Date/Time Color DARK YELLOW 12/12/2019 02:32 PM  
 Appearance CLOUDY (A) 12/12/2019 02:32 PM  
 Specific gravity 1.025 12/12/2019 02:32 PM  
 Specific gravity 1.010 10/15/2019 04:32 PM  
 pH (UA) 6.0 12/12/2019 02:32 PM  
 Protein NEGATIVE  12/12/2019 02:32 PM  
 Glucose NEGATIVE  12/12/2019 02:32 PM  
 Ketone 15 (A) 12/12/2019 02:32 PM  
 Bilirubin NEGATIVE  10/15/2019 04:32 PM  
 Urobilinogen 1.0 12/12/2019 02:32 PM  
 Nitrites NEGATIVE  12/12/2019 02:32 PM  
 Leukocyte Esterase SMALL (A) 12/12/2019 02:32 PM  
 Epithelial cells FEW 12/12/2019 02:32 PM  
 Bacteria NEGATIVE  12/12/2019 02:32 PM  
 WBC 0-4 12/12/2019 02:32 PM  
 RBC 0-5 12/12/2019 02:32 PM  
 
 
 
Medications Reviewed:  
 
Current Facility-Administered Medications Medication Dose Route Frequency  LORazepam (ATIVAN) tablet 0.5 mg  0.5 mg Oral QHS PRN  potassium chloride SR (KLOR-CON 10) tablet 10 mEq  10 mEq Oral BID  sodium chloride (NS) flush 5-40 mL  5-40 mL IntraVENous Q8H  
 sodium chloride (NS) flush 5-40 mL  5-40 mL IntraVENous PRN  
 acetaminophen (TYLENOL) tablet 650 mg  650 mg Oral Q4H PRN  
 morphine injection 2 mg  2 mg IntraVENous Q4H PRN  
 ondansetron (ZOFRAN) injection 4 mg  4 mg IntraVENous Q4H PRN  
 lactated Ringers infusion  100 mL/hr IntraVENous CONTINUOUS  piperacillin-tazobactam (ZOSYN) 3.375 g in 0.9% sodium chloride (MBP/ADV) 100 mL  3.375 g IntraVENous Q8H  
 
 ______________________________________________________________________ EXPECTED LENGTH OF STAY: - - - 
ACTUAL LENGTH OF STAY:          1 Lexie Kwong MD  
 
Patient's emergency contacts: 
Extended Emergency Contact Information Primary Emergency Contact: Froilan Gallagher Address: [address] [place] [address], [address] [place] [address] Phone: [phone] Relation: Spouse

## 2019-12-13 NOTE — PROGRESS NOTES
NUTRITION COMPLETE ASSESSMENT 
 
RECOMMENDATIONS:  
1. Diet advancement per MD 
2. Add supplements with meals if pt receptive Interventions/Plan:  
Food/Nutrient Delivery:  General/healthful diet Commercial supplement Assessment:  
Reason for Assessment: MST Referral 
 
 
Diet: NPO (for procedure) Supplements: none at this time Meal Intake: No data found. Subjective: 
Off for drain placement. .. Objective: 
44 y.o. female admitted with Splenic abscess [D73.3] Abdominal fluid collection, LUQ with possible fistula In the setting of of  metastatic appendiceal cancer s/p ex laparotomy, debulking surgery,SIERRA BSO and ileocectomy in 9/19 Per Gynecology likely d/t progressive tumor with direct extension to the bowel wall. .unlikely that it is a colonic perforation secondary to debulking surgery since it was in September. IR to place drain today. Diet advancement once returns. Oncology states that they will continue chemotherapy once acute issues have resolved. Pt followed by OP oncology dietitian. 20# weight loss since surgery in September. Struggling with on/off diarrhea. Has tried supplements in past, stating they are OK. Altered GI Function related to recent surgery and disease as evidence by diarrhea and anatomical changes. Involuntary weight loss related to surgery and disease as evidence by weight loss of 20# x 10 weeks. Nutritionally Significant Medications:  
LR @ 100 mL/hr, morphine, zofran, zosyn, klor Intake/Output: 
No intake or output data in the 24 hours ending 12/13/19 1648 Last BM: 12/10 (reported) Physical Findings: 
 
Nutrition focused physical exam: deferred at this time Edema: none Abdomen: tender Skin Integrity: intact Anthropometrics: 
Weight Loss Metrics 12/13/2019 12/12/2019 12/3/2019 12/3/2019 11/18/2019 11/14/2019 11/11/2019 Today's Wt 120 lb 13 oz - 120 lb 120 lb 121 lb 125 lb 9.6 oz 126 lb 3.2 oz  
 BMI - 21.4 kg/m2 21.26 kg/m2 21.26 kg/m2 21.43 kg/m2 22.97 kg/m2 22.36 kg/m2 Height: 5' 3\" (160 cm), Body mass index is 21.4 kg/m². IBW : 52.2 kg (115 lb), % IBW (Calculated): 105.05 % Usual Body Weight: 63.5 kg (140 lb),   
 
Labs:   
 
Recent Labs 12/13/19 0222 12/12/19 
1235 GLU 82 96 BUN 6 6 CREA 0.40* 0.54*  136  
K 3.6 3.0*  
 100 CO2 25 27  
CA 8.5 9.4 MG  --  1.8 Recent Labs 12/12/19 
1235 SGOT 13* ALT 20 AP 97 TBILI 0.3 TP 7.5 ALB 3.0*  
GLOB 4.5* LPSE 114 No results for input(s): LAC in the last 72 hours. Recent Labs 12/13/19 0222 12/12/19 
1235 WBC 5.0 5.8 HGB 9.5* 11.8 HCT 30.3* 37.4 * 452* No results for input(s): PREALB in the last 72 hours. No results for input(s): TRIGL in the last 72 hours. No results for input(s): GLUCPOC in the last 72 hours. No results found for: HBA1C, HGBE8, LDD8PVPF, VUT6OEDD, FUR5VFVX Cultural, Hindu and ethnic food preferences identified: None Food Allergies: NKFA Diet Restrictions: Cultural/Sikh Preference(s): None Estimated Nutrition Needs:  
Kcals/day: 1600 Kcals/day(-1900 kcal/day) Protein: 65 g(-90 gm/day using 1.2-1.6 gm/kg) Fluid: 1600 ml(30 mL/kg) Based On: Kcal/kg - specify (Comment)(30-35 kcal/kg) Weight Used: Actual wt(54.8 kg) Pt expected to meet estimated nutrient needs:  Unable to predict at this time Nutrition Diagnosis: 1. Altered GI function related to recent surgery as evidenced by diarrhea and anatomical changes 2. Inadequate oral intake related to increased energy needs, disease process as evidenced by previous weight loss of 20# over 10 weeks 3.   related to   as evidenced by   
 
Goals:   
 diet advancement in next 24 hours; then PO >/=50% in next 3-4 days Monitoring & Evaluation: Total energy intake, Oral fluids amount, Protein intake Weight/weight change, Electrolyte and renal profile, GI 
  
 
 Previous Nutrition Goals Met:   N/A Previous Recommendations:    N/A Education & Discharge Needs: 
 [x] None Identified 
 [] Identified and addressed [x] Participated in care plan, discharge planning, and/or interdisciplinary rounds aMx Lantigua RD

## 2019-12-13 NOTE — PROGRESS NOTES
[facility] [place] [address] [address], [address] W: [phone]  F: [phone] HEME/ONC CONSULT Reason for Visit:  
Miguelina Randall is a 44 y.o. female who is seen in consultation at the request of Dr. Adrian De Leon ER for abdominal pain and met appendix cancer. Treatment History:  
EXPLORATORY LAPAROTOMY SIERRA BSO, TUMOR DEBULKING: ILEOCECAL RESECTION; OMENTECTOMY STAGE: 4 with carcinomatosis History of Present Illness:  
 
Pt seen today in ER consult for metastatic appendix cancer post cycle 1 of 50% dose reduced palliative FOLFOX chemo. In ER due to persistent N/V/ abdominal pain despite multiple outpt IVF treatments. Pt is in chair in large waiting room in ER. States abdominal pain is diffuse throughout abdomin. N/V and cannot each much at all. Able to walk. Labs stable. CT:  
 IMPRESSION: 
1. Persistent small left pleural effusion and left basilar atelectasis. 2. Interval increase in size of left and fluid containing left upper abdominal 
collection, now measuring 6 cm x 6.2 cm. This collection is likely contiguous 
with the descending colon. 3. Persistent irregular soft tissue in the pelvis, tethering multiple loops of 
bowel, unchanged, but consistent with carcinomatosis. Hx of fluid collection drain via IR. INTERIM HX:  Pt seen today for hospital f/u for met appendix cancer post cycle 1 of chemo. CBC stable. Pt is admitted for abdominal pain/ N/ V and increase in fluid collection on CT with ? Fistula. Pt has been seen by GYN/ONC. Pt is in bed. States feeling ok. Not eating. Pain not bad. Past Medical History:  
Diagnosis Date  Cancer ([address] APPENDIX CANCER   
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  
Procedure Laterality Date [address] [place] [address] UNLISTED  HX APPENDECTOMY  09/2019  HX GI  09/2019 ILEOCECECTOMY  HX GYN  2003 CYST ON OVARY  HX HYSTERECTOMY  2019  IR THORACENTESIS CATH W IMAGE  11/6/2019 Social History Tobacco Use  Smoking status: Never Smoker  Smokeless tobacco: Never Used Substance Use Topics  Alcohol use: Not Currently Family History Problem Relation Age of Onset  Breast Cancer Paternal Grandmother 56  Crohn's Disease Mother  Heart Disease Mother  Cancer Father BLADDER  
 Diabetes Father  No Known Problems Son  No Known Problems Daughter  Anesth Problems Neg Hx Current Facility-Administered Medications Medication Dose Route Frequency  LORazepam (ATIVAN) tablet 0.5 mg  0.5 mg Oral QHS PRN  potassium chloride SR (KLOR-CON 10) tablet 10 mEq  10 mEq Oral BID  sodium chloride (NS) flush 5-40 mL  5-40 mL IntraVENous Q8H  
 sodium chloride (NS) flush 5-40 mL  5-40 mL IntraVENous PRN  
 acetaminophen (TYLENOL) tablet 650 mg  650 mg Oral Q4H PRN  
 morphine injection 2 mg  2 mg IntraVENous Q4H PRN  
 ondansetron (ZOFRAN) injection 4 mg  4 mg IntraVENous Q4H PRN  
 lactated Ringers infusion  100 mL/hr IntraVENous CONTINUOUS  piperacillin-tazobactam (ZOSYN) 3.375 g in 0.9% sodium chloride (MBP/ADV) 100 mL  3.375 g IntraVENous Q8H No Known Allergies Review of Systems: A complete review of systems was obtained, negative except as described above. Physical Exam:  
 
Visit Vitals /69 (BP 1 Location: Right arm, BP Patient Position: At rest) Pulse 75 Temp 97.7 °F (36.5 °C) Resp 17 Wt 120 lb 13 oz (54.8 kg) LMP 09/15/2019 SpO2 98% BMI 21.40 kg/m² ECOG PS: 1-2 General: No distress Eyes:  anicteric sclerae HENT: Atraumatic Neck: Supple CV regular 
resp clear GI: soft, distended MS: in bed but usually ambulatory Skin: warm dry Psych: Alert, oriented, appropriate affect, normal judgment/insight Results:  
 
Lab Results Component Value Date/Time WBC 5.0 12/13/2019 02:22 AM  
 HGB 9.5 (L) 12/13/2019 02:22 AM  
 HCT 30.3 (L) 12/13/2019 02:22 AM  
 PLATELET 706 (H) 74/46/9172 02:22 AM  
 MCV 87.6 12/13/2019 02:22 AM  
 ABS. NEUTROPHILS 2.9 12/12/2019 12:35 PM  
 Hemoglobin (POC) 10.8 (L) 09/25/2019 04:11 PM  
 Hematocrit (POC) 32 (L) 09/25/2019 12:10 PM  
 
Lab Results Component Value Date/Time Sodium 138 12/13/2019 02:22 AM  
 Potassium 3.6 12/13/2019 02:22 AM  
 Chloride 106 12/13/2019 02:22 AM  
 CO2 25 12/13/2019 02:22 AM  
 Glucose 82 12/13/2019 02:22 AM  
 BUN 6 12/13/2019 02:22 AM  
 Creatinine 0.40 (L) 12/13/2019 02:22 AM  
 GFR est AA >60 12/13/2019 02:22 AM  
 GFR est non-AA >60 12/13/2019 02:22 AM  
 Calcium 8.5 12/13/2019 02:22 AM  
 Sodium (POC) 137 09/25/2019 12:10 PM  
 Potassium (POC) 3.7 09/25/2019 12:10 PM  
 Chloride (POC) 105 09/25/2019 12:10 PM  
 Glucose (POC) 85 09/25/2019 12:10 PM  
 BUN (POC) 4 (L) 09/25/2019 12:10 PM  
 Creatinine (POC) 0.5 (L) 09/25/2019 12:10 PM  
 Calcium, ionized (POC) 1.14 09/25/2019 12:10 PM  
 
Lab Results Component Value Date/Time Bilirubin, total 0.3 12/12/2019 12:35 PM  
 ALT (SGPT) 20 12/12/2019 12:35 PM  
 AST (SGOT) 13 (L) 12/12/2019 12:35 PM  
 Alk. phosphatase 97 12/12/2019 12:35 PM  
 Protein, total 7.5 12/12/2019 12:35 PM  
 Albumin 3.0 (L) 12/12/2019 12:35 PM  
 Globulin 4.5 (H) 12/12/2019 12:35 PM  
 
 
CT Results (most recent): 
Results from Hospital Encounter encounter on 12/12/19 CT ABD PELV W CONT Narrative INDICATION: Abdominal pain, history of appendiceal CA, recent surgery, CT of the abdomen and pelvis is performed with 5 mm collimation. Study is 
performed with 100 cc of nonionic Isovue 370. Sagittal and coronal reformatted 
images were also performed. CT dose reduction was achieved with the use of the standardized protocol tailored for this examination and automatic exposure control for dose 
modulation. Adaptive statistical iterative reconstruction (ASIR) was utilized. Direct comparison is made to prior CT dated 11/26/2019. Findings: 
 
Lung bases: There is a very small left pleural effusion, as seen on prior CT 
dated 11/2019. There is no right pleural fluid. There is persistent mild left 
basilar atelectasis. Liver: There is a 1.9 cm cyst in the left hepatic lobe. There is a 7 mm 
hypodensity in the left hepatic lobe, too small to further characterize, but 
unchanged compared to prior CT dated 11/2019. Adrenals: Adrenal glands are normal. 
 
Pancreas: The pancreas is normal. 
 
Gallbladder: The gallbladder is normal. 
 
Kidneys: The kidneys are normal. 
 
Bowel/Spleen: There is persistent hypodense attenuation within the anterior 
aspect of the spleen consistent patient's known history of infarction. There is 
a gas and fluid containing collection anterior to the spleen and measuring 
approximately 6 cm x 6.2 cm and measuring approximately 5.3 cm x 4.6 cm on prior CT dated November 2019. There is irregular stranding and soft tissue adjacent to 
this collection. There is persistent thickening of the colon at the level of the 
splenic flexure. There appears to be a connection between the left upper 
quadrant focal fluid collection and the descending colon. No dilated loop of 
large or small bowel is visualized. There is persistent irregular soft tissue in 
the pelvis tethering multiple loops of bowel. Urinary bladder: Urinary bladder is partially filled and grossly normal. 
  
 Impression IMPRESSION: 
1. Persistent small left pleural effusion and left basilar atelectasis. 2. Interval increase in size of left and fluid containing left upper abdominal 
collection, now measuring 6 cm x 6.2 cm. This collection is likely contiguous 
with the descending colon. 3. Persistent irregular soft tissue in the pelvis, tethering multiple loops of 
bowel, unchanged, but consistent with carcinomatosis. Records reviewed and summarized above. Pathology report(s) reviewed above. Radiology report(s) reviewed above. Assessment/PLAN:  
 
1) stage 4 / metastatic appendiceal cancer  
post EXPLORATORY LAPAROTOMY SIERRA BSO, TUMOR DEBULKING: ILEOCECAL RESECTION; OMENTECTOMY 9/25/19 Surgery done for obstruction. Had left rib pain and SOB and on exam decreased bs on LEFT. Got CXR which showed pleural effusion then ultrasound which showed elevated diaphragm and ? Free air. Then got CT which shows fluid collection and pt admitted for this. Had IR drain of splenic fluid collection. saw [facility] and [facility] for second opinions. Discussed systemic therapy with FOLFOX/ +/- avastin chemo or some variation of this regimen. possible [facility] down the road. Pt choose to have chemo locally and still see [facility]. Pt had cycle 1 of FOLFOX chemotherapy on 12/3/19 reduced by 50%. CBC stable. Pt admitted via ER with N/V/abdominal pain. CT yesterday shows worsening fluid collection which was drained via IR in past. Unclear if fistula. Unsure if this is cause of N/ V/ abdominal pain vs carcinomatosis. Case d/w GYN/ONC  and ER attending yesterday. Plan per surgery and IR. Plan is to continue with chemo if possible. Due again next week. Sherrie Gifford Goal of care is palliative. 2) left pleural effusion post thora x 2. No cytology. Stable on CT. 3)  Thrombocytosis. Better. 4) N/V still. Antiemetics prn.  
 
5) abdominal pain due to carcinomatosis. Consider palliative care consult while inpt. 6) psychosocial.  Mood good. Coping well considering difficult disease. Great family support. We will follow Call if questions over weekend I appreciate the opportunity to participate in Ms. Vesna Jenkins care.  
 
Signed By: Christoph Ross DO

## 2019-12-14 LAB
ANION GAP SERPL CALC-SCNC: 10 MMOL/L (ref 5–15)
BASOPHILS # BLD: 0 K/UL (ref 0–0.1)
BASOPHILS NFR BLD: 0 % (ref 0–1)
BUN SERPL-MCNC: 4 MG/DL (ref 6–20)
BUN/CREAT SERPL: 10 (ref 12–20)
CALCIUM SERPL-MCNC: 8.4 MG/DL (ref 8.5–10.1)
CHLORIDE SERPL-SCNC: 101 MMOL/L (ref 97–108)
CO2 SERPL-SCNC: 26 MMOL/L (ref 21–32)
CREAT SERPL-MCNC: 0.39 MG/DL (ref 0.55–1.02)
DIFFERENTIAL METHOD BLD: ABNORMAL
EOSINOPHIL # BLD: 0.1 K/UL (ref 0–0.4)
EOSINOPHIL NFR BLD: 1 % (ref 0–7)
ERYTHROCYTE [DISTWIDTH] IN BLOOD BY AUTOMATED COUNT: 16.1 % (ref 11.5–14.5)
GLUCOSE SERPL-MCNC: 76 MG/DL (ref 65–100)
HCT VFR BLD AUTO: 32.8 % (ref 35–47)
HGB BLD-MCNC: 10.3 G/DL (ref 11.5–16)
IMM GRANULOCYTES # BLD AUTO: 0 K/UL
IMM GRANULOCYTES NFR BLD AUTO: 0 %
LYMPHOCYTES # BLD: 1.5 K/UL (ref 0.8–3.5)
LYMPHOCYTES NFR BLD: 24 % (ref 12–49)
MCH RBC QN AUTO: 27.5 PG (ref 26–34)
MCHC RBC AUTO-ENTMCNC: 31.4 G/DL (ref 30–36.5)
MCV RBC AUTO: 87.5 FL (ref 80–99)
MONOCYTES # BLD: 0.6 K/UL (ref 0–1)
MONOCYTES NFR BLD: 9 % (ref 5–13)
NEUTS SEG # BLD: 4.1 K/UL (ref 1.8–8)
NEUTS SEG NFR BLD: 66 % (ref 32–75)
NRBC # BLD: 0 K/UL (ref 0–0.01)
NRBC BLD-RTO: 0 PER 100 WBC
PLATELET # BLD AUTO: 433 K/UL (ref 150–400)
PLATELET COMMENTS,PCOM: ABNORMAL
PMV BLD AUTO: 9 FL (ref 8.9–12.9)
POTASSIUM SERPL-SCNC: 3.8 MMOL/L (ref 3.5–5.1)
RBC # BLD AUTO: 3.75 M/UL (ref 3.8–5.2)
RBC MORPH BLD: ABNORMAL
RBC MORPH BLD: ABNORMAL
SODIUM SERPL-SCNC: 137 MMOL/L (ref 136–145)
WBC # BLD AUTO: 6.3 K/UL (ref 3.6–11)

## 2019-12-14 PROCEDURE — 74011250637 HC RX REV CODE- 250/637: Performed by: INTERNAL MEDICINE

## 2019-12-14 PROCEDURE — 74011250636 HC RX REV CODE- 250/636: Performed by: STUDENT IN AN ORGANIZED HEALTH CARE EDUCATION/TRAINING PROGRAM

## 2019-12-14 PROCEDURE — 65270000029 HC RM PRIVATE

## 2019-12-14 PROCEDURE — 80048 BASIC METABOLIC PNL TOTAL CA: CPT

## 2019-12-14 PROCEDURE — 74011250636 HC RX REV CODE- 250/636: Performed by: INTERNAL MEDICINE

## 2019-12-14 PROCEDURE — 36415 COLL VENOUS BLD VENIPUNCTURE: CPT

## 2019-12-14 PROCEDURE — 85025 COMPLETE CBC W/AUTO DIFF WBC: CPT

## 2019-12-14 PROCEDURE — 74011250637 HC RX REV CODE- 250/637: Performed by: STUDENT IN AN ORGANIZED HEALTH CARE EDUCATION/TRAINING PROGRAM

## 2019-12-14 PROCEDURE — 74011000258 HC RX REV CODE- 258: Performed by: STUDENT IN AN ORGANIZED HEALTH CARE EDUCATION/TRAINING PROGRAM

## 2019-12-14 RX ORDER — PROCHLORPERAZINE EDISYLATE 5 MG/ML
10 INJECTION INTRAMUSCULAR; INTRAVENOUS
Status: DISCONTINUED | OUTPATIENT
Start: 2019-12-14 | End: 2019-12-14 | Stop reason: SDUPTHER

## 2019-12-14 RX ORDER — ONDANSETRON 2 MG/ML
8 INJECTION INTRAMUSCULAR; INTRAVENOUS
Status: DISCONTINUED | OUTPATIENT
Start: 2019-12-14 | End: 2019-12-18 | Stop reason: HOSPADM

## 2019-12-14 RX ADMIN — ONDANSETRON 4 MG: 2 INJECTION INTRAMUSCULAR; INTRAVENOUS at 08:36

## 2019-12-14 RX ADMIN — PIPERACILLIN AND TAZOBACTAM 3.38 G: 3; .375 INJECTION, POWDER, FOR SOLUTION INTRAVENOUS at 16:36

## 2019-12-14 RX ADMIN — POLYETHYLENE GLYCOL 3350 17 G: 17 POWDER, FOR SOLUTION ORAL at 13:38

## 2019-12-14 RX ADMIN — PIPERACILLIN AND TAZOBACTAM 3.38 G: 3; .375 INJECTION, POWDER, FOR SOLUTION INTRAVENOUS at 08:50

## 2019-12-14 RX ADMIN — PIPERACILLIN AND TAZOBACTAM 3.38 G: 3; .375 INJECTION, POWDER, FOR SOLUTION INTRAVENOUS at 01:00

## 2019-12-14 RX ADMIN — POTASSIUM CHLORIDE 10 MEQ: 750 TABLET, FILM COATED, EXTENDED RELEASE ORAL at 18:35

## 2019-12-14 RX ADMIN — ONDANSETRON 8 MG: 2 INJECTION INTRAMUSCULAR; INTRAVENOUS at 16:31

## 2019-12-14 RX ADMIN — POTASSIUM CHLORIDE 10 MEQ: 750 TABLET, FILM COATED, EXTENDED RELEASE ORAL at 13:39

## 2019-12-14 RX ADMIN — SODIUM CHLORIDE, SODIUM LACTATE, POTASSIUM CHLORIDE, AND CALCIUM CHLORIDE 100 ML/HR: 600; 310; 30; 20 INJECTION, SOLUTION INTRAVENOUS at 02:15

## 2019-12-14 RX ADMIN — Medication 10 ML: at 16:35

## 2019-12-14 RX ADMIN — ONDANSETRON 4 MG: 2 INJECTION INTRAMUSCULAR; INTRAVENOUS at 11:52

## 2019-12-14 RX ADMIN — SODIUM CHLORIDE, SODIUM LACTATE, POTASSIUM CHLORIDE, AND CALCIUM CHLORIDE 50 ML/HR: 600; 310; 30; 20 INJECTION, SOLUTION INTRAVENOUS at 13:33

## 2019-12-14 NOTE — CONSULTS
Infectious Disease Consult Today's Date: 12/14/2019 Admit Date: 12/12/2019 Impression:  
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  
Date of Consultation:  December 14, 2019 Referring Physician: Dr Medhat Gomez Patient is a 44 y.o. female with progressive appendiceal cancer who is admitted with abdominal pain. She was found to have a splenic abscess on imaging studies. She is growing multiple organisms from culture, suggestive of enteric fistula. She is on IV antibiotic therapy and we are asked to see her in consultation. Patient Active Problem List  
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  
Diagnosis Date  Cancer ([address] APPENDIX CANCER   
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  
 Diabetes Father  No Known Problems Son  No Known Problems Daughter  Anesth Problems Neg Hx Social History Tobacco Use  Smoking status: Never Smoker  Smokeless tobacco: Never Used Substance Use Topics  Alcohol use: Not Currently Past Surgical History:  
Procedure Laterality Date [address] UNLISTED    
  HX APPENDECTOMY  09/2019  HX GI  09/2019 ILEOCECECTOMY  HX GYN  2003 CYST ON OVARY  HX HYSTERECTOMY  2019  IR THORACENTESIS CATH W IMAGE  11/6/2019 Prior to Admission medications Medication Sig Start Date End Date Taking? Authorizing Provider  
potassium chloride (KLOR-CON) 10 mEq tablet Take 1 Tab by mouth two (2) times a day. 12/10/19  Yes Lieutenant Miley NP  
LORazepam (ATIVAN) 0.5 mg tablet Take 1 Tab by mouth nightly as needed for Anxiety. Max Daily Amount: 0.5 mg. 11/26/19  Yes Lieutenant Miley NP  
lidocaine-prilocaine (EMLA) topical cream Apply  to affected area as needed for Pain. 11/14/19  Yes Lieutenant Miley NP  
ondansetron (ZOFRAN ODT) 4 mg disintegrating tablet Take 1 Tab by mouth every eight (8) hours as needed for Nausea. Patient taking differently: Take 8 mg by mouth every eight (8) hours as needed for Nausea. Indications: prevent nausea and vomiting from cancer chemotherapy 11/14/19  Yes Lieutenant Miley NP  
prochlorperazine (COMPAZINE) 5 mg tablet Take 1 tab by mouth every 6 hours as needed for nausea or vomiting 11/14/19  Yes Lieutenant Miley NP  
dexAMETHasone (DECADRON) 4 mg tablet Take 2 tabs (8mg) by mouth daily on days 2 and 3 after chemotherapy 11/14/19  Yes Lieutenant Miley NP  
acetaminophen (TYLENOL) 325 mg tablet Take 650 mg by mouth every four (4) hours as needed for Pain. Indications: pain    Provider, Historical  
ibuprofen (MOTRIN) 200 mg tablet Take 3 Tabs by mouth every eight (8) hours as needed for Pain. 10/3/19   Yumiko Laguna PA No Known Allergies Review of Systems:  Pertinent items are noted in the History of Present Illness. Objective:  
 
Visit Vitals /84 (BP 1 Location: Right arm, BP Patient Position: At rest) Pulse 93 Temp 99.7 °F (37.6 °C) Resp 17 Ht 5' 3\" (1.6 m) Wt 54.8 kg (120 lb 13 oz) SpO2 96% BMI 21.40 kg/m² Temp (24hrs), Av.1 °F (37.3 °C), Min:98.8 °F (37.1 °C), Max:99.7 °F (37.6 °C) Lines:  Central Venous Catheter:    and Peripheral IV:    
 
Physical Exam:  Lungs:  clear to auscultation bilaterally Heart:  regular rate and rhythm Abdomen:  soft, non-tender. Bowel sounds normal. No masses,  no organomegaly, bloody drainage in tubing, feculent odor present Skin:  no rash or abnormalities Data Review: CBC: 
Recent Labs 19 
1235 WBC 6.3 5.0 5.8 GRANS 66  --  47 MONOS 9  --  19* EOS 1  --  4  
ANEU 4.1  --  2.9 ABL 1.5  --  1.6 HGB 10.3* 9.5* 11.8 HCT 32.8* 30.3* 37.4 * 404* 452* BMP: 
Recent Labs 19 
1235 CREA 0.39* 0.40* 0.54* BUN 4* 6 6  138 136  
K 3.8 3.6 3.0*  
 106 100 CO2 26 25 27 AGAP 10 7 9 GLU 76 82 96 LFTS: 
Recent Labs 19 
1235 TBILI 0.3 ALT 20 SGOT 13* AP 97  
TP 7.5 ALB 3.0* Microbiology:  
 
All Micro Results Procedure Component Value Units Date/Time CULTURE, BODY FLUID Theta Cordial STAIN [[id number]]  (Abnormal) Collected:  19 143 Order Status:  Completed Specimen:  Miscellaneous Fluid Updated:  19 1142 Special Requests: NO SPECIAL REQUESTS     
  GRAM STAIN 3+ WBCS SEEN     
      
  RARE EPITHELIAL CELLS SEEN 2+ GRAM POSITIVE RODS     
      
  RARE GRAM POSITIVE COCCI IN PAIRS  
     
      
  RARE APPARENT GRAM NEGATIVE RODS Culture result: HEAVY GRAM NEGATIVE RODS     
      
  HEAVY ENTEROCOCCUS SPECIES  
     
 CULTURE, ANAEROBIC [[id number]] Collected:  19 1418 Order Status:  Completed Specimen:  Abdominal Fluid Updated:  19 1652 URINE CULTURE HOLD SAMPLE [[id number]] Collected:  19 1432 Order Status:  Completed Specimen:  Urine from Serum Updated:  19 1447 Urine culture hold URINE ON HOLD IN MICROBIOLOGY DEPT FOR 3 DAYS. IF UNPRESERVED URINE IS SUBMITTED, IT CANNOT BE USED FOR ADDITIONAL TESTING AFTER 24 HRS, RECOLLECTION WILL BE REQUIRED. Imaging:  
Reviewed Signed By: Tari Howell MD   
 December 14, 2019

## 2019-12-14 NOTE — PROGRESS NOTES
[facility] [place] at [facility] 
[address], [address] W: [phone]  F: [phone] HEME/ONC CONSULT Reason for Visit:  
José Miguel Simmons is a 44 y.o. female who is seen in consultation at the request of Dr. Lavonne Olson ER for abdominal pain and met appendix cancer. Treatment History:  
EXPLORATORY LAPAROTOMY SIERRA BSO, TUMOR DEBULKING: ILEOCECAL RESECTION; OMENTECTOMY STAGE: 4 with carcinomatosis History of Present Illness:  
 
Pt seen today in ER consult for metastatic appendix cancer post cycle 1 of 50% dose reduced palliative FOLFOX chemo. In ER due to persistent N/V/ abdominal pain despite multiple outpt IVF treatments. Pt is in chair in large waiting room in ER. States abdominal pain is diffuse throughout abdomin. N/V and cannot each much at all. Able to walk. Labs stable. CT:  
 IMPRESSION: 
1. Persistent small left pleural effusion and left basilar atelectasis. 2. Interval increase in size of left and fluid containing left upper abdominal 
collection, now measuring 6 cm x 6.2 cm. This collection is likely contiguous 
with the descending colon. 3. Persistent irregular soft tissue in the pelvis, tethering multiple loops of 
bowel, unchanged, but consistent with carcinomatosis. Hx of fluid collection drain via IR. INTERIM HX:  Pt seen today for hospital f/u for met appendix cancer post cycle 1 of chemo. CBC stable. Dr. Guy Taylor in [place] evaluated pt given history of bilaterally enlarged ovaries and prior biopsy (not consistent with ovarian cancer) and h/o SIERRA, BSO. Pt states that after the IR drainage, her abdominal pain is not as severe. She continues have nausea and does not think the zofran is helping much. Past Medical History:  
Diagnosis Date  Cancer ([address] APPENDIX CANCER   
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  
Procedure Laterality Date [address] UNLISTED  HX APPENDECTOMY  09/2019  HX GI  09/2019 ILEOCECECTOMY  HX GYN  2003 CYST ON OVARY  HX HYSTERECTOMY  2019  IR THORACENTESIS CATH W IMAGE  11/6/2019 Social History Tobacco Use  Smoking status: Never Smoker  Smokeless tobacco: Never Used Substance Use Topics  Alcohol use: Not Currently Family History Problem Relation Age of Onset  Breast Cancer Paternal Grandmother 56  Crohn's Disease Mother  Heart Disease Mother  Cancer Father BLADDER  
 Diabetes Father  No Known Problems Son  No Known Problems Daughter  Anesth Problems Neg Hx Current Facility-Administered Medications Medication Dose Route Frequency  morphine injection 2 mg  2 mg IntraVENous Q3H PRN  polyethylene glycol (MIRALAX) packet 17 g  17 g Oral DAILY  LORazepam (ATIVAN) tablet 0.5 mg  0.5 mg Oral QHS PRN  potassium chloride SR (KLOR-CON 10) tablet 10 mEq  10 mEq Oral BID  sodium chloride (NS) flush 5-40 mL  5-40 mL IntraVENous Q8H  
 sodium chloride (NS) flush 5-40 mL  5-40 mL IntraVENous PRN  
 acetaminophen (TYLENOL) tablet 650 mg  650 mg Oral Q4H PRN  
 ondansetron (ZOFRAN) injection 4 mg  4 mg IntraVENous Q4H PRN  
 lactated Ringers infusion  50 mL/hr IntraVENous CONTINUOUS  piperacillin-tazobactam (ZOSYN) 3.375 g in 0.9% sodium chloride (MBP/ADV) 100 mL  3.375 g IntraVENous Q8H No Known Allergies Review of Systems: A complete review of systems was obtained, negative except as described above. Physical Exam:  
 
Visit Vitals /82 (BP 1 Location: Right arm, BP Patient Position: At rest) Pulse 95 Temp 98.8 °F (37.1 °C) Resp 17 Ht 5' 3\" (1.6 m) Wt 120 lb 13 oz (54.8 kg) LMP 09/15/2019 SpO2 96% BMI 21.40 kg/m² ECOG PS: 1-2 General: No distress Eyes:  anicteric sclerae HENT: Atraumatic Neck: Supple CV regular 
resp clear GI: soft, distended MS: in bed but usually ambulatory Skin: warm dry Psych: Alert, oriented, appropriate affect, normal judgment/insight Results:  
 
Lab Results Component Value Date/Time WBC 6.3 12/14/2019 02:16 AM  
 HGB 10.3 (L) 12/14/2019 02:16 AM  
 HCT 32.8 (L) 12/14/2019 02:16 AM  
 PLATELET 839 (H) 78/42/5610 02:16 AM  
 MCV 87.5 12/14/2019 02:16 AM  
 ABS. NEUTROPHILS 4.1 12/14/2019 02:16 AM  
 Hemoglobin (POC) 10.8 (L) 09/25/2019 04:11 PM  
 Hematocrit (POC) 32 (L) 09/25/2019 12:10 PM  
 
Lab Results Component Value Date/Time Sodium 137 12/14/2019 02:16 AM  
 Potassium 3.8 12/14/2019 02:16 AM  
 Chloride 101 12/14/2019 02:16 AM  
 CO2 26 12/14/2019 02:16 AM  
 Glucose 76 12/14/2019 02:16 AM  
 BUN 4 (L) 12/14/2019 02:16 AM  
 Creatinine 0.39 (L) 12/14/2019 02:16 AM  
 GFR est AA >60 12/14/2019 02:16 AM  
 GFR est non-AA >60 12/14/2019 02:16 AM  
 Calcium 8.4 (L) 12/14/2019 02:16 AM  
 Sodium (POC) 137 09/25/2019 12:10 PM  
 Potassium (POC) 3.7 09/25/2019 12:10 PM  
 Chloride (POC) 105 09/25/2019 12:10 PM  
 Glucose (POC) 85 09/25/2019 12:10 PM  
 BUN (POC) 4 (L) 09/25/2019 12:10 PM  
 Creatinine (POC) 0.5 (L) 09/25/2019 12:10 PM  
 Calcium, ionized (POC) 1.14 09/25/2019 12:10 PM  
 
Lab Results Component Value Date/Time Bilirubin, total 0.3 12/12/2019 12:35 PM  
 ALT (SGPT) 20 12/12/2019 12:35 PM  
 AST (SGOT) 13 (L) 12/12/2019 12:35 PM  
 Alk. phosphatase 97 12/12/2019 12:35 PM  
 Protein, total 7.5 12/12/2019 12:35 PM  
 Albumin 3.0 (L) 12/12/2019 12:35 PM  
 Globulin 4.5 (H) 12/12/2019 12:35 PM  
 
 
CT Results (most recent): 
Results from Hospital Encounter encounter on 12/12/19 CT GUIDE CATH/PERC DRAIN PERITON/RETROPERI FLUID W SI  
 Narrative CLINICAL HISTORY: Left upper quadrant gas and fluid collection. Appendiceal 
carcinoma, post tumor debulking, splenic infarction, and prior splenic drainage. COMPARISON: 12/12/2019, multiple prior CTs. PROCEDURE: CT-guided left upper quadrant drainage. CT dose reduction was 
achieved through use of a standardized protocol tailored for this examination 
and automatic exposure control for dose modulation. OPERATORS: Tiff Madden M.D.  
 
ESTIMATED BLOOD LOSS: None. ANESTHESIA: buffered lidocaine local anesthetic. The patient was evaluated and felt to be an appropriate candidate for conscious 
sedation. Moderate sedation was achieved with intravenous Versed and Fentanyl, 
and was supervised by Dr. Carlos Layton. Continuous independent monitoring was 
performed by a registered nurse assigned to the Department of Radiology using 
automated blood pressure, EKG, and pulse oximetry. Please see preprocedure 
assessment and nursing record in the hospital information system for full 
documentation. Medication: 
Versed: 3 mg Fentanyl: 100 mcg Intraprocedure time: 30 minutes TECHNIQUE AND FINDINGS: 
The patient's relevant allergies, medications, laboratory results, and history 
were reviewed. The patient was identified by name and date of birth. The 
procedure, benefits, risks, and alternatives (including not having the 
procedure) were discussed with the patient and her . Specifically, the 
risk of left empyema was discussed. All of their questions were answered. Informed verbal and written consent was obtained. Focused CT was performed of the left upper quadrant, localizing the fluid and 
gas collection between the spleen, splenic flexure, and stomach. The skin site 
was marked. A time-out procedure using two patient identifiers was performed and 
laterality confirmed. An anteroinferior intercostal approach was chosen, with a 
superoposterior trajectory toward the collection, in an attempt to traverse the 
pleural space as low as possible to reduce the risk of empyema. The skin was 
prepped and draped using usual sterile technique.  Subcutaneous were anesthetized 
with local anesthetic. Under CT-fluoroscopic guidance, a 22-gauge spinal needle 
was advanced into the collection using the posterosuperior trajectory and 
anesthetizing along the course of the tract. The 22-gauge needle was left in 
place, and an 18-gauge needle was advanced parallel to it and into the 
collection. No fluid could be aspirated using either the 22-gauge or 18-gauge 
needle. The 22-gauge needle was removed. A wire was advanced through the 
18-gauge needle, and the needle was removed. The tract was serially dilated. A 
12 American tail catheter was inserted over the wire into the collection. The wire 
was removed. Bloody, particulate, foul-smelling fluid was aspirated. The 
catheter was locked, sutured to the skin, affixed with a stay fix dressing, and 
connected to a drainage bag. There were no immediate complications. The patient 
tolerated the procedure well. Postprocedure diagnosis: Left upper quadrant collection drainage. Impression IMPRESSION: 
Uncomplicated CT-guided drainage of left upper quadrant collection with a 12 American locking pigtail catheter. PLEASE NOTE: This is a LOCKING catheter. The pigtail must be unlocked before the 
catheter is removed. Records reviewed and summarized above. Pathology report(s) reviewed above. Radiology report(s) reviewed above. Assessment/PLAN:  
 
1) Stage IV appendiceal cancer: s/p EXPLORATORY LAPAROTOMY SIERRA BSO, TUMOR DEBULKING: ILEOCECAL RESECTION; OMENTECTOMY 9/25/19. [place] [facility] [place] [facility] [place]. Surgery done for obstruction. Discussed systemic therapy with FOLFOX/ +/- avastin chemo or some variation of this regimen 
possible [facility] down the road. Pt chose to have chemo locally and still see [address] [facility]. Pt had cycle 1 of FOLFOX chemotherapy on 12/3/19 reduced by 50%. Currently admitted due to free air under diaphragm, now s/p IR drainage of splenic fluid collection after p/w n/vabdominal pain.  Unsure if fluid collection was cause of n/v/abdominal pain vs carcinomatosis. Dr. Rosemarie Phalen evaluated patient and recommends SurgOnc evaluation. Plan was to continue with chemo if possible. Due again next week. Goal of care is palliative. 2) Left pleural effusion post thora x 2. No cytology. Stable on CT. 3) Thrombocytosis. Better. 4) N/V: Persistent. -- Increased dose of Zofran to 8mg IV q8h prn and added Compazine for refractory nausea. 5) Abdominal pain due to carcinomatosis: Consider palliative care consult while inpt. 6) psychosocial.  Mood good. Coping well considering difficult disease. Great family support. We will follow Call if questions over weekend I appreciate the opportunity to participate in Ms. Thad Gutierrez care.  
 
Signed By: Keshawn Dash MD

## 2019-12-14 NOTE — PROGRESS NOTES
Progress Note Patient: Glenny Meade MRN: [id number]  SSN: [id number] YOB: 1970  Age: 44 y.o. Sex: female Admit Date: 2019 Intra-abdominal abscess with carcinomatosis Subjective: No acute surgical issues. Pt reported some nausea and anorexia. Passing some flatus but no BM. Pt reported left side abdominal pain. No leukocytosis. Objective:  
 
Visit Vitals /84 (BP 1 Location: Right arm, BP Patient Position: At rest) Pulse 93 Temp 99.7 °F (37.6 °C) Resp 17 Ht 5' 3\" (1.6 m) Wt 120 lb 13 oz (54.8 kg) SpO2 96% BMI 21.40 kg/m² Temp (24hrs), Av.1 °F (37.3 °C), Min:98.8 °F (37.1 °C), Max:99.7 °F (37.6 °C) Physical Exam:   
Gen:  NAD Pulm:  Unlabored Abd:  S/ND/moderate TTP in left abdomen Wound:  C/D/I Recent Results (from the past 24 hour(s)) CBC WITH AUTOMATED DIFF Collection Time: 19  2:16 AM  
Result Value Ref Range WBC 6.3 3.6 - 11.0 K/uL  
 RBC 3.75 (L) 3.80 - 5.20 M/uL  
 HGB 10.3 (L) 11.5 - 16.0 g/dL HCT 32.8 (L) 35.0 - 47.0 % MCV 87.5 80.0 - 99.0 FL  
 MCH 27.5 26.0 - 34.0 PG  
 MCHC 31.4 30.0 - 36.5 g/dL  
 RDW 16.1 (H) 11.5 - 14.5 % PLATELET 936 (H) 418 - 400 K/uL MPV 9.0 8.9 - 12.9 FL  
 NRBC 0.0 0  WBC ABSOLUTE NRBC 0.00 0.00 - 0.01 K/uL NEUTROPHILS 66 32 - 75 % LYMPHOCYTES 24 12 - 49 % MONOCYTES 9 5 - 13 % EOSINOPHILS 1 0 - 7 % BASOPHILS 0 0 - 1 % IMMATURE GRANULOCYTES 0 %  
 ABS. NEUTROPHILS 4.1 1.8 - 8.0 K/UL  
 ABS. LYMPHOCYTES 1.5 0.8 - 3.5 K/UL  
 ABS. MONOCYTES 0.6 0.0 - 1.0 K/UL  
 ABS. EOSINOPHILS 0.1 0.0 - 0.4 K/UL  
 ABS. BASOPHILS 0.0 0.0 - 0.1 K/UL  
 ABS. IMM. GRANS. 0.0 K/UL  
 DF MANUAL PLATELET COMMENTS Large Platelets RBC COMMENTS ANISOCYTOSIS 1+ 
    
 RBC COMMENTS OVALOCYTES PRESENT 
    
METABOLIC PANEL, BASIC Collection Time: 19  2:16 AM  
Result Value Ref Range  Sodium 137 136 - 145 mmol/L  
 Potassium 3.8 3.5 - 5.1 mmol/L Chloride 101 97 - 108 mmol/L  
 CO2 26 21 - 32 mmol/L Anion gap 10 5 - 15 mmol/L Glucose 76 65 - 100 mg/dL BUN 4 (L) 6 - 20 MG/DL Creatinine 0.39 (L) 0.55 - 1.02 MG/DL  
 BUN/Creatinine ratio 10 (L) 12 - 20 GFR est AA >60 >60 ml/min/1.73m2 GFR est non-AA >60 >60 ml/min/1.73m2 Calcium 8.4 (L) 8.5 - 10.1 MG/DL Assessment:  
 
Hospital Problems  Date Reviewed: 11/11/2019 Codes Class Noted POA Splenic abscess ICD-10-CM: D73.3 ICD-9-CM: 289.59  12/12/2019 Unknown Plan/Recommendations/Medical Decision Making: - Intra-peritoneal abscess:  Continue IV antibiotic therapy 
- Pain control - Diet as tolerated - No acute indication for diverting colostomy or ileostomy at this time

## 2019-12-14 NOTE — PROGRESS NOTES
Hospitalist Progress Note Justen Polanco MD 
Answering service: [phone] OR [phone] from in house phone Date of Service:  2019 NAME:  Carmen Blue :  1970 MRN:  [id number] PCP: Ely Manuel MD 
 
Chief Complaint:  
Chief Complaint Patient presents with  Abdominal Pain Admission Summary:  
 
Carmen Blue is a 44 y.o. female who presented with abdominal pain Interval history / Subjective:  
Patient seen for Follow up of  chief complaint abdomen fluid collection, nausea and vomiting. Patient seen and examined by the bedside, Labs, images and notes reviewed Patient reports nausea and vomiting with breakfast this am. She feels better now. No BM, passing gas, no significant abdominal pain but pain at the site of drain is still there. Afebrile. Discussed with nursing staff, no acute issues overnight, orders reviewed. Assessment & Plan: Abdominal fluid collection, LUQ with possible fistula In the setting of of  metastatic appendiceal cancer s/p ex laparotomy, debulking surgery,SIERRA BSO and ileocectomy in  CT remarkable for interval increase in the size of the fluid collection. Gynecology thinks it is due to progressive tumor with just direct extension to the bowel wall which is a very ominous sign. It is unlikely that it is a colonic perforation secondary to debulking surgery since it was in September. S/P CT guided drain at the collection by IR,19 Cultures obtained, showing GPC/GNR. Given complicated hx and potential for worsening, will consult ID to obtain their input on antibiotic coverage. Has hx of Enterococcus infection in the past. 
Cont Empiric IV zosyn Cont pain control Oncology states that they will continue chemotherapy once acute issues have resolved. 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 Patient has received 1 cycle of FOLFOX chemotherapy 12/3/2019. Plan is to continue chemo if possible, due next week. Goal of care is palliative as per oncology. Left renal effusion post Thora x2, stable. Thrombocytosisstable 
  
Nausea and vomiting Likely from the above Antiemetics IVF 
  
Hypokalemia Resolved, repeat lab in the morning. Code status: Full Code DVT prophylaxis: SCDs Care Plan discussed with: Discussed with patient Disposition: TBD Hospital Problems  Date Reviewed: 2019 Codes Class Noted POA Splenic abscess ICD-10-CM: D73.3 ICD-9-CM: 289.59  2019 Unknown Review of Systems: A comprehensive review of systems was negative. Physical Examination:  
 
General appearance: alert, cooperative, no distress, appears stated age Lungs: Clear to auscultation bilaterally, no wheezes, unlabored breathing Heart: RRR, no murmur gallops or rubs Abdomen: Soft, tenderness around the surgical site, the tenderness is diffuse, no obvious rebound. Drain in place or guarding. Bowel sounds are positive Extremities: No edema Neurologic: Alert and oriented X 3, normal strength and tone. Vital Signs:  
 Last 24hrs VS reviewed since prior progress note. Most recent are: 
 
Visit Vitals /82 (BP 1 Location: Right arm, BP Patient Position: At rest) Pulse 95 Temp 98.8 °F (37.1 °C) Resp 17 Ht 5' 3\" (1.6 m) Wt 54.8 kg (120 lb 13 oz) SpO2 96% BMI 21.40 kg/m² Intake/Output Summary (Last 24 hours) at 2019 1053 Last data filed at 2019 1017 Gross per 24 hour Intake  Output 1 ml Net -1 ml Tmax:  Temp (24hrs), Av.5 °F (36.9 °C), Min:97.5 °F (36.4 °C), Max:99.2 °F (37.3 °C) Data Review:  
Data reviewed by myself: 
Ct Chest W Cont Result Date: 2019 EXAM: CT CHEST W CONT, CT ABD PELV W CONT INDICATION: met appendix cancer f/u speen infarct/ clot pre chemo COMPARISON: 11 2/19 and others CONTRAST: 100 mL of Isovue-370. TECHNIQUE: Following the uneventful intravenous administration of contrast, thin axial images were obtained through the chest, abdomen and pelvis. Coronal and sagittal reconstructions were generated. Oral contrast was administered. CT dose reduction was achieved through use of a standardized protocol tailored for this examination and automatic exposure control for dose modulation. FINDINGS: THYROID: No nodule. MEDIASTINUM: No mass or lymphadenopathy. ABNER: No mass or lymphadenopathy. THORACIC AORTA: No dissection or aneurysm. MAIN PULMONARY ARTERY: Normal in caliber. TRACHEA/BRONCHI: Patent. ESOPHAGUS: No wall thickening or dilatation. HEART: Normal in size. PLEURA: Stable left pleural effusion. LUNGS: No nodule, mass, or airspace disease. LIVER: Small hypodensities in the left lobe liver unchanged. There is perihepatic fluid anterior to the left lobe. GALLBLADDER: Unremarkable. SPLEEN: Again noted is a fluid collection occupying the anterior aspect of the spleen consistent with patient's history of infarction and liquefaction. There is persistent locules of air within the collection PANCREAS: No mass or ductal dilatation. ADRENALS: Unremarkable. KIDNEYS: No mass, calculus, or hydronephrosis. STOMACH: In the lateral fundus of the stomach, there is extensive soft tissue thickening possibly reflecting tumor implant. The previously noted fluid collection in the wall stomach has resolved. There is a questionable tract communicating with the splenic fluid collection with multiple locules of air noted within the splenic fluid collection. The overall size of the fluid collection is decreased significantly since the prior study and now measures approximately 42 x 44 mm. SMALL BOWEL: No dilatation or wall thickening.  COLON: There is thickening of the splenic flexure with adjacent peritoneal soft tissue thickening. Possible serosal implants are noted on the sigmoid colon with irregular wall thickening. APPENDIX: Not visualized PERITONEUM: In the anterior pelvis just superior bladder, there is irregular soft tissue around loops of small bowel. Nondependent ascites is noted in the upper abdomen. RETROPERITONEUM: No lymphadenopathy or aortic aneurysm. REPRODUCTIVE ORGANS: URINARY BLADDER: Unremarkable BONES: No destructive bone lesion. ADDITIONAL COMMENTS: N/A IMPRESSION: 1. Interval decreased size of splenic fluid collection. There is extensive irregular soft tissue around the fluid collection which tracks into the lateral wall the gastric fundus. There is persistent locules of gas within the splenic collection and a fistulous communication with the stomach is not entirely excluded. In addition, there is increased soft tissue thickening around the lateral wall the stomach suggestive of tumor deposit. 2.  Increased soft tissue thickening around the splenic flexure of the colon with peritoneal thickening and nondependent studies suspicious for peritoneal carcinomatosis. Additional soft tissue densities noted in the anterior pelvis and around the sigmoid colon as well as loops of small bowel suspicious for peritoneal carcinomatosis as well. 3.  Left pleural effusion is stable. Ct Abd Pelv W Cont Result Date: 12/12/2019 INDICATION: Abdominal pain, history of appendiceal CA, recent surgery, CT of the abdomen and pelvis is performed with 5 mm collimation. Study is performed with 100 cc of nonionic Isovue 370. Sagittal and coronal reformatted images were also performed. CT dose reduction was achieved with the use of the standardized protocol tailored for this examination and automatic exposure control for dose modulation. Adaptive statistical iterative reconstruction (ASIR) was utilized.  Direct comparison is made to prior CT dated 11/26/2019. Findings: Lung bases: There is a very small left pleural effusion, as seen on prior CT dated 11/2019. There is no right pleural fluid. There is persistent mild left basilar atelectasis. Liver: There is a 1.9 cm cyst in the left hepatic lobe. There is a 7 mm hypodensity in the left hepatic lobe, too small to further characterize, but unchanged compared to prior CT dated 11/2019. Adrenals: Adrenal glands are normal. Pancreas: The pancreas is normal. Gallbladder: The gallbladder is normal. Kidneys: The kidneys are normal. Bowel/Spleen: There is persistent hypodense attenuation within the anterior aspect of the spleen consistent patient's known history of infarction. There is a gas and fluid containing collection anterior to the spleen and measuring approximately 6 cm x 6.2 cm and measuring approximately 5.3 cm x 4.6 cm on prior CT dated November 2019. There is irregular stranding and soft tissue adjacent to this collection. There is persistent thickening of the colon at the level of the splenic flexure. There appears to be a connection between the left upper quadrant focal fluid collection and the descending colon. No dilated loop of large or small bowel is visualized. There is persistent irregular soft tissue in the pelvis tethering multiple loops of bowel. Urinary bladder: Urinary bladder is partially filled and grossly normal.  
 
IMPRESSION: 1. Persistent small left pleural effusion and left basilar atelectasis. 2. Interval increase in size of left and fluid containing left upper abdominal collection, now measuring 6 cm x 6.2 cm. This collection is likely contiguous with the descending colon. 3. Persistent irregular soft tissue in the pelvis, tethering multiple loops of bowel, unchanged, but consistent with carcinomatosis. Ct Abd Pelv W Cont Result Date: 11/26/2019 EXAM: CT CHEST W CONT, CT ABD PELV W CONT INDICATION: met appendix cancer flexure with adjacent peritoneal soft tissue thickening. Possible serosal implants are noted on the sigmoid colon with irregular wall thickening. APPENDIX: Not visualized PERITONEUM: In the anterior pelvis just superior bladder, there is irregular soft tissue around loops of small bowel. Nondependent ascites is noted in the upper abdomen. RETROPERITONEUM: No lymphadenopathy or aortic aneurysm. REPRODUCTIVE ORGANS: URINARY BLADDER: Unremarkable BONES: No destructive bone lesion. ADDITIONAL COMMENTS: N/A IMPRESSION: 1. Interval decreased size of splenic fluid collection. There is extensive irregular soft tissue around the fluid collection which tracks into the lateral wall the gastric fundus. There is persistent locules of gas within the splenic collection and a fistulous communication with the stomach is not entirely excluded. In addition, there is increased soft tissue thickening around the lateral wall the stomach suggestive of tumor deposit. 2.  Increased soft tissue thickening around the splenic flexure of the colon with peritoneal thickening and nondependent studies suspicious for peritoneal carcinomatosis. Additional soft tissue densities noted in the anterior pelvis and around the sigmoid colon as well as loops of small bowel suspicious for peritoneal carcinomatosis as well. 3.  Left pleural effusion is stable. Xr Chest [facility] Result Date: 11/18/2019 EXAM: XR CHEST PORT INDICATION: port placement COMPARISON: 11/6/2019 FINDINGS: A portable AP radiograph of the chest was obtained at 1145 hours. The patient is on a cardiac monitor. A new left subclavian Port-A-Cath has been place. The tip is in the region of the cavoatrial junction. No pneumothorax identified. There is a small left pleural effusion. . The cardiac and mediastinal contours and pulmonary vascularity are normal.  The bones and soft tissues are grossly within normal limits.   
 
IMPRESSION: New left subclavian Port-A-Cath without evidence of pneumothorax. Small left pleural effusion. Nc Xr Technologist Service Result Date: 11/19/2019 Fluoroscopy was utilized. IMPRESSION:  FLUOROSCOPY WAS USED. Fluoro Dose:  0.17 mGy vk Lab Results Component Value Date/Time Specimen Description: VAGINA 08/01/2009 11:45 AM  
 Specimen Description: URINE 08/01/2009 11:45 AM  
 
Lab Results Component Value Date/Time Culture result: PENDING 12/13/2019 02:30 PM  
 Culture result: LIGHT ENTEROCOCCUS DURANS/HIRAE (A) 10/16/2019 03:45 PM  
 Culture result: NO ANAEROBES ISOLATED 10/16/2019 03:45 PM  
 
All Micro Results Procedure Component Value Units Date/Time CULTURE, BODY FLUID Lyndon Llanes [[id number]] Collected:  12/13/19 1430 Order Status:  Completed Specimen:  Miscellaneous Fluid Updated:  12/13/19 1841 Special Requests: NO SPECIAL REQUESTS     
  GRAM STAIN 3+ WBCS SEEN     
      
  RARE EPITHELIAL CELLS SEEN 2+ GRAM POSITIVE RODS     
      
  RARE GRAM POSITIVE COCCI IN PAIRS  
     
      
  RARE APPARENT GRAM NEGATIVE RODS Culture result: PENDING  
 CULTURE, ANAEROBIC [[id number]] Collected:  12/13/19 1418 Order Status:  Completed Specimen:  Abdominal Fluid Updated:  12/13/19 1652 URINE CULTURE HOLD SAMPLE [[id number]] Collected:  12/12/19 1432 Order Status:  Completed Specimen:  Urine from Serum Updated:  12/12/19 1447 Urine culture hold URINE ON HOLD IN MICROBIOLOGY DEPT FOR 3 DAYS. IF UNPRESERVED URINE IS SUBMITTED, IT CANNOT BE USED FOR ADDITIONAL TESTING AFTER 24 HRS, RECOLLECTION WILL BE REQUIRED. Labs: reviewed by myself. Recent Labs 12/14/19 0216 12/13/19 0222 WBC 6.3 5.0 HGB 10.3* 9.5* HCT 32.8* 30.3* * 404* Recent Labs 12/14/19 0216 12/13/19 0222 12/12/19 
1235  138 136  
K 3.8 3.6 3.0*  
 106 100 CO2 26 25 27 BUN 4* 6 6 CREA 0.39* 0.40* 0.54* GLU 76 82 96  
CA 8.4* 8.5 9.4 MG  --   --  1.8 Recent Labs 12/12/19 
1235 SGOT 13* ALT 20 AP 97 TBILI 0.3 TP 7.5 ALB 3.0*  
GLOB 4.5* LPSE 114 No results for input(s): INR, PTP, APTT, INREXT, INREXT in the last 72 hours. No results for input(s): FE, TIBC, PSAT, FERR in the last 72 hours. No results found for: FOL, RBCF No results for input(s): PH, PCO2, PO2 in the last 72 hours. No results for input(s): CPK, CKNDX, TROIQ in the last 72 hours. No lab exists for component: CPKMB No results found for: CHOL, CHOLX, CHLST, CHOLV, HDL, HDLP, LDL, LDLC, DLDLP, TGLX, TRIGL, TRIGP, CHHD, CHHDX Lab Results Component Value Date/Time Glucose (POC) 85 09/25/2019 12:10 PM  
 
Lab Results Component Value Date/Time Color DARK YELLOW 12/12/2019 02:32 PM  
 Appearance CLOUDY (A) 12/12/2019 02:32 PM  
 Specific gravity 1.025 12/12/2019 02:32 PM  
 Specific gravity 1.010 10/15/2019 04:32 PM  
 pH (UA) 6.0 12/12/2019 02:32 PM  
 Protein NEGATIVE  12/12/2019 02:32 PM  
 Glucose NEGATIVE  12/12/2019 02:32 PM  
 Ketone 15 (A) 12/12/2019 02:32 PM  
 Bilirubin NEGATIVE  10/15/2019 04:32 PM  
 Urobilinogen 1.0 12/12/2019 02:32 PM  
 Nitrites NEGATIVE  12/12/2019 02:32 PM  
 Leukocyte Esterase SMALL (A) 12/12/2019 02:32 PM  
 Epithelial cells FEW 12/12/2019 02:32 PM  
 Bacteria NEGATIVE  12/12/2019 02:32 PM  
 WBC 0-4 12/12/2019 02:32 PM  
 RBC 0-5 12/12/2019 02:32 PM  
 
 
 
Medications Reviewed:  
 
Current Facility-Administered Medications Medication Dose Route Frequency  morphine injection 2 mg  2 mg IntraVENous Q3H PRN  polyethylene glycol (MIRALAX) packet 17 g  17 g Oral DAILY  LORazepam (ATIVAN) tablet 0.5 mg  0.5 mg Oral QHS PRN  potassium chloride SR (KLOR-CON 10) tablet 10 mEq  10 mEq Oral BID  sodium chloride (NS) flush 5-40 mL  5-40 mL IntraVENous Q8H  
 sodium chloride (NS) flush 5-40 mL  5-40 mL IntraVENous PRN  
 acetaminophen (TYLENOL) tablet 650 mg  650 mg Oral Q4H PRN  
  ondansetron (ZOFRAN) injection 4 mg  4 mg IntraVENous Q4H PRN  
 lactated Ringers infusion  50 mL/hr IntraVENous CONTINUOUS  piperacillin-tazobactam (ZOSYN) 3.375 g in 0.9% sodium chloride (MBP/ADV) 100 mL  3.375 g IntraVENous Q8H  
 
______________________________________________________________________ EXPECTED LENGTH OF STAY: - - - 
ACTUAL LENGTH OF STAY:          2 Breonna Randolph MD  
 
Patient's emergency contacts: 
Extended Emergency Contact Information Primary Emergency Contact: Paulie Pelayo Address: [address] [place] [address], [address] Home Phone: [phone] Relation: Spouse

## 2019-12-14 NOTE — PROGRESS NOTES
[address], [address] [place], [address] 
P [phone]  F [phone] Patient Name: Trish Ndiaye Admit Date: 12/12/2019 OR Date: * No surgery found * Visit Date: 12/14/2019 SUBJECTIVE: 
 
Feels a little better overall. Some nausea/vomiting. Seems depressed. OBJECTIVE: 
 
Patient Vitals for the past 24 hrs: 
 Temp Pulse Resp BP SpO2  
12/14/19 0738 98.8 °F (37.1 °C) 95 17 129/82 96 % 12/14/19 0324 99.2 °F (37.3 °C) 82 18 138/83 96 % 12/13/19 2018 98.8 °F (37.1 °C) 92 18 142/82 97 % 12/13/19 1615 97.5 °F (36.4 °C) 84 17 127/80 97 % 12/13/19 1511 98.4 °F (36.9 °C) 90 17 177/75 98 % 12/13/19 1500  79 20 126/67 100 % 12/13/19 1435  77 18 118/57 100 % 12/13/19 1430  77 20 109/63 100 % 12/13/19 1425  80 18 112/55 100 % 12/13/19 1420  86 20 105/54 100 % 12/13/19 1415  72 16 103/58 100 % 12/13/19 1410  77 16 104/55 100 % 12/13/19 1405  85 17 105/56 100 % 12/13/19 1400  75 17 102/49 100 % 12/13/19 1355  78 17 103/55 100 % 12/13/19 1350  78 16 108/56 100 % 12/13/19 1345  83 19 119/57 100 % 12/13/19 1340  86 20 116/56 100 % 12/13/19 1251 98.5 °F (36.9 °C) 74 22 119/67 96 % Date 12/13/19 0700 - 12/14/19 3821 12/14/19 0700 - 12/15/19 7081 Shift 1210-3806 1236-6159 24 Hour Total 2984-7183 4445-0442 24 Hour Total  
INTAKE Shift Total(mL/kg) OUTPUT Emesis/NG output    1  1 Emesis    1  1 Shift Total(mL/kg)    1(0)  1(0) NET    -1  -1 Weight (kg) 54.8 54.8 54.8 54.8 54.8 54.8 Physical Exam 
   General:  alert, cooperative, no distress Cardiac:  Regular rate and rhythm Lungs:  clear to auscultation bilaterally Abdomen:  Soft, mildly and diffusely tender, with a little more tenderness in the LUQ; drain in place in left flank. Wound:  JOSE MANUEL with bloody-brown fluid Extremity: extremities normal, atraumatic, no cyanosis or edema Data Review Lab Results Component Value Date/Time WBC 6.3 12/14/2019 02:16 AM  
 ABS. NEUTROPHILS 4.1 12/14/2019 02:16 AM  
 HGB 10.3 (L) 12/14/2019 02:16 AM  
 HCT 32.8 (L) 12/14/2019 02:16 AM  
 MCV 87.5 12/14/2019 02:16 AM  
 MCH 27.5 12/14/2019 02:16 AM  
 PLATELET 057 (H) 72/25/7131 02:16 AM  
 
Lab Results Component Value Date/Time Sodium 137 12/14/2019 02:16 AM  
 Potassium 3.8 12/14/2019 02:16 AM  
 Chloride 101 12/14/2019 02:16 AM  
 CO2 26 12/14/2019 02:16 AM  
 Glucose 76 12/14/2019 02:16 AM  
 BUN 4 (L) 12/14/2019 02:16 AM  
 Creatinine 0.39 (L) 12/14/2019 02:16 AM  
 Calcium 8.4 (L) 12/14/2019 02:16 AM  
 Albumin 3.0 (L) 12/12/2019 12:35 PM  
 Bilirubin, total 0.3 12/12/2019 12:35 PM  
 AST (SGOT) 13 (L) 12/12/2019 12:35 PM  
 ALT (SGPT) 20 12/12/2019 12:35 PM  
 Alk. phosphatase 97 12/12/2019 12:35 PM  
 
IMPRESSION/PLAN: 
 
Oncologic:  Stage IV appendiceal cancer. She has a very aggressive malignancy. Colonic perforation/fistulization likely due to progressive disease. The aggressiveness of her tumor is evidence by the fact that she had extensive involvement of the gynecologic organs at the time of diagnosis. Heme/CV:  Hemodynamically stable. Renal:  Normal creatinine. FEN/GI:  Diet as tolerated. ID/Wound:  Currently afebrile. WBC normal, although this may be due to the fact that she recently received chemotherapy. ID is being consulted for recommendations. Dispostion:  Overall, her prognosis looks poor. Appendiceal cancers can be aggressive and don't always respond to therapy. Considering the aggressive behavior of her tumor, I am not optimistic about her response. Surgery would not be easy with her and I'm not sure it would be helpful in the long run. She would likely need a splenectomy, and a diverting ostomy, either proximal colon or ileostomy. Would defer that decision to surgical oncology.     
 
  
Lynne Llamas MD

## 2019-12-15 LAB
ANION GAP SERPL CALC-SCNC: 7 MMOL/L (ref 5–15)
BACTERIA SPEC CULT: ABNORMAL
BACTERIA SPEC CULT: ABNORMAL
BASOPHILS # BLD: 0 K/UL (ref 0–0.1)
BASOPHILS NFR BLD: 0 % (ref 0–1)
BUN SERPL-MCNC: 2 MG/DL (ref 6–20)
BUN/CREAT SERPL: 5 (ref 12–20)
CALCIUM SERPL-MCNC: 8.7 MG/DL (ref 8.5–10.1)
CHLORIDE SERPL-SCNC: 100 MMOL/L (ref 97–108)
CO2 SERPL-SCNC: 27 MMOL/L (ref 21–32)
CREAT SERPL-MCNC: 0.37 MG/DL (ref 0.55–1.02)
DIFFERENTIAL METHOD BLD: ABNORMAL
EOSINOPHIL # BLD: 0.1 K/UL (ref 0–0.4)
EOSINOPHIL NFR BLD: 2 % (ref 0–7)
ERYTHROCYTE [DISTWIDTH] IN BLOOD BY AUTOMATED COUNT: 16 % (ref 11.5–14.5)
GLUCOSE SERPL-MCNC: 85 MG/DL (ref 65–100)
GRAM STN SPEC: ABNORMAL
HCT VFR BLD AUTO: 31.8 % (ref 35–47)
HGB BLD-MCNC: 10.4 G/DL (ref 11.5–16)
IMM GRANULOCYTES # BLD AUTO: 0 K/UL
IMM GRANULOCYTES NFR BLD AUTO: 0 %
LYMPHOCYTES # BLD: 1.5 K/UL (ref 0.8–3.5)
LYMPHOCYTES NFR BLD: 27 % (ref 12–49)
MCH RBC QN AUTO: 28 PG (ref 26–34)
MCHC RBC AUTO-ENTMCNC: 32.7 G/DL (ref 30–36.5)
MCV RBC AUTO: 85.5 FL (ref 80–99)
MONOCYTES # BLD: 1 K/UL (ref 0–1)
MONOCYTES NFR BLD: 18 % (ref 5–13)
NEUTS BAND NFR BLD MANUAL: 1 % (ref 0–6)
NEUTS SEG # BLD: 2.8 K/UL (ref 1.8–8)
NEUTS SEG NFR BLD: 52 % (ref 32–75)
NRBC # BLD: 0 K/UL (ref 0–0.01)
NRBC BLD-RTO: 0 PER 100 WBC
PLATELET # BLD AUTO: 437 K/UL (ref 150–400)
PMV BLD AUTO: 8.5 FL (ref 8.9–12.9)
POTASSIUM SERPL-SCNC: 3.5 MMOL/L (ref 3.5–5.1)
RBC # BLD AUTO: 3.72 M/UL (ref 3.8–5.2)
RBC MORPH BLD: ABNORMAL
RBC MORPH BLD: ABNORMAL
SERVICE CMNT-IMP: ABNORMAL
SODIUM SERPL-SCNC: 134 MMOL/L (ref 136–145)
WBC # BLD AUTO: 5.4 K/UL (ref 3.6–11)
WBC MORPH BLD: ABNORMAL

## 2019-12-15 PROCEDURE — 65270000029 HC RM PRIVATE

## 2019-12-15 PROCEDURE — 36415 COLL VENOUS BLD VENIPUNCTURE: CPT

## 2019-12-15 PROCEDURE — 74011250637 HC RX REV CODE- 250/637: Performed by: STUDENT IN AN ORGANIZED HEALTH CARE EDUCATION/TRAINING PROGRAM

## 2019-12-15 PROCEDURE — 74011250636 HC RX REV CODE- 250/636: Performed by: INTERNAL MEDICINE

## 2019-12-15 PROCEDURE — 74011250636 HC RX REV CODE- 250/636: Performed by: STUDENT IN AN ORGANIZED HEALTH CARE EDUCATION/TRAINING PROGRAM

## 2019-12-15 PROCEDURE — 80048 BASIC METABOLIC PNL TOTAL CA: CPT

## 2019-12-15 PROCEDURE — 74011000258 HC RX REV CODE- 258: Performed by: STUDENT IN AN ORGANIZED HEALTH CARE EDUCATION/TRAINING PROGRAM

## 2019-12-15 PROCEDURE — 74011000250 HC RX REV CODE- 250: Performed by: INTERNAL MEDICINE

## 2019-12-15 PROCEDURE — 85025 COMPLETE CBC W/AUTO DIFF WBC: CPT

## 2019-12-15 RX ADMIN — POTASSIUM CHLORIDE 10 MEQ: 750 TABLET, FILM COATED, EXTENDED RELEASE ORAL at 18:32

## 2019-12-15 RX ADMIN — SODIUM CHLORIDE 10 MG: 9 INJECTION INTRAMUSCULAR; INTRAVENOUS; SUBCUTANEOUS at 18:32

## 2019-12-15 RX ADMIN — SODIUM CHLORIDE 10 MG: 9 INJECTION INTRAMUSCULAR; INTRAVENOUS; SUBCUTANEOUS at 06:20

## 2019-12-15 RX ADMIN — POTASSIUM CHLORIDE 10 MEQ: 750 TABLET, FILM COATED, EXTENDED RELEASE ORAL at 08:33

## 2019-12-15 RX ADMIN — PIPERACILLIN AND TAZOBACTAM 3.38 G: 3; .375 INJECTION, POWDER, FOR SOLUTION INTRAVENOUS at 16:54

## 2019-12-15 RX ADMIN — SODIUM CHLORIDE 10 MG: 9 INJECTION INTRAMUSCULAR; INTRAVENOUS; SUBCUTANEOUS at 12:16

## 2019-12-15 RX ADMIN — Medication 10 ML: at 15:23

## 2019-12-15 RX ADMIN — PIPERACILLIN AND TAZOBACTAM 3.38 G: 3; .375 INJECTION, POWDER, FOR SOLUTION INTRAVENOUS at 08:35

## 2019-12-15 RX ADMIN — PIPERACILLIN AND TAZOBACTAM 3.38 G: 3; .375 INJECTION, POWDER, FOR SOLUTION INTRAVENOUS at 01:06

## 2019-12-15 NOTE — PROGRESS NOTES
Problem: Falls - Risk of 
Goal: *Absence of Falls Description Document Ricky Yadav Fall Risk and appropriate interventions in the flowsheet. 12/14/2019 2000 by Jaylen Caldera RN Outcome: Progressing Towards Goal 
Note:  
Fall Risk Interventions: 
 
Medication Interventions: Evaluate medications/consider consulting pharmacy, Teach patient to arise slowly 12/14/2019 1959 by Jaylen Caldera RN Note:  
Fall Risk Interventions:  
 
Medication Interventions: Evaluate medications/consider consulting pharmacy, Teach patient to arise slowly

## 2019-12-15 NOTE — PROGRESS NOTES
ID Progress Note 12/15/2019 Subjective: She states that she is feeling ok today Objective: Antibiotics: 1. Zosyn Vitals:  
Visit Vitals BP (!) 144/93 (BP 1 Location: Right arm, BP Patient Position: At rest) Pulse 99 Temp 98.9 °F (37.2 °C) Resp 19 Ht 5' 3\" (1.6 m) Wt 54.8 kg (120 lb 13 oz) LMP 09/15/2019 SpO2 97% BMI 21.40 kg/m² Tmax:  Temp (24hrs), Av °F (37.2 °C), Min:98.3 °F (36.8 °C), Max:99.7 °F (37.6 °C) Exam:  Lungs:  clear to auscultation bilaterally Heart:  regular rate and rhythm Abdomen:  soft, non-tender. Bowel sounds normal. No masses,  no organomegaly, purulent drainage in tubing Skin:  no rash or abnormalities Labs:     
Recent Labs 12/15/19 
1258 19 
0216 19 
0222 WBC 5.4 6.3 5.0 HGB 10.4* 10.3* 9.5* * 433* 404* BUN 2* 4* 6  
CREA 0.37* 0.39* 0.40* Cultures:  
 
Lab Results Component Value Date/Time Specimen Description: VAGINA 2009 11:45 AM  
 Specimen Description: URINE 2009 11:45 AM  
 
Lab Results Component Value Date/Time Culture result: HEAVY ESCHERICHIA COLI (A) 2019 02:30 PM  
 Culture result: HEAVY ENTEROCOCCUS SPECIES (A) 2019 02:30 PM  
 Culture result: CHECKING FOR POSSIBLE ANAEROBIC GRAM NEGATIVE RODS (A) 2019 02:18 PM  
 
 
Radiology:  
 
Line/Insert Date:        
 
Assessment: 1. Appendiceal carcinoma, advanced 2. Enteric fistula 3. Splenic lesion Objective: 1. Continue current therapy Talia Springer MD

## 2019-12-15 NOTE — PROGRESS NOTES
Hospitalist Progress Note Christo Mast MD 
Answering service: [phone] OR [phone] from in house phone Date of Service:  12/15/2019 NAME:  Latia Nance :  1970 MRN:  [id number] PCP: Leslie Wilcox MD 
 
Chief Complaint:  
Chief Complaint Patient presents with  Abdominal Pain Admission Summary:  
 
Latia Nance is a 44 y.o. female who presented with abdominal pain Interval history / Subjective:  
Patient seen for Follow up of  chief complaint abdomen fluid collection, nausea and vomiting. Patient seen and examined by the bedside, Labs, images and notes reviewed 
patient reports that nausea is better after Compazine, she was able to eat breakfast and keep it down, afebrile, has better mood Today. Family is visiting by the bedside, questions were answered. Patient is afebrile. No diarrhea, she did have one small BM this morning Discussed with nursing staff, no acute issues overnight, orders reviewed. Assessment & Plan:  
 
Intra-abdominal abscess, left upper quadrant, POA, status post CT-guided drain placed on 2019 by IR. In the setting of of  metastatic appendiceal cancer s/p ex laparotomy, debulking surgery,SIERRA BSO and ileocectomy in  CT remarkable for interval increase in the size of the fluid collection. Gynecology thinks it is due to progressive tumor with just direct extension to the bowel wall which is a very ominous sign. It is unlikely that it is a colonic perforation secondary to debulking surgery since it was in September. Cultures obtained, showing GPC/GNR (polymicrobial earnest). The culture is growing heavy enterococcus and heavy E. coli. Both the bacteria are pansensitive. ID consult noted, continue IV Zosyn. Cont pain control Oncology states that they will continue chemotherapy once acute issues have resolved.  
 
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 Patient has received 1 cycle of FOLFOX chemotherapy 12/3/2019. Plan is to continue chemo if possible, due next week. Goal of care is palliative as per oncology. Left renal effusion post Thora x2, stable. Thrombocytosisstable [facility] oncology and surgical notes reviewed, no indication for surgery at the moment. Poor prognosis. 
  
Nausea and vomiting Unimproved with Zofran, better with IV Compazine. 
  
Hypokalemia Resolved, repeat lab in the morning. Code status: Full Code DVT prophylaxis: SCDs Care Plan discussed with: Discussed with patient Disposition: D Hospital Problems  Date Reviewed: 2019 Codes Class Noted POA Splenic abscess ICD-10-CM: D73.3 ICD-9-CM: 289.59  2019 Unknown Review of Systems: A comprehensive review of systems was negative. Physical Examination:  
 
General appearance: alert, cooperative, no distress, appears stated age Lungs: Clear to auscultation bilaterally, no wheezes, unlabored breathing Heart: RRR, no murmur gallops or rubs Abdomen: Soft, tenderness around the surgical site, the tenderness is diffuse, no obvious rebound. Drain in place or guarding. Bowel sounds are positive Extremities: No edema Neurologic: Alert and oriented X 3, normal strength and tone. Vital Signs:  
 Last 24hrs VS reviewed since prior progress note. Most recent are: 
 
Visit Vitals BP (!) 144/93 (BP 1 Location: Right arm, BP Patient Position: At rest) Pulse 99 Temp 98.9 °F (37.2 °C) Resp 19 Ht 5' 3\" (1.6 m) Wt 54.8 kg (120 lb 13 oz) SpO2 97% BMI 21.40 kg/m² Intake/Output Summary (Last 24 hours) at 12/15/2019 1303 Last data filed at 12/15/2019 1210 Gross per 24 hour Intake 1901 ml Output 120 ml Net 1781 ml Tmax:  Temp (24hrs), Av °F (37.2 °C), Min:98.3 °F (36.8 °C), Max:99.7 °F (37.6 °C) Data Review: Data reviewed by myself: 
Ct Chest W Cont Result Date: 11/26/2019 EXAM: CT CHEST W CONT, CT ABD PELV W CONT INDICATION: met appendix cancer f/u speen infarct/ clot pre chemo COMPARISON: 11 2/19 and others CONTRAST: 100 mL of Isovue-370. TECHNIQUE: Following the uneventful intravenous administration of contrast, thin axial images were obtained through the chest, abdomen and pelvis. Coronal and sagittal reconstructions were generated. Oral contrast was administered. CT dose reduction was achieved through use of a standardized protocol tailored for this examination and automatic exposure control for dose modulation. FINDINGS: THYROID: No nodule. MEDIASTINUM: No mass or lymphadenopathy. ABNER: No mass or lymphadenopathy. THORACIC AORTA: No dissection or aneurysm. MAIN PULMONARY ARTERY: Normal in caliber. TRACHEA/BRONCHI: Patent. ESOPHAGUS: No wall thickening or dilatation. HEART: Normal in size. PLEURA: Stable left pleural effusion. LUNGS: No nodule, mass, or airspace disease. LIVER: Small hypodensities in the left lobe liver unchanged. There is perihepatic fluid anterior to the left lobe. GALLBLADDER: Unremarkable. SPLEEN: Again noted is a fluid collection occupying the anterior aspect of the spleen consistent with patient's history of infarction and liquefaction. There is persistent locules of air within the collection PANCREAS: No mass or ductal dilatation. ADRENALS: Unremarkable. KIDNEYS: No mass, calculus, or hydronephrosis. STOMACH: In the lateral fundus of the stomach, there is extensive soft tissue thickening possibly reflecting tumor implant. The previously noted fluid collection in the wall stomach has resolved. There is a questionable tract communicating with the splenic fluid collection with multiple locules of air noted within the splenic fluid collection. The overall size of the fluid collection is decreased significantly since the prior study and now measures approximately 42 x 44 mm.  SMALL BOWEL: No dilatation or wall thickening. COLON: There is thickening of the splenic flexure with adjacent peritoneal soft tissue thickening. Possible serosal implants are noted on the sigmoid colon with irregular wall thickening. APPENDIX: Not visualized PERITONEUM: In the anterior pelvis just superior bladder, there is irregular soft tissue around loops of small bowel. Nondependent ascites is noted in the upper abdomen. RETROPERITONEUM: No lymphadenopathy or aortic aneurysm. REPRODUCTIVE ORGANS: URINARY BLADDER: Unremarkable BONES: No destructive bone lesion. ADDITIONAL COMMENTS: N/A IMPRESSION: 1. Interval decreased size of splenic fluid collection. There is extensive irregular soft tissue around the fluid collection which tracks into the lateral wall the gastric fundus. There is persistent locules of gas within the splenic collection and a fistulous communication with the stomach is not entirely excluded. In addition, there is increased soft tissue thickening around the lateral wall the stomach suggestive of tumor deposit. 2.  Increased soft tissue thickening around the splenic flexure of the colon with peritoneal thickening and nondependent studies suspicious for peritoneal carcinomatosis. Additional soft tissue densities noted in the anterior pelvis and around the sigmoid colon as well as loops of small bowel suspicious for peritoneal carcinomatosis as well. 3.  Left pleural effusion is stable. Ct Abd Pelv W Cont Result Date: 12/12/2019 INDICATION: Abdominal pain, history of appendiceal CA, recent surgery, CT of the abdomen and pelvis is performed with 5 mm collimation. Study is performed with 100 cc of nonionic Isovue 370. Sagittal and coronal reformatted images were also performed. CT dose reduction was achieved with the use of the standardized protocol tailored for this examination and automatic exposure control for dose modulation.  Adaptive statistical iterative reconstruction (ASIR) was utilized. Direct comparison is made to prior CT dated 11/26/2019. Findings: Lung bases: There is a very small left pleural effusion, as seen on prior CT dated 11/2019. There is no right pleural fluid. There is persistent mild left basilar atelectasis. Liver: There is a 1.9 cm cyst in the left hepatic lobe. There is a 7 mm hypodensity in the left hepatic lobe, too small to further characterize, but unchanged compared to prior CT dated 11/2019. Adrenals: Adrenal glands are normal. Pancreas: The pancreas is normal. Gallbladder: The gallbladder is normal. Kidneys: The kidneys are normal. Bowel/Spleen: There is persistent hypodense attenuation within the anterior aspect of the spleen consistent patient's known history of infarction. There is a gas and fluid containing collection anterior to the spleen and measuring approximately 6 cm x 6.2 cm and measuring approximately 5.3 cm x 4.6 cm on prior CT dated November 2019. There is irregular stranding and soft tissue adjacent to this collection. There is persistent thickening of the colon at the level of the splenic flexure. There appears to be a connection between the left upper quadrant focal fluid collection and the descending colon. No dilated loop of large or small bowel is visualized. There is persistent irregular soft tissue in the pelvis tethering multiple loops of bowel. Urinary bladder: Urinary bladder is partially filled and grossly normal.  
 
IMPRESSION: 1. Persistent small left pleural effusion and left basilar atelectasis. 2. Interval increase in size of left and fluid containing left upper abdominal collection, now measuring 6 cm x 6.2 cm. This collection is likely contiguous with the descending colon. 3. Persistent irregular soft tissue in the pelvis, tethering multiple loops of bowel, unchanged, but consistent with carcinomatosis. Ct Abd Pelv W Cont Result Date: 11/26/2019 EXAM: CT CHEST W CONT, CT ABD PELV W CONT INDICATION: met appendix cancer f/u speen infarct/ clot pre chemo COMPARISON: 11 2/19 and others CONTRAST: 100 mL of Isovue-370. TECHNIQUE: Following the uneventful intravenous administration of contrast, thin axial images were obtained through the chest, abdomen and pelvis. Coronal and sagittal reconstructions were generated. Oral contrast was administered. CT dose reduction was achieved through use of a standardized protocol tailored for this examination and automatic exposure control for dose modulation. FINDINGS: THYROID: No nodule. MEDIASTINUM: No mass or lymphadenopathy. ABNER: No mass or lymphadenopathy. THORACIC AORTA: No dissection or aneurysm. MAIN PULMONARY ARTERY: Normal in caliber. TRACHEA/BRONCHI: Patent. ESOPHAGUS: No wall thickening or dilatation. HEART: Normal in size. PLEURA: Stable left pleural effusion. LUNGS: No nodule, mass, or airspace disease. LIVER: Small hypodensities in the left lobe liver unchanged. There is perihepatic fluid anterior to the left lobe. GALLBLADDER: Unremarkable. SPLEEN: Again noted is a fluid collection occupying the anterior aspect of the spleen consistent with patient's history of infarction and liquefaction. There is persistent locules of air within the collection PANCREAS: No mass or ductal dilatation. ADRENALS: Unremarkable. KIDNEYS: No mass, calculus, or hydronephrosis. STOMACH: In the lateral fundus of the stomach, there is extensive soft tissue thickening possibly reflecting tumor implant. The previously noted fluid collection in the wall stomach has resolved. There is a questionable tract communicating with the splenic fluid collection with multiple locules of air noted within the splenic fluid collection. The overall size of the fluid collection is decreased significantly since the prior study and now measures approximately 42 x 44 mm.  SMALL BOWEL: No dilatation or wall thickening. COLON: There is thickening of the splenic flexure with adjacent peritoneal soft tissue thickening. Possible serosal implants are noted on the sigmoid colon with irregular wall thickening. APPENDIX: Not visualized PERITONEUM: In the anterior pelvis just superior bladder, there is irregular soft tissue around loops of small bowel. Nondependent ascites is noted in the upper abdomen. RETROPERITONEUM: No lymphadenopathy or aortic aneurysm. REPRODUCTIVE ORGANS: URINARY BLADDER: Unremarkable BONES: No destructive bone lesion. ADDITIONAL COMMENTS: N/A IMPRESSION: 1. Interval decreased size of splenic fluid collection. There is extensive irregular soft tissue around the fluid collection which tracks into the lateral wall the gastric fundus. There is persistent locules of gas within the splenic collection and a fistulous communication with the stomach is not entirely excluded. In addition, there is increased soft tissue thickening around the lateral wall the stomach suggestive of tumor deposit. 2.  Increased soft tissue thickening around the splenic flexure of the colon with peritoneal thickening and nondependent studies suspicious for peritoneal carcinomatosis. Additional soft tissue densities noted in the anterior pelvis and around the sigmoid colon as well as loops of small bowel suspicious for peritoneal carcinomatosis as well. 3.  Left pleural effusion is stable. Xr Chest [facility] Result Date: 11/18/2019 EXAM: XR CHEST PORT INDICATION: port placement COMPARISON: 11/6/2019 FINDINGS: A portable AP radiograph of the chest was obtained at 1145 hours. The patient is on a cardiac monitor. A new left subclavian Port-A-Cath has been place. The tip is in the region of the cavoatrial junction. No pneumothorax identified. There is a small left pleural effusion. . The cardiac and mediastinal contours and pulmonary vascularity are normal.  The bones and soft tissues are grossly within normal limits. IMPRESSION: New left subclavian Port-A-Cath without evidence of pneumothorax. Small left pleural effusion. Nc Xr Technologist Service Result Date: 11/19/2019 Fluoroscopy was utilized. IMPRESSION:  FLUOROSCOPY WAS USED. Fluoro Dose:  0.17 mGy vk Lab Results Component Value Date/Time Specimen Description: VAGINA 08/01/2009 11:45 AM  
 Specimen Description: URINE 08/01/2009 11:45 AM  
 
Lab Results Component Value Date/Time Culture result: HEAVY ESCHERICHIA COLI (A) 12/13/2019 02:30 PM  
 Culture result: HEAVY ENTEROCOCCUS SPECIES (A) 12/13/2019 02:30 PM  
 Culture result: CHECKING FOR POSSIBLE ANAEROBIC GRAM NEGATIVE RODS (A) 12/13/2019 02:18 PM  
 
All Micro Results Procedure Component Value Units Date/Time CULTURE, ANAEROBIC [[id number]]  (Abnormal) Collected:  12/13/19 1418 Order Status:  Completed Specimen:  Abdominal Fluid Updated:  12/15/19 1009 Special Requests: NO SPECIAL REQUESTS Culture result: CHECKING FOR POSSIBLE ANAEROBIC GRAM NEGATIVE RODS  
     
 CULTURE, BODY FLUID Orpah Geoffrey STAIN [[id number]]  (Abnormal)  (Susceptibility) Collected:  12/13/19 1430 Order Status:  Completed Specimen:  Miscellaneous Fluid Updated:  12/15/19 1004 Special Requests: NO SPECIAL REQUESTS     
  GRAM STAIN 3+ WBCS SEEN     
      
  RARE EPITHELIAL CELLS SEEN 2+ GRAM POSITIVE RODS     
      
  RARE GRAM POSITIVE COCCI IN PAIRS  
     
      
  RARE APPARENT GRAM NEGATIVE RODS Culture result: HEAVY ESCHERICHIA COLI     
      
  HEAVY ENTEROCOCCUS SPECIES  
     
 URINE CULTURE HOLD SAMPLE [[id number]] Collected:  12/12/19 1432 Order Status:  Completed Specimen:  Urine from Serum Updated:  12/12/19 1447 Urine culture hold URINE ON HOLD IN MICROBIOLOGY DEPT FOR 3 DAYS.  IF UNPRESERVED URINE IS SUBMITTED, IT CANNOT BE USED FOR ADDITIONAL TESTING AFTER 24 HRS, RECOLLECTION WILL BE REQUIRED. Labs: reviewed by myself. Recent Labs 12/15/19 
0152 12/14/19 
0216 WBC 5.4 6.3 HGB 10.4* 10.3* HCT 31.8* 32.8* * 433* Recent Labs 12/15/19 
5400 12/14/19 
0216 12/13/19 
0222 * 137 138  
K 3.5 3.8 3.6  101 106 CO2 27 26 25 BUN 2* 4* 6  
CREA 0.37* 0.39* 0.40* GLU 85 76 82 CA 8.7 8.4* 8.5 No results for input(s): SGOT, GPT, ALT, AP, TBIL, TBILI, TP, ALB, GLOB, GGT, AML, LPSE in the last 72 hours. No lab exists for component: AMYP, HLPSE No results for input(s): INR, PTP, APTT, INREXT, INREXT in the last 72 hours. No results for input(s): FE, TIBC, PSAT, FERR in the last 72 hours. No results found for: FOL, RBCF No results for input(s): PH, PCO2, PO2 in the last 72 hours. No results for input(s): CPK, CKNDX, TROIQ in the last 72 hours. No lab exists for component: CPKMB No results found for: CHOL, CHOLX, CHLST, CHOLV, HDL, HDLP, LDL, LDLC, DLDLP, TGLX, TRIGL, TRIGP, CHHD, CHHDX Lab Results Component Value Date/Time Glucose (POC) 85 09/25/2019 12:10 PM  
 
Lab Results Component Value Date/Time Color DARK YELLOW 12/12/2019 02:32 PM  
 Appearance CLOUDY (A) 12/12/2019 02:32 PM  
 Specific gravity 1.025 12/12/2019 02:32 PM  
 Specific gravity 1.010 10/15/2019 04:32 PM  
 pH (UA) 6.0 12/12/2019 02:32 PM  
 Protein NEGATIVE  12/12/2019 02:32 PM  
 Glucose NEGATIVE  12/12/2019 02:32 PM  
 Ketone 15 (A) 12/12/2019 02:32 PM  
 Bilirubin NEGATIVE  10/15/2019 04:32 PM  
 Urobilinogen 1.0 12/12/2019 02:32 PM  
 Nitrites NEGATIVE  12/12/2019 02:32 PM  
 Leukocyte Esterase SMALL (A) 12/12/2019 02:32 PM  
 Epithelial cells FEW 12/12/2019 02:32 PM  
 Bacteria NEGATIVE  12/12/2019 02:32 PM  
 WBC 0-4 12/12/2019 02:32 PM  
 RBC 0-5 12/12/2019 02:32 PM  
 
 
 
Medications Reviewed:  
 
Current Facility-Administered Medications Medication Dose Route Frequency  ondansetron (ZOFRAN) injection 8 mg  8 mg IntraVENous Q8H PRN  prochlorperazine (COMPAZINE) with saline injection 10 mg  10 mg IntraVENous Q6H PRN  
 morphine injection 2 mg  2 mg IntraVENous Q3H PRN  polyethylene glycol (MIRALAX) packet 17 g  17 g Oral DAILY  LORazepam (ATIVAN) tablet 0.5 mg  0.5 mg Oral QHS PRN  potassium chloride SR (KLOR-CON 10) tablet 10 mEq  10 mEq Oral BID  sodium chloride (NS) flush 5-40 mL  5-40 mL IntraVENous Q8H  
 sodium chloride (NS) flush 5-40 mL  5-40 mL IntraVENous PRN  
 acetaminophen (TYLENOL) tablet 650 mg  650 mg Oral Q4H PRN  
 lactated Ringers infusion  50 mL/hr IntraVENous CONTINUOUS  piperacillin-tazobactam (ZOSYN) 3.375 g in 0.9% sodium chloride (MBP/ADV) 100 mL  3.375 g IntraVENous Q8H  
 
______________________________________________________________________ EXPECTED LENGTH OF STAY: - - - 
ACTUAL LENGTH OF STAY:          3 Monroe Bermeo MD  
 
Patient's emergency contacts: 
Extended Emergency Contact Information Primary Emergency Contact: Fidelina Rosen Address: [id number] [place] [address], [address] Home Phone: [phone] Relation: Spouse

## 2019-12-15 NOTE — PROGRESS NOTES
Progress Note Patient: Xiang Ceron MRN: [id number]  SSN: [id number] YOB: 1970  Age: 44 y.o. Sex: female Admit Date: 2019 Intra-abdominal abscess with carcinomatosis Subjective: No acute surgical issues. Pt reported mild nausea and anorexia and is receiving compazine. Passing some flatus but no BM. Pt reported left side abdominal pain. No leukocytosis. Objective:  
 
Visit Vitals /82 (BP 1 Location: Right arm, BP Patient Position: At rest) Pulse 96 Temp 98.3 °F (36.8 °C) Resp 18 Ht 5' 3\" (1.6 m) Wt 120 lb 13 oz (54.8 kg) SpO2 97% BMI 21.40 kg/m² Temp (24hrs), Av.5 °F (36.9 °C), Min:98.3 °F (36.8 °C), Max:98.9 °F (37.2 °C) Physical Exam:   
Gen:  NAD Pulm:  Unlabored Abd:  S/ND/moderate TTP in left abdomen Wound:  C/D/I Recent Results (from the past 24 hour(s)) CBC WITH AUTOMATED DIFF Collection Time: 12/15/19  1:52 AM  
Result Value Ref Range WBC 5.4 3.6 - 11.0 K/uL  
 RBC 3.72 (L) 3.80 - 5.20 M/uL  
 HGB 10.4 (L) 11.5 - 16.0 g/dL HCT 31.8 (L) 35.0 - 47.0 % MCV 85.5 80.0 - 99.0 FL  
 MCH 28.0 26.0 - 34.0 PG  
 MCHC 32.7 30.0 - 36.5 g/dL  
 RDW 16.0 (H) 11.5 - 14.5 % PLATELET 799 (H) 957 - 400 K/uL MPV 8.5 (L) 8.9 - 12.9 FL  
 NRBC 0.0 0  WBC ABSOLUTE NRBC 0.00 0.00 - 0.01 K/uL NEUTROPHILS 52 32 - 75 % BAND NEUTROPHILS 1 0 - 6 % LYMPHOCYTES 27 12 - 49 % MONOCYTES 18 (H) 5 - 13 % EOSINOPHILS 2 0 - 7 % BASOPHILS 0 0 - 1 % IMMATURE GRANULOCYTES 0 %  
 ABS. NEUTROPHILS 2.8 1.8 - 8.0 K/UL  
 ABS. LYMPHOCYTES 1.5 0.8 - 3.5 K/UL  
 ABS. MONOCYTES 1.0 0.0 - 1.0 K/UL  
 ABS. EOSINOPHILS 0.1 0.0 - 0.4 K/UL  
 ABS. BASOPHILS 0.0 0.0 - 0.1 K/UL  
 ABS. IMM. GRANS. 0.0 K/UL  
 DF MANUAL    
 RBC COMMENTS ANISOCYTOSIS 1+ 
    
 RBC COMMENTS OVALOCYTES PRESENT 
    
 WBC COMMENTS ATYPICAL LYMPHOCYTES PRESENT    
METABOLIC PANEL, BASIC  Collection Time: 12/15/19  1:52 AM  
 Result Value Ref Range Sodium 134 (L) 136 - 145 mmol/L Potassium 3.5 3.5 - 5.1 mmol/L Chloride 100 97 - 108 mmol/L  
 CO2 27 21 - 32 mmol/L Anion gap 7 5 - 15 mmol/L Glucose 85 65 - 100 mg/dL BUN 2 (L) 6 - 20 MG/DL Creatinine 0.37 (L) 0.55 - 1.02 MG/DL  
 BUN/Creatinine ratio 5 (L) 12 - 20 GFR est AA >60 >60 ml/min/1.73m2 GFR est non-AA >60 >60 ml/min/1.73m2 Calcium 8.7 8.5 - 10.1 MG/DL Assessment:  
 
Hospital Problems  Date Reviewed: 11/11/2019 Codes Class Noted POA Splenic abscess ICD-10-CM: D73.3 ICD-9-CM: 289.59  12/12/2019 Unknown Plan/Recommendations/Medical Decision Making: - Intra-peritoneal abscess:  Continue IV antibiotic therapy 
- Pain control - Diet as tolerated - No acute indication for diverting colostomy or ileostomy at this time

## 2019-12-15 NOTE — PROGRESS NOTES
[address] [facility], [address] [place], [address] 
P [phone]  F [phone] Patient Name: Michelle Jason Admit Date: 12/12/2019 OR Date: * No surgery found * Visit Date: 12/15/2019 SUBJECTIVE: 
 
Feels better today. She reports a small BM this morning. Some nausea, but better than yesterday. States Compazine seems to work better. OBJECTIVE: 
 
Patient Vitals for the past 24 hrs: 
 Temp Pulse Resp BP SpO2  
12/15/19 0712 98.9 °F (37.2 °C) 99 19 (!) 144/93 97 % 12/14/19 1938 98.3 °F (36.8 °C) 79 18 148/90 96 % 12/14/19 1526 99.7 °F (37.6 °C) 93 17 124/84 96 % Date 12/14/19 0700 - 12/15/19 1350 12/15/19 0700 - 12/16/19 4457 Shift 0558-6735 3270-2279 24 Hour Total 9610-5882 0092-0219 24 Hour Total  
INTAKE  
I.V.(mL/kg/hr)  1901(2.9) 1901(1.4) I.V.  1901 1901 Shift Total(mL/kg)  4126(70.7) [id number]) OUTPUT Emesis/NG output 1  1 Emesis 1  1 Drains 60  60 Output (ml) (Drain total abscessiondrainage tube ;CT#[id number] 12/13/19 Left;Upper Abdomen) 60  60 Shift Total(mL/kg) 61(1.1)  61(1.1) NET -68 3752 6905 Weight (kg) 54.8 54.8 54.8 54.8 54.8 54.8 Physical Exam 
   General:  alert, cooperative, no distress Cardiac:  Regular rate and rhythm Lungs:  clear to auscultation bilaterally Abdomen:  Soft, mildly and diffusely tender, with a little more tenderness in the LUQ; drain in place in left flank. Wound:  JOSE MANUEL with bloody-brown fluid Extremity: extremities normal, atraumatic, no cyanosis or edema Data Review Lab Results Component Value Date/Time WBC 5.4 12/15/2019 01:52 AM  
 ABS. NEUTROPHILS 2.8 12/15/2019 01:52 AM  
 HGB 10.4 (L) 12/15/2019 01:52 AM  
 HCT 31.8 (L) 12/15/2019 01:52 AM  
 MCV 85.5 12/15/2019 01:52 AM  
 MCH 28.0 12/15/2019 01:52 AM  
 PLATELET 055 (H) 16/11/3734 01:52 AM  
 
Lab Results Component Value Date/Time Sodium 134 (L) 12/15/2019 01:52 AM  
 Potassium 3.5 12/15/2019 01:52 AM  
 Chloride 100 12/15/2019 01:52 AM  
 CO2 27 12/15/2019 01:52 AM  
 Glucose 85 12/15/2019 01:52 AM  
 BUN 2 (L) 12/15/2019 01:52 AM  
 Creatinine 0.37 (L) 12/15/2019 01:52 AM  
 Calcium 8.7 12/15/2019 01:52 AM  
 Albumin 3.0 (L) 12/12/2019 12:35 PM  
 Bilirubin, total 0.3 12/12/2019 12:35 PM  
 AST (SGOT) 13 (L) 12/12/2019 12:35 PM  
 ALT (SGPT) 20 12/12/2019 12:35 PM  
 Alk. phosphatase 97 12/12/2019 12:35 PM  
 
IMPRESSION/PLAN: 
 
Oncologic:  Stage IV appendiceal cancer. She has a very aggressive malignancy. Colonic perforation/fistulization likely due to progressive disease. Drain in place. The aggressiveness of her tumor is evidence by the fact that she had extensive involvement of the gynecologic organs at the time of diagnosis. Heme/CV:  Hemodynamically stable. Renal:  Normal creatinine. FEN/GI:  Diet as tolerated. ID/Wound:  Currently afebrile. WBC normal, although this may be due to the fact that she recently received chemotherapy. ID consulted for recommendations. Currently on Zosyn. Dispostion:  Overall, her prognosis looks poor. Appendiceal cancers can be aggressive and don't always respond to therapy. Considering the aggressive behavior of her tumor, I am not optimistic about her response. Not a good candidate for surgery. Surgery would not be easy with her and I'm not sure it would be helpful in the long run. She would likely need a splenectomy, and a diverting ostomy, either proximal colon or ileostomy. Would defer that decision to surgical oncology. Would continue with percutaneous drainage for now.   
 
  
Amado Headley MD

## 2019-12-16 LAB
ANION GAP SERPL CALC-SCNC: 5 MMOL/L (ref 5–15)
BACTERIA SPEC CULT: ABNORMAL
BASOPHILS # BLD: 0 K/UL (ref 0–0.1)
BASOPHILS NFR BLD: 0 % (ref 0–1)
BUN SERPL-MCNC: 3 MG/DL (ref 6–20)
BUN/CREAT SERPL: 9 (ref 12–20)
CALCIUM SERPL-MCNC: 8.6 MG/DL (ref 8.5–10.1)
CHLORIDE SERPL-SCNC: 104 MMOL/L (ref 97–108)
CO2 SERPL-SCNC: 27 MMOL/L (ref 21–32)
CREAT SERPL-MCNC: 0.35 MG/DL (ref 0.55–1.02)
DIFFERENTIAL METHOD BLD: ABNORMAL
EOSINOPHIL # BLD: 0.2 K/UL (ref 0–0.4)
EOSINOPHIL NFR BLD: 3 % (ref 0–7)
ERYTHROCYTE [DISTWIDTH] IN BLOOD BY AUTOMATED COUNT: 16.3 % (ref 11.5–14.5)
GLUCOSE SERPL-MCNC: 88 MG/DL (ref 65–100)
HCT VFR BLD AUTO: 31.8 % (ref 35–47)
HGB BLD-MCNC: 10.2 G/DL (ref 11.5–16)
IMM GRANULOCYTES # BLD AUTO: 0 K/UL (ref 0–0.04)
IMM GRANULOCYTES NFR BLD AUTO: 1 % (ref 0–0.5)
LYMPHOCYTES # BLD: 1.6 K/UL (ref 0.8–3.5)
LYMPHOCYTES NFR BLD: 28 % (ref 12–49)
MCH RBC QN AUTO: 27.9 PG (ref 26–34)
MCHC RBC AUTO-ENTMCNC: 32.1 G/DL (ref 30–36.5)
MCV RBC AUTO: 86.9 FL (ref 80–99)
MONOCYTES # BLD: 1.1 K/UL (ref 0–1)
MONOCYTES NFR BLD: 18 % (ref 5–13)
NEUTS SEG # BLD: 3 K/UL (ref 1.8–8)
NEUTS SEG NFR BLD: 50 % (ref 32–75)
NRBC # BLD: 0 K/UL (ref 0–0.01)
NRBC BLD-RTO: 0 PER 100 WBC
PLATELET # BLD AUTO: 438 K/UL (ref 150–400)
PMV BLD AUTO: 8.7 FL (ref 8.9–12.9)
POTASSIUM SERPL-SCNC: 3.7 MMOL/L (ref 3.5–5.1)
RBC # BLD AUTO: 3.66 M/UL (ref 3.8–5.2)
SERVICE CMNT-IMP: ABNORMAL
SODIUM SERPL-SCNC: 136 MMOL/L (ref 136–145)
WBC # BLD AUTO: 5.9 K/UL (ref 3.6–11)

## 2019-12-16 PROCEDURE — 74011250636 HC RX REV CODE- 250/636: Performed by: INTERNAL MEDICINE

## 2019-12-16 PROCEDURE — 74011250636 HC RX REV CODE- 250/636: Performed by: STUDENT IN AN ORGANIZED HEALTH CARE EDUCATION/TRAINING PROGRAM

## 2019-12-16 PROCEDURE — 36415 COLL VENOUS BLD VENIPUNCTURE: CPT

## 2019-12-16 PROCEDURE — 74011000258 HC RX REV CODE- 258: Performed by: STUDENT IN AN ORGANIZED HEALTH CARE EDUCATION/TRAINING PROGRAM

## 2019-12-16 PROCEDURE — 74011250637 HC RX REV CODE- 250/637: Performed by: INTERNAL MEDICINE

## 2019-12-16 PROCEDURE — 74011250637 HC RX REV CODE- 250/637: Performed by: STUDENT IN AN ORGANIZED HEALTH CARE EDUCATION/TRAINING PROGRAM

## 2019-12-16 PROCEDURE — 80048 BASIC METABOLIC PNL TOTAL CA: CPT

## 2019-12-16 PROCEDURE — 74011000250 HC RX REV CODE- 250: Performed by: INTERNAL MEDICINE

## 2019-12-16 PROCEDURE — 65270000029 HC RM PRIVATE

## 2019-12-16 PROCEDURE — 85025 COMPLETE CBC W/AUTO DIFF WBC: CPT

## 2019-12-16 RX ADMIN — SODIUM CHLORIDE 10 MG: 9 INJECTION INTRAMUSCULAR; INTRAVENOUS; SUBCUTANEOUS at 13:06

## 2019-12-16 RX ADMIN — Medication 10 ML: at 13:07

## 2019-12-16 RX ADMIN — PIPERACILLIN AND TAZOBACTAM 3.38 G: 3; .375 INJECTION, POWDER, FOR SOLUTION INTRAVENOUS at 17:31

## 2019-12-16 RX ADMIN — PIPERACILLIN AND TAZOBACTAM 3.38 G: 3; .375 INJECTION, POWDER, FOR SOLUTION INTRAVENOUS at 01:04

## 2019-12-16 RX ADMIN — POTASSIUM CHLORIDE 10 MEQ: 750 TABLET, FILM COATED, EXTENDED RELEASE ORAL at 08:43

## 2019-12-16 RX ADMIN — POLYETHYLENE GLYCOL 3350 17 G: 17 POWDER, FOR SOLUTION ORAL at 08:43

## 2019-12-16 RX ADMIN — SODIUM CHLORIDE, SODIUM LACTATE, POTASSIUM CHLORIDE, AND CALCIUM CHLORIDE 50 ML/HR: 600; 310; 30; 20 INJECTION, SOLUTION INTRAVENOUS at 05:09

## 2019-12-16 RX ADMIN — PIPERACILLIN AND TAZOBACTAM 3.38 G: 3; .375 INJECTION, POWDER, FOR SOLUTION INTRAVENOUS at 08:43

## 2019-12-16 RX ADMIN — POTASSIUM CHLORIDE 10 MEQ: 750 TABLET, FILM COATED, EXTENDED RELEASE ORAL at 17:31

## 2019-12-16 RX ADMIN — Medication 10 ML: at 21:51

## 2019-12-16 NOTE — PROGRESS NOTES
General Surgery Daily Progress Note Admit Date: 2019 Post-Operative Day: * No surgery found * from * No surgery found * Subjective:  
 
Last 24 hrs: Pt is feeling pretty good this morning. No reported nausea; minimal pain; on zosyn - e coli - ID following Objective:  
 
Blood pressure 131/86, pulse 95, temperature 97.9 °F (36.6 °C), resp. rate 16, height 5' 3\" (1.6 m), weight 120 lb 13 oz (54.8 kg), last menstrual period 09/15/2019, SpO2 96 %. Temp (24hrs), Av °F (36.7 °C), Min:97.8 °F (36.6 °C), Max:98.3 °F (36.8 °C) 
 
 
_____________________ Physical Exam:    
Alert and Oriented, x3, in no acute distress. Cardiovascular: RRR, no peripheral edema Abdomen: soft, perc drain in L side; brown, foul-smelling drainage - 95cc last 24hrs Assessment:  
Active Problems: 
  Splenic abscess (2019) Plan:  
 
Cont abx per ID ? Interval CT to assess status of splenic collection Cont diet OOB Data Review: 
 
Recent Labs 19 
0107 12/15/19 
9631 19 
0216 WBC 5.9 5.4 6.3 HGB 10.2* 10.4* 10.3* HCT 31.8* 31.8* 32.8* * 437* 433* Recent Labs 19 
0107 12/15/19 
1905 19 
0216  134* 137  
K 3.7 3.5 3.8  100 101 CO2 27 27 26 GLU 88 85 76 BUN 3* 2* 4*  
CREA 0.35* 0.37* 0.39* CA 8.6 8.7 8.4* No results for input(s): AML, LPSE in the last 72 hours. ______________________ Medications: 
 
Current Facility-Administered Medications Medication Dose Route Frequency  influenza vaccine - (6 mos+)(PF) (FLUARIX/FLULAVAL/FLUZONE QUAD) injection 0.5 mL  0.5 mL IntraMUSCular PRIOR TO DISCHARGE  ondansetron (ZOFRAN) injection 8 mg  8 mg IntraVENous Q8H PRN  prochlorperazine (COMPAZINE) with saline injection 10 mg  10 mg IntraVENous Q6H PRN  
 morphine injection 2 mg  2 mg IntraVENous Q3H PRN  polyethylene glycol (MIRALAX) packet 17 g  17 g Oral DAILY  LORazepam (ATIVAN) tablet 0.5 mg  0.5 mg Oral QHS PRN  potassium chloride SR (KLOR-CON 10) tablet 10 mEq  10 mEq Oral BID  sodium chloride (NS) flush 5-40 mL  5-40 mL IntraVENous Q8H  
 sodium chloride (NS) flush 5-40 mL  5-40 mL IntraVENous PRN  
 acetaminophen (TYLENOL) tablet 650 mg  650 mg Oral Q4H PRN  
 lactated Ringers infusion  50 mL/hr IntraVENous CONTINUOUS  piperacillin-tazobactam (ZOSYN) 3.375 g in 0.9% sodium chloride (MBP/ADV) 100 mL  3.375 g IntraVENous Q8H Tawanda Cox NP 
12/16/2019 ATTENDING ADDENDUM I supervised the APC and reviewed the note. We discussed the plan of care She looks and feels quite well. Not tender in the abdomen. Drain site is tender though. Repeat CT tomorrow.

## 2019-12-16 NOTE — PROGRESS NOTES
[address] [facility], [address] [place] [address] Joanie TOM [phone]  F [phone] Patient Name: Lang Suarez Admit Date: 12/12/2019 OR Date: * No surgery found * Visit Date: 12/16/2019 SUBJECTIVE: 
 
Feels better overall. States she has mild, generalized tenderness. +appetite, eating food. Drain in place, functional. Reports no F/C, N/V. OBJECTIVE: 
 
Patient Vitals for the past 24 hrs: 
 Temp Pulse Resp BP SpO2  
12/16/19 1431 98.5 °F (36.9 °C) 89 16 137/90 97 % 12/16/19 0802 97.9 °F (36.6 °C) 95 16 131/86 96 % 12/16/19 0303 97.9 °F (36.6 °C) 84 16 127/79 95 % 12/15/19 1913 97.8 °F (36.6 °C) 86 18 112/75 96 % Date 12/15/19 0700 - 12/16/19 1636 12/16/19 0700 - 12/17/19 4598 Shift 9822-8866 7017-5208 24 Hour Total 6468-5301 1038-9223 24 Hour Total  
INTAKE  
I.V.(mL/kg/hr)  888(1.4) 888(0.7) I.V.  888 888 Shift Total(mL/kg)  [id number]) [id number]) OUTPUT Drains 95  95 Output (ml) (Drain total abscessiondrainage tube ;#[id number] 12/13/19 Left;Upper Abdomen) 95  95 Shift Total(mL/kg) 95(1.7)  95(1.7) NET -95 888 793 Weight (kg) 54.8 54.8 54.8 54.8 54.8 54.8 Physical Exam 
   General:  alert, cooperative, no distress Cardiac:  RRR Lungs:  nonlabored Abdomen:  Soft, mildly and diffusely tender. No rebound/guarding. Drain in left flank, tan output. Extremity: No edema Data Review Lab Results Component Value Date/Time WBC 5.9 12/16/2019 01:07 AM  
 ABS. NEUTROPHILS 3.0 12/16/2019 01:07 AM  
 HGB 10.2 (L) 12/16/2019 01:07 AM  
 HCT 31.8 (L) 12/16/2019 01:07 AM  
 MCV 86.9 12/16/2019 01:07 AM  
 MCH 27.9 12/16/2019 01:07 AM  
 PLATELET 635 (H) 48/64/6617 01:07 AM  
 
Lab Results Component Value Date/Time  Sodium 136 12/16/2019 01:07 AM  
 Potassium 3.7 12/16/2019 01:07 AM  
 Chloride 104 12/16/2019 01:07 AM  
 CO2 27 12/16/2019 01:07 AM  
 Glucose 88 12/16/2019 01:07 AM  
 BUN 3 (L) 12/16/2019 01:07 AM  
 Creatinine 0.35 (L) 12/16/2019 01:07 AM  
 Calcium 8.6 12/16/2019 01:07 AM  
 Albumin 3.0 (L) 12/12/2019 12:35 PM  
 Bilirubin, total 0.3 12/12/2019 12:35 PM  
 AST (SGOT) 13 (L) 12/12/2019 12:35 PM  
 ALT (SGPT) 20 12/12/2019 12:35 PM  
 Alk. phosphatase 97 12/12/2019 12:35 PM  
 
IMPRESSION/PLAN: 
 
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 Colonic perforation/fistulization likely due to progressive disease. Drain in place. The aggressiveness of her tumor is evidence by the fact that she had extensive involvement of the gynecologic organs at the time of diagnosis. CT in AM per Surgery. FEN/GI:  Diet as tolerated ID/Wound: Currently afebrile. On zosyn, polymicrobial abscess pansensitive. ID following. No F/C. Dispostion: Overall, her prognosis looks poor. Appendiceal cancers can be aggressive and don't always respond to therapy. Chemo no hold per med onc. No surgical recs at this time. Continue with percutaneous drainage and abx.    
 
  
OMER Reynolds

## 2019-12-16 NOTE — PROGRESS NOTES
[facility] [place] [address] [address], [address] W: [phone]  F: [phone] HEME/ONC CONSULT Reason for Visit:  
Trish Ndiaye is a 44 y.o. female who is seen in consultation at the request of Dr. Courtney Riddle ER for abdominal pain and met appendix cancer. Treatment History:  
EXPLORATORY LAPAROTOMY SIERRA BSO, TUMOR DEBULKING: ILEOCECAL RESECTION; OMENTECTOMY STAGE: 4 with carcinomatosis History of Present Illness:  
 
Pt seen today in ER consult for metastatic appendix cancer post cycle 1 of 50% dose reduced palliative FOLFOX chemo. In ER due to persistent N/V/ abdominal pain despite multiple outpt IVF treatments. Pt is in chair in large waiting room in ER. States abdominal pain is diffuse throughout abdomin. N/V and cannot each much at all. Able to walk. Labs stable. CT:  
 IMPRESSION: 
1. Persistent small left pleural effusion and left basilar atelectasis. 2. Interval increase in size of left and fluid containing left upper abdominal 
collection, now measuring 6 cm x 6.2 cm. This collection is likely contiguous 
with the descending colon. 3. Persistent irregular soft tissue in the pelvis, tethering multiple loops of 
bowel, unchanged, but consistent with carcinomatosis. Hx of fluid collection drain via IR. INTERIM HX:  Pt seen today for hospital f/u for met appendix cancer post cycle 1 of chemo. Pt is on IV abx for possible abscess/ fistula. In bed sitting up with family at bedside. States doing ok. Due for chemo cycle 2 tmw which will be on hold. Pain controlled/ N/V also controlled. Past Medical History:  
Diagnosis Date  Cancer ([address] APPENDIX CANCER   
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  
Procedure Laterality Date [address] [place] [address] UNLISTED  HX APPENDECTOMY  09/2019  HX GI  09/2019 ILEOCECECTOMY  HX GYN  2003 CYST ON OVARY  HX HYSTERECTOMY  2019  IR THORACENTESIS CATH W IMAGE  11/6/2019 Social History Tobacco Use  Smoking status: Never Smoker  Smokeless tobacco: Never Used Substance Use Topics  Alcohol use: Not Currently Family History Problem Relation Age of Onset  Breast Cancer Paternal Grandmother 56  Crohn's Disease Mother  Heart Disease Mother  Cancer Father BLADDER  
 Diabetes Father  No Known Problems Son  No Known Problems Daughter  Anesth Problems Neg Hx Current Facility-Administered Medications Medication Dose Route Frequency  influenza vaccine 2019-20 (6 mos+)(PF) (FLUARIX/FLULAVAL/FLUZONE QUAD) injection 0.5 mL  0.5 mL IntraMUSCular PRIOR TO DISCHARGE  ondansetron (ZOFRAN) injection 8 mg  8 mg IntraVENous Q8H PRN  prochlorperazine (COMPAZINE) with saline injection 10 mg  10 mg IntraVENous Q6H PRN  
 morphine injection 2 mg  2 mg IntraVENous Q3H PRN  polyethylene glycol (MIRALAX) packet 17 g  17 g Oral DAILY  LORazepam (ATIVAN) tablet 0.5 mg  0.5 mg Oral QHS PRN  potassium chloride SR (KLOR-CON 10) tablet 10 mEq  10 mEq Oral BID  sodium chloride (NS) flush 5-40 mL  5-40 mL IntraVENous Q8H  
 sodium chloride (NS) flush 5-40 mL  5-40 mL IntraVENous PRN  
 acetaminophen (TYLENOL) tablet 650 mg  650 mg Oral Q4H PRN  
 lactated Ringers infusion  50 mL/hr IntraVENous CONTINUOUS  piperacillin-tazobactam (ZOSYN) 3.375 g in 0.9% sodium chloride (MBP/ADV) 100 mL  3.375 g IntraVENous Q8H No Known Allergies Review of Systems: A complete review of systems was obtained, negative except as described above. Physical Exam:  
 
Visit Vitals /86 (BP Patient Position: Sitting) Pulse 95 Temp 97.9 °F (36.6 °C) Resp 16 Ht 5' 3\" (1.6 m) Wt 120 lb 13 oz (54.8 kg) LMP 09/15/2019 SpO2 96% BMI 21.40 kg/m² ECOG PS: 1-2 General: No distress Eyes:  anicteric sclerae HENT: Atraumatic Neck: Supple GI: soft, less distended MS: in bed but usually ambulatory Skin: warm dry Psych: Alert, oriented, appropriate affect, normal judgment/insight Results:  
 
Lab Results Component Value Date/Time WBC 5.9 12/16/2019 01:07 AM  
 HGB 10.2 (L) 12/16/2019 01:07 AM  
 HCT 31.8 (L) 12/16/2019 01:07 AM  
 PLATELET 495 (H) 08/40/4009 01:07 AM  
 MCV 86.9 12/16/2019 01:07 AM  
 ABS. NEUTROPHILS 3.0 12/16/2019 01:07 AM  
 Hemoglobin (POC) 10.8 (L) 09/25/2019 04:11 PM  
 Hematocrit (POC) 32 (L) 09/25/2019 12:10 PM  
 
Lab Results Component Value Date/Time Sodium 136 12/16/2019 01:07 AM  
 Potassium 3.7 12/16/2019 01:07 AM  
 Chloride 104 12/16/2019 01:07 AM  
 CO2 27 12/16/2019 01:07 AM  
 Glucose 88 12/16/2019 01:07 AM  
 BUN 3 (L) 12/16/2019 01:07 AM  
 Creatinine 0.35 (L) 12/16/2019 01:07 AM  
 GFR est AA >60 12/16/2019 01:07 AM  
 GFR est non-AA >60 12/16/2019 01:07 AM  
 Calcium 8.6 12/16/2019 01:07 AM  
 Sodium (POC) 137 09/25/2019 12:10 PM  
 Potassium (POC) 3.7 09/25/2019 12:10 PM  
 Chloride (POC) 105 09/25/2019 12:10 PM  
 Glucose (POC) 85 09/25/2019 12:10 PM  
 BUN (POC) 4 (L) 09/25/2019 12:10 PM  
 Creatinine (POC) 0.5 (L) 09/25/2019 12:10 PM  
 Calcium, ionized (POC) 1.14 09/25/2019 12:10 PM  
 
Lab Results Component Value Date/Time Bilirubin, total 0.3 12/12/2019 12:35 PM  
 ALT (SGPT) 20 12/12/2019 12:35 PM  
 AST (SGOT) 13 (L) 12/12/2019 12:35 PM  
 Alk. phosphatase 97 12/12/2019 12:35 PM  
 Protein, total 7.5 12/12/2019 12:35 PM  
 Albumin 3.0 (L) 12/12/2019 12:35 PM  
 Globulin 4.5 (H) 12/12/2019 12:35 PM  
 
 
CT Results (most recent): 
Results from Hospital Encounter encounter on 12/12/19 CT GUIDE CATH/PERC DRAIN PERITON/RETROPERI FLUID W SI  
 Narrative CLINICAL HISTORY: Left upper quadrant gas and fluid collection.  Appendiceal 
 carcinoma, post tumor debulking, splenic infarction, and prior splenic drainage. COMPARISON: 12/12/2019, multiple prior CTs. PROCEDURE: CT-guided left upper quadrant drainage. CT dose reduction was 
achieved through use of a standardized protocol tailored for this examination 
and automatic exposure control for dose modulation. OPERATORS: Donovan Joaquin M.D.  
 
ESTIMATED BLOOD LOSS: None. ANESTHESIA: buffered lidocaine local anesthetic. The patient was evaluated and felt to be an appropriate candidate for conscious 
sedation. Moderate sedation was achieved with intravenous Versed and Fentanyl, 
and was supervised by Dr. Milly Reis. Continuous independent monitoring was 
performed by a registered nurse assigned to the Department of Radiology using 
automated blood pressure, EKG, and pulse oximetry. Please see preprocedure 
assessment and nursing record in the hospital information system for full 
documentation. Medication: 
Versed: 3 mg Fentanyl: 100 mcg Intraprocedure time: 30 minutes TECHNIQUE AND FINDINGS: 
The patient's relevant allergies, medications, laboratory results, and history 
were reviewed. The patient was identified by name and date of birth. The 
procedure, benefits, risks, and alternatives (including not having the 
procedure) were discussed with the patient and her . Specifically, the 
risk of left empyema was discussed. All of their questions were answered. Informed verbal and written consent was obtained. Focused CT was performed of the left upper quadrant, localizing the fluid and 
gas collection between the spleen, splenic flexure, and stomach. The skin site 
was marked. A time-out procedure using two patient identifiers was performed and 
laterality confirmed.  An anteroinferior intercostal approach was chosen, with a 
superoposterior trajectory toward the collection, in an attempt to traverse the 
 pleural space as low as possible to reduce the risk of empyema. The skin was 
prepped and draped using usual sterile technique. Subcutaneous were anesthetized 
with local anesthetic. Under CT-fluoroscopic guidance, a 22-gauge spinal needle 
was advanced into the collection using the posterosuperior trajectory and 
anesthetizing along the course of the tract. The 22-gauge needle was left in 
place, and an 18-gauge needle was advanced parallel to it and into the 
collection. No fluid could be aspirated using either the 22-gauge or 18-gauge 
needle. The 22-gauge needle was removed. A wire was advanced through the 
18-gauge needle, and the needle was removed. The tract was serially dilated. A 
12 [place] tail catheter was inserted over the wire into the collection. The wire 
was removed. Bloody, particulate, foul-smelling fluid was aspirated. The 
catheter was locked, sutured to the skin, affixed with a stay fix dressing, and 
connected to a drainage bag. There were no immediate complications. The patient 
tolerated the procedure well. Postprocedure diagnosis: Left upper quadrant collection drainage. Impression IMPRESSION: 
Uncomplicated CT-guided drainage of left upper quadrant collection with a 12 [place] locking pigtail catheter. PLEASE NOTE: This is a LOCKING catheter. The pigtail must be unlocked before the 
catheter is removed. Records reviewed and summarized above. Pathology report(s) reviewed above. Radiology report(s) reviewed above. Assessment/PLAN:  
 
1) stage 4 / metastatic appendiceal cancer  
post EXPLORATORY LAPAROTOMY SIERRA BSO, TUMOR DEBULKING: ILEOCECAL RESECTION; OMENTECTOMY 9/25/19 Surgery done for obstruction. Had left rib pain and SOB and on exam decreased bs on LEFT. Got CXR which showed pleural effusion then ultrasound which showed elevated diaphragm and ? Free air. Then got CT which shows fluid collection and pt admitted for this.   Had IR drain of splenic fluid collection. saw [facility] and [facility] for second opinions. Discussed systemic therapy with FOLFOX/ +/- avastin chemo or some variation of this regimen. possible [facility] down the road. Pt choose to have chemo locally and still see [address] [facility],[address]. Pt had cycle 1 of FOLFOX chemotherapy on 12/3/19 reduced by 50%. Pt admitted via ER with N/V/abdominal pain. CT shows worsening fluid collection which was drained via IR. Unsure if this is cause of N/ V/ abdominal pain vs carcinomatosis. But symptoms better now. Case d/w GYN/ONC today. Plan is to continue with chemo / due tmw. Can push chemo out a few days pending how much abx pt needs. Could do chemo Friday maybe/ will check schedule. Goal of care is palliative. 2) left pleural effusion post thora x 2. No cytology. Stable on CT. 3)  Thrombocytosis. Better. 4) N/V better. Antiemetics prn.  
 
5) abdominal pain due to carcinomatosis. Consider palliative care consult while inpt. 6) psychosocial.  Mood good. Coping well considering difficult disease. Great family support. We will follow Call if questions over weekend I appreciate the opportunity to participate in Ms. Kevin Padgett macario.  
 
Signed By: Severo Fleming DO

## 2019-12-16 NOTE — PROGRESS NOTES
Problem: Falls - Risk of 
Goal: *Absence of Falls Description Document Darlin Ramirez Fall Risk and appropriate interventions in the flowsheet. Outcome: Progressing Towards Goal 
Note:  
Fall Risk Interventions: 
 
Medication Interventions: Evaluate medications/consider consulting pharmacy, Teach patient to arise slowly

## 2019-12-16 NOTE — PROGRESS NOTES
Reason for Admission:   Splenic Abscess RRAT Score:     15/ mod Do you (patient/family) have any concerns for transition/discharge? There are no concerns noted at this point. CM will follow and address if this changes Plan for utilizing home health:   TBD/ CM will need to follow Current Advanced Directive/Advance Care Plan:  Full code status/ no advanced care plan is on file Transition of Care Plan: This CM met with patient to discuss CM roles in transition of care. Per patient she lives with her  and is very independent.  David Webber will transport at discharge [phone]. Care Management Interventions PCP Verified by CM: Yes 
Palliative Care Criteria Met (RRAT>21 & CHF Dx)?: No 
Mode of Transport at Discharge: (family) Transition of Care Consult (CM Consult): (TBD/ cm will need to follow) MyChart Signup: Yes Discharge Durable Medical Equipment: No 
Health Maintenance Reviewed: Yes Physical Therapy Consult: No 
Occupational Therapy Consult: No 
Speech Therapy Consult: No 
Current Support Network: Lives with Spouse, Own Home Confirm Follow Up Transport: Self Plan discussed with Pt/Family/Caregiver: Yes Discharge Location Discharge Placement: (TBD/ CM will need to follow) Anahi Madden 10:40 AM

## 2019-12-16 NOTE — PROGRESS NOTES
Hospitalist Progress Note Christo Mast MD 
Answering service: [phone] OR [phone] from in house phone Date of Service:  2019 NAME:  Latia Nance :  1970 MRN:  [id number] PCP: Leslie Wilcox MD 
 
Chief Complaint:  
Chief Complaint Patient presents with  Abdominal Pain Admission Summary:  
 
Latia Nance is a 44 y.o. female who presented with abdominal pain Interval history / Subjective:  
Patient seen for Follow up of  chief complaint abdomen fluid collection, nausea and vomiting. Patient seen and examined by the bedside, Labs, images and notes reviewed Patient is comfortable, afebrile, nausea has improved with Compazine, denies diarrhea. Oral intake is adequate. Discussed with nursing staff, no acute issues overnight, orders reviewed. Assessment & Plan:  
 
Intra-abdominal abscess, left upper quadrant, POA, status post CT-guided drain placed on 2019 by IR. In the setting of of  metastatic appendiceal cancer s/p ex laparotomy, debulking surgery,SIERRA BSO and ileocectomy in  CT remarkable for interval increase in the size of the fluid collection. Gynecology thinks it is due to progressive tumor with just direct extension to the bowel wall which is a very ominous sign. It is unlikely that it is a colonic perforation secondary to debulking surgery since it was in September. Cultures obtained, showing GPC/GNR (polymicrobial earnest). The culture is growing heavy enterococcus and heavy E. coli. Both the bacteria are pansensitive. ID consult noted, continue IV Zosyn. May require further imaging to assess improvement of the abscess size. Cont pain control Oncology states that they will continue chemotherapy once acute issues have resolved. 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 Patient has received 1 cycle of FOLFOX chemotherapy 12/3/2019. Plan is to continue chemo if possible, due next week. Goal of care is palliative as per oncology. Left renal effusion post Thora x2, stable. Thrombocytosisstable [facility] [place] oncology and surgical notes reviewed, no indication for surgery at the moment. Poor prognosis. 
  
Nausea and vomiting Unimproved with Zofran, better with IV Compazine. 
  
Hypokalemia Resolved, repeat lab in the morning. Anemia, chronicanemia of chronic disease, stable, repeat CBC daily. Code status: Full Code DVT prophylaxis: SCDs Care Plan discussed with: Discussed with patient Disposition: [facility] Hospital Problems  Date Reviewed: 2019 Codes Class Noted POA Splenic abscess ICD-10-CM: D73.3 ICD-9-CM: 289.59  2019 Unknown Review of Systems: A comprehensive review of systems was negative. Physical Examination:  
 
General appearance: alert, cooperative, no distress, appears stated age Lungsclear to auscultation, no wheezing Heart: RRR, no murmur gallops or rubs Abdomen: Soft, improved tenderness, no obvious rebound. Drain in place or guarding. Bowel sounds are positive Extremities: No edema Neurologic: Alert and oriented X 3, normal strength and tone. Vital Signs:  
 Last 24hrs VS reviewed since prior progress note. Most recent are: 
 
Visit Vitals /86 (BP Patient Position: Sitting) Pulse 95 Temp 97.9 °F (36.6 °C) Resp 16 Ht 5' 3\" (1.6 m) Wt 54.8 kg (120 lb 13 oz) SpO2 96% BMI 21.40 kg/m² Intake/Output Summary (Last 24 hours) at 2019 1058 Last data filed at 12/15/2019 2034 Gross per 24 hour Intake 888 ml Output 95 ml Net 793 ml Tmax:  Temp (24hrs), Av °F (36.7 °C), Min:97.8 °F (36.6 °C), Max:98.3 °F (36.8 °C) Data Review:  
Data reviewed by myself: 
Ct Chest W Cont Result Date: 2019 EXAM: CT CHEST W CONT, CT ABD PELV W CONT INDICATION: met appendix cancer f/u speen infarct/ clot pre chemo COMPARISON: 11 2/19 and others CONTRAST: 100 mL of Isovue-370. TECHNIQUE: Following the uneventful intravenous administration of contrast, thin axial images were obtained through the chest, abdomen and pelvis. Coronal and sagittal reconstructions were generated. Oral contrast was administered. CT dose reduction was achieved through use of a standardized protocol tailored for this examination and automatic exposure control for dose modulation. FINDINGS: THYROID: No nodule. MEDIASTINUM: No mass or lymphadenopathy. ABNER: No mass or lymphadenopathy. THORACIC AORTA: No dissection or aneurysm. MAIN PULMONARY ARTERY: Normal in caliber. TRACHEA/BRONCHI: Patent. ESOPHAGUS: No wall thickening or dilatation. HEART: Normal in size. PLEURA: Stable left pleural effusion. LUNGS: No nodule, mass, or airspace disease. LIVER: Small hypodensities in the left lobe liver unchanged. There is perihepatic fluid anterior to the left lobe. GALLBLADDER: Unremarkable. SPLEEN: Again noted is a fluid collection occupying the anterior aspect of the spleen consistent with patient's history of infarction and liquefaction. There is persistent locules of air within the collection PANCREAS: No mass or ductal dilatation. ADRENALS: Unremarkable. KIDNEYS: No mass, calculus, or hydronephrosis. STOMACH: In the lateral fundus of the stomach, there is extensive soft tissue thickening possibly reflecting tumor implant. The previously noted fluid collection in the wall stomach has resolved. There is a questionable tract communicating with the splenic fluid collection with multiple locules of air noted within the splenic fluid collection. The overall size of the fluid collection is decreased significantly since the prior study and now measures approximately 42 x 44 mm.  SMALL BOWEL: No dilatation or wall thickening. COLON: There is thickening of the splenic flexure with adjacent peritoneal soft tissue thickening. Possible serosal implants are noted on the sigmoid colon with irregular wall thickening. APPENDIX: Not visualized PERITONEUM: In the anterior pelvis just superior bladder, there is irregular soft tissue around loops of small bowel. Nondependent ascites is noted in the upper abdomen. RETROPERITONEUM: No lymphadenopathy or aortic aneurysm. REPRODUCTIVE ORGANS: URINARY BLADDER: Unremarkable BONES: No destructive bone lesion. ADDITIONAL COMMENTS: N/A IMPRESSION: 1. Interval decreased size of splenic fluid collection. There is extensive irregular soft tissue around the fluid collection which tracks into the lateral wall the gastric fundus. There is persistent locules of gas within the splenic collection and a fistulous communication with the stomach is not entirely excluded. In addition, there is increased soft tissue thickening around the lateral wall the stomach suggestive of tumor deposit. 2.  Increased soft tissue thickening around the splenic flexure of the colon with peritoneal thickening and nondependent studies suspicious for peritoneal carcinomatosis. Additional soft tissue densities noted in the anterior pelvis and around the sigmoid colon as well as loops of small bowel suspicious for peritoneal carcinomatosis as well. 3.  Left pleural effusion is stable. Ct Abd Pelv W Cont Result Date: 12/12/2019 INDICATION: Abdominal pain, history of appendiceal CA, recent surgery, CT of the abdomen and pelvis is performed with 5 mm collimation. Study is performed with 100 cc of nonionic Isovue 370. Sagittal and coronal reformatted images were also performed. CT dose reduction was achieved with the use of the standardized protocol tailored for this examination and automatic exposure control for dose modulation.  Adaptive statistical iterative reconstruction (ASIR) was utilized. Direct comparison is made to prior CT dated 11/26/2019. Findings: Lung bases: There is a very small left pleural effusion, as seen on prior CT dated 11/2019. There is no right pleural fluid. There is persistent mild left basilar atelectasis. Liver: There is a 1.9 cm cyst in the left hepatic lobe. There is a 7 mm hypodensity in the left hepatic lobe, too small to further characterize, but unchanged compared to prior CT dated 11/2019. Adrenals: Adrenal glands are normal. Pancreas: The pancreas is normal. Gallbladder: The gallbladder is normal. Kidneys: The kidneys are normal. Bowel/Spleen: There is persistent hypodense attenuation within the anterior aspect of the spleen consistent patient's known history of infarction. There is a gas and fluid containing collection anterior to the spleen and measuring approximately 6 cm x 6.2 cm and measuring approximately 5.3 cm x 4.6 cm on prior CT dated November 2019. There is irregular stranding and soft tissue adjacent to this collection. There is persistent thickening of the colon at the level of the splenic flexure. There appears to be a connection between the left upper quadrant focal fluid collection and the descending colon. No dilated loop of large or small bowel is visualized. There is persistent irregular soft tissue in the pelvis tethering multiple loops of bowel. Urinary bladder: Urinary bladder is partially filled and grossly normal.  
 
IMPRESSION: 1. Persistent small left pleural effusion and left basilar atelectasis. 2. Interval increase in size of left and fluid containing left upper abdominal collection, now measuring 6 cm x 6.2 cm. This collection is likely contiguous with the descending colon. 3. Persistent irregular soft tissue in the pelvis, tethering multiple loops of bowel, unchanged, but consistent with carcinomatosis. Ct Abd Pelv W Cont Result Date: 11/26/2019 dilatation or wall thickening. COLON: There is thickening of the splenic flexure with adjacent peritoneal soft tissue thickening. Possible serosal implants are noted on the sigmoid colon with irregular wall thickening. APPENDIX: Not visualized PERITONEUM: In the anterior pelvis just superior bladder, there is irregular soft tissue around loops of small bowel. Nondependent ascites is noted in the upper abdomen. RETROPERITONEUM: No lymphadenopathy or aortic aneurysm. REPRODUCTIVE ORGANS: URINARY BLADDER: Unremarkable BONES: No destructive bone lesion. ADDITIONAL COMMENTS: N/A IMPRESSION: 1. Interval decreased size of splenic fluid collection. There is extensive irregular soft tissue around the fluid collection which tracks into the lateral wall the gastric fundus. There is persistent locules of gas within the splenic collection and a fistulous communication with the stomach is not entirely excluded. In addition, there is increased soft tissue thickening around the lateral wall the stomach suggestive of tumor deposit. 2.  Increased soft tissue thickening around the splenic flexure of the colon with peritoneal thickening and nondependent studies suspicious for peritoneal carcinomatosis. Additional soft tissue densities noted in the anterior pelvis and around the sigmoid colon as well as loops of small bowel suspicious for peritoneal carcinomatosis as well. 3.  Left pleural effusion is stable. [facility] Result Date: 11/18/2019 EXAM: XR CHEST PORT INDICATION: port placement COMPARISON: 11/6/2019 FINDINGS: A portable AP radiograph of the chest was obtained at 1145 hours. The patient is on a cardiac monitor. A new left subclavian Port-A-Cath has been place. The tip is in the region of the cavoatrial junction. No pneumothorax identified. There is a small left pleural effusion. . The cardiac and mediastinal contours and pulmonary vascularity are normal.  The bones and soft tissues are grossly within normal limits. IMPRESSION: New left subclavian Port-A-Cath without evidence of pneumothorax. Small left pleural effusion. Nc Xr Technologist Service Result Date: 11/19/2019 Fluoroscopy was utilized. IMPRESSION:  FLUOROSCOPY WAS USED. Fluoro Dose:  0.17 mGy vk Lab Results Component Value Date/Time Specimen Description: VAGINA 08/01/2009 11:45 AM  
 Specimen Description: URINE 08/01/2009 11:45 AM  
 
Lab Results Component Value Date/Time Culture result: HEAVY ESCHERICHIA COLI (A) 12/13/2019 02:30 PM  
 Culture result: HEAVY ENTEROCOCCUS FAECIUM GROUP D (A) 12/13/2019 02:30 PM  
 Culture result: CHECKING FOR POSSIBLE ANAEROBIC GRAM NEGATIVE RODS (A) 12/13/2019 02:18 PM  
 
All Micro Results Procedure Component Value Units Date/Time CULTURE, BODY FLUID [facility] STAIN [[id number]]  (Abnormal)  (Susceptibility) Collected:  12/13/19 1430 Order Status:  Completed Specimen:  Miscellaneous Fluid Updated:  12/15/19 1459 Special Requests: NO SPECIAL REQUESTS     
  GRAM STAIN 3+ WBCS SEEN     
      
  RARE EPITHELIAL CELLS SEEN 2+ GRAM POSITIVE RODS     
      
  RARE GRAM POSITIVE COCCI IN PAIRS  
     
      
  RARE APPARENT GRAM NEGATIVE RODS Culture result: HEAVY ESCHERICHIA COLI     
      
  HEAVY ENTEROCOCCUS FAECIUM GROUP D  
     
 CULTURE, ANAEROBIC [[id number]]  (Abnormal) Collected:  12/13/19 1418 Order Status:  Completed Specimen:  Abdominal Fluid Updated:  12/15/19 1009 Special Requests: NO SPECIAL REQUESTS Culture result: CHECKING FOR POSSIBLE ANAEROBIC GRAM NEGATIVE RODS URINE CULTURE HOLD SAMPLE [[id number]] Collected:  12/12/19 1432 Order Status:  Completed Specimen:  Urine from Serum Updated:  12/12/19 1447 Urine culture hold URINE ON HOLD IN MICROBIOLOGY DEPT FOR 3 DAYS.  IF UNPRESERVED URINE IS SUBMITTED, IT CANNOT BE USED FOR ADDITIONAL TESTING AFTER 24 HRS, RECOLLECTION WILL BE REQUIRED. Labs: reviewed by myself. Recent Labs 12/16/19 
0107 12/15/19 
4569 WBC 5.9 5.4 HGB 10.2* 10.4* HCT 31.8* 31.8* * 437* Recent Labs 12/16/19 
0107 12/15/19 
6561 12/14/19 
0216  134* 137  
K 3.7 3.5 3.8  100 101 CO2 27 27 26 BUN 3* 2* 4*  
CREA 0.35* 0.37* 0.39* GLU 88 85 76  
CA 8.6 8.7 8.4* No results for input(s): SGOT, GPT, ALT, AP, TBIL, TBILI, TP, ALB, GLOB, GGT, AML, LPSE in the last 72 hours. No lab exists for component: AMYP, HLPSE No results for input(s): INR, PTP, APTT, INREXT, INREXT in the last 72 hours. No results for input(s): FE, TIBC, PSAT, FERR in the last 72 hours. No results found for: FOL, RBCF No results for input(s): PH, PCO2, PO2 in the last 72 hours. No results for input(s): CPK, CKNDX, TROIQ in the last 72 hours. No lab exists for component: CPKMB No results found for: CHOL, CHOLX, CHLST, CHOLV, HDL, HDLP, LDL, LDLC, DLDLP, TGLX, TRIGL, TRIGP, CHHD, CHHDX Lab Results Component Value Date/Time Glucose (POC) 85 09/25/2019 12:10 PM  
 
Lab Results Component Value Date/Time Color DARK YELLOW 12/12/2019 02:32 PM  
 Appearance CLOUDY (A) 12/12/2019 02:32 PM  
 Specific gravity 1.025 12/12/2019 02:32 PM  
 Specific gravity 1.010 10/15/2019 04:32 PM  
 pH (UA) 6.0 12/12/2019 02:32 PM  
 Protein NEGATIVE  12/12/2019 02:32 PM  
 Glucose NEGATIVE  12/12/2019 02:32 PM  
 Ketone 15 (A) 12/12/2019 02:32 PM  
 Bilirubin NEGATIVE  10/15/2019 04:32 PM  
 Urobilinogen 1.0 12/12/2019 02:32 PM  
 Nitrites NEGATIVE  12/12/2019 02:32 PM  
 Leukocyte Esterase SMALL (A) 12/12/2019 02:32 PM  
 Epithelial cells FEW 12/12/2019 02:32 PM  
 Bacteria NEGATIVE  12/12/2019 02:32 PM  
 WBC 0-4 12/12/2019 02:32 PM  
 RBC 0-5 12/12/2019 02:32 PM  
 
 
 
Medications Reviewed:  
 
Current Facility-Administered Medications Medication Dose Route Frequency  influenza vaccine 2019-20 (6 mos+)(PF) (FLUARIX/FLULAVAL/FLUZONE QUAD) injection 0.5 mL  0.5 mL IntraMUSCular PRIOR TO DISCHARGE  ondansetron (ZOFRAN) injection 8 mg  8 mg IntraVENous Q8H PRN  prochlorperazine (COMPAZINE) with saline injection 10 mg  10 mg IntraVENous Q6H PRN  
 morphine injection 2 mg  2 mg IntraVENous Q3H PRN  polyethylene glycol (MIRALAX) packet 17 g  17 g Oral DAILY  LORazepam (ATIVAN) tablet 0.5 mg  0.5 mg Oral QHS PRN  potassium chloride SR (KLOR-CON 10) tablet 10 mEq  10 mEq Oral BID  sodium chloride (NS) flush 5-40 mL  5-40 mL IntraVENous Q8H  
 sodium chloride (NS) flush 5-40 mL  5-40 mL IntraVENous PRN  
 acetaminophen (TYLENOL) tablet 650 mg  650 mg Oral Q4H PRN  
 lactated Ringers infusion  50 mL/hr IntraVENous CONTINUOUS  piperacillin-tazobactam (ZOSYN) 3.375 g in 0.9% sodium chloride (MBP/ADV) 100 mL  3.375 g IntraVENous Q8H  
 
______________________________________________________________________ EXPECTED LENGTH OF STAY: - - - 
ACTUAL LENGTH OF STAY:          4 Jimbo Pruitt MD  
 
Patient's emergency contacts: 
Extended Emergency Contact Information Primary Emergency Contact: Arin Villar Address: [address] [place] [address], [address] Home Phone: [phone] Relation: Spouse

## 2019-12-16 NOTE — PROGRESS NOTES
ID Progress Note 2019 Subjective: She states that she had a good day today Objective: Antibiotics: 1. Zosyn Vitals:  
Visit Vitals /90 (BP Patient Position: At rest) Pulse 89 Temp 98.5 °F (36.9 °C) Resp 16 Ht 5' 3\" (1.6 m) Wt 54.8 kg (120 lb 13 oz) LMP 09/15/2019 SpO2 97% BMI 21.40 kg/m² Tmax:  Temp (24hrs), Av °F (36.7 °C), Min:97.8 °F (36.6 °C), Max:98.5 °F (36.9 °C) Exam:  Lungs:  clear to auscultation bilaterally Heart:  regular rate and rhythm Abdomen:  soft, non-tender. Bowel sounds normal. No masses,  no organomegaly, purulent drainage in tubing Skin:  no rash or abnormalities Labs:     
Recent Labs 19 
0107 12/15/19 
6584 19 
0216 WBC 5.9 5.4 6.3 HGB 10.2* 10.4* 10.3* * 437* 433* BUN 3* 2* 4*  
CREA 0.35* 0.37* 0.39* Cultures:  
 
Lab Results Component Value Date/Time Specimen Description: VAGINA 2009 11:45 AM  
 Specimen Description: URINE 2009 11:45 AM  
 
Lab Results Component Value Date/Time Culture result: HEAVY ESCHERICHIA COLI (A) 2019 02:30 PM  
 Culture result: HEAVY ENTEROCOCCUS FAECIUM GROUP D (A) 2019 02:30 PM  
 Culture result: (A) 2019 02:18 PM  
  LIGHT ANAEROBIC GRAM NEGATIVE RODS BETA LACTAMASE POSITIVE Radiology:  
 
Line/Insert Date:        
 
Assessment: 1. Appendiceal carcinoma, advanced 2. Enteric fistula 3. Splenic lesion Objective: 1. Continue current therapy 2. Repeat imaging pending Dwight Dyer MD

## 2019-12-17 ENCOUNTER — APPOINTMENT (OUTPATIENT)
Dept: CT IMAGING | Age: 49
DRG: 374 | End: 2019-12-17
Attending: NURSE PRACTITIONER
Payer: COMMERCIAL

## 2019-12-17 LAB
ANION GAP SERPL CALC-SCNC: 5 MMOL/L (ref 5–15)
BASOPHILS # BLD: 0 K/UL (ref 0–0.1)
BASOPHILS NFR BLD: 1 % (ref 0–1)
BUN SERPL-MCNC: 4 MG/DL (ref 6–20)
BUN/CREAT SERPL: 11 (ref 12–20)
CALCIUM SERPL-MCNC: 8.9 MG/DL (ref 8.5–10.1)
CHLORIDE SERPL-SCNC: 106 MMOL/L (ref 97–108)
CO2 SERPL-SCNC: 26 MMOL/L (ref 21–32)
CREAT SERPL-MCNC: 0.37 MG/DL (ref 0.55–1.02)
DIFFERENTIAL METHOD BLD: ABNORMAL
EOSINOPHIL # BLD: 0.2 K/UL (ref 0–0.4)
EOSINOPHIL NFR BLD: 4 % (ref 0–7)
ERYTHROCYTE [DISTWIDTH] IN BLOOD BY AUTOMATED COUNT: 16.4 % (ref 11.5–14.5)
GLUCOSE SERPL-MCNC: 103 MG/DL (ref 65–100)
HCT VFR BLD AUTO: 32.2 % (ref 35–47)
HGB BLD-MCNC: 10.3 G/DL (ref 11.5–16)
IMM GRANULOCYTES # BLD AUTO: 0.1 K/UL (ref 0–0.04)
IMM GRANULOCYTES NFR BLD AUTO: 1 % (ref 0–0.5)
LYMPHOCYTES # BLD: 1.5 K/UL (ref 0.8–3.5)
LYMPHOCYTES NFR BLD: 27 % (ref 12–49)
MCH RBC QN AUTO: 27.8 PG (ref 26–34)
MCHC RBC AUTO-ENTMCNC: 32 G/DL (ref 30–36.5)
MCV RBC AUTO: 87 FL (ref 80–99)
MONOCYTES # BLD: 0.6 K/UL (ref 0–1)
MONOCYTES NFR BLD: 12 % (ref 5–13)
NEUTS SEG # BLD: 3 K/UL (ref 1.8–8)
NEUTS SEG NFR BLD: 55 % (ref 32–75)
NRBC # BLD: 0 K/UL (ref 0–0.01)
NRBC BLD-RTO: 0 PER 100 WBC
PLATELET # BLD AUTO: 458 K/UL (ref 150–400)
PMV BLD AUTO: 8.7 FL (ref 8.9–12.9)
POTASSIUM SERPL-SCNC: 3.4 MMOL/L (ref 3.5–5.1)
RBC # BLD AUTO: 3.7 M/UL (ref 3.8–5.2)
SODIUM SERPL-SCNC: 137 MMOL/L (ref 136–145)
WBC # BLD AUTO: 5.4 K/UL (ref 3.6–11)

## 2019-12-17 PROCEDURE — 74011250636 HC RX REV CODE- 250/636: Performed by: STUDENT IN AN ORGANIZED HEALTH CARE EDUCATION/TRAINING PROGRAM

## 2019-12-17 PROCEDURE — 74011250636 HC RX REV CODE- 250/636: Performed by: INTERNAL MEDICINE

## 2019-12-17 PROCEDURE — 74011000258 HC RX REV CODE- 258: Performed by: STUDENT IN AN ORGANIZED HEALTH CARE EDUCATION/TRAINING PROGRAM

## 2019-12-17 PROCEDURE — 65270000029 HC RM PRIVATE

## 2019-12-17 PROCEDURE — 36415 COLL VENOUS BLD VENIPUNCTURE: CPT

## 2019-12-17 PROCEDURE — 85025 COMPLETE CBC W/AUTO DIFF WBC: CPT

## 2019-12-17 PROCEDURE — 74011636320 HC RX REV CODE- 636/320: Performed by: RADIOLOGY

## 2019-12-17 PROCEDURE — 74177 CT ABD & PELVIS W/CONTRAST: CPT

## 2019-12-17 PROCEDURE — 80048 BASIC METABOLIC PNL TOTAL CA: CPT

## 2019-12-17 PROCEDURE — 74011000258 HC RX REV CODE- 258: Performed by: RADIOLOGY

## 2019-12-17 RX ORDER — POTASSIUM CHLORIDE 750 MG/1
40 TABLET, FILM COATED, EXTENDED RELEASE ORAL DAILY
Status: DISCONTINUED | OUTPATIENT
Start: 2019-12-17 | End: 2019-12-18 | Stop reason: HOSPADM

## 2019-12-17 RX ORDER — SODIUM CHLORIDE 0.9 % (FLUSH) 0.9 %
10 SYRINGE (ML) INJECTION
Status: COMPLETED | OUTPATIENT
Start: 2019-12-17 | End: 2019-12-17

## 2019-12-17 RX ADMIN — IOPAMIDOL 100 ML: 755 INJECTION, SOLUTION INTRAVENOUS at 11:53

## 2019-12-17 RX ADMIN — Medication 10 ML: at 06:00

## 2019-12-17 RX ADMIN — Medication 10 ML: at 09:21

## 2019-12-17 RX ADMIN — PIPERACILLIN AND TAZOBACTAM 3.38 G: 3; .375 INJECTION, POWDER, FOR SOLUTION INTRAVENOUS at 01:30

## 2019-12-17 RX ADMIN — PIPERACILLIN AND TAZOBACTAM 3.38 G: 3; .375 INJECTION, POWDER, FOR SOLUTION INTRAVENOUS at 09:25

## 2019-12-17 RX ADMIN — SODIUM CHLORIDE 100 ML: 900 INJECTION, SOLUTION INTRAVENOUS at 11:53

## 2019-12-17 RX ADMIN — Medication 10 ML: at 11:53

## 2019-12-17 RX ADMIN — Medication 10 ML: at 21:12

## 2019-12-17 RX ADMIN — PIPERACILLIN AND TAZOBACTAM 3.38 G: 3; .375 INJECTION, POWDER, FOR SOLUTION INTRAVENOUS at 16:55

## 2019-12-17 RX ADMIN — SODIUM CHLORIDE, SODIUM LACTATE, POTASSIUM CHLORIDE, AND CALCIUM CHLORIDE 50 ML/HR: 600; 310; 30; 20 INJECTION, SOLUTION INTRAVENOUS at 21:14

## 2019-12-17 NOTE — PROGRESS NOTES
NUTRITION COMPLETE ASSESSMENT 
 
RECOMMENDATIONS:  
1. Pt to try Ensure Clear between meals - RD to add Patient meets criteria for severe Protein Calorie Malnutrition as evidenced by:  
ASPEN Malnutrition Criteria Acute Illness, Chronic Illness, or Social/Enviornmental: Chronic illness Energy Intake: Less than/equal to 75% of est energy req for greater than/equal to 1 month Weight Loss: Greater than 7.5% x 3 mos Body Fat Loss: Severe Muscle Mass Loss: Severe ASPEN Malnutrition Score - Chronic Illness: 24 Chronic Illness - Malnutrition Diagnosis: Severe malnutrition. Interventions/Plan:  
Food/Nutrient Delivery:  General/healthful diet Commercial supplement Assessment:  
Reason for Assessment: Reassessment Diet: Regular Supplements: none at this time Meal Intake: No data found. - reports eating well for past couple days Subjective: 
Standing scale wt at home was 113 lb. N/V started after chemo, but they aren't sure if it is completely r/t to the chemo or everything that is going on at present. Milky supplements make me sick. Objective: 
44 y.o. female admitted with Splenic abscess [D73.3] Abdominal fluid collection, LUQ with possible fistula In the setting of of  metastatic appendiceal cancer s/p ex laparotomy, debulking surgery,SIERRA BSO and ileocectomy in 9/19 Per Gynecology likely d/t progressive tumor with direct extension to the bowel wall. .unlikely that it is a colonic perforation secondary to debulking surgery since it was in September. Oncology states that they will continue chemotherapy once acute issues have resolved. Pt followed by OP oncology dietitian. 20# weight loss since surgery in September. Struggling with on/off diarrhea. Has tried supplements in past, stating they are OK. N/V controlled at this time. Ate a good breakfast including eggs. Wants a turkey sandwich for lunch.   Receptive to trying Ensure Clear to increase kcal/protein. Understands need to take smaller, more frequent meals. Nutritionally Significant Medications:  
LR @ 50 mL/hr, zosyn, miralax, compazine, klor Intake/Output: 
 
Intake/Output Summary (Last 24 hours) at 12/17/2019 1516 Last data filed at 12/16/2019 2200 Gross per 24 hour Intake 10 ml Output 50 ml Net -40 ml Last BM: 12/16 Physical Findings: 
 
Nutrition focused physical exam: cachectic Edema: none Abdomen: tender Skin Integrity: intact Anthropometrics: 
Weight Loss Metrics 12/13/2019 12/12/2019 12/3/2019 12/3/2019 11/18/2019 11/14/2019 11/11/2019 Today's Wt 120 lb 13 oz - 120 lb 120 lb 121 lb 125 lb 9.6 oz 126 lb 3.2 oz  
BMI - 21.4 kg/m2 21.26 kg/m2 21.26 kg/m2 21.43 kg/m2 22.97 kg/m2 22.36 kg/m2 Height: 5' 3\" (160 cm), Body mass index is 21.4 kg/m². IBW : 52.2 kg (115 lb), % IBW (Calculated): 105.05 % Usual Body Weight: 63.5 kg (140 lb),   
 
Labs:   
 
Recent Labs 12/17/19 
9601 12/16/19 
0107 12/15/19 
3131 * 88 85 BUN 4* 3* 2*  
CREA 0.37* 0.35* 0.37*  136 134* K 3.4* 3.7 3.5  104 100 CO2 26 27 27  
CA 8.9 8.6 8.7 No results for input(s): SGOT, GPT, ALT, AP, TBIL, TBILI, TP, ALB, GLOB, GGT, AML, LPSE in the last 72 hours. No lab exists for component: AMYP, HLPSE No results for input(s): LAC in the last 72 hours. Recent Labs 12/17/19 
0434 12/16/19 
0107 WBC 5.4 5.9 HGB 10.3* 10.2* HCT 32.2* 31.8* * 438* No results for input(s): PREALB in the last 72 hours. No results for input(s): TRIGL in the last 72 hours. No results for input(s): GLUCPOC in the last 72 hours. No results found for: HBA1C, HGBE8, RBB9LOLJ, ZUX8IXLO, LVE1CCXJ Cultural, Yazidism and ethnic food preferences identified: None Food Allergies: NKFA Diet Restrictions: Cultural/Jew Preference(s): None Estimated Nutrition Needs:  
Kcals/day: 1600 Kcals/day(-1900 kcal/day) Protein: 65 g(-90 gm/day using 1.2-1.6 gm/kg) Fluid: 1600 ml(30 mL/kg) Based On: Kcal/kg - specify (Comment)(30-35 kcal/kg) Weight Used: Actual wt(54.8 kg) Pt expected to meet estimated nutrient needs:  Unable to predict at this time Nutrition Diagnosis: 1. Altered GI function related to recent surgery as evidenced by diarrhea and anatomical changes 2. Inadequate oral intake related to increased energy needs, disease process as evidenced by previous weight loss of 20# over 10 weeks with additional 7 lbs since that time 3.   related to   as evidenced by   
 
Goals:   
 PO >/=50% at meals; 2 supplements daily between meals Monitoring & Evaluation: Total energy intake, Oral fluids amount, Protein intake Weight/weight change, Electrolyte and renal profile, GI 
  
 
Previous Nutrition Goals Met:   Yes Previous Recommendations:    Progressing Education & Discharge Needs: 
 [] None Identified 
 [x] Identified and addressed [x] Participated in care plan, discharge planning, and/or interdisciplinary rounds Sammie Garza RD

## 2019-12-17 NOTE — PROGRESS NOTES
Hospitalist Progress Note Clarisa Gutierrez MD 
Answering service: [phone] OR [phone] from in house phone Date of Service:  2019 NAME:  Lang Suarez :  1970 MRN:  [id number] PCP: Jacque Araya MD 
 
 
 
Admission Summary:  
 
Lang Suarez is a 44 y.o. female who presented with abdominal pain Interval history / Subjective:  
Patient seen for Follow up: Intra-abdominal abscess 
-Patient denies pain, fever. No more nausea or vomiting for 4 days now. States she has a good appetite, she is up and walking. Assessment & Plan:  
 
Intra-abdominal abscess, splenic abscess , POA, status post CT-guided drain placed on 2019 by IR. In the setting of of  metastatic appendiceal cancer s/p ex laparotomy, debulking surgery,SIERRA BSO and ileocectomy in  CT remarkable for interval increase in the size of the fluid collection. Gynecology thinks it is due to progressive tumor with just direct extension to the bowel wall which is a very ominous sign. It is unlikely that it is a colonic perforation secondary to debulking surgery since it was in September. Cultures obtained, showing enterococcus and different E. coli species  (polymicrobial earnest). Both the bacteria are pansensitive. ID consult noted, continue IV Zosyn. Follow-up abdominal CT today,  Oncology states that they will continue chemotherapy once acute issues have resolved. 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 Patient has received 1 cycle of FOLFOX chemotherapy 12/3/2019. Plan is to continue chemo if possible, due next week. Goal of care is palliative as per oncology. Left renal effusion post Thora x2, stable. Thrombocytosisstable [facility] oncology and surgical notes reviewed, no indication for surgery at the moment. Poor prognosis. 
  
Nausea and vomiting, resolved Unimproved with Zofran, better with IV Compazine. 
  
Hypokalemia, mild continue to replace Resolved, repeat lab in the morning. Anemia, chronicanemia of chronic disease, stable, repeat CBC daily. Body mass index is 21.4 kg/m². Code status: Full Code DVT prophylaxis: SCDs Care Plan discussed with: Discussed with patient Disposition: Anticipate discharge to home in 1 to 2 days. She most probably needs to be discharged with a drainage catheter in place. Hospital Problems  Date Reviewed: 11/11/2019 Codes Class Noted POA Splenic abscess ICD-10-CM: D73.3 ICD-9-CM: 289.59  12/12/2019 Unknown Review of Systems: A comprehensive review of systems was negative. Physical Examination:  
GENERAL:  Alert, oriented, cooperative, no apparent distress HEENT:  Normocephalic, atraumatic, non icteric sclerae, non pallor conjuctivae, EOMs intact, PERRLA. NECK: Supple, trachea midline, no adenopathy, no thyromegally or tenderness, no carotid bruit and no JVD. LUNGS:   Port-A-Cath left upper chest, not accessed. Vesicular breath sounds bilaterally, no added sounds. HEART:   S1 and S2 well heard,RRR,  no murmur, click, rub or gallop. ABDOMEB:   Abscess drainage catheter on the left lateral upper abdomen, attached to a bag draining purulent material.  Healed midline surgical scar. Otherwise abdomen is soft, non-tender. Normoactive bowel sounds. No masses,  No organomegaly. EXTREMETIES: No edema, no calf swelling or tenderness PULSES: 2+ and symmetric all extremities. SKIN:  No rashes or lesions NEUROLOGY: Alert and oriented to PPT, CNII-XII intact. Motor and sensory exam grossly intact. Vital Signs:  
 Last 24hrs VS reviewed since prior progress note. Most recent are: 
 
Visit Vitals /88 (BP 1 Location: Right arm, BP Patient Position: At rest) Pulse 89 Temp 98.1 °F (36.7 °C) Resp 16 Ht 5' 3\" (1.6 m) Wt 54.8 kg (120 lb 13 oz) SpO2 98% BMI 21.40 kg/m² Intake/Output Summary (Last 24 hours) at 2019 3620 Last data filed at 2019 2200 Gross per 24 hour Intake 10 ml Output 50 ml Net -40 ml Tmax:  Temp (24hrs), Av.3 °F (36.8 °C), Min:98 °F (36.7 °C), Max:98.5 °F (36.9 °C) Data Review:  
Data reviewed by myself: 
Ct Chest W Cont Result Date: 2019 EXAM: CT CHEST W CONT, CT ABD PELV W CONT INDICATION: met appendix cancer f/u speen infarct/ clot pre chemo COMPARISON:  and others CONTRAST: 100 mL of Isovue-370. TECHNIQUE: Following the uneventful intravenous administration of contrast, thin axial images were obtained through the chest, abdomen and pelvis. Coronal and sagittal reconstructions were generated. Oral contrast was administered. CT dose reduction was achieved through use of a standardized protocol tailored for this examination and automatic exposure control for dose modulation. FINDINGS: THYROID: No nodule. MEDIASTINUM: No mass or lymphadenopathy. ABNER: No mass or lymphadenopathy. THORACIC AORTA: No dissection or aneurysm. MAIN PULMONARY ARTERY: Normal in caliber. TRACHEA/BRONCHI: Patent. ESOPHAGUS: No wall thickening or dilatation. HEART: Normal in size. PLEURA: Stable left pleural effusion. LUNGS: No nodule, mass, or airspace disease. LIVER: Small hypodensities in the left lobe liver unchanged. There is perihepatic fluid anterior to the left lobe. GALLBLADDER: Unremarkable. SPLEEN: Again noted is a fluid collection occupying the anterior aspect of the spleen consistent with patient's history of infarction and liquefaction. There is persistent locules of air within the collection PANCREAS: No mass or ductal dilatation. ADRENALS: Unremarkable. KIDNEYS: No mass, calculus, or hydronephrosis. STOMACH: In the lateral fundus of the stomach, there is extensive soft tissue thickening possibly reflecting tumor implant. The previously noted fluid collection in the wall stomach has resolved.  There is a questionable tract communicating with the splenic fluid collection with multiple locules of air noted within the splenic fluid collection. The overall size of the fluid collection is decreased significantly since the prior study and now measures approximately 42 x 44 mm. SMALL BOWEL: No dilatation or wall thickening. COLON: There is thickening of the splenic flexure with adjacent peritoneal soft tissue thickening. Possible serosal implants are noted on the sigmoid colon with irregular wall thickening. APPENDIX: Not visualized PERITONEUM: In the anterior pelvis just superior bladder, there is irregular soft tissue around loops of small bowel. Nondependent ascites is noted in the upper abdomen. RETROPERITONEUM: No lymphadenopathy or aortic aneurysm. REPRODUCTIVE ORGANS: URINARY BLADDER: Unremarkable BONES: No destructive bone lesion. ADDITIONAL COMMENTS: N/A IMPRESSION: 1. Interval decreased size of splenic fluid collection. There is extensive irregular soft tissue around the fluid collection which tracks into the lateral wall the gastric fundus. There is persistent locules of gas within the splenic collection and a fistulous communication with the stomach is not entirely excluded. In addition, there is increased soft tissue thickening around the lateral wall the stomach suggestive of tumor deposit. 2.  Increased soft tissue thickening around the splenic flexure of the colon with peritoneal thickening and nondependent studies suspicious for peritoneal carcinomatosis. Additional soft tissue densities noted in the anterior pelvis and around the sigmoid colon as well as loops of small bowel suspicious for peritoneal carcinomatosis as well. 3.  Left pleural effusion is stable. Ct Abd Pelv W Cont Result Date: 12/12/2019 INDICATION: Abdominal pain, history of appendiceal CA, recent surgery, CT of the abdomen and pelvis is performed with 5 mm collimation.  Study is performed with 100 cc of nonionic Isovue 370. Sagittal and coronal reformatted images were also performed. CT dose reduction was achieved with the use of the standardized protocol tailored for this examination and automatic exposure control for dose modulation. Adaptive statistical iterative reconstruction (ASIR) was utilized. Direct comparison is made to prior CT dated 11/26/2019. Findings: Lung bases: There is a very small left pleural effusion, as seen on prior CT dated 11/2019. There is no right pleural fluid. There is persistent mild left basilar atelectasis. Liver: There is a 1.9 cm cyst in the left hepatic lobe. There is a 7 mm hypodensity in the left hepatic lobe, too small to further characterize, but unchanged compared to prior CT dated 11/2019. Adrenals: Adrenal glands are normal. Pancreas: The pancreas is normal. Gallbladder: The gallbladder is normal. Kidneys: The kidneys are normal. Bowel/Spleen: There is persistent hypodense attenuation within the anterior aspect of the spleen consistent patient's known history of infarction. There is a gas and fluid containing collection anterior to the spleen and measuring approximately 6 cm x 6.2 cm and measuring approximately 5.3 cm x 4.6 cm on prior CT dated November 2019. There is irregular stranding and soft tissue adjacent to this collection. There is persistent thickening of the colon at the level of the splenic flexure. There appears to be a connection between the left upper quadrant focal fluid collection and the descending colon. No dilated loop of large or small bowel is visualized. There is persistent irregular soft tissue in the pelvis tethering multiple loops of bowel. Urinary bladder: Urinary bladder is partially filled and grossly normal.  
 
IMPRESSION: 1. Persistent small left pleural effusion and left basilar atelectasis. 2. Interval increase in size of left and fluid containing left upper abdominal collection, now measuring 6 cm x 6.2 cm.  This collection is likely contiguous with the descending colon. 3. Persistent irregular soft tissue in the pelvis, tethering multiple loops of bowel, unchanged, but consistent with carcinomatosis. Ct Abd Pelv W Cont Result Date: 11/26/2019 EXAM: CT CHEST W CONT, CT ABD PELV W CONT INDICATION: met appendix cancer f/u speen infarct/ clot pre chemo COMPARISON: 11 2/19 and others CONTRAST: 100 mL of Isovue-370. TECHNIQUE: Following the uneventful intravenous administration of contrast, thin axial images were obtained through the chest, abdomen and pelvis. Coronal and sagittal reconstructions were generated. Oral contrast was administered. CT dose reduction was achieved through use of a standardized protocol tailored for this examination and automatic exposure control for dose modulation. FINDINGS: THYROID: No nodule. MEDIASTINUM: No mass or lymphadenopathy. ABNER: No mass or lymphadenopathy. THORACIC AORTA: No dissection or aneurysm. MAIN PULMONARY ARTERY: Normal in caliber. TRACHEA/BRONCHI: Patent. ESOPHAGUS: No wall thickening or dilatation. HEART: Normal in size. PLEURA: Stable left pleural effusion. LUNGS: No nodule, mass, or airspace disease. LIVER: Small hypodensities in the left lobe liver unchanged. There is perihepatic fluid anterior to the left lobe. GALLBLADDER: Unremarkable. SPLEEN: Again noted is a fluid collection occupying the anterior aspect of the spleen consistent with patient's history of infarction and liquefaction. There is persistent locules of air within the collection PANCREAS: No mass or ductal dilatation. ADRENALS: Unremarkable. KIDNEYS: No mass, calculus, or hydronephrosis. STOMACH: In the lateral fundus of the stomach, there is extensive soft tissue thickening possibly reflecting tumor implant. The previously noted fluid collection in the wall stomach has resolved.  There is a questionable tract communicating with the splenic fluid collection with multiple locules of air noted within the splenic fluid collection. The overall size of the fluid collection is decreased significantly since the prior study and now measures approximately 42 x 44 mm. SMALL BOWEL: No dilatation or wall thickening. COLON: There is thickening of the splenic flexure with adjacent peritoneal soft tissue thickening. Possible serosal implants are noted on the sigmoid colon with irregular wall thickening. APPENDIX: Not visualized PERITONEUM: In the anterior pelvis just superior bladder, there is irregular soft tissue around loops of small bowel. Nondependent ascites is noted in the upper abdomen. RETROPERITONEUM: No lymphadenopathy or aortic aneurysm. REPRODUCTIVE ORGANS: URINARY BLADDER: Unremarkable BONES: No destructive bone lesion. ADDITIONAL COMMENTS: N/A IMPRESSION: 1. Interval decreased size of splenic fluid collection. There is extensive irregular soft tissue around the fluid collection which tracks into the lateral wall the gastric fundus. There is persistent locules of gas within the splenic collection and a fistulous communication with the stomach is not entirely excluded. In addition, there is increased soft tissue thickening around the lateral wall the stomach suggestive of tumor deposit. 2.  Increased soft tissue thickening around the splenic flexure of the colon with peritoneal thickening and nondependent studies suspicious for peritoneal carcinomatosis. Additional soft tissue densities noted in the anterior pelvis and around the sigmoid colon as well as loops of small bowel suspicious for peritoneal carcinomatosis as well. 3.  Left pleural effusion is stable. Xr Chest [facility] Result Date: 11/18/2019 EXAM: XR CHEST PORT INDICATION: port placement COMPARISON: 11/6/2019 FINDINGS: A portable AP radiograph of the chest was obtained at 1145 hours. The patient is on a cardiac monitor.  A new left subclavian Port-A-Cath has been place. The tip is in the region of the cavoatrial junction. No pneumothorax identified. There is a small left pleural effusion. . The cardiac and mediastinal contours and pulmonary vascularity are normal.  The bones and soft tissues are grossly within normal limits. IMPRESSION: New left subclavian Port-A-Cath without evidence of pneumothorax. Small left pleural effusion. Nc Xr Technologist Service Result Date: 11/19/2019 Fluoroscopy was utilized. IMPRESSION:  FLUOROSCOPY WAS USED. Fluoro Dose:  0.17 mGy vk Lab Results Component Value Date/Time Specimen Description: VAGINA 08/01/2009 11:45 AM  
 Specimen Description: URINE 08/01/2009 11:45 AM  
 
Lab Results Component Value Date/Time Culture result: HEAVY ESCHERICHIA COLI (A) 12/13/2019 02:30 PM  
 Culture result: HEAVY ENTEROCOCCUS FAECIUM GROUP D (A) 12/13/2019 02:30 PM  
 Culture result: (A) 12/13/2019 02:18 PM  
  LIGHT ANAEROBIC GRAM NEGATIVE RODS BETA LACTAMASE POSITIVE All Micro Results Procedure Component Value Units Date/Time CULTURE, ANAEROBIC [[id number]]  (Abnormal) Collected:  12/13/19 1418 Order Status:  Completed Specimen:  Abdominal Fluid Updated:  12/16/19 1117 Special Requests: NO SPECIAL REQUESTS Culture result:    
  LIGHT ANAEROBIC GRAM NEGATIVE RODS BETA LACTAMASE POSITIVE CULTURE, BODY FLUID Orpah Geoffrey STAIN [[id number]]  (Abnormal)  (Susceptibility) Collected:  12/13/19 1430 Order Status:  Completed Specimen:  Miscellaneous Fluid Updated:  12/15/19 1459 Special Requests: NO SPECIAL REQUESTS     
  GRAM STAIN 3+ WBCS SEEN     
      
  RARE EPITHELIAL CELLS SEEN 2+ GRAM POSITIVE RODS     
      
  RARE GRAM POSITIVE COCCI IN PAIRS  
     
      
  RARE APPARENT GRAM NEGATIVE RODS Culture result: HEAVY ESCHERICHIA COLI     
      
  HEAVY ENTEROCOCCUS FAECIUM GROUP D  
     
 URINE CULTURE HOLD SAMPLE [[id number]] Collected:  12/12/19 1432 Order Status:  Completed Specimen:  Urine from Serum Updated:  12/12/19 1447 Urine culture hold URINE ON HOLD IN MICROBIOLOGY DEPT FOR 3 DAYS. IF UNPRESERVED URINE IS SUBMITTED, IT CANNOT BE USED FOR ADDITIONAL TESTING AFTER 24 HRS, RECOLLECTION WILL BE REQUIRED. Labs: reviewed by myself. Recent Labs 12/17/19 
0434 12/16/19 
0107 WBC 5.4 5.9 HGB 10.3* 10.2* HCT 32.2* 31.8* * 438* Recent Labs 12/17/19 
5038 12/16/19 
0107 12/15/19 
6852  136 134* K 3.4* 3.7 3.5  104 100 CO2 26 27 27 BUN 4* 3* 2*  
CREA 0.37* 0.35* 0.37* * 88 85  
CA 8.9 8.6 8.7 No results for input(s): SGOT, GPT, ALT, AP, TBIL, TBILI, TP, ALB, GLOB, GGT, AML, LPSE in the last 72 hours. No lab exists for component: AMYP, HLPSE No results for input(s): INR, PTP, APTT, INREXT, INREXT in the last 72 hours. No results for input(s): FE, TIBC, PSAT, FERR in the last 72 hours. No results found for: FOL, RBCF No results for input(s): PH, PCO2, PO2 in the last 72 hours. No results for input(s): CPK, CKNDX, TROIQ in the last 72 hours. No lab exists for component: CPKMB No results found for: CHOL, CHOLX, CHLST, CHOLV, HDL, HDLP, LDL, LDLC, DLDLP, TGLX, TRIGL, TRIGP, CHHD, CHHDX Lab Results Component Value Date/Time Glucose (POC) 85 09/25/2019 12:10 PM  
 
Lab Results Component Value Date/Time  Color DARK YELLOW 12/12/2019 02:32 PM  
 Appearance CLOUDY (A) 12/12/2019 02:32 PM  
 Specific gravity 1.025 12/12/2019 02:32 PM  
 Specific gravity 1.010 10/15/2019 04:32 PM  
 pH (UA) 6.0 12/12/2019 02:32 PM  
 Protein NEGATIVE  12/12/2019 02:32 PM  
 Glucose NEGATIVE  12/12/2019 02:32 PM  
 Ketone 15 (A) 12/12/2019 02:32 PM  
 Bilirubin NEGATIVE  10/15/2019 04:32 PM  
 Urobilinogen 1.0 12/12/2019 02:32 PM  
 Nitrites NEGATIVE  12/12/2019 02:32 PM  
 Leukocyte Esterase SMALL (A) 12/12/2019 02:32 PM  
 Epithelial cells FEW 12/12/2019 02:32 PM  
 Bacteria NEGATIVE  12/12/2019 02:32 PM  
 WBC 0-4 12/12/2019 02:32 PM  
 RBC 0-5 12/12/2019 02:32 PM  
 
 
 
Medications Reviewed:  
 
Current Facility-Administered Medications Medication Dose Route Frequency  potassium chloride SR (KLOR-CON 10) tablet 40 mEq  40 mEq Oral DAILY  sodium chloride 0.9 % bolus infusion 100 mL  100 mL IntraVENous RAD ONCE  
 iopamidol (ISOVUE-370) 76 % injection 100 mL  100 mL IntraVENous RAD ONCE  
 sodium chloride (NS) flush 10 mL  10 mL IntraVENous RAD ONCE  
 influenza vaccine 2019-20 (6 mos+)(PF) (FLUARIX/FLULAVAL/FLUZONE QUAD) injection 0.5 mL  0.5 mL IntraMUSCular PRIOR TO DISCHARGE  ondansetron (ZOFRAN) injection 8 mg  8 mg IntraVENous Q8H PRN  prochlorperazine (COMPAZINE) with saline injection 10 mg  10 mg IntraVENous Q6H PRN  
 morphine injection 2 mg  2 mg IntraVENous Q3H PRN  polyethylene glycol (MIRALAX) packet 17 g  17 g Oral DAILY  LORazepam (ATIVAN) tablet 0.5 mg  0.5 mg Oral QHS PRN  
 sodium chloride (NS) flush 5-40 mL  5-40 mL IntraVENous Q8H  
 sodium chloride (NS) flush 5-40 mL  5-40 mL IntraVENous PRN  
 acetaminophen (TYLENOL) tablet 650 mg  650 mg Oral Q4H PRN  
 lactated Ringers infusion  50 mL/hr IntraVENous CONTINUOUS  piperacillin-tazobactam (ZOSYN) 3.375 g in 0.9% sodium chloride (MBP/ADV) 100 mL  3.375 g IntraVENous Q8H  
 
______________________________________________________________________ EXPECTED LENGTH OF STAY: - - - 
ACTUAL LENGTH OF STAY:          5 William Romero MD  
 
Patient's emergency contacts: 
Extended Emergency Contact Information Primary Emergency Contact: Brenna Guerra Address: [address] [place] [address], [address] Home Phone: [phone] Relation: Spouse

## 2019-12-17 NOTE — PROGRESS NOTES
ID Progress Note 2019 Subjective: She states that she had a good day today Objective: Antibiotics: 1. Zosyn Vitals:  
Visit Vitals /85 (BP 1 Location: Right arm, BP Patient Position: At rest) Pulse 85 Temp 98.2 °F (36.8 °C) Resp 16 Ht 5' 3\" (1.6 m) Wt 54.8 kg (120 lb 13 oz) LMP 09/15/2019 SpO2 97% BMI 21.40 kg/m² Tmax:  Temp (24hrs), Av.2 °F (36.8 °C), Min:98 °F (36.7 °C), Max:98.5 °F (36.9 °C) Exam:  Lungs:  clear to auscultation bilaterally Heart:  regular rate and rhythm Abdomen:  soft, non-tender. Bowel sounds normal. No masses,  no organomegaly, purulent drainage in tubing Skin:  no rash or abnormalities Labs:     
Recent Labs 19 
1164 19 
0107 12/15/19 
2768 WBC 5.4 5.9 5.4 HGB 10.3* 10.2* 10.4* * 438* 437* BUN 4* 3* 2*  
CREA 0.37* 0.35* 0.37* Cultures:  
 
Lab Results Component Value Date/Time Specimen Description: VAGINA 2009 11:45 AM  
 Specimen Description: URINE 2009 11:45 AM  
 
Lab Results Component Value Date/Time Culture result: HEAVY ESCHERICHIA COLI (A) 2019 02:30 PM  
 Culture result: HEAVY ENTEROCOCCUS FAECIUM GROUP D (A) 2019 02:30 PM  
 Culture result: (A) 2019 02:18 PM  
  LIGHT ANAEROBIC GRAM NEGATIVE RODS BETA LACTAMASE POSITIVE Radiology:  
 
Line/Insert Date:        
 
Assessment: 1. Appendiceal carcinoma, advanced 2. Enteric fistula 3. Splenic lesion Objective: 1. Continue current therapy 2. Repeat imaging pending 3. May be able to go home on oral antibiotic therapy if there is good source control Barbara Suarez MD

## 2019-12-17 NOTE — PROGRESS NOTES
JESSICA 
 
This CM met with patient in regards to potentially needing [place] services for drain management/ teaching. Patient indicated that she will want HH to assess for new drain. Offered list for [place] services. [place] of Choice signed and patient selected [place] with [facility]. Referral sent. [place] of Choice signed and placed in chart to be scanned. Family will transport at discharge. Marshal Madden 11:18 AM 
 
This CM was alerted to [address] [place] [address] RN- per liasion patient is not appropriate for [place] services. This was discussed with patient and she stated that she will just need to be taught prior to discharge. CM will follow and if this changes, CM will need to re-assess for the coordination of [place] services. Marshal Madden 12:23 PM

## 2019-12-17 NOTE — PROGRESS NOTES
Spiritual Care Partner Volunteer visited patient in room 605/01 on 12.17.19. Documented by: : Rev. Rosmery Warner. Salyer Ropes; [facility], to contact [phone] [address] call: [phone]

## 2019-12-17 NOTE — PROGRESS NOTES
Problem: Pain Goal: *Control of Pain Outcome: Progressing Towards Goal 
  
Problem: Falls - Risk of 
Goal: *Absence of Falls Description Document Gareth Henley Fall Risk and appropriate interventions in the flowsheet. Outcome: Progressing Towards Goal 
Note: Fall Risk Interventions: 
  
 
  
 
Medication Interventions: Evaluate medications/consider consulting pharmacy Problem: Patient Education: Go to Patient Education Activity Goal: Patient/Family Education Outcome: Progressing Towards Goal

## 2019-12-18 VITALS
HEART RATE: 91 BPM | OXYGEN SATURATION: 98 % | SYSTOLIC BLOOD PRESSURE: 117 MMHG | DIASTOLIC BLOOD PRESSURE: 79 MMHG | RESPIRATION RATE: 16 BRPM | HEIGHT: 63 IN | WEIGHT: 120.81 LBS | BODY MASS INDEX: 21.41 KG/M2 | TEMPERATURE: 97.2 F

## 2019-12-18 LAB
ANION GAP SERPL CALC-SCNC: 6 MMOL/L (ref 5–15)
BASOPHILS # BLD: 0 K/UL (ref 0–0.1)
BASOPHILS NFR BLD: 1 % (ref 0–1)
BUN SERPL-MCNC: 4 MG/DL (ref 6–20)
BUN/CREAT SERPL: 10 (ref 12–20)
CALCIUM SERPL-MCNC: 8.9 MG/DL (ref 8.5–10.1)
CHLORIDE SERPL-SCNC: 105 MMOL/L (ref 97–108)
CO2 SERPL-SCNC: 26 MMOL/L (ref 21–32)
CREAT SERPL-MCNC: 0.39 MG/DL (ref 0.55–1.02)
DIFFERENTIAL METHOD BLD: ABNORMAL
EOSINOPHIL # BLD: 0.2 K/UL (ref 0–0.4)
EOSINOPHIL NFR BLD: 4 % (ref 0–7)
ERYTHROCYTE [DISTWIDTH] IN BLOOD BY AUTOMATED COUNT: 16.7 % (ref 11.5–14.5)
GLUCOSE SERPL-MCNC: 94 MG/DL (ref 65–100)
HCT VFR BLD AUTO: 33.3 % (ref 35–47)
HGB BLD-MCNC: 10.5 G/DL (ref 11.5–16)
IMM GRANULOCYTES # BLD AUTO: 0 K/UL (ref 0–0.04)
IMM GRANULOCYTES NFR BLD AUTO: 1 % (ref 0–0.5)
LYMPHOCYTES # BLD: 1.6 K/UL (ref 0.8–3.5)
LYMPHOCYTES NFR BLD: 31 % (ref 12–49)
MCH RBC QN AUTO: 27.5 PG (ref 26–34)
MCHC RBC AUTO-ENTMCNC: 31.5 G/DL (ref 30–36.5)
MCV RBC AUTO: 87.2 FL (ref 80–99)
MONOCYTES # BLD: 0.6 K/UL (ref 0–1)
MONOCYTES NFR BLD: 12 % (ref 5–13)
NEUTS SEG # BLD: 2.7 K/UL (ref 1.8–8)
NEUTS SEG NFR BLD: 51 % (ref 32–75)
NRBC # BLD: 0 K/UL (ref 0–0.01)
NRBC BLD-RTO: 0 PER 100 WBC
PLATELET # BLD AUTO: 470 K/UL (ref 150–400)
PMV BLD AUTO: 8.5 FL (ref 8.9–12.9)
POTASSIUM SERPL-SCNC: 3.2 MMOL/L (ref 3.5–5.1)
RBC # BLD AUTO: 3.82 M/UL (ref 3.8–5.2)
SODIUM SERPL-SCNC: 137 MMOL/L (ref 136–145)
WBC # BLD AUTO: 5.2 K/UL (ref 3.6–11)

## 2019-12-18 PROCEDURE — 80048 BASIC METABOLIC PNL TOTAL CA: CPT

## 2019-12-18 PROCEDURE — 36415 COLL VENOUS BLD VENIPUNCTURE: CPT

## 2019-12-18 PROCEDURE — 74011250636 HC RX REV CODE- 250/636: Performed by: STUDENT IN AN ORGANIZED HEALTH CARE EDUCATION/TRAINING PROGRAM

## 2019-12-18 PROCEDURE — 90471 IMMUNIZATION ADMIN: CPT

## 2019-12-18 PROCEDURE — 74011250636 HC RX REV CODE- 250/636: Performed by: INTERNAL MEDICINE

## 2019-12-18 PROCEDURE — 74011250637 HC RX REV CODE- 250/637: Performed by: INTERNAL MEDICINE

## 2019-12-18 PROCEDURE — 74011000258 HC RX REV CODE- 258: Performed by: STUDENT IN AN ORGANIZED HEALTH CARE EDUCATION/TRAINING PROGRAM

## 2019-12-18 PROCEDURE — 90686 IIV4 VACC NO PRSV 0.5 ML IM: CPT | Performed by: INTERNAL MEDICINE

## 2019-12-18 PROCEDURE — 74011250637 HC RX REV CODE- 250/637: Performed by: HOSPITALIST

## 2019-12-18 PROCEDURE — 85025 COMPLETE CBC W/AUTO DIFF WBC: CPT

## 2019-12-18 RX ORDER — AMOXICILLIN AND CLAVULANATE POTASSIUM 500; 125 MG/1; MG/1
1 TABLET, FILM COATED ORAL 2 TIMES DAILY
Qty: 42 TAB | Refills: 0 | Status: SHIPPED | OUTPATIENT
Start: 2019-12-18 | End: 2020-01-08

## 2019-12-18 RX ORDER — SODIUM CHLORIDE 0.9 % (FLUSH) 0.9 %
5-10 SYRINGE (ML) INJECTION EVERY 12 HOURS
Qty: 40 SYRINGE | Refills: 0 | Status: SHIPPED | OUTPATIENT
Start: 2019-12-18 | End: 2020-01-07

## 2019-12-18 RX ADMIN — POTASSIUM CHLORIDE 40 MEQ: 750 TABLET, FILM COATED, EXTENDED RELEASE ORAL at 08:53

## 2019-12-18 RX ADMIN — PIPERACILLIN AND TAZOBACTAM 3.38 G: 3; .375 INJECTION, POWDER, FOR SOLUTION INTRAVENOUS at 01:00

## 2019-12-18 RX ADMIN — POLYETHYLENE GLYCOL 3350 17 G: 17 POWDER, FOR SOLUTION ORAL at 08:53

## 2019-12-18 RX ADMIN — PIPERACILLIN AND TAZOBACTAM 3.38 G: 3; .375 INJECTION, POWDER, FOR SOLUTION INTRAVENOUS at 09:00

## 2019-12-18 RX ADMIN — INFLUENZA VIRUS VACCINE 0.5 ML: 15; 15; 15; 15 SUSPENSION INTRAMUSCULAR at 16:22

## 2019-12-18 RX ADMIN — Medication 10 ML: at 12:23

## 2019-12-18 RX ADMIN — Medication 10 ML: at 06:00

## 2019-12-18 NOTE — PROGRESS NOTES
Discharge instructions done with patient at the bedside. IV access removed. Patient verbalizes understanding about follow up appointments, going to pharmacy to  medications. Education reinforced on flushing the drain. Patient verbalizes and demonstrated understanding of discharge education. No further questions at this time.  Patient to be discharged with daughter to the main entrance of hospital

## 2019-12-18 NOTE — PROGRESS NOTES
[address] [facility], [address] [place], [address] 
P [phone]  F [phone] Patient Name: Chase Elias Admit Date: 12/12/2019 OR Date: * No surgery found * Visit Date: 12/18/2019 SUBJECTIVE: 
 
Feels better overall. States she has mild, generalized tenderness. Looking forward to going home. +appetite, eating food. Drain in place, functional. Reports no F/C, N/V. OBJECTIVE: 
 
Patient Vitals for the past 24 hrs: 
 Temp Pulse Resp BP SpO2  
12/18/19 0852 97.2 °F (36.2 °C) 91 16 117/79 98 % 12/18/19 0249 98.1 °F (36.7 °C) 71 16 130/80 97 % 12/17/19 2059 98 °F (36.7 °C) 100 18 126/83 97 % 12/17/19 1405 98.2 °F (36.8 °C) 85 16 142/85 97 % Date 12/17/19 0700 - 12/18/19 8735 12/18/19 0700 - 12/19/19 2378 Shift 6090-5840 3708-3505 24 Hour Total 6201-8333 1020-4825 24 Hour Total  
INTAKE  
I.V.(mL/kg/hr)  1172(1.8) 1172(0.9) I.V.  1172 1172 Shift Total(mL/kg)  7569(38.0) [id number]) OUTPUT Drains    30  30 Output (ml) (Drain total abscessiondrainage tube ;#[id number] 12/13/19 Left;Upper Abdomen)    30  30 Shift Total(mL/kg)    30(0.5)  30(0.5) NET  1172 1172 -30  -30 Weight (kg) 54.8 54.8 54.8 54.8 54.8 54.8 Physical Exam 
   General:  alert, cooperative, no distress Cardiac:  RRR Lungs:  nonlabored Abdomen:  Soft, mildly and diffusely tender. No rebound/guarding. Drain in left flank, tan output. Extremity: No edema Data Review Lab Results Component Value Date/Time WBC 5.2 12/18/2019 03:00 AM  
 ABS. NEUTROPHILS 2.7 12/18/2019 03:00 AM  
 HGB 10.5 (L) 12/18/2019 03:00 AM  
 HCT 33.3 (L) 12/18/2019 03:00 AM  
 MCV 87.2 12/18/2019 03:00 AM  
 MCH 27.5 12/18/2019 03:00 AM  
 PLATELET 759 (H) 05/08/5492 03:00 AM  
 
Lab Results Component Value Date/Time  Sodium 137 12/18/2019 03:00 AM  
 Potassium 3.2 (L) 12/18/2019 03:00 AM  
 Chloride 105 12/18/2019 03:00 AM  
 CO2 26 12/18/2019 03:00 AM  
 Glucose 94 12/18/2019 03:00 AM  
 BUN 4 (L) 12/18/2019 03:00 AM  
 Creatinine 0.39 (L) 12/18/2019 03:00 AM  
 Calcium 8.9 12/18/2019 03:00 AM  
 Albumin 3.0 (L) 12/12/2019 12:35 PM  
 Bilirubin, total 0.3 12/12/2019 12:35 PM  
 AST (SGOT) 13 (L) 12/12/2019 12:35 PM  
 ALT (SGPT) 20 12/12/2019 12:35 PM  
 Alk. phosphatase 97 12/12/2019 12:35 PM  
 
 
CT of abdomen/pelvis (12/17/19) There is interval percutaneous catheter placement in the left subdiaphragmatic 
fluid collection which is significantly decreased in size. Small left pleural 
effusion is stable. 
  
There remains pelvic soft tissue stranding with tenting of the bladder dome 
adhering to loops of small bowel and possibly the sigmoid colon. 
  
There is moderate liquid/stool throughout the colon proximal to the sigmoid 
level. Small bowel is nondilated. There is no air in the urinary bladder. 
  
There is no ascites, pneumoperitoneum or significant adenopathy. Liver contains 2 stable probable cysts. Bile ducts are not enlarged. Pancreas shows no mass or 
inflammation. Spleen is unremarkable. Adrenal glands are normal in size. Kidneys 
show no mass or hydronephrosis. Aorta is without aneurysm. The ureters are not 
dilated. 
  
IMPRESSION: 
1. Decreased size of subdiaphragmatic fluid collection with drain placed. 2. Stable small left pleural effusion. 3. Stable pelvic soft tissue stranding, possibly representing known carcinomatosis. IMPRESSION/PLAN: 
 
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 Colonic perforation/fistulization likely due to progressive disease. Drain in place. The aggressiveness of her tumor is evidence by the fact that she had extensive involvement of the gynecologic organs at the time of diagnosis. FEN/GI:  Diet as tolerated ID/Wound: Currently afebrile.   On zosyn, polymicrobial abscess pansensitive. ID following. No F/C.  CT looks better. Fluid collection looks smaller. Draining well. Initial fluid was bloody, now it looks more like stool. Would leave drain in indefinitely. Dispostion: Overall, her prognosis looks poor. Appendiceal cancers can be aggressive and don't always respond to therapy. Chemo no hold per med onc. No surgical recs at this time. Continue with percutaneous drainage and abx. Possible discharged today.     
 
  
Jimi Dos Santos MD

## 2019-12-18 NOTE — PROGRESS NOTES
Agree with going home. She will call when drainage is down and I will see her in the office then. She also knows to call with any questions.

## 2019-12-18 NOTE — PROGRESS NOTES
ID Progress Note 2019 Subjective: She states that she had a good day today Objective: Antibiotics: 1. Zosyn Vitals:  
Visit Vitals /79 (BP 1 Location: Right arm, BP Patient Position: At rest) Pulse 91 Temp 97.2 °F (36.2 °C) Resp 16 Ht 5' 3\" (1.6 m) Wt 54.8 kg (120 lb 13 oz) LMP 09/15/2019 SpO2 98% BMI 21.40 kg/m² Tmax:  Temp (24hrs), Av.8 °F (36.6 °C), Min:97.2 °F (36.2 °C), Max:98.1 °F (36.7 °C) Exam:  Lungs:  clear to auscultation bilaterally Heart:  regular rate and rhythm Abdomen:  soft, non-tender. Bowel sounds normal. No masses,  no organomegaly, purulent drainage in tubing, feculent odor Skin:  no rash or abnormalities Labs:     
Recent Labs 19 
0300 19 
0434 19 
0107 WBC 5.2 5.4 5.9 HGB 10.5* 10.3* 10.2* * 458* 438* BUN 4* 4* 3*  
CREA 0.39* 0.37* 0.35* Cultures:  
 
Lab Results Component Value Date/Time Specimen Description: VAGINA 2009 11:45 AM  
 Specimen Description: URINE 2009 11:45 AM  
 
Lab Results Component Value Date/Time Culture result: HEAVY ESCHERICHIA COLI (A) 2019 02:30 PM  
 Culture result: HEAVY ENTEROCOCCUS FAECIUM GROUP D (A) 2019 02:30 PM  
 Culture result: (A) 2019 02:18 PM  
  LIGHT ANAEROBIC GRAM NEGATIVE RODS BETA LACTAMASE POSITIVE Radiology:  
 
Line/Insert Date:        
 
Assessment: 1. Appendiceal carcinoma, advanced 2. Enteric fistula 3. Splenic lesion Objective: 1. Continue current therapy 2. Repeat imaging pending 3. Home on oral Augmentin 500 bid for extended period, possibly until the fistula closes. Continued drainage of the LUQ collection is paramount to managing this problem Maisha Chacon MD

## 2019-12-18 NOTE — PROGRESS NOTES
Problem: Pain Goal: *Control of Pain Outcome: Progressing Towards Goal 
  
Problem: Falls - Risk of 
Goal: *Absence of Falls Description Document Jacqueline Griffiths Fall Risk and appropriate interventions in the flowsheet. Outcome: Progressing Towards Goal 
Note: Fall Risk Interventions: 
  
 
  
 
Medication Interventions: Teach patient to arise slowly Problem: Patient Education: Go to Patient Education Activity Goal: Patient/Family Education Outcome: Progressing Towards Goal 
  
Problem: Discharge Planning Goal: *Discharge to safe environment Outcome: Progressing Towards Goal

## 2019-12-18 NOTE — PROGRESS NOTES
JESSICA 
-Home with family on PO Abx  (patient advised doesn't need HH for drain care) -RX needed for flushes 
-Dressing change supplies needed 
-Family will transport at discharge. CM will monitor for needs. CM noted discharge orders. CM faxed RX for Saline flushes to [facility], awaiting an update of the cost.  
 
CM received update RX has $0.00 co-pay. RX for flushes has been filled and given to the patient. Patients daughter who was at bedside will transport. CHUCKY Berry/CRM

## 2019-12-18 NOTE — DISCHARGE INSTRUCTIONS
Discharge Instructions       PATIENT ID: Micha Charlton  MRN: [id number]   YOB: 1970    DATE OF ADMISSION: 12/12/2019  1:48 PM    DATE OF DISCHARGE: 12/18/2019    PRIMARY CARE PROVIDER: Geovanna Hastings MD     ATTENDING PHYSICIAN: Marylu Velázquez MD  DISCHARGING PROVIDER: Christian Wilks MD    To contact this individual call [phone] and ask the  to page. If unavailable ask to be transferred the Adult Hospitalist Department. DISCHARGE DIAGNOSES and CARE RECOMMENDATIONS:   Abdominal abscess,left upper quadrant   -Infectious Disease recommended extended course of antibiotics(Augmentin). Decision as to when to stop the antibiotics needs to be discussed among your treatment team,Dr Avila(ID),Blu Benson and Lord Acosta. You most probably need repeat CT scan of the abdomen before the antibiotics could be stopped. I have talked with Dr Dylan TELLO,they would see you on 12/30/2019.  -The drainage catheter would remain in place until its determined the output is substantially decreased. You most probably need follow up CT scan before the drain is taken out.           DIET: Regular Diet      ACTIVITY: Activity as tolerated    WOUND CARE: abdominal drain management       PENDING TEST RESULTS:   At the time of discharge the following test results are still pending: none    FOLLOW UP APPOINTMENTS:   Follow-up Information     Follow up With Specialties Details Why Contact Vinicius Wheeler MD Pediatrics, Internal Medicine   [address] [place] [address] 83.  [phone]      Curtis Morales MD General Surgery, Oncology, Breast Surgery On 12/30/2019  [address] [place], [place] [id number]  [phone]               YOU WERE SEEN BY THE FOLLOWING CONSULTATIONS:   IP CONSULT TO GENERAL SURGERY  IP CONSULT TO OB GYN  IP CONSULT TO HOSPITALIST  IP CONSULT TO INTERVENTIONAL RADIOLOGY  IP CONSULT TO INFECTIOUS DISEASES    YOU HAD THE FOLLOWING PROCEDURES/SURGERIES  :   * No surgery found *              DISCHARGE MEDICATIONS:   See Medication Reconciliation Form    · It is important that you take the medication exactly as they are prescribed. · Keep your medication in the bottles provided by the pharmacist and keep a list of the medication names, dosages, and times to be taken in your wallet. · Do not take other medications without consulting your doctor. NOTIFY YOUR PHYSICIAN FOR ANY OF THE FOLLOWING:   Fever over 101 degrees for 24 hours. Chest pain, shortness of breath, fever, chills, nausea, vomiting, diarrhea, change in mentation, falling, weakness, bleeding. Severe pain or pain not relieved by medications. Or, any other signs or symptoms that you may have questions about. Services you need on discharge     [facility]... x   [address] VISIT     DISPATCH HEALTH          Signed:    Angela Queen MD  12/18/2019  3:15 PM

## 2019-12-18 NOTE — PROGRESS NOTES
1240 Reviewed drain care with pt. Pt denies questions at this time. 1310 Spoke to angio about flushing pt's drain. Drain only needs to be flushed twice a day at home. 18 Notified Dr. Rahul Melo per perfect serve that pt's drain can be flushed twice a day at home.

## 2019-12-18 NOTE — PROGRESS NOTES
[facility] [place] [address] [address], [address] [address], [address] [place] [address] W: [phone]  F: [phone] HEME/ONC CONSULT Reason for Visit:  
Marcial Billings is a 44 y.o. female who is seen in consultation at the request of Dr. Pleas Mohs ER for abdominal pain and met appendix cancer. Treatment History:  
EXPLORATORY LAPAROTOMY SIERRA BSO, TUMOR DEBULKING: ILEOCECAL RESECTION; OMENTECTOMY STAGE: 4 with carcinomatosis History of Present Illness:  
 
Pt seen today in ER consult for metastatic appendix cancer post cycle 1 of 50% dose reduced palliative FOLFOX chemo. In ER due to persistent N/V/ abdominal pain despite multiple outpt IVF treatments. Pt is in chair in large waiting room in ER. States abdominal pain is diffuse throughout abdomin. N/V and cannot each much at all. Able to walk. Labs stable. CT:  
 IMPRESSION: 
1. Persistent small left pleural effusion and left basilar atelectasis. 2. Interval increase in size of left and fluid containing left upper abdominal 
collection, now measuring 6 cm x 6.2 cm. This collection is likely contiguous 
with the descending colon. 3. Persistent irregular soft tissue in the pelvis, tethering multiple loops of 
bowel, unchanged, but consistent with carcinomatosis. Hx of fluid collection drain via IR. INTERIM HX:  Pt seen today for hospital f/u for met appendix cancer post cycle 1 of chemo. Pt is on IV abx for possible abscess/ fistula. Repeat CT better. Drain is draining. Pt is feeling better overall. Ready to go home. Waiting on ID for abx regimen for home. Need to set up chemo for next week. Past Medical History:  
Diagnosis Date  Cancer ([address] APPENDIX CANCER   
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  
Procedure Laterality Date [address] [place] [address] UNLISTED  HX APPENDECTOMY  09/2019  HX GI  09/2019 ILEOCECECTOMY  HX GYN  2003 CYST ON OVARY  HX HYSTERECTOMY  2019  IR THORACENTESIS CATH W IMAGE  11/6/2019 Social History Tobacco Use  Smoking status: Never Smoker  Smokeless tobacco: Never Used Substance Use Topics  Alcohol use: Not Currently Family History Problem Relation Age of Onset  Breast Cancer Paternal Grandmother 56  Crohn's Disease Mother  Heart Disease Mother  Cancer Father BLADDER  
 Diabetes Father  No Known Problems Son  No Known Problems Daughter  Anesth Problems Neg Hx Current Facility-Administered Medications Medication Dose Route Frequency  potassium chloride SR (KLOR-CON 10) tablet 40 mEq  40 mEq Oral DAILY  influenza vaccine 2019-20 (6 mos+)(PF) (FLUARIX/FLULAVAL/FLUZONE QUAD) injection 0.5 mL  0.5 mL IntraMUSCular PRIOR TO DISCHARGE  ondansetron (ZOFRAN) injection 8 mg  8 mg IntraVENous Q8H PRN  prochlorperazine (COMPAZINE) with saline injection 10 mg  10 mg IntraVENous Q6H PRN  
 morphine injection 2 mg  2 mg IntraVENous Q3H PRN  polyethylene glycol (MIRALAX) packet 17 g  17 g Oral DAILY  LORazepam (ATIVAN) tablet 0.5 mg  0.5 mg Oral QHS PRN  
 sodium chloride (NS) flush 5-40 mL  5-40 mL IntraVENous Q8H  
 sodium chloride (NS) flush 5-40 mL  5-40 mL IntraVENous PRN  
 acetaminophen (TYLENOL) tablet 650 mg  650 mg Oral Q4H PRN  
 lactated Ringers infusion  50 mL/hr IntraVENous CONTINUOUS  piperacillin-tazobactam (ZOSYN) 3.375 g in 0.9% sodium chloride (MBP/ADV) 100 mL  3.375 g IntraVENous Q8H No Known Allergies Review of Systems: A complete review of systems was obtained, negative except as described above. Physical Exam:  
 
Visit Vitals /79 (BP 1 Location: Right arm, BP Patient Position: At rest) Pulse 91 Temp 97.2 °F (36.2 °C) Resp 16 Ht 5' 3\" (1.6 m) Wt 120 lb 13 oz (54.8 kg) LMP 09/15/2019 SpO2 98% BMI 21.40 kg/m² ECOG PS: 1-2 General: No distress Eyes:  anicteric sclerae HENT: Atraumatic Neck: Supple GI: soft, less distended with drain MS: in bed but usually ambulatory Skin: warm dry Psych: Alert, oriented, appropriate affect, normal judgment/insight Results:  
 
Lab Results Component Value Date/Time WBC 5.2 12/18/2019 03:00 AM  
 HGB 10.5 (L) 12/18/2019 03:00 AM  
 HCT 33.3 (L) 12/18/2019 03:00 AM  
 PLATELET 434 (H) 49/01/7284 03:00 AM  
 MCV 87.2 12/18/2019 03:00 AM  
 ABS. NEUTROPHILS 2.7 12/18/2019 03:00 AM  
 Hemoglobin (POC) 10.8 (L) 09/25/2019 04:11 PM  
 Hematocrit (POC) 32 (L) 09/25/2019 12:10 PM  
 
Lab Results Component Value Date/Time Sodium 137 12/18/2019 03:00 AM  
 Potassium 3.2 (L) 12/18/2019 03:00 AM  
 Chloride 105 12/18/2019 03:00 AM  
 CO2 26 12/18/2019 03:00 AM  
 Glucose 94 12/18/2019 03:00 AM  
 BUN 4 (L) 12/18/2019 03:00 AM  
 Creatinine 0.39 (L) 12/18/2019 03:00 AM  
 GFR est AA >60 12/18/2019 03:00 AM  
 GFR est non-AA >60 12/18/2019 03:00 AM  
 Calcium 8.9 12/18/2019 03:00 AM  
 Sodium (POC) 137 09/25/2019 12:10 PM  
 Potassium (POC) 3.7 09/25/2019 12:10 PM  
 Chloride (POC) 105 09/25/2019 12:10 PM  
 Glucose (POC) 85 09/25/2019 12:10 PM  
 BUN (POC) 4 (L) 09/25/2019 12:10 PM  
 Creatinine (POC) 0.5 (L) 09/25/2019 12:10 PM  
 Calcium, ionized (POC) 1.14 09/25/2019 12:10 PM  
 
Lab Results Component Value Date/Time Bilirubin, total 0.3 12/12/2019 12:35 PM  
 ALT (SGPT) 20 12/12/2019 12:35 PM  
 AST (SGOT) 13 (L) 12/12/2019 12:35 PM  
 Alk. phosphatase 97 12/12/2019 12:35 PM  
 Protein, total 7.5 12/12/2019 12:35 PM  
 Albumin 3.0 (L) 12/12/2019 12:35 PM  
 Globulin 4.5 (H) 12/12/2019 12:35 PM  
 
 
CT Results (most recent): 
Results from Hospital Encounter encounter on 12/12/19 CT ABD PELV W CONT Narrative INDICATION: pt w/ L upper abd steph'n, assess interval change EXAM: CT Abdomen and Pelvis is performed with 100 mL Isovue 370 contrast IV 
without oral contrast. CT dose reduction was achieved through use of a 
standardized protocol tailored for this examination and automatic exposure 
control for dose modulation. COMPARISON: 12/12/2019. FINDINGS: 
There is interval percutaneous catheter placement in the left subdiaphragmatic 
fluid collection which is significantly decreased in size. Small left pleural 
effusion is stable. There remains pelvic soft tissue stranding with tenting of the bladder dome 
adhering to loops of small bowel and possibly the sigmoid colon. There is moderate liquid/stool throughout the colon proximal to the sigmoid 
level. Small bowel is nondilated. There is no air in the urinary bladder. There is no ascites, pneumoperitoneum or significant adenopathy. Liver contains 2 stable probable cysts. Bile ducts are not enlarged. Pancreas shows no mass or 
inflammation. Spleen is unremarkable. Adrenal glands are normal in size. Kidneys 
show no mass or hydronephrosis. Aorta is without aneurysm. The ureters are not 
dilated. Impression IMPRESSION: 
1. Decreased size of subdiaphragmatic fluid collection with drain placed. 2. Stable small left pleural effusion. 3. Stable pelvic soft tissue stranding, possibly representing known 
carcinomatosis. Records reviewed and summarized above. Pathology report(s) reviewed above. Radiology report(s) reviewed above. Assessment/PLAN:  
 
1) stage 4 / metastatic appendiceal cancer  
post EXPLORATORY LAPAROTOMY SIERRA BSO, TUMOR DEBULKING: ILEOCECAL RESECTION; OMENTECTOMY 9/25/19 Surgery done for obstruction. Had left rib pain and SOB and on exam decreased bs on LEFT. Got CXR which showed pleural effusion then ultrasound which showed elevated diaphragm and ? Free air. Then got CT which shows fluid collection and pt admitted for this. Had IR drain of splenic fluid collection. saw [facility] for second opinions. Discussed systemic therapy with FOLFOX/ +/- avastin chemo or some variation of this regimen. possible [facility] down the road. Pt choose to have chemo locally and still see [address] [facility]. Pt had cycle 1 of FOLFOX chemotherapy on 12/3/19 reduced by 50%. Pt admitted via ER with N/V/abdominal pain. CT shows worsening fluid collection which was drained via IR. Still has drain. CT repeat better. Pt clinically much better overall. Plan is to continue with chemo perhaps next week. Fine with pt going home. Goal of care is palliative. 2) left pleural effusion post thora x 2. No cytology. Stable on CT. 3)  Thrombocytosis. Better. 4) N/V better. Antiemetics prn.  
 
5) abdominal pain due to carcinomatosis. Controlled. 6) psychosocial.  Mood good. Coping well considering difficult disease. Great family support. We will follow up as outpt Call if questions I appreciate the opportunity to participate in Ms. Ashwini Harding care.  
 
Signed By: Cheryl Sanchez DO

## 2019-12-19 ENCOUNTER — HOSPITAL ENCOUNTER (OUTPATIENT)
Dept: INFUSION THERAPY | Age: 49
End: 2019-12-19
Payer: COMMERCIAL

## 2019-12-19 RX ORDER — EPINEPHRINE 1 MG/ML
0.3 INJECTION, SOLUTION, CONCENTRATE INTRAVENOUS AS NEEDED
Status: CANCELLED | OUTPATIENT
Start: 2019-12-26

## 2019-12-19 RX ORDER — SODIUM CHLORIDE 9 MG/ML
10 INJECTION INTRAMUSCULAR; INTRAVENOUS; SUBCUTANEOUS AS NEEDED
Status: CANCELLED | OUTPATIENT
Start: 2019-12-26

## 2019-12-19 RX ORDER — HYDROCORTISONE SODIUM SUCCINATE 100 MG/2ML
100 INJECTION, POWDER, FOR SOLUTION INTRAMUSCULAR; INTRAVENOUS AS NEEDED
Status: CANCELLED | OUTPATIENT
Start: 2019-12-26

## 2019-12-19 RX ORDER — ACETAMINOPHEN 325 MG/1
650 TABLET ORAL AS NEEDED
Status: CANCELLED
Start: 2019-12-26

## 2019-12-19 RX ORDER — ONDANSETRON 2 MG/ML
8 INJECTION INTRAMUSCULAR; INTRAVENOUS ONCE
Status: CANCELLED | OUTPATIENT
Start: 2019-12-26

## 2019-12-19 RX ORDER — DIPHENHYDRAMINE HYDROCHLORIDE 50 MG/ML
50 INJECTION, SOLUTION INTRAMUSCULAR; INTRAVENOUS AS NEEDED
Status: CANCELLED
Start: 2019-12-26

## 2019-12-19 RX ORDER — DEXTROSE MONOHYDRATE 50 MG/ML
25 INJECTION, SOLUTION INTRAVENOUS CONTINUOUS
Status: CANCELLED
Start: 2019-12-26

## 2019-12-19 RX ORDER — ALBUTEROL SULFATE 0.83 MG/ML
2.5 SOLUTION RESPIRATORY (INHALATION) AS NEEDED
Status: CANCELLED
Start: 2019-12-26

## 2019-12-19 RX ORDER — FLUOROURACIL 50 MG/ML
200 INJECTION, SOLUTION INTRAVENOUS ONCE
Status: CANCELLED
Start: 2019-12-26 | End: 2019-12-26

## 2019-12-19 RX ORDER — SODIUM CHLORIDE 0.9 % (FLUSH) 0.9 %
10 SYRINGE (ML) INJECTION AS NEEDED
Status: CANCELLED
Start: 2019-12-26

## 2019-12-19 RX ORDER — ONDANSETRON 2 MG/ML
8 INJECTION INTRAMUSCULAR; INTRAVENOUS AS NEEDED
Status: CANCELLED | OUTPATIENT
Start: 2019-12-26

## 2019-12-19 RX ORDER — HEPARIN 100 UNIT/ML
300-500 SYRINGE INTRAVENOUS AS NEEDED
Status: CANCELLED
Start: 2019-12-26

## 2019-12-19 NOTE — DISCHARGE SUMMARY
Discharge Summary PATIENT ID: Eloise Bernard MRN: [id number] YOB: 1970 DATE OF ADMISSION: 12/12/2019  1:48 PM   
DATE OF DISCHARGE: 12/18/2019 PRIMARY CARE PROVIDER: Baltazar Ardon MD  
 
ATTENDING PHYSICIAN: Harleen Lujan MD 
DISCHARGING PROVIDER: Harleen Lujan MD   
To contact this individual call [phone] and ask the  to page. If unavailable ask to be transferred the Adult Hospitalist Department. CONSULTATIONS: IP CONSULT TO OB GYN 
IP CONSULT TO INFECTIOUS DISEASES PROCEDURES/SURGERIES: * No surgery found * [id number] [facility] COURSE:  
Eloise Bernard is a 33-year-old female who is currently admitted for abdominal abscess. Her recent medical history is significant for initial presentation to the care of the general surgeon for recurrent nausea vomiting abdominal pain. Work-up including HIDA scan suggested gallbladder etiology and on September 20, 2019 she underwent laparoscopy cholecystectomy. However, intraoperatively she was found to have evidence of peritoneal cancer and gynecologic oncologist was consulted. The ovarian was biopsied. She underwent exploratory laparotomy, ileocecal resection and anastomosis, total abdominal hysterectomy, bilateral salpingo-oophorectomy, omentectomy and tumor debulking. Surgical biopsy later on proved appendiceal cancer on 9/25. About 3 weeks postoperatively after the exploratory laparotomy she was admitted splenic infarction/abscess and was treated with antibiotics, she also had IR abscess drainage. She was referred to medical oncologist and had first round of chemo with FOLFOX. Patient presented again to the emergency room on 12/12 for abdominal pain and was admitted for perisplenic abdominal abscess with possible colonic fistula. General surgery, gynecology oncology and infectious disease were involved in the care.   IR placed catheter for abscess drainage. Patient received IV antibiotics. She improved. There is a possible colonic fistula however was gynecology can call general surgery recommended against surgical intervention. Follow-up CAT scan showed shrinkage of the abscess post drainage placement. She is discharged on oral Augmentin for extended amount of time until the fistula/abscess has closed. I have given her 3 weeks worth of Augmentin prescription and I have made the care team including general surgery gynecologic oncology and oncology team aware of ID recommendation that she may need refill of antibiotics. Dr. Giulia Mak has acknowledged she will take care of it. 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   
-Follow-up with Dr. Yesica Uribe for continued chemotherapy 
  
Nausea and vomiting, resolved 
-Improved, patient says she has antiemetics at home 
  
Hypokalemia, mild continue to replace 
-Replaced 
  
Anemia, chronicanemia of chronic disease, stable, repeat CBC daily. 
  
Body mass index is 21.4 kg/m². Patient is discharged home in a stable condition. Home health services for skilled nursing arranged. Patient declined home nursing as she is familiar with taking care of drain. DISCHARGE MEDICATIONS: 
Discharge Medication List as of 12/18/2019  4:47 PM  
  
START taking these medications Details  
amoxicillin-clavulanate (AUGMENTIN) 500-125 mg per tablet Take 1 Tab by mouth two (2) times a day for 21 days. , Print, Disp-42 Tab, R-0  
  
L.acidoph & parac-S.therm-Bifido (RADHA Q2/RISAQUAD-2) 16 billion cell cap cap Take 1 Cap by mouth daily for 30 days. , Print, Disp-30 Cap, R-0  
  
sodium chloride (MONOJECT PREFILL SALINE) 5-10 mL by IntraVENous route every twelve (12) hours for 20 days. , Print, Disp-40 Syringe, R-0  
  
  
CONTINUE these medications which have NOT CHANGED Details potassium chloride (KLOR-CON) 10 mEq tablet Take 1 Tab by mouth two (2) times a day., Normal, Disp-6 Tab, R-0  
  
LORazepam (ATIVAN) 0.5 mg tablet Take 1 Tab by mouth nightly as needed for Anxiety. Max Daily Amount: 0.5 mg., Normal, Disp-30 Tab, R-0  
  
lidocaine-prilocaine (EMLA) topical cream Apply  to affected area as needed for Pain., Normal, Disp-30 g, R-0  
  
ondansetron (ZOFRAN ODT) 4 mg disintegrating tablet Take 1 Tab by mouth every eight (8) hours as needed for Nausea., Normal, Disp-60 Tab, R-1  
  
prochlorperazine (COMPAZINE) 5 mg tablet Take 1 tab by mouth every 6 hours as needed for nausea or vomiting, Normal, Disp-30 Tab, R-1  
  
dexAMETHasone (DECADRON) 4 mg tablet Take 2 tabs (8mg) by mouth daily on days 2 and 3 after chemotherapy, Normal, Disp-60 Tab, R-0  
  
acetaminophen (TYLENOL) 325 mg tablet Take 650 mg by mouth every four (4) hours as needed for Pain. Indications: pain, Historical Med  
  
ibuprofen (MOTRIN) 200 mg tablet Take 3 Tabs by mouth every eight (8) hours as needed for Pain., Print, Disp-50 Tab, R-0  
  
  
 
 
PENDING TEST RESULTS:  
At the time of discharge the following test results are still pending: None FOLLOW UP APPOINTMENTS:   
Follow-up Information Follow up With Specialties Details Why Contact Info Teena Siemens, MD Pediatrics, Internal Medicine   [address] 
[phone] Agnieszka Howard MD General Surgery, Oncology, Breast Surgery On 12/30/2019  [address] [place] [address] DANIEL 506 [address] [place] [address] 
[phone] Ligia Cid MD Gynecologic Oncology   [address] [place] [address] 
[phone] ADDITIONAL CARE RECOMMENDATIONS:  
 
DIET: Regular Diet and Cardiac Diet ACTIVITY: Activity as tolerated WOUND CARE: Drain management EQUIPMENT needed: N/A 
 
 
 
 
NOTIFY YOUR PHYSICIAN FOR ANY OF THE FOLLOWING:  
Fever over 101 degrees for 24 hours. Chest pain, shortness of breath, fever, chills, nausea, vomiting, diarrhea, change in mentation, falling, weakness, bleeding. Severe pain or pain not relieved by medications. Or, any other signs or symptoms that you may have questions about. DISPOSITION: 
xx  Home With: 
 OT  PT  formerly [facility]  RN  
  
 SNF(Name) Inpatient Rehab(name) Independent/assisted living(name) Hospice Other:  
 
 
PATIENT CONDITION AT DISCHARGE:  
 
Functional status Poor Deconditioned   
x Independent Cognition  
   
x Lucid Forgetful Dementia Catheter/Lines(indications) Pham PICC  
 PEG  
x None Code status    
x Full DNR PHYSICAL EXAMINATION AT DISCHARGE: 
  
Visit Vitals /79 (BP 1 Location: Right arm, BP Patient Position: At rest) Pulse 91 Temp 97.2 °F (36.2 °C) Resp 16 Ht 5' 3\" (1.6 m) Wt 54.8 kg (120 lb 13 oz) LMP 09/15/2019 SpO2 98% BMI 21.40 kg/m² O2 Flow Rate (L/min): 3 l/min O2 Device: Room air Temp (24hrs), Av.7 °F (36.5 °C), Min:97.2 °F (36.2 °C), Max:98.1 °F (36.7 °C) No intake/output data recorded.  0701 -  1900 In: 9940 [P.O.:480; I.V.:1172] Out: 30 [Drains:30] GENERAL:  Alert, oriented, cooperative, no apparent distress HEENT:  Normocephalic, atraumatic, non icteric sclerae, non pallor conjuctivae, EOMs intact, PERRLA. NECK: Supple, trachea midline, no adenopathy, no thyromegally or tenderness, no carotid bruit and no JVD. LUNGS:   Vesicular breath sounds bilaterally, no added sounds. HEART:   S1 and S2 well heard,RRR,  no murmur, click, rub or gallop. ABDOMEB:   Left upper lateral abdomen with drain attached to bag draining purulent material.  Healed surgical scar. Abdomen soft nontender EXTREMETIES:  Atraumatic, acyanotic, no edema PULSES: 2+ and symmetric all extremities. SKIN:  No rashes or lesions NEUROLOGY: Alert and oriented to PPT, CNII-XII intact. Motor and sensory exam grossly intact. CHRONIC MEDICAL DIAGNOSES: 
Problem List as of 12/18/2019 Date Reviewed: 11/11/2019 Codes Class Noted - Resolved Splenic abscess ICD-10-CM: D73.3 ICD-9-CM: 289.59  12/12/2019 - Present Nausea and vomiting ICD-10-CM: R11.2 ICD-9-CM: 787.01  12/9/2019 - Present Splenic infarction ICD-10-CM: D73.5 ICD-9-CM: 289.59  10/15/2019 - Present Carcinoma [facility] ([facility] ICD-10-CM: C18.1 ICD-9-CM: 153.5  10/15/2019 - Present Mucinous adenocarcinoma of appendix ([facility] ICD-10-CM: C18.1 ICD-9-CM: 153.5  9/24/2019 - Present Small bowel obstruction ([facility] ICD-10-CM: Q66.319 ICD-9-CM: 560.9  9/24/2019 - Present 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   
Lesley King MD 
12/18/2019 
10:56 PM

## 2019-12-23 ENCOUNTER — OFFICE VISIT (OUTPATIENT)
Dept: ONCOLOGY | Age: 49
End: 2019-12-23

## 2019-12-23 ENCOUNTER — DOCUMENTATION ONLY (OUTPATIENT)
Dept: ONCOLOGY | Age: 49
End: 2019-12-23

## 2019-12-23 VITALS
SYSTOLIC BLOOD PRESSURE: 116 MMHG | OXYGEN SATURATION: 98 % | TEMPERATURE: 97.7 F | HEIGHT: 63 IN | BODY MASS INDEX: 20.02 KG/M2 | WEIGHT: 113 LBS | DIASTOLIC BLOOD PRESSURE: 82 MMHG | HEART RATE: 82 BPM

## 2019-12-23 DIAGNOSIS — C18.1 MUCINOUS ADENOCARCINOMA OF APPENDIX (HCC): ICD-10-CM

## 2019-12-23 DIAGNOSIS — Z95.828 PORT-A-CATH IN PLACE: ICD-10-CM

## 2019-12-23 DIAGNOSIS — C18.1 CANCER OF APPENDIX METASTATIC TO INTRA-ABDOMINAL LYMPH NODE (HCC): ICD-10-CM

## 2019-12-23 DIAGNOSIS — J90 PLEURAL EFFUSION ON LEFT: ICD-10-CM

## 2019-12-23 DIAGNOSIS — D73.5 SPLENIC INFARCTION: ICD-10-CM

## 2019-12-23 DIAGNOSIS — R10.84 GENERALIZED ABDOMINAL PAIN: ICD-10-CM

## 2019-12-23 DIAGNOSIS — D73.3 SPLENIC ABSCESS: ICD-10-CM

## 2019-12-23 DIAGNOSIS — D75.839 THROMBOCYTOSIS: ICD-10-CM

## 2019-12-23 DIAGNOSIS — Z09 CHEMOTHERAPY FOLLOW-UP EXAMINATION: ICD-10-CM

## 2019-12-23 DIAGNOSIS — C79.60 MALIGNANT NEOPLASM METASTATIC TO OVARY, UNSPECIFIED LATERALITY (HCC): ICD-10-CM

## 2019-12-23 DIAGNOSIS — K56.609 SMALL BOWEL OBSTRUCTION (HCC): ICD-10-CM

## 2019-12-23 DIAGNOSIS — C77.2 CANCER OF APPENDIX METASTATIC TO INTRA-ABDOMINAL LYMPH NODE (HCC): ICD-10-CM

## 2019-12-23 DIAGNOSIS — C80.0 CARCINOMATOSIS (HCC): ICD-10-CM

## 2019-12-23 DIAGNOSIS — C18.1 CARCINOMA OF APPENDIX (HCC): Primary | ICD-10-CM

## 2019-12-23 RX ORDER — ONDANSETRON 2 MG/ML
8 INJECTION INTRAMUSCULAR; INTRAVENOUS ONCE
Status: CANCELLED | OUTPATIENT
Start: 2019-12-30

## 2019-12-23 NOTE — PROGRESS NOTES
Fatuma Richards is a 44 y.o. female  Chief Complaint   Patient presents with    Follow-up     appendix cancer     1. Have you been to the ER, urgent care clinic since your last visit? Hospitalized since your last visit? Yes pt was in the hospital for her Spleen and was there for 7 days. 2. Have you seen or consulted any other health care providers outside of the [address] [place] [address] since your last visit? Include any pap smears or colon screening.  No.

## 2019-12-23 NOTE — PROGRESS NOTES
Orders in [facility] for 1 L NS over 60 minutes and 8 mg IV Zofran x1 dose for [facility] appointment on 12/30/19.

## 2019-12-23 NOTE — LETTER
12/23/19 Patient: Latia Nance YOB: 1970 Date of Visit: 12/23/2019 Ja Mcdonnell MD 
[address] VIA Facsimile: [phone] Dear Ja Mcdonnell MD, Thank you for referring Ms. Latia Nance to [address] [place] [address] for evaluation. My notes for this consultation are attached. If you have questions, please do not hesitate to call me. I look forward to following your patient along with you. Sincerely, Severo Fleming, DO

## 2019-12-23 NOTE — PROGRESS NOTES
[facility] [place] [address] [address], [place]: [phone]  F: [phone]    HEME/ONC FOLLOW UP    Reason for Visit:   Rubi Valle is a 44 y.o. female who is seen in office today for follow up of metastatic appendix cancer. Treatment History:   · EXPLORATORY LAPAROTOMY SIERRA BSO, TUMOR DEBULKING: ILEOCECAL RESECTION; OMENTECTOMY  · FOLFOX 50% DR 12/3/19 - Current  · STAGE: 4 with carcinomatosis    History of Present Illness:   Rubi Valle is a 44 y.o. female seen today in office for follow up of metastatic appendix cancer post cycle 1 of 50% dose reduced palliative FOLFOX chemo. She went to the ER on 12/12/19 for persistent nausea, vomiting, and abdominal pain despite multiple outpatient IVF treatments in hospitals. CT A/P showed persistent small left pleural effusion and left basilar atelectasis, interval increase in size of left and fluid containing left upper abdominal collection, now measuring 6 cm x 6.2 cm - this collection is likely contiguous with the descending colon, and persistent irregular soft tissue in the pelvis, tethering multiple loops of bowel, unchanged, but consistent with carcinomatosis. She was started on IV abx and had a drain placed via IR. She was discharged on 12/18/19. She continues to have occasional nausea and vomiting. She is taking Zofran and Compazine as needed. She denies abdominal pain. She still has drain in place. She reports that it has drained 10 cc since earlier this morning. She reports that her appetite is good, but eating small quantities of food. She is due for chemo again on 12/26/19 at [facility]. Her supportive  is here today.      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 2019    Nausea & vomiting     Nausea and vomiting 12/9/2019    Splenic infarction 2019    Symptomatic cholelithiasis 9/18/2019      Past Surgical History:   Procedure Laterality Date    ABDOMEN SURGERY PROC UNLISTED      HX APPENDECTOMY  09/2019    HX GI  09/2019    ILEOCECECTOMY    HX GYN  2003    CYST ON OVARY    HX HYSTERECTOMY  2019    IR THORACENTESIS CATH W IMAGE  11/6/2019      Social History     Tobacco Use    Smoking status: Never Smoker    Smokeless tobacco: Never Used   Substance Use Topics    Alcohol use: Not Currently      Family History   Problem Relation Age of Onset    Breast Cancer Paternal Grandmother 61    Crohn's Disease Mother     Heart Disease Mother     Cancer Father         BLADDER    Diabetes Father     No Known Problems Son     No Known Problems Daughter     Anesth Problems Neg Hx      Current Outpatient Medications   Medication Sig    amoxicillin-clavulanate (AUGMENTIN) 500-125 mg per tablet Take 1 Tab by mouth two (2) times a day for 21 days.  L.acidoph & parac-S.therm-Bifido (RADHA Q2/RISAQUAD-2) 16 billion cell cap cap Take 1 Cap by mouth daily for 30 days.  sodium chloride (MONOJECT PREFILL SALINE) 5-10 mL by IntraVENous route every twelve (12) hours for 20 days.  potassium chloride (KLOR-CON) 10 mEq tablet Take 1 Tab by mouth two (2) times a day.  ondansetron (ZOFRAN ODT) 4 mg disintegrating tablet Take 1 Tab by mouth every eight (8) hours as needed for Nausea. (Patient taking differently: Take 8 mg by mouth every eight (8) hours as needed for Nausea. Indications: prevent nausea and vomiting from cancer chemotherapy)    dexAMETHasone (DECADRON) 4 mg tablet Take 2 tabs (8mg) by mouth daily on days 2 and 3 after chemotherapy    LORazepam (ATIVAN) 0.5 mg tablet Take 1 Tab by mouth nightly as needed for Anxiety. Max Daily Amount: 0.5 mg.    lidocaine-prilocaine (EMLA) topical cream Apply  to affected area as needed for Pain.     prochlorperazine (COMPAZINE) 5 mg tablet Take 1 tab by mouth every 6 hours as needed for nausea or vomiting    acetaminophen (TYLENOL) 325 mg tablet Take 650 mg by mouth every four (4) hours as needed for Pain. Indications: pain    ibuprofen (MOTRIN) 200 mg tablet Take 3 Tabs by mouth every eight (8) hours as needed for Pain. No current facility-administered medications for this visit. No Known Allergies     Review of Systems: A complete review of systems was obtained, negative except as described above. Physical Exam:     Visit Vitals  /82 (BP 1 Location: Left arm, BP Patient Position: Sitting)   Pulse 82   Temp 97.7 °F (36.5 °C) (Oral)   Ht 5' 3\" (1.6 m)   Wt 113 lb (51.3 kg)   LMP 09/15/2019   SpO2 98%   BMI 20.02 kg/m²     ECOG PS: 1-2  General: No distress  Eyes: Anicteric sclerae  HENT: Atraumatic  Neck: Supple  Resp: CTAB, normal effort  CV: Regular   GI: Soft, less distended with drain   MS: Ambulatory  Skin: Warm dry  Psych: Alert, oriented, appropriate affect, normal judgment/insight    Results:     Lab Results   Component Value Date/Time    WBC 5.2 12/18/2019 03:00 AM    HGB 10.5 (L) 12/18/2019 03:00 AM    HCT 33.3 (L) 12/18/2019 03:00 AM    PLATELET 156 (H) 55/34/4448 03:00 AM    MCV 87.2 12/18/2019 03:00 AM    ABS.  NEUTROPHILS 2.7 12/18/2019 03:00 AM    Hemoglobin (POC) 10.8 (L) 09/25/2019 04:11 PM    Hematocrit (POC) 32 (L) 09/25/2019 12:10 PM     Lab Results   Component Value Date/Time    Sodium 137 12/18/2019 03:00 AM    Potassium 3.2 (L) 12/18/2019 03:00 AM    Chloride 105 12/18/2019 03:00 AM    CO2 26 12/18/2019 03:00 AM    Glucose 94 12/18/2019 03:00 AM    BUN 4 (L) 12/18/2019 03:00 AM    Creatinine 0.39 (L) 12/18/2019 03:00 AM    GFR est AA >60 12/18/2019 03:00 AM    GFR est non-AA >60 12/18/2019 03:00 AM    Calcium 8.9 12/18/2019 03:00 AM    Sodium (POC) 137 09/25/2019 12:10 PM    Potassium (POC) 3.7 09/25/2019 12:10 PM    Chloride (POC) 105 09/25/2019 12:10 PM    Glucose (POC) 85 09/25/2019 12:10 PM    BUN (POC) 4 (L) 09/25/2019 12:10 PM    Creatinine (POC) 0.5 (L) 09/25/2019 12:10 PM    Calcium, ionized (POC) 1.14 09/25/2019 12:10 PM Lab Results   Component Value Date/Time    Bilirubin, total 0.3 12/12/2019 12:35 PM    ALT (SGPT) 20 12/12/2019 12:35 PM    AST (SGOT) 13 (L) 12/12/2019 12:35 PM    Alk. phosphatase 97 12/12/2019 12:35 PM    Protein, total 7.5 12/12/2019 12:35 PM    Albumin 3.0 (L) 12/12/2019 12:35 PM    Globulin 4.5 (H) 12/12/2019 12:35 PM     CT Results (most recent):  Results from Hospital Encounter encounter on 12/12/19   CT ABD PELV W CONT    Narrative INDICATION: pt w/ L upper abd steph'n, assess interval change     EXAM: CT Abdomen and Pelvis is performed with 100 mL Isovue 370 contrast IV  without oral contrast. CT dose reduction was achieved through use of a  standardized protocol tailored for this examination and automatic exposure  control for dose modulation. COMPARISON: 12/12/2019. FINDINGS:  There is interval percutaneous catheter placement in the left subdiaphragmatic  fluid collection which is significantly decreased in size. Small left pleural  effusion is stable. There remains pelvic soft tissue stranding with tenting of the bladder dome  adhering to loops of small bowel and possibly the sigmoid colon. There is moderate liquid/stool throughout the colon proximal to the sigmoid  level. Small bowel is nondilated. There is no air in the urinary bladder. There is no ascites, pneumoperitoneum or significant adenopathy. Liver contains  2 stable probable cysts. Bile ducts are not enlarged. Pancreas shows no mass or  inflammation. Spleen is unremarkable. Adrenal glands are normal in size. Kidneys  show no mass or hydronephrosis. Aorta is without aneurysm. The ureters are not  dilated. Impression IMPRESSION:  1. Decreased size of subdiaphragmatic fluid collection with drain placed. 2. Stable small left pleural effusion. 3. Stable pelvic soft tissue stranding, possibly representing known  carcinomatosis. Records reviewed and summarized above. Pathology report(s) reviewed above.   Radiology report(s) reviewed above. Assessment/PLAN:     1) Stage 4 / Metastatic Appendiceal Cancer   post EXPLORATORY LAPAROTOMY SIERRA BSO, TUMOR DEBULKING: ILEOCECAL RESECTION; OMENTECTOMY 9/25/19  Surgery done for obstruction. She had left rib pain and SOB and on exam decreased bs on LEFT. Got CXR which showed pleural effusion then ultrasound which showed elevated diaphragm and ? Free air  CT which showed fluid collection and patient admitted for this. Had IR drain of splenic fluid collection. Patient went to [facility] and [address] for second opinions. Discussed systemic therapy with FOLFOX/ +/- Avastin chemo or some variation of this regimen. Possible [facility] down the road. Patient choose to have chemo locally and still see [address]. Patient had cycle 1 of FOLFOX chemotherapy on 12/3/19 reduced by 50%. She was then admitted via ER with N/V and abdominal pain. CT A/P 12/12/19 shows worsening fluid collection which was drained via IR. Follow up CT A/P 12/17/19 better overall. Patient is clinically doing much better overall. Patient is due for Cycle 2 of FOLFOX 50% DR on 12/26/19 at [facility] - Goal of care is palliative. Will set up IVF for Monday post chemo. Pt will call us with symptoms of N/V.     2) Left Pleural Effusion post thora x 2  No cytology. Stable on CT. 3) Thrombocytosis  Last PLTs 470k on 12/18/19. 4) Nausea and Vomiting  Much better now but still mild. Continue anti-emetics as needed. 5) Abdominal Pain   Due to carcinomatosis. No pain today per patient. 6) Psychosocial  Mood good. Coping well considering difficult disease. She has great family support -  here today. Call if questions. Follow up in 2 weeks with Cycle 3. This patient was seen in conjunction with Kandis Smith NP. I appreciate the opportunity to participate in Ms. [facility].     Signed By: Marguerite Hebert DO

## 2019-12-26 ENCOUNTER — HOSPITAL ENCOUNTER (OUTPATIENT)
Dept: INFUSION THERAPY | Age: 49
Discharge: HOME | End: 2019-12-26
Payer: COMMERCIAL

## 2019-12-26 VITALS
RESPIRATION RATE: 16 BRPM | DIASTOLIC BLOOD PRESSURE: 75 MMHG | SYSTOLIC BLOOD PRESSURE: 123 MMHG | BODY MASS INDEX: 19.56 KG/M2 | HEIGHT: 63 IN | HEART RATE: 88 BPM | TEMPERATURE: 98 F | WEIGHT: 110.4 LBS

## 2019-12-26 DIAGNOSIS — C18.1 CARCINOMA OF APPENDIX (HCC): Primary | ICD-10-CM

## 2019-12-26 LAB
ALBUMIN SERPL-MCNC: 3.2 G/DL (ref 3.5–5)
ALBUMIN/GLOB SERPL: 0.7 {RATIO} (ref 1.1–2.2)
ALP SERPL-CCNC: 80 U/L (ref 45–117)
ALT SERPL-CCNC: 17 U/L (ref 12–78)
ANION GAP SERPL CALC-SCNC: 6 MMOL/L (ref 5–15)
AST SERPL-CCNC: 19 U/L (ref 15–37)
BASOPHILS # BLD: 0.1 K/UL (ref 0–0.1)
BASOPHILS NFR BLD: 1 % (ref 0–1)
BILIRUB SERPL-MCNC: 0.3 MG/DL (ref 0.2–1)
BUN SERPL-MCNC: 6 MG/DL (ref 6–20)
BUN/CREAT SERPL: 13 (ref 12–20)
CALCIUM SERPL-MCNC: 9.6 MG/DL (ref 8.5–10.1)
CHLORIDE SERPL-SCNC: 103 MMOL/L (ref 97–108)
CO2 SERPL-SCNC: 28 MMOL/L (ref 21–32)
CREAT SERPL-MCNC: 0.46 MG/DL (ref 0.55–1.02)
DIFFERENTIAL METHOD BLD: ABNORMAL
EOSINOPHIL # BLD: 0.2 K/UL (ref 0–0.4)
EOSINOPHIL NFR BLD: 2 % (ref 0–7)
ERYTHROCYTE [DISTWIDTH] IN BLOOD BY AUTOMATED COUNT: 17.6 % (ref 11.5–14.5)
GLOBULIN SER CALC-MCNC: 4.3 G/DL (ref 2–4)
GLUCOSE SERPL-MCNC: 94 MG/DL (ref 65–100)
HCT VFR BLD AUTO: 37.6 % (ref 35–47)
HGB BLD-MCNC: 11.8 G/DL (ref 11.5–16)
IMM GRANULOCYTES # BLD AUTO: 0 K/UL (ref 0–0.04)
IMM GRANULOCYTES NFR BLD AUTO: 0 % (ref 0–0.5)
LYMPHOCYTES # BLD: 1.5 K/UL (ref 0.8–3.5)
LYMPHOCYTES NFR BLD: 19 % (ref 12–49)
MCH RBC QN AUTO: 27.7 PG (ref 26–34)
MCHC RBC AUTO-ENTMCNC: 31.4 G/DL (ref 30–36.5)
MCV RBC AUTO: 88.3 FL (ref 80–99)
MONOCYTES # BLD: 0.6 K/UL (ref 0–1)
MONOCYTES NFR BLD: 8 % (ref 5–13)
NEUTS SEG # BLD: 5.5 K/UL (ref 1.8–8)
NEUTS SEG NFR BLD: 70 % (ref 32–75)
NRBC # BLD: 0 K/UL (ref 0–0.01)
NRBC BLD-RTO: 0 PER 100 WBC
PLATELET # BLD AUTO: 491 K/UL (ref 150–400)
PMV BLD AUTO: 8.5 FL (ref 8.9–12.9)
POTASSIUM SERPL-SCNC: 3.8 MMOL/L (ref 3.5–5.1)
PROT SERPL-MCNC: 7.5 G/DL (ref 6.4–8.2)
RBC # BLD AUTO: 4.26 M/UL (ref 3.8–5.2)
SODIUM SERPL-SCNC: 137 MMOL/L (ref 136–145)
WBC # BLD AUTO: 7.9 K/UL (ref 3.6–11)

## 2019-12-26 PROCEDURE — 96375 TX/PRO/DX INJ NEW DRUG ADDON: CPT

## 2019-12-26 PROCEDURE — 74011000258 HC RX REV CODE- 258: Performed by: NURSE PRACTITIONER

## 2019-12-26 PROCEDURE — 85025 COMPLETE CBC W/AUTO DIFF WBC: CPT

## 2019-12-26 PROCEDURE — 96368 THER/DIAG CONCURRENT INF: CPT

## 2019-12-26 PROCEDURE — 74011250636 HC RX REV CODE- 250/636: Performed by: NURSE PRACTITIONER

## 2019-12-26 PROCEDURE — 96416 CHEMO PROLONG INFUSE W/PUMP: CPT

## 2019-12-26 PROCEDURE — 96415 CHEMO IV INFUSION ADDL HR: CPT

## 2019-12-26 PROCEDURE — 96411 CHEMO IV PUSH ADDL DRUG: CPT

## 2019-12-26 PROCEDURE — 36415 COLL VENOUS BLD VENIPUNCTURE: CPT

## 2019-12-26 PROCEDURE — 96413 CHEMO IV INFUSION 1 HR: CPT

## 2019-12-26 PROCEDURE — 77030012965 HC NDL HUBR BBMI -A

## 2019-12-26 PROCEDURE — 80053 COMPREHEN METABOLIC PANEL: CPT

## 2019-12-26 RX ORDER — SODIUM CHLORIDE 9 MG/ML
10 INJECTION INTRAMUSCULAR; INTRAVENOUS; SUBCUTANEOUS AS NEEDED
Status: ACTIVE | OUTPATIENT
Start: 2019-12-26 | End: 2019-12-26

## 2019-12-26 RX ORDER — ONDANSETRON 2 MG/ML
8 INJECTION INTRAMUSCULAR; INTRAVENOUS ONCE
Status: COMPLETED | OUTPATIENT
Start: 2019-12-26 | End: 2019-12-26

## 2019-12-26 RX ORDER — DEXTROSE MONOHYDRATE 50 MG/ML
25 INJECTION, SOLUTION INTRAVENOUS CONTINUOUS
Status: DISPENSED | OUTPATIENT
Start: 2019-12-26 | End: 2019-12-26

## 2019-12-26 RX ORDER — HEPARIN 100 UNIT/ML
300-500 SYRINGE INTRAVENOUS AS NEEDED
Status: ACTIVE | OUTPATIENT
Start: 2019-12-26 | End: 2019-12-26

## 2019-12-26 RX ORDER — SODIUM CHLORIDE 0.9 % (FLUSH) 0.9 %
10 SYRINGE (ML) INJECTION AS NEEDED
Status: ACTIVE | OUTPATIENT
Start: 2019-12-26 | End: 2019-12-26

## 2019-12-26 RX ORDER — FLUOROURACIL 50 MG/ML
200 INJECTION, SOLUTION INTRAVENOUS ONCE
Status: COMPLETED | OUTPATIENT
Start: 2019-12-26 | End: 2019-12-26

## 2019-12-26 RX ADMIN — DEXTROSE MONOHYDRATE 316 MG: 5 INJECTION, SOLUTION INTRAVENOUS at 12:50

## 2019-12-26 RX ADMIN — OXALIPLATIN 67 MG: 5 INJECTION, SOLUTION, CONCENTRATE INTRAVENOUS at 12:50

## 2019-12-26 RX ADMIN — Medication 10 ML: at 15:01

## 2019-12-26 RX ADMIN — FLUOROURACIL 1896 MG: 50 INJECTION, SOLUTION INTRAVENOUS at 15:12

## 2019-12-26 RX ADMIN — FLUOROURACIL 316 MG: 50 INJECTION, SOLUTION INTRAVENOUS at 15:07

## 2019-12-26 RX ADMIN — DEXAMETHASONE SODIUM PHOSPHATE 12 MG: 4 INJECTION, SOLUTION INTRA-ARTICULAR; INTRALESIONAL; INTRAMUSCULAR; INTRAVENOUS; SOFT TISSUE at 12:00

## 2019-12-26 RX ADMIN — ONDANSETRON 8 MG: 2 INJECTION, SOLUTION INTRAMUSCULAR; INTRAVENOUS at 11:54

## 2019-12-26 RX ADMIN — DEXTROSE MONOHYDRATE 25 ML/HR: 5 INJECTION, SOLUTION INTRAVENOUS at 12:38

## 2019-12-26 NOTE — PROGRESS NOTES
[facility] [place] Progress Note    Date: 2019    Name: Fernando Concepcion    MRN: [id number]         : 1970      1500: SBAR received from Rosalio Holguin RN    Medications Given:   5FU push  5FU  CADD pump    Patient Vitals for the past 12 hrs:   Temp Pulse Resp BP   19 1517 98 °F (36.7 °C) 88 16 123/75   19 1037 97.9 °F (36.6 °C) 93 18 116/86     Ms. Lynn tolerated treatment well and was discharged from [place] [address] in stable condition at 1520. Port infusing with 5FU pump. She is to return on  at [address] [place] [address] for her next appointment.     Laura Lucas RN  2019

## 2019-12-26 NOTE — PROGRESS NOTES
[facility] [place] Progress Note    Date: 2019    Name: Africa Graham    MRN: [id number]         : 1970    Ms. Clayton Booker Arrived ambulatory and in no distress for C2D1 of Folfox Regimen. Assessment was completed, no acute issues at this time, no new complaints voiced. Left chest wall port accessed without difficulty, labs drawn & sent for processing. Chemotherapy Flowsheet 2019   Cycle C2D1   Date 2019   Drug / Regimen Folfox   Pre Meds given          Ms. Lynn's vitals were reviewed. Visit Vitals  /86 (BP 1 Location: Left arm)   Pulse 93   Temp 97.9 °F (36.6 °C)   Resp 18   Ht 5' 3\" (1.6 m)   Wt 50.1 kg (110 lb 6.4 oz)   BMI 19.56 kg/m²       Lab results were obtained and reviewed. Recent Results (from the past 12 hour(s))   CBC WITH AUTOMATED DIFF    Collection Time: 19 10:39 AM   Result Value Ref Range    WBC 7.9 3.6 - 11.0 K/uL    RBC 4.26 3.80 - 5.20 M/uL    HGB 11.8 11.5 - 16.0 g/dL    HCT 37.6 35.0 - 47.0 %    MCV 88.3 80.0 - 99.0 FL    MCH 27.7 26.0 - 34.0 PG    MCHC 31.4 30.0 - 36.5 g/dL    RDW 17.6 (H) 11.5 - 14.5 %    PLATELET 097 (H) 857 - 400 K/uL    MPV 8.5 (L) 8.9 - 12.9 FL    NRBC 0.0 0  WBC    ABSOLUTE NRBC 0.00 0.00 - 0.01 K/uL    NEUTROPHILS 70 32 - 75 %    LYMPHOCYTES 19 12 - 49 %    MONOCYTES 8 5 - 13 %    EOSINOPHILS 2 0 - 7 %    BASOPHILS 1 0 - 1 %    IMMATURE GRANULOCYTES 0 0.0 - 0.5 %    ABS. NEUTROPHILS 5.5 1.8 - 8.0 K/UL    ABS. LYMPHOCYTES 1.5 0.8 - 3.5 K/UL    ABS. MONOCYTES 0.6 0.0 - 1.0 K/UL    ABS. EOSINOPHILS 0.2 0.0 - 0.4 K/UL    ABS. BASOPHILS 0.1 0.0 - 0.1 K/UL    ABS. IMM.  GRANS. 0.0 0.00 - 0.04 K/UL    DF AUTOMATED     METABOLIC PANEL, COMPREHENSIVE    Collection Time: 19 10:39 AM   Result Value Ref Range    Sodium 137 136 - 145 mmol/L    Potassium 3.8 3.5 - 5.1 mmol/L    Chloride 103 97 - 108 mmol/L    CO2 28 21 - 32 mmol/L    Anion gap 6 5 - 15 mmol/L    Glucose 94 65 - 100 mg/dL    BUN 6 6 - 20 MG/DL    Creatinine 0.46 (L) 0.55 - 1.02 MG/DL    BUN/Creatinine ratio 13 12 - 20      GFR est AA >60 >60 ml/min/1.73m2    GFR est non-AA >60 >60 ml/min/1.73m2    Calcium 9.6 8.5 - 10.1 MG/DL    Bilirubin, total 0.3 0.2 - 1.0 MG/DL    ALT (SGPT) 17 12 - 78 U/L    AST (SGOT) 19 15 - 37 U/L    Alk. phosphatase 80 45 - 117 U/L    Protein, total 7.5 6.4 - 8.2 g/dL    Albumin 3.2 (L) 3.5 - 5.0 g/dL    Globulin 4.3 (H) 2.0 - 4.0 g/dL    A-G Ratio 0.7 (L) 1.1 - 2.2         Medications:  Medications Administered     dexamethasone (DECADRON) 12 mg in 0.9% sodium chloride 50 mL IVPB     Admin Date  12/26/2019 Action  Given Dose  12 mg Route  IntraVENous Administered By  Facundo Finn RN          dextrose 5% infusion     Admin Date  12/26/2019 Action  New Bag Dose  25 mL/hr Rate  25 mL/hr Route  IntraVENous Administered By  Vesna Fletcher RN          leucovorin (WELLCOVORIN) 316 mg in dextrose 5% 250 mL, overfill volume 25 mL IVPB     Admin Date  12/26/2019 Action  New Bag Dose  316 mg Rate  145.4 mL/hr Route  IntraVENous Administered By  Vesna Fletcher RN          ondansetron [facility]) injection 8 mg     Admin Date  12/26/2019 Action  Given Dose  8 mg Route  IntraVENous Administered By  Facundo Finn RN          oxaliplatin (ELOXATIN) 67 mg in dextrose 5% 250 mL, overfill volume 25 mL chemo infusion     Admin Date  12/26/2019 Action  New Bag Dose  67 mg Rate  144.2 mL/hr Route  IntraVENous Administered By  Vesna Fletcher, [facility] given to William Bradford RN.       Alvaro Teran RN  December 26, 2019

## 2019-12-28 ENCOUNTER — HOSPITAL ENCOUNTER (OUTPATIENT)
Dept: INFUSION THERAPY | Age: 49
Discharge: HOME | End: 2019-12-28
Payer: COMMERCIAL

## 2019-12-28 VITALS
DIASTOLIC BLOOD PRESSURE: 74 MMHG | RESPIRATION RATE: 18 BRPM | SYSTOLIC BLOOD PRESSURE: 101 MMHG | TEMPERATURE: 97.8 F | HEART RATE: 73 BPM

## 2019-12-28 DIAGNOSIS — C18.1 CARCINOMA OF APPENDIX (HCC): Primary | ICD-10-CM

## 2019-12-28 PROCEDURE — 96523 IRRIG DRUG DELIVERY DEVICE: CPT

## 2019-12-28 PROCEDURE — 74011250636 HC RX REV CODE- 250/636: Performed by: NURSE PRACTITIONER

## 2019-12-28 RX ORDER — SODIUM CHLORIDE 0.9 % (FLUSH) 0.9 %
10 SYRINGE (ML) INJECTION AS NEEDED
Status: DISCONTINUED | OUTPATIENT
Start: 2019-12-28 | End: 2019-12-29 | Stop reason: HOSPADM

## 2019-12-28 RX ORDER — SODIUM CHLORIDE 9 MG/ML
10 INJECTION INTRAMUSCULAR; INTRAVENOUS; SUBCUTANEOUS AS NEEDED
Status: DISCONTINUED | OUTPATIENT
Start: 2019-12-28 | End: 2019-12-29 | Stop reason: HOSPADM

## 2019-12-28 RX ORDER — HEPARIN 100 UNIT/ML
300-500 SYRINGE INTRAVENOUS AS NEEDED
Status: DISCONTINUED | OUTPATIENT
Start: 2019-12-28 | End: 2019-12-29 | Stop reason: HOSPADM

## 2019-12-28 RX ADMIN — Medication 10 ML: at 12:42

## 2019-12-28 RX ADMIN — Medication 500 UNITS: at 12:42

## 2019-12-28 NOTE — PROGRESS NOTES
[facility] Progress Note    Date: 2019    Name: Fatuma Richards    MRN: [id number]         : 1970    1215. Ms. Nick Tam Arrived ambulatory and in no distress for Pump Removal.  Assessment was completed, no acute issues at this time, no new complaints voiced. CADD completed at 1241- 100 ml infused per order. Patient Vitals for the past 12 hrs:   Temp Pulse Resp BP   19 1219 97.8 °F (36.6 °C) 73 18 101/74       1250. Ms. Nick Tam tolerated treatment well and was discharged from [place] [id number] in stable condition. Port de-accessed, flushed & heparinized per protocol by Castro Bang RN. She is to return on 19 for her next appointment.     Poli Vergara RN  2019

## 2019-12-30 ENCOUNTER — HOSPITAL ENCOUNTER (OUTPATIENT)
Dept: INFUSION THERAPY | Age: 49
Discharge: HOME | End: 2019-12-30
Payer: COMMERCIAL

## 2019-12-30 VITALS
TEMPERATURE: 97.5 F | RESPIRATION RATE: 16 BRPM | OXYGEN SATURATION: 97 % | SYSTOLIC BLOOD PRESSURE: 107 MMHG | HEART RATE: 101 BPM | DIASTOLIC BLOOD PRESSURE: 78 MMHG

## 2019-12-30 DIAGNOSIS — C79.60 MALIGNANT NEOPLASM METASTATIC TO OVARY, UNSPECIFIED LATERALITY (HCC): ICD-10-CM

## 2019-12-30 DIAGNOSIS — R11.2 INTRACTABLE VOMITING WITH NAUSEA, UNSPECIFIED VOMITING TYPE: Primary | ICD-10-CM

## 2019-12-30 DIAGNOSIS — C18.1 CARCINOMA OF APPENDIX (HCC): ICD-10-CM

## 2019-12-30 DIAGNOSIS — C18.1 MUCINOUS ADENOCARCINOMA OF APPENDIX (HCC): ICD-10-CM

## 2019-12-30 PROCEDURE — 77030012965 HC NDL HUBR BBMI -A

## 2019-12-30 PROCEDURE — 74011250636 HC RX REV CODE- 250/636: Performed by: NURSE PRACTITIONER

## 2019-12-30 PROCEDURE — 96375 TX/PRO/DX INJ NEW DRUG ADDON: CPT

## 2019-12-30 PROCEDURE — 96360 HYDRATION IV INFUSION INIT: CPT

## 2019-12-30 RX ORDER — ONDANSETRON 2 MG/ML
8 INJECTION INTRAMUSCULAR; INTRAVENOUS ONCE
Status: COMPLETED | OUTPATIENT
Start: 2019-12-30 | End: 2019-12-30

## 2019-12-30 RX ADMIN — SODIUM CHLORIDE 1000 ML: 900 INJECTION, SOLUTION INTRAVENOUS at 13:30

## 2019-12-30 RX ADMIN — ONDANSETRON 8 MG: 2 INJECTION, SOLUTION INTRAMUSCULAR; INTRAVENOUS at 14:08

## 2019-12-30 NOTE — PROGRESS NOTES
Adult Outpatient [facility] @ [facility] VISIT NOTE     2900 Patient arrives for Hydration/Antiemetic therapy without acute problems. Please see connect [facility] for complete assessment and education provided. Patient Vitals for the past 12 hrs:   Temp Pulse Resp BP SpO2   12/30/19 1343 97.5 °F (36.4 °C) (!) 101 16 107/78 97 %       . Medications Administered     ondansetron (ZOFRAN) injection 8 mg     Admin Date  12/30/2019 Action  Given Dose  8 mg Route  IntraVENous Administered By  Jackson Steel RN          sodium chloride 0.9 % bolus infusion 1,000 mL     Admin Date  12/30/2019 Action  New Bag Dose  1000 mL Rate  1,000 mL/hr Route  IntraVENous Administered By  Jackson Steel, DF              1252 Pt stated she felt better after treatment and was discharged home. Patient is aware of next [place] appointment on 01/09/2019.

## 2020-01-01 ENCOUNTER — HOSPITAL ENCOUNTER (OUTPATIENT)
Dept: INFUSION THERAPY | Age: 50
Discharge: HOME | End: 2020-07-08
Payer: COMMERCIAL

## 2020-01-01 ENCOUNTER — HOSPITAL ENCOUNTER (OUTPATIENT)
Dept: INFUSION THERAPY | Age: 50
End: 2020-01-01

## 2020-01-01 ENCOUNTER — APPOINTMENT (OUTPATIENT)
Dept: INFUSION THERAPY | Age: 50
End: 2020-01-01
Payer: COMMERCIAL

## 2020-01-01 ENCOUNTER — OFFICE VISIT (OUTPATIENT)
Dept: ONCOLOGY | Age: 50
End: 2020-01-01

## 2020-01-01 ENCOUNTER — APPOINTMENT (OUTPATIENT)
Dept: CT IMAGING | Age: 50
DRG: 391 | End: 2020-01-01
Attending: EMERGENCY MEDICINE
Payer: COMMERCIAL

## 2020-01-01 ENCOUNTER — HOSPITAL ENCOUNTER (OUTPATIENT)
Dept: INFUSION THERAPY | Age: 50
Discharge: HOME | End: 2020-05-15
Payer: COMMERCIAL

## 2020-01-01 ENCOUNTER — HOSPITAL ENCOUNTER (OUTPATIENT)
Dept: INFUSION THERAPY | Age: 50
Discharge: HOME | End: 2020-02-21
Payer: COMMERCIAL

## 2020-01-01 ENCOUNTER — HOSPITAL ENCOUNTER (OUTPATIENT)
Dept: INFUSION THERAPY | Age: 50
Discharge: HOME | End: 2020-08-28
Payer: COMMERCIAL

## 2020-01-01 ENCOUNTER — APPOINTMENT (OUTPATIENT)
Dept: GENERAL RADIOLOGY | Age: 50
DRG: 391 | End: 2020-01-01
Attending: EMERGENCY MEDICINE
Payer: COMMERCIAL

## 2020-01-01 ENCOUNTER — HOSPITAL ENCOUNTER (OUTPATIENT)
Dept: INFUSION THERAPY | Age: 50
Discharge: HOME | End: 2020-02-19
Payer: COMMERCIAL

## 2020-01-01 ENCOUNTER — HOSPITAL ENCOUNTER (OUTPATIENT)
Dept: INFUSION THERAPY | Age: 50
Discharge: HOME | End: 2020-06-10
Payer: COMMERCIAL

## 2020-01-01 ENCOUNTER — HOSPITAL ENCOUNTER (OUTPATIENT)
Dept: INFUSION THERAPY | Age: 50
Discharge: HOME | End: 2020-09-23
Payer: COMMERCIAL

## 2020-01-01 ENCOUNTER — HOSPITAL ENCOUNTER (OUTPATIENT)
Dept: INFUSION THERAPY | Age: 50
Discharge: HOME | End: 2020-10-08
Payer: COMMERCIAL

## 2020-01-01 ENCOUNTER — HOSPITAL ENCOUNTER (OUTPATIENT)
Dept: INFUSION THERAPY | Age: 50
Discharge: HOME | End: 2020-11-06
Payer: COMMERCIAL

## 2020-01-01 ENCOUNTER — DOCUMENTATION ONLY (OUTPATIENT)
Dept: ONCOLOGY | Age: 50
End: 2020-01-01

## 2020-01-01 ENCOUNTER — HOSPITAL ENCOUNTER (OUTPATIENT)
Dept: INFUSION THERAPY | Age: 50
Discharge: HOME | End: 2020-07-10
Payer: COMMERCIAL

## 2020-01-01 ENCOUNTER — HOSPITAL ENCOUNTER (OUTPATIENT)
Dept: INFUSION THERAPY | Age: 50
Discharge: HOME | End: 2020-11-04
Payer: COMMERCIAL

## 2020-01-01 ENCOUNTER — PATIENT MESSAGE (OUTPATIENT)
Dept: ONCOLOGY | Age: 50
End: 2020-01-01

## 2020-01-01 ENCOUNTER — HOSPITAL ENCOUNTER (OUTPATIENT)
Dept: INFUSION THERAPY | Age: 50
Discharge: HOME | End: 2020-05-01
Payer: COMMERCIAL

## 2020-01-01 ENCOUNTER — TELEPHONE (OUTPATIENT)
Dept: ONCOLOGY | Age: 50
End: 2020-01-01

## 2020-01-01 ENCOUNTER — HOSPITAL ENCOUNTER (OUTPATIENT)
Dept: INFUSION THERAPY | Age: 50
Discharge: HOME | End: 2020-10-07
Payer: COMMERCIAL

## 2020-01-01 ENCOUNTER — HOSPITAL ENCOUNTER (OUTPATIENT)
Dept: INFUSION THERAPY | Age: 50
Discharge: HOME | End: 2020-04-29
Payer: COMMERCIAL

## 2020-01-01 ENCOUNTER — APPOINTMENT (OUTPATIENT)
Dept: INFUSION THERAPY | Age: 50
End: 2020-01-01

## 2020-01-01 ENCOUNTER — HOSPITAL ENCOUNTER (OUTPATIENT)
Dept: INFUSION THERAPY | Age: 50
Discharge: HOME | End: 2020-03-08
Payer: COMMERCIAL

## 2020-01-01 ENCOUNTER — OFFICE VISIT (OUTPATIENT)
Dept: ONCOLOGY | Age: 50
End: 2020-01-01
Payer: COMMERCIAL

## 2020-01-01 ENCOUNTER — HOSPITAL ENCOUNTER (OUTPATIENT)
Dept: INFUSION THERAPY | Age: 50
Discharge: HOME | End: 2020-02-05
Payer: COMMERCIAL

## 2020-01-01 ENCOUNTER — HOSPITAL ENCOUNTER (OUTPATIENT)
Dept: INFUSION THERAPY | Age: 50
End: 2020-01-01
Payer: COMMERCIAL

## 2020-01-01 ENCOUNTER — HOSPITAL ENCOUNTER (OUTPATIENT)
Dept: INFUSION THERAPY | Age: 50
Discharge: HOME | End: 2020-08-12
Payer: COMMERCIAL

## 2020-01-01 ENCOUNTER — HOSPITAL ENCOUNTER (OUTPATIENT)
Dept: INFUSION THERAPY | Age: 50
Discharge: HOME | End: 2020-09-25
Payer: COMMERCIAL

## 2020-01-01 ENCOUNTER — HOSPITAL ENCOUNTER (OUTPATIENT)
Dept: CT IMAGING | Age: 50
Discharge: HOME | End: 2020-05-26
Attending: NURSE PRACTITIONER
Payer: COMMERCIAL

## 2020-01-01 ENCOUNTER — HOSPITAL ENCOUNTER (OUTPATIENT)
Dept: INFUSION THERAPY | Age: 50
Discharge: HOME | End: 2020-02-23
Payer: COMMERCIAL

## 2020-01-01 ENCOUNTER — HOSPITAL ENCOUNTER (OUTPATIENT)
Dept: INFUSION THERAPY | Age: 50
Discharge: HOME | End: 2020-09-09
Payer: COMMERCIAL

## 2020-01-01 ENCOUNTER — HOSPITAL ENCOUNTER (INPATIENT)
Age: 50
LOS: 4 days | Discharge: HOME | DRG: 391 | End: 2020-12-10
Attending: EMERGENCY MEDICINE | Admitting: HOSPITALIST
Payer: COMMERCIAL

## 2020-01-01 ENCOUNTER — HOSPITAL ENCOUNTER (OUTPATIENT)
Dept: INFUSION THERAPY | Age: 50
Discharge: HOME | End: 2020-07-22
Payer: COMMERCIAL

## 2020-01-01 ENCOUNTER — HOSPITAL ENCOUNTER (OUTPATIENT)
Dept: INFUSION THERAPY | Age: 50
Discharge: HOME | End: 2020-06-19
Payer: COMMERCIAL

## 2020-01-01 ENCOUNTER — HOSPITAL ENCOUNTER (OUTPATIENT)
Dept: INFUSION THERAPY | Age: 50
Discharge: HOME | End: 2020-10-09
Payer: COMMERCIAL

## 2020-01-01 ENCOUNTER — HOSPITAL ENCOUNTER (OUTPATIENT)
Dept: INFUSION THERAPY | Age: 50
Discharge: HOME | End: 2020-06-17
Payer: COMMERCIAL

## 2020-01-01 ENCOUNTER — HOSPITAL ENCOUNTER (OUTPATIENT)
Dept: INFUSION THERAPY | Age: 50
Discharge: HOME | End: 2020-03-04
Payer: COMMERCIAL

## 2020-01-01 ENCOUNTER — HOSPITAL ENCOUNTER (OUTPATIENT)
Dept: INFUSION THERAPY | Age: 50
Discharge: HOME | End: 2020-10-23
Payer: COMMERCIAL

## 2020-01-01 ENCOUNTER — HOSPITAL ENCOUNTER (OUTPATIENT)
Dept: INFUSION THERAPY | Age: 50
Discharge: HOME | End: 2020-05-29
Payer: COMMERCIAL

## 2020-01-01 ENCOUNTER — HOSPITAL ENCOUNTER (OUTPATIENT)
Dept: INFUSION THERAPY | Age: 50
Discharge: HOME | End: 2020-08-14
Payer: COMMERCIAL

## 2020-01-01 ENCOUNTER — OFFICE VISIT (OUTPATIENT)
Dept: SURGERY | Age: 50
End: 2020-01-01

## 2020-01-01 ENCOUNTER — HOSPITAL ENCOUNTER (OUTPATIENT)
Dept: INFUSION THERAPY | Age: 50
Discharge: HOME | End: 2020-05-27
Payer: COMMERCIAL

## 2020-01-01 ENCOUNTER — HOSPITAL ENCOUNTER (OUTPATIENT)
Dept: INFUSION THERAPY | Age: 50
Discharge: HOME | End: 2020-03-20
Payer: COMMERCIAL

## 2020-01-01 ENCOUNTER — APPOINTMENT (OUTPATIENT)
Dept: GENERAL RADIOLOGY | Age: 50
DRG: 391 | End: 2020-01-01
Attending: RADIOLOGY
Payer: COMMERCIAL

## 2020-01-01 ENCOUNTER — HOSPITAL ENCOUNTER (OUTPATIENT)
Dept: INFUSION THERAPY | Age: 50
Discharge: HOME | End: 2020-09-11
Payer: COMMERCIAL

## 2020-01-01 ENCOUNTER — HOSPITAL ENCOUNTER (EMERGENCY)
Age: 50
Discharge: HOME | End: 2020-10-09
Attending: EMERGENCY MEDICINE
Payer: COMMERCIAL

## 2020-01-01 ENCOUNTER — HOSPITAL ENCOUNTER (OUTPATIENT)
Dept: INFUSION THERAPY | Age: 50
Discharge: HOME | End: 2020-01-22
Payer: COMMERCIAL

## 2020-01-01 ENCOUNTER — HOSPITAL ENCOUNTER (OUTPATIENT)
Dept: INFUSION THERAPY | Age: 50
Discharge: HOME | End: 2020-07-01
Payer: COMMERCIAL

## 2020-01-01 ENCOUNTER — HOSPITAL ENCOUNTER (OUTPATIENT)
Dept: INFUSION THERAPY | Age: 50
Discharge: HOME | End: 2020-03-18
Payer: COMMERCIAL

## 2020-01-01 ENCOUNTER — HOSPITAL ENCOUNTER (OUTPATIENT)
Dept: INFUSION THERAPY | Age: 50
Discharge: HOME | End: 2020-01-24
Payer: COMMERCIAL

## 2020-01-01 ENCOUNTER — HOSPITAL ENCOUNTER (OUTPATIENT)
Dept: INFUSION THERAPY | Age: 50
Discharge: HOME | End: 2020-03-06
Payer: COMMERCIAL

## 2020-01-01 ENCOUNTER — HOSPITAL ENCOUNTER (OUTPATIENT)
Dept: INFUSION THERAPY | Age: 50
Discharge: HOME | End: 2020-04-03
Payer: COMMERCIAL

## 2020-01-01 ENCOUNTER — HOSPITAL ENCOUNTER (OUTPATIENT)
Dept: INFUSION THERAPY | Age: 50
Discharge: HOME | End: 2020-11-18
Payer: COMMERCIAL

## 2020-01-01 ENCOUNTER — HOSPITAL ENCOUNTER (OUTPATIENT)
Dept: INFUSION THERAPY | Age: 50
Discharge: HOME | End: 2020-08-26
Payer: COMMERCIAL

## 2020-01-01 ENCOUNTER — HOSPITAL ENCOUNTER (OUTPATIENT)
Dept: INFUSION THERAPY | Age: 50
Discharge: HOME | End: 2020-04-15
Payer: COMMERCIAL

## 2020-01-01 ENCOUNTER — HOSPITAL ENCOUNTER (OUTPATIENT)
Dept: INFUSION THERAPY | Age: 50
Discharge: HOME | End: 2020-10-21
Payer: COMMERCIAL

## 2020-01-01 ENCOUNTER — HOSPITAL ENCOUNTER (OUTPATIENT)
Dept: INFUSION THERAPY | Age: 50
Discharge: HOME | End: 2020-05-13
Payer: COMMERCIAL

## 2020-01-01 ENCOUNTER — HOSPITAL ENCOUNTER (OUTPATIENT)
Dept: INFUSION THERAPY | Age: 50
Discharge: HOME | End: 2020-04-01
Payer: COMMERCIAL

## 2020-01-01 ENCOUNTER — HOSPITAL ENCOUNTER (OUTPATIENT)
Dept: INFUSION THERAPY | Age: 50
Discharge: HOME | End: 2020-11-20
Payer: COMMERCIAL

## 2020-01-01 ENCOUNTER — HOSPITAL ENCOUNTER (OUTPATIENT)
Dept: INFUSION THERAPY | Age: 50
Discharge: HOME | End: 2020-02-07
Payer: COMMERCIAL

## 2020-01-01 ENCOUNTER — HOSPITAL ENCOUNTER (OUTPATIENT)
Dept: INFUSION THERAPY | Age: 50
Discharge: HOME | End: 2020-04-17
Payer: COMMERCIAL

## 2020-01-01 ENCOUNTER — HOSPITAL ENCOUNTER (OUTPATIENT)
Dept: INFUSION THERAPY | Age: 50
Discharge: HOME | End: 2020-01-26
Payer: COMMERCIAL

## 2020-01-01 VITALS
TEMPERATURE: 98.2 F | HEIGHT: 63 IN | SYSTOLIC BLOOD PRESSURE: 111 MMHG | WEIGHT: 113 LBS | SYSTOLIC BLOOD PRESSURE: 119 MMHG | WEIGHT: 114.9 LBS | OXYGEN SATURATION: 97 % | HEIGHT: 63 IN | TEMPERATURE: 97.2 F | HEART RATE: 71 BPM | HEART RATE: 76 BPM | DIASTOLIC BLOOD PRESSURE: 84 MMHG | DIASTOLIC BLOOD PRESSURE: 77 MMHG | BODY MASS INDEX: 20.02 KG/M2 | OXYGEN SATURATION: 98 % | BODY MASS INDEX: 20.36 KG/M2

## 2020-01-01 VITALS
SYSTOLIC BLOOD PRESSURE: 114 MMHG | BODY MASS INDEX: 19.68 KG/M2 | DIASTOLIC BLOOD PRESSURE: 78 MMHG | HEART RATE: 73 BPM | RESPIRATION RATE: 18 BRPM | TEMPERATURE: 98.2 F | HEIGHT: 63 IN | WEIGHT: 111.1 LBS

## 2020-01-01 VITALS
TEMPERATURE: 97.4 F | HEIGHT: 63 IN | OXYGEN SATURATION: 97 % | WEIGHT: 109 LBS | SYSTOLIC BLOOD PRESSURE: 108 MMHG | HEART RATE: 69 BPM | BODY MASS INDEX: 19.31 KG/M2 | DIASTOLIC BLOOD PRESSURE: 75 MMHG

## 2020-01-01 VITALS
HEART RATE: 67 BPM | RESPIRATION RATE: 18 BRPM | DIASTOLIC BLOOD PRESSURE: 81 MMHG | TEMPERATURE: 96.8 F | BODY MASS INDEX: 18.49 KG/M2 | SYSTOLIC BLOOD PRESSURE: 126 MMHG | WEIGHT: 104.4 LBS

## 2020-01-01 VITALS
BODY MASS INDEX: 19.67 KG/M2 | HEART RATE: 80 BPM | DIASTOLIC BLOOD PRESSURE: 77 MMHG | RESPIRATION RATE: 16 BRPM | WEIGHT: 111 LBS | SYSTOLIC BLOOD PRESSURE: 110 MMHG | HEIGHT: 63 IN | TEMPERATURE: 98 F

## 2020-01-01 VITALS
TEMPERATURE: 97.5 F | HEART RATE: 72 BPM | OXYGEN SATURATION: 98 % | WEIGHT: 115 LBS | HEIGHT: 63 IN | BODY MASS INDEX: 20.38 KG/M2 | SYSTOLIC BLOOD PRESSURE: 102 MMHG | DIASTOLIC BLOOD PRESSURE: 67 MMHG

## 2020-01-01 VITALS
SYSTOLIC BLOOD PRESSURE: 120 MMHG | DIASTOLIC BLOOD PRESSURE: 78 MMHG | TEMPERATURE: 98.6 F | RESPIRATION RATE: 16 BRPM | HEART RATE: 78 BPM

## 2020-01-01 VITALS
DIASTOLIC BLOOD PRESSURE: 74 MMHG | HEIGHT: 63 IN | SYSTOLIC BLOOD PRESSURE: 109 MMHG | DIASTOLIC BLOOD PRESSURE: 75 MMHG | SYSTOLIC BLOOD PRESSURE: 108 MMHG | BODY MASS INDEX: 20.11 KG/M2 | WEIGHT: 111 LBS | OXYGEN SATURATION: 96 % | HEIGHT: 63 IN | TEMPERATURE: 97.8 F | BODY MASS INDEX: 19.67 KG/M2 | WEIGHT: 113.5 LBS | OXYGEN SATURATION: 98 % | TEMPERATURE: 96.4 F | HEART RATE: 68 BPM

## 2020-01-01 VITALS
DIASTOLIC BLOOD PRESSURE: 69 MMHG | SYSTOLIC BLOOD PRESSURE: 104 MMHG | TEMPERATURE: 98.5 F | RESPIRATION RATE: 18 BRPM | HEART RATE: 66 BPM

## 2020-01-01 VITALS
HEART RATE: 58 BPM | RESPIRATION RATE: 18 BRPM | DIASTOLIC BLOOD PRESSURE: 66 MMHG | SYSTOLIC BLOOD PRESSURE: 106 MMHG | TEMPERATURE: 98.2 F

## 2020-01-01 VITALS
OXYGEN SATURATION: 98 % | HEIGHT: 63 IN | TEMPERATURE: 97.2 F | SYSTOLIC BLOOD PRESSURE: 122 MMHG | BODY MASS INDEX: 18.32 KG/M2 | DIASTOLIC BLOOD PRESSURE: 84 MMHG | HEART RATE: 83 BPM | WEIGHT: 103.4 LBS

## 2020-01-01 VITALS
TEMPERATURE: 97.7 F | SYSTOLIC BLOOD PRESSURE: 101 MMHG | RESPIRATION RATE: 16 BRPM | DIASTOLIC BLOOD PRESSURE: 71 MMHG | HEART RATE: 72 BPM

## 2020-01-01 VITALS
OXYGEN SATURATION: 98 % | WEIGHT: 109 LBS | RESPIRATION RATE: 16 BRPM | DIASTOLIC BLOOD PRESSURE: 82 MMHG | HEART RATE: 78 BPM | BODY MASS INDEX: 19.31 KG/M2 | TEMPERATURE: 96.9 F | HEIGHT: 63 IN | SYSTOLIC BLOOD PRESSURE: 123 MMHG

## 2020-01-01 VITALS
WEIGHT: 102.2 LBS | TEMPERATURE: 97.6 F | OXYGEN SATURATION: 98 % | DIASTOLIC BLOOD PRESSURE: 78 MMHG | HEIGHT: 63 IN | HEART RATE: 76 BPM | SYSTOLIC BLOOD PRESSURE: 116 MMHG | BODY MASS INDEX: 18.11 KG/M2

## 2020-01-01 VITALS
DIASTOLIC BLOOD PRESSURE: 70 MMHG | RESPIRATION RATE: 18 BRPM | HEART RATE: 81 BPM | TEMPERATURE: 96.8 F | SYSTOLIC BLOOD PRESSURE: 97 MMHG

## 2020-01-01 VITALS
OXYGEN SATURATION: 98 % | TEMPERATURE: 97.9 F | SYSTOLIC BLOOD PRESSURE: 97 MMHG | WEIGHT: 110.6 LBS | DIASTOLIC BLOOD PRESSURE: 73 MMHG | HEIGHT: 63 IN | HEART RATE: 65 BPM | BODY MASS INDEX: 19.6 KG/M2

## 2020-01-01 VITALS
HEIGHT: 63 IN | DIASTOLIC BLOOD PRESSURE: 72 MMHG | WEIGHT: 108.9 LBS | SYSTOLIC BLOOD PRESSURE: 102 MMHG | TEMPERATURE: 97.5 F | BODY MASS INDEX: 19.3 KG/M2 | HEART RATE: 75 BPM | OXYGEN SATURATION: 97 %

## 2020-01-01 VITALS
HEART RATE: 75 BPM | BODY MASS INDEX: 20.11 KG/M2 | DIASTOLIC BLOOD PRESSURE: 77 MMHG | TEMPERATURE: 97.2 F | HEIGHT: 63 IN | WEIGHT: 113.5 LBS | SYSTOLIC BLOOD PRESSURE: 110 MMHG

## 2020-01-01 VITALS
DIASTOLIC BLOOD PRESSURE: 81 MMHG | OXYGEN SATURATION: 98 % | WEIGHT: 112.1 LBS | BODY MASS INDEX: 19.86 KG/M2 | HEIGHT: 63 IN | TEMPERATURE: 97.7 F | SYSTOLIC BLOOD PRESSURE: 120 MMHG | HEART RATE: 72 BPM

## 2020-01-01 VITALS
WEIGHT: 100.31 LBS | SYSTOLIC BLOOD PRESSURE: 120 MMHG | HEIGHT: 63 IN | RESPIRATION RATE: 18 BRPM | OXYGEN SATURATION: 95 % | HEART RATE: 80 BPM | TEMPERATURE: 98.1 F | BODY MASS INDEX: 17.77 KG/M2 | DIASTOLIC BLOOD PRESSURE: 75 MMHG

## 2020-01-01 VITALS
TEMPERATURE: 98.5 F | HEART RATE: 62 BPM | SYSTOLIC BLOOD PRESSURE: 111 MMHG | DIASTOLIC BLOOD PRESSURE: 77 MMHG | RESPIRATION RATE: 18 BRPM

## 2020-01-01 VITALS
TEMPERATURE: 98.7 F | DIASTOLIC BLOOD PRESSURE: 64 MMHG | RESPIRATION RATE: 18 BRPM | HEART RATE: 60 BPM | DIASTOLIC BLOOD PRESSURE: 76 MMHG | SYSTOLIC BLOOD PRESSURE: 95 MMHG | RESPIRATION RATE: 18 BRPM | SYSTOLIC BLOOD PRESSURE: 114 MMHG | HEIGHT: 63 IN | BODY MASS INDEX: 20.2 KG/M2 | TEMPERATURE: 98.7 F | HEART RATE: 67 BPM | WEIGHT: 114 LBS

## 2020-01-01 VITALS
DIASTOLIC BLOOD PRESSURE: 67 MMHG | RESPIRATION RATE: 18 BRPM | TEMPERATURE: 98.1 F | SYSTOLIC BLOOD PRESSURE: 103 MMHG | HEART RATE: 61 BPM

## 2020-01-01 VITALS
SYSTOLIC BLOOD PRESSURE: 108 MMHG | WEIGHT: 107.5 LBS | OXYGEN SATURATION: 98 % | HEIGHT: 63 IN | DIASTOLIC BLOOD PRESSURE: 70 MMHG | TEMPERATURE: 97.6 F | RESPIRATION RATE: 14 BRPM | BODY MASS INDEX: 19.05 KG/M2 | HEART RATE: 76 BPM

## 2020-01-01 VITALS
RESPIRATION RATE: 18 BRPM | TEMPERATURE: 98.2 F | SYSTOLIC BLOOD PRESSURE: 126 MMHG | HEART RATE: 59 BPM | DIASTOLIC BLOOD PRESSURE: 78 MMHG

## 2020-01-01 VITALS
RESPIRATION RATE: 18 BRPM | HEART RATE: 81 BPM | BODY MASS INDEX: 18.69 KG/M2 | WEIGHT: 105.5 LBS | HEIGHT: 63 IN | TEMPERATURE: 97.9 F | SYSTOLIC BLOOD PRESSURE: 106 MMHG | DIASTOLIC BLOOD PRESSURE: 72 MMHG

## 2020-01-01 VITALS
BODY MASS INDEX: 20.38 KG/M2 | HEIGHT: 63 IN | DIASTOLIC BLOOD PRESSURE: 77 MMHG | SYSTOLIC BLOOD PRESSURE: 110 MMHG | TEMPERATURE: 98 F | HEART RATE: 62 BPM | RESPIRATION RATE: 16 BRPM | WEIGHT: 115 LBS

## 2020-01-01 VITALS
DIASTOLIC BLOOD PRESSURE: 71 MMHG | HEART RATE: 69 BPM | RESPIRATION RATE: 18 BRPM | SYSTOLIC BLOOD PRESSURE: 104 MMHG | TEMPERATURE: 98.9 F

## 2020-01-01 VITALS
BODY MASS INDEX: 19.05 KG/M2 | OXYGEN SATURATION: 95 % | HEIGHT: 63 IN | TEMPERATURE: 97.2 F | SYSTOLIC BLOOD PRESSURE: 111 MMHG | WEIGHT: 107.5 LBS | DIASTOLIC BLOOD PRESSURE: 77 MMHG | HEART RATE: 70 BPM

## 2020-01-01 VITALS
WEIGHT: 108 LBS | RESPIRATION RATE: 16 BRPM | BODY MASS INDEX: 19.14 KG/M2 | HEART RATE: 60 BPM | SYSTOLIC BLOOD PRESSURE: 110 MMHG | TEMPERATURE: 96.7 F | HEIGHT: 63 IN | DIASTOLIC BLOOD PRESSURE: 65 MMHG

## 2020-01-01 VITALS
SYSTOLIC BLOOD PRESSURE: 114 MMHG | DIASTOLIC BLOOD PRESSURE: 74 MMHG | HEART RATE: 60 BPM | TEMPERATURE: 98.4 F | RESPIRATION RATE: 18 BRPM

## 2020-01-01 VITALS
HEART RATE: 67 BPM | TEMPERATURE: 97.9 F | RESPIRATION RATE: 18 BRPM | SYSTOLIC BLOOD PRESSURE: 119 MMHG | DIASTOLIC BLOOD PRESSURE: 74 MMHG

## 2020-01-01 VITALS
WEIGHT: 105.5 LBS | BODY MASS INDEX: 18.69 KG/M2 | DIASTOLIC BLOOD PRESSURE: 79 MMHG | WEIGHT: 112.2 LBS | DIASTOLIC BLOOD PRESSURE: 76 MMHG | OXYGEN SATURATION: 96 % | TEMPERATURE: 97.7 F | HEIGHT: 63 IN | HEIGHT: 63 IN | HEART RATE: 78 BPM | OXYGEN SATURATION: 97 % | SYSTOLIC BLOOD PRESSURE: 115 MMHG | TEMPERATURE: 97.7 F | HEART RATE: 84 BPM | BODY MASS INDEX: 19.88 KG/M2 | SYSTOLIC BLOOD PRESSURE: 112 MMHG

## 2020-01-01 VITALS
TEMPERATURE: 98.4 F | RESPIRATION RATE: 16 BRPM | SYSTOLIC BLOOD PRESSURE: 105 MMHG | HEART RATE: 75 BPM | DIASTOLIC BLOOD PRESSURE: 71 MMHG

## 2020-01-01 VITALS
WEIGHT: 110.2 LBS | HEIGHT: 63 IN | TEMPERATURE: 98.6 F | BODY MASS INDEX: 19.53 KG/M2 | SYSTOLIC BLOOD PRESSURE: 100 MMHG | RESPIRATION RATE: 16 BRPM | DIASTOLIC BLOOD PRESSURE: 68 MMHG | OXYGEN SATURATION: 98 % | DIASTOLIC BLOOD PRESSURE: 78 MMHG | HEART RATE: 69 BPM | HEIGHT: 63 IN | HEART RATE: 78 BPM | BODY MASS INDEX: 20.3 KG/M2 | TEMPERATURE: 97.6 F | WEIGHT: 114.6 LBS | SYSTOLIC BLOOD PRESSURE: 110 MMHG

## 2020-01-01 VITALS
HEIGHT: 63 IN | OXYGEN SATURATION: 96 % | TEMPERATURE: 96.3 F | SYSTOLIC BLOOD PRESSURE: 108 MMHG | HEART RATE: 65 BPM | BODY MASS INDEX: 20.45 KG/M2 | DIASTOLIC BLOOD PRESSURE: 74 MMHG | WEIGHT: 115.4 LBS

## 2020-01-01 VITALS
DIASTOLIC BLOOD PRESSURE: 76 MMHG | WEIGHT: 113.6 LBS | TEMPERATURE: 98.2 F | HEIGHT: 63 IN | RESPIRATION RATE: 18 BRPM | HEART RATE: 70 BPM | SYSTOLIC BLOOD PRESSURE: 107 MMHG | BODY MASS INDEX: 20.13 KG/M2

## 2020-01-01 VITALS
SYSTOLIC BLOOD PRESSURE: 112 MMHG | RESPIRATION RATE: 18 BRPM | TEMPERATURE: 97 F | HEART RATE: 61 BPM | DIASTOLIC BLOOD PRESSURE: 74 MMHG

## 2020-01-01 VITALS
SYSTOLIC BLOOD PRESSURE: 108 MMHG | WEIGHT: 110.6 LBS | HEART RATE: 76 BPM | DIASTOLIC BLOOD PRESSURE: 73 MMHG | BODY MASS INDEX: 19.6 KG/M2 | TEMPERATURE: 96.8 F | RESPIRATION RATE: 18 BRPM | HEIGHT: 63 IN

## 2020-01-01 VITALS
TEMPERATURE: 97.8 F | HEART RATE: 76 BPM | SYSTOLIC BLOOD PRESSURE: 99 MMHG | RESPIRATION RATE: 18 BRPM | HEIGHT: 63 IN | BODY MASS INDEX: 18.07 KG/M2 | DIASTOLIC BLOOD PRESSURE: 67 MMHG | WEIGHT: 102 LBS

## 2020-01-01 VITALS
HEART RATE: 68 BPM | RESPIRATION RATE: 18 BRPM | SYSTOLIC BLOOD PRESSURE: 111 MMHG | DIASTOLIC BLOOD PRESSURE: 73 MMHG | TEMPERATURE: 98.4 F

## 2020-01-01 VITALS
WEIGHT: 114.1 LBS | SYSTOLIC BLOOD PRESSURE: 116 MMHG | OXYGEN SATURATION: 97 % | HEART RATE: 82 BPM | BODY MASS INDEX: 20.21 KG/M2 | HEIGHT: 63 IN | DIASTOLIC BLOOD PRESSURE: 79 MMHG | TEMPERATURE: 97.7 F

## 2020-01-01 VITALS
SYSTOLIC BLOOD PRESSURE: 110 MMHG | TEMPERATURE: 98.9 F | HEART RATE: 58 BPM | RESPIRATION RATE: 18 BRPM | DIASTOLIC BLOOD PRESSURE: 68 MMHG

## 2020-01-01 VITALS
TEMPERATURE: 98.1 F | DIASTOLIC BLOOD PRESSURE: 70 MMHG | HEART RATE: 81 BPM | RESPIRATION RATE: 18 BRPM | SYSTOLIC BLOOD PRESSURE: 96 MMHG

## 2020-01-01 VITALS
BODY MASS INDEX: 20.61 KG/M2 | DIASTOLIC BLOOD PRESSURE: 77 MMHG | DIASTOLIC BLOOD PRESSURE: 77 MMHG | SYSTOLIC BLOOD PRESSURE: 111 MMHG | HEIGHT: 63 IN | OXYGEN SATURATION: 96 % | BODY MASS INDEX: 19.83 KG/M2 | SYSTOLIC BLOOD PRESSURE: 117 MMHG | HEIGHT: 63 IN | TEMPERATURE: 97.6 F | TEMPERATURE: 97.1 F | WEIGHT: 111.9 LBS | HEART RATE: 69 BPM | HEART RATE: 73 BPM | OXYGEN SATURATION: 98 % | WEIGHT: 116.3 LBS

## 2020-01-01 VITALS
HEART RATE: 68 BPM | DIASTOLIC BLOOD PRESSURE: 68 MMHG | SYSTOLIC BLOOD PRESSURE: 102 MMHG | HEIGHT: 63 IN | WEIGHT: 116.3 LBS | RESPIRATION RATE: 18 BRPM | BODY MASS INDEX: 20.61 KG/M2 | TEMPERATURE: 97.8 F

## 2020-01-01 VITALS
TEMPERATURE: 97.7 F | BODY MASS INDEX: 20.21 KG/M2 | HEIGHT: 63 IN | DIASTOLIC BLOOD PRESSURE: 80 MMHG | HEART RATE: 64 BPM | SYSTOLIC BLOOD PRESSURE: 122 MMHG | WEIGHT: 114.1 LBS | RESPIRATION RATE: 18 BRPM

## 2020-01-01 VITALS
OXYGEN SATURATION: 100 % | RESPIRATION RATE: 15 BRPM | DIASTOLIC BLOOD PRESSURE: 61 MMHG | SYSTOLIC BLOOD PRESSURE: 110 MMHG | TEMPERATURE: 98 F | HEART RATE: 68 BPM

## 2020-01-01 VITALS
OXYGEN SATURATION: 97 % | WEIGHT: 111.1 LBS | TEMPERATURE: 98.2 F | BODY MASS INDEX: 19.68 KG/M2 | DIASTOLIC BLOOD PRESSURE: 75 MMHG | HEART RATE: 78 BPM | HEIGHT: 63 IN | SYSTOLIC BLOOD PRESSURE: 113 MMHG

## 2020-01-01 VITALS
TEMPERATURE: 97.4 F | DIASTOLIC BLOOD PRESSURE: 64 MMHG | WEIGHT: 113.4 LBS | HEIGHT: 63 IN | SYSTOLIC BLOOD PRESSURE: 98 MMHG | RESPIRATION RATE: 16 BRPM | HEART RATE: 62 BPM | BODY MASS INDEX: 20.09 KG/M2

## 2020-01-01 VITALS
WEIGHT: 113.5 LBS | SYSTOLIC BLOOD PRESSURE: 96 MMHG | TEMPERATURE: 97 F | BODY MASS INDEX: 20.11 KG/M2 | RESPIRATION RATE: 18 BRPM | HEIGHT: 63 IN | DIASTOLIC BLOOD PRESSURE: 66 MMHG

## 2020-01-01 VITALS
TEMPERATURE: 98.7 F | HEIGHT: 63 IN | DIASTOLIC BLOOD PRESSURE: 72 MMHG | SYSTOLIC BLOOD PRESSURE: 109 MMHG | WEIGHT: 112.2 LBS | BODY MASS INDEX: 19.88 KG/M2 | HEART RATE: 70 BPM

## 2020-01-01 VITALS
WEIGHT: 105.1 LBS | OXYGEN SATURATION: 98 % | HEIGHT: 63 IN | TEMPERATURE: 97.8 F | BODY MASS INDEX: 18.62 KG/M2 | SYSTOLIC BLOOD PRESSURE: 116 MMHG | HEART RATE: 90 BPM | DIASTOLIC BLOOD PRESSURE: 82 MMHG

## 2020-01-01 VITALS
RESPIRATION RATE: 18 BRPM | DIASTOLIC BLOOD PRESSURE: 79 MMHG | TEMPERATURE: 97.8 F | RESPIRATION RATE: 16 BRPM | SYSTOLIC BLOOD PRESSURE: 113 MMHG | HEART RATE: 73 BPM | SYSTOLIC BLOOD PRESSURE: 110 MMHG | HEART RATE: 68 BPM | DIASTOLIC BLOOD PRESSURE: 69 MMHG | TEMPERATURE: 98.6 F

## 2020-01-01 VITALS
DIASTOLIC BLOOD PRESSURE: 80 MMHG | RESPIRATION RATE: 18 BRPM | SYSTOLIC BLOOD PRESSURE: 121 MMHG | TEMPERATURE: 97.8 F | HEIGHT: 63 IN | HEART RATE: 69 BPM | WEIGHT: 113 LBS | BODY MASS INDEX: 20.02 KG/M2

## 2020-01-01 VITALS
DIASTOLIC BLOOD PRESSURE: 87 MMHG | HEART RATE: 78 BPM | SYSTOLIC BLOOD PRESSURE: 137 MMHG | TEMPERATURE: 98.4 F | HEIGHT: 63 IN | WEIGHT: 112.1 LBS | RESPIRATION RATE: 18 BRPM | BODY MASS INDEX: 19.86 KG/M2

## 2020-01-01 VITALS
RESPIRATION RATE: 18 BRPM | HEART RATE: 60 BPM | TEMPERATURE: 97.9 F | TEMPERATURE: 98.9 F | DIASTOLIC BLOOD PRESSURE: 66 MMHG | SYSTOLIC BLOOD PRESSURE: 110 MMHG | HEART RATE: 70 BPM | DIASTOLIC BLOOD PRESSURE: 76 MMHG | RESPIRATION RATE: 16 BRPM | SYSTOLIC BLOOD PRESSURE: 103 MMHG

## 2020-01-01 VITALS
HEART RATE: 70 BPM | TEMPERATURE: 98.6 F | SYSTOLIC BLOOD PRESSURE: 101 MMHG | RESPIRATION RATE: 16 BRPM | DIASTOLIC BLOOD PRESSURE: 58 MMHG

## 2020-01-01 VITALS
RESPIRATION RATE: 16 BRPM | SYSTOLIC BLOOD PRESSURE: 109 MMHG | TEMPERATURE: 98.2 F | HEART RATE: 66 BPM | DIASTOLIC BLOOD PRESSURE: 78 MMHG

## 2020-01-01 VITALS — BODY MASS INDEX: 18.96 KG/M2 | WEIGHT: 107 LBS | HEIGHT: 63 IN

## 2020-01-01 VITALS — HEIGHT: 63 IN | WEIGHT: 111 LBS | BODY MASS INDEX: 19.67 KG/M2

## 2020-01-01 VITALS — TEMPERATURE: 96.9 F | RESPIRATION RATE: 16 BRPM

## 2020-01-01 DIAGNOSIS — G62.0 CHEMOTHERAPY-INDUCED NEUROPATHY (HCC): ICD-10-CM

## 2020-01-01 DIAGNOSIS — G47.00 INSOMNIA, UNSPECIFIED TYPE: ICD-10-CM

## 2020-01-01 DIAGNOSIS — T45.1X5A CHEMOTHERAPY INDUCED DIARRHEA: ICD-10-CM

## 2020-01-01 DIAGNOSIS — C18.1 MUCINOUS ADENOCARCINOMA OF APPENDIX (HCC): Primary | ICD-10-CM

## 2020-01-01 DIAGNOSIS — C80.0 CARCINOMATOSIS (HCC): ICD-10-CM

## 2020-01-01 DIAGNOSIS — Z87.09 HISTORY OF PLEURAL EFFUSION: ICD-10-CM

## 2020-01-01 DIAGNOSIS — C77.2 CANCER OF APPENDIX METASTATIC TO INTRA-ABDOMINAL LYMPH NODE (HCC): Primary | ICD-10-CM

## 2020-01-01 DIAGNOSIS — R10.84 GENERALIZED ABDOMINAL PAIN: ICD-10-CM

## 2020-01-01 DIAGNOSIS — C18.1 CARCINOMA OF APPENDIX (HCC): Primary | ICD-10-CM

## 2020-01-01 DIAGNOSIS — C79.60 MALIGNANT NEOPLASM METASTATIC TO OVARY, UNSPECIFIED LATERALITY (HCC): ICD-10-CM

## 2020-01-01 DIAGNOSIS — D73.3 SPLENIC ABSCESS: ICD-10-CM

## 2020-01-01 DIAGNOSIS — T45.1X5A CHEMOTHERAPY-INDUCED NEUROPATHY (HCC): ICD-10-CM

## 2020-01-01 DIAGNOSIS — Z09 CHEMOTHERAPY FOLLOW-UP EXAMINATION: ICD-10-CM

## 2020-01-01 DIAGNOSIS — R63.4 WEIGHT LOSS: ICD-10-CM

## 2020-01-01 DIAGNOSIS — R10.32 ABDOMINAL PAIN, LLQ (LEFT LOWER QUADRANT): Primary | ICD-10-CM

## 2020-01-01 DIAGNOSIS — Z95.828 PORT-A-CATH IN PLACE: ICD-10-CM

## 2020-01-01 DIAGNOSIS — T45.1X5A CHEMOTHERAPY-INDUCED NAUSEA: ICD-10-CM

## 2020-01-01 DIAGNOSIS — R11.0 CHEMOTHERAPY-INDUCED NAUSEA: ICD-10-CM

## 2020-01-01 DIAGNOSIS — R63.0 POOR APPETITE: ICD-10-CM

## 2020-01-01 DIAGNOSIS — N30.01 ACUTE CYSTITIS WITH HEMATURIA: ICD-10-CM

## 2020-01-01 DIAGNOSIS — F41.8 ANXIETY ABOUT HEALTH: ICD-10-CM

## 2020-01-01 DIAGNOSIS — C77.2 CANCER OF APPENDIX METASTATIC TO INTRA-ABDOMINAL LYMPH NODE (HCC): ICD-10-CM

## 2020-01-01 DIAGNOSIS — J90 PLEURAL EFFUSION ON LEFT: ICD-10-CM

## 2020-01-01 DIAGNOSIS — T45.1X5A CHEMOTHERAPY-INDUCED THROMBOCYTOPENIA: ICD-10-CM

## 2020-01-01 DIAGNOSIS — R11.0 CHEMOTHERAPY-INDUCED NAUSEA: Primary | ICD-10-CM

## 2020-01-01 DIAGNOSIS — C18.1 CANCER OF APPENDIX METASTATIC TO INTRA-ABDOMINAL LYMPH NODE (HCC): ICD-10-CM

## 2020-01-01 DIAGNOSIS — D73.5 SPLENIC INFARCTION: ICD-10-CM

## 2020-01-01 DIAGNOSIS — K52.1 CHEMOTHERAPY INDUCED DIARRHEA: ICD-10-CM

## 2020-01-01 DIAGNOSIS — C18.1 CARCINOMA OF APPENDIX (HCC): ICD-10-CM

## 2020-01-01 DIAGNOSIS — D69.59 CHEMOTHERAPY-INDUCED THROMBOCYTOPENIA: ICD-10-CM

## 2020-01-01 DIAGNOSIS — C18.1 CANCER OF APPENDIX METASTATIC TO INTRA-ABDOMINAL LYMPH NODE (HCC): Primary | ICD-10-CM

## 2020-01-01 DIAGNOSIS — C79.60: ICD-10-CM

## 2020-01-01 DIAGNOSIS — C18.1 MUCINOUS ADENOCARCINOMA OF APPENDIX (HCC): ICD-10-CM

## 2020-01-01 DIAGNOSIS — C18.1 APPENDIX CARCINOMA (HCC): ICD-10-CM

## 2020-01-01 DIAGNOSIS — R50.9 FEVER, UNSPECIFIED FEVER CAUSE: ICD-10-CM

## 2020-01-01 DIAGNOSIS — R11.2 NAUSEA AND VOMITING, INTRACTABILITY OF VOMITING NOT SPECIFIED, UNSPECIFIED VOMITING TYPE: ICD-10-CM

## 2020-01-01 DIAGNOSIS — R19.7 DIARRHEA, UNSPECIFIED TYPE: ICD-10-CM

## 2020-01-01 DIAGNOSIS — G89.3 CANCER RELATED PAIN: ICD-10-CM

## 2020-01-01 DIAGNOSIS — R31.0 GROSS HEMATURIA: Primary | ICD-10-CM

## 2020-01-01 DIAGNOSIS — K65.1 SUBPHRENIC ABSCESS (HCC): Primary | ICD-10-CM

## 2020-01-01 DIAGNOSIS — R11.2 INTRACTABLE VOMITING WITH NAUSEA, UNSPECIFIED VOMITING TYPE: Primary | ICD-10-CM

## 2020-01-01 DIAGNOSIS — Z87.448 HISTORY OF HEMATURIA: ICD-10-CM

## 2020-01-01 DIAGNOSIS — E87.6 HYPOKALEMIA: ICD-10-CM

## 2020-01-01 DIAGNOSIS — R10.9 ABDOMINAL PAIN, UNSPECIFIED ABDOMINAL LOCATION: ICD-10-CM

## 2020-01-01 DIAGNOSIS — Z87.09 HISTORY OF ASTHMA: ICD-10-CM

## 2020-01-01 DIAGNOSIS — N32.89 BLADDER MASS: ICD-10-CM

## 2020-01-01 DIAGNOSIS — G89.3 CANCER ASSOCIATED PAIN: ICD-10-CM

## 2020-01-01 DIAGNOSIS — T45.1X5A CHEMOTHERAPY-INDUCED NAUSEA: Primary | ICD-10-CM

## 2020-01-01 DIAGNOSIS — R31.9 HEMATURIA, UNSPECIFIED TYPE: ICD-10-CM

## 2020-01-01 DIAGNOSIS — C80.0 DISSEMINATED MALIGNANT NEOPLASM (HCC): ICD-10-CM

## 2020-01-01 LAB
ALBUMIN SERPL-MCNC: 2.1 G/DL (ref 3.5–5)
ALBUMIN SERPL-MCNC: 2.7 G/DL (ref 3.5–5)
ALBUMIN SERPL-MCNC: 2.9 G/DL (ref 3.5–5)
ALBUMIN SERPL-MCNC: 3 G/DL (ref 3.5–5)
ALBUMIN SERPL-MCNC: 3.1 G/DL (ref 3.5–5)
ALBUMIN SERPL-MCNC: 3.2 G/DL (ref 3.5–5)
ALBUMIN SERPL-MCNC: 3.3 G/DL (ref 3.5–5)
ALBUMIN SERPL-MCNC: 3.4 G/DL (ref 3.5–5)
ALBUMIN SERPL-MCNC: 3.4 G/DL (ref 3.5–5)
ALBUMIN SERPL-MCNC: 3.5 G/DL (ref 3.5–5)
ALBUMIN/GLOB SERPL: 0.6 {RATIO} (ref 1.1–2.2)
ALBUMIN/GLOB SERPL: 0.6 {RATIO} (ref 1.1–2.2)
ALBUMIN/GLOB SERPL: 0.7 {RATIO} (ref 1.1–2.2)
ALBUMIN/GLOB SERPL: 0.8 {RATIO} (ref 1.1–2.2)
ALBUMIN/GLOB SERPL: 0.9 {RATIO} (ref 1.1–2.2)
ALP SERPL-CCNC: 101 U/L (ref 45–117)
ALP SERPL-CCNC: 106 U/L (ref 45–117)
ALP SERPL-CCNC: 107 U/L (ref 45–117)
ALP SERPL-CCNC: 109 U/L (ref 45–117)
ALP SERPL-CCNC: 113 U/L (ref 45–117)
ALP SERPL-CCNC: 116 U/L (ref 45–117)
ALP SERPL-CCNC: 118 U/L (ref 45–117)
ALP SERPL-CCNC: 119 U/L (ref 45–117)
ALP SERPL-CCNC: 120 U/L (ref 45–117)
ALP SERPL-CCNC: 120 U/L (ref 45–117)
ALP SERPL-CCNC: 122 U/L (ref 45–117)
ALP SERPL-CCNC: 126 U/L (ref 45–117)
ALP SERPL-CCNC: 130 U/L (ref 45–117)
ALP SERPL-CCNC: 134 U/L (ref 45–117)
ALP SERPL-CCNC: 137 U/L (ref 45–117)
ALP SERPL-CCNC: 139 U/L (ref 45–117)
ALP SERPL-CCNC: 139 U/L (ref 45–117)
ALP SERPL-CCNC: 142 U/L (ref 45–117)
ALP SERPL-CCNC: 143 U/L (ref 45–117)
ALP SERPL-CCNC: 146 U/L (ref 45–117)
ALP SERPL-CCNC: 154 U/L (ref 45–117)
ALP SERPL-CCNC: 157 U/L (ref 45–117)
ALP SERPL-CCNC: 161 U/L (ref 45–117)
ALP SERPL-CCNC: 222 U/L (ref 45–117)
ALP SERPL-CCNC: 87 U/L (ref 45–117)
ALP SERPL-CCNC: 91 U/L (ref 45–117)
ALT SERPL-CCNC: 20 U/L (ref 12–78)
ALT SERPL-CCNC: 21 U/L (ref 12–78)
ALT SERPL-CCNC: 21 U/L (ref 12–78)
ALT SERPL-CCNC: 22 U/L (ref 12–78)
ALT SERPL-CCNC: 23 U/L (ref 12–78)
ALT SERPL-CCNC: 24 U/L (ref 12–78)
ALT SERPL-CCNC: 25 U/L (ref 12–78)
ALT SERPL-CCNC: 25 U/L (ref 12–78)
ALT SERPL-CCNC: 26 U/L (ref 12–78)
ALT SERPL-CCNC: 26 U/L (ref 12–78)
ALT SERPL-CCNC: 27 U/L (ref 12–78)
ALT SERPL-CCNC: 28 U/L (ref 12–78)
ALT SERPL-CCNC: 31 U/L (ref 12–78)
ALT SERPL-CCNC: 32 U/L (ref 12–78)
ALT SERPL-CCNC: 45 U/L (ref 12–78)
ANION GAP SERPL CALC-SCNC: 11 MMOL/L (ref 5–15)
ANION GAP SERPL CALC-SCNC: 4 MMOL/L (ref 5–15)
ANION GAP SERPL CALC-SCNC: 5 MMOL/L (ref 5–15)
ANION GAP SERPL CALC-SCNC: 6 MMOL/L (ref 5–15)
ANION GAP SERPL CALC-SCNC: 7 MMOL/L (ref 5–15)
ANION GAP SERPL CALC-SCNC: 8 MMOL/L (ref 5–15)
ANION GAP SERPL CALC-SCNC: 9 MMOL/L (ref 5–15)
APPEARANCE UR: ABNORMAL
APPEARANCE UR: ABNORMAL
APPEARANCE UR: CLEAR
AST SERPL-CCNC: 18 U/L (ref 15–37)
AST SERPL-CCNC: 19 U/L (ref 15–37)
AST SERPL-CCNC: 19 U/L (ref 15–37)
AST SERPL-CCNC: 20 U/L (ref 15–37)
AST SERPL-CCNC: 21 U/L (ref 15–37)
AST SERPL-CCNC: 22 U/L (ref 15–37)
AST SERPL-CCNC: 22 U/L (ref 15–37)
AST SERPL-CCNC: 23 U/L (ref 15–37)
AST SERPL-CCNC: 23 U/L (ref 15–37)
AST SERPL-CCNC: 24 U/L (ref 15–37)
AST SERPL-CCNC: 24 U/L (ref 15–37)
AST SERPL-CCNC: 25 U/L (ref 15–37)
AST SERPL-CCNC: 26 U/L (ref 15–37)
AST SERPL-CCNC: 27 U/L (ref 15–37)
AST SERPL-CCNC: 28 U/L (ref 15–37)
AST SERPL-CCNC: 29 U/L (ref 15–37)
AST SERPL-CCNC: 29 U/L (ref 15–37)
AST SERPL-CCNC: 30 U/L (ref 15–37)
AST SERPL-CCNC: 31 U/L (ref 15–37)
AST SERPL-CCNC: 32 U/L (ref 15–37)
AST SERPL-CCNC: 33 U/L (ref 15–37)
AST SERPL-CCNC: 33 U/L (ref 15–37)
AST SERPL-CCNC: 46 U/L (ref 15–37)
AST SERPL-CCNC: 47 U/L (ref 15–37)
ATRIAL RATE: 75 BPM
ATRIAL RATE: 88 BPM
BACTERIA SPEC CULT: ABNORMAL
BACTERIA SPEC CULT: ABNORMAL
BACTERIA SPEC CULT: NORMAL
BACTERIA URNS QL MICRO: NEGATIVE /HPF
BASOPHILS # BLD: 0 K/UL (ref 0–0.1)
BASOPHILS # BLD: 0.1 K/UL (ref 0–0.1)
BASOPHILS NFR BLD: 0 % (ref 0–1)
BASOPHILS NFR BLD: 1 % (ref 0–1)
BILIRUB SERPL-MCNC: 0.2 MG/DL (ref 0.2–1)
BILIRUB SERPL-MCNC: 0.3 MG/DL (ref 0.2–1)
BILIRUB SERPL-MCNC: 0.4 MG/DL (ref 0.2–1)
BILIRUB SERPL-MCNC: 0.5 MG/DL (ref 0.2–1)
BILIRUB SERPL-MCNC: 0.6 MG/DL (ref 0.2–1)
BILIRUB SERPL-MCNC: 0.6 MG/DL (ref 0.2–1)
BILIRUB UR QL CFM: NEGATIVE
BILIRUB UR QL CFM: NEGATIVE
BILIRUB UR QL: NEGATIVE
BUN SERPL-MCNC: 10 MG/DL (ref 6–20)
BUN SERPL-MCNC: 12 MG/DL (ref 6–20)
BUN SERPL-MCNC: 5 MG/DL (ref 6–20)
BUN SERPL-MCNC: 6 MG/DL (ref 6–20)
BUN SERPL-MCNC: 7 MG/DL (ref 6–20)
BUN SERPL-MCNC: 8 MG/DL (ref 6–20)
BUN SERPL-MCNC: 9 MG/DL (ref 6–20)
BUN/CREAT SERPL: 12 (ref 12–20)
BUN/CREAT SERPL: 13 (ref 12–20)
BUN/CREAT SERPL: 14 (ref 12–20)
BUN/CREAT SERPL: 15 (ref 12–20)
BUN/CREAT SERPL: 16 (ref 12–20)
BUN/CREAT SERPL: 16 (ref 12–20)
BUN/CREAT SERPL: 17 (ref 12–20)
BUN/CREAT SERPL: 18 (ref 12–20)
BUN/CREAT SERPL: 19 (ref 12–20)
BUN/CREAT SERPL: 20 (ref 12–20)
BUN/CREAT SERPL: 21 (ref 12–20)
BUN/CREAT SERPL: 23 (ref 12–20)
BUN/CREAT SERPL: 24 (ref 12–20)
CALCIUM SERPL-MCNC: 7.6 MG/DL (ref 8.5–10.1)
CALCIUM SERPL-MCNC: 8.6 MG/DL (ref 8.5–10.1)
CALCIUM SERPL-MCNC: 8.8 MG/DL (ref 8.5–10.1)
CALCIUM SERPL-MCNC: 8.9 MG/DL (ref 8.5–10.1)
CALCIUM SERPL-MCNC: 9.1 MG/DL (ref 8.5–10.1)
CALCIUM SERPL-MCNC: 9.2 MG/DL (ref 8.5–10.1)
CALCIUM SERPL-MCNC: 9.3 MG/DL (ref 8.5–10.1)
CALCIUM SERPL-MCNC: 9.4 MG/DL (ref 8.5–10.1)
CALCIUM SERPL-MCNC: 9.4 MG/DL (ref 8.5–10.1)
CALCIUM SERPL-MCNC: 9.5 MG/DL (ref 8.5–10.1)
CALCIUM SERPL-MCNC: 9.6 MG/DL (ref 8.5–10.1)
CALCIUM SERPL-MCNC: 9.6 MG/DL (ref 8.5–10.1)
CALCULATED P AXIS, ECG09: 28 DEGREES
CALCULATED P AXIS, ECG09: 51 DEGREES
CALCULATED R AXIS, ECG10: 18 DEGREES
CALCULATED R AXIS, ECG10: 24 DEGREES
CALCULATED T AXIS, ECG11: 31 DEGREES
CALCULATED T AXIS, ECG11: 31 DEGREES
CHLORIDE SERPL-SCNC: 101 MMOL/L (ref 97–108)
CHLORIDE SERPL-SCNC: 103 MMOL/L (ref 97–108)
CHLORIDE SERPL-SCNC: 105 MMOL/L (ref 97–108)
CHLORIDE SERPL-SCNC: 106 MMOL/L (ref 97–108)
CHLORIDE SERPL-SCNC: 107 MMOL/L (ref 97–108)
CHLORIDE SERPL-SCNC: 108 MMOL/L (ref 97–108)
CHLORIDE SERPL-SCNC: 109 MMOL/L (ref 97–108)
CHLORIDE SERPL-SCNC: 110 MMOL/L (ref 97–108)
CHLORIDE SERPL-SCNC: 111 MMOL/L (ref 97–108)
CHLORIDE SERPL-SCNC: 111 MMOL/L (ref 97–108)
CHLORIDE SERPL-SCNC: 112 MMOL/L (ref 97–108)
CO2 SERPL-SCNC: 22 MMOL/L (ref 21–32)
CO2 SERPL-SCNC: 22 MMOL/L (ref 21–32)
CO2 SERPL-SCNC: 23 MMOL/L (ref 21–32)
CO2 SERPL-SCNC: 24 MMOL/L (ref 21–32)
CO2 SERPL-SCNC: 25 MMOL/L (ref 21–32)
CO2 SERPL-SCNC: 26 MMOL/L (ref 21–32)
CO2 SERPL-SCNC: 27 MMOL/L (ref 21–32)
CO2 SERPL-SCNC: 28 MMOL/L (ref 21–32)
CO2 SERPL-SCNC: 28 MMOL/L (ref 21–32)
COLOR UR: ABNORMAL
COMMENT, HOLDF: NORMAL
COMMENT, HOLDF: NORMAL
CREAT SERPL-MCNC: 0.33 MG/DL (ref 0.55–1.02)
CREAT SERPL-MCNC: 0.36 MG/DL (ref 0.55–1.02)
CREAT SERPL-MCNC: 0.38 MG/DL (ref 0.55–1.02)
CREAT SERPL-MCNC: 0.39 MG/DL (ref 0.55–1.02)
CREAT SERPL-MCNC: 0.4 MG/DL (ref 0.55–1.02)
CREAT SERPL-MCNC: 0.41 MG/DL (ref 0.55–1.02)
CREAT SERPL-MCNC: 0.42 MG/DL (ref 0.55–1.02)
CREAT SERPL-MCNC: 0.42 MG/DL (ref 0.55–1.02)
CREAT SERPL-MCNC: 0.43 MG/DL (ref 0.55–1.02)
CREAT SERPL-MCNC: 0.43 MG/DL (ref 0.55–1.02)
CREAT SERPL-MCNC: 0.44 MG/DL (ref 0.55–1.02)
CREAT SERPL-MCNC: 0.46 MG/DL (ref 0.55–1.02)
CREAT SERPL-MCNC: 0.47 MG/DL (ref 0.55–1.02)
CREAT SERPL-MCNC: 0.47 MG/DL (ref 0.55–1.02)
CREAT SERPL-MCNC: 0.48 MG/DL (ref 0.55–1.02)
CREAT SERPL-MCNC: 0.48 MG/DL (ref 0.55–1.02)
CREAT SERPL-MCNC: 0.51 MG/DL (ref 0.55–1.02)
CREAT SERPL-MCNC: 0.51 MG/DL (ref 0.55–1.02)
CREAT SERPL-MCNC: 0.53 MG/DL (ref 0.55–1.02)
DIAGNOSIS, 93000: NORMAL
DIAGNOSIS, 93000: NORMAL
DIFFERENTIAL METHOD BLD: ABNORMAL
EOSINOPHIL # BLD: 0 K/UL (ref 0–0.4)
EOSINOPHIL # BLD: 0.1 K/UL (ref 0–0.4)
EOSINOPHIL # BLD: 0.2 K/UL (ref 0–0.4)
EOSINOPHIL # BLD: 0.3 K/UL (ref 0–0.4)
EOSINOPHIL NFR BLD: 0 % (ref 0–7)
EOSINOPHIL NFR BLD: 0 % (ref 0–7)
EOSINOPHIL NFR BLD: 1 % (ref 0–7)
EOSINOPHIL NFR BLD: 11 % (ref 0–7)
EOSINOPHIL NFR BLD: 2 % (ref 0–7)
EOSINOPHIL NFR BLD: 3 % (ref 0–7)
EOSINOPHIL NFR BLD: 4 % (ref 0–7)
EOSINOPHIL NFR BLD: 6 % (ref 0–7)
EOSINOPHIL NFR BLD: 6 % (ref 0–7)
EOSINOPHIL NFR BLD: 7 % (ref 0–7)
EOSINOPHIL NFR BLD: 7 % (ref 0–7)
EPITH CASTS URNS QL MICRO: ABNORMAL /LPF
ERYTHROCYTE [DISTWIDTH] IN BLOOD BY AUTOMATED COUNT: 13.7 % (ref 11.5–14.5)
ERYTHROCYTE [DISTWIDTH] IN BLOOD BY AUTOMATED COUNT: 13.9 % (ref 11.5–14.5)
ERYTHROCYTE [DISTWIDTH] IN BLOOD BY AUTOMATED COUNT: 14 % (ref 11.5–14.5)
ERYTHROCYTE [DISTWIDTH] IN BLOOD BY AUTOMATED COUNT: 14.2 % (ref 11.5–14.5)
ERYTHROCYTE [DISTWIDTH] IN BLOOD BY AUTOMATED COUNT: 14.4 % (ref 11.5–14.5)
ERYTHROCYTE [DISTWIDTH] IN BLOOD BY AUTOMATED COUNT: 14.8 % (ref 11.5–14.5)
ERYTHROCYTE [DISTWIDTH] IN BLOOD BY AUTOMATED COUNT: 15 % (ref 11.5–14.5)
ERYTHROCYTE [DISTWIDTH] IN BLOOD BY AUTOMATED COUNT: 15.1 % (ref 11.5–14.5)
ERYTHROCYTE [DISTWIDTH] IN BLOOD BY AUTOMATED COUNT: 15.2 % (ref 11.5–14.5)
ERYTHROCYTE [DISTWIDTH] IN BLOOD BY AUTOMATED COUNT: 15.3 % (ref 11.5–14.5)
ERYTHROCYTE [DISTWIDTH] IN BLOOD BY AUTOMATED COUNT: 15.4 % (ref 11.5–14.5)
ERYTHROCYTE [DISTWIDTH] IN BLOOD BY AUTOMATED COUNT: 15.5 % (ref 11.5–14.5)
ERYTHROCYTE [DISTWIDTH] IN BLOOD BY AUTOMATED COUNT: 15.6 % (ref 11.5–14.5)
ERYTHROCYTE [DISTWIDTH] IN BLOOD BY AUTOMATED COUNT: 15.6 % (ref 11.5–14.5)
ERYTHROCYTE [DISTWIDTH] IN BLOOD BY AUTOMATED COUNT: 15.7 % (ref 11.5–14.5)
ERYTHROCYTE [DISTWIDTH] IN BLOOD BY AUTOMATED COUNT: 15.7 % (ref 11.5–14.5)
ERYTHROCYTE [DISTWIDTH] IN BLOOD BY AUTOMATED COUNT: 15.9 % (ref 11.5–14.5)
ERYTHROCYTE [DISTWIDTH] IN BLOOD BY AUTOMATED COUNT: 15.9 % (ref 11.5–14.5)
ERYTHROCYTE [DISTWIDTH] IN BLOOD BY AUTOMATED COUNT: 16 % (ref 11.5–14.5)
ERYTHROCYTE [DISTWIDTH] IN BLOOD BY AUTOMATED COUNT: 16 % (ref 11.5–14.5)
ERYTHROCYTE [DISTWIDTH] IN BLOOD BY AUTOMATED COUNT: 16.1 % (ref 11.5–14.5)
ERYTHROCYTE [DISTWIDTH] IN BLOOD BY AUTOMATED COUNT: 16.7 % (ref 11.5–14.5)
ERYTHROCYTE [DISTWIDTH] IN BLOOD BY AUTOMATED COUNT: 17.2 % (ref 11.5–14.5)
ERYTHROCYTE [DISTWIDTH] IN BLOOD BY AUTOMATED COUNT: 17.4 % (ref 11.5–14.5)
FLUAV AG NPH QL IA: NEGATIVE
FLUBV AG NOSE QL IA: NEGATIVE
GLOBULIN SER CALC-MCNC: 3.5 G/DL (ref 2–4)
GLOBULIN SER CALC-MCNC: 3.6 G/DL (ref 2–4)
GLOBULIN SER CALC-MCNC: 3.6 G/DL (ref 2–4)
GLOBULIN SER CALC-MCNC: 3.7 G/DL (ref 2–4)
GLOBULIN SER CALC-MCNC: 3.8 G/DL (ref 2–4)
GLOBULIN SER CALC-MCNC: 4 G/DL (ref 2–4)
GLOBULIN SER CALC-MCNC: 4.1 G/DL (ref 2–4)
GLOBULIN SER CALC-MCNC: 4.1 G/DL (ref 2–4)
GLOBULIN SER CALC-MCNC: 4.2 G/DL (ref 2–4)
GLOBULIN SER CALC-MCNC: 4.2 G/DL (ref 2–4)
GLOBULIN SER CALC-MCNC: 4.6 G/DL (ref 2–4)
GLUCOSE SERPL-MCNC: 107 MG/DL (ref 65–100)
GLUCOSE SERPL-MCNC: 108 MG/DL (ref 65–100)
GLUCOSE SERPL-MCNC: 111 MG/DL (ref 65–100)
GLUCOSE SERPL-MCNC: 120 MG/DL (ref 65–100)
GLUCOSE SERPL-MCNC: 124 MG/DL (ref 65–100)
GLUCOSE SERPL-MCNC: 136 MG/DL (ref 65–100)
GLUCOSE SERPL-MCNC: 72 MG/DL (ref 65–100)
GLUCOSE SERPL-MCNC: 76 MG/DL (ref 65–100)
GLUCOSE SERPL-MCNC: 76 MG/DL (ref 65–100)
GLUCOSE SERPL-MCNC: 79 MG/DL (ref 65–100)
GLUCOSE SERPL-MCNC: 80 MG/DL (ref 65–100)
GLUCOSE SERPL-MCNC: 81 MG/DL (ref 65–100)
GLUCOSE SERPL-MCNC: 83 MG/DL (ref 65–100)
GLUCOSE SERPL-MCNC: 84 MG/DL (ref 65–100)
GLUCOSE SERPL-MCNC: 84 MG/DL (ref 65–100)
GLUCOSE SERPL-MCNC: 87 MG/DL (ref 65–100)
GLUCOSE SERPL-MCNC: 90 MG/DL (ref 65–100)
GLUCOSE SERPL-MCNC: 92 MG/DL (ref 65–100)
GLUCOSE SERPL-MCNC: 93 MG/DL (ref 65–100)
GLUCOSE SERPL-MCNC: 96 MG/DL (ref 65–100)
GLUCOSE SERPL-MCNC: 99 MG/DL (ref 65–100)
GLUCOSE UR STRIP.AUTO-MCNC: NEGATIVE MG/DL
HCT VFR BLD AUTO: 30.4 % (ref 35–47)
HCT VFR BLD AUTO: 33.3 % (ref 35–47)
HCT VFR BLD AUTO: 33.6 % (ref 35–47)
HCT VFR BLD AUTO: 34.2 % (ref 35–47)
HCT VFR BLD AUTO: 34.4 % (ref 35–47)
HCT VFR BLD AUTO: 34.8 % (ref 35–47)
HCT VFR BLD AUTO: 34.8 % (ref 35–47)
HCT VFR BLD AUTO: 35 % (ref 35–47)
HCT VFR BLD AUTO: 35.1 % (ref 35–47)
HCT VFR BLD AUTO: 35.4 % (ref 35–47)
HCT VFR BLD AUTO: 35.5 % (ref 35–47)
HCT VFR BLD AUTO: 35.7 % (ref 35–47)
HCT VFR BLD AUTO: 35.9 % (ref 35–47)
HCT VFR BLD AUTO: 36 % (ref 35–47)
HCT VFR BLD AUTO: 36.1 % (ref 35–47)
HCT VFR BLD AUTO: 36.3 % (ref 35–47)
HCT VFR BLD AUTO: 36.3 % (ref 35–47)
HCT VFR BLD AUTO: 36.9 % (ref 35–47)
HCT VFR BLD AUTO: 37 % (ref 35–47)
HCT VFR BLD AUTO: 37.3 % (ref 35–47)
HCT VFR BLD AUTO: 37.5 % (ref 35–47)
HCT VFR BLD AUTO: 38 % (ref 35–47)
HCT VFR BLD AUTO: 38.3 % (ref 35–47)
HEALTH STATUS, XMCV2T: NORMAL
HGB BLD-MCNC: 10.1 G/DL (ref 11.5–16)
HGB BLD-MCNC: 10.9 G/DL (ref 11.5–16)
HGB BLD-MCNC: 10.9 G/DL (ref 11.5–16)
HGB BLD-MCNC: 11.1 G/DL (ref 11.5–16)
HGB BLD-MCNC: 11.2 G/DL (ref 11.5–16)
HGB BLD-MCNC: 11.4 G/DL (ref 11.5–16)
HGB BLD-MCNC: 11.5 G/DL (ref 11.5–16)
HGB BLD-MCNC: 11.6 G/DL (ref 11.5–16)
HGB BLD-MCNC: 11.6 G/DL (ref 11.5–16)
HGB BLD-MCNC: 11.7 G/DL (ref 11.5–16)
HGB BLD-MCNC: 11.8 G/DL (ref 11.5–16)
HGB BLD-MCNC: 11.8 G/DL (ref 11.5–16)
HGB BLD-MCNC: 12 G/DL (ref 11.5–16)
HGB BLD-MCNC: 12 G/DL (ref 11.5–16)
HGB BLD-MCNC: 12.1 G/DL (ref 11.5–16)
HGB BLD-MCNC: 12.3 G/DL (ref 11.5–16)
HGB BLD-MCNC: 12.3 G/DL (ref 11.5–16)
HGB BLD-MCNC: 12.4 G/DL (ref 11.5–16)
HGB BLD-MCNC: 12.9 G/DL (ref 11.5–16)
HGB UR QL STRIP: ABNORMAL
HGB UR QL STRIP: ABNORMAL
HGB UR QL STRIP: NEGATIVE
IMM GRANULOCYTES # BLD AUTO: 0 K/UL
IMM GRANULOCYTES # BLD AUTO: 0 K/UL (ref 0–0.04)
IMM GRANULOCYTES NFR BLD AUTO: 0 %
IMM GRANULOCYTES NFR BLD AUTO: 0 % (ref 0–0.5)
INR PPP: 1 (ref 0.9–1.1)
KETONES UR QL STRIP.AUTO: ABNORMAL MG/DL
KETONES UR QL STRIP.AUTO: NEGATIVE MG/DL
KETONES UR QL STRIP.AUTO: NEGATIVE MG/DL
LACTATE SERPL-SCNC: 1.6 MMOL/L (ref 0.4–2)
LEUKOCYTE ESTERASE UR QL STRIP.AUTO: ABNORMAL
LIPASE SERPL-CCNC: 176 U/L (ref 73–393)
LIPASE SERPL-CCNC: 77 U/L (ref 73–393)
LYMPHOCYTES # BLD: 0.7 K/UL (ref 0.8–3.5)
LYMPHOCYTES # BLD: 1 K/UL (ref 0.8–3.5)
LYMPHOCYTES # BLD: 1.1 K/UL (ref 0.8–3.5)
LYMPHOCYTES # BLD: 1.2 K/UL (ref 0.8–3.5)
LYMPHOCYTES # BLD: 1.3 K/UL (ref 0.8–3.5)
LYMPHOCYTES # BLD: 1.3 K/UL (ref 0.8–3.5)
LYMPHOCYTES # BLD: 1.4 K/UL (ref 0.8–3.5)
LYMPHOCYTES # BLD: 1.5 K/UL (ref 0.8–3.5)
LYMPHOCYTES # BLD: 1.5 K/UL (ref 0.8–3.5)
LYMPHOCYTES # BLD: 1.6 K/UL (ref 0.8–3.5)
LYMPHOCYTES # BLD: 2.1 K/UL (ref 0.8–3.5)
LYMPHOCYTES NFR BLD: 10 % (ref 12–49)
LYMPHOCYTES NFR BLD: 19 % (ref 12–49)
LYMPHOCYTES NFR BLD: 22 % (ref 12–49)
LYMPHOCYTES NFR BLD: 22 % (ref 12–49)
LYMPHOCYTES NFR BLD: 23 % (ref 12–49)
LYMPHOCYTES NFR BLD: 25 % (ref 12–49)
LYMPHOCYTES NFR BLD: 25 % (ref 12–49)
LYMPHOCYTES NFR BLD: 26 % (ref 12–49)
LYMPHOCYTES NFR BLD: 27 % (ref 12–49)
LYMPHOCYTES NFR BLD: 28 % (ref 12–49)
LYMPHOCYTES NFR BLD: 29 % (ref 12–49)
LYMPHOCYTES NFR BLD: 29 % (ref 12–49)
LYMPHOCYTES NFR BLD: 30 % (ref 12–49)
LYMPHOCYTES NFR BLD: 30 % (ref 12–49)
LYMPHOCYTES NFR BLD: 31 % (ref 12–49)
LYMPHOCYTES NFR BLD: 32 % (ref 12–49)
LYMPHOCYTES NFR BLD: 32 % (ref 12–49)
LYMPHOCYTES NFR BLD: 33 % (ref 12–49)
LYMPHOCYTES NFR BLD: 33 % (ref 12–49)
LYMPHOCYTES NFR BLD: 34 % (ref 12–49)
LYMPHOCYTES NFR BLD: 34 % (ref 12–49)
LYMPHOCYTES NFR BLD: 35 % (ref 12–49)
LYMPHOCYTES NFR BLD: 41 % (ref 12–49)
LYMPHOCYTES NFR BLD: 9 % (ref 12–49)
MAGNESIUM SERPL-MCNC: 1.5 MG/DL (ref 1.6–2.4)
MAGNESIUM SERPL-MCNC: 1.8 MG/DL (ref 1.6–2.4)
MAGNESIUM SERPL-MCNC: 2 MG/DL (ref 1.6–2.4)
MCH RBC QN AUTO: 29.7 PG (ref 26–34)
MCH RBC QN AUTO: 29.9 PG (ref 26–34)
MCH RBC QN AUTO: 29.9 PG (ref 26–34)
MCH RBC QN AUTO: 30.1 PG (ref 26–34)
MCH RBC QN AUTO: 30.3 PG (ref 26–34)
MCH RBC QN AUTO: 30.7 PG (ref 26–34)
MCH RBC QN AUTO: 31 PG (ref 26–34)
MCH RBC QN AUTO: 31.2 PG (ref 26–34)
MCH RBC QN AUTO: 31.5 PG (ref 26–34)
MCH RBC QN AUTO: 31.8 PG (ref 26–34)
MCH RBC QN AUTO: 32 PG (ref 26–34)
MCH RBC QN AUTO: 32 PG (ref 26–34)
MCH RBC QN AUTO: 32.1 PG (ref 26–34)
MCH RBC QN AUTO: 32.1 PG (ref 26–34)
MCH RBC QN AUTO: 32.2 PG (ref 26–34)
MCH RBC QN AUTO: 32.3 PG (ref 26–34)
MCH RBC QN AUTO: 32.5 PG (ref 26–34)
MCH RBC QN AUTO: 32.6 PG (ref 26–34)
MCH RBC QN AUTO: 32.6 PG (ref 26–34)
MCH RBC QN AUTO: 32.7 PG (ref 26–34)
MCH RBC QN AUTO: 32.9 PG (ref 26–34)
MCH RBC QN AUTO: 33.3 PG (ref 26–34)
MCH RBC QN AUTO: 33.3 PG (ref 26–34)
MCHC RBC AUTO-ENTMCNC: 30.8 G/DL (ref 30–36.5)
MCHC RBC AUTO-ENTMCNC: 32 G/DL (ref 30–36.5)
MCHC RBC AUTO-ENTMCNC: 32.3 G/DL (ref 30–36.5)
MCHC RBC AUTO-ENTMCNC: 32.3 G/DL (ref 30–36.5)
MCHC RBC AUTO-ENTMCNC: 32.4 G/DL (ref 30–36.5)
MCHC RBC AUTO-ENTMCNC: 32.5 G/DL (ref 30–36.5)
MCHC RBC AUTO-ENTMCNC: 32.5 G/DL (ref 30–36.5)
MCHC RBC AUTO-ENTMCNC: 32.7 G/DL (ref 30–36.5)
MCHC RBC AUTO-ENTMCNC: 32.8 G/DL (ref 30–36.5)
MCHC RBC AUTO-ENTMCNC: 33 G/DL (ref 30–36.5)
MCHC RBC AUTO-ENTMCNC: 33.1 G/DL (ref 30–36.5)
MCHC RBC AUTO-ENTMCNC: 33.2 G/DL (ref 30–36.5)
MCHC RBC AUTO-ENTMCNC: 33.3 G/DL (ref 30–36.5)
MCHC RBC AUTO-ENTMCNC: 34.8 G/DL (ref 30–36.5)
MCHC RBC AUTO-ENTMCNC: 34.9 G/DL (ref 30–36.5)
MCV RBC AUTO: 100 FL (ref 80–99)
MCV RBC AUTO: 100.3 FL (ref 80–99)
MCV RBC AUTO: 91 FL (ref 80–99)
MCV RBC AUTO: 92.3 FL (ref 80–99)
MCV RBC AUTO: 92.5 FL (ref 80–99)
MCV RBC AUTO: 93.2 FL (ref 80–99)
MCV RBC AUTO: 94.5 FL (ref 80–99)
MCV RBC AUTO: 94.8 FL (ref 80–99)
MCV RBC AUTO: 95.6 FL (ref 80–99)
MCV RBC AUTO: 95.9 FL (ref 80–99)
MCV RBC AUTO: 96 FL (ref 80–99)
MCV RBC AUTO: 96.1 FL (ref 80–99)
MCV RBC AUTO: 96.2 FL (ref 80–99)
MCV RBC AUTO: 96.5 FL (ref 80–99)
MCV RBC AUTO: 97.3 FL (ref 80–99)
MCV RBC AUTO: 97.5 FL (ref 80–99)
MCV RBC AUTO: 97.6 FL (ref 80–99)
MCV RBC AUTO: 97.7 FL (ref 80–99)
MCV RBC AUTO: 97.9 FL (ref 80–99)
MCV RBC AUTO: 98 FL (ref 80–99)
MCV RBC AUTO: 98.1 FL (ref 80–99)
MCV RBC AUTO: 98.4 FL (ref 80–99)
MCV RBC AUTO: 98.6 FL (ref 80–99)
MCV RBC AUTO: 99.1 FL (ref 80–99)
MCV RBC AUTO: 99.4 FL (ref 80–99)
MCV RBC AUTO: 99.4 FL (ref 80–99)
MCV RBC AUTO: 99.7 FL (ref 80–99)
METAMYELOCYTES NFR BLD MANUAL: 2 %
MONOCYTES # BLD: 0.3 K/UL (ref 0–1)
MONOCYTES # BLD: 0.3 K/UL (ref 0–1)
MONOCYTES # BLD: 0.4 K/UL (ref 0–1)
MONOCYTES # BLD: 0.5 K/UL (ref 0–1)
MONOCYTES # BLD: 0.5 K/UL (ref 0–1)
MONOCYTES # BLD: 0.6 K/UL (ref 0–1)
MONOCYTES # BLD: 0.7 K/UL (ref 0–1)
MONOCYTES # BLD: 0.8 K/UL (ref 0–1)
MONOCYTES # BLD: 0.9 K/UL (ref 0–1)
MONOCYTES # BLD: 1 K/UL (ref 0–1)
MONOCYTES # BLD: 1.1 K/UL (ref 0–1)
MONOCYTES NFR BLD: 12 % (ref 5–13)
MONOCYTES NFR BLD: 13 % (ref 5–13)
MONOCYTES NFR BLD: 13 % (ref 5–13)
MONOCYTES NFR BLD: 15 % (ref 5–13)
MONOCYTES NFR BLD: 16 % (ref 5–13)
MONOCYTES NFR BLD: 17 % (ref 5–13)
MONOCYTES NFR BLD: 18 % (ref 5–13)
MONOCYTES NFR BLD: 18 % (ref 5–13)
MONOCYTES NFR BLD: 19 % (ref 5–13)
MONOCYTES NFR BLD: 20 % (ref 5–13)
MONOCYTES NFR BLD: 23 % (ref 5–13)
MONOCYTES NFR BLD: 23 % (ref 5–13)
MONOCYTES NFR BLD: 24 % (ref 5–13)
MONOCYTES NFR BLD: 25 % (ref 5–13)
MONOCYTES NFR BLD: 29 % (ref 5–13)
MONOCYTES NFR BLD: 3 % (ref 5–13)
MONOCYTES NFR BLD: 4 % (ref 5–13)
MONOCYTES NFR BLD: 5 % (ref 5–13)
MUCOUS THREADS URNS QL MICRO: ABNORMAL /LPF
NEUTS BAND NFR BLD MANUAL: 4 % (ref 0–6)
NEUTS SEG # BLD: 1 K/UL (ref 1.8–8)
NEUTS SEG # BLD: 1.2 K/UL (ref 1.8–8)
NEUTS SEG # BLD: 1.4 K/UL (ref 1.8–8)
NEUTS SEG # BLD: 1.6 K/UL (ref 1.8–8)
NEUTS SEG # BLD: 1.6 K/UL (ref 1.8–8)
NEUTS SEG # BLD: 1.8 K/UL (ref 1.8–8)
NEUTS SEG # BLD: 2.1 K/UL (ref 1.8–8)
NEUTS SEG # BLD: 2.3 K/UL (ref 1.8–8)
NEUTS SEG # BLD: 2.5 K/UL (ref 1.8–8)
NEUTS SEG # BLD: 2.9 K/UL (ref 1.8–8)
NEUTS SEG # BLD: 2.9 K/UL (ref 1.8–8)
NEUTS SEG # BLD: 3 K/UL (ref 1.8–8)
NEUTS SEG # BLD: 3.1 K/UL (ref 1.8–8)
NEUTS SEG # BLD: 3.2 K/UL (ref 1.8–8)
NEUTS SEG # BLD: 4.8 K/UL (ref 1.8–8)
NEUTS SEG # BLD: 5.8 K/UL (ref 1.8–8)
NEUTS SEG # BLD: 7.1 K/UL (ref 1.8–8)
NEUTS SEG # BLD: 8.3 K/UL (ref 1.8–8)
NEUTS SEG NFR BLD: 32 % (ref 32–75)
NEUTS SEG NFR BLD: 36 % (ref 32–75)
NEUTS SEG NFR BLD: 37 % (ref 32–75)
NEUTS SEG NFR BLD: 38 % (ref 32–75)
NEUTS SEG NFR BLD: 38 % (ref 32–75)
NEUTS SEG NFR BLD: 42 % (ref 32–75)
NEUTS SEG NFR BLD: 44 % (ref 32–75)
NEUTS SEG NFR BLD: 45 % (ref 32–75)
NEUTS SEG NFR BLD: 47 % (ref 32–75)
NEUTS SEG NFR BLD: 50 % (ref 32–75)
NEUTS SEG NFR BLD: 50 % (ref 32–75)
NEUTS SEG NFR BLD: 52 % (ref 32–75)
NEUTS SEG NFR BLD: 53 % (ref 32–75)
NEUTS SEG NFR BLD: 55 % (ref 32–75)
NEUTS SEG NFR BLD: 56 % (ref 32–75)
NEUTS SEG NFR BLD: 57 % (ref 32–75)
NEUTS SEG NFR BLD: 60 % (ref 32–75)
NEUTS SEG NFR BLD: 63 % (ref 32–75)
NEUTS SEG NFR BLD: 69 % (ref 32–75)
NEUTS SEG NFR BLD: 86 % (ref 32–75)
NEUTS SEG NFR BLD: 87 % (ref 32–75)
NITRITE UR QL STRIP.AUTO: NEGATIVE
NRBC # BLD: 0 K/UL (ref 0–0.01)
NRBC BLD-RTO: 0 PER 100 WBC
P-R INTERVAL, ECG05: 138 MS
P-R INTERVAL, ECG05: 140 MS
PH UR STRIP: 6 [PH] (ref 5–8)
PH UR STRIP: 6.5 [PH] (ref 5–8)
PH UR STRIP: 7.5 [PH] (ref 5–8)
PHOSPHATE SERPL-MCNC: 2.3 MG/DL (ref 2.6–4.7)
PHOSPHATE SERPL-MCNC: 3.4 MG/DL (ref 2.6–4.7)
PLATELET # BLD AUTO: 100 K/UL (ref 150–400)
PLATELET # BLD AUTO: 108 K/UL (ref 150–400)
PLATELET # BLD AUTO: 113 K/UL (ref 150–400)
PLATELET # BLD AUTO: 138 K/UL (ref 150–400)
PLATELET # BLD AUTO: 144 K/UL (ref 150–400)
PLATELET # BLD AUTO: 145 K/UL (ref 150–400)
PLATELET # BLD AUTO: 146 K/UL (ref 150–400)
PLATELET # BLD AUTO: 149 K/UL (ref 150–400)
PLATELET # BLD AUTO: 153 K/UL (ref 150–400)
PLATELET # BLD AUTO: 154 K/UL (ref 150–400)
PLATELET # BLD AUTO: 162 K/UL (ref 150–400)
PLATELET # BLD AUTO: 167 K/UL (ref 150–400)
PLATELET # BLD AUTO: 174 K/UL (ref 150–400)
PLATELET # BLD AUTO: 175 K/UL (ref 150–400)
PLATELET # BLD AUTO: 185 K/UL (ref 150–400)
PLATELET # BLD AUTO: 185 K/UL (ref 150–400)
PLATELET # BLD AUTO: 186 K/UL (ref 150–400)
PLATELET # BLD AUTO: 187 K/UL (ref 150–400)
PLATELET # BLD AUTO: 187 K/UL (ref 150–400)
PLATELET # BLD AUTO: 195 K/UL (ref 150–400)
PLATELET # BLD AUTO: 212 K/UL (ref 150–400)
PLATELET # BLD AUTO: 231 K/UL (ref 150–400)
PLATELET # BLD AUTO: 263 K/UL (ref 150–400)
PLATELET # BLD AUTO: 69 K/UL (ref 150–400)
PLATELET # BLD AUTO: 74 K/UL (ref 150–400)
PLATELET # BLD AUTO: 81 K/UL (ref 150–400)
PLATELET # BLD AUTO: 97 K/UL (ref 150–400)
PLATELET COMMENTS,PCOM: ABNORMAL
PMV BLD AUTO: 10 FL (ref 8.9–12.9)
PMV BLD AUTO: 10.1 FL (ref 8.9–12.9)
PMV BLD AUTO: 10.2 FL (ref 8.9–12.9)
PMV BLD AUTO: 10.2 FL (ref 8.9–12.9)
PMV BLD AUTO: 10.4 FL (ref 8.9–12.9)
PMV BLD AUTO: 10.5 FL (ref 8.9–12.9)
PMV BLD AUTO: 10.5 FL (ref 8.9–12.9)
PMV BLD AUTO: 10.6 FL (ref 8.9–12.9)
PMV BLD AUTO: 10.8 FL (ref 8.9–12.9)
PMV BLD AUTO: 11.1 FL (ref 8.9–12.9)
PMV BLD AUTO: 11.2 FL (ref 8.9–12.9)
PMV BLD AUTO: 11.3 FL (ref 8.9–12.9)
PMV BLD AUTO: 11.3 FL (ref 8.9–12.9)
PMV BLD AUTO: 11.4 FL (ref 8.9–12.9)
PMV BLD AUTO: 9.2 FL (ref 8.9–12.9)
PMV BLD AUTO: 9.2 FL (ref 8.9–12.9)
PMV BLD AUTO: 9.5 FL (ref 8.9–12.9)
PMV BLD AUTO: 9.6 FL (ref 8.9–12.9)
PMV BLD AUTO: 9.6 FL (ref 8.9–12.9)
PMV BLD AUTO: 9.9 FL (ref 8.9–12.9)
PMV BLD AUTO: 9.9 FL (ref 8.9–12.9)
POTASSIUM SERPL-SCNC: 3.1 MMOL/L (ref 3.5–5.1)
POTASSIUM SERPL-SCNC: 3.2 MMOL/L (ref 3.5–5.1)
POTASSIUM SERPL-SCNC: 3.3 MMOL/L (ref 3.5–5.1)
POTASSIUM SERPL-SCNC: 3.3 MMOL/L (ref 3.5–5.1)
POTASSIUM SERPL-SCNC: 3.4 MMOL/L (ref 3.5–5.1)
POTASSIUM SERPL-SCNC: 3.5 MMOL/L (ref 3.5–5.1)
POTASSIUM SERPL-SCNC: 3.5 MMOL/L (ref 3.5–5.1)
POTASSIUM SERPL-SCNC: 3.6 MMOL/L (ref 3.5–5.1)
POTASSIUM SERPL-SCNC: 3.7 MMOL/L (ref 3.5–5.1)
POTASSIUM SERPL-SCNC: 3.8 MMOL/L (ref 3.5–5.1)
POTASSIUM SERPL-SCNC: 3.9 MMOL/L (ref 3.5–5.1)
POTASSIUM SERPL-SCNC: 4.1 MMOL/L (ref 3.5–5.1)
PROT SERPL-MCNC: 5.8 G/DL (ref 6.4–8.2)
PROT SERPL-MCNC: 6.4 G/DL (ref 6.4–8.2)
PROT SERPL-MCNC: 6.6 G/DL (ref 6.4–8.2)
PROT SERPL-MCNC: 6.7 G/DL (ref 6.4–8.2)
PROT SERPL-MCNC: 6.8 G/DL (ref 6.4–8.2)
PROT SERPL-MCNC: 6.9 G/DL (ref 6.4–8.2)
PROT SERPL-MCNC: 6.9 G/DL (ref 6.4–8.2)
PROT SERPL-MCNC: 7 G/DL (ref 6.4–8.2)
PROT SERPL-MCNC: 7.1 G/DL (ref 6.4–8.2)
PROT SERPL-MCNC: 7.2 G/DL (ref 6.4–8.2)
PROT SERPL-MCNC: 7.2 G/DL (ref 6.4–8.2)
PROT SERPL-MCNC: 7.3 G/DL (ref 6.4–8.2)
PROT SERPL-MCNC: 7.7 G/DL (ref 6.4–8.2)
PROT UR STRIP-MCNC: 100 MG/DL
PROTHROMBIN TIME: 10.7 SEC (ref 9–11.1)
Q-T INTERVAL, ECG07: 390 MS
Q-T INTERVAL, ECG07: 420 MS
QRS DURATION, ECG06: 74 MS
QRS DURATION, ECG06: 80 MS
QTC CALCULATION (BEZET), ECG08: 469 MS
QTC CALCULATION (BEZET), ECG08: 471 MS
RBC # BLD AUTO: 3.1 M/UL (ref 3.8–5.2)
RBC # BLD AUTO: 3.35 M/UL (ref 3.8–5.2)
RBC # BLD AUTO: 3.39 M/UL (ref 3.8–5.2)
RBC # BLD AUTO: 3.44 M/UL (ref 3.8–5.2)
RBC # BLD AUTO: 3.47 M/UL (ref 3.8–5.2)
RBC # BLD AUTO: 3.49 M/UL (ref 3.8–5.2)
RBC # BLD AUTO: 3.58 M/UL (ref 3.8–5.2)
RBC # BLD AUTO: 3.59 M/UL (ref 3.8–5.2)
RBC # BLD AUTO: 3.6 M/UL (ref 3.8–5.2)
RBC # BLD AUTO: 3.62 M/UL (ref 3.8–5.2)
RBC # BLD AUTO: 3.63 M/UL (ref 3.8–5.2)
RBC # BLD AUTO: 3.64 M/UL (ref 3.8–5.2)
RBC # BLD AUTO: 3.67 M/UL (ref 3.8–5.2)
RBC # BLD AUTO: 3.69 M/UL (ref 3.8–5.2)
RBC # BLD AUTO: 3.73 M/UL (ref 3.8–5.2)
RBC # BLD AUTO: 3.76 M/UL (ref 3.8–5.2)
RBC # BLD AUTO: 3.78 M/UL (ref 3.8–5.2)
RBC # BLD AUTO: 3.79 M/UL (ref 3.8–5.2)
RBC # BLD AUTO: 3.81 M/UL (ref 3.8–5.2)
RBC # BLD AUTO: 3.84 M/UL (ref 3.8–5.2)
RBC # BLD AUTO: 3.87 M/UL (ref 3.8–5.2)
RBC # BLD AUTO: 3.9 M/UL (ref 3.8–5.2)
RBC # BLD AUTO: 3.96 M/UL (ref 3.8–5.2)
RBC # BLD AUTO: 4.11 M/UL (ref 3.8–5.2)
RBC # BLD AUTO: 4.15 M/UL (ref 3.8–5.2)
RBC #/AREA URNS HPF: >100 /HPF (ref 0–5)
RBC #/AREA URNS HPF: >100 /HPF (ref 0–5)
RBC #/AREA URNS HPF: ABNORMAL /HPF (ref 0–5)
RBC MORPH BLD: ABNORMAL
SAMPLES BEING HELD,HOLD: NORMAL
SAMPLES BEING HELD,HOLD: NORMAL
SARS-COV-2, COV2: NOT DETECTED
SERVICE CMNT-IMP: ABNORMAL
SERVICE CMNT-IMP: NORMAL
SODIUM SERPL-SCNC: 134 MMOL/L (ref 136–145)
SODIUM SERPL-SCNC: 137 MMOL/L (ref 136–145)
SODIUM SERPL-SCNC: 138 MMOL/L (ref 136–145)
SODIUM SERPL-SCNC: 139 MMOL/L (ref 136–145)
SODIUM SERPL-SCNC: 140 MMOL/L (ref 136–145)
SODIUM SERPL-SCNC: 141 MMOL/L (ref 136–145)
SODIUM SERPL-SCNC: 142 MMOL/L (ref 136–145)
SOURCE, COVRS: NORMAL
SP GR UR REFRACTOMETRY: 1.01 (ref 1–1.03)
SP GR UR REFRACTOMETRY: 1.01 (ref 1–1.03)
SP GR UR REFRACTOMETRY: 1.02
SPECIMEN SOURCE, FCOV2M: NORMAL
SPECIMEN TYPE, XMCV1T: NORMAL
UA: UC IF INDICATED,UAUC: ABNORMAL
UR CULT HOLD, URHOLD: NORMAL
UR CULT HOLD, URHOLD: NORMAL
UROBILINOGEN UR QL STRIP.AUTO: 0.2 EU/DL (ref 0.2–1)
UROBILINOGEN UR QL STRIP.AUTO: 1 EU/DL (ref 0.2–1)
UROBILINOGEN UR QL STRIP.AUTO: 2 EU/DL (ref 0.2–1)
VENTRICULAR RATE, ECG03: 75 BPM
VENTRICULAR RATE, ECG03: 88 BPM
WBC # BLD AUTO: 3 K/UL (ref 3.6–11)
WBC # BLD AUTO: 3.2 K/UL (ref 3.6–11)
WBC # BLD AUTO: 3.2 K/UL (ref 3.6–11)
WBC # BLD AUTO: 3.4 K/UL (ref 3.6–11)
WBC # BLD AUTO: 3.4 K/UL (ref 3.6–11)
WBC # BLD AUTO: 3.5 K/UL (ref 3.6–11)
WBC # BLD AUTO: 3.6 K/UL (ref 3.6–11)
WBC # BLD AUTO: 3.7 K/UL (ref 3.6–11)
WBC # BLD AUTO: 3.9 K/UL (ref 3.6–11)
WBC # BLD AUTO: 4.1 K/UL (ref 3.6–11)
WBC # BLD AUTO: 4.4 K/UL (ref 3.6–11)
WBC # BLD AUTO: 4.5 K/UL (ref 3.6–11)
WBC # BLD AUTO: 4.6 K/UL (ref 3.6–11)
WBC # BLD AUTO: 4.7 K/UL (ref 3.6–11)
WBC # BLD AUTO: 4.8 K/UL (ref 3.6–11)
WBC # BLD AUTO: 4.8 K/UL (ref 3.6–11)
WBC # BLD AUTO: 4.9 K/UL (ref 3.6–11)
WBC # BLD AUTO: 5.1 K/UL (ref 3.6–11)
WBC # BLD AUTO: 5.4 K/UL (ref 3.6–11)
WBC # BLD AUTO: 5.4 K/UL (ref 3.6–11)
WBC # BLD AUTO: 5.5 K/UL (ref 3.6–11)
WBC # BLD AUTO: 5.6 K/UL (ref 3.6–11)
WBC # BLD AUTO: 7.9 K/UL (ref 3.6–11)
WBC # BLD AUTO: 8.1 K/UL (ref 3.6–11)
WBC # BLD AUTO: 9.7 K/UL (ref 3.6–11)
WBC URNS QL MICRO: ABNORMAL /HPF (ref 0–4)

## 2020-01-01 PROCEDURE — 81001 URINALYSIS AUTO W/SCOPE: CPT

## 2020-01-01 PROCEDURE — 96416 CHEMO PROLONG INFUSE W/PUMP: CPT

## 2020-01-01 PROCEDURE — 36591 DRAW BLOOD OFF VENOUS DEVICE: CPT

## 2020-01-01 PROCEDURE — 87186 SC STD MICRODIL/AGAR DIL: CPT

## 2020-01-01 PROCEDURE — 96415 CHEMO IV INFUSION ADDL HR: CPT

## 2020-01-01 PROCEDURE — 74011250636 HC RX REV CODE- 250/636: Performed by: NURSE PRACTITIONER

## 2020-01-01 PROCEDURE — 96361 HYDRATE IV INFUSION ADD-ON: CPT

## 2020-01-01 PROCEDURE — 74011000250 HC RX REV CODE- 250: Performed by: NURSE PRACTITIONER

## 2020-01-01 PROCEDURE — 74011000258 HC RX REV CODE- 258: Performed by: NURSE PRACTITIONER

## 2020-01-01 PROCEDURE — 96411 CHEMO IV PUSH ADDL DRUG: CPT

## 2020-01-01 PROCEDURE — 96375 TX/PRO/DX INJ NEW DRUG ADDON: CPT

## 2020-01-01 PROCEDURE — 77030012965 HC NDL HUBR BBMI -A

## 2020-01-01 PROCEDURE — 87086 URINE CULTURE/COLONY COUNT: CPT

## 2020-01-01 PROCEDURE — 85025 COMPLETE CBC W/AUTO DIFF WBC: CPT

## 2020-01-01 PROCEDURE — 36415 COLL VENOUS BLD VENIPUNCTURE: CPT

## 2020-01-01 PROCEDURE — 80053 COMPREHEN METABOLIC PANEL: CPT

## 2020-01-01 PROCEDURE — 96374 THER/PROPH/DIAG INJ IV PUSH: CPT

## 2020-01-01 PROCEDURE — 96413 CHEMO IV INFUSION 1 HR: CPT

## 2020-01-01 PROCEDURE — 99232 SBSQ HOSP IP/OBS MODERATE 35: CPT | Performed by: INTERNAL MEDICINE

## 2020-01-01 PROCEDURE — 74177 CT ABD & PELVIS W/CONTRAST: CPT

## 2020-01-01 PROCEDURE — 74011250637 HC RX REV CODE- 250/637: Performed by: INTERNAL MEDICINE

## 2020-01-01 PROCEDURE — 74011000258 HC RX REV CODE- 258: Performed by: HOSPITALIST

## 2020-01-01 PROCEDURE — 51798 US URINE CAPACITY MEASURE: CPT

## 2020-01-01 PROCEDURE — 74011250637 HC RX REV CODE- 250/637: Performed by: HOSPITALIST

## 2020-01-01 PROCEDURE — 99215 OFFICE O/P EST HI 40 MIN: CPT | Performed by: INTERNAL MEDICINE

## 2020-01-01 PROCEDURE — 96523 IRRIG DRUG DELIVERY DEVICE: CPT

## 2020-01-01 PROCEDURE — 96360 HYDRATION IV INFUSION INIT: CPT

## 2020-01-01 PROCEDURE — 99214 OFFICE O/P EST MOD 30 MIN: CPT | Performed by: INTERNAL MEDICINE

## 2020-01-01 PROCEDURE — 87040 BLOOD CULTURE FOR BACTERIA: CPT

## 2020-01-01 PROCEDURE — 84100 ASSAY OF PHOSPHORUS: CPT

## 2020-01-01 PROCEDURE — 96367 TX/PROPH/DG ADDL SEQ IV INF: CPT

## 2020-01-01 PROCEDURE — 74011250637 HC RX REV CODE- 250/637: Performed by: PHYSICAL MEDICINE & REHABILITATION

## 2020-01-01 PROCEDURE — 74011250637 HC RX REV CODE- 250/637: Performed by: NURSE PRACTITIONER

## 2020-01-01 PROCEDURE — 74011000250 HC RX REV CODE- 250: Performed by: HOSPITALIST

## 2020-01-01 PROCEDURE — 83690 ASSAY OF LIPASE: CPT

## 2020-01-01 PROCEDURE — 99284 EMERGENCY DEPT VISIT MOD MDM: CPT

## 2020-01-01 PROCEDURE — 74011250636 HC RX REV CODE- 250/636: Performed by: HOSPITALIST

## 2020-01-01 PROCEDURE — 65660000000 HC RM CCU STEPDOWN

## 2020-01-01 PROCEDURE — 87804 INFLUENZA ASSAY W/OPTIC: CPT

## 2020-01-01 PROCEDURE — 96368 THER/DIAG CONCURRENT INF: CPT

## 2020-01-01 PROCEDURE — 99254 IP/OBS CNSLTJ NEW/EST MOD 60: CPT | Performed by: INTERNAL MEDICINE

## 2020-01-01 PROCEDURE — 99223 1ST HOSP IP/OBS HIGH 75: CPT | Performed by: PHYSICAL MEDICINE & REHABILITATION

## 2020-01-01 PROCEDURE — C9113 INJ PANTOPRAZOLE SODIUM, VIA: HCPCS | Performed by: HOSPITALIST

## 2020-01-01 PROCEDURE — 74011250636 HC RX REV CODE- 250/636: Performed by: INTERNAL MEDICINE

## 2020-01-01 PROCEDURE — G0498 CHEMO EXTEND IV INFUS W/PUMP: HCPCS

## 2020-01-01 PROCEDURE — 96366 THER/PROPH/DIAG IV INF ADDON: CPT

## 2020-01-01 PROCEDURE — 65270000029 HC RM PRIVATE

## 2020-01-01 PROCEDURE — 74011250637 HC RX REV CODE- 250/637: Performed by: PHYSICIAN ASSISTANT

## 2020-01-01 PROCEDURE — 74011000258 HC RX REV CODE- 258: Performed by: INTERNAL MEDICINE

## 2020-01-01 PROCEDURE — 74011636320 HC RX REV CODE- 636/320: Performed by: NURSE PRACTITIONER

## 2020-01-01 PROCEDURE — 93005 ELECTROCARDIOGRAM TRACING: CPT

## 2020-01-01 PROCEDURE — 85610 PROTHROMBIN TIME: CPT

## 2020-01-01 PROCEDURE — 96365 THER/PROPH/DIAG IV INF INIT: CPT

## 2020-01-01 PROCEDURE — 87077 CULTURE AEROBIC IDENTIFY: CPT

## 2020-01-01 PROCEDURE — 74011000636 HC RX REV CODE- 636: Performed by: RADIOLOGY

## 2020-01-01 PROCEDURE — 87635 SARS-COV-2 COVID-19 AMP PRB: CPT

## 2020-01-01 PROCEDURE — 71045 X-RAY EXAM CHEST 1 VIEW: CPT

## 2020-01-01 PROCEDURE — 83735 ASSAY OF MAGNESIUM: CPT

## 2020-01-01 PROCEDURE — 99231 SBSQ HOSP IP/OBS SF/LOW 25: CPT | Performed by: INTERNAL MEDICINE

## 2020-01-01 PROCEDURE — 71260 CT THORAX DX C+: CPT

## 2020-01-01 PROCEDURE — 85027 COMPLETE CBC AUTOMATED: CPT

## 2020-01-01 PROCEDURE — 74011250636 HC RX REV CODE- 250/636

## 2020-01-01 PROCEDURE — 96417 CHEMO IV INFUS EACH ADDL SEQ: CPT

## 2020-01-01 PROCEDURE — 83605 ASSAY OF LACTIC ACID: CPT

## 2020-01-01 PROCEDURE — 99233 SBSQ HOSP IP/OBS HIGH 50: CPT | Performed by: PHYSICAL MEDICINE & REHABILITATION

## 2020-01-01 PROCEDURE — 74011250636 HC RX REV CODE- 250/636: Performed by: PHYSICIAN ASSISTANT

## 2020-01-01 PROCEDURE — 80048 BASIC METABOLIC PNL TOTAL CA: CPT

## 2020-01-01 PROCEDURE — 74011000258 HC RX REV CODE- 258: Performed by: RADIOLOGY

## 2020-01-01 RX ORDER — ACETAMINOPHEN 325 MG/1
650 TABLET ORAL AS NEEDED
Status: CANCELLED
Start: 2020-01-01

## 2020-01-01 RX ORDER — SODIUM CHLORIDE 0.9 % (FLUSH) 0.9 %
10 SYRINGE (ML) INJECTION AS NEEDED
Status: DISCONTINUED | OUTPATIENT
Start: 2020-01-01 | End: 2020-01-01 | Stop reason: HOSPADM

## 2020-01-01 RX ORDER — ONDANSETRON 2 MG/ML
8 INJECTION INTRAMUSCULAR; INTRAVENOUS ONCE
Status: CANCELLED
Start: 2020-01-01

## 2020-01-01 RX ORDER — SODIUM CHLORIDE 9 MG/ML
10 INJECTION INTRAMUSCULAR; INTRAVENOUS; SUBCUTANEOUS AS NEEDED
Status: ACTIVE | OUTPATIENT
Start: 2020-01-01 | End: 2020-01-01

## 2020-01-01 RX ORDER — DIPHENHYDRAMINE HYDROCHLORIDE 50 MG/ML
50 INJECTION, SOLUTION INTRAMUSCULAR; INTRAVENOUS AS NEEDED
Status: CANCELLED
Start: 2020-01-01

## 2020-01-01 RX ORDER — SODIUM CHLORIDE 0.9 % (FLUSH) 0.9 %
10 SYRINGE (ML) INJECTION AS NEEDED
Status: CANCELLED
Start: 2020-01-01

## 2020-01-01 RX ORDER — ONDANSETRON 2 MG/ML
8 INJECTION INTRAMUSCULAR; INTRAVENOUS AS NEEDED
Status: CANCELLED | OUTPATIENT
Start: 2020-01-01

## 2020-01-01 RX ORDER — BACITRACIN 500 UNIT/G
1 PACKET (EA) TOPICAL AS NEEDED
Status: DISCONTINUED | OUTPATIENT
Start: 2020-01-01 | End: 2020-01-01 | Stop reason: HOSPADM

## 2020-01-01 RX ORDER — HYDROCORTISONE SODIUM SUCCINATE 100 MG/2ML
100 INJECTION, POWDER, FOR SOLUTION INTRAMUSCULAR; INTRAVENOUS AS NEEDED
Status: CANCELLED | OUTPATIENT
Start: 2020-01-01

## 2020-01-01 RX ORDER — ONDANSETRON 2 MG/ML
8 INJECTION INTRAMUSCULAR; INTRAVENOUS ONCE
Status: CANCELLED | OUTPATIENT
Start: 2020-01-01

## 2020-01-01 RX ORDER — DEXTROSE MONOHYDRATE 50 MG/ML
25 INJECTION, SOLUTION INTRAVENOUS CONTINUOUS
Status: CANCELLED | OUTPATIENT
Start: 2020-01-01

## 2020-01-01 RX ORDER — ALBUTEROL SULFATE 0.83 MG/ML
2.5 SOLUTION RESPIRATORY (INHALATION) AS NEEDED
Status: CANCELLED
Start: 2020-01-01

## 2020-01-01 RX ORDER — ONDANSETRON 2 MG/ML
4 INJECTION INTRAMUSCULAR; INTRAVENOUS
Status: DISCONTINUED | OUTPATIENT
Start: 2020-01-01 | End: 2020-01-01 | Stop reason: HOSPADM

## 2020-01-01 RX ORDER — SODIUM CHLORIDE 9 MG/ML
10 INJECTION INTRAMUSCULAR; INTRAVENOUS; SUBCUTANEOUS AS NEEDED
Status: CANCELLED | OUTPATIENT
Start: 2020-01-01

## 2020-01-01 RX ORDER — POTASSIUM CHLORIDE 750 MG/1
10 TABLET, EXTENDED RELEASE ORAL 2 TIMES DAILY
Qty: 6 TAB | Refills: 0 | Status: SHIPPED | OUTPATIENT
Start: 2020-01-01 | End: 2021-01-01

## 2020-01-01 RX ORDER — HYOSCYAMINE SULFATE 0.12 MG/1
0.12 TABLET SUBLINGUAL
COMMUNITY
Start: 2021-01-01

## 2020-01-01 RX ORDER — DEXTROSE MONOHYDRATE 50 MG/ML
25 INJECTION, SOLUTION INTRAVENOUS CONTINUOUS
Status: CANCELLED
Start: 2020-01-01

## 2020-01-01 RX ORDER — SODIUM CHLORIDE 0.9 % (FLUSH) 0.9 %
10 SYRINGE (ML) INJECTION AS NEEDED
Status: DISPENSED | OUTPATIENT
Start: 2020-01-01 | End: 2020-01-01

## 2020-01-01 RX ORDER — HEPARIN 100 UNIT/ML
300-500 SYRINGE INTRAVENOUS AS NEEDED
Status: CANCELLED
Start: 2020-01-01

## 2020-01-01 RX ORDER — EPINEPHRINE 1 MG/ML
0.3 INJECTION, SOLUTION, CONCENTRATE INTRAVENOUS AS NEEDED
Status: CANCELLED | OUTPATIENT
Start: 2020-01-01

## 2020-01-01 RX ORDER — HYDROMORPHONE HYDROCHLORIDE 1 MG/ML
1 INJECTION, SOLUTION INTRAMUSCULAR; INTRAVENOUS; SUBCUTANEOUS
Status: DISCONTINUED | OUTPATIENT
Start: 2020-01-01 | End: 2020-01-01

## 2020-01-01 RX ORDER — HEPARIN 100 UNIT/ML
300-500 SYRINGE INTRAVENOUS AS NEEDED
Status: ACTIVE | OUTPATIENT
Start: 2020-01-01 | End: 2020-01-01

## 2020-01-01 RX ORDER — ONDANSETRON 2 MG/ML
8 INJECTION INTRAMUSCULAR; INTRAVENOUS ONCE
Status: COMPLETED | OUTPATIENT
Start: 2020-01-01 | End: 2020-01-01

## 2020-01-01 RX ORDER — HEPARIN 100 UNIT/ML
300-500 SYRINGE INTRAVENOUS AS NEEDED
Status: DISCONTINUED | OUTPATIENT
Start: 2020-01-01 | End: 2020-01-01 | Stop reason: HOSPADM

## 2020-01-01 RX ORDER — SODIUM CHLORIDE 9 MG/ML
10 INJECTION INTRAMUSCULAR; INTRAVENOUS; SUBCUTANEOUS AS NEEDED
Status: DISCONTINUED | OUTPATIENT
Start: 2020-01-01 | End: 2020-01-01 | Stop reason: HOSPADM

## 2020-01-01 RX ORDER — PROCHLORPERAZINE MALEATE 5 MG
TABLET ORAL
Qty: 30 TAB | Refills: 1 | Status: SHIPPED | OUTPATIENT
Start: 2020-01-01 | End: 2020-01-01 | Stop reason: SDUPTHER

## 2020-01-01 RX ORDER — SODIUM CHLORIDE 0.9 % (FLUSH) 0.9 %
10 SYRINGE (ML) INJECTION
Status: COMPLETED | OUTPATIENT
Start: 2020-01-01 | End: 2020-01-01

## 2020-01-01 RX ORDER — DEXTROSE MONOHYDRATE 50 MG/ML
25 INJECTION, SOLUTION INTRAVENOUS CONTINUOUS
Status: DISPENSED | OUTPATIENT
Start: 2020-01-01 | End: 2020-01-01

## 2020-01-01 RX ORDER — FLUOROURACIL 50 MG/ML
200 INJECTION, SOLUTION INTRAVENOUS ONCE
Status: COMPLETED | OUTPATIENT
Start: 2020-01-01 | End: 2020-01-01

## 2020-01-01 RX ORDER — MEGESTROL ACETATE 40 MG/ML
SUSPENSION ORAL
Qty: 300 ML | Refills: 1 | Status: SHIPPED | OUTPATIENT
Start: 2020-01-01 | End: 2021-01-01

## 2020-01-01 RX ORDER — PROCHLORPERAZINE MALEATE 10 MG
5 TABLET ORAL
Status: DISCONTINUED | OUTPATIENT
Start: 2020-01-01 | End: 2020-01-01 | Stop reason: HOSPADM

## 2020-01-01 RX ORDER — DIPHENOXYLATE HYDROCHLORIDE AND ATROPINE SULFATE 2.5; .025 MG/1; MG/1
2 TABLET ORAL
Qty: 30 TAB | Refills: 0 | Status: SHIPPED | OUTPATIENT
Start: 2020-01-01 | End: 2020-01-01 | Stop reason: SDUPTHER

## 2020-01-01 RX ORDER — CEPHALEXIN 500 MG/1
500 CAPSULE ORAL
Status: COMPLETED | OUTPATIENT
Start: 2020-01-01 | End: 2020-01-01

## 2020-01-01 RX ORDER — HEPARIN 100 UNIT/ML
500 SYRINGE INTRAVENOUS AS NEEDED
Status: DISCONTINUED | OUTPATIENT
Start: 2020-01-01 | End: 2020-01-01 | Stop reason: HOSPADM

## 2020-01-01 RX ORDER — DEXTROSE MONOHYDRATE 50 MG/ML
25 INJECTION, SOLUTION INTRAVENOUS CONTINUOUS
Status: DISCONTINUED | OUTPATIENT
Start: 2020-01-01 | End: 2020-01-01 | Stop reason: HOSPADM

## 2020-01-01 RX ORDER — SODIUM CHLORIDE 0.9 % (FLUSH) 0.9 %
5-40 SYRINGE (ML) INJECTION AS NEEDED
Status: DISCONTINUED | OUTPATIENT
Start: 2020-01-01 | End: 2020-01-01 | Stop reason: HOSPADM

## 2020-01-01 RX ORDER — SODIUM CHLORIDE 0.9 % (FLUSH) 0.9 %
5-10 SYRINGE (ML) INJECTION AS NEEDED
Status: DISCONTINUED | OUTPATIENT
Start: 2020-01-01 | End: 2020-01-01 | Stop reason: HOSPADM

## 2020-01-01 RX ORDER — PROCHLORPERAZINE MALEATE 5 MG
TABLET ORAL
Qty: 30 TAB | Refills: 1 | Status: SHIPPED | OUTPATIENT
Start: 2020-01-01

## 2020-01-01 RX ORDER — SODIUM CHLORIDE 9 MG/ML
50 INJECTION, SOLUTION INTRAVENOUS ONCE
Status: COMPLETED | OUTPATIENT
Start: 2020-01-01 | End: 2020-01-01

## 2020-01-01 RX ORDER — OXYCODONE HYDROCHLORIDE 5 MG/1
5 TABLET ORAL
Qty: 60 TAB | Refills: 0 | Status: SHIPPED | OUTPATIENT
Start: 2020-01-01 | End: 2020-01-01

## 2020-01-01 RX ORDER — SODIUM CHLORIDE 9 MG/ML
10 INJECTION INTRAMUSCULAR; INTRAVENOUS; SUBCUTANEOUS AS NEEDED
Status: CANCELLED | OUTPATIENT
Start: 2021-01-01

## 2020-01-01 RX ORDER — ACETAMINOPHEN 325 MG/1
650 TABLET ORAL
Status: DISCONTINUED | OUTPATIENT
Start: 2020-01-01 | End: 2020-01-01 | Stop reason: HOSPADM

## 2020-01-01 RX ORDER — MEGESTROL ACETATE 40 MG/ML
200 SUSPENSION ORAL 2 TIMES DAILY
Qty: 300 ML | Refills: 1 | Status: SHIPPED | OUTPATIENT
Start: 2020-01-01 | End: 2020-01-01

## 2020-01-01 RX ORDER — ENOXAPARIN SODIUM 100 MG/ML
40 INJECTION SUBCUTANEOUS DAILY
Status: DISCONTINUED | OUTPATIENT
Start: 2020-01-01 | End: 2020-01-01 | Stop reason: HOSPADM

## 2020-01-01 RX ORDER — LORAZEPAM 0.5 MG/1
0.5 TABLET ORAL
Qty: 60 TAB | Refills: 0 | Status: SHIPPED | OUTPATIENT
Start: 2020-01-01 | End: 2020-01-01 | Stop reason: SDUPTHER

## 2020-01-01 RX ORDER — POTASSIUM CHLORIDE 750 MG/1
40 TABLET, FILM COATED, EXTENDED RELEASE ORAL
Status: COMPLETED | OUTPATIENT
Start: 2020-01-01 | End: 2020-01-01

## 2020-01-01 RX ORDER — MEGESTROL ACETATE 40 MG/ML
200 SUSPENSION ORAL 2 TIMES DAILY
Status: DISCONTINUED | OUTPATIENT
Start: 2020-01-01 | End: 2020-01-01 | Stop reason: HOSPADM

## 2020-01-01 RX ORDER — BISACODYL 5 MG
5 TABLET, DELAYED RELEASE (ENTERIC COATED) ORAL DAILY
Status: DISCONTINUED | OUTPATIENT
Start: 2020-01-01 | End: 2020-01-01 | Stop reason: HOSPADM

## 2020-01-01 RX ORDER — OXYCODONE HYDROCHLORIDE 5 MG/1
5 TABLET ORAL
Status: DISCONTINUED | OUTPATIENT
Start: 2020-01-01 | End: 2020-01-01

## 2020-01-01 RX ORDER — HEPARIN 100 UNIT/ML
500 SYRINGE INTRAVENOUS AS NEEDED
Status: ACTIVE | OUTPATIENT
Start: 2020-01-01 | End: 2020-01-01

## 2020-01-01 RX ORDER — BARIUM SULFATE 20 MG/ML
900 SUSPENSION ORAL ONCE
Status: DISCONTINUED | OUTPATIENT
Start: 2020-01-01 | End: 2020-01-01

## 2020-01-01 RX ORDER — SIMETHICONE 125 MG
125 CAPSULE ORAL
COMMUNITY
Start: 2021-01-01

## 2020-01-01 RX ORDER — FLUOROURACIL 50 MG/ML
200 INJECTION, SOLUTION INTRAVENOUS ONCE
Status: CANCELLED
Start: 2020-01-01 | End: 2020-01-01

## 2020-01-01 RX ORDER — ACETAMINOPHEN 650 MG/1
650 SUPPOSITORY RECTAL
Status: DISCONTINUED | OUTPATIENT
Start: 2020-01-01 | End: 2020-01-01 | Stop reason: HOSPADM

## 2020-01-01 RX ORDER — DIPHENOXYLATE HYDROCHLORIDE AND ATROPINE SULFATE 2.5; .025 MG/1; MG/1
2 TABLET ORAL
Qty: 60 TAB | Refills: 1 | Status: SHIPPED | OUTPATIENT
Start: 2020-01-01 | End: 2021-01-01

## 2020-01-01 RX ORDER — SODIUM,POTASSIUM PHOSPHATES 280-250MG
1 POWDER IN PACKET (EA) ORAL 4 TIMES DAILY
Status: DISCONTINUED | OUTPATIENT
Start: 2020-01-01 | End: 2020-01-01 | Stop reason: HOSPADM

## 2020-01-01 RX ORDER — HYDROMORPHONE HYDROCHLORIDE 1 MG/ML
0.5 INJECTION, SOLUTION INTRAMUSCULAR; INTRAVENOUS; SUBCUTANEOUS
Status: DISCONTINUED | OUTPATIENT
Start: 2020-01-01 | End: 2020-01-01 | Stop reason: HOSPADM

## 2020-01-01 RX ORDER — GABAPENTIN 100 MG/1
100 CAPSULE ORAL
Qty: 30 CAP | Refills: 0 | Status: SHIPPED | OUTPATIENT
Start: 2020-01-01 | End: 2020-01-01

## 2020-01-01 RX ORDER — SIMETHICONE 80 MG
120 TABLET,CHEWABLE ORAL
Status: DISCONTINUED | OUTPATIENT
Start: 2020-01-01 | End: 2020-01-01 | Stop reason: HOSPADM

## 2020-01-01 RX ORDER — SODIUM CHLORIDE 0.9 % (FLUSH) 0.9 %
5-10 SYRINGE (ML) INJECTION AS NEEDED
Status: DISPENSED | OUTPATIENT
Start: 2020-01-01 | End: 2020-01-01

## 2020-01-01 RX ORDER — OXYCODONE HYDROCHLORIDE 10 MG/1
10 TABLET ORAL
Qty: 180 TAB | Refills: 0 | Status: SHIPPED | OUTPATIENT
Start: 2020-01-01 | End: 2021-01-01

## 2020-01-01 RX ORDER — LORAZEPAM 0.5 MG/1
0.5 TABLET ORAL
Qty: 30 TAB | Refills: 1 | Status: SHIPPED | OUTPATIENT
Start: 2020-01-01 | End: 2020-01-01 | Stop reason: DRUGHIGH

## 2020-01-01 RX ORDER — DEXAMETHASONE 4 MG/1
TABLET ORAL
Qty: 60 TAB | Refills: 2 | Status: SHIPPED | OUTPATIENT
Start: 2020-01-01 | End: 2021-01-01

## 2020-01-01 RX ORDER — NALOXONE HYDROCHLORIDE 4 MG/.1ML
SPRAY NASAL
Qty: 1 EACH | Refills: 1 | Status: SHIPPED | OUTPATIENT
Start: 2020-01-01

## 2020-01-01 RX ORDER — SODIUM CHLORIDE 0.9 % (FLUSH) 0.9 %
5-40 SYRINGE (ML) INJECTION EVERY 8 HOURS
Status: DISCONTINUED | OUTPATIENT
Start: 2020-01-01 | End: 2020-01-01 | Stop reason: HOSPADM

## 2020-01-01 RX ORDER — POTASSIUM CHLORIDE AND SODIUM CHLORIDE 900; 300 MG/100ML; MG/100ML
INJECTION, SOLUTION INTRAVENOUS CONTINUOUS
Status: DISCONTINUED | OUTPATIENT
Start: 2020-01-01 | End: 2020-01-01

## 2020-01-01 RX ORDER — POLYETHYLENE GLYCOL 3350 17 G/17G
17 POWDER, FOR SOLUTION ORAL DAILY PRN
Status: DISCONTINUED | OUTPATIENT
Start: 2020-01-01 | End: 2020-01-01 | Stop reason: HOSPADM

## 2020-01-01 RX ORDER — LORAZEPAM 0.5 MG/1
0.5 TABLET ORAL
Qty: 30 TAB | Refills: 1 | Status: SHIPPED | OUTPATIENT
Start: 2020-01-01 | End: 2020-01-01 | Stop reason: SDUPTHER

## 2020-01-01 RX ORDER — LORAZEPAM 0.5 MG/1
.5-1 TABLET ORAL
Qty: 60 TAB | Refills: 0 | Status: SHIPPED | OUTPATIENT
Start: 2020-01-01 | End: 2021-01-01

## 2020-01-01 RX ORDER — DIPHENOXYLATE HYDROCHLORIDE AND ATROPINE SULFATE 2.5; .025 MG/1; MG/1
2 TABLET ORAL
Qty: 60 TAB | Refills: 1 | Status: SHIPPED | OUTPATIENT
Start: 2020-01-01 | End: 2020-01-01 | Stop reason: SDUPTHER

## 2020-01-01 RX ORDER — HEPARIN 100 UNIT/ML
300-500 SYRINGE INTRAVENOUS AS NEEDED
Status: CANCELLED
Start: 2021-01-01

## 2020-01-01 RX ORDER — HEPARIN 100 UNIT/ML
300 SYRINGE INTRAVENOUS
Status: COMPLETED | OUTPATIENT
Start: 2020-01-01 | End: 2020-01-01

## 2020-01-01 RX ORDER — OXYCODONE HYDROCHLORIDE 5 MG/1
5 TABLET ORAL
Qty: 60 TAB | Refills: 0 | Status: SHIPPED | OUTPATIENT
Start: 2020-01-01 | End: 2020-01-01 | Stop reason: SDUPTHER

## 2020-01-01 RX ORDER — OXYCODONE HYDROCHLORIDE 5 MG/1
10 TABLET ORAL
Status: DISCONTINUED | OUTPATIENT
Start: 2020-01-01 | End: 2020-01-01 | Stop reason: HOSPADM

## 2020-01-01 RX ORDER — SODIUM CHLORIDE 9 MG/ML
75 INJECTION, SOLUTION INTRAVENOUS CONTINUOUS
Status: DISCONTINUED | OUTPATIENT
Start: 2020-01-01 | End: 2020-01-01

## 2020-01-01 RX ORDER — SODIUM CHLORIDE 9 MG/ML
10 INJECTION INTRAMUSCULAR; INTRAVENOUS; SUBCUTANEOUS AS NEEDED
Status: DISPENSED | OUTPATIENT
Start: 2020-01-01 | End: 2020-01-01

## 2020-01-01 RX ORDER — LORAZEPAM 0.5 MG/1
0.5 TABLET ORAL
Qty: 60 TAB | Refills: 1 | Status: SHIPPED | OUTPATIENT
Start: 2020-01-01 | End: 2020-01-01 | Stop reason: SDUPTHER

## 2020-01-01 RX ORDER — ONDANSETRON 2 MG/ML
8 INJECTION INTRAMUSCULAR; INTRAVENOUS ONCE
Status: CANCELLED | OUTPATIENT
Start: 2020-01-01 | End: 2020-01-01

## 2020-01-01 RX ORDER — ONDANSETRON 2 MG/ML
8 INJECTION INTRAMUSCULAR; INTRAVENOUS ONCE
Status: ACTIVE | OUTPATIENT
Start: 2020-01-01 | End: 2020-01-01

## 2020-01-01 RX ORDER — CEPHALEXIN 500 MG/1
500 CAPSULE ORAL 3 TIMES DAILY
Qty: 20 CAP | Refills: 0 | Status: SHIPPED | OUTPATIENT
Start: 2020-01-01 | End: 2020-01-01

## 2020-01-01 RX ORDER — BISACODYL 5 MG
5 TABLET, DELAYED RELEASE (ENTERIC COATED) ORAL DAILY
Qty: 15 TAB | Refills: 0 | Status: SHIPPED | OUTPATIENT
Start: 2020-01-01 | End: 2021-01-01

## 2020-01-01 RX ORDER — GABAPENTIN 100 MG/1
100 CAPSULE ORAL
Status: DISCONTINUED | OUTPATIENT
Start: 2020-01-01 | End: 2020-01-01

## 2020-01-01 RX ORDER — SODIUM CHLORIDE 0.9 % (FLUSH) 0.9 %
10 SYRINGE (ML) INJECTION AS NEEDED
Status: CANCELLED
Start: 2021-01-01

## 2020-01-01 RX ORDER — LORAZEPAM 0.5 MG/1
.5-1 TABLET ORAL
Qty: 60 TAB | Refills: 0 | Status: SHIPPED | OUTPATIENT
Start: 2020-01-01 | End: 2020-01-01 | Stop reason: SDUPTHER

## 2020-01-01 RX ORDER — HYOSCYAMINE SULFATE 0.12 MG/1
0.12 TABLET SUBLINGUAL
Status: DISCONTINUED | OUTPATIENT
Start: 2020-01-01 | End: 2020-01-01 | Stop reason: HOSPADM

## 2020-01-01 RX ORDER — LORAZEPAM 0.5 MG/1
0.5 TABLET ORAL
Qty: 30 TAB | Refills: 0 | Status: SHIPPED | OUTPATIENT
Start: 2020-01-01 | End: 2020-01-01 | Stop reason: SDUPTHER

## 2020-01-01 RX ORDER — LORAZEPAM 0.5 MG/1
.5-1 TABLET ORAL
Status: DISCONTINUED | OUTPATIENT
Start: 2020-01-01 | End: 2020-01-01 | Stop reason: HOSPADM

## 2020-01-01 RX ORDER — CALCIUM CARBONATE 200(500)MG
200 TABLET,CHEWABLE ORAL
Status: DISCONTINUED | OUTPATIENT
Start: 2020-01-01 | End: 2020-01-01 | Stop reason: HOSPADM

## 2020-01-01 RX ORDER — LANOLIN ALCOHOL/MO/W.PET/CERES
400 CREAM (GRAM) TOPICAL DAILY
Status: COMPLETED | OUTPATIENT
Start: 2020-01-01 | End: 2020-01-01

## 2020-01-01 RX ADMIN — Medication 10 ML: at 18:26

## 2020-01-01 RX ADMIN — MEGESTROL ACETATE 200 MG: 40 SUSPENSION ORAL at 09:41

## 2020-01-01 RX ADMIN — DEXAMETHASONE SODIUM PHOSPHATE 12 MG: 4 INJECTION, SOLUTION INTRA-ARTICULAR; INTRALESIONAL; INTRAMUSCULAR; INTRAVENOUS; SOFT TISSUE at 12:30

## 2020-01-01 RX ADMIN — SODIUM CHLORIDE 1000 ML: 900 INJECTION, SOLUTION INTRAVENOUS at 14:10

## 2020-01-01 RX ADMIN — ONDANSETRON 8 MG: 2 INJECTION, SOLUTION INTRAMUSCULAR; INTRAVENOUS at 11:45

## 2020-01-01 RX ADMIN — Medication 10 ML: at 09:35

## 2020-01-01 RX ADMIN — POTASSIUM CHLORIDE 40 MEQ: 750 TABLET, EXTENDED RELEASE ORAL at 13:37

## 2020-01-01 RX ADMIN — ONDANSETRON 8 MG: 2 INJECTION, SOLUTION INTRAMUSCULAR; INTRAVENOUS at 12:48

## 2020-01-01 RX ADMIN — DEXTROSE MONOHYDRATE 25 ML/HR: 5 INJECTION, SOLUTION INTRAVENOUS at 13:07

## 2020-01-01 RX ADMIN — CALCIUM CARBONATE (ANTACID) CHEW TAB 500 MG 200 MG: 500 CHEW TAB at 02:08

## 2020-01-01 RX ADMIN — POTASSIUM & SODIUM PHOSPHATES POWDER PACK 280-160-250 MG 1 PACKET: 280-160-250 PACK at 13:33

## 2020-01-01 RX ADMIN — Medication 10 ML: at 15:41

## 2020-01-01 RX ADMIN — SODIUM CHLORIDE 10 ML: 9 INJECTION INTRAMUSCULAR; INTRAVENOUS; SUBCUTANEOUS at 10:11

## 2020-01-01 RX ADMIN — Medication 10 ML: at 16:41

## 2020-01-01 RX ADMIN — CEFTRIAXONE SODIUM 2 G: 2 INJECTION, POWDER, FOR SOLUTION INTRAMUSCULAR; INTRAVENOUS at 18:38

## 2020-01-01 RX ADMIN — Medication 10 ML: at 15:16

## 2020-01-01 RX ADMIN — Medication 500 UNITS: at 10:12

## 2020-01-01 RX ADMIN — LEUCOVORIN CALCIUM 316 MG: 350 INJECTION, POWDER, LYOPHILIZED, FOR SOLUTION INTRAMUSCULAR; INTRAVENOUS at 13:40

## 2020-01-01 RX ADMIN — OXALIPLATIN 127.5 MG: 5 INJECTION, SOLUTION INTRAVENOUS at 13:16

## 2020-01-01 RX ADMIN — PIPERACILLIN AND TAZOBACTAM 3.38 G: 3; .375 INJECTION, POWDER, LYOPHILIZED, FOR SOLUTION INTRAVENOUS at 11:21

## 2020-01-01 RX ADMIN — ONDANSETRON HYDROCHLORIDE 8 MG: 2 SOLUTION INTRAMUSCULAR; INTRAVENOUS at 13:13

## 2020-01-01 RX ADMIN — Medication 10 ML: at 11:44

## 2020-01-01 RX ADMIN — DEXAMETHASONE SODIUM PHOSPHATE 12 MG: 4 INJECTION, SOLUTION INTRA-ARTICULAR; INTRALESIONAL; INTRAMUSCULAR; INTRAVENOUS; SOFT TISSUE at 13:31

## 2020-01-01 RX ADMIN — Medication 10 ML: at 10:15

## 2020-01-01 RX ADMIN — SODIUM CHLORIDE 1000 ML: 900 INJECTION, SOLUTION INTRAVENOUS at 11:30

## 2020-01-01 RX ADMIN — ONDANSETRON 8 MG: 2 INJECTION INTRAMUSCULAR; INTRAVENOUS at 13:15

## 2020-01-01 RX ADMIN — Medication 10 ML: at 14:15

## 2020-01-01 RX ADMIN — Medication 10 ML: at 10:41

## 2020-01-01 RX ADMIN — Medication 400 MG: at 09:17

## 2020-01-01 RX ADMIN — VANCOMYCIN HYDROCHLORIDE 750 MG: 750 INJECTION, POWDER, LYOPHILIZED, FOR SOLUTION INTRAVENOUS at 11:38

## 2020-01-01 RX ADMIN — ONDANSETRON 8 MG: 2 INJECTION, SOLUTION INTRAMUSCULAR; INTRAVENOUS at 14:27

## 2020-01-01 RX ADMIN — DEXTROSE MONOHYDRATE 102 MG: 50 INJECTION, SOLUTION INTRAVENOUS at 12:13

## 2020-01-01 RX ADMIN — ONDANSETRON 8 MG: 2 INJECTION INTRAMUSCULAR; INTRAVENOUS at 11:56

## 2020-01-01 RX ADMIN — DEXTROSE MONOHYDRATE 25 ML/HR: 5 INJECTION, SOLUTION INTRAVENOUS at 11:56

## 2020-01-01 RX ADMIN — Medication 10 ML: at 06:42

## 2020-01-01 RX ADMIN — Medication 10 ML: at 13:25

## 2020-01-01 RX ADMIN — HEPARIN 500 UNITS: 100 SYRINGE at 17:05

## 2020-01-01 RX ADMIN — LEUCOVORIN CALCIUM 316 MG: 350 INJECTION, POWDER, LYOPHILIZED, FOR SOLUTION INTRAMUSCULAR; INTRAVENOUS at 12:28

## 2020-01-01 RX ADMIN — HEPARIN 500 UNITS: 100 SYRINGE at 15:29

## 2020-01-01 RX ADMIN — HEPARIN 500 UNITS: 100 SYRINGE at 15:17

## 2020-01-01 RX ADMIN — OXYCODONE HYDROCHLORIDE 10 MG: 5 TABLET ORAL at 16:41

## 2020-01-01 RX ADMIN — CEPHALEXIN 500 MG: 500 CAPSULE ORAL at 18:23

## 2020-01-01 RX ADMIN — FLUOROURACIL 2700 MG: 50 INJECTION, SOLUTION INTRAVENOUS at 16:14

## 2020-01-01 RX ADMIN — DEXTROSE MONOHYDRATE 25 ML/HR: 5 INJECTION, SOLUTION INTRAVENOUS at 13:34

## 2020-01-01 RX ADMIN — DEXTROSE MONOHYDRATE 25 ML/HR: 5 INJECTION, SOLUTION INTRAVENOUS at 12:50

## 2020-01-01 RX ADMIN — Medication 10 ML: at 11:50

## 2020-01-01 RX ADMIN — DEXTROSE MONOHYDRATE 25 ML/HR: 5 INJECTION, SOLUTION INTRAVENOUS at 11:27

## 2020-01-01 RX ADMIN — POTASSIUM CHLORIDE AND SODIUM CHLORIDE: 900; 300 INJECTION, SOLUTION INTRAVENOUS at 20:24

## 2020-01-01 RX ADMIN — Medication 10 ML: at 13:38

## 2020-01-01 RX ADMIN — Medication 10 ML: at 14:43

## 2020-01-01 RX ADMIN — Medication 10 ML: at 15:17

## 2020-01-01 RX ADMIN — Medication 10 ML: at 21:48

## 2020-01-01 RX ADMIN — ONDANSETRON 8 MG: 2 INJECTION, SOLUTION INTRAMUSCULAR; INTRAVENOUS at 14:43

## 2020-01-01 RX ADMIN — MEGESTROL ACETATE 200 MG: 40 SUSPENSION ORAL at 10:07

## 2020-01-01 RX ADMIN — DEXAMETHASONE SODIUM PHOSPHATE 12 MG: 4 INJECTION, SOLUTION INTRA-ARTICULAR; INTRALESIONAL; INTRAMUSCULAR; INTRAVENOUS; SOFT TISSUE at 12:17

## 2020-01-01 RX ADMIN — Medication 10 ML: at 15:35

## 2020-01-01 RX ADMIN — SODIUM CHLORIDE 1000 ML: 900 INJECTION, SOLUTION INTRAVENOUS at 12:05

## 2020-01-01 RX ADMIN — DEXTROSE MONOHYDRATE 102 MG: 50 INJECTION, SOLUTION INTRAVENOUS at 14:06

## 2020-01-01 RX ADMIN — PIPERACILLIN AND TAZOBACTAM 3.38 G: 3; .375 INJECTION, POWDER, LYOPHILIZED, FOR SOLUTION INTRAVENOUS at 02:08

## 2020-01-01 RX ADMIN — OXYCODONE HYDROCHLORIDE 5 MG: 5 TABLET ORAL at 19:49

## 2020-01-01 RX ADMIN — Medication 500 UNITS: at 14:31

## 2020-01-01 RX ADMIN — DEXTROSE MONOHYDRATE 102 MG: 5 INJECTION, SOLUTION INTRAVENOUS at 13:19

## 2020-01-01 RX ADMIN — MEGESTROL ACETATE 200 MG: 40 SUSPENSION ORAL at 18:37

## 2020-01-01 RX ADMIN — SODIUM CHLORIDE 10 ML: 9 INJECTION INTRAMUSCULAR; INTRAVENOUS; SUBCUTANEOUS at 10:15

## 2020-01-01 RX ADMIN — POTASSIUM CHLORIDE 40 MEQ: 750 TABLET, FILM COATED, EXTENDED RELEASE ORAL at 11:26

## 2020-01-01 RX ADMIN — FLUOROURACIL 2700 MG: 50 INJECTION, SOLUTION INTRAVENOUS at 14:18

## 2020-01-01 RX ADMIN — PIPERACILLIN AND TAZOBACTAM 3.38 G: 3; .375 INJECTION, POWDER, LYOPHILIZED, FOR SOLUTION INTRAVENOUS at 11:38

## 2020-01-01 RX ADMIN — HEPARIN 500 UNITS: 100 SYRINGE at 15:21

## 2020-01-01 RX ADMIN — FLUOROURACIL 1896 MG: 50 INJECTION, SOLUTION INTRAVENOUS at 14:37

## 2020-01-01 RX ADMIN — Medication 500 UNITS: at 14:20

## 2020-01-01 RX ADMIN — OXALIPLATIN 127.5 MG: 5 INJECTION, SOLUTION INTRAVENOUS at 12:50

## 2020-01-01 RX ADMIN — POTASSIUM BICARBONATE 40 MEQ: 782 TABLET, EFFERVESCENT ORAL at 00:13

## 2020-01-01 RX ADMIN — DEXTROSE MONOHYDRATE 25 ML/HR: 5 INJECTION, SOLUTION INTRAVENOUS at 13:23

## 2020-01-01 RX ADMIN — GABAPENTIN 100 MG: 100 CAPSULE ORAL at 21:34

## 2020-01-01 RX ADMIN — FLUOROURACIL 1896 MG: 50 INJECTION, SOLUTION INTRAVENOUS at 16:10

## 2020-01-01 RX ADMIN — ONDANSETRON 8 MG: 2 INJECTION INTRAMUSCULAR; INTRAVENOUS at 11:44

## 2020-01-01 RX ADMIN — Medication 10 ML: at 16:08

## 2020-01-01 RX ADMIN — Medication 400 MG: at 10:07

## 2020-01-01 RX ADMIN — DEXAMETHASONE SODIUM PHOSPHATE 12 MG: 4 INJECTION, SOLUTION INTRA-ARTICULAR; INTRALESIONAL; INTRAMUSCULAR; INTRAVENOUS; SOFT TISSUE at 13:07

## 2020-01-01 RX ADMIN — OXYCODONE HYDROCHLORIDE 10 MG: 5 TABLET ORAL at 12:41

## 2020-01-01 RX ADMIN — DEXAMETHASONE SODIUM PHOSPHATE 12 MG: 4 INJECTION, SOLUTION INTRA-ARTICULAR; INTRALESIONAL; INTRAMUSCULAR; INTRAVENOUS; SOFT TISSUE at 12:35

## 2020-01-01 RX ADMIN — FLUOROURACIL 2700 MG: 50 INJECTION, SOLUTION INTRAVENOUS at 15:25

## 2020-01-01 RX ADMIN — FLUOROURACIL 1896 MG: 50 INJECTION, SOLUTION INTRAVENOUS at 16:15

## 2020-01-01 RX ADMIN — Medication 10 ML: at 14:49

## 2020-01-01 RX ADMIN — OXALIPLATIN 127.5 MG: 5 INJECTION, SOLUTION, CONCENTRATE INTRAVENOUS at 13:29

## 2020-01-01 RX ADMIN — PIPERACILLIN AND TAZOBACTAM 3.38 G: 3; .375 INJECTION, POWDER, LYOPHILIZED, FOR SOLUTION INTRAVENOUS at 03:41

## 2020-01-01 RX ADMIN — DEXAMETHASONE SODIUM PHOSPHATE 12 MG: 4 INJECTION, SOLUTION INTRA-ARTICULAR; INTRALESIONAL; INTRAMUSCULAR; INTRAVENOUS; SOFT TISSUE at 13:05

## 2020-01-01 RX ADMIN — SODIUM CHLORIDE 10 ML: 9 INJECTION INTRAMUSCULAR; INTRAVENOUS; SUBCUTANEOUS at 09:15

## 2020-01-01 RX ADMIN — Medication 10 ML: at 17:04

## 2020-01-01 RX ADMIN — Medication 400 MG: at 10:06

## 2020-01-01 RX ADMIN — HEPARIN 500 UNITS: 100 SYRINGE at 16:30

## 2020-01-01 RX ADMIN — PIPERACILLIN AND TAZOBACTAM 3.38 G: 3; .375 INJECTION, POWDER, LYOPHILIZED, FOR SOLUTION INTRAVENOUS at 02:43

## 2020-01-01 RX ADMIN — SODIUM CHLORIDE 1000 ML: 900 INJECTION, SOLUTION INTRAVENOUS at 12:00

## 2020-01-01 RX ADMIN — FLUOROURACIL 2700 MG: 50 INJECTION, SOLUTION INTRAVENOUS at 13:03

## 2020-01-01 RX ADMIN — FLUOROURACIL 2700 MG: 50 INJECTION, SOLUTION INTRAVENOUS at 15:50

## 2020-01-01 RX ADMIN — SODIUM CHLORIDE 1000 ML: 900 INJECTION, SOLUTION INTRAVENOUS at 18:41

## 2020-01-01 RX ADMIN — ONDANSETRON 8 MG: 2 INJECTION INTRAMUSCULAR; INTRAVENOUS at 12:02

## 2020-01-01 RX ADMIN — ONDANSETRON 4 MG: 2 INJECTION INTRAMUSCULAR; INTRAVENOUS at 17:39

## 2020-01-01 RX ADMIN — ONDANSETRON 8 MG: 2 INJECTION, SOLUTION INTRAMUSCULAR; INTRAVENOUS at 11:49

## 2020-01-01 RX ADMIN — SODIUM CHLORIDE 1000 ML: 900 INJECTION, SOLUTION INTRAVENOUS at 10:13

## 2020-01-01 RX ADMIN — ONDANSETRON 8 MG: 2 INJECTION, SOLUTION INTRAMUSCULAR; INTRAVENOUS at 12:35

## 2020-01-01 RX ADMIN — SODIUM CHLORIDE 1000 ML: 900 INJECTION, SOLUTION INTRAVENOUS at 14:35

## 2020-01-01 RX ADMIN — ONDANSETRON 8 MG: 2 INJECTION, SOLUTION INTRAMUSCULAR; INTRAVENOUS at 09:04

## 2020-01-01 RX ADMIN — SODIUM CHLORIDE 10 ML: 9 INJECTION, SOLUTION INTRAMUSCULAR; INTRAVENOUS; SUBCUTANEOUS at 11:49

## 2020-01-01 RX ADMIN — MEGESTROL ACETATE 200 MG: 40 SUSPENSION ORAL at 18:33

## 2020-01-01 RX ADMIN — OXYCODONE HYDROCHLORIDE 10 MG: 5 TABLET ORAL at 12:14

## 2020-01-01 RX ADMIN — OXALIPLATIN 67 MG: 5 INJECTION, SOLUTION INTRAVENOUS at 14:00

## 2020-01-01 RX ADMIN — DEXTROSE MONOHYDRATE 25 ML/HR: 5 INJECTION, SOLUTION INTRAVENOUS at 13:00

## 2020-01-01 RX ADMIN — SODIUM CHLORIDE 10 ML: 9 INJECTION INTRAMUSCULAR; INTRAVENOUS; SUBCUTANEOUS at 10:00

## 2020-01-01 RX ADMIN — ONDANSETRON 8 MG: 2 INJECTION, SOLUTION INTRAMUSCULAR; INTRAVENOUS at 14:18

## 2020-01-01 RX ADMIN — POTASSIUM & SODIUM PHOSPHATES POWDER PACK 280-160-250 MG 1 PACKET: 280-160-250 PACK at 18:37

## 2020-01-01 RX ADMIN — DEXTROSE MONOHYDRATE 25 ML/HR: 5 INJECTION, SOLUTION INTRAVENOUS at 12:40

## 2020-01-01 RX ADMIN — DEXAMETHASONE SODIUM PHOSPHATE 12 MG: 4 INJECTION, SOLUTION INTRA-ARTICULAR; INTRALESIONAL; INTRAMUSCULAR; INTRAVENOUS; SOFT TISSUE at 11:25

## 2020-01-01 RX ADMIN — POTASSIUM & SODIUM PHOSPHATES POWDER PACK 280-160-250 MG 1 PACKET: 280-160-250 PACK at 21:37

## 2020-01-01 RX ADMIN — DEXTROSE MONOHYDRATE 25 ML/HR: 5 INJECTION, SOLUTION INTRAVENOUS at 13:15

## 2020-01-01 RX ADMIN — Medication 10 ML: at 07:28

## 2020-01-01 RX ADMIN — DEXAMETHASONE SODIUM PHOSPHATE 12 MG: 4 INJECTION, SOLUTION INTRA-ARTICULAR; INTRALESIONAL; INTRAMUSCULAR; INTRAVENOUS; SOFT TISSUE at 13:36

## 2020-01-01 RX ADMIN — DEXTROSE MONOHYDRATE 25 ML/HR: 5 INJECTION, SOLUTION INTRAVENOUS at 13:04

## 2020-01-01 RX ADMIN — IOHEXOL 50 ML: 240 INJECTION, SOLUTION INTRATHECAL; INTRAVASCULAR; INTRAVENOUS; ORAL at 21:01

## 2020-01-01 RX ADMIN — SODIUM CHLORIDE 1000 ML: 900 INJECTION, SOLUTION INTRAVENOUS at 11:28

## 2020-01-01 RX ADMIN — HEPARIN 500 UNITS: 100 SYRINGE at 15:41

## 2020-01-01 RX ADMIN — OXYCODONE HYDROCHLORIDE 10 MG: 5 TABLET ORAL at 08:10

## 2020-01-01 RX ADMIN — Medication 10 ML: at 17:05

## 2020-01-01 RX ADMIN — Medication 10 ML: at 15:38

## 2020-01-01 RX ADMIN — SODIUM CHLORIDE 1000 ML: 900 INJECTION, SOLUTION INTRAVENOUS at 11:20

## 2020-01-01 RX ADMIN — IOPAMIDOL 100 ML: 755 INJECTION, SOLUTION INTRAVENOUS at 11:48

## 2020-01-01 RX ADMIN — OXALIPLATIN 127.5 MG: 50 INJECTION, SOLUTION, CONCENTRATE INTRAVENOUS at 12:28

## 2020-01-01 RX ADMIN — Medication 10 ML: at 10:00

## 2020-01-01 RX ADMIN — HEPARIN 300 UNITS: 100 SYRINGE at 18:24

## 2020-01-01 RX ADMIN — Medication 10 ML: at 10:10

## 2020-01-01 RX ADMIN — ONDANSETRON 8 MG: 2 INJECTION, SOLUTION INTRAMUSCULAR; INTRAVENOUS at 14:30

## 2020-01-01 RX ADMIN — FLUOROURACIL 2700 MG: 50 INJECTION, SOLUTION INTRAVENOUS at 13:12

## 2020-01-01 RX ADMIN — HYDROMORPHONE HYDROCHLORIDE 1 MG: 1 INJECTION, SOLUTION INTRAMUSCULAR; INTRAVENOUS; SUBCUTANEOUS at 18:26

## 2020-01-01 RX ADMIN — SODIUM CHLORIDE 40 MG: 9 INJECTION INTRAMUSCULAR; INTRAVENOUS; SUBCUTANEOUS at 22:50

## 2020-01-01 RX ADMIN — FLUOROURACIL 2700 MG: 50 INJECTION, SOLUTION INTRAVENOUS at 15:33

## 2020-01-01 RX ADMIN — Medication 10 ML: at 10:05

## 2020-01-01 RX ADMIN — FLUOROURACIL 2700 MG: 50 INJECTION, SOLUTION INTRAVENOUS at 15:32

## 2020-01-01 RX ADMIN — SODIUM CHLORIDE 1000 ML: 900 INJECTION, SOLUTION INTRAVENOUS at 09:11

## 2020-01-01 RX ADMIN — SODIUM CHLORIDE 10 ML: 9 INJECTION INTRAMUSCULAR; INTRAVENOUS; SUBCUTANEOUS at 14:20

## 2020-01-01 RX ADMIN — HEPARIN 500 UNITS: 100 SYRINGE at 10:19

## 2020-01-01 RX ADMIN — FLUOROURACIL 2700 MG: 50 INJECTION, SOLUTION INTRAVENOUS at 15:20

## 2020-01-01 RX ADMIN — OXYCODONE HYDROCHLORIDE 5 MG: 5 TABLET ORAL at 07:36

## 2020-01-01 RX ADMIN — HYDROMORPHONE HYDROCHLORIDE 1 MG: 1 INJECTION, SOLUTION INTRAMUSCULAR; INTRAVENOUS; SUBCUTANEOUS at 13:25

## 2020-01-01 RX ADMIN — ONDANSETRON 8 MG: 2 INJECTION, SOLUTION INTRAMUSCULAR; INTRAVENOUS at 14:13

## 2020-01-01 RX ADMIN — PIPERACILLIN AND TAZOBACTAM 3.38 G: 3; .375 INJECTION, POWDER, LYOPHILIZED, FOR SOLUTION INTRAVENOUS at 18:34

## 2020-01-01 RX ADMIN — FLUOROURACIL 2700 MG: 50 INJECTION, SOLUTION INTRAVENOUS at 15:18

## 2020-01-01 RX ADMIN — POTASSIUM & SODIUM PHOSPHATES POWDER PACK 280-160-250 MG 1 PACKET: 280-160-250 PACK at 09:18

## 2020-01-01 RX ADMIN — POTASSIUM & SODIUM PHOSPHATES POWDER PACK 280-160-250 MG 1 PACKET: 280-160-250 PACK at 09:41

## 2020-01-01 RX ADMIN — DEXAMETHASONE SODIUM PHOSPHATE 12 MG: 4 INJECTION, SOLUTION INTRA-ARTICULAR; INTRALESIONAL; INTRAMUSCULAR; INTRAVENOUS; SOFT TISSUE at 12:38

## 2020-01-01 RX ADMIN — SODIUM CHLORIDE 10 ML: 9 INJECTION INTRAMUSCULAR; INTRAVENOUS; SUBCUTANEOUS at 10:10

## 2020-01-01 RX ADMIN — POTASSIUM & SODIUM PHOSPHATES POWDER PACK 280-160-250 MG 1 PACKET: 280-160-250 PACK at 17:39

## 2020-01-01 RX ADMIN — MEGESTROL ACETATE 200 MG: 40 SUSPENSION ORAL at 09:18

## 2020-01-01 RX ADMIN — POTASSIUM CHLORIDE 40 MEQ: 750 TABLET, EXTENDED RELEASE ORAL at 11:43

## 2020-01-01 RX ADMIN — Medication 10 ML: at 09:15

## 2020-01-01 RX ADMIN — SODIUM CHLORIDE 10 ML: 9 INJECTION INTRAMUSCULAR; INTRAVENOUS; SUBCUTANEOUS at 10:05

## 2020-01-01 RX ADMIN — ONDANSETRON 8 MG: 2 INJECTION, SOLUTION INTRAMUSCULAR; INTRAVENOUS at 15:35

## 2020-01-01 RX ADMIN — Medication 10 ML: at 15:39

## 2020-01-01 RX ADMIN — IOPAMIDOL 100 ML: 755 INJECTION, SOLUTION INTRAVENOUS at 21:01

## 2020-01-01 RX ADMIN — FLUOROURACIL 316 MG: 50 INJECTION, SOLUTION INTRAVENOUS at 15:35

## 2020-01-01 RX ADMIN — OXALIPLATIN 64 MG: 5 INJECTION, SOLUTION, CONCENTRATE INTRAVENOUS at 13:29

## 2020-01-01 RX ADMIN — Medication 10 ML: at 14:12

## 2020-01-01 RX ADMIN — POTASSIUM & SODIUM PHOSPHATES POWDER PACK 280-160-250 MG 1 PACKET: 280-160-250 PACK at 21:01

## 2020-01-01 RX ADMIN — Medication 500 UNITS: at 15:33

## 2020-01-01 RX ADMIN — PIPERACILLIN AND TAZOBACTAM 3.38 G: 3; .375 INJECTION, POWDER, LYOPHILIZED, FOR SOLUTION INTRAVENOUS at 19:28

## 2020-01-01 RX ADMIN — POTASSIUM CHLORIDE 40 MEQ: 750 TABLET, FILM COATED, EXTENDED RELEASE ORAL at 10:02

## 2020-01-01 RX ADMIN — DEXTROSE MONOHYDRATE 316 MG: 50 INJECTION, SOLUTION INTRAVENOUS at 13:32

## 2020-01-01 RX ADMIN — OXYCODONE HYDROCHLORIDE 5 MG: 5 TABLET ORAL at 14:39

## 2020-01-01 RX ADMIN — Medication 10 ML: at 13:51

## 2020-01-01 RX ADMIN — Medication 10 ML: at 09:05

## 2020-01-01 RX ADMIN — SODIUM CHLORIDE 100 ML: 900 INJECTION, SOLUTION INTRAVENOUS at 18:29

## 2020-01-01 RX ADMIN — Medication 10 ML: at 10:20

## 2020-01-01 RX ADMIN — FLUOROURACIL 2700 MG: 50 INJECTION, SOLUTION INTRAVENOUS at 15:05

## 2020-01-01 RX ADMIN — VANCOMYCIN HYDROCHLORIDE 1000 MG: 1 INJECTION, POWDER, LYOPHILIZED, FOR SOLUTION INTRAVENOUS at 23:30

## 2020-01-01 RX ADMIN — Medication 10 ML: at 20:54

## 2020-01-01 RX ADMIN — SODIUM CHLORIDE 1000 ML: 900 INJECTION, SOLUTION INTRAVENOUS at 14:25

## 2020-01-01 RX ADMIN — Medication 10 ML: at 11:48

## 2020-01-01 RX ADMIN — Medication 10 ML: at 10:19

## 2020-01-01 RX ADMIN — HEPARIN 500 UNITS: 100 SYRINGE at 16:04

## 2020-01-01 RX ADMIN — Medication 10 ML: at 06:00

## 2020-01-01 RX ADMIN — DEXTROSE MONOHYDRATE 90 MG: 50 INJECTION, SOLUTION INTRAVENOUS at 13:00

## 2020-01-01 RX ADMIN — SODIUM CHLORIDE 10 ML: 9 INJECTION INTRAMUSCULAR; INTRAVENOUS; SUBCUTANEOUS at 12:53

## 2020-01-01 RX ADMIN — ONDANSETRON 8 MG: 2 INJECTION, SOLUTION INTRAMUSCULAR; INTRAVENOUS at 12:30

## 2020-01-01 RX ADMIN — MEGESTROL ACETATE 200 MG: 40 SUSPENSION ORAL at 17:36

## 2020-01-01 RX ADMIN — POTASSIUM & SODIUM PHOSPHATES POWDER PACK 280-160-250 MG 1 PACKET: 280-160-250 PACK at 10:12

## 2020-01-01 RX ADMIN — SODIUM CHLORIDE 1000 ML: 900 INJECTION, SOLUTION INTRAVENOUS at 14:40

## 2020-01-01 RX ADMIN — DEXTROSE MONOHYDRATE 25 ML/HR: 5 INJECTION, SOLUTION INTRAVENOUS at 12:41

## 2020-01-01 RX ADMIN — Medication 500 UNITS: at 10:41

## 2020-01-01 RX ADMIN — ONDANSETRON 8 MG: 2 INJECTION, SOLUTION INTRAMUSCULAR; INTRAVENOUS at 14:28

## 2020-01-01 RX ADMIN — FLUOROURACIL 316 MG: 50 INJECTION, SOLUTION INTRAVENOUS at 14:37

## 2020-01-01 RX ADMIN — Medication 10 ML: at 12:53

## 2020-01-01 RX ADMIN — Medication 10 ML: at 19:28

## 2020-01-01 RX ADMIN — POTASSIUM & SODIUM PHOSPHATES POWDER PACK 280-160-250 MG 1 PACKET: 280-160-250 PACK at 13:25

## 2020-01-01 RX ADMIN — SODIUM CHLORIDE 1000 ML: 900 INJECTION, SOLUTION INTRAVENOUS at 09:40

## 2020-01-01 RX ADMIN — ONDANSETRON 8 MG: 2 INJECTION, SOLUTION INTRAMUSCULAR; INTRAVENOUS at 13:03

## 2020-01-01 RX ADMIN — SODIUM CHLORIDE 1000 ML: 900 INJECTION, SOLUTION INTRAVENOUS at 11:44

## 2020-01-01 RX ADMIN — ONDANSETRON 8 MG: 2 INJECTION, SOLUTION INTRAMUSCULAR; INTRAVENOUS at 13:34

## 2020-01-01 RX ADMIN — ONDANSETRON 8 MG: 2 INJECTION, SOLUTION INTRAMUSCULAR; INTRAVENOUS at 12:24

## 2020-01-01 RX ADMIN — OXALIPLATIN 95.5 MG: 5 INJECTION, SOLUTION, CONCENTRATE INTRAVENOUS at 13:00

## 2020-01-01 RX ADMIN — DEXTROSE MONOHYDRATE 25 ML/HR: 5 INJECTION, SOLUTION INTRAVENOUS at 11:44

## 2020-01-01 RX ADMIN — SODIUM CHLORIDE 1000 ML: 900 INJECTION, SOLUTION INTRAVENOUS at 14:15

## 2020-01-01 RX ADMIN — Medication 10 ML: at 14:35

## 2020-01-01 RX ADMIN — DEXTROSE MONOHYDRATE 316 MG: 50 INJECTION, SOLUTION INTRAVENOUS at 14:00

## 2020-01-01 RX ADMIN — Medication 10 ML: at 14:09

## 2020-01-01 RX ADMIN — OXALIPLATIN 102 MG: 5 INJECTION, SOLUTION, CONCENTRATE INTRAVENOUS at 12:58

## 2020-01-01 RX ADMIN — MEGESTROL ACETATE 200 MG: 40 SUSPENSION ORAL at 17:02

## 2020-01-01 RX ADMIN — Medication 10 ML: at 14:19

## 2020-01-01 RX ADMIN — SODIUM CHLORIDE 10 ML: 9 INJECTION INTRAMUSCULAR; INTRAVENOUS; SUBCUTANEOUS at 14:43

## 2020-01-01 RX ADMIN — Medication 10 ML: at 10:16

## 2020-01-01 RX ADMIN — Medication 10 ML: at 10:18

## 2020-01-01 RX ADMIN — SODIUM CHLORIDE 1000 ML: 900 INJECTION, SOLUTION INTRAVENOUS at 14:20

## 2020-01-01 RX ADMIN — FLUOROURACIL 316 MG: 50 INJECTION, SOLUTION INTRAVENOUS at 16:05

## 2020-01-01 RX ADMIN — ENOXAPARIN SODIUM 40 MG: 40 INJECTION SUBCUTANEOUS at 09:41

## 2020-01-01 RX ADMIN — OXYCODONE HYDROCHLORIDE 5 MG: 5 TABLET ORAL at 06:45

## 2020-01-01 RX ADMIN — ONDANSETRON 8 MG: 2 INJECTION, SOLUTION INTRAMUSCULAR; INTRAVENOUS at 14:15

## 2020-01-01 RX ADMIN — Medication 10 ML: at 17:10

## 2020-01-01 RX ADMIN — FLUOROURACIL 2700 MG: 50 INJECTION, SOLUTION INTRAVENOUS at 12:55

## 2020-01-01 RX ADMIN — ONDANSETRON 8 MG: 2 INJECTION, SOLUTION INTRAMUSCULAR; INTRAVENOUS at 09:59

## 2020-01-01 RX ADMIN — SODIUM CHLORIDE 1000 ML: 900 INJECTION, SOLUTION INTRAVENOUS at 11:52

## 2020-01-01 RX ADMIN — POTASSIUM & SODIUM PHOSPHATES POWDER PACK 280-160-250 MG 1 PACKET: 280-160-250 PACK at 18:32

## 2020-01-01 RX ADMIN — HEPARIN 500 UNITS: 100 SYRINGE at 17:10

## 2020-01-01 RX ADMIN — SODIUM CHLORIDE 10 ML: 9 INJECTION INTRAMUSCULAR; INTRAVENOUS; SUBCUTANEOUS at 16:31

## 2020-01-01 RX ADMIN — ONDANSETRON 8 MG: 2 INJECTION, SOLUTION INTRAMUSCULAR; INTRAVENOUS at 13:04

## 2020-01-01 RX ADMIN — SODIUM CHLORIDE, POTASSIUM CHLORIDE, SODIUM LACTATE AND CALCIUM CHLORIDE 1000 ML: 600; 310; 30; 20 INJECTION, SOLUTION INTRAVENOUS at 09:10

## 2020-01-01 RX ADMIN — POTASSIUM CHLORIDE AND SODIUM CHLORIDE: 900; 300 INJECTION, SOLUTION INTRAVENOUS at 07:22

## 2020-01-01 RX ADMIN — DEXTROSE MONOHYDRATE 25 ML/HR: 5 INJECTION, SOLUTION INTRAVENOUS at 12:32

## 2020-01-01 RX ADMIN — CEFTRIAXONE SODIUM 2 G: 2 INJECTION, POWDER, FOR SOLUTION INTRAMUSCULAR; INTRAVENOUS at 17:03

## 2020-01-01 RX ADMIN — DEXAMETHASONE SODIUM PHOSPHATE 12 MG: 4 INJECTION, SOLUTION INTRA-ARTICULAR; INTRALESIONAL; INTRAMUSCULAR; INTRAVENOUS; SOFT TISSUE at 12:48

## 2020-01-01 RX ADMIN — HEPARIN 500 UNITS: 100 SYRINGE at 12:58

## 2020-01-01 RX ADMIN — PIPERACILLIN AND TAZOBACTAM 3.38 G: 3; .375 INJECTION, POWDER, LYOPHILIZED, FOR SOLUTION INTRAVENOUS at 10:15

## 2020-01-01 RX ADMIN — SODIUM CHLORIDE 10 ML: 9 INJECTION INTRAMUSCULAR; INTRAVENOUS; SUBCUTANEOUS at 15:21

## 2020-01-01 RX ADMIN — DEXTROSE MONOHYDRATE 25 ML/HR: 5 INJECTION, SOLUTION INTRAVENOUS at 13:16

## 2020-01-01 RX ADMIN — OXALIPLATIN 67 MG: 5 INJECTION, SOLUTION INTRAVENOUS at 13:40

## 2020-01-01 RX ADMIN — Medication 10 ML: at 13:10

## 2020-01-01 RX ADMIN — SODIUM CHLORIDE 1000 ML: 900 INJECTION, SOLUTION INTRAVENOUS at 14:19

## 2020-01-01 RX ADMIN — SODIUM CHLORIDE 10 ML: 9 INJECTION INTRAMUSCULAR; INTRAVENOUS; SUBCUTANEOUS at 11:48

## 2020-01-01 RX ADMIN — POTASSIUM & SODIUM PHOSPHATES POWDER PACK 280-160-250 MG 1 PACKET: 280-160-250 PACK at 13:50

## 2020-01-01 RX ADMIN — POTASSIUM CHLORIDE 40 MEQ: 750 TABLET, EXTENDED RELEASE ORAL at 13:33

## 2020-01-01 RX ADMIN — DEXAMETHASONE SODIUM PHOSPHATE 12 MG: 4 INJECTION, SOLUTION INTRA-ARTICULAR; INTRALESIONAL; INTRAMUSCULAR; INTRAVENOUS; SOFT TISSUE at 12:15

## 2020-01-01 RX ADMIN — POTASSIUM & SODIUM PHOSPHATES POWDER PACK 280-160-250 MG 1 PACKET: 280-160-250 PACK at 17:04

## 2020-01-01 RX ADMIN — DEXAMETHASONE SODIUM PHOSPHATE 12 MG: 4 INJECTION, SOLUTION INTRA-ARTICULAR; INTRALESIONAL; INTRAMUSCULAR; INTRAVENOUS; SOFT TISSUE at 12:05

## 2020-01-01 RX ADMIN — HEPARIN 500 UNITS: 100 SYRINGE at 14:49

## 2020-01-01 RX ADMIN — FLUOROURACIL 2700 MG: 50 INJECTION, SOLUTION INTRAVENOUS at 15:40

## 2020-01-01 RX ADMIN — Medication 10 ML: at 10:12

## 2020-01-01 RX ADMIN — ONDANSETRON 8 MG: 2 INJECTION, SOLUTION INTRAMUSCULAR; INTRAVENOUS at 10:52

## 2020-01-01 RX ADMIN — Medication 10 ML: at 21:44

## 2020-01-01 RX ADMIN — OXYCODONE HYDROCHLORIDE 10 MG: 5 TABLET ORAL at 17:03

## 2020-01-01 RX ADMIN — Medication 10 ML: at 14:30

## 2020-01-01 RX ADMIN — FLUOROURACIL 2700 MG: 50 INJECTION, SOLUTION INTRAVENOUS at 14:35

## 2020-01-01 RX ADMIN — FLUOROURACIL 2700 MG: 50 INJECTION, SOLUTION INTRAVENOUS at 15:26

## 2020-01-01 RX ADMIN — SODIUM CHLORIDE, PRESERVATIVE FREE 500 UNITS: 5 INJECTION INTRAVENOUS at 14:15

## 2020-01-01 RX ADMIN — OXALIPLATIN 67 MG: 5 INJECTION, SOLUTION INTRAVENOUS at 12:29

## 2020-01-01 RX ADMIN — ONDANSETRON 8 MG: 2 INJECTION INTRAMUSCULAR; INTRAVENOUS at 11:27

## 2020-01-01 RX ADMIN — DEXTROSE MONOHYDRATE 67 MG: 50 INJECTION, SOLUTION INTRAVENOUS at 13:32

## 2020-01-01 RX ADMIN — OXALIPLATIN 64 MG: 5 INJECTION, SOLUTION INTRAVENOUS at 13:00

## 2020-01-01 RX ADMIN — HEPARIN 500 UNITS: 100 SYRINGE at 13:12

## 2020-01-01 RX ADMIN — ONDANSETRON 8 MG: 2 INJECTION INTRAMUSCULAR; INTRAVENOUS at 09:12

## 2020-01-01 RX ADMIN — DEXTROSE MONOHYDRATE 25 ML/HR: 5 INJECTION, SOLUTION INTRAVENOUS at 11:29

## 2020-01-01 RX ADMIN — OXALIPLATIN 90 MG: 5 INJECTION, SOLUTION INTRAVENOUS at 13:20

## 2020-01-01 RX ADMIN — POTASSIUM & SODIUM PHOSPHATES POWDER PACK 280-160-250 MG 1 PACKET: 280-160-250 PACK at 10:03

## 2020-01-01 RX ADMIN — ONDANSETRON 8 MG: 2 INJECTION INTRAMUSCULAR; INTRAVENOUS at 14:48

## 2020-01-01 RX ADMIN — POTASSIUM & SODIUM PHOSPHATES POWDER PACK 280-160-250 MG 1 PACKET: 280-160-250 PACK at 21:34

## 2020-01-01 RX ADMIN — HEPARIN 500 UNITS: 100 SYRINGE at 15:30

## 2020-01-01 RX ADMIN — POTASSIUM & SODIUM PHOSPHATES POWDER PACK 280-160-250 MG 1 PACKET: 280-160-250 PACK at 14:06

## 2020-01-01 RX ADMIN — DEXTROSE MONOHYDRATE 25 ML/HR: 5 INJECTION, SOLUTION INTRAVENOUS at 11:49

## 2020-01-01 RX ADMIN — DEXAMETHASONE SODIUM PHOSPHATE 12 MG: 4 INJECTION, SOLUTION INTRA-ARTICULAR; INTRALESIONAL; INTRAMUSCULAR; INTRAVENOUS; SOFT TISSUE at 12:47

## 2020-01-01 RX ADMIN — SODIUM CHLORIDE 1000 ML: 900 INJECTION, SOLUTION INTRAVENOUS at 19:32

## 2020-01-01 RX ADMIN — DEXAMETHASONE SODIUM PHOSPHATE 12 MG: 4 INJECTION, SOLUTION INTRAMUSCULAR; INTRAVENOUS at 13:23

## 2020-01-01 RX ADMIN — Medication 400 MG: at 09:41

## 2020-01-01 RX ADMIN — Medication 10 ML: at 09:01

## 2020-01-01 RX ADMIN — FLUOROURACIL 2700 MG: 50 INJECTION, SOLUTION INTRAVENOUS at 15:15

## 2020-01-01 RX ADMIN — SODIUM CHLORIDE 1000 ML: 900 INJECTION, SOLUTION INTRAVENOUS at 11:55

## 2020-01-01 RX ADMIN — DEXAMETHASONE SODIUM PHOSPHATE 12 MG: 4 INJECTION, SOLUTION INTRA-ARTICULAR; INTRALESIONAL; INTRAMUSCULAR; INTRAVENOUS; SOFT TISSUE at 11:54

## 2020-01-01 RX ADMIN — FLUOROURACIL 1896 MG: 50 INJECTION, SOLUTION INTRAVENOUS at 15:45

## 2020-01-01 RX ADMIN — DEXAMETHASONE SODIUM PHOSPHATE 12 MG: 4 INJECTION, SOLUTION INTRA-ARTICULAR; INTRALESIONAL; INTRAMUSCULAR; INTRAVENOUS; SOFT TISSUE at 11:56

## 2020-01-01 RX ADMIN — PIPERACILLIN AND TAZOBACTAM 3.38 G: 3; .375 INJECTION, POWDER, LYOPHILIZED, FOR SOLUTION INTRAVENOUS at 18:59

## 2020-01-01 RX ADMIN — HEPARIN 500 UNITS: 100 SYRINGE at 15:38

## 2020-01-01 RX ADMIN — SODIUM CHLORIDE 10 ML: 9 INJECTION INTRAMUSCULAR; INTRAVENOUS; SUBCUTANEOUS at 09:35

## 2020-01-01 RX ADMIN — ONDANSETRON 8 MG: 2 INJECTION, SOLUTION INTRAMUSCULAR; INTRAVENOUS at 12:15

## 2020-01-01 RX ADMIN — FLUOROURACIL 316 MG: 50 INJECTION, SOLUTION INTRAVENOUS at 16:12

## 2020-01-01 RX ADMIN — SODIUM CHLORIDE 50 ML/HR: 900 INJECTION, SOLUTION INTRAVENOUS at 11:49

## 2020-01-01 RX ADMIN — ONDANSETRON 4 MG: 2 INJECTION INTRAMUSCULAR; INTRAVENOUS at 07:28

## 2020-01-01 RX ADMIN — Medication 10 ML: at 12:58

## 2020-01-01 RX ADMIN — SODIUM CHLORIDE 1000 ML: 900 INJECTION, SOLUTION INTRAVENOUS at 12:03

## 2020-01-01 RX ADMIN — Medication 10 ML: at 10:11

## 2020-01-01 RX ADMIN — OXYCODONE HYDROCHLORIDE 5 MG: 5 TABLET ORAL at 10:07

## 2020-01-02 RX ORDER — ONDANSETRON 2 MG/ML
8 INJECTION INTRAMUSCULAR; INTRAVENOUS ONCE
Status: CANCELLED | OUTPATIENT
Start: 2020-01-13

## 2020-01-03 ENCOUNTER — TELEPHONE (OUTPATIENT)
Dept: SURGERY | Age: 50
End: 2020-01-03

## 2020-01-03 DIAGNOSIS — R11.2 NAUSEA AND VOMITING, INTRACTABILITY OF VOMITING NOT SPECIFIED, UNSPECIFIED VOMITING TYPE: ICD-10-CM

## 2020-01-03 DIAGNOSIS — C18.1 MUCINOUS ADENOCARCINOMA OF APPENDIX (HCC): ICD-10-CM

## 2020-01-03 DIAGNOSIS — C79.60 MALIGNANT NEOPLASM METASTATIC TO OVARY, UNSPECIFIED LATERALITY (HCC): ICD-10-CM

## 2020-01-03 DIAGNOSIS — G47.00 INSOMNIA, UNSPECIFIED TYPE: ICD-10-CM

## 2020-01-03 DIAGNOSIS — C18.1 CARCINOMA OF APPENDIX (HCC): Primary | ICD-10-CM

## 2020-01-03 RX ORDER — LORAZEPAM 0.5 MG/1
0.5 TABLET ORAL
Qty: 30 TAB | Refills: 0 | Status: SHIPPED | OUTPATIENT
Start: 2020-01-03 | End: 2020-01-01 | Stop reason: SDUPTHER

## 2020-01-03 NOTE — TELEPHONE ENCOUNTER
I returned patients call and per Dr. Darby Locke abd W/contrast was ordered. I gave patient the [facility] dept. # [id number] and told her to schedule appt. After CT. I also told her Dr. Daryl Toscano was made aware of the air/gas discomfort complaint and said she has a fistula and shouldn't be a problem. I told her if she started running fevers over the weekend or pain increased to call the on call physician for advise-otherwise we would see her over the weekend.

## 2020-01-07 ENCOUNTER — TELEPHONE (OUTPATIENT)
Dept: SURGERY | Age: 50
End: 2020-01-07

## 2020-01-07 ENCOUNTER — HOSPITAL ENCOUNTER (OUTPATIENT)
Dept: CT IMAGING | Age: 50
Discharge: HOME | End: 2020-01-07
Attending: SURGERY
Payer: COMMERCIAL

## 2020-01-07 DIAGNOSIS — C18.1 CARCINOMA OF APPENDIX (HCC): ICD-10-CM

## 2020-01-07 DIAGNOSIS — C18.1 CARCINOMA OF APPENDIX (HCC): Primary | ICD-10-CM

## 2020-01-07 PROCEDURE — 74177 CT ABD & PELVIS W/CONTRAST: CPT

## 2020-01-07 PROCEDURE — 74011000258 HC RX REV CODE- 258: Performed by: SURGERY

## 2020-01-07 PROCEDURE — 74011636320 HC RX REV CODE- 636/320: Performed by: SURGERY

## 2020-01-07 RX ORDER — SODIUM CHLORIDE 9 MG/ML
50 INJECTION, SOLUTION INTRAVENOUS ONCE
Status: COMPLETED | OUTPATIENT
Start: 2020-01-07 | End: 2020-01-07

## 2020-01-07 RX ORDER — SODIUM CHLORIDE 0.9 % (FLUSH) 0.9 %
10 SYRINGE (ML) INJECTION ONCE
Status: COMPLETED | OUTPATIENT
Start: 2020-01-07 | End: 2020-01-07

## 2020-01-07 RX ADMIN — IOPAMIDOL 100 ML: 755 INJECTION, SOLUTION INTRAVENOUS at 13:17

## 2020-01-07 RX ADMIN — Medication 10 ML: at 13:17

## 2020-01-07 RX ADMIN — SODIUM CHLORIDE 50 ML: 900 INJECTION, SOLUTION INTRAVENOUS at 13:17

## 2020-01-08 ENCOUNTER — OFFICE VISIT (OUTPATIENT)
Dept: SURGERY | Age: 50
End: 2020-01-08

## 2020-01-08 VITALS
HEART RATE: 90 BPM | SYSTOLIC BLOOD PRESSURE: 108 MMHG | OXYGEN SATURATION: 98 % | TEMPERATURE: 98 F | BODY MASS INDEX: 19.4 KG/M2 | DIASTOLIC BLOOD PRESSURE: 70 MMHG | RESPIRATION RATE: 16 BRPM | HEIGHT: 63 IN | WEIGHT: 109.5 LBS

## 2020-01-08 DIAGNOSIS — K65.1 SUBPHRENIC ABSCESS (HCC): Primary | ICD-10-CM

## 2020-01-08 NOTE — PROGRESS NOTES
Subjective:      Baron Gilmore is a 44 y.o. female who presents to discuss recent CT scan results and drain removal. Drain was placed on 12/12/19 during ER visit. Drainage tube is still putting out about 50-60ml per day. Pt is having increasing \"crampy\" abdominal pain after eating.     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    Carcinoma of appendix ([address] 10/15/2019    Mucinous adenocarcinoma of appendix ([address] 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    Small bowel obstruction ([address] 09/24/2019    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 ([address] 09/20/2019    Symptomatic cholelithiasis 09/18/2019     Past Medical History:   Diagnosis Date    Cancer [facility])     APPENDIX CANCER     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 ([address] 2019    Nausea & vomiting     Nausea and vomiting 12/9/2019    Splenic infarction 2019    Subphrenic abscess ([address] 1/8/2020    Symptomatic cholelithiasis 9/18/2019      Past Surgical History:   Procedure Laterality Date    ABDOMEN SURGERY PROC UNLISTED      HX APPENDECTOMY  09/2019    HX GI  09/2019    ILEOCECECTOMY    HX GYN  2003    CYST ON OVARY    HX HYSTERECTOMY  2019    IR THORACENTESIS CATH W IMAGE  11/6/2019      Social History     Tobacco Use    Smoking status: Never Smoker    Smokeless tobacco: Never Used   Substance Use Topics    Alcohol use: Not Currently      Family History   Problem Relation Age of Onset    Breast Cancer Paternal Grandmother 61    Crohn's Disease Mother     Heart Disease Mother     Cancer Father         BLADDER    Diabetes Father     No Known Problems Son     No Known Problems Daughter     Anesth Problems Neg Hx       Current Outpatient Medications   Medication Sig    LORazepam (ATIVAN) 0.5 mg tablet Take 1 Tab by mouth nightly as needed for Anxiety. Max Daily Amount: 0.5 mg.  potassium chloride (KLOR-CON) 10 mEq tablet Take 1 Tab by mouth two (2) times a day.  prochlorperazine (COMPAZINE) 5 mg tablet Take 1 tab by mouth every 6 hours as needed for nausea or vomiting    acetaminophen (TYLENOL) 325 mg tablet Take 650 mg by mouth every four (4) hours as needed for Pain. Indications: pain    amoxicillin-clavulanate (AUGMENTIN) 500-125 mg per tablet Take 1 Tab by mouth two (2) times a day for 21 days.  L.acidoph & parac-S.therm-Bifido (RADHA Q2/RISAQUAD-2) 16 billion cell cap cap Take 1 Cap by mouth daily for 30 days.  lidocaine-prilocaine (EMLA) topical cream Apply  to affected area as needed for Pain.  ondansetron (ZOFRAN ODT) 4 mg disintegrating tablet Take 1 Tab by mouth every eight (8) hours as needed for Nausea. (Patient taking differently: Take 8 mg by mouth every eight (8) hours as needed for Nausea. Indications: prevent nausea and vomiting from cancer chemotherapy)    dexAMETHasone (DECADRON) 4 mg tablet Take 2 tabs (8mg) by mouth daily on days 2 and 3 after chemotherapy    ibuprofen (MOTRIN) 200 mg tablet Take 3 Tabs by mouth every eight (8) hours as needed for Pain. No current facility-administered medications for this visit. No Known Allergies     Review of Systems:    A comprehensive review of systems was negative except for that written in the History of Present Illness.     Objective:     Visit Vitals  /70 (BP 1 Location: Left arm, BP Patient Position: Sitting)   Pulse 90   Temp 98 °F (36.7 °C) (Oral)   Resp 16   Ht 5' 3\" (1.6 m)   Wt 109 lb 8 oz (49.7 kg)   LMP 09/15/2019   SpO2 98%   BMI 19.40 kg/m²       Physical Exam: Abdomen is soft, uncomfortable to palpation everywhere. Labs: No results found for this or any previous visit (from the past 24 hour(s)). Data Review:  CT Scan on 12/12/19:  Impression IMPRESSION:  1. Decreased size of subdiaphragmatic fluid collection with drain placed. 2. Stable small left pleural effusion. 3. Stable pelvic soft tissue stranding, possibly representing known  carcinomatosis. Assessment:       ICD-10-CM ICD-9-CM    1. [facility]phren[facility] abscess ([facility] K65.1 567.22          Plan:     Pt will continue chemo, and has appointment tomorrow. When drainage is less than 20ml per day, will remove drain. Pt will call with drain totals. She will continue to irrigate her drain with saline she produces in the kitchen per my instructions. This plan was reviewed with the patient and patient agrees. All questions were answered. This document was scribed by Haresh Hernández as dictated by Blayne Muniz MD.    Signed By: Blayne Muniz MD     January 8, 2020

## 2020-01-08 NOTE — PROGRESS NOTES
1. Have you been to the ER, urgent care clinic since your last visit? Hospitalized since your last visit? 12/12/19 splenic abscess    2. Have you seen or consulted any other health care providers outside of the [address] [place] [address] since your last visit? Include any pap smears or colon screening.   No I have reviewed and confirmed nurses' notes...

## 2020-01-08 NOTE — LETTER
1/8/20 Patient: Maikel Goodman YOB: 1970 Date of Visit: 1/8/2020 Hernan Cruz MD 
[address] VIA Facsimile: [phone] Dear Hernan Cruz MD, Thank you for referring Ms. Maikel Goodman to [address] [facility] for evaluation. My notes for this consultation are attached. If you have questions, please do not hesitate to call me. I look forward to following your patient along with you. Sincerely, Doris Varma MD

## 2020-01-09 ENCOUNTER — APPOINTMENT (OUTPATIENT)
Dept: INFUSION THERAPY | Age: 50
End: 2020-01-09
Payer: COMMERCIAL

## 2020-01-09 ENCOUNTER — HOSPITAL ENCOUNTER (OUTPATIENT)
Dept: INFUSION THERAPY | Age: 50
Discharge: HOME | End: 2020-01-09
Payer: COMMERCIAL

## 2020-01-09 ENCOUNTER — OFFICE VISIT (OUTPATIENT)
Dept: ONCOLOGY | Age: 50
End: 2020-01-09

## 2020-01-09 ENCOUNTER — APPOINTMENT (OUTPATIENT)
Dept: INFUSION THERAPY | Age: 50
End: 2020-01-09

## 2020-01-09 VITALS
HEART RATE: 85 BPM | DIASTOLIC BLOOD PRESSURE: 72 MMHG | TEMPERATURE: 97 F | BODY MASS INDEX: 19.59 KG/M2 | WEIGHT: 110.6 LBS | SYSTOLIC BLOOD PRESSURE: 103 MMHG | RESPIRATION RATE: 16 BRPM

## 2020-01-09 VITALS
BODY MASS INDEX: 19.49 KG/M2 | HEIGHT: 63 IN | WEIGHT: 110 LBS | OXYGEN SATURATION: 96 % | HEART RATE: 76 BPM | DIASTOLIC BLOOD PRESSURE: 74 MMHG | SYSTOLIC BLOOD PRESSURE: 99 MMHG | TEMPERATURE: 97.3 F

## 2020-01-09 DIAGNOSIS — J90 PLEURAL EFFUSION ON LEFT: ICD-10-CM

## 2020-01-09 DIAGNOSIS — K56.609 SMALL BOWEL OBSTRUCTION (HCC): ICD-10-CM

## 2020-01-09 DIAGNOSIS — C18.1 CARCINOMA OF APPENDIX (HCC): Primary | ICD-10-CM

## 2020-01-09 DIAGNOSIS — D73.3 SPLENIC ABSCESS: ICD-10-CM

## 2020-01-09 DIAGNOSIS — C77.2 CANCER OF APPENDIX METASTATIC TO INTRA-ABDOMINAL LYMPH NODE (HCC): ICD-10-CM

## 2020-01-09 DIAGNOSIS — D73.5 SPLENIC INFARCTION: ICD-10-CM

## 2020-01-09 DIAGNOSIS — C18.1 CANCER OF APPENDIX METASTATIC TO INTRA-ABDOMINAL LYMPH NODE (HCC): ICD-10-CM

## 2020-01-09 DIAGNOSIS — Z09 CHEMOTHERAPY FOLLOW-UP EXAMINATION: ICD-10-CM

## 2020-01-09 DIAGNOSIS — Z95.828 PORT-A-CATH IN PLACE: ICD-10-CM

## 2020-01-09 DIAGNOSIS — C80.0 CARCINOMATOSIS (HCC): ICD-10-CM

## 2020-01-09 DIAGNOSIS — C79.60 MALIGNANT NEOPLASM METASTATIC TO OVARY, UNSPECIFIED LATERALITY (HCC): ICD-10-CM

## 2020-01-09 LAB
ALBUMIN SERPL-MCNC: 2.8 G/DL (ref 3.5–5)
ALBUMIN/GLOB SERPL: 0.8 {RATIO} (ref 1.1–2.2)
ALP SERPL-CCNC: 92 U/L (ref 45–117)
ALT SERPL-CCNC: 19 U/L (ref 12–78)
ANION GAP SERPL CALC-SCNC: 6 MMOL/L (ref 5–15)
AST SERPL-CCNC: 12 U/L (ref 15–37)
BASOPHILS # BLD: 0 K/UL (ref 0–0.1)
BASOPHILS NFR BLD: 0 % (ref 0–1)
BILIRUB SERPL-MCNC: 0.2 MG/DL (ref 0.2–1)
BUN SERPL-MCNC: 7 MG/DL (ref 6–20)
BUN/CREAT SERPL: 18 (ref 12–20)
CALCIUM SERPL-MCNC: 8.8 MG/DL (ref 8.5–10.1)
CHLORIDE SERPL-SCNC: 108 MMOL/L (ref 97–108)
CO2 SERPL-SCNC: 27 MMOL/L (ref 21–32)
CREAT SERPL-MCNC: 0.4 MG/DL (ref 0.55–1.02)
DIFFERENTIAL METHOD BLD: ABNORMAL
EOSINOPHIL # BLD: 0.2 K/UL (ref 0–0.4)
EOSINOPHIL NFR BLD: 4 % (ref 0–7)
ERYTHROCYTE [DISTWIDTH] IN BLOOD BY AUTOMATED COUNT: 18.3 % (ref 11.5–14.5)
GLOBULIN SER CALC-MCNC: 3.4 G/DL (ref 2–4)
GLUCOSE SERPL-MCNC: 111 MG/DL (ref 65–100)
HCT VFR BLD AUTO: 34.2 % (ref 35–47)
HGB BLD-MCNC: 11 G/DL (ref 11.5–16)
IMM GRANULOCYTES # BLD AUTO: 0 K/UL (ref 0–0.04)
IMM GRANULOCYTES NFR BLD AUTO: 0 % (ref 0–0.5)
LYMPHOCYTES # BLD: 1.1 K/UL (ref 0.8–3.5)
LYMPHOCYTES NFR BLD: 22 % (ref 12–49)
MCH RBC QN AUTO: 29.3 PG (ref 26–34)
MCHC RBC AUTO-ENTMCNC: 32.2 G/DL (ref 30–36.5)
MCV RBC AUTO: 91 FL (ref 80–99)
MONOCYTES # BLD: 0.6 K/UL (ref 0–1)
MONOCYTES NFR BLD: 11 % (ref 5–13)
NEUTS SEG # BLD: 3.1 K/UL (ref 1.8–8)
NEUTS SEG NFR BLD: 63 % (ref 32–75)
NRBC # BLD: 0 K/UL (ref 0–0.01)
NRBC BLD-RTO: 0 PER 100 WBC
PLATELET # BLD AUTO: 283 K/UL (ref 150–400)
PMV BLD AUTO: 9.6 FL (ref 8.9–12.9)
POTASSIUM SERPL-SCNC: 3.6 MMOL/L (ref 3.5–5.1)
PROT SERPL-MCNC: 6.2 G/DL (ref 6.4–8.2)
RBC # BLD AUTO: 3.76 M/UL (ref 3.8–5.2)
SODIUM SERPL-SCNC: 141 MMOL/L (ref 136–145)
WBC # BLD AUTO: 5 K/UL (ref 3.6–11)

## 2020-01-09 PROCEDURE — 80053 COMPREHEN METABOLIC PANEL: CPT

## 2020-01-09 PROCEDURE — 96416 CHEMO PROLONG INFUSE W/PUMP: CPT

## 2020-01-09 PROCEDURE — 74011000258 HC RX REV CODE- 258: Performed by: NURSE PRACTITIONER

## 2020-01-09 PROCEDURE — 96375 TX/PRO/DX INJ NEW DRUG ADDON: CPT

## 2020-01-09 PROCEDURE — 96411 CHEMO IV PUSH ADDL DRUG: CPT

## 2020-01-09 PROCEDURE — 96413 CHEMO IV INFUSION 1 HR: CPT

## 2020-01-09 PROCEDURE — 77030012965 HC NDL HUBR BBMI -A

## 2020-01-09 PROCEDURE — 36415 COLL VENOUS BLD VENIPUNCTURE: CPT

## 2020-01-09 PROCEDURE — 74011250636 HC RX REV CODE- 250/636: Performed by: NURSE PRACTITIONER

## 2020-01-09 PROCEDURE — 96368 THER/DIAG CONCURRENT INF: CPT

## 2020-01-09 PROCEDURE — 85025 COMPLETE CBC W/AUTO DIFF WBC: CPT

## 2020-01-09 PROCEDURE — 96415 CHEMO IV INFUSION ADDL HR: CPT

## 2020-01-09 RX ORDER — HEPARIN 100 UNIT/ML
300-500 SYRINGE INTRAVENOUS AS NEEDED
Status: ACTIVE | OUTPATIENT
Start: 2020-01-09 | End: 2020-01-09

## 2020-01-09 RX ORDER — SODIUM CHLORIDE 0.9 % (FLUSH) 0.9 %
10 SYRINGE (ML) INJECTION AS NEEDED
Status: ACTIVE | OUTPATIENT
Start: 2020-01-09 | End: 2020-01-09

## 2020-01-09 RX ORDER — ONDANSETRON 2 MG/ML
8 INJECTION INTRAMUSCULAR; INTRAVENOUS ONCE
Status: COMPLETED | OUTPATIENT
Start: 2020-01-09 | End: 2020-01-09

## 2020-01-09 RX ORDER — FLUOROURACIL 50 MG/ML
200 INJECTION, SOLUTION INTRAVENOUS ONCE
Status: COMPLETED | OUTPATIENT
Start: 2020-01-09 | End: 2020-01-09

## 2020-01-09 RX ORDER — DEXTROSE MONOHYDRATE 50 MG/ML
25 INJECTION, SOLUTION INTRAVENOUS CONTINUOUS
Status: DISPENSED | OUTPATIENT
Start: 2020-01-09 | End: 2020-01-09

## 2020-01-09 RX ORDER — SODIUM CHLORIDE 9 MG/ML
10 INJECTION INTRAMUSCULAR; INTRAVENOUS; SUBCUTANEOUS AS NEEDED
Status: ACTIVE | OUTPATIENT
Start: 2020-01-09 | End: 2020-01-09

## 2020-01-09 RX ADMIN — OXALIPLATIN 67 MG: 5 INJECTION, SOLUTION INTRAVENOUS at 12:44

## 2020-01-09 RX ADMIN — FLUOROURACIL 316 MG: 50 INJECTION, SOLUTION INTRAVENOUS at 15:04

## 2020-01-09 RX ADMIN — DEXAMETHASONE SODIUM PHOSPHATE 12 MG: 4 INJECTION, SOLUTION INTRAMUSCULAR; INTRAVENOUS at 11:35

## 2020-01-09 RX ADMIN — FLUOROURACIL 1896 MG: 50 INJECTION, SOLUTION INTRAVENOUS at 15:04

## 2020-01-09 RX ADMIN — DEXTROSE MONOHYDRATE 25 ML/HR: 5 INJECTION, SOLUTION INTRAVENOUS at 11:00

## 2020-01-09 RX ADMIN — ONDANSETRON 8 MG: 2 INJECTION, SOLUTION INTRAMUSCULAR; INTRAVENOUS at 11:30

## 2020-01-09 RX ADMIN — LEUCOVORIN CALCIUM 316 MG: 350 INJECTION, POWDER, LYOPHILIZED, FOR SUSPENSION INTRAMUSCULAR; INTRAVENOUS at 12:44

## 2020-01-09 RX ADMIN — Medication 10 ML: at 10:30

## 2020-01-09 NOTE — PROGRESS NOTES
Outpatient Infusion Center - Chemotherapy Progress Note    1000 Pt admit to [facility] for C3 Folfox ambulatory in stable condition. Assessment completed. No new concerns voiced. Port with positive blood return. Labs drawn per order and sent. Line flushed, clamped, Curos Cap applied to end clave. Pt departs to MD appt upon returning pt connected to [facility] [address] Chemotherapy Flowsheet 1/9/2020   Cycle C3   Date 1/9/2020   Drug / Regimen Folfox   Pre Meds given   Notes given     Recent Results (from the past 12 hour(s))   METABOLIC PANEL, COMPREHENSIVE    Collection Time: 01/09/20 10:09 AM   Result Value Ref Range    Sodium 141 136 - 145 mmol/L    Potassium 3.6 3.5 - 5.1 mmol/L    Chloride 108 97 - 108 mmol/L    CO2 27 21 - 32 mmol/L    Anion gap 6 5 - 15 mmol/L    Glucose 111 (H) 65 - 100 mg/dL    BUN 7 6 - 20 MG/DL    Creatinine 0.40 (L) 0.55 - 1.02 MG/DL    BUN/Creatinine ratio 18 12 - 20      GFR est AA >60 >60 ml/min/1.73m2    GFR est non-AA >60 >60 ml/min/1.73m2    Calcium 8.8 8.5 - 10.1 MG/DL    Bilirubin, total 0.2 0.2 - 1.0 MG/DL    ALT (SGPT) 19 12 - 78 U/L    AST (SGOT) 12 (L) 15 - 37 U/L    Alk. phosphatase 92 45 - 117 U/L    Protein, total 6.2 (L) 6.4 - 8.2 g/dL    Albumin 2.8 (L) 3.5 - 5.0 g/dL    Globulin 3.4 2.0 - 4.0 g/dL    A-G Ratio 0.8 (L) 1.1 - 2.2     CBC WITH AUTOMATED DIFF    Collection Time: 01/09/20 10:09 AM   Result Value Ref Range    WBC 5.0 3.6 - 11.0 K/uL    RBC 3.76 (L) 3.80 - 5.20 M/uL    HGB 11.0 (L) 11.5 - 16.0 g/dL    HCT 34.2 (L) 35.0 - 47.0 %    MCV 91.0 80.0 - 99.0 FL    MCH 29.3 26.0 - 34.0 PG    MCHC 32.2 30.0 - 36.5 g/dL    RDW 18.3 (H) 11.5 - 14.5 %    PLATELET 805 081 - 470 K/uL    MPV 9.6 8.9 - 12.9 FL    NRBC 0.0 0  WBC    ABSOLUTE NRBC 0.00 0.00 - 0.01 K/uL    NEUTROPHILS 63 32 - 75 %    LYMPHOCYTES 22 12 - 49 %    MONOCYTES 11 5 - 13 %    EOSINOPHILS 4 0 - 7 %    BASOPHILS 0 0 - 1 %    IMMATURE GRANULOCYTES 0 0.0 - 0.5 %    ABS. NEUTROPHILS 3.1 1.8 - 8.0 K/UL    ABS. LYMPHOCYTES 1.1 0.8 - 3.5 K/UL    ABS. MONOCYTES 0.6 0.0 - 1.0 K/UL    ABS. EOSINOPHILS 0.2 0.0 - 0.4 K/UL    ABS. BASOPHILS 0.0 0.0 - 0.1 K/UL    ABS. IMM.  GRANS. 0.0 0.00 - 0.04 K/UL    DF AUTOMATED           Visit Vitals  /72   Pulse 85   Temp 97 °F (36.1 °C)   Resp 16   Wt 50.2 kg (110 lb 9.6 oz)   LMP 09/15/2019   Breastfeeding No   BMI 19.59 kg/m²       Medications:  Medications Administered     dexamethasone (DECADRON) 12 mg in 0.9% sodium chloride 50 mL, overfill volume 5 mL IVPB     Admin Date  01/09/2020 Action  Given Dose  12 mg Rate  232 mL/hr Route  IntraVENous Administered By  Mariya Panchal RN          dextrose 5% infusion     Admin Date  01/09/2020 Action  New Bag Dose  25 mL/hr Rate  25 mL/hr Route  IntraVENous Administered By  Mariya Panchal RN          fluorouracil (ADRUCIL) 1,896 mg in 0.9% sodium chloride 100 mL CADD Cassette     Admin Date  01/09/2020 Action  New Bag Dose  1896 mg Rate  2.2 mL/hr Route  IntraVENous Administered By  Mariya Panchal RN          fluorouracil (ADRUCIL) chemo syringe 316 mg     Admin Date  01/09/2020 Action  Given Dose  316 mg Rate  189.6 mL/hr Route  IntraVENous Administered By  Mariya Panchal RN          leucovorin (WELLCOVORIN) 316 mg in dextrose 5% 250 mL, overfill volume 25 mL IVPB     Admin Date  01/09/2020 Action  New Bag Dose  316 mg Rate  145.4 mL/hr Route  IntraVENous Administered By  Mariya Panchal RN          ondansetron [facility]) injection 8 mg     Admin Date  01/09/2020 Action  Given Dose  8 mg Route  IntraVENous Administered By  Mariya Panchal RN          oxaliplatin (ELOXATIN) 67 mg in dextrose 5% 250 mL, overfill volume 25 mL chemo infusion     Admin Date  01/09/2020 Action  New Bag Dose  67 mg Rate  144.2 mL/hr Route  IntraVENous Administered By  Mariya Panchal RN          saline peripheral flush soln 10 mL     Admin Date  01/09/2020 Action  Given Dose  10 mL Route  InterCATHeter Administered By  Mariya Panchal RN                  1510 Pt tolerated treatment well. Port maintained positive blood return throughout treatment, flushed with positive blood return at conclusion and connected to CADD. D/c home ambulatory in no distress.  Pt aware of next [facility] appointment scheduled for 01/12/20 for cadd removal.

## 2020-01-09 NOTE — PROGRESS NOTES
Juan M Menon is a 44 y.o. female  Chief Complaint   Patient presents with    Follow-up     metastatic appendix cancer     1. Have you been to the ER, urgent care clinic since your last visit? Hospitalized since your last visit? No.  2. Have you seen or consulted any other health care providers outside of the [address] [place] [address] since your last visit? Include any pap smears or colon screening.  No.

## 2020-01-09 NOTE — PROGRESS NOTES
[facility] at [address], [address] [place] [address] Road, [facility]: [phone]  F: [phone]    HEME/ONC FOLLOW UP    Reason for Visit:   Nohelia Quinn is a 44 y.o. female who is seen in office today for follow up of metastatic appendix cancer. Treatment History:   · EXPLORATORY LAPAROTOMY SIERRA BSO, TUMOR DEBULKING: ILEOCECAL RESECTION; OMENTECTOMY  · FOLFOX 50%  12/3/19 - Current    · STAGE: 4 with carcinomatosis    History of Present Illness:   Nohelia Quinn is a 44 y.o. female seen today in office for follow up of metastatic appendix cancer on of 50% dose reduced palliative FOLFOX chemo. Saw surgery yesterday and still has drain. Had CT a few days ago which was stable. Has abdominal pain with eating. Not taking any pain meds. Can tolerate supplements. Taking compazine every morning. Ativan helping with sleeping. Labs pending. Last visit: She went to the ER on 12/12/19 for persistent nausea, vomiting, and abdominal pain despite multiple outpatient IVF treatments in [facility]. CT A/P showed persistent small left pleural effusion and left basilar atelectasis, interval increase in size of left and fluid containing left upper abdominal collection, now measuring 6 cm x 6.2 cm - this collection is likely contiguous with the descending colon, and persistent irregular soft tissue in the pelvis, tethering multiple loops of bowel, unchanged, but consistent with carcinomatosis. She was started on IV abx and had a drain placed via IR. She was discharged on 12/18/19. She continues to have occasional nausea and vomiting. She is taking Zofran and Compazine as needed. She denies abdominal pain. She still has drain in place. She reports that it has drained 10 cc since earlier this morning. She reports that her appetite is good, but eating small quantities of food. She is due for chemo again on 12/26/19 at [address] [place] [address]. Her supportive  is here today.      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 ([facility] [address] 2019    Nausea & vomiting     Nausea and vomiting 12/9/2019    Splenic infarction 2019    Subphrenic abscess ([facility] [address] 1/8/2020    Symptomatic cholelithiasis 9/18/2019      Past Surgical History:   Procedure Laterality Date    ABDOMEN SURGERY PROC UNLISTED      HX APPENDECTOMY  09/2019    HX GI  09/2019    ILEOCECECTOMY    HX GYN  2003    CYST ON OVARY    HX HYSTERECTOMY  2019    IR THORACENTESIS CATH W IMAGE  11/6/2019      Social History     Tobacco Use    Smoking status: Never Smoker    Smokeless tobacco: Never Used   Substance Use Topics    Alcohol use: Not Currently      Family History   Problem Relation Age of Onset    Breast Cancer Paternal Grandmother [facility]    Crohn's Disease Mother     Heart Disease Mother     Cancer Father         BLADDER    Diabetes Father     No Known Problems Son     No Known Problems Daughter     Anesth Problems Neg Hx      Current Outpatient Medications   Medication Sig    L.acidoph & parac-S.therm-Bifido (RADHA Q2/RISAQUAD-2) 16 billion cell cap cap Take 1 Cap by mouth daily for 30 days.  potassium chloride (KLOR-CON) 10 mEq tablet Take 1 Tab by mouth two (2) times a day.  ondansetron (ZOFRAN ODT) 4 mg disintegrating tablet Take 1 Tab by mouth every eight (8) hours as needed for Nausea. (Patient taking differently: Take 8 mg by mouth every eight (8) hours as needed for Nausea. Indications: prevent nausea and vomiting from cancer chemotherapy)    prochlorperazine (COMPAZINE) 5 mg tablet Take 1 tab by mouth every 6 hours as needed for nausea or vomiting    dexAMETHasone (DECADRON) 4 mg tablet Take 2 tabs (8mg) by mouth daily on days 2 and 3 after chemotherapy    LORazepam (ATIVAN) 0.5 mg tablet Take 1 Tab by mouth nightly as needed for Anxiety.  Max Daily Amount: 0.5 mg.    lidocaine-prilocaine (EMLA) topical cream Apply  to affected area as needed for Pain.  acetaminophen (TYLENOL) 325 mg tablet Take 650 mg by mouth every four (4) hours as needed for Pain. Indications: pain    ibuprofen (MOTRIN) 200 mg tablet Take 3 Tabs by mouth every eight (8) hours as needed for Pain. No current facility-administered medications for this visit. No Known Allergies     Review of Systems: A complete review of systems was obtained, negative except as described above. Physical Exam:     Visit Vitals  BP 99/74 (BP 1 Location: Left arm, BP Patient Position: Sitting)   Pulse 76   Temp 97.3 °F (36.3 °C) (Oral)   Ht 5' 3\" (1.6 m)   Wt 110 lb (49.9 kg)   LMP 09/15/2019   SpO2 96%   BMI 19.49 kg/m²     ECOG PS: 1-2  General: No distress  Eyes: Anicteric sclerae  HENT: Atraumatic  Neck: Supple  Resp: CTAB, normal effort  CV: Regular   GI: Soft, less distended with drain   MS: Ambulatory  Skin: Warm dry  Psych: Alert, oriented, appropriate affect, normal judgment/insight    Results:     Lab Results   Component Value Date/Time    WBC 5.0 01/09/2020 10:09 AM    HGB 11.0 (L) 01/09/2020 10:09 AM    HCT 34.2 (L) 01/09/2020 10:09 AM    PLATELET 816 97/63/7978 10:09 AM    MCV 91.0 01/09/2020 10:09 AM    ABS.  NEUTROPHILS 3.1 01/09/2020 10:09 AM    Hemoglobin (POC) 10.8 (L) 09/25/2019 04:11 PM    Hematocrit (POC) 32 (L) 09/25/2019 12:10 PM     Lab Results   Component Value Date/Time    Sodium 137 12/26/2019 10:39 AM    Potassium 3.8 12/26/2019 10:39 AM    Chloride 103 12/26/2019 10:39 AM    CO2 28 12/26/2019 10:39 AM    Glucose 94 12/26/2019 10:39 AM    BUN 6 12/26/2019 10:39 AM    Creatinine 0.46 (L) 12/26/2019 10:39 AM    GFR est AA >60 12/26/2019 10:39 AM    GFR est non-AA >60 12/26/2019 10:39 AM    Calcium 9.6 12/26/2019 10:39 AM    Sodium (POC) 137 09/25/2019 12:10 PM    Potassium (POC) 3.7 09/25/2019 12:10 PM    Chloride (POC) 105 09/25/2019 12:10 PM    Glucose (POC) 85 09/25/2019 12:10 PM    BUN (POC) 4 (L) 09/25/2019 12:10 PM    Creatinine (POC) 0.5 (L) 09/25/2019 12:10 PM    Calcium, ionized (POC) 1.14 09/25/2019 12:10 PM     Lab Results   Component Value Date/Time    Bilirubin, total 0.3 12/26/2019 10:39 AM    ALT (SGPT) 17 12/26/2019 10:39 AM    AST (SGOT) 19 12/26/2019 10:39 AM    Alk. phosphatase 80 12/26/2019 10:39 AM    Protein, total 7.5 12/26/2019 10:39 AM    Albumin 3.2 (L) 12/26/2019 10:39 AM    Globulin 4.3 (H) 12/26/2019 10:39 AM     CT Results (most recent):  Results from [facility] encounter on 01/07/20   CT ABD PELV W CONT    Narrative EXAM: CT ABD PELV W CONT    INDICATION: F/U subdiaphragmatic fluid collection . Malignant neoplasm of the  appendix status post appendectomy, colon resection, hysterectomy and  nephrectomy. COMPARISON: 12/17/2019     CONTRAST: 100 mL of Isovue-370. TECHNIQUE:   Following the uneventful intravenous administration of contrast, thin axial  images were obtained through the abdomen and pelvis. Coronal and sagittal  reconstructions were generated. Oral contrast was not administered. CT dose  reduction was achieved through use of a standardized protocol tailored for this  examination and automatic exposure control for dose modulation. FINDINGS:   LUNG BASES: Stable small left pleural effusion with left basilar atelectasis. INCIDENTALLY IMAGED HEART AND MEDIASTINUM: Unremarkable. LIVER: No solid mass or biliary dilatation. 2 stable cysts likely in the left  hepatic lobe. GALLBLADDER: Unremarkable. SPLEEN: Stable left subphrenic fluid collection containing a pigtail catheter,  anterior and lateral to the spleen which remains homogeneous and unchanged. PANCREAS: No mass or ductal dilatation. ADRENALS: Unremarkable. KIDNEYS: No mass, calculus, or hydronephrosis. STOMACH: Unremarkable. SMALL BOWEL: No dilatation or wall thickening. However, stranding of perivesical  and lower pelvic fat surrounds nondilated small bowel loops just cephalad to the  bladder dome, unchanged in appearance.  The bladder dome is tented toward small  bowel loops and is inseparable with no preservation of fat plane between the  bladder and small bowel or sigmoid colon. This is unchanged. COLON: No dilatation or wall thickening. Large amount of retained fecal material  and fluid again noted in the colon. Anastomotic staples at the cecal region. Stranding of pericolonic fat in the sigmoid colon adjacent to the bladder dome,  stable since the prior study. APPENDIX: Not seen, surgically absent. PERITONEUM: No ascites or pneumoperitoneum. RETROPERITONEUM: No lymphadenopathy or aortic aneurysm. REPRODUCTIVE ORGANS: Surgically absent uterus  URINARY BLADDER: Tenting of the bladder dome, which is inseparable from adjacent  small bowel and sigmoid colonic loops with stranding of perivesical fat, stable  BONES: No destructive bone lesion. ADDITIONAL COMMENTS: N/A      Impression IMPRESSION:    1. Stable appearance of pigtail catheter in the left subphrenic space with small  fluid collection adjacent to the spleen. 2. Stable postoperative/postinflammatory changes in the pelvis status post  appendectomy. 3. Stable small left pleural effusion with left basilar atelectasis. 4. Other stable incidental and postoperative changes. Records reviewed and summarized above. Pathology report(s) reviewed above. Radiology report(s) reviewed above. Assessment/PLAN:     1) Stage 4 / Metastatic Appendiceal Cancer   post EXPLORATORY LAPAROTOMY SIERRA BSO, TUMOR DEBULKING: ILEOCECAL RESECTION; OMENTECTOMY 9/25/19  Surgery done for obstruction. She had left rib pain and SOB and on exam decreased bs on LEFT. Got CXR which showed pleural effusion then ultrasound which showed elevated diaphragm and ? Free air  CT which showed fluid collection and patient admitted for this. Had IR drain of splenic fluid collection. Patient went to [facility] and [address] [place] [address],[address] for second opinions. Discussed systemic therapy with FOLFOX/ +/- Avastin chemo or some variation of this regimen. Possible [facility] down the road. Patient choose to have chemo locally and still see [address] [facility],[address]. Patient had cycle 1 of FOLFOX chemotherapy on 12/3/19 reduced by 50%. She was then admitted via ER with N/V and abdominal pain. CT A/P 12/12/19 shows worsening fluid collection which was drained via IR. Follow up CT A/P 12/17/19 better overall. Patient is clinically stable overall. Saw surgery yesterday. Monitoring drain. CT done 1/20 stable. on palliative FOLFOX 50% DR for cycle 3 today. Tolerating chemo with side effects. Reviewed side effect mgmt today. Wants to continue with chemo. Will set up IVF during chemo. 2) Left Pleural Effusion post thora x 2  No cytology. Stable on CT. 3) abdominal drain via IR. Saw surgery yesterday. Monitoring outpt to remove soon hopefully. 4) Nausea and Vomiting  Much better now but still mild. Continue anti-emetics as needed. 5) Abdominal Pain   Due to carcinomatosis. No pain today per patient. 6) Psychosocial  Mood good. Coping well considering difficult disease. She has great family support -  here today. Call if questions. Follow up in 2 weeks  I appreciate the opportunity to participate in Ms[employer]aura Ger Kwaku sorto.     Signed By: Scottie Alaniz DO

## 2020-01-09 NOTE — LETTER
1/9/20 Patient: Ernie Chamberlain YOB: 1970 Date of Visit: 1/9/2020 Yefri Raya MD 
[address] VIA Facsimile: [phone] Dear Yefri Raya MD, Thank you for referring Ms. Ernie Chamberlain to [address] [place] [address] for evaluation. My notes for this consultation are attached. If you have questions, please do not hesitate to call me. I look forward to following your patient along with you. Sincerely, Amisha Gomez NP

## 2020-01-11 ENCOUNTER — HOSPITAL ENCOUNTER (OUTPATIENT)
Dept: INFUSION THERAPY | Age: 50
End: 2020-01-11
Payer: COMMERCIAL

## 2020-01-12 ENCOUNTER — HOSPITAL ENCOUNTER (OUTPATIENT)
Dept: INFUSION THERAPY | Age: 50
Discharge: HOME | End: 2020-01-12
Payer: COMMERCIAL

## 2020-01-12 VITALS
TEMPERATURE: 98.3 F | DIASTOLIC BLOOD PRESSURE: 72 MMHG | RESPIRATION RATE: 18 BRPM | SYSTOLIC BLOOD PRESSURE: 102 MMHG | HEART RATE: 71 BPM

## 2020-01-12 DIAGNOSIS — R11.2 INTRACTABLE VOMITING WITH NAUSEA, UNSPECIFIED VOMITING TYPE: Primary | ICD-10-CM

## 2020-01-12 DIAGNOSIS — C18.1 CARCINOMA OF APPENDIX (HCC): ICD-10-CM

## 2020-01-12 DIAGNOSIS — C79.60 MALIGNANT NEOPLASM METASTATIC TO OVARY, UNSPECIFIED LATERALITY (HCC): ICD-10-CM

## 2020-01-12 DIAGNOSIS — C18.1 MUCINOUS ADENOCARCINOMA OF APPENDIX (HCC): ICD-10-CM

## 2020-01-12 PROCEDURE — 96360 HYDRATION IV INFUSION INIT: CPT

## 2020-01-12 PROCEDURE — 96375 TX/PRO/DX INJ NEW DRUG ADDON: CPT

## 2020-01-12 PROCEDURE — 74011250636 HC RX REV CODE- 250/636: Performed by: INTERNAL MEDICINE

## 2020-01-12 PROCEDURE — 74011250636 HC RX REV CODE- 250/636: Performed by: NURSE PRACTITIONER

## 2020-01-12 RX ORDER — SODIUM CHLORIDE 0.9 % (FLUSH) 0.9 %
10 SYRINGE (ML) INJECTION AS NEEDED
Status: DISCONTINUED | OUTPATIENT
Start: 2020-01-12 | End: 2020-01-13 | Stop reason: HOSPADM

## 2020-01-12 RX ORDER — HEPARIN 100 UNIT/ML
300-500 SYRINGE INTRAVENOUS AS NEEDED
Status: DISCONTINUED | OUTPATIENT
Start: 2020-01-12 | End: 2020-01-13 | Stop reason: HOSPADM

## 2020-01-12 RX ORDER — ONDANSETRON 2 MG/ML
8 INJECTION INTRAMUSCULAR; INTRAVENOUS ONCE
Status: COMPLETED | OUTPATIENT
Start: 2020-01-12 | End: 2020-01-12

## 2020-01-12 RX ORDER — SODIUM CHLORIDE 9 MG/ML
10 INJECTION INTRAMUSCULAR; INTRAVENOUS; SUBCUTANEOUS AS NEEDED
Status: DISCONTINUED | OUTPATIENT
Start: 2020-01-12 | End: 2020-01-13 | Stop reason: HOSPADM

## 2020-01-12 RX ORDER — HEPARIN 100 UNIT/ML
500 SYRINGE INTRAVENOUS AS NEEDED
Status: DISCONTINUED | OUTPATIENT
Start: 2020-01-12 | End: 2020-01-13 | Stop reason: HOSPADM

## 2020-01-12 RX ADMIN — ONDANSETRON 8 MG: 2 INJECTION, SOLUTION INTRAMUSCULAR; INTRAVENOUS at 10:56

## 2020-01-12 RX ADMIN — SODIUM CHLORIDE 10 ML: 9 INJECTION INTRAMUSCULAR; INTRAVENOUS; SUBCUTANEOUS at 10:52

## 2020-01-12 RX ADMIN — SODIUM CHLORIDE 1000 ML: 900 INJECTION, SOLUTION INTRAVENOUS at 11:00

## 2020-01-12 RX ADMIN — HEPARIN 500 UNITS: 100 SYRINGE at 12:11

## 2020-01-12 NOTE — PROGRESS NOTES
Patient feeling very nauseated and vomiting today. Per MD Smith ok to give hydration and zofran that was scheduled for patient tomorrow. Patient states she will call tomorrow whether or not she will be coming in for her hydration apt scheduled for 1/13/20.     [facility], RN

## 2020-01-12 NOTE — PROGRESS NOTES
[facility] VISIT NOTE    1045  Pt arrived at [facility] ambulatory and in no distress for pump removal/hydration. Assessment completed, pt c/o nausea, diarrhea, and fatigue. No other new complaints at this time. Port with positive blood return post 5FU infusion. Medications received:  Zofran IVP  1 L NS bolus IV    Patient Vitals for the past 12 hrs:   Temp Pulse Resp BP   01/12/20 1050 98.3 °F (36.8 °C) 71 18 102/72     Tolerated treatment well, no adverse reaction noted. Port de-accessed and flushed per protocol. Positive blood return noted. 1210  D/C'd from [facility] ambulatory and in no distress accompanied by her . Next appointment is 1/13/20.

## 2020-01-13 ENCOUNTER — HOSPITAL ENCOUNTER (OUTPATIENT)
Dept: INFUSION THERAPY | Age: 50
Discharge: HOME | End: 2020-01-13
Payer: COMMERCIAL

## 2020-01-13 VITALS
DIASTOLIC BLOOD PRESSURE: 66 MMHG | HEART RATE: 72 BPM | TEMPERATURE: 96.7 F | RESPIRATION RATE: 18 BRPM | SYSTOLIC BLOOD PRESSURE: 105 MMHG

## 2020-01-13 DIAGNOSIS — C79.60 MALIGNANT NEOPLASM METASTATIC TO OVARY, UNSPECIFIED LATERALITY (HCC): ICD-10-CM

## 2020-01-13 DIAGNOSIS — C18.1 CARCINOMA OF APPENDIX (HCC): ICD-10-CM

## 2020-01-13 DIAGNOSIS — R11.2 INTRACTABLE VOMITING WITH NAUSEA, UNSPECIFIED VOMITING TYPE: Primary | ICD-10-CM

## 2020-01-13 DIAGNOSIS — C18.1 MUCINOUS ADENOCARCINOMA OF APPENDIX (HCC): ICD-10-CM

## 2020-01-13 PROCEDURE — 96360 HYDRATION IV INFUSION INIT: CPT

## 2020-01-13 PROCEDURE — 74011250636 HC RX REV CODE- 250/636: Performed by: NURSE PRACTITIONER

## 2020-01-13 RX ORDER — ONDANSETRON 2 MG/ML
8 INJECTION INTRAMUSCULAR; INTRAVENOUS ONCE
Status: COMPLETED | OUTPATIENT
Start: 2020-01-13 | End: 2020-01-13

## 2020-01-13 RX ORDER — ONDANSETRON 2 MG/ML
8 INJECTION INTRAMUSCULAR; INTRAVENOUS ONCE
Status: CANCELLED | OUTPATIENT
Start: 2020-01-13

## 2020-01-13 RX ADMIN — ONDANSETRON 8 MG: 2 INJECTION INTRAMUSCULAR; INTRAVENOUS at 12:05

## 2020-01-13 RX ADMIN — SODIUM CHLORIDE 1000 ML: 900 INJECTION, SOLUTION INTRAVENOUS at 12:06

## 2020-01-13 NOTE — PROGRESS NOTES
[facility] VISIT NOTE    1100  Pt arrived at [facility] ambulatory and in no distress for pump removal/hydration. Assessment completed, pt c/o nausea, diarrhea, and fatigue. No other new complaints at this time. Port with positive blood return post 5FU infusion. Medications received:  Zofran IVP  1 L NS bolus IV    Patient Vitals for the past 12 hrs:   Temp Pulse Resp BP   01/13/20 1105 96.7 °F (35.9 °C) 72 18 105/66     Tolerated treatment well, no adverse reaction noted. Port de-accessed and flushed per protocol. Positive blood return noted. 1215  D/C'd from [facility] ambulatory and in no distress accompanied by her .  Next appointment is   Future Appointments   Date Time Provider Thomas Espinoza   1/22/2020 10:00 AM A2 DIANNE LONG [address]   1/22/2020 10:45 AM BART [address]   1/24/2020  2:00 PM G2 DIANNE FASTRACK [facility]   2/5/2020 10:00 AM A2 DIANNE LONG [address]   2/7/2020  2:00 PM G2 DIANNE FASTRACK [facility]   2/19/2020 10:00 AM D1 DIANNE LONG [address]   2/21/2020  2:00 PM G2 DIANNE FASTRACK [facility]   3/4/2020 10:00 AM A2 DIANNE LONG [place] [facility]   3/6/2020  2:00 PM G2 DIANNE FASTRACK [facility]

## 2020-01-14 ENCOUNTER — APPOINTMENT (OUTPATIENT)
Dept: INFUSION THERAPY | Age: 50
End: 2020-01-14
Payer: COMMERCIAL

## 2020-01-14 ENCOUNTER — TELEPHONE (OUTPATIENT)
Dept: SURGERY | Age: 50
End: 2020-01-14

## 2020-01-14 NOTE — TELEPHONE ENCOUNTER
Patient called stating she would like to speak with nurse regarding when this week she can come in to get her drain removed. Please advise with scheduling.

## 2020-01-15 NOTE — PROGRESS NOTES
1. Have you been to the ER, urgent care clinic since your last visit? Hospitalized since your last visit? No    2. Have you seen or consulted any other health care providers outside of the [address] [place] [address] since your last visit? Include any pap smears or colon screening.   No

## 2020-01-15 NOTE — LETTER
1/15/20 Patient: Johnathan Love YOB: 1970 Date of Visit: 1/15/2020 Eliz Carr MD 
[address] [facility] [place] VIA Facsimile: [phone] Dear Eliz Carr MD, Thank you for referring Ms. Johnathan Love to [address] [facility] for evaluation. My notes for this consultation are attached. If you have questions, please do not hesitate to call me. I look forward to following your patient along with you. Sincerely, Shira Arias MD

## 2020-01-15 NOTE — PROGRESS NOTES
Subjective:      Naina Woods  is a 44 y.o.  female who presents for f/u abdominal abscess/wound check and possible drain removal.     Pt was initially believed to have cholelithiasis and was schedule for lap lila. At this time, it was noted that pt had peritoneal seeding, especially both diaphragms and small amount in the omentum with AALIYAH ovarian masses. Pt underwent diagnostic laparoscopy with aspiration of ascitic fluid and RIGHT ovary biopsy (Dr. Shekhar Reed) on 09/20/19. Final surgical PATH demonstrated poorly differentiated adenocarcinoma with scattered signet ring cells. Pt then had exploratory lararotomy SIERRA BSO and tumor debulking including ileocecal resection and omentectomy on 09/25/19 with Dr. Shekhar Reed. 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 Pt had PAC placed on 11/18/19 and is undergoing chemotherapy (FOLFOX) with Dr. Ann Ayers. About 3 weeks postoperatively after the exploratory laparotomy she was admitted splenic infarction/abscess and was treated with antibiotics, she also had IR abscess drainage and drain placed on 12/12/19. Possibility of colonic fistula. Pt was given oral Augmentin until fistula/abscess has closed. At last visit on 01/08/20, pt still had significant drainage output, and drain was left in place. Pt reports no drainage for the last several days. Once her nausea resolved from her last chemotherapy, pt has been able to eat a little better.        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 ([facility] [address] 2019    Nausea & vomiting     Nausea and vomiting 12/9/2019    Splenic infarction 2019    Subphrenic abscess ([address] 1/8/2020    Symptomatic cholelithiasis 9/18/2019       Past Surgical History:   Procedure Laterality Date    ABDOMEN SURGERY PROC UNLISTED      HX APPENDECTOMY  09/2019    HX GI  09/2019    ILEOCECECTOMY    HX GYN  2003    CYST ON OVARY    HX HYSTERECTOMY  2019    IR THORACENTESIS CATH W IMAGE  11/6/2019       Social History     Tobacco Use    Smoking status: Never Smoker    Smokeless tobacco: Never Used   Substance Use Topics    Alcohol use: Not Currently       Family History   Problem Relation Age of Onset    Breast Cancer Paternal Grandmother 61    Crohn's Disease Mother     Heart Disease Mother     Cancer Father         BLADDER    Diabetes Father     No Known Problems Son     No Known Problems Daughter     Anesth Problems Neg Hx        Current Outpatient Medications on File Prior to Visit   Medication Sig Dispense Refill    LORazepam (ATIVAN) 0.5 mg tablet Take 1 Tab by mouth nightly as needed for Anxiety. Max Daily Amount: 0.5 mg. 30 Tab 0    L.acidoph & parac-S.therm-Bifido (RADHA Q2/RISAQUAD-2) 16 billion cell cap cap Take 1 Cap by mouth daily for 30 days. 30 Cap 0    ondansetron (ZOFRAN ODT) 4 mg disintegrating tablet Take 1 Tab by mouth every eight (8) hours as needed for Nausea. (Patient taking differently: Take 8 mg by mouth every eight (8) hours as needed for Nausea. Indications: prevent nausea and vomiting from cancer chemotherapy) 60 Tab 1    prochlorperazine (COMPAZINE) 5 mg tablet Take 1 tab by mouth every 6 hours as needed for nausea or vomiting 30 Tab 1    dexAMETHasone (DECADRON) 4 mg tablet Take 2 tabs (8mg) by mouth daily on days 2 and 3 after chemotherapy 60 Tab 0    acetaminophen (TYLENOL) 325 mg tablet Take 650 mg by mouth every four (4) hours as needed for Pain. Indications: pain      ibuprofen (MOTRIN) 200 mg tablet Take 3 Tabs by mouth every eight (8) hours as needed for Pain. 50 Tab 0    potassium chloride (KLOR-CON) 10 mEq tablet Take 1 Tab by mouth two (2) times a day. 6 Tab 0    lidocaine-prilocaine (EMLA) topical cream Apply  to affected area as needed for Pain.  30 g 0 No current facility-administered medications on file prior to visit. No Known Allergies      Review of Systems:    A comprehensive review of systems was negative except for that written in the History of Present Illness. Objective:     Visit Vitals  /70 (BP 1 Location: Left arm, BP Patient Position: Sitting)   Pulse 76   Temp 97.6 °F (36.4 °C) (Oral)   Resp 14   Ht 5' 3\" (1.6 m)   Wt 107 lb 8 oz (48.8 kg)   SpO2 98%   BMI 19.04 kg/m²        Physical Exam:  ABDOMEN: RUQ drain was removed without difficulty. Labs: No results found for this or any previous visit (from the past 24 hour(s)). Data Review:      FINAL PATHOLOGIC DIAGNOSIS 09/20/19:  Right ovary, biopsy:   Positive for malignancy, poorly differentiated adenocarcinoma with scattered signet ring cells. FINAL PATHOLOGIC DIAGNOSIS 09/25/19:  1. 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 SPECIMEN   Procedure: Ileocecectomy  TUMOR   Tumor Site: Diffusely involving appendix   Histologic Type: Mucinous adenocarcinoma   Histologic Type Comments: Extensive signet ring cell component   Histologic Grade: G3: Poorly differentiated   Tumor Size: 4.5 cm   Tumor Deposits: Present   Number of Deposits: 1   Tumor Extension: Tumor directly invades adjacent organs or structures: Uterus, cervix, bilateral ovaries and fallopian tubes, and omentum   Lymphovascular Invasion: Not identified   Perineural Invasion: Present, extensive  MARGINS   Proximal, distal and radial margins uninvolved by invasive carcinoma. LYMPH NODES   Number of Lymph Nodes Involved: 11   Number of Lymph Nodes Examined: 11  PATHOLOGIC STAGE CLASSIFICATION (pTNM, AJCC 8th Edition)   Primary Tumor (pT): pT4b   Regional Lymph Nodes (pN): pN2   Distant Metastasis (pM): pM1c   Site(s): Uterus, bilateral fallopian tubes, and ovaries    2.  Uterus, bilateral fallopian tubes and ovaries, supracervical hysterectomy bilateral salpingo-oophorectomy:  Serosa: Positive for metastatic mucinous adenocarcinoma involving fibrous adhesions. Myometrium: Extensive transmural involvement by metastatic mucinous adenocarcinoma. Adenomyosis. Endometrium: Inactive endometrium with metastatic mucinous adenocarcinoma. Bilateral fallopian tubes: Metastatic mucinous adenocarcinoma. Bilateral fallopian tubes: Metastatic mucinous adenocarcinoma. 3. Omentum, omentectomy:  Positive for metastatic mucinous adenocarcinoma. Splenule. 4. Cervix, completion total hysterectomy:  Positive for metastatic mucinous adenocarcinoma. CT abd pel 01/07/20 IMPRESSION:  1. Stable appearance of pigtail catheter in the left subphrenic space with small fluid collection adjacent to the spleen. 2. Stable postoperative/postinflammatory changes in the pelvis status post appendectomy. 3. Stable small left pleural effusion with left basilar atelectasis. 4. Other stable incidental and postoperative changes    Assessment and Plan:       ICD-10-CM ICD-9-CM    1. Subphrenic abscess ([facility] [address] K65.1 567.22        Pt will call if she develops and fevers or abdominal pain. Otherwise f/u PRN. All questions were answered and pt is in agreement with this plan. This document was scribed by Kristina Huntley as dictated by Dr. Darian Traore.      Signed By: Buffy Bojorquez MD     01/15/20

## 2020-01-22 NOTE — PROGRESS NOTES
Potassium 3.4 with chemo labs today. Ordered 40 mEq PO Potassium x 1 dose for replacement in [facility] today.

## 2020-01-22 NOTE — PROGRESS NOTES
Outpatient Infusion Center - Chemotherapy Progress Note    5981 Pt admit to [facility] for C4 Folfox ambulatory in stable condition. Assessment completed. No new concerns voiced. Port with positive blood return. Labs drawn per order and sent. Line flushed, clamped, Curos Cap applied to end clave. Pt departs to MD appt upon returning pt connected to [facility]. Chemotherapy Flowsheet 1/22/2020   Cycle C4   Date 1/22/2020   Drug / Regimen FOLFOX   Pre Meds -   Notes -     Recent Results (from the past 12 hour(s))   METABOLIC PANEL, COMPREHENSIVE    Collection Time: 01/22/20 10:02 AM   Result Value Ref Range    Sodium 140 136 - 145 mmol/L    Potassium 3.4 (L) 3.5 - 5.1 mmol/L    Chloride 108 97 - 108 mmol/L    CO2 27 21 - 32 mmol/L    Anion gap 5 5 - 15 mmol/L    Glucose 90 65 - 100 mg/dL    BUN 8 6 - 20 MG/DL    Creatinine 0.39 (L) 0.55 - 1.02 MG/DL    BUN/Creatinine ratio 21 (H) 12 - 20      GFR est AA >60 >60 ml/min/1.73m2    GFR est non-AA >60 >60 ml/min/1.73m2    Calcium 9.1 8.5 - 10.1 MG/DL    Bilirubin, total 0.2 0.2 - 1.0 MG/DL    ALT (SGPT) 23 12 - 78 U/L    AST (SGOT) 19 15 - 37 U/L    Alk. phosphatase 87 45 - 117 U/L    Protein, total 6.6 6.4 - 8.2 g/dL    Albumin 3.0 (L) 3.5 - 5.0 g/dL    Globulin 3.6 2.0 - 4.0 g/dL    A-G Ratio 0.8 (L) 1.1 - 2.2     CBC WITH AUTOMATED DIFF    Collection Time: 01/22/20 11:51 AM   Result Value Ref Range    WBC 5.4 3.6 - 11.0 K/uL    RBC 3.90 3.80 - 5.20 M/uL    HGB 11.6 11.5 - 16.0 g/dL    HCT 35.5 35.0 - 47.0 %    MCV 91.0 80.0 - 99.0 FL    MCH 29.7 26.0 - 34.0 PG    MCHC 32.7 30.0 - 36.5 g/dL    RDW 17.4 (H) 11.5 - 14.5 %    PLATELET 565 719 - 434 K/uL    MPV 9.2 8.9 - 12.9 FL    NRBC 0.0 0  WBC    ABSOLUTE NRBC 0.00 0.00 - 0.01 K/uL    NEUTROPHILS 53 32 - 75 %    LYMPHOCYTES 31 12 - 49 %    MONOCYTES 13 5 - 13 %    EOSINOPHILS 2 0 - 7 %    BASOPHILS 1 0 - 1 %    IMMATURE GRANULOCYTES 0 0.0 - 0.5 %    ABS. NEUTROPHILS 2.9 1.8 - 8.0 K/UL    ABS.  LYMPHOCYTES 1.6 0.8 - 3.5 K/UL    ABS. MONOCYTES 0.7 0.0 - 1.0 K/UL    ABS. EOSINOPHILS 0.1 0.0 - 0.4 K/UL    ABS. BASOPHILS 0.0 0.0 - 0.1 K/UL    ABS. IMM.  GRANS. 0.0 0.00 - 0.04 K/UL    DF AUTOMATED           Visit Vitals  /82   Pulse 78   Temp 96.9 °F (36.1 °C)   Resp 16   Ht 5' 3\" (1.6 m)   Wt 49.4 kg (109 lb)   LMP 09/15/2019   SpO2 98%   Breastfeeding No   BMI 19.31 kg/m²       Medications:  Medications Administered     0.9% sodium chloride injection 10 mL     Admin Date  01/22/2020 Action  Given Dose  10 mL Route  IntraVENous Administered By  Ariane Allen RN          dexamethasone (DECADRON) 12 mg in 0.9% sodium chloride 50 mL, overfill volume 5 mL IVPB     Admin Date  01/22/2020 Action  Given Dose  12 mg Route  IntraVENous Administered By  Ariane Allen RN          dextrose 5% infusion     Admin Date  01/22/2020 Action  New Bag Dose  25 mL/hr Rate  25 mL/hr Route  IntraVENous Administered By  Ariane Allen RN          fluorouraciL (ADRUCIL) 1,896 mg in 0.9% sodium chloride 100 mL CADD Cassette     Admin Date  01/22/2020 Action  New Bag Dose  1896 mg Rate  2.2 mL/hr Route  IntraVENous Administered By  Ariane Allen RN          fluorouracil (ADRUCIL) chemo syringe 316 mg     Admin Date  01/22/2020 Action  Given Dose  316 mg Rate  189.6 mL/hr Route  IntraVENous Administered By  Ariane Allen RN          leucovorin (WELLCOVORIN) 316 mg in dextrose 5% 250 mL, overfill volume 25 mL IVPB     Admin Date  01/22/2020 Action  New Bag Dose  316 mg Rate  145.4 mL/hr Route  IntraVENous Administered By  Ariane Allen RN          [facility]ansetron [facility]) injection 8 mg     Admin Date  01/22/2020 Action  Given Dose  8 mg Route  IntraVENous Administered By  Ariane Allen RN          oxaliplatin (ELOXATIN) 67 mg in dextrose 5% 250 mL, overfill volume 25 mL chemo infusion     Admin Date  01/22/2020 Action  New Bag Dose  67 mg Rate  144.2 mL/hr Route  IntraVENous Administered By  Ariane Allen RN potassium chloride SR (KLOR-CON 10) tablet 40 mEq     Admin Date  01/22/2020 Action  Given Dose  40 mEq Route  Oral Administered By  Jackson Steel RN          saline peripheral flush soln 10 mL     Admin Date  01/22/2020 Action  Given Dose  10 mL Route  InterCATHeter Administered By  Jackson Steel, MING                    5650 Pt tolerated treatment well. Port maintained positive blood return throughout treatment, flushed with positive blood return at conclusion and connected to CADD. D/c home ambulatory in no distress.  Pt aware of next [facility] appointment scheduled for 01/24/20 for cadd removal.

## 2020-01-22 NOTE — LETTER
1/22/20 Patient: Baron Gilmore YOB: 1970 Date of Visit: 1/22/2020 Dilma Anglin MD 
[address] VIA Facsimile: [phone] Dear Dilma Anglin MD, Thank you for referring Ms. Baron Gilmore to [address] [place] [address] for evaluation. My notes for this consultation are attached. If you have questions, please do not hesitate to call me. I look forward to following your patient along with you. Sincerely, Ez English NP

## 2020-01-22 NOTE — PROGRESS NOTES
[facility] [place] at [place] [address], [place]: [phone]  F: [phone]    HEME/ONC FOLLOW UP    Reason for Visit:   Nicki Dominguez is a 44 y.o. female who is seen in office today for follow up of metastatic appendix cancer. Treatment History:   · EXPLORATORY LAPAROTOMY SIERRA BSO, TUMOR DEBULKING: ILEOCECAL RESECTION; OMENTECTOMY  · FOLFOX 50%  12/3/19 - Current    · STAGE: 4 with carcinomatosis    History of Present Illness:   Nicki Dominguez is a 44 y.o. female seen today in office for follow up of metastatic appendix cancer on of 50% dose reduced palliative FOLFOX chemo. Here for cycle 3 today. CBC still pending today. Pt feels good today. No pain. Able to eat some. Drain out now. Wants to do chemo today. Here with a friend. Last visit:  Saw surgery yesterday and still has drain. Had CT a few days ago which was stable. Has abdominal pain with eating. Not taking any pain meds. Can tolerate supplements. Taking compazine every morning. Ativan helping with sleeping. Labs pending.        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 ([address] 2019    Nausea & vomiting     Nausea and vomiting 12/9/2019    Splenic infarction 2019    Subphrenic abscess ([address] 1/8/2020    Symptomatic cholelithiasis 9/18/2019      Past Surgical History:   Procedure Laterality Date    ABDOMEN SURGERY PROC UNLISTED      HX APPENDECTOMY  09/2019    HX GI  09/2019    ILEOCECECTOMY    HX GYN  2003    CYST ON OVARY    HX HYSTERECTOMY  2019    IR THORACENTESIS CATH W IMAGE  11/6/2019      Social History     Tobacco Use    Smoking status: Never Smoker    Smokeless tobacco: Never Used   Substance Use Topics    Alcohol use: Not Currently      Family History   Problem Relation Age of Onset    Breast Cancer Paternal Grandmother 61    Crohn's Disease Mother     Heart Disease Mother     Cancer Father         BLADDER    Diabetes Father     No Known Problems Son     No Known Problems Daughter     Anesth Problems Neg Hx      Current Outpatient Medications   Medication Sig    dexAMETHasone (DECADRON) 4 mg tablet Take 2 tabs (8mg) by mouth daily on days 2 and 3 after chemotherapy    prochlorperazine (COMPAZINE) 5 mg tablet Take 1 tab by mouth every 6 hours as needed for nausea or vomiting    LORazepam (ATIVAN) 0.5 mg tablet Take 1 Tab by mouth nightly as needed for Anxiety. Max Daily Amount: 0.5 mg.  potassium chloride (KLOR-CON) 10 mEq tablet Take 1 Tab by mouth two (2) times a day.  lidocaine-prilocaine (EMLA) topical cream Apply  to affected area as needed for Pain.  ondansetron (ZOFRAN ODT) 4 mg disintegrating tablet Take 1 Tab by mouth every eight (8) hours as needed for Nausea. (Patient taking differently: Take 8 mg by mouth every eight (8) hours as needed for Nausea. Indications: prevent nausea and vomiting from cancer chemotherapy)    acetaminophen (TYLENOL) 325 mg tablet Take 650 mg by mouth every four (4) hours as needed for Pain. Indications: pain    ibuprofen (MOTRIN) 200 mg tablet Take 3 Tabs by mouth every eight (8) hours as needed for Pain. No current facility-administered medications for this visit. No Known Allergies     Review of Systems: A complete review of systems was obtained, negative except as described above. Physical Exam:     Visit Vitals  /75 (BP 1 Location: Left arm, BP Patient Position: Sitting)   Pulse 69   Temp 97.4 °F (36.3 °C) (Oral)   Ht 5' 3\" (1.6 m)   Wt 109 lb (49.4 kg)   LMP 09/15/2019   SpO2 97%   BMI 19.31 kg/m²     ECOG PS: 1-2  General: No distress  Eyes: Anicteric sclerae  HENT: Atraumatic  Neck: Supple  Resp: CTAB, normal effort  CV: Regular   GI: Soft, less distended drain out and site healed.    MS: Ambulatory  Skin: Warm dry  Psych: Alert, oriented, appropriate affect, normal judgment/insight    Results: Lab Results   Component Value Date/Time    WBC 5.0 01/09/2020 10:09 AM    HGB 11.0 (L) 01/09/2020 10:09 AM    HCT 34.2 (L) 01/09/2020 10:09 AM    PLATELET 202 66/86/0020 10:09 AM    MCV 91.0 01/09/2020 10:09 AM    ABS. NEUTROPHILS 3.1 01/09/2020 10:09 AM    Hemoglobin (POC) 10.8 (L) 09/25/2019 04:11 PM    Hematocrit (POC) 32 (L) 09/25/2019 12:10 PM     Lab Results   Component Value Date/Time    Sodium 140 01/22/2020 10:02 AM    Potassium 3.4 (L) 01/22/2020 10:02 AM    Chloride 108 01/22/2020 10:02 AM    CO2 27 01/22/2020 10:02 AM    Glucose 90 01/22/2020 10:02 AM    BUN 8 01/22/2020 10:02 AM    Creatinine 0.39 (L) 01/22/2020 10:02 AM    GFR est AA >60 01/22/2020 10:02 AM    GFR est non-AA >60 01/22/2020 10:02 AM    Calcium 9.1 01/22/2020 10:02 AM    Sodium (POC) 137 09/25/2019 12:10 PM    Potassium (POC) 3.7 09/25/2019 12:10 PM    Chloride (POC) 105 09/25/2019 12:10 PM    Glucose (POC) 85 09/25/2019 12:10 PM    BUN (POC) 4 (L) 09/25/2019 12:10 PM    Creatinine (POC) 0.5 (L) 09/25/2019 12:10 PM    Calcium, ionized (POC) 1.14 09/25/2019 12:10 PM     Lab Results   Component Value Date/Time    Bilirubin, total 0.2 01/22/2020 10:02 AM    ALT (SGPT) 23 01/22/2020 10:02 AM    AST (SGOT) 19 01/22/2020 10:02 AM    Alk. phosphatase 87 01/22/2020 10:02 AM    Protein, total 6.6 01/22/2020 10:02 AM    Albumin 3.0 (L) 01/22/2020 10:02 AM    Globulin 3.6 01/22/2020 10:02 AM     CT Results (most recent):  Results from Hospital Encounter encounter on 01/07/20   CT ABD PELV W CONT    Narrative EXAM: CT ABD PELV W CONT    INDICATION: F/U subdiaphragmatic fluid collection . Malignant neoplasm of the  appendix status post appendectomy, colon resection, hysterectomy and  nephrectomy. COMPARISON: 12/17/2019     CONTRAST: 100 mL of Isovue-370. TECHNIQUE:   Following the uneventful intravenous administration of contrast, thin axial  images were obtained through the abdomen and pelvis.  Coronal and sagittal  reconstructions were generated. Oral contrast was not administered. CT dose  reduction was achieved through use of a standardized protocol tailored for this  examination and automatic exposure control for dose modulation. FINDINGS:   LUNG BASES: Stable small left pleural effusion with left basilar atelectasis. INCIDENTALLY IMAGED HEART AND MEDIASTINUM: Unremarkable. LIVER: No solid mass or biliary dilatation. 2 stable cysts likely in the left  hepatic lobe. GALLBLADDER: Unremarkable. SPLEEN: Stable left subphrenic fluid collection containing a pigtail catheter,  anterior and lateral to the spleen which remains homogeneous and unchanged. PANCREAS: No mass or ductal dilatation. ADRENALS: Unremarkable. KIDNEYS: No mass, calculus, or hydronephrosis. STOMACH: Unremarkable. SMALL BOWEL: No dilatation or wall thickening. However, stranding of perivesical  and lower pelvic fat surrounds nondilated small bowel loops just cephalad to the  bladder dome, unchanged in appearance. The bladder dome is tented toward small  bowel loops and is inseparable with no preservation of fat plane between the  bladder and small bowel or sigmoid colon. This is unchanged. COLON: No dilatation or wall thickening. Large amount of retained fecal material  and fluid again noted in the colon. Anastomotic staples at the cecal region. Stranding of pericolonic fat in the sigmoid colon adjacent to the bladder dome,  stable since the prior study. APPENDIX: Not seen, surgically absent. PERITONEUM: No ascites or pneumoperitoneum. RETROPERITONEUM: No lymphadenopathy or aortic aneurysm. REPRODUCTIVE ORGANS: Surgically absent uterus  URINARY BLADDER: Tenting of the bladder dome, which is inseparable from adjacent  small bowel and sigmoid colonic loops with stranding of perivesical fat, stable  BONES: No destructive bone lesion.   ADDITIONAL COMMENTS: N/A      Impression IMPRESSION:    1. Stable appearance of pigtail catheter in the left subphrenic space with small  fluid collection adjacent to the spleen. 2. Stable postoperative/postinflammatory changes in the pelvis status post  appendectomy. 3. Stable small left pleural effusion with left basilar atelectasis. 4. Other stable incidental and postoperative changes. Records reviewed and summarized above. Pathology report(s) reviewed above. Radiology report(s) reviewed above. Assessment/PLAN:     1) Stage 4 / Metastatic Appendiceal Cancer   post EXPLORATORY LAPAROTOMY SIERRA BSO, TUMOR DEBULKING: ILEOCECAL RESECTION; OMENTECTOMY 9/25/19  Surgery done for obstruction. She had left rib pain and SOB and on exam decreased bs on LEFT. Got CXR which showed pleural effusion then ultrasound which showed elevated diaphragm and ? Free air  CT which showed fluid collection and patient admitted for this. Had IR drain of splenic fluid collection. [place] [facility] [place] [address] [facility] [address] for second opinions. Discussed systemic therapy with FOLFOX/ +/- Avastin chemo or some variation of this regimen. Possible [facility] down the road. Patient choose to have chemo locally and still see [address]. Patient had cycle 1 of FOLFOX chemotherapy on 12/3/19 reduced by 50%. She was then admitted via ER with N/V and abdominal pain. CT A/P 12/12/19 shows worsening fluid collection which was drained via IR. Follow up CT A/P 12/17/19 better overall. Patient is clinically stable overall. Feeling better overall. Drain tube out. on palliative FOLFOX 50% DR for cycle 4 today. Tolerating chemo with side effects. Reviewed side effect mgmt today. Wants to continue with chemo. Will set up IVF during chemo. Labs still pending today. 2) Left Pleural Effusion post thora x 2  No cytology. Stable on CT. 3) abdominal drain out. Site fine. 4) Nausea and Vomiting  Much better now but still mild. Continue anti-emetics as needed. 5) Abdominal Pain   Due to carcinomatosis. No pain today per patient.       6) Psychosocial  Mood good. Coping well considering difficult disease. She has great family support. SW support. Call if questions. Follow up in 2 weeks  I appreciate the opportunity to participate in Ms. [facility].     Signed By: Temi Moss DO

## 2020-01-22 NOTE — PROGRESS NOTES
Destinee Barlow is a 44 y.o. female  Chief Complaint   Patient presents with    Follow-up      metastatic appendix cancer     1. Have you been to the ER, urgent care clinic since your last visit? Hospitalized since your last visit? No.    2. Have you seen or consulted any other health care providers outside of the [address] [place] [address] since your last visit? Include any pap smears or colon screening. No.    Pt states she had a drain removed since the last time she was in this office.

## 2020-01-24 NOTE — PROGRESS NOTES
Outpatient Infusion Center - Chemotherapy Progress Note    5384 Pt admit to [facility] for Pump DC ambulatory in stable condition. Assessment completed. No new concerns voiced, CADD pump volume=0, stopped and disconnected CADD pump from patient. Chemotherapy Flowsheet 1/22/2020   Cycle C4   Date 1/22/2020   Drug / Regimen FOLFOX   Pre Meds -   Notes -         Visit Vitals  /74   Pulse 61   Temp 97 °F (36.1 °C)   Resp 18   LMP 09/15/2019       Medications:  Medications Administered     heparin (porcine) pf 300-500 Units     Admin Date  01/24/2020 Action  Given Dose  500 Units Route  InterCATHeter Administered By  Yanira JARQUIN          saline peripheral flush soln 10 mL     Admin Date  01/24/2020 Action  Given Dose  10 mL Route  InterCATHeter Administered By  Yanira JARQUIN                  4384 Pt tolerated treatment well. Port maintained positive blood return throughout treatment. Flushed, heparinized and de-accessed per protocol. D/c home ambulatory in no distress. Pt aware of next appointment scheduled for 1/25/20 for hydration.

## 2020-01-26 NOTE — PROGRESS NOTES
Outpatient Infusion Center Short Visit Progress Note    0900 Patient admitted to [facility] for Hydration ambulatory in stable condition. Assessment completed. No new concerns voiced. Port accessed by Puja Zarate RN with positive blood return. Vital Signs:  Visit Vitals  /71 (BP 1 Location: Right arm, BP Patient Position: Sitting)   Pulse 72   Temp 97.7 °F (36.5 °C)   Resp 16   LMP 09/15/2019   Breastfeeding No     Medications:  Medications Administered     heparin (porcine) pf 500 Units     Admin Date  01/26/2020 Action  Given Dose  500 Units Route  IntraVENous Administered By  Trixie Langford RN          ondansetron [facility]) injection 8 mg     Admin Date  01/26/2020 Action  Given Dose  8 mg Route  IntraVENous Administered By  Trixie Langford RN          sodium chloride (NS) flush 5-10 mL     Admin Date  01/26/2020 Action  Given Dose  10 mL Route  IntraVENous Administered By  Trixie Langford RN          sodium chloride 0.9 % bolus infusion 1,000 mL     Admin Date  01/26/2020 Action  New Bag Dose  1000 mL Rate  1,000 mL/hr Route  IntraVENous Administered By  Trixie Langford RN              5914 Patient tolerated treatment well. Port flushed with positive blood return, heparinized, and de-accessed per protocol. Patient discharged from [place] [address] ambulatory in no distress at 1020. Patient aware of next appointment.     Future Appointments   Date Time Provider Thomas Espinoza   2/5/2020 10:00 AM A2 DIANNE LONG 1370 [facility]   2/5/2020 10:15 AM BART Caballero 6199   2/7/2020  2:00 PM G2 DIANNE FASTRACK [facility]   2/19/2020 10:00 AM D1 DIANNE LONG 1370 [facility]   2/21/2020  2:00 PM G2 DIANNE FASTRACK [facility]   3/4/2020 10:00 AM A2 DIANNE LONG [place] [facility]   3/6/2020  2:00 PM G2 DIANNE FASTRACK [facility]

## 2020-02-05 NOTE — PROGRESS NOTES
[facility] Chemotherapy Progress Note    Date: 2020    Name: Fay Castañeda    MRN: [id number]         : 1970      1000 Pt admit to [facility] for Folfox ambulatory in stable condition. Assessment completed. No new concerns voiced. Port with positive blood return. Chemotherapy Flowsheet 2020   Cycle C5   Date 2020   Drug / Regimen Folfox   Pre Meds given   Notes given         Ms. Lynn's vitals were reviewed. Patient Vitals for the past 12 hrs:   Temp Pulse Resp BP   20 1430 -- 80 -- 110/77   20 1000 98 °F (36.7 °C) 70 16 110/82         Lab results were obtained and reviewed. Recent Results (from the past 12 hour(s))   CBC WITH AUTOMATED DIFF    Collection Time: 20  9:49 AM   Result Value Ref Range    WBC 5.1 3.6 - 11.0 K/uL    RBC 4.15 3.80 - 5.20 M/uL    HGB 12.4 11.5 - 16.0 g/dL    HCT 38.3 35.0 - 47.0 %    MCV 92.3 80.0 - 99.0 FL    MCH 29.9 26.0 - 34.0 PG    MCHC 32.4 30.0 - 36.5 g/dL    RDW 17.2 (H) 11.5 - 14.5 %    PLATELET 599 344 - 475 K/uL    MPV 10.1 8.9 - 12.9 FL    NRBC 0.0 0  WBC    ABSOLUTE NRBC 0.00 0.00 - 0.01 K/uL    NEUTROPHILS 50 32 - 75 %    LYMPHOCYTES 31 12 - 49 %    MONOCYTES 16 (H) 5 - 13 %    EOSINOPHILS 2 0 - 7 %    BASOPHILS 1 0 - 1 %    IMMATURE GRANULOCYTES 0 0.0 - 0.5 %    ABS. NEUTROPHILS 2.5 1.8 - 8.0 K/UL    ABS. LYMPHOCYTES 1.6 0.8 - 3.5 K/UL    ABS. MONOCYTES 0.8 0.0 - 1.0 K/UL    ABS. EOSINOPHILS 0.1 0.0 - 0.4 K/UL    ABS. BASOPHILS 0.1 0.0 - 0.1 K/UL    ABS. IMM.  GRANS. 0.0 0.00 - 0.04 K/UL    DF AUTOMATED     METABOLIC PANEL, COMPREHENSIVE    Collection Time: 20  9:49 AM   Result Value Ref Range    Sodium 140 136 - 145 mmol/L    Potassium 3.8 3.5 - 5.1 mmol/L    Chloride 108 97 - 108 mmol/L    CO2 27 21 - 32 mmol/L    Anion gap 5 5 - 15 mmol/L    Glucose 107 (H) 65 - 100 mg/dL    BUN 8 6 - 20 MG/DL    Creatinine 0.44 (L) 0.55 - 1.02 MG/DL    BUN/Creatinine ratio 18 12 - 20      GFR est AA >60 >60 ml/min/1.73m2    GFR est non-AA >60 >60 ml/min/1.73m2    Calcium 9.3 8.5 - 10.1 MG/DL    Bilirubin, total 0.2 0.2 - 1.0 MG/DL    ALT (SGPT) 32 12 - 78 U/L    AST (SGOT) 24 15 - 37 U/L    Alk. phosphatase 101 45 - 117 U/L    Protein, total 7.1 6.4 - 8.2 g/dL    Albumin 3.4 (L) 3.5 - 5.0 g/dL    Globulin 3.7 2.0 - 4.0 g/dL    A-G Ratio 0.9 (L) 1.1 - 2.2         Pre-medications  were administered as ordered and chemotherapy was initiated. Medications Administered     dexamethasone (DECADRON) 12 mg in 0.9% sodium chloride 50 mL, overfill volume 5 mL IVPB     Admin Date  02/05/2020 Action  Given Dose  12 mg Rate  232 mL/hr Route  IntraVENous Administered By  Alok Morel RN          dextrose 5% infusion     Admin Date  02/05/2020 Action  New Bag Dose  25 mL/hr Rate  25 mL/hr Route  IntraVENous Administered By  Alok Morel RN          fluorouraciL (ADRUCIL) 1,896 mg in 0.9% sodium chloride 100 mL CADD Cassette     Admin Date  02/05/2020 Action  New Bag Dose  1896 mg Rate  2.2 mL/hr Route  IntraVENous Administered By  Alok Morel RN          fluorouraciL (ADRUCIL) chemo syringe 316 mg     Admin Date  02/05/2020 Action  Given Dose  316 mg Rate  189.6 mL/hr Route  IntraVENous Administered By  Alok Morel RN          leucovorin (WELLCOVORIN) 316 mg in dextrose 5% 250 mL, overfill volume 25 mL IVPB     Admin Date  02/05/2020 Action  New Bag Dose  316 mg Rate  145.4 mL/hr Route  IntraVENous Administered By  Alok Morel RN          [facility]ron [facility]) injection 8 mg     Admin Date  02/05/2020 Action  Given Dose  8 mg Route  IntraVENous Administered By  Alok Morel RN          oxaliplatin (ELOXATIN) 67 mg in dextrose 5% 250 mL, overfill volume 25 mL chemo infusion     Admin Date  02/05/2020 Action  New Bag Dose  67 mg Rate  144.2 mL/hr Route  IntraVENous Administered By  Alok Morel RN                1440 Pt tolerated treatment well. Port maintained positive blood return throughout treatment.  Flushed, heparinized and connected to CADD pump per protocol. D/c home ambulatory in no distress.      Future Appointments   Date Time Provider Thomas Espinoza   2/7/2020  2:00 PM G2 DIANNE FASTRACK [facility]   2/9/2020  9:30 AM H1 DIANNE FASTRACK [facility]   2/19/2020 10:00 AM D1 DIANNE LONG 1370 [facility]   2/19/2020 10:15 AM BART Sparks 6199   2/21/2020  2:00 PM G2 DIANNE FASTRACK [facility]   3/4/2020 10:00 AM A2 DIANNE LONG 137[address] [facility]   3/6/2020  2:00 PM G2 [facility]  February 5, 2020

## 2020-02-05 NOTE — LETTER
2/5/2020 10:34 AM 
 
Patient:  Cullen Jo YOB: 1970 Date of Visit: 2/5/2020 Dear No Recipients: Thank you for referring Ms. Cullen Jo to me for evaluation/treatment. Below are the relevant portions of my assessment and plan of care. If you have questions, please do not hesitate to call me. I look forward to following Ms. Ritu Pleitez along with you. Sincerely, Tiana Fleischer, NP

## 2020-02-05 NOTE — LETTER
2/5/20 Patient: Pia Shanks YOB: 1970 Date of Visit: 2/5/2020 Quintin Herbert MD 
[address] VIA Facsimile: [phone] Dear Quintin Herbert MD, Thank you for referring Ms. Pia Shanks to [address] [place] [address] for evaluation. My notes for this consultation are attached. If you have questions, please do not hesitate to call me. I look forward to following your patient along with you. Sincerely, Helio Gardiner NP

## 2020-02-05 NOTE — PROGRESS NOTES
Nohelia Quinn is a 44 y.o. female  Chief Complaint   Patient presents with    Follow-up      metastatic appendix cancer     1. Have you been to the ER, urgent care clinic since your last visit? Hospitalized since your last visit? No.  2. Have you seen or consulted any other health care providers outside of the [address] [place] [address] since your last visit? Include any pap smears or colon screening.  No.

## 2020-02-05 NOTE — PROGRESS NOTES
[facility] [place] [address], [place]: [phone]  F: [phone]    HEME/ONC FOLLOW UP    Reason for Visit:   John Phelps is a 44 y.o. female who is seen in office today for follow up of metastatic appendix cancer. Treatment History:   · EXPLORATORY LAPAROTOMY SIERRA BSO, TUMOR DEBULKING: ILEOCECAL RESECTION; OMENTECTOMY  · FOLFOX 50%  12/3/19 - Current    · STAGE: 4 with carcinomatosis    History of Present Illness:   John Phelps is a 44 y.o. female seen today in office for follow up of metastatic appendix cancer on of 50% dose reduced palliative FOLFOX chemo. Here for cycle 5 today. Tolerating chemo well. Pt feels good today. No pain. No N/V. Wants to do chemo today. Back to [facility] on 3/15 for visit and scans. CBC good today. Comp met pending. Last visit:  Saw surgery yesterday and still has drain. Had CT a few days ago which was stable. Has abdominal pain with eating. Not taking any pain meds. Can tolerate supplements. Taking compazine every morning. Ativan helping with sleeping. Labs pending.        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 ([address] 2019    Nausea & vomiting     Nausea and vomiting 12/9/2019    Splenic infarction 2019    Subphrenic abscess ([facility] 1/8/2020    Symptomatic cholelithiasis 9/18/2019      Past Surgical History:   Procedure Laterality Date    ABDOMEN SURGERY PROC UNLISTED      HX APPENDECTOMY  09/2019    HX GI  09/2019    ILEOCECECTOMY    HX GYN  2003    CYST ON OVARY    HX HYSTERECTOMY  2019    IR THORACENTESIS CATH W IMAGE  11/6/2019      Social History     Tobacco Use    Smoking status: Never Smoker    Smokeless tobacco: Never Used   Substance Use Topics    Alcohol use: Not Currently      Family History   Problem Relation Age of Onset    Breast Cancer Paternal Grandmother 61    Crohn's Disease Mother     Heart Disease Mother     Cancer Father         BLADDER    Diabetes Father     No Known Problems Son     No Known Problems Daughter     Anesth Problems Neg Hx      Current Outpatient Medications   Medication Sig    potassium chloride (KLOR-CON) 10 mEq tablet Take 1 Tab by mouth two (2) times a day.  ondansetron (ZOFRAN ODT) 4 mg disintegrating tablet Take 1 Tab by mouth every eight (8) hours as needed for Nausea. (Patient taking differently: Take 8 mg by mouth every eight (8) hours as needed for Nausea. Indications: prevent nausea and vomiting from cancer chemotherapy)    dexAMETHasone (DECADRON) 4 mg tablet Take 2 tabs (8mg) by mouth daily on days 2 and 3 after chemotherapy    LORazepam (ATIVAN) 0.5 mg tablet Take 1 Tab by mouth nightly as needed for Anxiety. Max Daily Amount: 0.5 mg.  prochlorperazine (COMPAZINE) 5 mg tablet Take 1 tab by mouth every 6 hours as needed for nausea or vomiting    lidocaine-prilocaine (EMLA) topical cream Apply  to affected area as needed for Pain.  acetaminophen (TYLENOL) 325 mg tablet Take 650 mg by mouth every four (4) hours as needed for Pain. Indications: pain    ibuprofen (MOTRIN) 200 mg tablet Take 3 Tabs by mouth every eight (8) hours as needed for Pain. No current facility-administered medications for this visit. No Known Allergies     Review of Systems: A complete review of systems was obtained, negative except as described above.     Physical Exam:     Visit Vitals  /68 (BP 1 Location: Right arm, BP Patient Position: Sitting)   Pulse 69   Temp 97.6 °F (36.4 °C) (Oral)   Ht 5' 3\" (1.6 m)   Wt 110 lb 3.2 oz (50 kg)   LMP 09/15/2019   SpO2 98%   BMI 19.52 kg/m²     ECOG PS: 1-2  General: No distress  Eyes: Anicteric sclerae  HENT: Atraumatic  Neck: Supple  Resp: CTAB, normal effort  CV: Regular   GI: Soft, less distended   MS: Ambulatory  Skin: Warm dry  Psych: Alert, oriented, appropriate affect, normal judgment/insight    Results:     Lab Results   Component Value Date/Time    WBC 5.1 02/05/2020 09:49 AM    HGB 12.4 02/05/2020 09:49 AM    HCT 38.3 02/05/2020 09:49 AM    PLATELET 797 79/06/0482 09:49 AM    MCV 92.3 02/05/2020 09:49 AM    ABS. NEUTROPHILS 2.5 02/05/2020 09:49 AM    Hemoglobin (POC) 10.8 (L) 09/25/2019 04:11 PM    Hematocrit (POC) 32 (L) 09/25/2019 12:10 PM     Lab Results   Component Value Date/Time    Sodium 140 01/22/2020 10:02 AM    Potassium 3.4 (L) 01/22/2020 10:02 AM    Chloride 108 01/22/2020 10:02 AM    CO2 27 01/22/2020 10:02 AM    Glucose 90 01/22/2020 10:02 AM    BUN 8 01/22/2020 10:02 AM    Creatinine 0.39 (L) 01/22/2020 10:02 AM    GFR est AA >60 01/22/2020 10:02 AM    GFR est non-AA >60 01/22/2020 10:02 AM    Calcium 9.1 01/22/2020 10:02 AM    Sodium (POC) 137 09/25/2019 12:10 PM    Potassium (POC) 3.7 09/25/2019 12:10 PM    Chloride (POC) 105 09/25/2019 12:10 PM    Glucose (POC) 85 09/25/2019 12:10 PM    BUN (POC) 4 (L) 09/25/2019 12:10 PM    Creatinine (POC) 0.5 (L) 09/25/2019 12:10 PM    Calcium, ionized (POC) 1.14 09/25/2019 12:10 PM     Lab Results   Component Value Date/Time    Bilirubin, total 0.2 01/22/2020 10:02 AM    ALT (SGPT) 23 01/22/2020 10:02 AM    AST (SGOT) 19 01/22/2020 10:02 AM    Alk. phosphatase 87 01/22/2020 10:02 AM    Protein, total 6.6 01/22/2020 10:02 AM    Albumin 3.0 (L) 01/22/2020 10:02 AM    Globulin 3.6 01/22/2020 10:02 AM     CT Results (most recent):  Results from Hospital Encounter encounter on 01/07/20   CT ABD PELV W CONT    Narrative EXAM: CT ABD PELV W CONT    INDICATION: F/U subdiaphragmatic fluid collection . Malignant neoplasm of the  appendix status post appendectomy, colon resection, hysterectomy and  nephrectomy. COMPARISON: 12/17/2019     CONTRAST: 100 mL of Isovue-370. TECHNIQUE:   Following the uneventful intravenous administration of contrast, thin axial  images were obtained through the abdomen and pelvis.  Coronal and sagittal  reconstructions were generated. Oral contrast was not administered. CT dose  reduction was achieved through use of a standardized protocol tailored for this  examination and automatic exposure control for dose modulation. FINDINGS:   LUNG BASES: Stable small left pleural effusion with left basilar atelectasis. INCIDENTALLY IMAGED HEART AND MEDIASTINUM: Unremarkable. LIVER: No solid mass or biliary dilatation. 2 stable cysts likely in the left  hepatic lobe. GALLBLADDER: Unremarkable. SPLEEN: Stable left subphrenic fluid collection containing a pigtail catheter,  anterior and lateral to the spleen which remains homogeneous and unchanged. PANCREAS: No mass or ductal dilatation. ADRENALS: Unremarkable. KIDNEYS: No mass, calculus, or hydronephrosis. STOMACH: Unremarkable. SMALL BOWEL: No dilatation or wall thickening. However, stranding of perivesical  and lower pelvic fat surrounds nondilated small bowel loops just cephalad to the  bladder dome, unchanged in appearance. The bladder dome is tented toward small  bowel loops and is inseparable with no preservation of fat plane between the  bladder and small bowel or sigmoid colon. This is unchanged. COLON: No dilatation or wall thickening. Large amount of retained fecal material  and fluid again noted in the colon. Anastomotic staples at the cecal region. Stranding of pericolonic fat in the sigmoid colon adjacent to the bladder dome,  stable since the prior study. APPENDIX: Not seen, surgically absent. PERITONEUM: No ascites or pneumoperitoneum. RETROPERITONEUM: No lymphadenopathy or aortic aneurysm. REPRODUCTIVE ORGANS: Surgically absent uterus  URINARY BLADDER: Tenting of the bladder dome, which is inseparable from adjacent  small bowel and sigmoid colonic loops with stranding of perivesical fat, stable  BONES: No destructive bone lesion.   ADDITIONAL COMMENTS: N/A      Impression IMPRESSION:    1. Stable appearance of pigtail catheter in the left subphrenic space with small  fluid collection adjacent to the spleen. 2. Stable postoperative/postinflammatory changes in the pelvis status post  appendectomy. 3. Stable small left pleural effusion with left basilar atelectasis. 4. Other stable incidental and postoperative changes. Records reviewed and summarized above. Pathology report(s) reviewed above. Radiology report(s) reviewed above. Assessment/PLAN:     1) Stage 4 / Metastatic Appendiceal Cancer   post EXPLORATORY LAPAROTOMY SIERRA BSO, TUMOR DEBULKING: ILEOCECAL RESECTION; OMENTECTOMY 9/25/19  Surgery done for obstruction. She had left rib pain and SOB and on exam decreased bs on LEFT. Got CXR which showed pleural effusion then ultrasound which showed elevated diaphragm and ? Free air  CT which showed fluid collection and patient admitted for this. Had IR drain of splenic fluid collection. [place] [facility] [place] [address] [facility] for second opinions. Discussed systemic therapy with FOLFOX/ +/- Avastin chemo or some variation of this regimen. Possible [facility] down the road. Patient choose to have chemo locally and still see [address] [facility]. Patient had cycle 1 of FOLFOX chemotherapy on 12/3/19 reduced by 50%. She was then admitted via ER with N/V and abdominal pain. CT A/P 12/12/19 shows worsening fluid collection which was drained via IR. Follow up CT A/P 12/17/19 better overall. CT 1/20 stable. Patient is clinically stable overall. Feeling better overall. No N/V. Pain controlled. on palliative FOLFOX 50% DR for cycle 5   Tolerating chemo with side effects. Reviewed side effect mgmt today. Wants to continue with chemo. Will set up IVF during chemo. CBC good today. Comp met pending. Pt is going to [facility] for f/u 3/20 with CTs there. 2) Left Pleural Effusion post thora x 2  No cytology. Stable on CT. 3) abdominal drain out. Site fine. 4) Nausea and Vomiting  Much better now. Continue anti-emetics as needed.       5) Abdominal Pain   Due to carcinomatosis. No pain today per patient. 6) Psychosocial  Mood good. Coping well considering difficult disease. She has great family support. SW support. Call if questions. Follow up in 2 weeks  I appreciate the opportunity to participate in Ms. Hanna Slaughter care.     Signed By: Ashlie Mike,

## 2020-02-07 NOTE — PROGRESS NOTES
Outpatient Infusion Center - Chemotherapy Progress Note 1245th Pt admit to [facility] for Pump DC ambulatory in stable condition. Assessment completed. No new concerns voiced, CADD pump volume=0, stopped and disconnected CADD pump from patient. Chemotherapy Flowsheet 2/5/2020 Cycle C5 Date 2/5/2020 Drug / Regimen Folfox Pre Meds given Notes given Visit Vitals /77 Pulse 62 Temp 98.5 °F (36.9 °C) Resp 18 LMP 09/15/2019 Medications: 
Medications Administered   
 heparin (porcine) pf 300-500 Units Admin Date 
02/07/2020 Action Given Dose 
500 Units Route InterCATHeter Administered By Barbara Last RN  
  
  
 saline peripheral flush soln 10 mL Admin Date 
02/07/2020 Action Given Dose 
10 mL Route InterCATHeter Administered By Barbara Last, MING  
  
  
  
  
  
 
 
 
1300 Pt tolerated treatment well. Port maintained positive blood return throughout treatment. Flushed, heparinized and de-accessed per protocol. D/c home ambulatory in no distress. Pt aware of next appointment scheduled for 2/9/20

## 2020-02-19 PROBLEM — R19.7 DIARRHEA: Status: ACTIVE | Noted: 2020-01-01

## 2020-02-19 PROBLEM — R10.84 GENERALIZED ABDOMINAL PAIN: Status: ACTIVE | Noted: 2020-01-01

## 2020-02-19 NOTE — PROGRESS NOTES
[facility] Outpatient Infusion Center Note:  1000Pt arrived at [facility] ambulatory and in no distress for *6c    Assessment stable, no new complaints voiced. She continues to have diarrhea, nausea intermittently. Uses immodium. Will come to hydration on Sunday.     Medications received:  Medications Administered     0.9% sodium chloride injection 10 mL     Admin Date  02/19/2020 Action  Given Dose  10 mL Route  IntraVENous Administered By  Adelso Randolph RN          dexamethasone (DECADRON) 12 mg in 0.9% sodium chloride 50 mL, overfill volume 5 mL IVPB     Admin Date  02/19/2020 Action  Given Dose  12 mg Rate  232 mL/hr Route  IntraVENous Administered By  Adelso Randolph RN          dextrose 5% infusion     Admin Date  02/19/2020 Action  New Bag Dose  25 mL/hr Rate  25 mL/hr Route  IntraVENous Administered By  Adelso Randolph RN          fluorouraciL (ADRUCIL) 1,896 mg in 0.9% sodium chloride 100 mL CADD Cassette     Admin Date  02/19/2020 Action  New Bag Dose  1896 mg Rate  2.2 mL/hr Route  IntraVENous Administered By  Adelso Randolph RN          fluorouraciL (ADRUCIL) chemo syringe 316 mg     Admin Date  02/19/2020 Action  Given Dose  316 mg Rate  189.6 mL/hr Route  IntraVENous Administered By  Adelso Randolph RN          heparin (porcine) pf 300-500 Units     Admin Date  02/19/2020 Action  Given Dose  500 Units Route  InterCATHeter Administered By  Adelso Randolph RN          leucovorin (WELLCOVORIN) 316 mg in dextrose 5% 250 mL, overfill volume 25 mL IVPB     Admin Date  02/19/2020 Action  New Bag Dose  316 mg Rate  145.4 mL/hr Route  IntraVENous Administered By  Adelso Randolph RN          ondansetron [facility]) injection 8 mg     Admin Date  02/19/2020 Action  Given Dose  8 mg Route  IntraVENous Administered By  Adelso Randolph RN          oxaliplatin (ELOXATIN) 67 mg in dextrose 5% 250 mL, overfill volume 25 mL chemo infusion     Admin Date  02/19/2020 Action  New Bag Dose  67 mg Rate  144.2 mL/hr Route  IntraVENous Administered By  Candice Clark RN          potassium chloride SR (KLOR-CON 10) tablet 40 mEq     Admin Date  02/19/2020 Action  Given Dose  40 mEq Route  Oral Administered By  Candice Clark RN          saline peripheral flush soln 10 mL     Admin Date  02/19/2020 Action  Given Dose  10 mL Route  InterCATHeter Administered By  Candice Clark RN          sodium chloride 0.9 % bolus infusion 1,000 mL     Admin Date  02/19/2020 Action  New Bag Dose  1000 mL Rate  1,000 mL/hr Route  IntraVENous Administered By  Candice Clark RN                  [id number] Tolerated treatment well, no adverse reaction noted. D/Cd from [facility] ambulatory and in no distress accompanied by self. Next appt 2/21  1400 for pump removal.  Visit Vitals  /73   Pulse 81   Temp 97.1 °F (36.2 °C)   Resp 16   Ht 5' 3\" (1.6 m)   Wt 49 kg (108 lb)   LMP 09/15/2019   BMI 19.13 kg/m²     Recent Results (from the past 12 hour(s))   METABOLIC PANEL, COMPREHENSIVE    Collection Time: 02/19/20 10:10 AM   Result Value Ref Range    Sodium 139 136 - 145 mmol/L    Potassium 3.3 (L) 3.5 - 5.1 mmol/L    Chloride 105 97 - 108 mmol/L    CO2 27 21 - 32 mmol/L    Anion gap 7 5 - 15 mmol/L    Glucose 124 (H) 65 - 100 mg/dL    BUN 9 6 - 20 MG/DL    Creatinine 0.53 (L) 0.55 - 1.02 MG/DL    BUN/Creatinine ratio 17 12 - 20      GFR est AA >60 >60 ml/min/1.73m2    GFR est non-AA >60 >60 ml/min/1.73m2    Calcium 8.9 8.5 - 10.1 MG/DL    Bilirubin, total 0.2 0.2 - 1.0 MG/DL    ALT (SGPT) 31 12 - 78 U/L    AST (SGOT) 21 15 - 37 U/L    Alk.  phosphatase 106 45 - 117 U/L    Protein, total 6.6 6.4 - 8.2 g/dL    Albumin 3.1 (L) 3.5 - 5.0 g/dL    Globulin 3.5 2.0 - 4.0 g/dL    A-G Ratio 0.9 (L) 1.1 - 2.2     CBC WITH AUTOMATED DIFF    Collection Time: 02/19/20 10:10 AM   Result Value Ref Range    WBC 4.1 3.6 - 11.0 K/uL    RBC 4.11 3.80 - 5.20 M/uL    HGB 12.3 11.5 - 16.0 g/dL    HCT 38.0 35.0 - 47.0 %    MCV 92.5 80.0 - 99.0 FL    MCH 29.9 26.0 - 34.0 PG    MCHC 32.4 30.0 - 36.5 g/dL    RDW 16.7 (H) 11.5 - 14.5 %    PLATELET 489 911 - 339 K/uL    MPV 10.0 8.9 - 12.9 FL    NRBC 0.0 0  WBC    ABSOLUTE NRBC 0.00 0.00 - 0.01 K/uL    NEUTROPHILS 45 32 - 75 %    LYMPHOCYTES 35 12 - 49 %    MONOCYTES 16 (H) 5 - 13 %    EOSINOPHILS 3 0 - 7 %    BASOPHILS 1 0 - 1 %    IMMATURE GRANULOCYTES 0 0.0 - 0.5 %    ABS. NEUTROPHILS 1.8 1.8 - 8.0 K/UL    ABS. LYMPHOCYTES 1.4 0.8 - 3.5 K/UL    ABS. MONOCYTES 0.7 0.0 - 1.0 K/UL    ABS. EOSINOPHILS 0.1 0.0 - 0.4 K/UL    ABS. BASOPHILS 0.0 0.0 - 0.1 K/UL    ABS. IMM.  GRANS. 0.0 0.00 - 0.04 K/UL    DF AUTOMATED

## 2020-02-19 NOTE — PROGRESS NOTES
[facility] [place] [address] [address], [place]: [phone]  F: [phone]    HEME/ONC FOLLOW UP    Reason for Visit:   Eli Gómez is a 44 y.o. female who is seen in office today for follow up of metastatic appendix cancer. Treatment History:   · EXPLORATORY LAPAROTOMY SIERRA BSO, TUMOR DEBULKING: ILEOCECAL RESECTION; OMENTECTOMY  · FOLFOX 50% DR 12/3/19 - Current  ·   · STAGE: 4 with carcinomatosis    History of Present Illness:   Eli Gómez is a 44 y.o. female seen today in office for follow up of metastatic appendix cancer on of 50% dose reduced palliative FOLFOX chemo. She is here today for Cycle 6. She reports that she is feeling okay overall today. She is tolerating chemo well overall with some mild side effects. She reports that she has had some bad abdominal pain, nausea, vomiting, and diarrhea for the past 1.5 days. Her appetite was good up until 1.5 days ago. She has been taking Tylenol PRN, Imodium PRN, and anti-emetics PRN. She denies fever, chills, and mouth sores. She is going back to [facility] in 3/20 for visit and scans. CBC is good today, CMP is still pending. She wants treatment today. Her supportive  is here today.      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[facility] 2019    Nausea & vomiting     Nausea and vomiting 12/9/2019    Splenic infarction 2019    Subphrenic abscess ([address] 1/8/2020    Symptomatic cholelithiasis 9/18/2019      Past Surgical History:   Procedure Laterality Date    ABDOMEN SURGERY PROC UNLISTED      HX APPENDECTOMY  09/2019    HX GI  09/2019    ILEOCECECTOMY    HX GYN  2003    CYST ON OVARY    HX HYSTERECTOMY  2019    IR THORACENTESIS CATH W IMAGE  11/6/2019      Social History     Tobacco Use    Smoking status: Never Smoker    Smokeless tobacco: Never Used   Substance Use Topics    Alcohol use: Not Currently Family History   Problem Relation Age of Onset    Breast Cancer Paternal Grandmother 61    Crohn's Disease Mother     Heart Disease Mother     Cancer Father         BLADDER    Diabetes Father     No Known Problems Son     No Known Problems Daughter     Anesth Problems Neg Hx      Current Outpatient Medications   Medication Sig    potassium chloride (KLOR-CON) 10 mEq tablet Take 1 Tab by mouth two (2) times a day.  ondansetron (ZOFRAN ODT) 4 mg disintegrating tablet Take 1 Tab by mouth every eight (8) hours as needed for Nausea. (Patient taking differently: Take 8 mg by mouth every eight (8) hours as needed for Nausea. Indications: prevent nausea and vomiting from cancer chemotherapy)    dexAMETHasone (DECADRON) 4 mg tablet Take 2 tabs (8mg) by mouth daily on days 2 and 3 after chemotherapy    LORazepam (ATIVAN) 0.5 mg tablet Take 1 Tab by mouth nightly as needed for Anxiety. Max Daily Amount: 0.5 mg.  prochlorperazine (COMPAZINE) 5 mg tablet Take 1 tab by mouth every 6 hours as needed for nausea or vomiting    lidocaine-prilocaine (EMLA) topical cream Apply  to affected area as needed for Pain.  acetaminophen (TYLENOL) 325 mg tablet Take 650 mg by mouth every four (4) hours as needed for Pain. Indications: pain    ibuprofen (MOTRIN) 200 mg tablet Take 3 Tabs by mouth every eight (8) hours as needed for Pain. No current facility-administered medications for this visit. No Known Allergies     Review of Systems: A complete review of systems was obtained, negative except as described above. Physical Exam:     Visit Vitals  Ht 5' 3\" (1.6 m)   Wt 108 lb 14.4 oz (49.4 kg)   LMP 09/15/2019   BMI 19.29 kg/m²     ECOG PS: 1-2  General: No distress  Eyes: Anicteric sclerae  HENT: Atraumatic  Neck: Supple  Resp: CTAB, normal effort  CV: Regular   GI: Soft, less distended   MS: Ambulatory  Skin: Warm dry.  Port site without redness/swelling  Psych: Alert, oriented, appropriate affect, normal judgment/insight    Results:     Lab Results   Component Value Date/Time    WBC 5.1 02/05/2020 09:49 AM    HGB 12.4 02/05/2020 09:49 AM    HCT 38.3 02/05/2020 09:49 AM    PLATELET 562 51/66/5124 09:49 AM    MCV 92.3 02/05/2020 09:49 AM    ABS. NEUTROPHILS 2.5 02/05/2020 09:49 AM    Hemoglobin (POC) 10.8 (L) 09/25/2019 04:11 PM    Hematocrit (POC) 32 (L) 09/25/2019 12:10 PM     Lab Results   Component Value Date/Time    Sodium 140 02/05/2020 09:49 AM    Potassium 3.8 02/05/2020 09:49 AM    Chloride 108 02/05/2020 09:49 AM    CO2 27 02/05/2020 09:49 AM    Glucose 107 (H) 02/05/2020 09:49 AM    BUN 8 02/05/2020 09:49 AM    Creatinine 0.44 (L) 02/05/2020 09:49 AM    GFR est AA >60 02/05/2020 09:49 AM    GFR est non-AA >60 02/05/2020 09:49 AM    Calcium 9.3 02/05/2020 09:49 AM    Sodium (POC) 137 09/25/2019 12:10 PM    Potassium (POC) 3.7 09/25/2019 12:10 PM    Chloride (POC) 105 09/25/2019 12:10 PM    Glucose (POC) 85 09/25/2019 12:10 PM    BUN (POC) 4 (L) 09/25/2019 12:10 PM    Creatinine (POC) 0.5 (L) 09/25/2019 12:10 PM    Calcium, ionized (POC) 1.14 09/25/2019 12:10 PM     Lab Results   Component Value Date/Time    Bilirubin, total 0.2 02/05/2020 09:49 AM    ALT (SGPT) 32 02/05/2020 09:49 AM    AST (SGOT) 24 02/05/2020 09:49 AM    Alk. phosphatase 101 02/05/2020 09:49 AM    Protein, total 7.1 02/05/2020 09:49 AM    Albumin 3.4 (L) 02/05/2020 09:49 AM    Globulin 3.7 02/05/2020 09:49 AM     CT Results (most recent):  Results from [place] encounter on 01/07/20   CT ABD PELV W CONT    Narrative EXAM: CT ABD PELV W CONT    INDICATION: F/U subdiaphragmatic fluid collection . Malignant neoplasm of the  appendix status post appendectomy, colon resection, hysterectomy and  nephrectomy. COMPARISON: 12/17/2019     CONTRAST: 100 mL of Isovue-370. TECHNIQUE:   Following the uneventful intravenous administration of contrast, thin axial  images were obtained through the abdomen and pelvis.  Coronal and sagittal  reconstructions were generated. Oral contrast was not administered. CT dose  reduction was achieved through use of a standardized protocol tailored for this  examination and automatic exposure control for dose modulation. FINDINGS:   LUNG BASES: Stable small left pleural effusion with left basilar atelectasis. INCIDENTALLY IMAGED HEART AND MEDIASTINUM: Unremarkable. LIVER: No solid mass or biliary dilatation. 2 stable cysts likely in the left  hepatic lobe. GALLBLADDER: Unremarkable. SPLEEN: Stable left subphrenic fluid collection containing a pigtail catheter,  anterior and lateral to the spleen which remains homogeneous and unchanged. PANCREAS: No mass or ductal dilatation. ADRENALS: Unremarkable. KIDNEYS: No mass, calculus, or hydronephrosis. STOMACH: Unremarkable. SMALL BOWEL: No dilatation or wall thickening. However, stranding of perivesical  and lower pelvic fat surrounds nondilated small bowel loops just cephalad to the  bladder dome, unchanged in appearance. The bladder dome is tented toward small  bowel loops and is inseparable with no preservation of fat plane between the  bladder and small bowel or sigmoid colon. This is unchanged. COLON: No dilatation or wall thickening. Large amount of retained fecal material  and fluid again noted in the colon. Anastomotic staples at the cecal region. Stranding of pericolonic fat in the sigmoid colon adjacent to the bladder dome,  stable since the prior study. APPENDIX: Not seen, surgically absent. PERITONEUM: No ascites or pneumoperitoneum. RETROPERITONEUM: No lymphadenopathy or aortic aneurysm. REPRODUCTIVE ORGANS: Surgically absent uterus  URINARY BLADDER: Tenting of the bladder dome, which is inseparable from adjacent  small bowel and sigmoid colonic loops with stranding of perivesical fat, stable  BONES: No destructive bone lesion.   ADDITIONAL COMMENTS: N/A      Impression IMPRESSION:    1. Stable appearance of pigtail catheter in the left subphrenic space with small  fluid collection adjacent to the spleen. 2. Stable postoperative/postinflammatory changes in the pelvis status post  appendectomy. 3. Stable small left pleural effusion with left basilar atelectasis. 4. Other stable incidental and postoperative changes. Records reviewed and summarized above. Pathology report(s) reviewed above. Radiology report(s) reviewed above. Assessment/PLAN:     1) Stage 4 / Metastatic Appendiceal Cancer   post EXPLORATORY LAPAROTOMY SIERRA BSO, TUMOR DEBULKING: ILEOCECAL RESECTION; OMENTECTOMY 9/25/19  Surgery done for obstruction. She had left rib pain and SOB and on exam decreased bs on LEFT. Got CXR which showed pleural effusion then ultrasound which showed elevated diaphragm and ? Free air  CT which showed fluid collection and patient admitted for this. Had IR drain of splenic fluid collection. [place] [facility] [place] [facility] for second opinions. Discussed systemic therapy with FOLFOX/ +/- Avastin chemo or some variation of this regimen. Possible [facility] down the road. Patient choose to have chemo locally and still see [address] [facility]. Patient had cycle 1 of FOLFOX chemotherapy on 12/3/19 reduced by 50%. She was then admitted via ER with N/V and abdominal pain. CT A/P 12/12/19 shows worsening fluid collection which was drained via IR. Follow up CT A/P 12/17/19 better overall. CT 1/20 stable. She is here for Cycle 6 of palliative FOLFOX 50% DR. She is tolerating chemo well overall with some mild side effects. Patient is clinically stable overall. She has been having N/V/D and abdominal pain for past 1.5 days. CBC is good today, CMP is still pending. She wants to do treatment today. Will set up IVF with chemo today and for Sunday after chemo. She is going to [facility] for follow up in 3/20 with CTs there. Follow up here in 2 weeks. 2) Left Pleural Effusion post Thoracentesis x 2  No cytology. Stable on CT.       3) Nausea and Vomiting  N/V for the past 1.5 days. Continue anti-emetics as needed. Advised her that she can take 10 mg Compazine if needed. 4) Abdominal Pain   Due to carcinomatosis. Pain for the past 1.5 days. She is taking Tylenol PRN. 5) Psychosocial  Mood good, coping well considering difficult disease. She has great family support. SW support as needed. Her supportive  is here today. Call if questions. Follow up in 2 weeks  This patient was seen in conjunction with Osman Sharp NP. I appreciate the opportunity to participate in Ms. Xavier Loyd care.     Signed By: Tiana Fleischer, NP

## 2020-02-19 NOTE — PROGRESS NOTES
Black Hewitt is a 44 y.o. female  Chief Complaint   Patient presents with    Follow-up      metastatic appendix cancer     1. Have you been to the ER, urgent care clinic since your last visit? Hospitalized since your last visit? No.  2. Have you seen or consulted any other health care providers outside of the [address] [place] [address] since your last visit? Include any pap smears or colon screening. No.    Pt states her stomach starts hurting when she eats.

## 2020-02-19 NOTE — LETTER
2/19/20 Patient: Gema Hogan YOB: 1970 Date of Visit: 2/19/2020 Dottie Massey MD 
[place] [address] [address] [place] [address] VIA Facsimile: [phone] Dear Dottie Massey MD, Thank you for referring Ms. Gema Hogan to [address] [place] [address] for evaluation. My notes for this consultation are attached. If you have questions, please do not hesitate to call me. I look forward to following your patient along with you. Sincerely, Slime Gonsalez NP

## 2020-02-21 NOTE — PROGRESS NOTES
[facility] Short Note Date: 2020 Name: Lang Suarez MRN: [id number] : 1970 1415 Pt admit to [facility] for pump D/C ambulatory in stable condition. Assessment completed. No new concerns voiced. Port flushed with brisk blood return, heparinized and de-accessed per protocol. Patient Vitals for the past 12 hrs: 
 Temp Pulse Resp BP  
20 1423 98.4 °F (36.9 °C) 60 18 114/74 Medications Administered   
 heparin (porcine) pf 300-500 Units Admin Date 
2020 Action Given Dose 
500 Units Route InterCATHeter Administered By 
Jessa Connell RN  
  
  
 saline peripheral flush soln 10 mL Admin Date 
2020 Action Given Dose 
10 mL Route InterCATHeter Administered By 
Jessa Connell RN  
  
  
  
 
 
 
Ms. Cat Tijerina was discharged from [place] [address] in stable condition at 1430. Future Appointments Date Time Provider Thomas Espinoza 2020  9:30 AM THERESE INFUSION NURSE 2 [facility]  
3/4/2020 10:00 AM A2 DIANNE LONG [place] [facility]  
3/4/2020 10:45 AM Ramiro Henry, [facility] [address]  
3/6/2020  2:00 PM G2 DIANNE FASTRACK [facility]  
3/18/2020 10:00 AM A2 DIANNE LONG [facility]  
3/20/2020  2:00 PM G2 DIANNE FASTRACK [facility] Peter Edmondson RN 2020 
2:40 PM

## 2020-02-23 NOTE — PROGRESS NOTES
\[facility]\"" VISIT NOTE 
 
2717 Pt arrived at [facility] ambulatory and in no distress for hydration. Assessment completed, pt c/o nausea, diarrhea, and fatigue. No other new complaints at this time. Port accessed with 1 in oleary with no difficulty. Positive blood return noted. Medications received: 
Zofran IVP 
1 L NS bolus IV Patient Vitals for the past 12 hrs: 
 Temp Pulse Resp BP  
02/23/20 0925 98.1 °F (36.7 °C) 81 18 96/70 Tolerated treatment well, no adverse reaction noted. Port de-accessed and flushed per protocol. Positive blood return noted. [facility] D/C'd from [facility] ambulatory and in no distress accompanied by her . Next appointment is 3/4/20.

## 2020-03-04 PROBLEM — G47.00 INSOMNIA: Status: ACTIVE | Noted: 2020-01-01

## 2020-03-04 PROBLEM — K52.1 CHEMOTHERAPY INDUCED DIARRHEA: Status: ACTIVE | Noted: 2020-01-01

## 2020-03-04 PROBLEM — T45.1X5A CHEMOTHERAPY INDUCED DIARRHEA: Status: ACTIVE | Noted: 2020-01-01

## 2020-03-04 NOTE — LETTER
3/4/20 Patient: Katia Hawkins YOB: 1970 Date of Visit: 3/4/2020 Sania Gardner MD 
[address] VIA Facsimile: [phone] Dear Sania Gardner MD, Thank you for referring Ms. Katia Hawkins to [address] [place] [address] for evaluation. My notes for this consultation are attached. If you have questions, please do not hesitate to call me. I look forward to following your patient along with you. Sincerely, Tim Clifton NP

## 2020-03-04 NOTE — PROGRESS NOTES
Clearence Boeck is a 44 y.o. female  Chief Complaint   Patient presents with    Follow-up      metastatic appendix cancer     1. Have you been to the ER, urgent care clinic since your last visit? Hospitalized since your last visit? No.    2. Have you seen or consulted any other health care providers outside of the [address] [place] [address] since your last visit? Include any pap smears or colon screening.  No.

## 2020-03-04 NOTE — PROGRESS NOTES
Cancer [place] [facility]  [address], [place]: [phone]  F: [phone]    HEME/ONC FOLLOW UP    Reason for Visit:   Cullen Jo is a 44 y.o. female who is seen in office today for follow up of metastatic appendix cancer. Treatment History:   · EXPLORATORY LAPAROTOMY SIERRA BSO, TUMOR DEBULKING: ILEOCECAL RESECTION; OMENTECTOMY  · FOLFOX 50%  12/3/19 - Current    · STAGE: 4 with carcinomatosis    History of Present Illness:   Cullen Jo is a 44 y.o. female seen today in office for follow up of metastatic appendix cancer on of 50% dose reduced palliative FOLFOX chemo. She is here today for Cycle 7. She reports that she is feeling well overall today. She is tolerating chemo well overall with some mild side effects. She denies abdominal pain, nausea, and vomiting today. She continues to have some diarrhea and is taking Imodium as needed. Her appetite is stable - up 2 lbs today. She has neuropathy in fingers for 3-4 days after chemo when touching cold but this is not constant. She denies fever, chills, and mouth sores. She is going back to [facility] in 3/13/20  for visit and scans. Labs are good today. She wants treatment today. She is here alone today.      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 ([facility] [address] 2019    Nausea & vomiting     Nausea and vomiting 12/9/2019    Splenic infarction 2019    Subphrenic abscess ([facility] [address] 1/8/2020    Symptomatic cholelithiasis 9/18/2019      Past Surgical History:   Procedure Laterality Date    ABDOMEN SURGERY PROC UNLISTED      HX APPENDECTOMY  09/2019    HX GI  09/2019    ILEOCECECTOMY    HX GYN  2003    CYST ON OVARY    HX HYSTERECTOMY  2019    IR THORACENTESIS CATH W IMAGE  11/6/2019      Social History     Tobacco Use    Smoking status: Never Smoker    Smokeless tobacco: Never Used   Substance Use Topics    Alcohol use: Not Currently      Family History   Problem Relation Age of Onset    Breast Cancer Paternal Grandmother 61    Crohn's Disease Mother     Heart Disease Mother     Cancer Father         BLADDER    Diabetes Father     No Known Problems Son     No Known Problems Daughter     Anesth Problems Neg Hx      Current Outpatient Medications   Medication Sig    LORazepam (ATIVAN) 0.5 mg tablet Take 1 Tab by mouth nightly as needed for Anxiety. Max Daily Amount: 0.5 mg.  potassium chloride (KLOR-CON) 10 mEq tablet Take 1 Tab by mouth two (2) times a day.  dexAMETHasone (DECADRON) 4 mg tablet Take 2 tabs (8mg) by mouth daily on days 2 and 3 after chemotherapy    ibuprofen (MOTRIN) 200 mg tablet Take 3 Tabs by mouth every eight (8) hours as needed for Pain.  prochlorperazine (COMPAZINE) 5 mg tablet Take 1 tab by mouth every 6 hours as needed for nausea or vomiting    lidocaine-prilocaine (EMLA) topical cream Apply  to affected area as needed for Pain.  ondansetron (ZOFRAN ODT) 4 mg disintegrating tablet Take 1 Tab by mouth every eight (8) hours as needed for Nausea. (Patient taking differently: Take 8 mg by mouth every eight (8) hours as needed for Nausea. Indications: prevent nausea and vomiting from cancer chemotherapy)    acetaminophen (TYLENOL) 325 mg tablet Take 650 mg by mouth every four (4) hours as needed for Pain. Indications: pain     No current facility-administered medications for this visit.       Facility-Administered Medications Ordered in Other Visits   Medication Dose Route Frequency    dextrose 5% infusion  25 mL/hr IntraVENous CONTINUOUS    ondansetron (ZOFRAN) injection 8 mg  8 mg IntraVENous ONCE    dexamethasone (DECADRON) 12 mg in 0.9% sodium chloride 50 mL, overfill volume 5 mL IVPB  12 mg IntraVENous ONCE    leucovorin (WELLCOVORIN) 316 mg in dextrose 5% 250 mL, overfill volume 25 mL IVPB  200 mg/m2 (Treatment Plan Recorded) IntraVENous ONCE    oxaliplatin (ELOXATIN) 67 mg in dextrose 5% 250 mL, overfill volume 25 mL chemo infusion  42.5 mg/m2 (Treatment Plan Recorded) IntraVENous ONCE    fluorouraciL (ADRUCIL) chemo syringe 316 mg  200 mg/m2 (Treatment Plan Recorded) IntraVENous ONCE    fluorouraciL (ADRUCIL) 1,896 mg in 0.9% sodium chloride 100 mL CADD Cassette  1,200 mg/m2 (Treatment Plan Recorded) IntraVENous ONCE    saline peripheral flush soln 10 mL  10 mL InterCATHeter PRN    0.9% sodium chloride injection 10 mL  10 mL IntraVENous PRN    heparin (porcine) pf 300-500 Units  300-500 Units InterCATHeter PRN      No Known Allergies     Review of Systems: A complete review of systems was obtained, negative except as described above. Physical Exam:     Visit Vitals  BP 97/73 (BP 1 Location: Left arm, BP Patient Position: Sitting)   Pulse 65   Temp 97.9 °F (36.6 °C) (Oral)   Ht 5' 3\" (1.6 m)   Wt 110 lb 9.6 oz (50.2 kg)   LMP 09/15/2019   SpO2 98%   BMI 19.59 kg/m²     ECOG PS: 1-2  General: No distress  Eyes: Anicteric sclerae  HENT: Atraumatic  Neck: Supple  Resp: CTAB, normal effort  CV: Regular   GI: Soft, less distended   MS: Ambulatory  Skin: Warm dry. Port site without redness/swelling  Psych: Alert, oriented, appropriate affect, normal judgment/insight    Results:     Lab Results   Component Value Date/Time    WBC 4.9 03/04/2020 10:15 AM    HGB 12.0 03/04/2020 10:15 AM    HCT 36.9 03/04/2020 10:15 AM    PLATELET 547 65/55/0254 10:15 AM    MCV 93.2 03/04/2020 10:15 AM    ABS.  NEUTROPHILS 2.5 03/04/2020 10:15 AM    Hemoglobin (POC) 10.8 (L) 09/25/2019 04:11 PM    Hematocrit (POC) 32 (L) 09/25/2019 12:10 PM     Lab Results   Component Value Date/Time    Sodium 140 03/04/2020 10:15 AM    Potassium 3.6 03/04/2020 10:15 AM    Chloride 109 (H) 03/04/2020 10:15 AM    CO2 25 03/04/2020 10:15 AM    Glucose 83 03/04/2020 10:15 AM    BUN 7 03/04/2020 10:15 AM    Creatinine 0.43 (L) 03/04/2020 10:15 AM    GFR est AA >60 03/04/2020 10:15 AM    GFR est non-AA >60 03/04/2020 10:15 AM Calcium 9.6 03/04/2020 10:15 AM    Sodium (POC) 137 09/25/2019 12:10 PM    Potassium (POC) 3.7 09/25/2019 12:10 PM    Chloride (POC) 105 09/25/2019 12:10 PM    Glucose (POC) 85 09/25/2019 12:10 PM    BUN (POC) 4 (L) 09/25/2019 12:10 PM    Creatinine (POC) 0.5 (L) 09/25/2019 12:10 PM    Calcium, ionized (POC) 1.14 09/25/2019 12:10 PM     Lab Results   Component Value Date/Time    Bilirubin, total 0.2 03/04/2020 10:15 AM    ALT (SGPT) 28 03/04/2020 10:15 AM    AST (SGOT) 18 03/04/2020 10:15 AM    Alk. phosphatase 107 03/04/2020 10:15 AM    Protein, total 7.2 03/04/2020 10:15 AM    Albumin 3.0 (L) 03/04/2020 10:15 AM    Globulin 4.2 (H) 03/04/2020 10:15 AM     CT Results (most recent):  Results from Hospital Encounter encounter on 01/07/20   CT ABD PELV W CONT    Narrative EXAM: CT ABD PELV W CONT    INDICATION: F/U subdiaphragmatic fluid collection . Malignant neoplasm of the  appendix status post appendectomy, colon resection, hysterectomy and  nephrectomy. COMPARISON: 12/17/2019     CONTRAST: 100 mL of Isovue-370. TECHNIQUE:   Following the uneventful intravenous administration of contrast, thin axial  images were obtained through the abdomen and pelvis. Coronal and sagittal  reconstructions were generated. Oral contrast was not administered. CT dose  reduction was achieved through use of a standardized protocol tailored for this  examination and automatic exposure control for dose modulation. FINDINGS:   LUNG BASES: Stable small left pleural effusion with left basilar atelectasis. INCIDENTALLY IMAGED HEART AND MEDIASTINUM: Unremarkable. LIVER: No solid mass or biliary dilatation. 2 stable cysts likely in the left  hepatic lobe. GALLBLADDER: Unremarkable. SPLEEN: Stable left subphrenic fluid collection containing a pigtail catheter,  anterior and lateral to the spleen which remains homogeneous and unchanged. PANCREAS: No mass or ductal dilatation. ADRENALS: Unremarkable.   KIDNEYS: No mass, calculus, or hydronephrosis. STOMACH: Unremarkable. SMALL BOWEL: No dilatation or wall thickening. However, stranding of perivesical  and lower pelvic fat surrounds nondilated small bowel loops just cephalad to the  bladder dome, unchanged in appearance. The bladder dome is tented toward small  bowel loops and is inseparable with no preservation of fat plane between the  bladder and small bowel or sigmoid colon. This is unchanged. COLON: No dilatation or wall thickening. Large amount of retained fecal material  and fluid again noted in the colon. Anastomotic staples at the cecal region. Stranding of pericolonic fat in the sigmoid colon adjacent to the bladder dome,  stable since the prior study. APPENDIX: Not seen, surgically absent. PERITONEUM: No ascites or pneumoperitoneum. RETROPERITONEUM: No lymphadenopathy or aortic aneurysm. REPRODUCTIVE ORGANS: Surgically absent uterus  URINARY BLADDER: Tenting of the bladder dome, which is inseparable from adjacent  small bowel and sigmoid colonic loops with stranding of perivesical fat, stable  BONES: No destructive bone lesion. ADDITIONAL COMMENTS: N/A      Impression IMPRESSION:    1. Stable appearance of pigtail catheter in the left subphrenic space with small  fluid collection adjacent to the spleen. 2. Stable postoperative/postinflammatory changes in the pelvis status post  appendectomy. 3. Stable small left pleural effusion with left basilar atelectasis. 4. Other stable incidental and postoperative changes. Records reviewed and summarized above. Pathology report(s) reviewed above. Radiology report(s) reviewed above. Assessment/PLAN:     1) Stage 4 / Metastatic Appendiceal Cancer   post EXPLORATORY LAPAROTOMY SIERRA BSO, TUMOR DEBULKING: ILEOCECAL RESECTION; OMENTECTOMY 9/25/19  Surgery done for obstruction. She had left rib pain and SOB and on exam decreased bs on LEFT.    Got CXR which showed pleural effusion then ultrasound which showed elevated diaphragm and ? Free air  CT which showed fluid collection and patient admitted for this. Had IR drain of splenic fluid collection. Patient went to [facility] and [address] for second opinions. Discussed systemic therapy with FOLFOX/ +/- Avastin chemo or some variation of this regimen. Possible [facility] down the road. Patient choose to have chemo locally and still [facility] [address] [facility] [address]. Patient had cycle 1 of FOLFOX chemotherapy on 12/3/19 reduced by 50%. She was then admitted via ER with N/V and abdominal pain. CT A/P 12/12/19 shows worsening fluid collection which was drained via IR. Follow up CT A/P 12/17/19 better overall. CT 1/20 stable. She is here for Cycle 7 of palliative FOLFOX 50% DR. She is tolerating chemo well overall with some mild side effects. Patient is clinically stable overall. She denies nausea/vomiting and pain today. CBC and CMP are good today. She wants to do treatment today. Will set up IVF with chemo today and for Sunday after chemo. She is going to [facility] for follow up on 3/13/20 with CTs there. Follow up here in 2 weeks. 2) Left Pleural Effusion post Thoracentesis x 2  No cytology. Stable on CT. 3) Nausea and Vomiting  Continue anti-emetics as needed. Advised her that she can take 10 mg Compazine if needed. No recent nausea/vomiting. 4) Abdominal Pain   Due to carcinomatosis. She is taking Tylenol PRN. No pain today. 5) Chemo Induced Diarrhea  Controlled with PRN Imodium. Will continue to monitor. 6) Insomnia  Takes Ativan PRN - refilled today, #60, NR.    7) Psychosocial  Mood good, coping well considering difficult disease. She has great family support. SW support as needed. Her supportive  is here today. Call if questions. Follow up in 2 weeks  This patient was seen in conjunction with Osmel Weaver NP. I appreciate the opportunity to participate in MsLaura Renny sorto.     Signed By: Guerrero Bunn NP

## 2020-03-04 NOTE — PROGRESS NOTES
[facility] - Chemotherapy Progress Note    1000 Pt admit to [facility] for Folfox ambulatory in stable condition. Assessment completed. No new concerns voiced. Port accessed with positive blood return, labs drawn and sent for processing.     1020 pt upstairs for MD appt    1130 pt back to [facility] for tx    Chemotherapy Flowsheet 3/4/2020   Cycle C7   Date 3/4/2020   Drug / Regimen Folfox   Pre Meds given   Notes given         Visit Vitals  /73   Pulse 76   Temp 96.8 °F (36 °C)   Resp 18   Ht 5' 3\" (1.6 m)   Wt 50.2 kg (110 lb 9.6 oz)   LMP 09/15/2019   BMI 19.59 kg/m²       Medications:  Medications Administered     0.9% sodium chloride injection 10 mL     Admin Date  03/04/2020 Action  Given Dose  10 mL Route  IntraVENous Administered By  [facility] A          dexamethasone (DECADRON) 12 mg in 0.9% sodium chloride 50 mL, overfill volume 5 mL IVPB     Admin Date  03/04/2020 Action  Given Dose  12 mg Rate  232 mL/hr Route  IntraVENous Administered By  [facility] A          dextrose 5% infusion     Admin Date  03/04/2020 Action  New Bag Dose  25 mL/hr Rate  25 mL/hr Route  IntraVENous Administered By  [facility] A          fluorouraciL (ADRUCIL) 1,896 mg in 0.9% sodium chloride 100 mL CADD Cassette     Admin Date  03/04/2020 Action  New Bag Dose  1896 mg Rate  2.2 mL/hr Route  IntraVENous Administered By  [facility] A          fluorouraciL (ADRUCIL) chemo syringe 316 mg     Admin Date  03/04/2020 Action  Given Dose  316 mg Rate  189.6 mL/hr Route  IntraVENous Administered By  [facility] A          leucovorin (WELLCOVORIN) 316 mg in dextrose 5% 250 mL, overfill volume 25 mL IVPB     Admin Date  03/04/2020 Action  New Bag Dose  316 mg Rate  145.4 mL/hr Route  IntraVENous Administered By  [facility] A          [facility]ansetron [facility]) injection 8 mg     Admin Date  03/04/2020 Action  Given Dose  8 mg Route  IntraVENous Administered By  [facility] A          oxaliplatin (ELOXATIN) 67 mg in dextrose 5% 250 mL, overfill volume 25 mL chemo infusion     Admin Date  03/04/2020 Action  New Bag Dose  67 mg Rate  144.2 mL/hr Route  IntraVENous Administered By  Eyvonne Due BRITTON          saline peripheral flush soln 10 mL     Admin Date  03/04/2020 Action  Given Dose  10 mL Route  InterCATHeter Administered By  Mastere Due BRITTON              8810 Pt tolerated treatment well. Port maintained positive blood return throughout treatment,CADD pump connected to port and programmed to run at 2.2cc/hr x 46 hrs, D/c home ambulatory in no distress. Pt aware of next appointment scheduled for 3/6/20.

## 2020-03-18 NOTE — PROGRESS NOTES
[facility] [place] at [address], [id number] [place] [address] Road, [facility]: [phone]  F: [phone]    HEME/ONC FOLLOW UP    Reason for Visit:   Edgard Garduno is a 44 y.o. 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 Treatment History:   · EXPLORATORY LAPAROTOMY SIERRA BSO, TUMOR DEBULKING: ILEOCECAL RESECTION; OMENTECTOMY  · FOLFOX 50% DR 12/3/19 - Current    STAGE: 4 with carcinomatosis    History of Present Illness:   Edgard Garduno is a 44 y.o. female seen today in office for follow up of metastatic appendix cancer on of 50% dose reduced palliative FOLFOX chemo. She is here today for Cycle 8. She reports that she is feeling well overall today. She is tolerating chemo well overall with some mild side effects. She denies recent abdominal pain, nausea, and vomiting today. She continues to have some diarrhea but \"not quite as bad\" and is taking Imodium as needed. Her appetite is stable - up 2 more lbs today. She has neuropathy in fingers for 3-4 days after chemo when touching cold but this is not constant. She denies fever, chills, and mouth sores. She went back to [facility] on 3/13/20  for visit and scans. Per patient, no new areas on scans - stable overall. [facility] recommends making some dose changes to chemo today. She has records with her today. CBC and CMP are still pending today. She wants treatment today. She is here alone today.      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 ([facility] [address] 2019    Nausea & vomiting     Nausea and vomiting 12/9/2019    Splenic infarction 2019    Subphrenic abscess ([facility] [address] 1/8/2020    Symptomatic cholelithiasis 9/18/2019      Past Surgical History:   Procedure Laterality Date    ABDOMEN SURGERY PROC UNLISTED      HX APPENDECTOMY  09/2019    HX GI  09/2019    ILEOCECECTOMY    HX GYN  2003    CYST ON OVARY    HX HYSTERECTOMY  2019    IR THORACENTESIS CATH W IMAGE  11/6/2019      Social History     Tobacco Use    Smoking status: Never Smoker    Smokeless tobacco: Never Used   Substance Use Topics    Alcohol use: Not Currently      Family History   Problem Relation Age of Onset    Breast Cancer Paternal Grandmother 61    Crohn's Disease Mother     Heart Disease Mother     Cancer Father         BLADDER    Diabetes Father     No Known Problems Son     No Known Problems Daughter     Anesth Problems Neg Hx      Current Outpatient Medications   Medication Sig    potassium chloride (KLOR-CON) 10 mEq tablet Take 1 Tab by mouth two (2) times a day.  dexAMETHasone (DECADRON) 4 mg tablet Take 2 tabs (8mg) by mouth daily on days 2 and 3 after chemotherapy    LORazepam (ATIVAN) 0.5 mg tablet Take 1 Tab by mouth nightly as needed for Anxiety. Max Daily Amount: 0.5 mg.  prochlorperazine (COMPAZINE) 5 mg tablet Take 1 tab by mouth every 6 hours as needed for nausea or vomiting    lidocaine-prilocaine (EMLA) topical cream Apply  to affected area as needed for Pain.  ondansetron (ZOFRAN ODT) 4 mg disintegrating tablet Take 1 Tab by mouth every eight (8) hours as needed for Nausea. (Patient taking differently: Take 8 mg by mouth every eight (8) hours as needed for Nausea. Indications: prevent nausea and vomiting from cancer chemotherapy)    acetaminophen (TYLENOL) 325 mg tablet Take 650 mg by mouth every four (4) hours as needed for Pain. Indications: pain    ibuprofen (MOTRIN) 200 mg tablet Take 3 Tabs by mouth every eight (8) hours as needed for Pain. No current facility-administered medications for this visit.       Facility-Administered Medications Ordered in Other Visits   Medication Dose Route Frequency    sodium chloride 0.9 % bolus infusion 1,000 mL  1,000 mL IntraVENous ONCE      No Known Allergies     Review of Systems: A complete review of systems was obtained, negative except as described above.    Physical Exam:     Visit Vitals  /81 (BP 1 Location: Right arm, BP Patient Position: Sitting)   Pulse 72   Temp 97.7 °F (36.5 °C) (Oral)   Ht 5' 3\" (1.6 m)   Wt 112 lb 1.6 oz (50.8 kg)   LMP 09/15/2019   SpO2 98%   BMI 19.86 kg/m²     ECOG PS: 1-2  General: No distress  Eyes: Anicteric sclerae  HENT: Atraumatic  Neck: Supple  Resp: CTAB, normal effort  CV: Regular   GI: Soft, less distended   MS: Ambulatory  Skin: Warm dry. Port site without redness/swelling  Psych: Alert, oriented, appropriate affect, normal judgment/insight    Results:     Lab Results   Component Value Date/Time    WBC 5.6 03/18/2020 10:05 AM    HGB 11.7 03/18/2020 10:05 AM    HCT 36.0 03/18/2020 10:05 AM    PLATELET 026 51/35/5874 10:05 AM    MCV 94.5 03/18/2020 10:05 AM    ABS. NEUTROPHILS 3.1 03/18/2020 10:05 AM    Hemoglobin (POC) 10.8 (L) 09/25/2019 04:11 PM    Hematocrit (POC) 32 (L) 09/25/2019 12:10 PM     Lab Results   Component Value Date/Time    Sodium 140 03/04/2020 10:15 AM    Potassium 3.6 03/04/2020 10:15 AM    Chloride 109 (H) 03/04/2020 10:15 AM    CO2 25 03/04/2020 10:15 AM    Glucose 83 03/04/2020 10:15 AM    BUN 7 03/04/2020 10:15 AM    Creatinine 0.43 (L) 03/04/2020 10:15 AM    GFR est AA >60 03/04/2020 10:15 AM    GFR est non-AA >60 03/04/2020 10:15 AM    Calcium 9.6 03/04/2020 10:15 AM    Sodium (POC) 137 09/25/2019 12:10 PM    Potassium (POC) 3.7 09/25/2019 12:10 PM    Chloride (POC) 105 09/25/2019 12:10 PM    Glucose (POC) 85 09/25/2019 12:10 PM    BUN (POC) 4 (L) 09/25/2019 12:10 PM    Creatinine (POC) 0.5 (L) 09/25/2019 12:10 PM    Calcium, ionized (POC) 1.14 09/25/2019 12:10 PM     Lab Results   Component Value Date/Time    Bilirubin, total 0.2 03/04/2020 10:15 AM    ALT (SGPT) 28 03/04/2020 10:15 AM    AST (SGOT) 18 03/04/2020 10:15 AM    Alk.  phosphatase 107 03/04/2020 10:15 AM    Protein, total 7.2 03/04/2020 10:15 AM    Albumin 3.0 (L) 03/04/2020 10:15 AM    Globulin 4.2 (H) 03/04/2020 10:15 AM     CT Results (most recent):  Results from [place] encounter on 01/07/20   CT ABD PELV W CONT    Narrative EXAM: CT ABD PELV W CONT    INDICATION: F/U subdiaphragmatic fluid collection . Malignant neoplasm of the  appendix status post appendectomy, colon resection, hysterectomy and  nephrectomy. COMPARISON: 12/17/2019     CONTRAST: 100 mL of Isovue-370. TECHNIQUE:   Following the uneventful intravenous administration of contrast, thin axial  images were obtained through the abdomen and pelvis. Coronal and sagittal  reconstructions were generated. Oral contrast was not administered. CT dose  reduction was achieved through use of a standardized protocol tailored for this  examination and automatic exposure control for dose modulation. FINDINGS:   LUNG BASES: Stable small left pleural effusion with left basilar atelectasis. INCIDENTALLY IMAGED HEART AND MEDIASTINUM: Unremarkable. LIVER: No solid mass or biliary dilatation. 2 stable cysts likely in the left  hepatic lobe. GALLBLADDER: Unremarkable. SPLEEN: Stable left subphrenic fluid collection containing a pigtail catheter,  anterior and lateral to the spleen which remains homogeneous and unchanged. PANCREAS: No mass or ductal dilatation. ADRENALS: Unremarkable. KIDNEYS: No mass, calculus, or hydronephrosis. STOMACH: Unremarkable. SMALL BOWEL: No dilatation or wall thickening. However, stranding of perivesical  and lower pelvic fat surrounds nondilated small bowel loops just cephalad to the  bladder dome, unchanged in appearance. The bladder dome is tented toward small  bowel loops and is inseparable with no preservation of fat plane between the  bladder and small bowel or sigmoid colon. This is unchanged. COLON: No dilatation or wall thickening. Large amount of retained fecal material  and fluid again noted in the colon. Anastomotic staples at the cecal region.   Stranding of pericolonic fat in the sigmoid colon adjacent to the bladder dome,  stable since the prior study. APPENDIX: Not seen, surgically absent. PERITONEUM: No ascites or pneumoperitoneum. RETROPERITONEUM: No lymphadenopathy or aortic aneurysm. REPRODUCTIVE ORGANS: Surgically absent uterus  URINARY BLADDER: Tenting of the bladder dome, which is inseparable from adjacent  small bowel and sigmoid colonic loops with stranding of perivesical fat, stable  BONES: No destructive bone lesion. ADDITIONAL COMMENTS: N/A      Impression IMPRESSION:    1. Stable appearance of pigtail catheter in the left subphrenic space with small  fluid collection adjacent to the spleen. 2. Stable postoperative/postinflammatory changes in the pelvis status post  appendectomy. 3. Stable small left pleural effusion with left basilar atelectasis. 4. Other stable incidental and postoperative changes. Records reviewed and summarized above. Pathology report(s) reviewed above. Radiology report(s) reviewed above. Assessment/PLAN:     1) Stage 4 / Metastatic Appendiceal Cancer   post EXPLORATORY LAPAROTOMY SIERRA BSO, TUMOR DEBULKING: ILEOCECAL RESECTION; OMENTECTOMY 9/25/19  Surgery done for obstruction. She had left rib pain and SOB and on exam decreased bs on LEFT. Got CXR which showed pleural effusion then ultrasound which showed elevated diaphragm and ? Free air  CT which showed fluid collection and patient admitted for this. Had IR drain of splenic fluid collection. Patient went to [facility] and [address] for second opinions. Discussed systemic therapy with FOLFOX/ +/- Avastin chemo or some variation of this regimen. Possible [facility] down the road. Patient choose to have chemo locally and still see [address]. Patient had cycle 1 of FOLFOX chemotherapy on 12/3/19 reduced by 50%. She was then admitted via ER with N/V and abdominal pain. CT A/P 12/12/19 shows worsening fluid collection which was drained via IR. Follow up CT A/P 12/17/19 better overall. CT A/P 1/7/20 stable.       She went to [facility] for follow up on 3/13/20 with CTs there. Patient brought some records with her today - will request full records. Per patient, [facility] felt that CTs were stable overall compared to her last scans there in 11/19. [facility] recommends dropping 5FU Bolus/Leucovorin and increasing Oxaliplatin to 60 mg/m2 and 5FU pump tp 1,800 mg/m2. She is here for Cycle 8 of palliative FOLFOX. She has been at 50% DR. She is tolerating chemo well overall. Will do dose recommendations per [facility] with treatment today. Patient is clinically stable overall. She denies nausea/vomiting and pain today. CBC and CMP are still pending today. She wants to do treatment today. Will set up IVF with chemo today and IVF/Zofran on Friday with pump removal.   Will add case to cancer conference to compare [facility] scans to last scans here. Pt seen today with COVID precautions. Follow up here in 2 weeks. 2) Left Pleural Effusion post Thoracentesis x 2  No cytology. Stable on CT. 3) Nausea and Vomiting  Continue anti-emetics as needed. Advised her that she can take 10 mg Compazine if needed. No recent nausea/vomiting. 4) Abdominal Pain   Due to carcinomatosis. She is taking Tylenol PRN. No pain today. 5) Chemo Induced Diarrhea  Well controlled with PRN Imodium. Will continue to monitor. 6) Insomnia  Takes Ativan PRN. No refill needed today. 7) Psychosocial  Mood good, coping well considering difficult disease. She has great family support. SW support as needed. She is here alone today. Call if questions. Follow up in 2 weeks  This patient was seen in conjunction with Rolanda Hawkins NP. I appreciate the opportunity to participate in Ms. Ap Weldon care.     Signed By: Slime Gonsalez NP

## 2020-03-18 NOTE — PROGRESS NOTES
Surekha Sapp is a 44 y.o. female  Chief Complaint   Patient presents with    Follow-up      metastatic appendix cancer     1. Have you been to the ER, urgent care clinic since your last visit? Hospitalized since your last visit? No.    2. Have you seen or consulted any other health care providers outside of the [address] [place] [address] since your last visit? Include any pap smears or colon screening. Yes, pt was seen at [address] [place] [address] by Janel Dias      Pt at [facility] recommended patient to get higher dosage of chemo medications and to stop some. none

## 2020-03-18 NOTE — LETTER
3/18/20 Patient: Ki Woods YOB: 1970 Date of Visit: 3/18/2020 Doretha Parker MD 
[place] [address] [address] [place] [address]. VIA Facsimile: [phone] Dear Doretha Parker MD, Thank you for referring Ms. Ki Woods to [address] [place] [address] for evaluation. My notes for this consultation are attached. If you have questions, please do not hesitate to call me. I look forward to following your patient along with you. Sincerely, Maybell Nyhan, NP

## 2020-03-18 NOTE — PROGRESS NOTES
Outpatient Infusion Center - Chemotherapy Progress Note    1000 Pt admit to [facility] for Folfox ambulatory in stable condition. Assessment completed. No new concerns voiced. Port accessed with positive blood return, labs drawn and sent for processing.     1025 pt upstairs for MD appt    1110 pt back to [facility] for tx    Chemotherapy Flowsheet 3/18/2020   Cycle C8   Date 3/18/2020   Drug / Regimen Folfox   Pre Meds given   Notes given         Visit Vitals  /87   Pulse 78   Temp 98.4 °F (36.9 °C)   Resp 18   Ht 5' 3\" (1.6 m)   Wt 50.8 kg (112 lb 1.6 oz)   LMP 09/15/2019   Breastfeeding No   BMI 19.86 kg/m²       Medications:  Medications Administered     0.9% sodium chloride injection 10 mL     Admin Date  03/18/2020 Action  Given Dose  10 mL Route  IntraVENous Administered By  Gentry Scheuermann A          dexamethasone (DECADRON) 12 mg in 0.9% sodium chloride 50 mL, overfill volume 5 mL IVPB     Admin Date  03/18/2020 Action  Given Dose  12 mg Rate  232 mL/hr Route  IntraVENous Administered By  Gentry Scheuermann A          dextrose 5% infusion     Admin Date  03/18/2020 Action  New Bag Dose  25 mL/hr Rate  25 mL/hr Route  IntraVENous Administered By  Gentry Scheuermann A          fluorouraciL (ADRUCIL) 2,700 mg in 0.9% sodium chloride 100 mL CADD Cassette     Admin Date  03/18/2020 Action  New Bag Dose  2700 mg Rate  2.2 mL/hr Route  IntraVENous Administered By  Gentry Scheuermann A          ondansetron [facility]) injection 8 mg     Admin Date  03/18/2020 Action  Given Dose  8 mg Route  IntraVENous Administered By  Gentry Scheuermann A          oxaliplatin (ELOXATIN) 90 mg in dextrose 5% 250 mL, overfill volume 25 mL chemo infusion     Admin Date  03/18/2020 Action  New Bag Dose  90 mg Rate  146.5 mL/hr Route  IntraVENous Administered By  Gentry Scheuermann A          potassium chloride SR (KLOR-CON 10) tablet 40 mEq     Admin Date  03/18/2020 Action  Given Dose  40 mEq Route  Oral Administered By  Gentry Scheuermann A          saline peripheral flush soln 10 mL     Admin Date  03/18/2020 Action  Given Dose  10 mL Route  InterCATHeter Administered By  David JARQUIN          sodium chloride 0.9 % bolus infusion 1,000 mL     Admin Date  03/18/2020 Action  New Bag Dose  1000 mL Rate  1,000 mL/hr Route  IntraVENous Administered By  Inocente Rendon                  1520  Pt tolerated treatment well. Port maintained positive blood return throughout treatment, CADD pump connected to port and programmed to run at 2.2cc/hr x 46 hrs, D/c home ambulatory in no distress. Pt aware of next appointment scheduled for 3/20/20.

## 2020-03-20 NOTE — PROGRESS NOTES
Outpatient Infusion Center Short Visit Progress Note    0890 Pt admit to [facility] for hydration/antiemetics/pump removal ambulatory in stable condition. Assessment completed. No new concerns voiced. 5FU CADD pump completed and removed; line flushed w/ positive blood return; hydration infusing. Patient Vitals for the past 12 hrs:   Temp Pulse Resp BP   03/20/20 1405 98.7 °F (37.1 °C) 60 18 114/76     Medications Administered     heparin (porcine) pf 300-500 Units     Admin Date  03/20/2020 Action  Given Dose  500 Units Route  InterCATHeter Administered By  April Cote RN          ondansetron [facility]) injection 8 mg     Admin Date  03/20/2020 Action  Given Dose  8 mg Route  IntraVENous Administered By  April Cote RN          saline peripheral flush soln 10 mL     Admin Date  03/20/2020 Action  Given Dose  10 mL Route  InterCATHeter Administered By  April Cote RN          sodium chloride 0.9 % bolus infusion 1,000 mL     Admin Date  03/20/2020 Action  New Bag Dose  1000 mL Rate  1,000 mL/hr Route  IntraVENous Administered By  April Cote, MING                  1530 Pt tolerated treatment well. Port flushed with positive blood return, heparinized and de-accessed. D/c home ambulatory in no distress. Pt aware of next appointment scheduled for 04/01/2020.

## 2020-03-22 NOTE — PROGRESS NOTES
3/19/20 Call received from Ruby in [address] [place] [address] that [facility]angel scans requested by MD are located in [facility] for comparison to scans performed at [facility].

## 2020-04-01 NOTE — PROGRESS NOTES
Sherial Gottron is a 44 y.o. female  Chief Complaint   Patient presents with    Follow-up      metastatic appendix cancer   1. Have you been to the ER, urgent care clinic since your last visit? Hospitalized since your last visit? no  2. Have you seen or consulted any other health care providers outside of the [address] [place] [address] since your last visit? Include any pap smears or colon screening.  no

## 2020-04-01 NOTE — TELEPHONE ENCOUNTER
Returned call to Rosasebastian at [address] [facility]. [facility] these medications have a higher than usual risk of respiratory depression. Contacted Jodi Hess, pharmacist for guidance. Per pharmacy -   \"The lorazepam is only 0.5 mg at bedtime and the lomotil as needed for her diarrhea. I would say that it is low risk for severe respiratory depression and benefits outweigh the risks. \"    Mellissa Driver verbalized understanding.

## 2020-04-01 NOTE — PROGRESS NOTES
[facility] at [address], [id number] [place] [address] Road, [facility] [phone]  F: [phone]    HEME/ONC FOLLOW UP    Reason for Visit:   Belén Drew is a 44 y.o. 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 Treatment History:   · EXPLORATORY LAPAROTOMY SIERRA BSO, TUMOR DEBULKING: ILEOCECAL RESECTION; OMENTECTOMY  · FOLFOX 50%  12/3/19 - 3/4/20  · Per [facility] recommendation, dropped 5FU Bolus/Leucovorin and increased Oxaliplatin to 60 mg/m2 and 5FU pump tp 1,800 mg/m2 with Cycle 8 on 3/18/20    STAGE: 4 with carcinomatosis    History of Present Illness:   Belén Drew is a 44 y.o. 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 She is here today for Cycle 9. She went back to [facility] on 3/13/20 for visit and scans. CTs were stable overall. [facility] recommended dropping the 5FU Bolus/Leucovorin and increasing Oxaliplatin to 60 mg/m2 and 5FU pump tp 1,800 mg/m2 with Cycle 8 on 3/18/20. She reports that she is feeling well overall today. She tolerated this change well overall. She did have some increased lower abdominal cramping and diarrhea. She has taken Imodium once daily almost every day. She did not have any nausea or vomiting. Her appetite is stable overall. She has neuropathy in fingers for 3-4 days after chemo when touching cold but this is not constant. She denies fever, chills, and mouth sores. CBC is good and and CMP is still pending today. She wants treatment today. She is here alone today.      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 ([facility] [address] 2019    Nausea & vomiting     Nausea and vomiting 12/9/2019    Splenic infarction 2019    Subphrenic abscess ([facility] [address] 1/8/2020    Symptomatic cholelithiasis 9/18/2019      Past Surgical History:   Procedure Laterality Date    ABDOMEN SURGERY PROC UNLISTED      HX APPENDECTOMY  09/2019    HX GI  09/2019    ILEOCECECTOMY    HX GYN  2003    CYST ON OVARY    HX HYSTERECTOMY  2019    IR THORACENTESIS CATH W IMAGE  11/6/2019      Social History     Tobacco Use    Smoking status: Never Smoker    Smokeless tobacco: Never Used   Substance Use Topics    Alcohol use: Not Currently      Family History   Problem Relation Age of Onset    Breast Cancer Paternal Grandmother 61    Crohn's Disease Mother     Heart Disease Mother     Cancer Father         BLADDER    Diabetes Father     No Known Problems Son     No Known Problems Daughter     Anesth Problems Neg Hx      Current Outpatient Medications   Medication Sig    LORazepam (ATIVAN) 0.5 mg tablet Take 1 Tab by mouth nightly as needed for Anxiety. Max Daily Amount: 0.5 mg.    diphenoxylate-atropine (LomotiL) 2.5-0.025 mg per tablet Take 2 Tabs by mouth four (4) times daily as needed for Diarrhea. Max Daily Amount: 8 Tabs.  prochlorperazine (COMPAZINE) 5 mg tablet Take 1 tab by mouth every 6 hours as needed for nausea or vomiting    potassium chloride (KLOR-CON) 10 mEq tablet Take 1 Tab by mouth two (2) times a day.  lidocaine-prilocaine (EMLA) topical cream Apply  to affected area as needed for Pain.  ondansetron (ZOFRAN ODT) 4 mg disintegrating tablet Take 1 Tab by mouth every eight (8) hours as needed for Nausea. (Patient taking differently: Take 8 mg by mouth every eight (8) hours as needed for Nausea. Indications: prevent nausea and vomiting from cancer chemotherapy)    dexAMETHasone (DECADRON) 4 mg tablet Take 2 tabs (8mg) by mouth daily on days 2 and 3 after chemotherapy    acetaminophen (TYLENOL) 325 mg tablet Take 650 mg by mouth every four (4) hours as needed for Pain. Indications: pain    ibuprofen (MOTRIN) 200 mg tablet Take 3 Tabs by mouth every eight (8) hours as needed for Pain. No current facility-administered medications for this visit. Facility-Administered Medications Ordered in Other Visits   Medication Dose Route Frequency    saline peripheral flush soln 10 mL  10 mL InterCATHeter PRN    0.9% sodium chloride injection 10 mL  10 mL IntraVENous PRN    heparin (porcine) pf 300-500 Units  300-500 Units InterCATHeter PRN    sodium chloride 0.9 % bolus infusion 1,000 mL  1,000 mL IntraVENous ONCE      No Known Allergies     Review of Systems: A complete review of systems was obtained, negative except as described above. Physical Exam:     Visit Vitals  /75   Pulse 78   Temp 98.2 °F (36.8 °C)   Ht 5' 3\" (1.6 m)   Wt 111 lb 1.6 oz (50.4 kg)   LMP 09/15/2019   SpO2 97%   BMI 19.68 kg/m²     ECOG PS: 1-2  General: No distress  Eyes: Anicteric sclerae  HENT: Atraumatic  Neck: Supple  Resp: CTAB, normal effort  CV: Regular   GI: Soft, less distended   MS: Ambulatory  Skin: Warm dry. Port site without redness/swelling  Psych: Alert, oriented, appropriate affect, normal judgment/insight    Results:     Lab Results   Component Value Date/Time    WBC 5.5 04/01/2020 10:06 AM    HGB 11.8 04/01/2020 10:06 AM    HCT 36.1 04/01/2020 10:06 AM    PLATELET 476 55/70/5431 10:06 AM    MCV 94.8 04/01/2020 10:06 AM    ABS.  NEUTROPHILS 3.2 04/01/2020 10:06 AM    Hemoglobin (POC) 10.8 (L) 09/25/2019 04:11 PM    Hematocrit (POC) 32 (L) 09/25/2019 12:10 PM     Lab Results   Component Value Date/Time    Sodium 140 04/01/2020 10:06 AM    Potassium 3.7 04/01/2020 10:06 AM    Chloride 109 (H) 04/01/2020 10:06 AM    CO2 25 04/01/2020 10:06 AM    Glucose 81 04/01/2020 10:06 AM    BUN 9 04/01/2020 10:06 AM    Creatinine 0.48 (L) 04/01/2020 10:06 AM    GFR est AA >60 04/01/2020 10:06 AM    GFR est non-AA >60 04/01/2020 10:06 AM    Calcium 9.4 04/01/2020 10:06 AM    Sodium (POC) 137 09/25/2019 12:10 PM    Potassium (POC) 3.7 09/25/2019 12:10 PM    Chloride (POC) 105 09/25/2019 12:10 PM    Glucose (POC) 85 09/25/2019 12:10 PM    BUN (POC) 4 (L) 09/25/2019 12:10 PM Creatinine (POC) 0.5 (L) 09/25/2019 12:10 PM    Calcium, ionized (POC) 1.14 09/25/2019 12:10 PM     Lab Results   Component Value Date/Time    Bilirubin, total 0.3 04/01/2020 10:06 AM    ALT (SGPT) 24 04/01/2020 10:06 AM    AST (SGOT) 24 04/01/2020 10:06 AM    Alk. phosphatase 113 04/01/2020 10:06 AM    Protein, total 7.0 04/01/2020 10:06 AM    Albumin 3.0 (L) 04/01/2020 10:06 AM    Globulin 4.0 04/01/2020 10:06 AM     CT Results (most recent):  Results from Hospital Encounter encounter on 01/07/20   CT ABD PELV W CONT    Narrative EXAM: CT ABD PELV W CONT    INDICATION: F/U subdiaphragmatic fluid collection . Malignant neoplasm of the  appendix status post appendectomy, colon resection, hysterectomy and  nephrectomy. COMPARISON: 12/17/2019     CONTRAST: 100 mL of Isovue-370. TECHNIQUE:   Following the uneventful intravenous administration of contrast, thin axial  images were obtained through the abdomen and pelvis. Coronal and sagittal  reconstructions were generated. Oral contrast was not administered. CT dose  reduction was achieved through use of a standardized protocol tailored for this  examination and automatic exposure control for dose modulation. FINDINGS:   LUNG BASES: Stable small left pleural effusion with left basilar atelectasis. INCIDENTALLY IMAGED HEART AND MEDIASTINUM: Unremarkable. LIVER: No solid mass or biliary dilatation. 2 stable cysts likely in the left  hepatic lobe. GALLBLADDER: Unremarkable. SPLEEN: Stable left subphrenic fluid collection containing a pigtail catheter,  anterior and lateral to the spleen which remains homogeneous and unchanged. PANCREAS: No mass or ductal dilatation. ADRENALS: Unremarkable. KIDNEYS: No mass, calculus, or hydronephrosis. STOMACH: Unremarkable. SMALL BOWEL: No dilatation or wall thickening.  However, stranding of perivesical  and lower pelvic fat surrounds nondilated small bowel loops just cephalad to the  bladder dome, unchanged in appearance. The bladder dome is tented toward small  bowel loops and is inseparable with no preservation of fat plane between the  bladder and small bowel or sigmoid colon. This is unchanged. COLON: No dilatation or wall thickening. Large amount of retained fecal material  and fluid again noted in the colon. Anastomotic staples at the cecal region. Stranding of pericolonic fat in the sigmoid colon adjacent to the bladder dome,  stable since the prior study. APPENDIX: Not seen, surgically absent. PERITONEUM: No ascites or pneumoperitoneum. RETROPERITONEUM: No lymphadenopathy or aortic aneurysm. REPRODUCTIVE ORGANS: Surgically absent uterus  URINARY BLADDER: Tenting of the bladder dome, which is inseparable from adjacent  small bowel and sigmoid colonic loops with stranding of perivesical fat, stable  BONES: No destructive bone lesion. ADDITIONAL COMMENTS: N/A      Impression IMPRESSION:    1. Stable appearance of pigtail catheter in the left subphrenic space with small  fluid collection adjacent to the spleen. 2. Stable postoperative/postinflammatory changes in the pelvis status post  appendectomy. 3. Stable small left pleural effusion with left basilar atelectasis. 4. Other stable incidental and postoperative changes. Records reviewed and summarized above. Pathology report(s) reviewed above. Radiology report(s) reviewed above. Assessment/PLAN:     1) Stage 4 / Metastatic Appendiceal Cancer   post EXPLORATORY LAPAROTOMY SIERRA BSO, TUMOR DEBULKING: ILEOCECAL RESECTION; OMENTECTOMY 9/25/19  Surgery done for obstruction. She had left rib pain and SOB and on exam decreased bs on LEFT. Got CXR which showed pleural effusion then ultrasound which showed elevated diaphragm and ? Free air  CT which showed fluid collection and patient admitted for this. Had IR drain of splenic fluid collection. Patient went to [facility] and [address] [place] [address],[address] for second opinions.    Discussed systemic therapy with FOLFOX/ +/- Avastin chemo or some variation of this regimen. Possible [facility] down the road. Patient choose to have chemo locally and still see [address]. Patient had cycle 1 of FOLFOX chemotherapy on 12/3/19 reduced by 50%. She was then admitted via ER with N/V and abdominal pain. CT A/P 12/12/19 shows worsening fluid collection which was drained via IR. Follow up CT A/P 12/17/19 better overall. CT A/P 1/7/20 stable. She has been tolerating FOLFOX 50% DR well overall. She went to [facility] for follow up on 3/13/20 with CTs there. [address] felt that CTs were stable overall compared to her last scans there in 11/19. [facility] recommended dropping 5FU Bolus/Leucovorin and increasing Oxaliplatin to 60 mg/m2 and 5FU pump tp 1,800 mg/m2. She is here for Cycle 9 of palliative chemo. She tolerated last cycle of chemo with changes per [address] recommendation well overall. She did have some increased abdominal pain/cramping and diarrhea after last treatment. Patient is clinically stable overall today. She denies nausea/vomiting and pain today. Will continue current dosing for now and if patient continues to tolerate, will increase Oxaliplatin to 85 mg/m2 per [address] recommendation. CBC is good today, CMP is still pending. She wants to do treatment today. Will set up IVF with chemo today and IVF/Zofran on Friday with pump removal.   Patient seen today with COVID precautions. Follow up here in 2 weeks. 2) Left Pleural Effusion post Thoracentesis x 2  No cytology. Stable on CT. 3) Hx of Nausea and Vomiting  Continue anti-emetics as needed. Advised her that she can take 10 mg Compazine if needed. No recent nausea/vomiting. 4) Abdominal Pain/Cramping  Due to carcinomatosis. She is taking Tylenol PRN. Will send in Lomotil to have on hand also. No pain today. 5) Chemo Induced Diarrhea  Well controlled with PRN Imodium. Will send in Lomotil to have on hand also. Will continue to monitor.     6) Insomnia  Takes Ativan PRN. Refilled today. 7) Psychosocial  Mood good, coping well considering difficult disease. She has great family support. SW support as needed. She is here alone today. Call if questions. Follow up in 2 weeks  This patient was seen in conjunction with Mauro Barnard NP. I appreciate the opportunity to participate in Ms. Juan M Betancur care.     Signed By: Doris Calabrese NP

## 2020-04-01 NOTE — PROGRESS NOTES
Outpatient Infusion Center - Chemotherapy Progress Note    1000 Pt admit to [facility] for Folfox ambulatory in stable condition. Assessment completed. No new concerns voiced. Port accessed with positive blood return, labs drawn and sent for processing.     1055 pt upstairs for MD appt    1200 pt back to [facility] for tx    Chemotherapy Flowsheet 4/1/2020   Cycle C9   Date 4/1/2020   Drug / Regimen Folfox   Pre Hydration given   Pre Meds given   Notes given         Visit Vitals  /78   Pulse 73   Temp 98.2 °F (36.8 °C)   Resp 18   Ht 5' 3\" (1.6 m)   Wt 50.4 kg (111 lb 1.6 oz)   LMP 09/15/2019   BMI 19.68 kg/m²       Medications:  Medications Administered     0.9% sodium chloride injection 10 mL     Admin Date  04/01/2020 Action  Given Dose  10 mL Route  IntraVENous Administered By  Valentino Dash A          dexamethasone (DECADRON) 12 mg in 0.9% sodium chloride 50 mL, overfill volume 5 mL IVPB     Admin Date  04/01/2020 Action  Given Dose  12 mg Rate  232 mL/hr Route  IntraVENous Administered By  Valentino Dash A          dextrose 5% infusion     Admin Date  04/01/2020 Action  New Bag Dose  25 mL/hr Rate  25 mL/hr Route  IntraVENous Administered By  Valentino Dash A          fluorouraciL (ADRUCIL) 2,700 mg in 0.9% sodium chloride 100 mL CADD Cassette     Admin Date  04/01/2020 Action  New Bag Dose  2700 mg Rate  2.2 mL/hr Route  IntraVENous Administered By  Valentino Dash A          ondansetron [facility]) injection 8 mg     Admin Date  04/01/2020 Action  Given Dose  8 mg Route  IntraVENous Administered By  Valentino Dash A          oxaliplatin (ELOXATIN) 90 mg in dextrose 5% 250 mL, overfill volume 25 mL chemo infusion     Admin Date  04/01/2020 Action  New Bag Dose  90 mg Rate  146.5 mL/hr Route  IntraVENous Administered By  Valentino Dash A          saline peripheral flush soln 10 mL     Admin Date  04/01/2020 Action  Given Dose  10 mL Route  InterCATHeter Administered By  Valentino Dash A          sodium chloride 0.9 % bolus infusion 1,000 mL     Admin Date  04/01/2020 Action  New Bag Dose  1000 mL Rate  1,000 mL/hr Route  IntraVENous Administered By  Isir Mt Q                8613  Pt tolerated treatment well. Port maintained positive blood return throughout treatment, CADD pump connected to port and set to run at 2.2cc/hrx46 hrs, D/c home ambulatory in no distress. Pt aware of next appointment scheduled for 4/3/20.

## 2020-04-03 NOTE — PROGRESS NOTES
Outpatient Infusion Center Short Visit Progress Note    6386 Pt admit to [facility] for pump removal/hydration/anti-emetics ambulatory in stable condition. Assessment completed. No new concerns voiced. 5FU CADD pump completed and removed; line flushed w/ positive blood return; hydration infusing. Patient Vitals for the past 12 hrs:   Temp Pulse Resp BP   04/03/20 1419 98.4 °F (36.9 °C) 75 16 105/71       Medications Administered     heparin (porcine) pf 300-500 Units     Admin Date  04/03/2020 Action  Given Dose  500 Units Route  InterCATHeter Administered By  Ramona Bowen, MING          ondansetron [facility]) injection 8 mg     Admin Date  04/03/2020 Action  Given Dose  8 mg Route  IntraVENous Administered By  Ramona Bowen, MING          sodium chloride 0.9 % bolus infusion 1,000 mL     Admin Date  04/03/2020 Action  New Bag Dose  1000 mL Rate  1,000 mL/hr Route  IntraVENous Administered By  Ramona Bowen, RN                  2432 Pt tolerated treatment well. Port flushed, heparinized and de-accessed. D/c home ambulatory in no distress. Pt aware of next appointment scheduled for 04/15/2020.

## 2020-04-15 NOTE — PROGRESS NOTES
Edmundo Peng is a 44 y.o. female Chief Complaint Patient presents with  Follow-up  
  metastatic appendix cancer 1. Have you been to the ER, urgent care clinic since your last visit? Hospitalized since your last visit? No 
2. Have you seen or consulted any other health care providers outside of the [address] [place] [address] since your last visit? Include any pap smears or colon screening.  no

## 2020-04-15 NOTE — PROGRESS NOTES
Outpatient Infusion Center - Chemotherapy Progress Note    1010 Pt admit to [facility] for Folfox ambulatory in stable condition. Assessment completed. No new concerns voiced. Port accessed with positive blood return, labs drawn and sent for processing.     1  Pt upstairs for MD appt    1110 pt back to [facility] for tx     Chemotherapy Flowsheet 4/15/2020   Cycle C10   Date 4/15/2020   Drug / Regimen Folfox   Pre Hydration given   Pre Meds given   Notes given         Visit Vitals  /76   Pulse 70   Temp 98.2 °F (36.8 °C)   Resp 18   Ht 5' 3\" (1.6 m)   Wt 51.5 kg (113 lb 9.6 oz)   LMP 09/15/2019   BMI 20.12 kg/m²       Medications:  Medications Administered     0.9% sodium chloride injection 10 mL     Admin Date  04/15/2020 Action  Given Dose  10 mL Route  IntraVENous Administered By  Blanka JARQUIN          dexamethasone (DECADRON) 12 mg in 0.9% sodium chloride 50 mL, overfill volume 5 mL IVPB     Admin Date  04/15/2020 Action  Given Dose  12 mg Rate  232 mL/hr Route  IntraVENous Administered By  Blanka Bell A          dextrose 5% infusion     Admin Date  04/15/2020 Action  New Bag Dose  25 mL/hr Rate  25 mL/hr Route  IntraVENous Administered By  Blanka Bell A          fluorouraciL (ADRUCIL) 2,700 mg in 0.9% sodium chloride 100 mL CADD Cassette     Admin Date  04/15/2020 Action  New Bag Dose  2700 mg Rate  2.2 mL/hr Route  IntraVENous Administered By  Blanka Bell A          ondansetron [facility]) injection 8 mg     Admin Date  04/15/2020 Action  Given Dose  8 mg Route  IntraVENous Administered By  Blanka Bell A          oxaliplatin (ELOXATIN) 127.5 mg in dextrose 5% 250 mL, overfill volume 25 mL chemo infusion     Admin Date  04/15/2020 Action  New Bag Dose  127.5 mg Rate  150.3 mL/hr Route  IntraVENous Administered By  Blanka Bell A          saline peripheral flush soln 10 mL     Admin Date  04/15/2020 Action  Given Dose  10 mL Route  InterCATHeter Administered By  Blanka Bell A          sodium chloride 0.9 % bolus infusion 1,000 mL     Admin Date  04/15/2020 Action  New Bag Dose  1000 mL Rate  1,000 mL/hr Route  IntraVENous Administered By  Radha Alt                1455  Pt tolerated treatment well. Port maintained positive blood return throughout treatment. CADD pump connected to port, D/c home ambulatory in no distress. Pt aware of next appointment scheduled for 4/17/20.

## 2020-04-15 NOTE — PROGRESS NOTES
[facility] 
[address], [address] [place], [address] W: [phone]  F: [phone] HEME/ONC FOLLOW UP 
 
Reason for Visit:  
Kai Vance is a 44 y.o. 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 Treatment History:  
· EXPLORATORY LAPAROTOMY SIERRA BSO, TUMOR DEBULKING: ILEOCECAL RESECTION; OMENTECTOMY · FOLFOX 50%  12/3/19 - 3/4/20 · Per [facility] recommendation, dropped 5FU Bolus/Leucovorin and increased Oxaliplatin to 60 mg/m2 and 5FU pump tp 1,800 mg/m2 with Cycle 8 on 3/18/20 · Oxaliplatin increased to 85 mg/m2 with Cycle 10 on 4/15/20 STAGE: 4 with carcinomatosis History of Present Illness:  
Kai Vance is a 44 y.o. 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 She went back to [facility] on 3/13/20 for visit and scans. CTs were stable overall. [facility] recommended dropping the 5FU Bolus/Leucovorin and increasing Oxaliplatin to 60 mg/m2 and 5FU pump tp 1,800 mg/m2 with Cycle 8 on 3/18/20. She is here today for Cycle 10. She reports that she is feeling well overall today. She reports that she had a couple of days of abdominal pain and diarrhea. She takes Imodium PRN. She also started Lomotil which made her tired. She did not have any nausea or vomiting. Her appetite is improving now that nausea is better. She has neuropathy in fingers for 3-4 days after chemo when touching cold but this is not constant. She denies fever, chills, and mouth sores. CBC and CMP are still pending today. She wants treatment today and wants to increase Oxaliplatin to full dose pending labs. She is here alone today. Past Medical History:  
Diagnosis Date  Cancer ([facility] [address] APPENDIX CANCER   
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  
Procedure Laterality Date [address] UNLISTED  HX APPENDECTOMY  09/2019  HX GI  09/2019 ILEOCECECTOMY  HX GYN  2003 CYST ON OVARY  HX HYSTERECTOMY  2019  IR THORACENTESIS CATH W IMAGE  11/6/2019 Social History Tobacco Use  Smoking status: Never Smoker  Smokeless tobacco: Never Used Substance Use Topics  Alcohol use: Not Currently Family History Problem Relation Age of Onset  Breast Cancer Paternal Grandmother 56  Crohn's Disease Mother  Heart Disease Mother  Cancer Father BLADDER  
 Diabetes Father  No Known Problems Son  No Known Problems Daughter  Anesth Problems Neg Hx Current Outpatient Medications Medication Sig  LORazepam (ATIVAN) 0.5 mg tablet Take 1 Tab by mouth nightly as needed for Anxiety. Max Daily Amount: 0.5 mg.  
 diphenoxylate-atropine (LomotiL) 2.5-0.025 mg per tablet Take 2 Tabs by mouth four (4) times daily as needed for Diarrhea. Max Daily Amount: 8 Tabs.  prochlorperazine (COMPAZINE) 5 mg tablet Take 1 tab by mouth every 6 hours as needed for nausea or vomiting  potassium chloride (KLOR-CON) 10 mEq tablet Take 1 Tab by mouth two (2) times a day.  lidocaine-prilocaine (EMLA) topical cream Apply  to affected area as needed for Pain.  ondansetron (ZOFRAN ODT) 4 mg disintegrating tablet Take 1 Tab by mouth every eight (8) hours as needed for Nausea. (Patient taking differently: Take 8 mg by mouth every eight (8) hours as needed for Nausea. Indications: prevent nausea and vomiting from cancer chemotherapy)  dexAMETHasone (DECADRON) 4 mg tablet Take 2 tabs (8mg) by mouth daily on days 2 and 3 after chemotherapy  acetaminophen (TYLENOL) 325 mg tablet Take 650 mg by mouth every four (4) hours as needed for Pain. Indications: pain  ibuprofen (MOTRIN) 200 mg tablet Take 3 Tabs by mouth every eight (8) hours as needed for Pain. No current facility-administered medications for this visit. Facility-Administered Medications Ordered in Other Visits Medication Dose Route Frequency  sodium chloride 0.9 % bolus infusion 1,000 mL  1,000 mL IntraVENous ONCE No Known Allergies Review of Systems: A complete review of systems was obtained, negative except as described above. Physical Exam:  
 
Visit Vitals /84 Pulse 71 Temp 98.2 °F (36.8 °C) Ht 5' 3\" (1.6 m) Wt 113 lb (51.3 kg) LMP 09/15/2019 SpO2 98% BMI 20.02 kg/m² ECOG PS: 1-2 General: No distress Eyes: Anicteric sclerae HENT: Atraumatic Neck: Supple Resp: CTAB, normal effort CV: Regular GI: Soft, less distended MS: Ambulatory Skin: Warm dry. Port site without redness/swelling Psych: Alert, oriented, appropriate affect, normal judgment/insight Results:  
 
Lab Results Component Value Date/Time WBC 5.5 04/01/2020 10:06 AM  
 HGB 11.8 04/01/2020 10:06 AM  
 HCT 36.1 04/01/2020 10:06 AM  
 PLATELET 032 25/27/5865 10:06 AM  
 MCV 94.8 04/01/2020 10:06 AM  
 ABS. NEUTROPHILS 3.2 04/01/2020 10:06 AM  
 Hemoglobin (POC) 10.8 (L) 09/25/2019 04:11 PM  
 Hematocrit (POC) 32 (L) 09/25/2019 12:10 PM  
 
Lab Results Component Value Date/Time  Sodium 140 04/01/2020 10:06 AM  
 Potassium 3.7 04/01/2020 10:06 AM  
 Chloride 109 (H) 04/01/2020 10:06 AM  
 CO2 25 04/01/2020 10:06 AM  
 Glucose 81 04/01/2020 10:06 AM  
 BUN 9 04/01/2020 10:06 AM  
 Creatinine 0.48 (L) 04/01/2020 10:06 AM  
 GFR est AA >60 04/01/2020 10:06 AM  
 GFR est non-AA >60 04/01/2020 10:06 AM  
 Calcium 9.4 04/01/2020 10:06 AM  
 Sodium (POC) 137 09/25/2019 12:10 PM  
 Potassium (POC) 3.7 09/25/2019 12:10 PM  
 Chloride (POC) 105 09/25/2019 12:10 PM  
 Glucose (POC) 85 09/25/2019 12:10 PM  
 BUN (POC) 4 (L) 09/25/2019 12:10 PM  
 Creatinine (POC) 0.5 (L) 09/25/2019 12:10 PM  
 Calcium, ionized (POC) 1.14 09/25/2019 12:10 PM  
 
Lab Results Component Value Date/Time Bilirubin, total 0.3 04/01/2020 10:06 AM  
 ALT (SGPT) 24 04/01/2020 10:06 AM  
 AST (SGOT) 24 04/01/2020 10:06 AM  
 Alk. phosphatase 113 04/01/2020 10:06 AM  
 Protein, total 7.0 04/01/2020 10:06 AM  
 Albumin 3.0 (L) 04/01/2020 10:06 AM  
 Globulin 4.0 04/01/2020 10:06 AM  
 
CT Results (most recent): 
Results from Hospital Encounter encounter on 01/07/20 CT ABD PELV W CONT Narrative EXAM: CT ABD PELV W CONT INDICATION: F/U subdiaphragmatic fluid collection . Malignant neoplasm of the 
appendix status post appendectomy, colon resection, hysterectomy and 
nephrectomy. COMPARISON: 12/17/2019 CONTRAST: 100 mL of Isovue-370. TECHNIQUE:  
Following the uneventful intravenous administration of contrast, thin axial 
images were obtained through the abdomen and pelvis. Coronal and sagittal 
reconstructions were generated. Oral contrast was not administered. CT dose 
reduction was achieved through use of a standardized protocol tailored for this 
examination and automatic exposure control for dose modulation. FINDINGS:  
LUNG BASES: Stable small left pleural effusion with left basilar atelectasis. INCIDENTALLY IMAGED HEART AND MEDIASTINUM: Unremarkable. LIVER: No solid mass or biliary dilatation. 2 stable cysts likely in the left 
hepatic lobe. GALLBLADDER: Unremarkable. SPLEEN: Stable left subphrenic fluid collection containing a pigtail catheter, 
anterior and lateral to the spleen which remains homogeneous and unchanged. PANCREAS: No mass or ductal dilatation. ADRENALS: Unremarkable. KIDNEYS: No mass, calculus, or hydronephrosis. STOMACH: Unremarkable. SMALL BOWEL: No dilatation or wall thickening.  However, stranding of perivesical 
and lower pelvic fat surrounds nondilated small bowel loops just cephalad to the 
 bladder dome, unchanged in appearance. The bladder dome is tented toward small 
bowel loops and is inseparable with no preservation of fat plane between the 
bladder and small bowel or sigmoid colon. This is unchanged. COLON: No dilatation or wall thickening. Large amount of retained fecal material 
and fluid again noted in the colon. Anastomotic staples at the cecal region. Stranding of pericolonic fat in the sigmoid colon adjacent to the bladder dome, 
stable since the prior study. APPENDIX: Not seen, surgically absent. PERITONEUM: No ascites or pneumoperitoneum. RETROPERITONEUM: No lymphadenopathy or aortic aneurysm. REPRODUCTIVE ORGANS: Surgically absent uterus URINARY BLADDER: Tenting of the bladder dome, which is inseparable from adjacent 
small bowel and sigmoid colonic loops with stranding of perivesical fat, stable BONES: No destructive bone lesion. ADDITIONAL COMMENTS: N/A Impression IMPRESSION: 
 
1. Stable appearance of pigtail catheter in the left subphrenic space with small 
fluid collection adjacent to the spleen. 2. Stable postoperative/postinflammatory changes in the pelvis status post 
appendectomy. 3. Stable small left pleural effusion with left basilar atelectasis. 4. Other stable incidental and postoperative changes. Records reviewed and summarized above. Pathology report(s) reviewed above. Radiology report(s) reviewed above. Assessment/PLAN:  
 
1) Stage 4 / Metastatic Appendiceal Cancer  
post EXPLORATORY LAPAROTOMY SIERRA BSO, TUMOR DEBULKING: ILEOCECAL RESECTION; OMENTECTOMY 9/25/19 Surgery done for obstruction. She had left rib pain and SOB and on exam decreased bs on LEFT. Got CXR which showed pleural effusion then ultrasound which showed elevated diaphragm and ? Free air CT which showed fluid collection and patient admitted for this. Had IR drain of splenic fluid collection. Patient went to [facility] and [address] [place] [address],[address] for second opinions. Discussed systemic therapy with FOLFOX/ +/- Avastin chemo or some variation of this regimen. Possible [facility] down the road. Patient choose to have chemo locally and still [facility] [address] [facility]. Patient had cycle 1 of FOLFOX chemotherapy on 12/3/19 reduced by 50%. She was then admitted via ER with N/V and abdominal pain. CT A/P 12/12/19 shows worsening fluid collection which was drained via IR. Follow up CT A/P 12/17/19 better overall. CT A/P 1/7/20 stable. She has been tolerating FOLFOX 50% DR well overall. She went to [facility] for follow up on 3/13/20 with CTs there. [address] felt that CTs were stable overall compared to her last scans there in 11/19. [facility] recommended dropping 5FU Bolus/Leucovorin and increasing Oxaliplatin to 60 mg/m2 and 5FU pump tp 1,800 mg/m2. She is here for Cycle 10 of palliative chemo. She tolerated last cycle of chemo with changes per [address] recommendation well overall. Patient is clinically stable overall today. She denies nausea/vomiting and pain today. Patient is tolerating this dose well overall so will increase Oxaliplatin to 85 mg/m2 today per [address] recommendation. CBC and CMP are still pending today. She wants to do treatment today pending labs. Will give IVF with chemo today and IVF/Zofran on Friday with pump removal.  
Patient seen today with COVID precautions. Follow up here in 2 weeks. 2) Left Pleural Effusion post Thoracentesis x 2 No cytology. Stable on CT. 3) Hx of Nausea and Vomiting Continue anti-emetics as needed. Advised her that she can take 10 mg Compazine if needed. No recent nausea/vomiting. 4) Abdominal Pain/Cramping Due to carcinomatosis. She is taking Tylenol PRN. No pain today. 5) Chemo Induced Diarrhea Well controlled with PRN Imodium/Lomotil. Will continue to monitor. 6) Insomnia Takes Ativan PRN. No refill needed today. 7) Psychosocial 
Mood good, coping well considering difficult disease. She has great family support. SW support as needed. She is here alone today. Call if questions. Follow up in 2 weeks This patient was seen in conjunction with Silvana Fitzpatrick NP. I appreciate the opportunity to participate in Ms. Aragon jacek care.  
 
Signed By: Neena Grier NP

## 2020-04-17 NOTE — PROGRESS NOTES
Outpatient Infusion [facility] Progress Note    1400 Pt admit to [facility] for pump dc/hydration ambulatory in stable condition. Assessment completed. No new concerns voiced, CADD pump volume=0, disconnected pump from port, good blood return, NS started. Visit Vitals  /66   Pulse (!) 58   Temp 98.2 °F (36.8 °C)   Resp 18   LMP 09/15/2019       Medications:  Medications Administered     heparin (porcine) pf 300-500 Units     Admin Date  04/17/2020 Action  Given Dose  500 Units Route  InterCATHeter Administered By  Yanira JARQUIN          ondansetron [facility]) injection 8 mg     Admin Date  04/17/2020 Action  Given Dose  8 mg Route  IntraVENous Administered By  Yanira Phan A          saline peripheral flush soln 10 mL     Admin Date  04/17/2020 Action  Given Dose  10 mL Route  InterCATHeter Administered By  Yanira Phan A          sodium chloride 0.9 % bolus infusion 1,000 mL     Admin Date  04/17/2020 Action  New Bag Dose  1000 mL Rate  1,000 mL/hr Route  IntraVENous Administered By  Yanira Phan A                2698 Pt tolerated treatment well, port flushed, heparinized and de-accessed, D/c home ambulatory in no distress. Pt aware of next appointment scheduled for 4/29/20.

## 2020-04-29 NOTE — PROGRESS NOTES
Merlinda Leavell is a 44 y.o. female  Chief Complaint   Patient presents with    Follow-up     metastatic appendix cancer     1. Have you been to the ER, urgent care clinic since your last visit? Hospitalized since your last visit? No    2. Have you seen or consulted any other health care providers outside of the [address] [place] [address] since your last visit? Include any pap smears or colon screening.  no

## 2020-04-29 NOTE — PROGRESS NOTES
[facility] [place] [facility] [place] [address] [address], [place]: [phone]  F: [phone]    HEME/ONC FOLLOW UP    Reason for Visit:   Rick Steel is a 44 y.o. 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 Treatment History:   · EXPLORATORY LAPAROTOMY SIERRA BSO, TUMOR DEBULKING: ILEOCECAL RESECTION; OMENTECTOMY  · FOLFOX 50% DR 12/3/19 - 3/4/20  · Per [facility] recommendation, dropped 5FU Bolus/Leucovorin and increased Oxaliplatin to 60 mg/m2 and 5FU pump tp 1,800 mg/m2 with Cycle 8 on 3/18/20  · Oxaliplatin increased to 85 mg/m2 with Cycle 10 on 4/15/20 - Current     STAGE: 4 with carcinomatosis    History of Present Illness:   Rick Steel is a 44 y.o. 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 She went back to [facility] on 3/13/20 for visit and scans. CTs were stable overall. [facility] recommended dropping the 5FU Bolus/Leucovorin and increasing Oxaliplatin to 60 mg/m2 and 5FU pump tp 1,800 mg/m2 with Cycle 8 on 3/18/20. Patient tolerated dose changes well overall, so Oxaliplatin was increased to 85 mg/m2 with Cycle 10 per [facility] recommendation. She is here today for Cycle 11. She reports that she is feeling well overall today. She tolerated increased dose of Oxaliplatin well overall with a little bit of increased diarrhea/abdominal pain. She reports that the side effects are tolerable/manageable. She takes Imodium and Advil/Tylneol PRN. She did not have any nausea or vomiting. Her appetite is improving now that nausea is better. She has neuropathy in fingers for about 4-5 days after chemo when touching cold but this is not constant. She denies fever, chills, and mouth sores. CBC is stable and CMP is still pending today. She wants treatment today. She is here alone today d/t COVID.     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 ([facility] [address] 2019    Nausea & vomiting     Nausea and vomiting 12/9/2019    Splenic infarction 2019    Subphrenic abscess ([facility] [address] 1/8/2020    Symptomatic cholelithiasis 9/18/2019      Past Surgical History:   Procedure Laterality Date    ABDOMEN SURGERY PROC UNLISTED      HX APPENDECTOMY  09/2019    HX GI  09/2019    ILEOCECECTOMY    HX GYN  2003    CYST ON OVARY    HX HYSTERECTOMY  2019    IR THORACENTESIS CATH W IMAGE  11/6/2019      Social History     Tobacco Use    Smoking status: Never Smoker    Smokeless tobacco: Never Used   Substance Use Topics    Alcohol use: Not Currently      Family History   Problem Relation Age of Onset    Breast Cancer Paternal Grandmother 61    Crohn's Disease Mother     Heart Disease Mother     Cancer Father         BLADDER    Diabetes Father     No Known Problems Son     No Known Problems Daughter     Anesth Problems Neg Hx      Current Outpatient Medications   Medication Sig    LORazepam (ATIVAN) 0.5 mg tablet Take 1 Tab by mouth nightly as needed for Anxiety. Max Daily Amount: 0.5 mg.    diphenoxylate-atropine (LomotiL) 2.5-0.025 mg per tablet Take 2 Tabs by mouth four (4) times daily as needed for Diarrhea. Max Daily Amount: 8 Tabs.  prochlorperazine (COMPAZINE) 5 mg tablet Take 1 tab by mouth every 6 hours as needed for nausea or vomiting    potassium chloride (KLOR-CON) 10 mEq tablet Take 1 Tab by mouth two (2) times a day.  lidocaine-prilocaine (EMLA) topical cream Apply  to affected area as needed for Pain.  ondansetron (ZOFRAN ODT) 4 mg disintegrating tablet Take 1 Tab by mouth every eight (8) hours as needed for Nausea. (Patient taking differently: Take 8 mg by mouth every eight (8) hours as needed for Nausea.  Indications: prevent nausea and vomiting from cancer chemotherapy)    dexAMETHasone (DECADRON) 4 mg tablet Take 2 tabs (8mg) by mouth daily on days 2 and 3 after chemotherapy    acetaminophen (TYLENOL) 325 mg tablet Take 650 mg by mouth every four (4) hours as needed for Pain. Indications: pain    ibuprofen (MOTRIN) 200 mg tablet Take 3 Tabs by mouth every eight (8) hours as needed for Pain. No current facility-administered medications for this visit. No Known Allergies     Review of Systems: A complete review of systems was obtained, negative except as described above. Physical Exam:     Visit Vitals  /77   Pulse 75   Temp 97.2 °F (36.2 °C)   Ht 5' 3\" (1.6 m)   Wt 113 lb 8 oz (51.5 kg)   LMP 09/15/2019   BMI 20.11 kg/m²     ECOG PS: 1-2  General: No distress  Eyes: Anicteric sclerae  HENT: Atraumatic  Neck: Supple  Resp: CTAB, normal effort  CV: Regular   GI: Soft, less distended   MS: Ambulatory  Skin: Warm dry. Port site without redness/swelling  Psych: Alert, oriented, appropriate affect, normal judgment/insight    Results:     Lab Results   Component Value Date/Time    WBC 5.4 04/29/2020 10:22 AM    HGB 11.4 (L) 04/29/2020 10:22 AM    HCT 34.8 (L) 04/29/2020 10:22 AM    PLATELET 087 (L) 33/75/7693 10:22 AM    MCV 96.1 04/29/2020 10:22 AM    ABS.  NEUTROPHILS 3.0 04/29/2020 10:22 AM    Hemoglobin (POC) 10.8 (L) 09/25/2019 04:11 PM    Hematocrit (POC) 32 (L) 09/25/2019 12:10 PM     Lab Results   Component Value Date/Time    Sodium 140 04/15/2020 10:21 AM    Potassium 3.8 04/15/2020 10:21 AM    Chloride 107 04/15/2020 10:21 AM    CO2 27 04/15/2020 10:21 AM    Glucose 76 04/15/2020 10:21 AM    BUN 6 04/15/2020 10:21 AM    Creatinine 0.40 (L) 04/15/2020 10:21 AM    GFR est AA >60 04/15/2020 10:21 AM    GFR est non-AA >60 04/15/2020 10:21 AM    Calcium 9.3 04/15/2020 10:21 AM    Sodium (POC) 137 09/25/2019 12:10 PM    Potassium (POC) 3.7 09/25/2019 12:10 PM    Chloride (POC) 105 09/25/2019 12:10 PM    Glucose (POC) 85 09/25/2019 12:10 PM    BUN (POC) 4 (L) 09/25/2019 12:10 PM    Creatinine (POC) 0.5 (L) 09/25/2019 12:10 PM    Calcium, ionized (POC) 1.14 09/25/2019 12:10 PM     Lab Results Component Value Date/Time    Bilirubin, total 0.3 04/15/2020 10:21 AM    ALT (SGPT) 21 04/15/2020 10:21 AM    AST (SGOT) 22 04/15/2020 10:21 AM    Alk. phosphatase 91 04/15/2020 10:21 AM    Protein, total 6.7 04/15/2020 10:21 AM    Albumin 3.0 (L) 04/15/2020 10:21 AM    Globulin 3.7 04/15/2020 10:21 AM     CT Results (most recent):  Results from Hospital Encounter encounter on 01/07/20   CT ABD PELV W CONT    Narrative EXAM: CT ABD PELV W CONT    INDICATION: F/U subdiaphragmatic fluid collection . Malignant neoplasm of the  appendix status post appendectomy, colon resection, hysterectomy and  nephrectomy. COMPARISON: 12/17/2019     CONTRAST: 100 mL of Isovue-370. TECHNIQUE:   Following the uneventful intravenous administration of contrast, thin axial  images were obtained through the abdomen and pelvis. Coronal and sagittal  reconstructions were generated. Oral contrast was not administered. CT dose  reduction was achieved through use of a standardized protocol tailored for this  examination and automatic exposure control for dose modulation. FINDINGS:   LUNG BASES: Stable small left pleural effusion with left basilar atelectasis. INCIDENTALLY IMAGED HEART AND MEDIASTINUM: Unremarkable. LIVER: No solid mass or biliary dilatation. 2 stable cysts likely in the left  hepatic lobe. GALLBLADDER: Unremarkable. SPLEEN: Stable left subphrenic fluid collection containing a pigtail catheter,  anterior and lateral to the spleen which remains homogeneous and unchanged. PANCREAS: No mass or ductal dilatation. ADRENALS: Unremarkable. KIDNEYS: No mass, calculus, or hydronephrosis. STOMACH: Unremarkable. SMALL BOWEL: No dilatation or wall thickening. However, stranding of perivesical  and lower pelvic fat surrounds nondilated small bowel loops just cephalad to the  bladder dome, unchanged in appearance.  The bladder dome is tented toward small  bowel loops and is inseparable with no preservation of fat plane between the  bladder and small bowel or sigmoid colon. This is unchanged. COLON: No dilatation or wall thickening. Large amount of retained fecal material  and fluid again noted in the colon. Anastomotic staples at the cecal region. Stranding of pericolonic fat in the sigmoid colon adjacent to the bladder dome,  stable since the prior study. APPENDIX: Not seen, surgically absent. PERITONEUM: No ascites or pneumoperitoneum. RETROPERITONEUM: No lymphadenopathy or aortic aneurysm. REPRODUCTIVE ORGANS: Surgically absent uterus  URINARY BLADDER: Tenting of the bladder dome, which is inseparable from adjacent  small bowel and sigmoid colonic loops with stranding of perivesical fat, stable  BONES: No destructive bone lesion. ADDITIONAL COMMENTS: N/A      Impression IMPRESSION:    1. Stable appearance of pigtail catheter in the left subphrenic space with small  fluid collection adjacent to the spleen. 2. Stable postoperative/postinflammatory changes in the pelvis status post  appendectomy. 3. Stable small left pleural effusion with left basilar atelectasis. 4. Other stable incidental and postoperative changes. Records reviewed and summarized above. Pathology report(s) reviewed above. Radiology report(s) reviewed above. Assessment/PLAN:     1) Stage 4 / Metastatic Appendiceal Cancer   post EXPLORATORY LAPAROTOMY SIERRA BSO, TUMOR DEBULKING: ILEOCECAL RESECTION; OMENTECTOMY 9/25/19  Surgery done for obstruction. She had left rib pain and SOB and on exam decreased bs on LEFT. Got CXR which showed pleural effusion then ultrasound which showed elevated diaphragm and ? Free air  CT which showed fluid collection and patient admitted for this. Had IR drain of splenic fluid collection. [place] [facility] [place] [address] [place] [address],[address] for second opinions. Discussed systemic therapy with FOLFOX/ +/- Avastin chemo or some variation of this regimen. Possible [facility] down the road.    Patient choose to have chemo locally and still see Jose Ho. Patient had cycle 1 of FOLFOX chemotherapy on 12/3/19 reduced by 50%. She was then admitted via ER with N/V and abdominal pain. CT A/P 12/12/19 shows worsening fluid collection which was drained via IR. Follow up CT A/P 12/17/19 better overall. CT A/P 1/7/20 stable. She had been tolerating FOLFOX 50% DR well overall. She went to [facility] for follow up on 3/13/20 with CTs there. Jose Ho felt that CTs were stable overall compared to her last scans there in 11/19. [facility] recommended dropping 5FU Bolus/Leucovorin and increasing Oxaliplatin to 60 mg/m2 and 5FU pump tp 1,800 mg/m2 and then increasing Oxaliplatin to 85 mg/m2 if tolerated. Increased Oxaliplatin to 85 mg/m2 with Cycle 10 per [facility] recommendation. She is here for Cycle 11 of palliative chemo. She is tolerating chemo well overall with mild side effects. Patient is clinically stable overall today. She denies nausea/vomiting and pain today. CBC is stable and CMP is still pending today. She wants to do treatment today pending labs. Will give IVF with chemo today and IVF/Zofran on Friday with pump removal.   Patient seen today with COVID precautions. Follow up here in 2 weeks. 2) Left Pleural Effusion post Thoracentesis x 2  No cytology. Stable on CT. 3) Hx of Nausea and Vomiting  Continue anti-emetics as needed. Advised her that she can take 10 mg Compazine if needed. No recent nausea/vomiting. 4) Abdominal Pain/Cramping  Due to carcinomatosis. She is taking Tylenol/Advil PRN. No pain today. 5) Chemo Induced Diarrhea  Well controlled with PRN Imodium/Lomotil. Will continue to monitor. 6) Insomnia  Takes Ativan PRN. No refill needed today. 7) Psychosocial  Mood good, coping well considering difficult disease. She has great family support. SW support as needed. She is here alone today d/t COIVD. Call if questions.   Follow up in 2 weeks  This patient was seen in conjunction with Kwabena Quevedo NP. I appreciate the opportunity to participate in Ms. Aravind Yan care.     Signed By: Val Agarwal NP

## 2020-04-29 NOTE — PROGRESS NOTES
[facility]patient [facility]fusion [facility] - Chemotherapy Progress Note    1000 Pt admit to [facility] for Folfox ambulatory in stable condition. Assessment completed. No new concerns voiced. Port accessed with positive blood return, labs drawn and sent for processing. Patient upstairs to MD appointment. Patient returned and connected to saline KVO.         Chemotherapy Flowsheet 4/29/2020   Cycle C11   Date 4/29/2020   Drug / Regimen Folfox   Pre Hydration -   Pre Meds given   Notes given         Visit Vitals  BP 96/66   Temp 97 °F (36.1 °C)   Resp 18   Ht 5' 3\" (1.6 m)   Wt 51.5 kg (113 lb 8 oz)   LMP 09/15/2019   Breastfeeding No   BMI 20.11 kg/m²       Medications:  Medications Administered     0.9% sodium chloride injection 10 mL     Admin Date  04/29/2020 Action  Given Dose  10 mL Route  IntraVENous Administered By  Cruz Drake RN          dexamethasone (DECADRON) 12 mg in 0.9% sodium chloride 50 mL, overfill volume 5 mL IVPB     Admin Date  04/29/2020 Action  Given Dose  12 mg Rate  232 mL/hr Route  IntraVENous Administered By  Cruz Drake RN          dextrose 5% infusion     Admin Date  04/29/2020 Action  New Bag Dose  25 mL/hr Rate  25 mL/hr Route  IntraVENous Administered By  Cruz Drake RN          fluorouraciL (ADRUCIL) 2,700 mg in 0.9% sodium chloride 100 mL CADD Cassette     Admin Date  04/29/2020 Action  New Bag Dose  2700 mg Rate  2.2 mL/hr Route  IntraVENous Administered By  Cruz Drake RN          ondansetron [facility]) injection 8 mg     Admin Date  04/29/2020 Action  Given Dose  8 mg Route  IntraVENous Administered By  Cruz Drake RN          oxaliplatin (ELOXATIN) 127.5 mg in dextrose 5% 250 mL, overfill volume 25 mL chemo infusion     Admin Date  04/29/2020 Action  New Bag Dose  127.5 mg Rate  150.3 mL/hr Route  IntraVENous Administered By  Cruz Drake RN          potassium chloride SR (KLOR-CON 10) tablet 40 mEq     Admin Date  04/29/2020 Action  Given Dose  40 mEq Route  Oral Administered By  Reddy Sandoval RN          saline peripheral flush soln 10 mL     Admin Date  04/29/2020 Action  Given Dose  10 mL Route  InterCATHeter Administered By  Reddy Sandoval, MING                  [id number]  Pt tolerated treatment well. Port maintained positive blood return throughout treatment. CADD pump connected to port, D/c home ambulatory in no distress.  Pt aware of next appointment scheduled for   Future Appointments   Date Time Provider Thomas Espinoza   5/1/2020  2:00 PM G1 DIANNE [address]   5/13/2020 10:00 AM A2 DIANNE LONG [address] [place] [address]   5/13/2020 10:15 AM Paul Klinefelter, NP [place] [address]   5/15/2020  2:00 PM G2 DIANNE [facility]   5/27/2020 10:00 AM A2 DIANNE LONG [address] [place] [address]   5/29/2020  2:00 PM G2 DIANNE [facility]   6/10/2020 10:00 AM A2 DIANNE LONG [place] [facility]   6/12/2020  2:00 PM G2 DIANNE [facility]

## 2020-05-01 NOTE — PROGRESS NOTES
[facility] Short Note Date: May 1, 2020 Name: Beatrice Ahuja MRN: [id number] : [id number] Pt admit to [facility] for removal of CADD and hydration ambulatory in stable condition. Assessment completed. No new concerns voiced. Ms. Kristie Mares vitals were reviewed prior to treatment. Patient Vitals for the past 12 hrs: 
 Temp Pulse Resp BP  
20 1415 97.9 °F (36.6 °C) 67 18 119/74 Port with positive blood return flushed, heparinized and de-accessed per protocol. Medications given:  
Medications Administered   
 ondansetron (ZOFRAN) injection 8 mg Admin Date 2020 Action Given Dose 8 mg Route IntraVENous Administered By 
Terrance Carl RN  
  
  
 sodium chloride 0.9 % bolus infusion 1,000 mL Admin Date 2020 Action New Bag Dose 
1000 mL Rate 
1,000 mL/hr Route IntraVENous Administered By 
Terrance Carl RN  
  
  
  
 
  
 
5691 Pt tolerated treatment well. D/c home ambulatory in no distress. Future Appointments Date Time Provider Thomas Espinoza 2020 10:00 AM A2 DIANNE LONG [address] [place] [facility]  
2020 10:15 AM BART [address]  
5/15/2020  2:00 PM G2 DIANNE [facility]  
2020 10:00 AM A2 DIANNE LONG [address] [facility]  
2020  2:00 PM G2 DIANNE [facility]  
6/10/2020 10:00 AM A2 DIANNE LONG [place] [facility]  
2020  2:00 PM G2 DIANNE [facility] Trino Chapa RN May 1, 2020 
5:05 PM

## 2020-05-13 PROBLEM — Z87.09 HISTORY OF PLEURAL EFFUSION: Status: ACTIVE | Noted: 2020-01-01

## 2020-05-13 NOTE — PROGRESS NOTES
William Matos is a 44 y.o. female  Chief Complaint   Patient presents with    Follow-up     metastatic appendix cancer     1. Have you been to the ER, urgent care clinic since your last visit? Hospitalized since your last visit?no    2. Have you seen or consulted any other health care providers outside of the [address] [place] [address] since your last visit? Include any pap smears or colon screening. no

## 2020-05-13 NOTE — PROGRESS NOTES
[facility] [place] [facility] [place] [address] [address], [place]: [phone]  F: [phone]    HEME/ONC FOLLOW UP    Reason for Visit:   Hilary Piña is a 44 y.o. 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 Treatment History:   · EXPLORATORY LAPAROTOMY SIERRA BSO, TUMOR DEBULKING: ILEOCECAL RESECTION; OMENTECTOMY  · FOLFOX 50% DR 12/3/19 - 3/4/20  · Per [facility] recommendation, dropped 5FU Bolus/Leucovorin and increased Oxaliplatin to 60 mg/m2 and 5FU pump tp 1,800 mg/m2 with Cycle 8 on 3/18/20  · Oxaliplatin increased to 85 mg/m2 with Cycle 10 on 4/15/20 - Current     STAGE: 4 with carcinomatosis    History of Present Illness:   Hilary Piña is a 44 y.o. 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 She went to [facility] on 3/13/20 for visit and scans. CTs were stable overall. [facility] recommended dropping the 5FU Bolus/Leucovorin and increasing Oxaliplatin to 60 mg/m2 and 5FU pump tp 1,800 mg/m2 with Cycle 8 on 3/18/20. Patient tolerated dose changes well overall, so Oxaliplatin was increased to 85 mg/m2 with Cycle 10 per [facility] recommendation. She is here today for Cycle 12. She reports that she is feeling well overall today - nothing new or different. She is tolerating increased dose of Oxaliplatin well overall with a little bit of increased diarrhea/abdominal pain. She reports that the side effects are tolerable/manageable. She takes Imodium and Advil/Tylneol PRN. She did had very slight nausea after least treatment but no vomiting. Her appetite is good overall - gained 1 lb since last visit. She has neuropathy in fingers for about 5-6 days after chemo when touching cold but this is not constant. She denies fever, chills, and mouth sores. CBC and CMP are stable/good today. She wants treatment today. She is here alone today d/t COVID.     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 [address] 2019    Nausea & vomiting     Nausea and vomiting 12/9/2019    Splenic infarction 2019    Subphrenic abscess ([facility] [address] 1/8/2020    Symptomatic cholelithiasis 9/18/2019      Past Surgical History:   Procedure Laterality Date    ABDOMEN SURGERY PROC UNLISTED      HX APPENDECTOMY  09/2019    HX GI  09/2019    ILEOCECECTOMY    HX GYN  2003    CYST ON OVARY    HX HYSTERECTOMY  2019    IR THORACENTESIS CATH W IMAGE  11/6/2019      Social History     Tobacco Use    Smoking status: Never Smoker    Smokeless tobacco: Never Used   Substance Use Topics    Alcohol use: Not Currently      Family History   Problem Relation Age of Onset    Breast Cancer Paternal Grandmother 61    Crohn's Disease Mother     Heart Disease Mother     Cancer Father         BLADDER    Diabetes Father     No Known Problems Son     No Known Problems Daughter     Anesth Problems Neg Hx      Current Outpatient Medications   Medication Sig    LORazepam (ATIVAN) 0.5 mg tablet Take 1 Tab by mouth nightly as needed for Anxiety. Max Daily Amount: 0.5 mg.    diphenoxylate-atropine (LomotiL) 2.5-0.025 mg per tablet Take 2 Tabs by mouth four (4) times daily as needed for Diarrhea. Max Daily Amount: 8 Tabs.  prochlorperazine (COMPAZINE) 5 mg tablet Take 1 tab by mouth every 6 hours as needed for nausea or vomiting    potassium chloride (KLOR-CON) 10 mEq tablet Take 1 Tab by mouth two (2) times a day.  lidocaine-prilocaine (EMLA) topical cream Apply  to affected area as needed for Pain.  ondansetron (ZOFRAN ODT) 4 mg disintegrating tablet Take 1 Tab by mouth every eight (8) hours as needed for Nausea. (Patient taking differently: Take 8 mg by mouth every eight (8) hours as needed for Nausea.  Indications: prevent nausea and vomiting from cancer chemotherapy)    dexAMETHasone (DECADRON) 4 mg tablet Take 2 tabs (8mg) by mouth daily on days 2 and 3 after chemotherapy    acetaminophen (TYLENOL) 325 mg tablet Take 650 mg by mouth every four (4) hours as needed for Pain. Indications: pain    ibuprofen (MOTRIN) 200 mg tablet Take 3 Tabs by mouth every eight (8) hours as needed for Pain. No current facility-administered medications for this visit. Facility-Administered Medications Ordered in Other Visits   Medication Dose Route Frequency    saline peripheral flush soln 10 mL  10 mL InterCATHeter PRN    0.9% sodium chloride injection 10 mL  10 mL IntraVENous PRN    heparin (porcine) pf 300-500 Units  300-500 Units InterCATHeter PRN      No Known Allergies     Review of Systems: A complete review of systems was obtained, negative except as described above. Physical Exam:     Visit Vitals  /79   Pulse 82   Temp 97.7 °F (36.5 °C)   Ht 5' 3\" (1.6 m)   Wt 114 lb 1.6 oz (51.8 kg)   LMP 09/15/2019   SpO2 97%   BMI 20.21 kg/m²     ECOG PS: 1-2  General: No distress  Eyes: Anicteric sclerae  HENT: Atraumatic  Neck: Supple  Resp: CTAB, normal effort  CV: Regular   GI: Soft, less distended   MS: Ambulatory  Skin: Warm dry. Port site without redness/swelling  Psych: Alert, oriented, appropriate affect, normal judgment/insight    Results:     Lab Results   Component Value Date/Time    WBC 4.5 05/13/2020 10:13 AM    HGB 11.4 (L) 05/13/2020 10:13 AM    HCT 37.0 05/13/2020 10:13 AM    PLATELET 646 (L) 28/35/5515 10:13 AM    MCV 97.6 05/13/2020 10:13 AM    ABS.  NEUTROPHILS PENDING 05/13/2020 10:13 AM    Hemoglobin (POC) 10.8 (L) 09/25/2019 04:11 PM    Hematocrit (POC) 32 (L) 09/25/2019 12:10 PM     Lab Results   Component Value Date/Time    Sodium 140 05/13/2020 10:13 AM    Potassium 3.6 05/13/2020 10:13 AM    Chloride 109 (H) 05/13/2020 10:13 AM    CO2 26 05/13/2020 10:13 AM    Glucose 81 05/13/2020 10:13 AM    BUN 5 (L) 05/13/2020 10:13 AM    Creatinine 0.38 (L) 05/13/2020 10:13 AM    GFR est AA >60 05/13/2020 10:13 AM    GFR est non-AA >60 05/13/2020 10:13 AM    Calcium 9.3 05/13/2020 10:13 AM    Sodium (POC) 137 09/25/2019 12:10 PM    Potassium (POC) 3.7 09/25/2019 12:10 PM    Chloride (POC) 105 09/25/2019 12:10 PM    Glucose (POC) 85 09/25/2019 12:10 PM    BUN (POC) 4 (L) 09/25/2019 12:10 PM    Creatinine (POC) 0.5 (L) 09/25/2019 12:10 PM    Calcium, ionized (POC) 1.14 09/25/2019 12:10 PM     Lab Results   Component Value Date/Time    Bilirubin, total 0.4 05/13/2020 10:13 AM    ALT (SGPT) 24 05/13/2020 10:13 AM    AST (SGOT) 32 05/13/2020 10:13 AM    Alk. phosphatase 120 (H) 05/13/2020 10:13 AM    Protein, total 6.7 05/13/2020 10:13 AM    Albumin 3.1 (L) 05/13/2020 10:13 AM    Globulin 3.6 05/13/2020 10:13 AM     CT Results (most recent):  Results from Hospital Encounter encounter on 01/07/20   CT ABD PELV W CONT    Narrative EXAM: CT ABD PELV W CONT    INDICATION: F/U subdiaphragmatic fluid collection . Malignant neoplasm of the  appendix status post appendectomy, colon resection, hysterectomy and  nephrectomy. COMPARISON: 12/17/2019     CONTRAST: 100 mL of Isovue-370. TECHNIQUE:   Following the uneventful intravenous administration of contrast, thin axial  images were obtained through the abdomen and pelvis. Coronal and sagittal  reconstructions were generated. Oral contrast was not administered. CT dose  reduction was achieved through use of a standardized protocol tailored for this  examination and automatic exposure control for dose modulation. FINDINGS:   LUNG BASES: Stable small left pleural effusion with left basilar atelectasis. INCIDENTALLY IMAGED HEART AND MEDIASTINUM: Unremarkable. LIVER: No solid mass or biliary dilatation. 2 stable cysts likely in the left  hepatic lobe. GALLBLADDER: Unremarkable. SPLEEN: Stable left subphrenic fluid collection containing a pigtail catheter,  anterior and lateral to the spleen which remains homogeneous and unchanged. PANCREAS: No mass or ductal dilatation. ADRENALS: Unremarkable.   KIDNEYS: No mass, calculus, or hydronephrosis. STOMACH: Unremarkable. SMALL BOWEL: No dilatation or wall thickening. However, stranding of perivesical  and lower pelvic fat surrounds nondilated small bowel loops just cephalad to the  bladder dome, unchanged in appearance. The bladder dome is tented toward small  bowel loops and is inseparable with no preservation of fat plane between the  bladder and small bowel or sigmoid colon. This is unchanged. COLON: No dilatation or wall thickening. Large amount of retained fecal material  and fluid again noted in the colon. Anastomotic staples at the cecal region. Stranding of pericolonic fat in the sigmoid colon adjacent to the bladder dome,  stable since the prior study. APPENDIX: Not seen, surgically absent. PERITONEUM: No ascites or pneumoperitoneum. RETROPERITONEUM: No lymphadenopathy or aortic aneurysm. REPRODUCTIVE ORGANS: Surgically absent uterus  URINARY BLADDER: Tenting of the bladder dome, which is inseparable from adjacent  small bowel and sigmoid colonic loops with stranding of perivesical fat, stable  BONES: No destructive bone lesion. ADDITIONAL COMMENTS: N/A      Impression IMPRESSION:    1. Stable appearance of pigtail catheter in the left subphrenic space with small  fluid collection adjacent to the spleen. 2. Stable postoperative/postinflammatory changes in the pelvis status post  appendectomy. 3. Stable small left pleural effusion with left basilar atelectasis. 4. Other stable incidental and postoperative changes. Records reviewed and summarized above. Pathology report(s) reviewed above. Radiology report(s) reviewed above. Assessment/PLAN:     1) Stage 4 / Metastatic Appendiceal Cancer   post EXPLORATORY LAPAROTOMY SIERRA BSO, TUMOR DEBULKING: ILEOCECAL RESECTION; OMENTECTOMY 9/25/19  Surgery done for obstruction. She had left rib pain and SOB and on exam decreased bs on LEFT.    Got CXR which showed pleural effusion then ultrasound which showed elevated diaphragm and ? Free air  CT which showed fluid collection and patient admitted for this. Had IR drain of splenic fluid collection. Patient went to [facility] and [address] for second opinions. Discussed systemic therapy with FOLFOX/ +/- Avastin chemo or some variation of this regimen. Possible [facility] down the road. Patient choose to have chemo locally and still [facility] [address] [facility] [address]. Patient had cycle 1 of FOLFOX chemotherapy on 12/3/19 reduced by 50%. CT A/P 12/12/19 shows worsening fluid collection which was drained via IR. Follow up CT A/P 12/17/19 better overall. CT A/P 1/7/20 stable. She had been tolerating FOLFOX 50% DR well overall. She went to [facility] for follow up on 3/13/20 with CTs there. [address] [facility] [address] felt that CTs were stable overall compared to her last scans there in 11/19. [facility] recommended dropping 5FU Bolus/Leucovorin and increasing Oxaliplatin to 60 mg/m2 and 5FU pump tp 1,800 mg/m2 and then increasing Oxaliplatin to 85 mg/m2 if tolerated. Increased Oxaliplatin to 85 mg/m2 with Cycle 10 per [address] recommendation. She is here for Cycle 12 of palliative chemo. She is tolerating chemo well overall with mild side effects. Patient is clinically stable overall today. She denies nausea/vomiting and pain today. CBC and CMP are stable today. She is ready for treatment today - same dose as last time. Will give IVF with chemo today and IVF/Zofran on Friday with pump removal.   Ordered follow up CTs C/A/P today for end of May/early June. She will have a VV with [place] after CTs. Patient seen today with COVID precautions. Follow up here in 2 weeks. Patient agrees with plan. 2) Left Pleural Effusion post Thoracentesis x 2  No cytology. Stable on CT. 3) Hx of Nausea and Vomiting  Continue anti-emetics as needed. Advised her that she can take 10 mg Compazine if needed. No recent nausea/vomiting. 4) Abdominal Pain/Cramping  Likely d/t carcinomatosis. She is taking Tylenol/Advil PRN. No pain today. 5) Chemo Induced Diarrhea  Well controlled with PRN Imodium/Lomotil. Will continue to monitor. 6) Insomnia  Takes Ativan PRN. 7) Psychosocial  Mood good, coping well considering difficult disease. She has great family support. SW support as needed. She is here alone today d/t COIVD. Call if questions. Follow up in 2 weeks. This patient was seen in conjunction with Marc Goltz, NP. I appreciate the opportunity to participate in MsLaura Marcos Dangelo care.     Signed By: Soren Aviles NP

## 2020-05-13 NOTE — PROGRESS NOTES
[facility] Progress Note 1015. Ms. Santo Kessler Arrived ambulatory and in no distress for Folfox (Cycle 12). Assessment was completed, no acute issues at this time, no new complaints voiced. Port accessed with positive blood return. Labs drawn and sent for processing. Port flushed and capped. Patient went to scheduled apt upstairs and returned to [facility]. All labs within parameters for treatment, medication ordered from pharmacy. Chemotherapy Flowsheet 5/13/2020 Cycle C12 Date 5/13/2020 Drug / Regimen Folfox Pre Hydration given Pre Meds given Notes given Ms. Lynn's vitals were reviewed. Patient Vitals for the past 12 hrs: 
 Temp Pulse Resp BP  
05/13/20 1542  64  122/80  
05/13/20 1013 97.7 °F (36.5 °C) 73 18 111/76 Lab results were obtained and reviewed. Recent Results (from the past 12 hour(s)) CBC WITH AUTOMATED DIFF Collection Time: 05/13/20 10:13 AM  
Result Value Ref Range WBC 4.5 3.6 - 11.0 K/uL  
 RBC 3.79 (L) 3.80 - 5.20 M/uL  
 HGB 11.4 (L) 11.5 - 16.0 g/dL HCT 37.0 35.0 - 47.0 % MCV 97.6 80.0 - 99.0 FL  
 MCH 30.1 26.0 - 34.0 PG  
 MCHC 30.8 30.0 - 36.5 g/dL  
 RDW 16.0 (H) 11.5 - 14.5 % PLATELET 051 (L) 187 - 400 K/uL MPV 11.2 8.9 - 12.9 FL  
 NRBC 0.0 0  WBC ABSOLUTE NRBC 0.00 0.00 - 0.01 K/uL NEUTROPHILS 45 32 - 75 % LYMPHOCYTES 27 12 - 49 % MONOCYTES 23 (H) 5 - 13 % EOSINOPHILS 4 0 - 7 % BASOPHILS 1 0 - 1 % IMMATURE GRANULOCYTES 0 0.0 - 0.5 % ABS. NEUTROPHILS 2.1 1.8 - 8.0 K/UL  
 ABS. LYMPHOCYTES 1.2 0.8 - 3.5 K/UL  
 ABS. MONOCYTES 1.0 0.0 - 1.0 K/UL  
 ABS. EOSINOPHILS 0.2 0.0 - 0.4 K/UL  
 ABS. BASOPHILS 0.0 0.0 - 0.1 K/UL  
 ABS. IMM. GRANS. 0.0 0.00 - 0.04 K/UL  
 DF SMEAR SCANNED    
 RBC COMMENTS ANISOCYTOSIS 
1+ METABOLIC PANEL, COMPREHENSIVE Collection Time: 05/13/20 10:13 AM  
Result Value Ref Range Sodium 140 136 - 145 mmol/L Potassium 3.6 3.5 - 5.1 mmol/L  Chloride 109 (H) 97 - 108 mmol/L  
 CO2 26 21 - 32 mmol/L Anion gap 5 5 - 15 mmol/L Glucose 81 65 - 100 mg/dL BUN 5 (L) 6 - 20 MG/DL Creatinine 0.38 (L) 0.55 - 1.02 MG/DL  
 BUN/Creatinine ratio 13 12 - 20 GFR est AA >60 >60 ml/min/1.73m2 GFR est non-AA >60 >60 ml/min/1.73m2 Calcium 9.3 8.5 - 10.1 MG/DL Bilirubin, total 0.4 0.2 - 1.0 MG/DL  
 ALT (SGPT) 24 12 - 78 U/L  
 AST (SGOT) 32 15 - 37 U/L Alk. phosphatase 120 (H) 45 - 117 U/L Protein, total 6.7 6.4 - 8.2 g/dL Albumin 3.1 (L) 3.5 - 5.0 g/dL Globulin 3.6 2.0 - 4.0 g/dL A-G Ratio 0.9 (L) 1.1 - 2.2 Pre-medications  were administered as ordered and chemotherapy was initiated. Medications Administered   
 dexamethasone (DECADRON) 12 mg in 0.9% sodium chloride 50 mL, overfill volume 5 mL IVPB Admin Date 
05/13/2020 Action Given Dose 
12 mg Rate 232 mL/hr Route IntraVENous Administered By 
Angie Guardado, MING  
  
  
 dextrose 5% infusion Admin Date 
05/13/2020 Action New Bag Dose 25 mL/hr Rate 25 mL/hr Route IntraVENous Administered By 
Angie Guardado, MING  
  
  
 fluorouraciL (ADRUCIL) 2,700 mg in 0.9% sodium chloride 100 mL CADD Cassette Admin Date 
05/13/2020 Action New Bag Dose 
2700 mg Rate 2.2 mL/hr Route IntraVENous Administered By 
Angie Guardado, MING  
  
  
 ondansetron [facility]) injection 8 mg Admin Date 
05/13/2020 Action Given Dose 8 mg Route IntraVENous Administered By 
Angie Guardado, MING  
  
  
 oxaliplatin (ELOXATIN) 127.5 mg in dextrose 5% 250 mL, overfill volume 25 mL chemo infusion Admin Date 
05/13/2020 Action New Bag Dose 
127.5 mg Rate 
150.3 mL/hr Route IntraVENous Administered By 
Angie Guardado, MING  
  
  
 saline peripheral flush soln 10 mL Admin Date 
05/13/2020 Action Given Dose 
10 mL Route InterCATHeter Administered By 
Angie Life, RN  
  
  
 sodium chloride 0.9 % bolus infusion 1,000 mL Admin Date 
05/13/2020 Action New Bag Dose 
1000 mL Rate 
1,000 mL/hr Route IntraVENous Administered By 
Linda Mart RN  
  
  
  
 
8269. Ms. Marc Camacho tolerated treatment well. Port maintained positive blood return throughout treatment. Patient connected to 5FU pump at 2.2ml/hr. . Patient was discharged from [place] [address] in stable condition. Patient is aware of pump disconnect appointment on 5/15/2020. Future Appointments Date Time Provider Thomas Olga 5/15/2020  2:00 PM G2 DIANNE [facility]  
5/27/2020 10:00 AM A2 DIANNE LONG [address] [facility]  
5/27/2020 10:45 AM BART [address]  
5/29/2020  2:00 PM G2 DIANNE [facility]STR[facility]  
6/10/2020 10:00 AM A2 DIANNE LONG [address] [facility]  
6/12/2020  2:00 PM G2 DIANNE [facility]STR[facility]  
6/24/2020 10:00 AM A2 DIANNE LONG [place] [facility]  
6/26/2020  2:00 PM G2 DIANNE [facility]STRACK [facility] Valarie Edmondson RN May 13, 2020

## 2020-05-15 NOTE — PROGRESS NOTES
[facility] Progress Note Date: May 15, 2020 Name: Trish Ndiaye MRN: [id number] : 1970 Ms. Aloma Kussmaul Arrived ambulatory and in no distress for 5FU pump disconnect and hydration. Assessment was completed, no acute issues at this time, no new complaints voiced. 5FU pump displaying 0.0 mL left to infuse upon arrive to [facility]. 5FU pump disconnected per protocol. Port flushed with positive blood return and NS bolus started as ordered. Ms. Ana Pena vitals were reviewed. Patient Vitals for the past 12 hrs: 
 Temp Pulse Resp BP  
05/15/20 1521  (!) 58  110/68  
05/15/20 1415 98.9 °F (37.2 °C) 65 18 111/74 Pre-medications  were administered as ordered and chemotherapy was initiated. Medications Administered 0.9% sodium chloride injection 10 mL Admin Date 
05/15/2020 Action Given Dose 
10 mL Route IntraVENous Administered By 
Jen Parr RN Admin Date 
05/15/2020 Action Given Dose 
10 mL Route IntraVENous Administered By 
Jen Parr RN  
  
  
 heparin (porcine) pf 300-500 Units Admin Date 
05/15/2020 Action Given Dose 
500 Units Route InterCATHeter Administered By 
Jen Parr RN  
  
  
 ondansetron [facility]) injection 8 mg Admin Date 
05/15/2020 Action Given Dose 8 mg Route IntraVENous Administered By 
Jen Parr RN  
  
  
 sodium chloride 0.9 % bolus infusion 1,000 mL Admin Date 
05/15/2020 Action New Bag Dose 
1000 mL Rate 
1,000 mL/hr Route IntraVENous Administered By 
Jen Parr RN  
  
  
  
 
Ms. Aloma Kussmaul tolerated treatment well. Port maintained positive blood return throughout treatment. Port flushed, heparinized and de accessed per protocol. Patient was discharged from [place] [address] in stable condition at 1525. Future Appointments Date Time Provider Thomas Espinoza 2020 11:30 AM SPT CT 1 SPTRCT SPT  
2020 10:00 AM [facility] LONG [address] [place] [address]  
 5/27/2020 10:45 AM Leanna Severe, BART [address]  
5/29/2020  2:00 PM G2 DIANNE [facility]STRACK [facility]  
6/10/2020 10:00 AM A2 DIANNE LONG [address] [facility]  
6/12/2020  2:00 PM [facility]  
6/24/2020 10:00 AM A2 DIANNE LONG [address] [facility]  
6/26/2020  2:00 PM G2 DIANNE FASTRACK [facility] Carles Phoenix, RN May 15, 2020

## 2020-05-27 PROBLEM — T45.1X5A CHEMOTHERAPY-INDUCED NAUSEA: Status: ACTIVE | Noted: 2020-01-01

## 2020-05-27 PROBLEM — R11.0 CHEMOTHERAPY-INDUCED NAUSEA: Status: ACTIVE | Noted: 2020-01-01

## 2020-05-27 PROBLEM — G62.0 CHEMOTHERAPY-INDUCED NEUROPATHY (HCC): Status: ACTIVE | Noted: 2020-01-01

## 2020-05-27 PROBLEM — T45.1X5A CHEMOTHERAPY-INDUCED NEUROPATHY (HCC): Status: ACTIVE | Noted: 2020-01-01

## 2020-05-27 NOTE — PROGRESS NOTES
[facility] Chemotherapy Progress Note Date: May 27, 2020 Name: Renetta Ching MRN: [id number] : 1970 
 
 
1020 Pt admit to [facility] for Folfox modified ambulatory in stable condition. Assessment completed. No new concerns voiced. Port with positive blood return. Chemotherapy Flowsheet 2020 Cycle C12 Date 2020 Drug / Regimen Folfox Pre Hydration given Pre Meds given Notes given Ms. Lynn's vitals were reviewed. Patient Vitals for the past 12 hrs: 
 Temp Pulse Resp BP  
20 1533  69  121/80  
20 1023 97.8 °F (36.6 °C) 74 18 102/73 Lab results were obtained and reviewed. Recent Results (from the past 12 hour(s)) CBC WITH AUTOMATED DIFF Collection Time: 20 10:25 AM  
Result Value Ref Range WBC 3.5 (L) 3.6 - 11.0 K/uL  
 RBC 3.64 (L) 3.80 - 5.20 M/uL  
 HGB 11.7 11.5 - 16.0 g/dL HCT 35.5 35.0 - 47.0 % MCV 97.5 80.0 - 99.0 FL  
 MCH 32.1 26.0 - 34.0 PG  
 MCHC 33.0 30.0 - 36.5 g/dL  
 RDW 15.3 (H) 11.5 - 14.5 % PLATELET 178 (L) 073 - 400 K/uL MPV 10.6 8.9 - 12.9 FL  
 NRBC 0.0 0  WBC ABSOLUTE NRBC 0.00 0.00 - 0.01 K/uL NEUTROPHILS 38 32 - 75 % LYMPHOCYTES 32 12 - 49 % MONOCYTES 25 (H) 5 - 13 % EOSINOPHILS 4 0 - 7 % BASOPHILS 1 0 - 1 % IMMATURE GRANULOCYTES 0 0.0 - 0.5 % ABS. NEUTROPHILS 1.4 (L) 1.8 - 8.0 K/UL  
 ABS. LYMPHOCYTES 1.1 0.8 - 3.5 K/UL  
 ABS. MONOCYTES 0.9 0.0 - 1.0 K/UL  
 ABS. EOSINOPHILS 0.1 0.0 - 0.4 K/UL  
 ABS. BASOPHILS 0.0 0.0 - 0.1 K/UL  
 ABS. IMM. GRANS. 0.0 0.00 - 0.04 K/UL  
 DF SMEAR SCANNED    
 RBC COMMENTS ANISOCYTOSIS 1+ 
    
 RBC COMMENTS MACROCYTOSIS 
1+ METABOLIC PANEL, COMPREHENSIVE Collection Time: 20 10:25 AM  
Result Value Ref Range Sodium 140 136 - 145 mmol/L Potassium 3.8 3.5 - 5.1 mmol/L Chloride 107 97 - 108 mmol/L  
 CO2 25 21 - 32 mmol/L Anion gap 8 5 - 15 mmol/L Glucose 81 65 - 100 mg/dL  BUN 8 6 - 20 MG/DL  
 Creatinine 0.38 (L) 0.55 - 1.02 MG/DL  
 BUN/Creatinine ratio 21 (H) 12 - 20 GFR est AA >60 >60 ml/min/1.73m2 GFR est non-AA >60 >60 ml/min/1.73m2 Calcium 9.5 8.5 - 10.1 MG/DL Bilirubin, total 0.5 0.2 - 1.0 MG/DL  
 ALT (SGPT) 23 12 - 78 U/L  
 AST (SGOT) 33 15 - 37 U/L Alk. phosphatase 137 (H) 45 - 117 U/L Protein, total 6.9 6.4 - 8.2 g/dL Albumin 3.1 (L) 3.5 - 5.0 g/dL Globulin 3.8 2.0 - 4.0 g/dL A-G Ratio 0.8 (L) 1.1 - 2.2 Pre-medications  were administered as ordered and chemotherapy was initiated. Medications Administered   
 dexamethasone (DECADRON) 12 mg in 0.9% sodium chloride 50 mL, overfill volume 5 mL IVPB Admin Date 
05/27/2020 Action Given Dose 
12 mg Rate 232 mL/hr Route IntraVENous Administered By 
Mireya Cheng RN  
  
  
 dextrose 5% infusion Admin Date 
05/27/2020 Action New Bag Dose 25 mL/hr Rate 25 mL/hr Route IntraVENous Administered By 
Mireya Cheng RN  
  
  
 fluorouraciL (ADRUCIL) 2,700 mg in 0.9% sodium chloride 100 mL CADD Cassette Admin Date 
05/27/2020 Action New Bag Dose 
2700 mg Rate 2.2 mL/hr Route IntraVENous Administered By 
Mireya Cheng RN  
  
  
 ondansetron [facility]) injection 8 mg Admin Date 
05/27/2020 Action Given Dose 8 mg Route IntraVENous Administered By 
Mireya Cheng RN  
  
  
 oxaliplatin (ELOXATIN) 127.5 mg in dextrose 5% 250 mL, overfill volume 25 mL chemo infusion Admin Date 
05/27/2020 Action New Bag Dose 
127.5 mg Rate 
150.3 mL/hr Route IntraVENous Administered By 
Mireya Cheng RN  
  
  
 sodium chloride 0.9 % bolus infusion 1,000 mL Admin Date 
05/27/2020 Action New Bag Dose 
1000 mL Rate 
1,000 mL/hr Route IntraVENous Administered By 
Mireya Cheng RN  
  
  
  
 
 
1540 Pt tolerated treatment well. Port maintained positive blood return throughout treatment. Flushed and capped per protocol. D/c home ambulatory in no distress. Future Appointments Date Time Provider Thomas Espinoza 5/29/2020  2:00 PM G2 DIANNE FASTRACK [facility]  
6/10/2020 10:00 AM A2 DIANNE LONG 1370 [facility] H  
6/10/2020 10:45 AM BART [address]  
6/12/2020  2:00 PM G2 DIANNE FASTRACK [facility]  
6/24/2020 10:00 AM A2 DIANNE LONG 1370 [facility] H  
6/26/2020  2:00 PM G2 DIANNE FASTRACK [facility]  
7/8/2020 10:00 AM A2 DIANNE LONG [place] [facility]  
7/10/2020  2:00 PM G2 DIANNE FASTRACK [facility] Tang Dunn RN May 27, 2020

## 2020-05-27 NOTE — PROGRESS NOTES
[facility] [place] [facility] 
[address] [place] W: [phone]  F: [phone] HEME/ONC FOLLOW UP 
 
Reason for Visit:  
Twan Chaudhry is a 44 y.o. 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 Treatment History:  
· EXPLORATORY LAPAROTOMY SIERRA BSO, TUMOR DEBULKING: ILEOCECAL RESECTION; OMENTECTOMY · FOLFOX 50% DR 12/3/19 - 3/4/20 · [facility], dropped 5FU Bolus/Leucovorin and increased Oxaliplatin to 60 mg/m2 and 5FU pump tp 1,800 mg/m2 with Cycle 8 on 3/18/20 · Oxaliplatin increased to 85 mg/m2 with Cycle 10 on 4/15/20 - Current · CTs C/A/P 3/20 stable STAGE: 4 with carcinomatosis History of Present Illness:  
Twan Chaudhry is a 44 y.o. 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 She went to [facility] on 3/13/20 for visit and scans. CTs were stable overall. [facility] recommended dropping the 5FU Bolus/Leucovorin and increasing Oxaliplatin to 60 mg/m2 and 5FU pump tp 1,800 mg/m2 with Cycle 8 on 3/18/20. Patient tolerated dose changes well overall, so Oxaliplatin was increased to 85 mg/m2 with Cycle 10 per [facility] recommendation. She is here today for Cycle 13. She had CTs C/A/P on 5/26/20 that were read as stable overall. She reports that she is feeling well overall today - nothing new or different. She is tolerating increased dose of Oxaliplatin well overall with a little bit of increased diarrhea/abdominal pain/ She reports that the side effects are tolerable/manageable. She takes Imodium and Advil/Tylneol PRN. She has occasional nausea after treatment but no vomiting. Her appetite is stable overall. She has neuropathy in fingers for about 5-6 days after chemo when touching cold but this is not constant. She denies fever, chills, and mouth sores. CBC and CMP are stable/good today. She wants treatment today. She is here alone today d/t COVID. Past Medical History:  
Diagnosis Date  Cancer ([facility] [address] APPENDIX CANCER   
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  
Procedure Laterality Date [address] UNLISTED  HX APPENDECTOMY  09/2019  HX GI  09/2019 ILEOCECECTOMY  HX GYN  2003 CYST ON OVARY  HX HYSTERECTOMY  2019  IR THORACENTESIS CATH W IMAGE  11/6/2019 Social History Tobacco Use  Smoking status: Never Smoker  Smokeless tobacco: Never Used Substance Use Topics  Alcohol use: Not Currently Family History Problem Relation Age of Onset  Breast Cancer Paternal Grandmother 56  Crohn's Disease Mother  Heart Disease Mother  Cancer Father BLADDER  
 Diabetes Father  No Known Problems Son  No Known Problems Daughter  Anesth Problems Neg Hx Current Outpatient Medications Medication Sig  prochlorperazine (Compazine) 5 mg tablet Take 1 tab by mouth every 6 hours as needed for nausea or vomiting  LORazepam (ATIVAN) 0.5 mg tablet Take 1 Tab by mouth nightly as needed for Anxiety. Max Daily Amount: 0.5 mg.  
 diphenoxylate-atropine (LomotiL) 2.5-0.025 mg per tablet Take 2 Tabs by mouth four (4) times daily as needed for Diarrhea. Max Daily Amount: 8 Tabs.  potassium chloride (KLOR-CON) 10 mEq tablet Take 1 Tab by mouth two (2) times a day.  lidocaine-prilocaine (EMLA) topical cream Apply  to affected area as needed for Pain.  ondansetron (ZOFRAN ODT) 4 mg disintegrating tablet Take 1 Tab by mouth every eight (8) hours as needed for Nausea. (Patient taking differently: Take 8 mg by mouth every eight (8) hours as needed for Nausea. Indications: prevent nausea and vomiting from cancer chemotherapy)  dexAMETHasone (DECADRON) 4 mg tablet Take 2 tabs (8mg) by mouth daily on days 2 and 3 after chemotherapy  acetaminophen (TYLENOL) 325 mg tablet Take 650 mg by mouth every four (4) hours as needed for Pain. Indications: pain  ibuprofen (MOTRIN) 200 mg tablet Take 3 Tabs by mouth every eight (8) hours as needed for Pain. No current facility-administered medications for this visit. No Known Allergies Review of Systems: A complete review of systems was obtained, negative except as described above. Physical Exam:  
 
Visit Vitals /75 Pulse 68 Temp 97.8 °F (36.6 °C) Ht 5' 3\" (1.6 m) Wt 113 lb 8 oz (51.5 kg) LMP 09/15/2019 SpO2 98% BMI 20.11 kg/m² ECOG PS: 1-2 General: No distress Eyes: Anicteric sclerae HENT: Atraumatic Neck: Supple Resp: CTAB, normal effort CV: Regular GI: Soft, less distended MS: Ambulatory Skin: Warm dry. Port site without redness/swelling Psych: Alert, oriented, appropriate affect, normal judgment/insight Results:  
 
Lab Results Component Value Date/Time WBC 3.5 (L) 05/27/2020 10:25 AM  
 HGB 11.7 05/27/2020 10:25 AM  
 HCT 35.5 05/27/2020 10:25 AM  
 PLATELET 590 (L) 70/56/1211 10:25 AM  
 MCV 97.5 05/27/2020 10:25 AM  
 ABS. NEUTROPHILS PENDING 05/27/2020 10:25 AM  
 Hemoglobin (POC) 10.8 (L) 09/25/2019 04:11 PM  
 Hematocrit (POC) 32 (L) 09/25/2019 12:10 PM  
 
Lab Results Component Value Date/Time  Sodium 140 05/27/2020 10:25 AM  
 Potassium 3.8 05/27/2020 10:25 AM  
 Chloride 107 05/27/2020 10:25 AM  
 CO2 25 05/27/2020 10:25 AM  
 Glucose 81 05/27/2020 10:25 AM  
 BUN 8 05/27/2020 10:25 AM  
 Creatinine 0.38 (L) 05/27/2020 10:25 AM  
 GFR est AA >60 05/27/2020 10:25 AM  
 GFR est non-AA >60 05/27/2020 10:25 AM  
 Calcium 9.5 05/27/2020 10:25 AM  
 Sodium (POC) 137 09/25/2019 12:10 PM  
 Potassium (POC) 3.7 09/25/2019 12:10 PM  
 Chloride (POC) 105 09/25/2019 12:10 PM  
 Glucose (POC) 85 09/25/2019 12:10 PM  
 BUN (POC) 4 (L) 09/25/2019 12:10 PM  
 Creatinine (POC) 0.5 (L) 09/25/2019 12:10 PM  
 Calcium, ionized (POC) 1.14 09/25/2019 12:10 PM  
 
Lab Results Component Value Date/Time Bilirubin, total 0.5 05/27/2020 10:25 AM  
 ALT (SGPT) 23 05/27/2020 10:25 AM  
 Alk. phosphatase 137 (H) 05/27/2020 10:25 AM  
 Protein, total 6.9 05/27/2020 10:25 AM  
 Albumin 3.1 (L) 05/27/2020 10:25 AM  
 Globulin 3.8 05/27/2020 10:25 AM  
 
CT Results (most recent): 
Results from Hospital Encounter encounter on 05/26/20 CT ABD PELV W CONT Narrative INDICATION:   Appendiceal cancer EXAM:  CT chest, abdomen, and pelvis WITH  CONTRAST 
 
COMPARISON:  CT chest 11/26/2019, CT abdomen and pelvis 12/18/2010 TECHNIQUE:  Thin collimation axial images were obtained through the chest, 
abdomen, and pelvis with IV contrast administration. Coronal and sagittal 
reconstructions were obtained. Oral contrast was not administered. CT dose 
reduction was achieved through use of a standardized protocol tailored for this 
examination and automatic exposure control for dose modulation. FINDINGS: 
 
Chest: 
 
LUNGS: Left lower lobe basilar subsegmental atelectasis. No other significant 
abnormality. Central airways are patent LYMPH NODES: No thoracic lymphadenopathy. PLEURAL FLUID: No pleural effusion. PERICARDIAL FLUID: No pericardial effusion. THYROID: No thyroid nodule. OTHER: Left chest port extends to the SVC Abdomen: LIVER: 2.5 cm left hepatic lobe cyst. No other focal liver abnormality GALLBLADDER: Small calcification along the nondependent wall. The gallbladder is 
otherwise unremarkable SPLEEN: Anterior perisplenic of low-density/fluid and gas appears smaller 
measuring 5.6 x 3.7 cm but is incompletely evaluated on this noncontrast exam. 
No other splenic abnormality PANCREAS: No mass or ductal dilatation. ADRENALS: Unremarkable. KIDNEYS/URETERS: Normal symmetric nephrograms without evidence for  focal mass. No hydronephrosis PERITONEUM: No abdominal lymphadenopathy or ascites. COLON: Nonspecific bowel wall thickening involving the left colon APPENDIX: Not seen SMALL BOWEL: No dilatation or wall thickening. STOMACH: Unremarkable. Pelvis: PELVIS: No pelvic lymphadenopathy or free fluid. BONES: No destructive bone lesion. ADDITIONAL COMMENTS: N/A Impression IMPRESSION: 
 
Decrease in size of anterior perisplenic low-density/fluid and gas which is 
predominantly phlegmon. If there is concern for peritoneal carcinomatosis, 
consider PET/CT. No obvious metastasis. Mild nonspecific colitis involving the left colon Left lower lobe subsegmental atelectasis. 23X Records reviewed and summarized above. Pathology report(s) reviewed above. Radiology report(s) reviewed above. Assessment/PLAN:  
 
1) Stage 4 / Metastatic Appendiceal Cancer  
post EXPLORATORY LAPAROTOMY SIERRA BSO, TUMOR DEBULKING: ILEOCECAL RESECTION; OMENTECTOMY 9/25/19 Surgery done for obstruction. She had left rib pain and SOB and on exam decreased bs on LEFT. Got CXR which showed pleural effusion then ultrasound which showed elevated diaphragm and ? Free air CT which showed fluid collection and patient admitted for this. Had IR drain of splenic fluid collection. [place] [facility] [place] [address] for second opinions. Discussed systemic therapy with FOLFOX/ +/- Avastin chemo or some variation of this regimen. Possible [facility] down the road. Patient choose to have chemo locally and still see [address]. Patient had cycle 1 of FOLFOX chemotherapy on 12/3/19 reduced by 50%. CT A/P 12/12/19 shows worsening fluid collection which was drained via IR. Follow up CT A/P 12/17/19 better overall. CT A/P 1/7/20 stable. She had been tolerating FOLFOX 50% DR well overall. She went to [facility] for follow up on 3/13/20 with CTs there. [address] felt that CTs were stable overall compared to her last scans there in 11/19. [facility] recommended dropping 5FU Bolus/Leucovorin and increasing Oxaliplatin to 60 mg/m2 and 5FU pump tp 1,800 mg/m2 and then increasing Oxaliplatin to 85 mg/m2 if tolerated. Increased Oxaliplatin to 85 mg/m2 with Cycle 10 per [address] [facility] recommendation. She is here for Cycle 13 of palliative chemo. She had CTs C/A/P 5/26/20 here that were read as table/no mets. Will send CTs to [address] [facility] to review. Patient has a VV follow up with [address] [facility] on 6/4/20. She is tolerating chemo well overall with mild side effects. Patient is clinically stable overall today. She denies nausea/vomiting/diarrhea and pain today. CBC and CMP are stable today. She is ready for treatment today - same dose as last time. Will give IVF with chemo today and IVF/Zofran on Friday with pump removal.  
Patient seen today with COVID precautions. Follow up here in 2 weeks. Patient agrees with plan. 2) Left Pleural Effusion post Thoracentesis x 2 No cytology. Stable on CT. 3) Hx of Nausea and Vomiting Continue anti-emetics as needed. Advised her that she can take 10 mg Compazine if needed. Occasional nausea, no recent vomiting. 4) Abdominal Pain/Cramping Likely d/t carcinomatosis. She is taking Tylenol/Advil PRN. No pain today. 5) Chemo Induced Diarrhea Well controlled with PRN Imodium/Lomotil. Will continue to monitor. 6) Chemo Induced Neuropathy Mild, Grade 1 - not interfering with functioning. Will continue to monitor. 7) Insomnia Takes Ativan PRN. 8) Psychosocial 
Mood good, coping well considering difficult disease. She has great family support. SW support as needed. She is here alone today d/t COIVD. Call if questions. Follow up in 2 weeks. This patient was seen in conjunction with Marlyn Brian NP. I appreciate the opportunity to participate in Ms. Lova Councilman care.  
 
Signed By: Wojciech Crews NP

## 2020-05-27 NOTE — PROGRESS NOTES
Twan Chaudhry is a 44 y.o. female Chief Complaint Patient presents with  Follow-up  
  metastatic appendix cancer 1. Have you been to the ER, urgent care clinic since your last visit? Hospitalized since your last visit? No 
2. Have you seen or consulted any other health care providers outside of the [address] [place] [address] since your last visit? Include any pap smears or colon screening.   No

## 2020-05-29 NOTE — PROGRESS NOTES
Outpatient Infusion [facility] Progress Note 80 Pt admit to [facility] for 5FU pump disconnect and hydration ambulatory in stable condition. Assessment completed. No new concerns voiced. 5FU pump displaying 0.0 mL left to infuse upon arrival to [facility]. 5FU pump disconnected and discarded. Port flushed with positive blood return and NS bolus started as ordered. Visit Vitals /69 (BP 1 Location: Right arm, BP Patient Position: Sitting) Pulse 68 Temp 97.8 °F (36.6 °C) Resp 18 LMP 09/15/2019 Breastfeeding No  
 
 
Medications: 
1 L NS over 1 hour Zofran NS flushes Heparin 1520 Pt tolerated treatment well. Port maintained positive blood return throughout treatment. Port flushed, heparinized, and deaccessed per protocol. D/c home ambulatory in no distress. Pt aware of next appointment scheduled for 6/10/20.

## 2020-06-10 NOTE — TELEPHONE ENCOUNTER
Received call from [address] [place] [address], [address]. Patient's platelets are 89V today. Discussed downward trend in platelets with Dr. Amanda Cho. HOLD treatment today. Reschedule for 1 week. Discussed with Shiva Avila RN, who will relay info to patient. Front to reschedule [facility]/office appointments. General

## 2020-06-10 NOTE — PROGRESS NOTES
Hilary Piña is a 44 y.o. female Chief Complaint Patient presents with  Follow-up  
  metastatic appendix cancer 1. Have you been to the ER, urgent care clinic since your last visit? Hospitalized since your last visit? No 
 
2. Have you seen or consulted any other health care providers outside of the [address] [place] [address] since your last visit? Include any pap smears or colon screening.  No

## 2020-06-10 NOTE — PROGRESS NOTES
[facility] [place] at [facility] 
[address], [address] [place] W: [phone]  F: [phone] HEME/ONC FOLLOW UP 
 
Reason for Visit:  
Kai Vance is a 44 y.o. 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 Treatment History:  
· EXPLORATORY LAPAROTOMY SIERRA BSO, TUMOR DEBULKING: ILEOCECAL RESECTION; OMENTECTOMY · FOLFOX 50%  12/3/19 - 3/4/20 · [facility], dropped 5FU Bolus/Leucovorin and increased Oxaliplatin to 60 mg/m2 and 5FU pump tp 1,800 mg/m2 with Cycle 8 on 3/18/20 · Oxaliplatin increased to 85 mg/m2 with Cycle 10 on 4/15/20 - Current · CTs C/A/P 3/20 stable STAGE: 4 with carcinomatosis History of Present Illness:  
Kai Vance is a 44 y.o. 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 Pt had virtual visit with [place]. [place] did not have CTs. But pt states they called and wanted to continue same plan. Has some neuropathy when touching cold things not constant. Seeing [facility] again on 7/29/20. Seeing surgery also for possible HiPEC. Feeling ok overall. States abdominal cramps/ diarrhea. Taking immodium. Labs pending today. Last visit: She went to [facility] on 3/13/20 for visit and scans. CTs were stable overall. [facility] recommended dropping the 5FU Bolus/Leucovorin and increasing Oxaliplatin to 60 mg/m2 and 5FU pump tp 1,800 mg/m2 with Cycle 8 on 3/18/20. Patient tolerated dose changes well overall, so Oxaliplatin was increased to 85 mg/m2 with Cycle 10 per [facility] recommendation. She is here today for Cycle 13. She had CTs C/A/P on 5/26/20 that were read as stable overall. She reports that she is feeling well overall today - nothing new or different.  She is tolerating increased dose of Oxaliplatin well overall with a little bit of increased diarrhea/abdominal pain/ She reports that the side effects are tolerable/manageable. She takes Imodium and Advil/Tylneol PRN. She has occasional nausea after treatment but no vomiting. Her appetite is stable overall. She has neuropathy in fingers for about 5-6 days after chemo when touching cold but this is not constant. She denies fever, chills, and mouth sores. CBC and CMP are stable/good today. She wants treatment today. She is here alone today d/t COVID. Past Medical History:  
Diagnosis Date  Cancer ([facility] [address] APPENDIX CANCER   
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  
Procedure Laterality Date [address] UNLISTED  HX APPENDECTOMY  09/2019  HX GI  09/2019 ILEOCECECTOMY  HX GYN  2003 CYST ON OVARY  HX HYSTERECTOMY  2019  IR THORACENTESIS CATH W IMAGE  11/6/2019 Social History Tobacco Use  Smoking status: Never Smoker  Smokeless tobacco: Never Used Substance Use Topics  Alcohol use: Not Currently Family History Problem Relation Age of Onset  Breast Cancer Paternal Grandmother 56  Crohn's Disease Mother  Heart Disease Mother  Cancer Father BLADDER  
 Diabetes Father  No Known Problems Son  No Known Problems Daughter  Anesth Problems Neg Hx Current Outpatient Medications Medication Sig  LORazepam (ATIVAN) 0.5 mg tablet Take 1 Tab by mouth nightly as needed for Anxiety. Max Daily Amount: 0.5 mg.  prochlorperazine (Compazine) 5 mg tablet Take 1 tab by mouth every 6 hours as needed for nausea or vomiting  LORazepam (ATIVAN) 0.5 mg tablet Take 1 Tab by mouth nightly as needed for Anxiety.  Max Daily Amount: 0.5 mg.  
 diphenoxylate-atropine (LomotiL) 2.5-0.025 mg per tablet Take 2 Tabs by mouth four (4) times daily as needed for Diarrhea. Max Daily Amount: 8 Tabs.  potassium chloride (KLOR-CON) 10 mEq tablet Take 1 Tab by mouth two (2) times a day.  lidocaine-prilocaine (EMLA) topical cream Apply  to affected area as needed for Pain.  ondansetron (ZOFRAN ODT) 4 mg disintegrating tablet Take 1 Tab by mouth every eight (8) hours as needed for Nausea. (Patient taking differently: Take 8 mg by mouth every eight (8) hours as needed for Nausea. Indications: prevent nausea and vomiting from cancer chemotherapy)  dexAMETHasone (DECADRON) 4 mg tablet Take 2 tabs (8mg) by mouth daily on days 2 and 3 after chemotherapy  acetaminophen (TYLENOL) 325 mg tablet Take 650 mg by mouth every four (4) hours as needed for Pain. Indications: pain  ibuprofen (MOTRIN) 200 mg tablet Take 3 Tabs by mouth every eight (8) hours as needed for Pain. No current facility-administered medications for this visit. No Known Allergies Review of Systems: A complete review of systems was obtained, negative except as described above. Physical Exam:  
 
Visit Vitals /77 Pulse 76 Temp 97.2 °F (36.2 °C) Ht 5' 3\" (1.6 m) Wt 114 lb 14.4 oz (52.1 kg) LMP 09/15/2019 SpO2 97% BMI 20.35 kg/m² ECOG PS: 1-2 General: No distress Eyes: Anicteric sclerae HENT: Atraumatic Neck: Supple Resp: CTAB, normal effort CV: Regular GI: Soft, less distended MS: Ambulatory Skin: Warm dry. Port site without redness/swelling Psych: Alert, oriented, appropriate affect, normal judgment/insight Results:  
 
Lab Results Component Value Date/Time WBC 3.5 (L) 05/27/2020 10:25 AM  
 HGB 11.7 05/27/2020 10:25 AM  
 HCT 35.5 05/27/2020 10:25 AM  
 PLATELET 056 (L) 64/19/3828 10:25 AM  
 MCV 97.5 05/27/2020 10:25 AM  
 ABS. NEUTROPHILS 1.4 (L) 05/27/2020 10:25 AM  
 Hemoglobin (POC) 10.8 (L) 09/25/2019 04:11 PM  
 Hematocrit (POC) 32 (L) 09/25/2019 12:10 PM  
 
Lab Results Component Value Date/Time Sodium 140 05/27/2020 10:25 AM  
 Potassium 3.8 05/27/2020 10:25 AM  
 Chloride 107 05/27/2020 10:25 AM  
 CO2 25 05/27/2020 10:25 AM  
 Glucose 81 05/27/2020 10:25 AM  
 BUN 8 05/27/2020 10:25 AM  
 Creatinine 0.38 (L) 05/27/2020 10:25 AM  
 GFR est AA >60 05/27/2020 10:25 AM  
 GFR est non-AA >60 05/27/2020 10:25 AM  
 Calcium 9.5 05/27/2020 10:25 AM  
 Sodium (POC) 137 09/25/2019 12:10 PM  
 Potassium (POC) 3.7 09/25/2019 12:10 PM  
 Chloride (POC) 105 09/25/2019 12:10 PM  
 Glucose (POC) 85 09/25/2019 12:10 PM  
 BUN (POC) 4 (L) 09/25/2019 12:10 PM  
 Creatinine (POC) 0.5 (L) 09/25/2019 12:10 PM  
 Calcium, ionized (POC) 1.14 09/25/2019 12:10 PM  
 
Lab Results Component Value Date/Time Bilirubin, total 0.5 05/27/2020 10:25 AM  
 ALT (SGPT) 23 05/27/2020 10:25 AM  
 Alk. phosphatase 137 (H) 05/27/2020 10:25 AM  
 Protein, total 6.9 05/27/2020 10:25 AM  
 Albumin 3.1 (L) 05/27/2020 10:25 AM  
 Globulin 3.8 05/27/2020 10:25 AM  
 
CT Results (most recent): 
Results from Hospital Encounter encounter on 05/26/20 CT ABD PELV W CONT Narrative INDICATION:   Appendiceal cancer EXAM:  CT chest, abdomen, and pelvis WITH  CONTRAST 
 
COMPARISON:  CT chest 11/26/2019, CT abdomen and pelvis 12/18/2010 TECHNIQUE:  Thin collimation axial images were obtained through the chest, 
abdomen, and pelvis with IV contrast administration. Coronal and sagittal 
reconstructions were obtained. Oral contrast was not administered. CT dose 
reduction was achieved through use of a standardized protocol tailored for this 
examination and automatic exposure control for dose modulation. FINDINGS: 
 
Chest: 
 
LUNGS: Left lower lobe basilar subsegmental atelectasis. No other significant 
abnormality. Central airways are patent LYMPH NODES: No thoracic lymphadenopathy. PLEURAL FLUID: No pleural effusion. PERICARDIAL FLUID: No pericardial effusion. THYROID: No thyroid nodule. OTHER: Left chest port extends to the SVC Abdomen: LIVER: 2.5 cm left hepatic lobe cyst. No other focal liver abnormality GALLBLADDER: Small calcification along the nondependent wall. The gallbladder is 
otherwise unremarkable SPLEEN: Anterior perisplenic of low-density/fluid and gas appears smaller 
measuring 5.6 x 3.7 cm but is incompletely evaluated on this noncontrast exam. 
No other splenic abnormality PANCREAS: No mass or ductal dilatation. ADRENALS: Unremarkable. KIDNEYS/URETERS: Normal symmetric nephrograms without evidence for  focal mass. No hydronephrosis PERITONEUM: No abdominal lymphadenopathy or ascites. COLON: Nonspecific bowel wall thickening involving the left colon APPENDIX: Not seen SMALL BOWEL: No dilatation or wall thickening. STOMACH: Unremarkable. Pelvis: PELVIS: No pelvic lymphadenopathy or free fluid. BONES: No destructive bone lesion. ADDITIONAL COMMENTS: N/A Impression IMPRESSION: 
 
Decrease in size of anterior perisplenic low-density/fluid and gas which is 
predominantly phlegmon. If there is concern for peritoneal carcinomatosis, 
consider PET/CT. No obvious metastasis. Mild nonspecific colitis involving the left colon Left lower lobe subsegmental atelectasis. 23X Records reviewed and summarized above. Pathology report(s) reviewed above. Radiology report(s) reviewed above. Assessment/PLAN:  
 
1) Stage 4 / Metastatic Appendiceal Cancer  
post EXPLORATORY LAPAROTOMY SIERRA BSO, TUMOR DEBULKING: ILEOCECAL RESECTION; OMENTECTOMY 9/25/19 Surgery done for obstruction. She had left rib pain and SOB and on exam decreased bs on LEFT. Got CXR which showed pleural effusion then ultrasound which showed elevated diaphragm and ? Free air CT which showed fluid collection and patient admitted for this. Had IR drain of splenic fluid collection. Patient went to [facility] and [address] [place] [address],[address] for second opinions. Discussed systemic therapy with FOLFOX/ +/- Avastin chemo or some variation of this regimen. Possible HiPEC down the road. Patient choose to have chemo locally and still see [address] [place],[address]. Patient had cycle 1 of FOLFOX chemotherapy on 12/3/19 reduced by 50%. CT A/P 12/12/19 shows worsening fluid collection which was drained via IR. Follow up CT A/P 12/17/19 better overall. CT A/P 1/7/20 stable. She had been tolerating FOLFOX 50% DR well overall. She went to [facility] for follow up on 3/13/20 with CTs there. [address] [place] [address] [place] felt that CTs were stable overall compared to her last scans there in 11/19. [facility] recommended dropping 5FU Bolus/Leucovorin and increasing Oxaliplatin to 60 mg/m2 and 5FU pump tp 1,800 mg/m2 and then increasing Oxaliplatin to 85 mg/m2 if tolerated. Increased Oxaliplatin to 85 mg/m2 with Cycle 10 per [facility] [place] recommendation. She is here for palliative chemo with FOLFOX. She had CTs C/A/P 5/26/20 here that were read as stable/no mets. Will send CTs to [address] [place] [address] [place] to review. Patient had a VV follow up with [facility] [place] on 6/4/20 and [place] did not have CTs yet. Then called pt and said ok to continue chemo for now. She is tolerating chemo well overall with mild side effects. Neuropathy stable. Patient is clinically stable overall today. CBC and CMP are pending today She is ready for treatment today - same dose as last time. Seeing [facility] in July with surgery visit then. Follow up here in 2 weeks. Patient agrees with plan. 2) Left Pleural Effusion post Thoracentesis x 2 No cytology. Stable on CT. 3) Hx of Nausea and Vomiting Continue anti-emetics as needed. Advised her that she can take 10 mg Compazine if needed. Occasional nausea, no recent vomiting. 4) Abdominal Pain/Cramping Likely d/t carcinomatosis. She is taking Tylenol/Advil PRN. No pain today. 5) Chemo Induced Diarrhea Well controlled with PRN Imodium/Lomotil. Will continue to monitor. 6) Chemo Induced Neuropathy Mild, Grade 1 - not interfering with functioning. Will continue to monitor. 7) Insomnia Takes Ativan PRN. Refilled. 8) Psychosocial 
Mood good, coping well considering difficult disease. She has great family support. SW support as needed. She is here alone today d/t COIVD. Call if questions. Follow up in 2 weeks. I appreciate the opportunity to participate in Ms. Consuelo Crowley care.  
 
Signed By: Agustina Weinstein DO

## 2020-06-10 NOTE — PROGRESS NOTES
[facility] Chemotherapy Progress Note Date: Peggy 10, 2020 Name: Aramis Messina MRN: [id number] : 1970 
 
 
1000 Pt admit to [facility] for Folfox ambulatory in stable condition. Assessment completed. No new concerns voiced. Port with positive blood return. Treatment held due to low PLTS Chemotherapy Flowsheet 6/10/2020 Cycle C13 Date 6/10/2020 Drug / Regimen Folfox Pre Hydration -  
Pre Meds -  
Notes HELD Ms. Lynn's vitals were reviewed. Patient Vitals for the past 12 hrs: 
 Temp Pulse Resp BP  
06/10/20 1005 98.7 °F (37.1 °C) 67 18 95/64 Lab results were obtained and reviewed. Recent Results (from the past 12 hour(s)) CBC WITH AUTOMATED DIFF Collection Time: 06/10/20 10:07 AM  
Result Value Ref Range WBC 3.4 (L) 3.6 - 11.0 K/uL  
 RBC 3.39 (L) 3.80 - 5.20 M/uL  
 HGB 10.9 (L) 11.5 - 16.0 g/dL HCT 33.6 (L) 35.0 - 47.0 % MCV 99.1 (H) 80.0 - 99.0 FL  
 MCH 32.2 26.0 - 34.0 PG  
 MCHC 32.4 30.0 - 36.5 g/dL  
 RDW 15.6 (H) 11.5 - 14.5 % PLATELET 74 (L) 921 - 400 K/uL MPV 11.4 8.9 - 12.9 FL  
 NRBC 0.0 0  WBC ABSOLUTE NRBC 0.00 0.00 - 0.01 K/uL NEUTROPHILS 36 32 - 75 % LYMPHOCYTES 32 12 - 49 % MONOCYTES 29 (H) 5 - 13 % EOSINOPHILS 2 0 - 7 % BASOPHILS 1 0 - 1 % IMMATURE GRANULOCYTES 0 0.0 - 0.5 % ABS. NEUTROPHILS 1.2 (L) 1.8 - 8.0 K/UL  
 ABS. LYMPHOCYTES 1.1 0.8 - 3.5 K/UL  
 ABS. MONOCYTES 1.0 0.0 - 1.0 K/UL  
 ABS. EOSINOPHILS 0.1 0.0 - 0.4 K/UL  
 ABS. BASOPHILS 0.0 0.0 - 0.1 K/UL  
 ABS. IMM. GRANS. 0.0 0.00 - 0.04 K/UL  
 DF SMEAR SCANNED    
 RBC COMMENTS NORMOCYTIC, NORMOCHROMIC METABOLIC PANEL, COMPREHENSIVE Collection Time: 06/10/20 10:07 AM  
Result Value Ref Range Sodium 142 136 - 145 mmol/L Potassium 3.6 3.5 - 5.1 mmol/L Chloride 110 (H) 97 - 108 mmol/L  
 CO2 24 21 - 32 mmol/L Anion gap 8 5 - 15 mmol/L Glucose 96 65 - 100 mg/dL  BUN 8 6 - 20 MG/DL  
 Creatinine 0.40 (L) 0.55 - 1.02 MG/DL  
 BUN/Creatinine ratio 20 12 - 20 GFR est AA >60 >60 ml/min/1.73m2 GFR est non-AA >60 >60 ml/min/1.73m2 Calcium 8.9 8.5 - 10.1 MG/DL Bilirubin, total 0.4 0.2 - 1.0 MG/DL  
 ALT (SGPT) 23 12 - 78 U/L  
 AST (SGOT) 33 15 - 37 U/L Alk. phosphatase 130 (H) 45 - 117 U/L Protein, total 6.4 6.4 - 8.2 g/dL Albumin 2.9 (L) 3.5 - 5.0 g/dL Globulin 3.5 2.0 - 4.0 g/dL A-G Ratio 0.8 (L) 1.1 - 2.2    
 
 
 
1100 Port maintained positive blood return throughout treatment. Flushed, heparinized and de-accessed per protocol. D/c home ambulatory in no distress. Future Appointments Date Time Provider Thomas Espinoza 6/12/2020  2:00 PM [facility]  
6/24/2020 10:00 AM A2 DIANNE LONG [facility] [facility]  
6/24/2020 10:15 AM BART Lopez [address]  
6/26/2020  2:00 PM [facility]  
7/8/2020 10:00 AM A2 DIANNE LONG [address] [facility]  
7/10/2020  2:00 PM G2 [facility] Adwoa Garcias RN 
Peggy 10, 2020

## 2020-06-17 PROBLEM — T45.1X5A CHEMOTHERAPY-INDUCED THROMBOCYTOPENIA: Status: ACTIVE | Noted: 2020-01-01

## 2020-06-17 PROBLEM — D69.59 CHEMOTHERAPY-INDUCED THROMBOCYTOPENIA: Status: ACTIVE | Noted: 2020-01-01

## 2020-06-17 NOTE — LETTER
6/21/20 Patient: Rick Steel YOB: 1970 Date of Visit: 6/17/2020 Emelia Jose MD 
[place] [address]. VIA Facsimile: [phone] Dear Emelia Jose MD, Thank you for referring Ms. Rick Steel to [address] [place] [address] for evaluation. My notes for this consultation are attached. If you have questions, please do not hesitate to call me. I look forward to following your patient along with you. Sincerely, Dexter Burroughs NP

## 2020-06-17 NOTE — PROGRESS NOTES
Sherial Gottron is a 44 y.o. female Chief Complaint Patient presents with  Follow-up  
  metastatic appendix cancer 1. Have you been to the ER, urgent care clinic since your last visit? Hospitalized since your last visit? No 
2. Have you seen or consulted any other health care providers outside of the [address] [place] [address] since your last visit? Include any pap smears or colon screening.  No

## 2020-06-17 NOTE — PROGRESS NOTES
[facility] 
[address], [address] [address] [facility], [address] W: [phone]  F: [phone] HEME/ONC FOLLOW UP 
 
Reason for Visit:  
Twan Chaudhry is a 44 y.o. 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 Treatment History:  
· EXPLORATORY LAPAROTOMY SIERRA BSO, TUMOR DEBULKING: ILEOCECAL RESECTION; OMENTECTOMY · FOLFOX 50% DR 12/3/19 - 3/4/20 · Per [facility] recommendation, dropped 5FU Bolus/Leucovorin and increased Oxaliplatin to 60 mg/m2 and 5FU pump tp 1,800 mg/m2 with Cycle 8 on 3/18/20 · Oxaliplatin increased to 85 mg/m2 with Cycle 10 on 4/15/20 - 5/27/20 
· Oxaliplatin DR 25% with Cycle 14 on 6/17/20 d/t thrombocytopenia · CTs C/A/P 3/20 stable STAGE: 4 with carcinomatosis History of Present Illness:  
Twan Chaudhry is a 44 y.o. 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 She had CTs on 5/26/20 here that were read as stable/no mets. She had a VV with [facility] and they recommended continuing treatment for 2 more months. Treatment was held last week on 6/10/20 d/t thrombocytopenia - platelets were 12Z. She is here today to re-try Cycle 14. Platelets are 21L today so will DR Oxaliplatin 25% with treatment today. Patient is feeling well overall today - nothing new or different. She continues to have some neuropathy when touching cold things, not constant. She also has abdominal cramp/diarrhea but this is controlled with PRN Imodium/Lomotil. Her appetite has been stable. She has follow up at [facility] again at the end of July/early August - possible HiPEC. She denies fever, chills, and mouth sores. CBC is stable, CMP is still pending. She is ready for treatment today pending labs. She is here alone today d/t COVID. Past Medical History:  
Diagnosis Date  Cancer ([facility] [address] APPENDIX CANCER   
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  
Procedure Laterality Date [address] UNLISTED  HX APPENDECTOMY  09/2019  HX GI  09/2019 ILEOCECECTOMY  HX GYN  2003 CYST ON OVARY  HX HYSTERECTOMY  2019  IR THORACENTESIS CATH W IMAGE  11/6/2019 Social History Tobacco Use  Smoking status: Never Smoker  Smokeless tobacco: Never Used Substance Use Topics  Alcohol use: Not Currently Family History Problem Relation Age of Onset  Breast Cancer Paternal Grandmother 56  Crohn's Disease Mother  Heart Disease Mother  Cancer Father BLADDER  
 Diabetes Father  No Known Problems Son  No Known Problems Daughter  Anesth Problems Neg Hx Current Outpatient Medications Medication Sig  LORazepam (ATIVAN) 0.5 mg tablet Take 1 Tab by mouth nightly as needed for Anxiety. Max Daily Amount: 0.5 mg.  prochlorperazine (Compazine) 5 mg tablet Take 1 tab by mouth every 6 hours as needed for nausea or vomiting  LORazepam (ATIVAN) 0.5 mg tablet Take 1 Tab by mouth nightly as needed for Anxiety. Max Daily Amount: 0.5 mg.  
 diphenoxylate-atropine (LomotiL) 2.5-0.025 mg per tablet Take 2 Tabs by mouth four (4) times daily as needed for Diarrhea. Max Daily Amount: 8 Tabs.  potassium chloride (KLOR-CON) 10 mEq tablet Take 1 Tab by mouth two (2) times a day.  lidocaine-prilocaine (EMLA) topical cream Apply  to affected area as needed for Pain.  ondansetron (ZOFRAN ODT) 4 mg disintegrating tablet Take 1 Tab by mouth every eight (8) hours as needed for Nausea. (Patient taking differently: Take 8 mg by mouth every eight (8) hours as needed for Nausea. Indications: prevent nausea and vomiting from cancer chemotherapy)  dexAMETHasone (DECADRON) 4 mg tablet Take 2 tabs (8mg) by mouth daily on days 2 and 3 after chemotherapy  acetaminophen (TYLENOL) 325 mg tablet Take 650 mg by mouth every four (4) hours as needed for Pain. Indications: pain  ibuprofen (MOTRIN) 200 mg tablet Take 3 Tabs by mouth every eight (8) hours as needed for Pain. No current facility-administered medications for this visit. No Known Allergies Review of Systems: A complete review of systems was obtained, negative except as described above. Physical Exam:  
 
Visit Vitals /77 Pulse 73 Temp 97.6 °F (36.4 °C) Ht 5' 3\" (1.6 m) Wt 116 lb 4.8 oz (52.8 kg) LMP 09/15/2019 SpO2 98% BMI 20.60 kg/m² ECOG PS: 1-2 General: No distress Eyes: Anicteric sclerae HENT: Atraumatic Neck: Supple Resp: Normal respiratory effort CV: Regular GI: Soft, less distended MS: Ambulatory Skin: Warm dry. Port site without redness/swelling Psych: Alert, oriented, appropriate affect, normal judgment/insight Results:  
 
Lab Results Component Value Date/Time WBC 3.2 (L) 06/17/2020 10:07 AM  
 HGB 11.2 (L) 06/17/2020 10:07 AM  
 HCT 35.0 06/17/2020 10:07 AM  
 PLATELET 81 (L) 66/64/5385 10:07 AM  
 .3 (H) 06/17/2020 10:07 AM  
 ABS. NEUTROPHILS 1.0 (L) 06/17/2020 10:07 AM  
 Hemoglobin (POC) 10.8 (L) 09/25/2019 04:11 PM  
 Hematocrit (POC) 32 (L) 09/25/2019 12:10 PM  
 
Lab Results Component Value Date/Time  Sodium 140 06/17/2020 10:07 AM  
 Potassium 3.7 06/17/2020 10:07 AM  
 Chloride 109 (H) 06/17/2020 10:07 AM  
 CO2 26 06/17/2020 10:07 AM  
 Glucose 81 06/17/2020 10:07 AM  
 BUN 6 06/17/2020 10:07 AM  
 Creatinine 0.38 (L) 06/17/2020 10:07 AM  
 GFR est AA >60 06/17/2020 10:07 AM  
 GFR est non-AA >60 06/17/2020 10:07 AM  
 Calcium 9.3 06/17/2020 10:07 AM  
 Sodium (POC) 137 09/25/2019 12:10 PM  
 Potassium (POC) 3.7 09/25/2019 12:10 PM  
 Chloride (POC) 105 09/25/2019 12:10 PM  
 Glucose (POC) 85 09/25/2019 12:10 PM  
 BUN (POC) 4 (L) 09/25/2019 12:10 PM  
 Creatinine (POC) 0.5 (L) 09/25/2019 12:10 PM  
 Calcium, ionized (POC) 1.14 09/25/2019 12:10 PM  
 
Lab Results Component Value Date/Time Bilirubin, total 0.4 06/17/2020 10:07 AM  
 ALT (SGPT) 21 06/17/2020 10:07 AM  
 Alk. phosphatase 134 (H) 06/17/2020 10:07 AM  
 Protein, total 7.0 06/17/2020 10:07 AM  
 Albumin 2.9 (L) 06/17/2020 10:07 AM  
 Globulin 4.1 (H) 06/17/2020 10:07 AM  
 
CT Results (most recent): 
Results from Hospital Encounter encounter on 05/26/20 CT ABD PELV W CONT Narrative INDICATION:   Appendiceal cancer EXAM:  CT chest, abdomen, and pelvis WITH  CONTRAST 
 
COMPARISON:  CT chest 11/26/2019, CT abdomen and pelvis 12/18/2010 TECHNIQUE:  Thin collimation axial images were obtained through the chest, 
abdomen, and pelvis with IV contrast administration. Coronal and sagittal 
reconstructions were obtained. Oral contrast was not administered. CT dose 
reduction was achieved through use of a standardized protocol tailored for this 
examination and automatic exposure control for dose modulation. FINDINGS: 
 
Chest: 
 
LUNGS: Left lower lobe basilar subsegmental atelectasis. No other significant 
abnormality. Central airways are patent LYMPH NODES: No thoracic lymphadenopathy. PLEURAL FLUID: No pleural effusion. PERICARDIAL FLUID: No pericardial effusion. THYROID: No thyroid nodule. OTHER: Left chest port extends to the SVC Abdomen: LIVER: 2.5 cm left hepatic lobe cyst. No other focal liver abnormality GALLBLADDER: Small calcification along the nondependent wall. The gallbladder is 
otherwise unremarkable SPLEEN: Anterior perisplenic of low-density/fluid and gas appears smaller 
measuring 5.6 x 3.7 cm but is incompletely evaluated on this noncontrast exam. 
No other splenic abnormality PANCREAS: No mass or ductal dilatation. ADRENALS: Unremarkable. KIDNEYS/URETERS: Normal symmetric nephrograms without evidence for  focal mass. No hydronephrosis PERITONEUM: No abdominal lymphadenopathy or ascites. COLON: Nonspecific bowel wall thickening involving the left colon APPENDIX: Not seen SMALL BOWEL: No dilatation or wall thickening. STOMACH: Unremarkable. Pelvis: PELVIS: No pelvic lymphadenopathy or free fluid. BONES: No destructive bone lesion. ADDITIONAL COMMENTS: N/A Impression IMPRESSION: 
 
Decrease in size of anterior perisplenic low-density/fluid and gas which is 
predominantly phlegmon. If there is concern for peritoneal carcinomatosis, 
consider PET/CT. No obvious metastasis. Mild nonspecific colitis involving the left colon Left lower lobe subsegmental atelectasis. 23X Records reviewed and summarized above. Pathology report(s) reviewed above. Radiology report(s) reviewed above. Assessment/PLAN:  
 
1) Stage 4 / Metastatic Appendiceal Cancer  
post EXPLORATORY LAPAROTOMY SIERRA BSO, TUMOR DEBULKING: ILEOCECAL RESECTION; OMENTECTOMY 9/25/19 Surgery done for obstruction. She had left rib pain and SOB and on exam decreased bs on LEFT. Got CXR which showed pleural effusion then ultrasound which showed elevated diaphragm and ? Free air CT which showed fluid collection and patient admitted for this. Had IR drain of splenic fluid collection. Patient went to [facility] and [facility] for second opinions. Discussed systemic therapy with FOLFOX/ +/- Avastin chemo or some variation of this regimen. Possible HiPEC down the road. Patient choose to have chemo locally and still see [facility]. Patient had cycle 1 of FOLFOX chemotherapy on 12/3/19 reduced by 50%. CT A/P 12/12/19 shows worsening fluid collection which was drained via IR. Follow up CT A/P 12/17/19 better overall. CT A/P 1/7/20 stable. She had been tolerating FOLFOX 50% DR well overall. She went to [facility] for follow up on 3/13/20 with CTs there. [facility] felt that CTs were stable overall compared to her last scans there in 11/19. [facility] recommended dropping 5FU Bolus/Leucovorin and increasing Oxaliplatin to 60 mg/m2 and 5FU pump tp 1,800 mg/m2 and then increasing Oxaliplatin to 85 mg/m2 if tolerated. Increased Oxaliplatin to 85 mg/m2 with Cycle 10 per [facility] recommendation. She had CTs C/A/P 5/26/20 here that were read as stable/no mets. Patient had a VV follow up with [facility] on 6/4/20 - okay to continue chemo for 2 more months. Chemo was held on 6/10/20 d/t thrombocytopenia - platelets were 47P. She is here for Cycle 14 of palliative chemo with FOLFOX. She is tolerating chemo well overall with mild side effects. Neuropathy stable. Patient is clinically stable overall today. CBC stable today and CMP is still pending. She is ready for treatment today - will DR Whitaker 25% d/t thrombocytopenia - platelets are 59Z today. She is scheduled to see [facility] at the end of July/early August with surgery visit then. Follow up here in 2 weeks in [facility]. Patient agrees with plan. 2) Left Pleural Effusion post Thoracentesis x 2 No cytology. Stable on CT. 3) Hx of Nausea and Vomiting Continue anti-emetics as needed. Advised her that she can take 10 mg Compazine if needed. No recent nausea or vomiting. 4) Abdominal Pain/Cramping Likely d/t carcinomatosis. She is taking Tylenol/Advil PRN. No pain today. 5) Chemo Induced Diarrhea Well controlled with PRN Imodium/Lomotil. Will continue to monitor. 6) Chemo Induced Neuropathy Mild, Grade 1 - not interfering with functioning. Will continue to monitor. 7) Insomnia Takes Ativan PRN. 8) Psychosocial 
Mood good, coping well considering difficult disease. She has great family support. SW support as needed. She is here alone today d/t COIVD. Call if questions. Follow up in 2 weeks in [facility]/office.   
I appreciate the opportunity to participate in Ms. Shankar Bolivar care. 
 
Signed By: Van Shoemaker NP

## 2020-06-17 NOTE — PROGRESS NOTES
Outpatient Infusion Center - Chemotherapy Progress Note    1000 Pt admit to [facility] for Folfox ambulatory in stable condition. Assessment completed. No new concerns voiced. Port accessed with positive blood return, labs drawn and sent for processing.     1020 pt upstairs for MD appt    1120 pt back to [facility] for tx    Chemotherapy Flowsheet 6/17/2020   Cycle C13   Date 6/17/2020   Drug / Regimen Folfox   Pre Hydration given   Pre Meds given   Notes given         Visit Vitals  /68   Pulse 68   Temp 97.8 °F (36.6 °C)   Resp 18   Ht 5' 3\" (1.6 m)   Wt 52.8 kg (116 lb 4.8 oz)   LMP 09/15/2019   BMI 20.60 kg/m²       Medications:  Medications Administered     0.9% sodium chloride injection 10 mL     Admin Date  06/17/2020 Action  Given Dose  10 mL Route  IntraVENous Administered By  Christy JARQUIN          dexamethasone (DECADRON) 12 mg in 0.9% sodium chloride 50 mL, overfill volume 5 mL IVPB     Admin Date  06/17/2020 Action  Given Dose  12 mg Rate  232 mL/hr Route  IntraVENous Administered By  Christy Askew A          dextrose 5% infusion     Admin Date  06/17/2020 Action  New Bag Dose  25 mL/hr Rate  25 mL/hr Route  IntraVENous Administered By  Christy Askew A          fluorouraciL (ADRUCIL) 2,700 mg in 0.9% sodium chloride 100 mL CADD Cassette     Admin Date  06/17/2020 Action  New Bag Dose  2700 mg Rate  2.2 mL/hr Route  IntraVENous Administered By  Christy Askew A          ondansetron [facility] injection 8 mg     Admin Date  06/17/2020 Action  Given Dose  8 mg Route  IntraVENous Administered By  Christy JARQUIN          oxaliplatin (ELOXATIN) 95.5 mg in dextrose 5% 250 mL, overfill volume 25 mL chemo infusion     Admin Date  06/17/2020 Action  New Bag Dose  95.5 mg Rate  147.1 mL/hr Route  IntraVENous Administered By  Christy Askew A          saline peripheral flush soln 10 mL     Admin Date  06/17/2020 Action  Given Dose  10 mL Route  InterCATHeter Administered By  Christy JARQUIN          sodium chloride 0.9 % bolus infusion 1,000 mL     Admin Date  06/17/2020 Action  New Bag Dose  1000 mL Rate  1,000 mL/hr Route  IntraVENous Administered By  Supriya Ruelas                1510 Pt tolerated treatment well. Port maintained positive blood return throughout treatment, CADD pump connected to port and programmed to run at 2.2cc/hr x 46 hrs, D/c home ambulatory in no distress. Pt aware of next appointment scheduled for 6/19/20.

## 2020-06-19 NOTE — PROGRESS NOTES
[facility] Short Note                       Date: 2020    Name: Alla Godwin    MRN: [id number]         : 1970    1440 Pt admit to [facility] for removal of CADD pump and given hydration  ambulatory in stable condition. Assessment completed. No new concerns voiced. Ms. Dean Dooley vitals were reviewed prior to treatment. Patient Vitals for the past 12 hrs:   Temp Pulse Resp BP   20 1439 98.1 °F (36.7 °C) 61 18 103/67       POrt with positive blood return, flushed, heparinized and de-accessed per protocol. Medications given:   Medications Administered     ondansetron (ZOFRAN) injection 8 mg     Admin Date  2020 Action  Given Dose  8 mg Route  IntraVENous Administered By  Cristina Reed RN          sodium chloride 0.9 % bolus infusion 1,000 mL     Admin Date  2020 Action  New Bag Dose  1000 mL Rate  1,000 mL/hr Route  IntraVENous Administered By  Cristina Reed RN                   1600 Pt tolerated treatment well. D/c home ambulatory in no distress.      Future Appointments   Date Time Provider Thomas Espinoza   2020 10:00 AM F1 DIANNE LONG [address] [place] [address] [place]   2020 10:45 AM BART [address]en [address]   7/3/2020 11:00 AM H1 DIANNE [address]   7/15/2020 10:00 AM F1 DIANNE LONG [address] [place] [facility]   2020  2:30 PM H2 DIANNE Orozco RN  2020  4:21 PM

## 2020-07-01 NOTE — PROGRESS NOTES
Luis Fernando Miller is a 44 y.o. female Chief Complaint Patient presents with  Follow-up  
  metastatic appendix cancer 1. Have you been to the ER, urgent care clinic since your last visit? Hospitalized since your last visit? No 
 
2. Have you seen or consulted any other health care providers outside of the [address] [place] [address] since your last visit? Include any pap smears or colon screening. No

## 2020-07-01 NOTE — PROGRESS NOTES
[facility] Outpatient Infusion Center Note: 
1000Pt arrived at [facility] ambulatory and in no distress for C14 Assessment stable, no new complaints voiced. Still has diarrhea and therefore less intake. She states she fills up quickly. She has intermittent neuropathy in hands and feet. Gets slight cold sensitivity. Pt went to MD appt. Port patent ;good blood return;stiff to irrigate. Medications received: 
Chemo held due to low platelets. Pt will return for next cycle. in one week. Talked with her about bleeding precautions. 1155 Tolerated treatment well, no adverse reaction noted. D/Cd from [facility] ambulatory and in no distress accompanied by self. Next appt 7/8 Visit Vitals /78 Pulse 78 Temp 98.6 °F (37 °C) Resp 16 Ht 5' 3\" (1.6 m) Wt 52 kg (114 lb 9.6 oz) LMP 09/15/2019 BMI 20.30 kg/m² Recent Results (from the past 12 hour(s)) CBC WITH AUTOMATED DIFF Collection Time: 07/01/20 10:06 AM  
Result Value Ref Range WBC 3.2 (L) 3.6 - 11.0 K/uL  
 RBC 3.35 (L) 3.80 - 5.20 M/uL  
 HGB 10.9 (L) 11.5 - 16.0 g/dL HCT 33.3 (L) 35.0 - 47.0 % MCV 99.4 (H) 80.0 - 99.0 FL  
 MCH 32.5 26.0 - 34.0 PG  
 MCHC 32.7 30.0 - 36.5 g/dL  
 RDW 15.0 (H) 11.5 - 14.5 % PLATELET 69 (L) 793 - 400 K/uL MPV 11.1 8.9 - 12.9 FL  
 NRBC 0.0 0  WBC ABSOLUTE NRBC 0.00 0.00 - 0.01 K/uL NEUTROPHILS 38 32 - 75 % LYMPHOCYTES 34 12 - 49 % MONOCYTES 24 (H) 5 - 13 % EOSINOPHILS 3 0 - 7 % BASOPHILS 1 0 - 1 % IMMATURE GRANULOCYTES 0 0.0 - 0.5 % ABS. NEUTROPHILS 1.2 (L) 1.8 - 8.0 K/UL  
 ABS. LYMPHOCYTES 1.1 0.8 - 3.5 K/UL  
 ABS. MONOCYTES 0.8 0.0 - 1.0 K/UL  
 ABS. EOSINOPHILS 0.1 0.0 - 0.4 K/UL  
 ABS. BASOPHILS 0.0 0.0 - 0.1 K/UL  
 ABS. IMM. GRANS. 0.0 0.00 - 0.04 K/UL  
 DF SMEAR SCANNED    
 RBC COMMENTS MACROCYTOSIS 
1+ METABOLIC PANEL, COMPREHENSIVE Collection Time: 07/01/20 10:15 AM  
Result Value Ref Range  Sodium 140 136 - 145 mmol/L  
 Potassium 4.1 3.5 - 5.1 mmol/L Chloride 108 97 - 108 mmol/L  
 CO2 26 21 - 32 mmol/L Anion gap 6 5 - 15 mmol/L Glucose 81 65 - 100 mg/dL BUN 7 6 - 20 MG/DL Creatinine 0.39 (L) 0.55 - 1.02 MG/DL  
 BUN/Creatinine ratio 18 12 - 20 GFR est AA >60 >60 ml/min/1.73m2 GFR est non-AA >60 >60 ml/min/1.73m2 Calcium 8.9 8.5 - 10.1 MG/DL Bilirubin, total 0.5 0.2 - 1.0 MG/DL  
 ALT (SGPT) 23 12 - 78 U/L  
 AST (SGOT) 46 (H) 15 - 37 U/L Alk. phosphatase 126 (H) 45 - 117 U/L Protein, total 6.6 6.4 - 8.2 g/dL Albumin 2.9 (L) 3.5 - 5.0 g/dL Globulin 3.7 2.0 - 4.0 g/dL A-G Ratio 0.8 (L) 1.1 - 2.2

## 2020-07-01 NOTE — PROGRESS NOTES
[facility] at [address] 
[address], [address] [address] [place], [address] [place] [address] Stephanie W: [phone]  F: [phone] HEME/ONC FOLLOW UP 
 
Reason for Visit:  
Latia Nance is a 44 y.o. 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 Treatment History:  
· EXPLORATORY LAPAROTOMY SIERRA BSO, TUMOR DEBULKING: ILEOCECAL RESECTION; OMENTECTOMY · FOLFOX 50% DR 12/3/19 - 3/4/20 · [facility] recommendation, dropped 5FU Bolus/Leucovorin and increased Oxaliplatin to 60 mg/m2 and 5FU pump tp 1,800 mg/m2 with Cycle 8 on 3/18/20 · Oxaliplatin increased to 85 mg/m2 with Cycle 10 on 4/15/20 - 5/27/20 
· CTs C/A/P 3/20 stable · CTs C/A/P 5/26/20 stable · Oxaliplatin DR 25% with Cycle 14 on 6/17/20 d/t thrombocytopenia · Oxaliplatin DR 25% more (50% total) with Cycle 15 on 7/8/20 STAGE: 4 with carcinomatosis History of Present Illness:  
Latia Nance is a 44 y.o. 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 She had CTs on 5/26/20 here that were read as stable/no mets. She had a VV with [facility] and they recommended continuing treatment for 2 more months. Treatment was held on 6/10/20 d/t thrombocytopenia - platelets were 84J. She had Cycle 14 on 6/17/20 and Oxaliplatin was DR 25% d/t thrombocytopenia. She is here today for Cycle 15. Patient is feeling well overall today - nothing new or different. She continues to have some neuropathy when touching cold things, not constant. She also has abdominal cramp/diarrhea but this is controlled with PRN Imodium/Lomotil. [facility] which is helping. Her appetite has been low but stable - down about 1 lb today. She has follow up at [facility] again at the end of July/early August - possible HiPEC. She denies fever, chills, and mouth sores.  Platelets are 69k today so treatment will have to be held. CMP is still pending. She is here alone today d/t COVID. Past Medical History:  
Diagnosis Date  Cancer ([facility] [address] APPENDIX CANCER   
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  
Procedure Laterality Date [address] [place] [address] UNLISTED  HX APPENDECTOMY  09/2019  HX GI  09/2019 ILEOCECECTOMY  HX GYN  2003 CYST ON OVARY  HX HYSTERECTOMY  2019  IR THORACENTESIS CATH W IMAGE  11/6/2019 Social History Tobacco Use  Smoking status: Never Smoker  Smokeless tobacco: Never Used Substance Use Topics  Alcohol use: Not Currently Family History Problem Relation Age of Onset  Breast Cancer Paternal Grandmother 56  Crohn's Disease Mother  Heart Disease Mother  Cancer Father BLADDER  
 Diabetes Father  No Known Problems Son  No Known Problems Daughter  Anesth Problems Neg Hx Current Outpatient Medications Medication Sig  
 hyoscyamine SL (LEVSIN/SL) 0.125 mg SL tablet 0.125 mg by SubLINGual route every four (4) hours as needed for Cramping.  LORazepam (ATIVAN) 0.5 mg tablet Take 1 Tab by mouth nightly as needed for Anxiety. Max Daily Amount: 0.5 mg.  prochlorperazine (Compazine) 5 mg tablet Take 1 tab by mouth every 6 hours as needed for nausea or vomiting  LORazepam (ATIVAN) 0.5 mg tablet Take 1 Tab by mouth nightly as needed for Anxiety. Max Daily Amount: 0.5 mg.  
 diphenoxylate-atropine (LomotiL) 2.5-0.025 mg per tablet Take 2 Tabs by mouth four (4) times daily as needed for Diarrhea. Max Daily Amount: 8 Tabs.  potassium chloride (KLOR-CON) 10 mEq tablet Take 1 Tab by mouth two (2) times a day.   
 lidocaine-prilocaine (EMLA) topical cream Apply  to affected area as needed for Pain.  ondansetron (ZOFRAN ODT) 4 mg disintegrating tablet Take 1 Tab by mouth every eight (8) hours as needed for Nausea. (Patient taking differently: Take 8 mg by mouth every eight (8) hours as needed for Nausea. Indications: prevent nausea and vomiting from cancer chemotherapy)  dexAMETHasone (DECADRON) 4 mg tablet Take 2 tabs (8mg) by mouth daily on days 2 and 3 after chemotherapy  acetaminophen (TYLENOL) 325 mg tablet Take 650 mg by mouth every four (4) hours as needed for Pain. Indications: pain  ibuprofen (MOTRIN) 200 mg tablet Take 3 Tabs by mouth every eight (8) hours as needed for Pain. No current facility-administered medications for this visit. No Known Allergies Review of Systems: A complete review of systems was obtained, negative except as described above. Physical Exam:  
 
Visit Vitals /67 Pulse 72 Temp 97.5 °F (36.4 °C) Ht 5' 3\" (1.6 m) Wt 115 lb (52.2 kg) LMP 09/15/2019 SpO2 98% BMI 20.37 kg/m² ECOG PS: 1-2 General: No distress Eyes: Anicteric sclerae HENT: Atraumatic Neck: Supple Resp: Normal respiratory effort CV: Regular GI: Soft, less distended MS: Ambulatory Skin: Warm dry. Port site without redness/swelling Psych: Alert, oriented, appropriate affect, normal judgment/insight Results:  
 
Lab Results Component Value Date/Time WBC 3.2 (L) 07/01/2020 10:06 AM  
 HGB 10.9 (L) 07/01/2020 10:06 AM  
 HCT 33.3 (L) 07/01/2020 10:06 AM  
 PLATELET 69 (L) 44/26/0067 10:06 AM  
 MCV 99.4 (H) 07/01/2020 10:06 AM  
 ABS. NEUTROPHILS PENDING 07/01/2020 10:06 AM  
 Hemoglobin (POC) 10.8 (L) 09/25/2019 04:11 PM  
 Hematocrit (POC) 32 (L) 09/25/2019 12:10 PM  
 
Lab Results Component Value Date/Time  Sodium 140 06/17/2020 10:07 AM  
 Potassium 3.7 06/17/2020 10:07 AM  
 Chloride 109 (H) 06/17/2020 10:07 AM  
 CO2 26 06/17/2020 10:07 AM  
 Glucose 81 06/17/2020 10:07 AM  
 BUN 6 06/17/2020 10:07 AM  
 Creatinine 0.38 (L) 06/17/2020 10:07 AM  
 GFR est AA >60 06/17/2020 10:07 AM  
 GFR est non-AA >60 06/17/2020 10:07 AM  
 Calcium 9.3 06/17/2020 10:07 AM  
 Sodium (POC) 137 09/25/2019 12:10 PM  
 Potassium (POC) 3.7 09/25/2019 12:10 PM  
 Chloride (POC) 105 09/25/2019 12:10 PM  
 Glucose (POC) 85 09/25/2019 12:10 PM  
 BUN (POC) 4 (L) 09/25/2019 12:10 PM  
 Creatinine (POC) 0.5 (L) 09/25/2019 12:10 PM  
 Calcium, ionized (POC) 1.14 09/25/2019 12:10 PM  
 
Lab Results Component Value Date/Time Bilirubin, total 0.4 06/17/2020 10:07 AM  
 ALT (SGPT) 21 06/17/2020 10:07 AM  
 Alk. phosphatase 134 (H) 06/17/2020 10:07 AM  
 Protein, total 7.0 06/17/2020 10:07 AM  
 Albumin 2.9 (L) 06/17/2020 10:07 AM  
 Globulin 4.1 (H) 06/17/2020 10:07 AM  
 
CT Results (most recent): 
Results from Hospital Encounter encounter on 05/26/20 CT ABD PELV W CONT Narrative INDICATION:   Appendiceal cancer EXAM:  CT chest, abdomen, and pelvis WITH  CONTRAST 
 
COMPARISON:  CT chest 11/26/2019, CT abdomen and pelvis 12/18/2010 TECHNIQUE:  Thin collimation axial images were obtained through the chest, 
abdomen, and pelvis with IV contrast administration. Coronal and sagittal 
reconstructions were obtained. Oral contrast was not administered. CT dose 
reduction was achieved through use of a standardized protocol tailored for this 
examination and automatic exposure control for dose modulation. FINDINGS: 
 
Chest: 
 
LUNGS: Left lower lobe basilar subsegmental atelectasis. No other significant 
abnormality. Central airways are patent LYMPH NODES: No thoracic lymphadenopathy. PLEURAL FLUID: No pleural effusion. PERICARDIAL FLUID: No pericardial effusion. THYROID: No thyroid nodule. OTHER: Left chest port extends to the SVC Abdomen: LIVER: 2.5 cm left hepatic lobe cyst. No other focal liver abnormality GALLBLADDER: Small calcification along the nondependent wall. The gallbladder is 
otherwise unremarkable SPLEEN: Anterior perisplenic of low-density/fluid and gas appears smaller 
measuring 5.6 x 3.7 cm but is incompletely evaluated on this noncontrast exam. 
No other splenic abnormality PANCREAS: No mass or ductal dilatation. ADRENALS: Unremarkable. KIDNEYS/URETERS: Normal symmetric nephrograms without evidence for  focal mass. No hydronephrosis PERITONEUM: No abdominal lymphadenopathy or ascites. COLON: Nonspecific bowel wall thickening involving the left colon APPENDIX: Not seen SMALL BOWEL: No dilatation or wall thickening. STOMACH: Unremarkable. Pelvis: PELVIS: No pelvic lymphadenopathy or free fluid. BONES: No destructive bone lesion. ADDITIONAL COMMENTS: N/A Impression IMPRESSION: 
 
Decrease in size of anterior perisplenic low-density/fluid and gas which is 
predominantly phlegmon. If there is concern for peritoneal carcinomatosis, 
consider PET/CT. No obvious metastasis. Mild nonspecific colitis involving the left colon Left lower lobe subsegmental atelectasis. 23X Records reviewed and summarized above. Pathology report(s) reviewed above. Radiology report(s) reviewed above. Assessment/PLAN:  
 
1) Stage 4 / Metastatic Appendiceal Cancer  
post EXPLORATORY LAPAROTOMY SIERRA BSO, TUMOR DEBULKING: ILEOCECAL RESECTION; OMENTECTOMY 9/25/19 Surgery done for obstruction. She had left rib pain and SOB and on exam decreased bs on LEFT. Got CXR which showed pleural effusion then ultrasound which showed elevated diaphragm and ? Free air CT which showed fluid collection and patient admitted for this. Had IR drain of splenic fluid collection. Patient went to [facility] and [address] [facility] for second opinions. Discussed systemic therapy with FOLFOX/ +/- Avastin chemo or some variation of this regimen. Possible HiPEC down the road. Patient choose to have chemo locally and still see [address] [facility]. Patient had cycle 1 of FOLFOX chemotherapy on 12/3/19 reduced by 50%. CT A/P 12/12/19 shows worsening fluid collection which was drained via IR. Follow up CT A/P 12/17/19 better overall. CT A/P 1/7/20 stable. She had been tolerating FOLFOX 50% DR well overall. She went to [facility] for follow up on 3/13/20 with CTs there. [address] felt that CTs were stable overall compared to her last scans there in 11/19. [facility] recommended dropping 5FU Bolus/Leucovorin and increasing Oxaliplatin to 60 mg/m2 and 5FU pump tp 1,800 mg/m2 and then increasing Oxaliplatin to 85 mg/m2 if tolerated. Increased Oxaliplatin to 85 mg/m2 with Cycle 10 per [address] recommendation. She had CTs C/A/P 5/26/20 here that were read as stable/no mets. Patient had a VV follow up with [address] [facility] on 6/4/20 - okay to continue chemo for 2 more months. Chemo was held on 6/10/20 d/t thrombocytopenia - platelets were 50Q. Marie Alvarado was DR 25% on 6/17/20 d/t thrombocytopenia. She is here for Cycle 15 of palliative chemo with FOLFOX. She is tolerating chemo well overall with mild side effects - neuropathy is stable. Patient is clinically stable overall today. Platelets 88L today. CMP is still pending. HOLD chemo today d/t thrombocytopenia. Will reschedule for 1 week and DR Whitaker additional 25% (50% total). She is scheduled to see [place] at the end of July/early August with surgery visit then. Follow up here in 1 week in [facility] and 3 weeks in [facility]/office. Patient agrees with plan. 2) Left Pleural Effusion post Thoracentesis x 2 No cytology. Stable on CT. 3) Hx of Nausea and Vomiting Continue anti-emetics as needed. Advised her that she can take 10 mg Compazine if needed. No recent nausea or vomiting. 4) Abdominal Pain/Cramping Likely d/t carcinomatosis. She is taking Tylenol/Advil PRN. No pain today. 5) Chemo Induced Diarrhea Well controlled with PRN Imodium/Lomotil. Will continue to monitor. 6) Chemo Induced Neuropathy Mild, Grade 1 - not interfering with functioning. Will continue to monitor. 7) Insomnia Takes Ativan PRN. 8) Psychosocial 
Mood good, coping well considering difficult disease. She has great family support. SW support as needed. She is here alone today d/t COIVD. Call if questions. Follow up in 1 week in [facility] and then 3 weeks in [facility]/office. I appreciate the opportunity to participate in Ms. Marcell Weldon care.  
 
Signed By: Gita Zelaya NP

## 2020-07-08 NOTE — PROGRESS NOTES
[facility] Note:  1000Pt arrived at [facility] ambulatory and in no distress for C. Assessment stable, no new complaints voiced. Continues to have diarrhea so intake has limitations. Medications received:  Medications Administered     0.9% sodium chloride injection 10 mL     Admin Date  07/08/2020 Action  Given Dose  10 mL Route  IntraVENous Administered By  Howard Cornejo RN          dexamethasone (DECADRON) 12 mg in 0.9% sodium chloride 50 mL, overfill volume 5 mL IVPB     Admin Date  07/08/2020 Action  Given Dose  12 mg Rate  232 mL/hr Route  IntraVENous Administered By  Howard Cornejo RN          dextrose 5% infusion     Admin Date  07/08/2020 Action  New Bag Dose  25 mL/hr Rate  25 mL/hr Route  IntraVENous Administered By  Howard Cornejo RN          fluorouraciL (ADRUCIL) 2,700 mg in 0.9% sodium chloride 100 mL CADD [place]     Admin Date  07/08/2020 Action  New Bag Dose  2700 mg Rate  2.2 mL/hr Route  IntraVENous Administered By  Howard Cornejo RN          [facility]ansetron [facility] injection 8 mg     Admin Date  07/08/2020 Action  Given Dose  8 mg Route  IntraVENous Administered By  Howard Cornejo RN          oxaliplatin (ELOXATIN) 64 mg in dextrose 5% 250 mL, overfill volume 25 mL chemo infusion     Admin Date  07/08/2020 Action  New Bag Dose  64 mg Rate  143.9 mL/hr Route  IntraVENous Administered By  Howard Cornejo RN          saline peripheral flush soln 10 mL     Admin Date  07/08/2020 Action  Given Dose  10 mL Route  InterCATHeter Administered By  Howard Cornejo RN          sodium chloride 0.9 % bolus infusion 1,000 mL     Admin Date  07/08/2020 Action  New Bag Dose  1000 mL Rate  1,000 mL/hr Route  IntraVENous Administered By  Howard Cornejo, PF                  7414 Tolerated treatment well, no adverse reaction noted. D/Cd from [facility] ambulatory and in no distress accompanied by self.   Next appt 1530 7/10  Visit Vitals  /71   Pulse 69   Temp 97.4 °F (36.3 °C)   Resp 18   Ht 5' 3\" (1.6 m)   Wt 51.4 kg (113 lb 6.4 oz)   LMP 09/15/2019   BMI 20.09 kg/m²     Recent Results (from the past 12 hour(s))   CBC WITH AUTOMATED DIFF    Collection Time: 07/08/20 10:11 AM   Result Value Ref Range    WBC 3.0 (L) 3.6 - 11.0 K/uL    RBC 3.60 (L) 3.80 - 5.20 M/uL    HGB 11.6 11.5 - 16.0 g/dL    HCT 35.9 35.0 - 47.0 %    MCV 99.7 (H) 80.0 - 99.0 FL    MCH 32.2 26.0 - 34.0 PG    MCHC 32.3 30.0 - 36.5 g/dL    RDW 15.1 (H) 11.5 - 14.5 %    PLATELET 626 (L) 970 - 400 K/uL    MPV 11.3 8.9 - 12.9 FL    NRBC 0.0 0  WBC    ABSOLUTE NRBC 0.00 0.00 - 0.01 K/uL    NEUTROPHILS 37 32 - 75 %    LYMPHOCYTES 34 12 - 49 %    MONOCYTES 18 (H) 5 - 13 %    EOSINOPHILS 11 (H) 0 - 7 %    BASOPHILS 0 0 - 1 %    IMMATURE GRANULOCYTES 0 %    ABS. NEUTROPHILS 1.2 (L) 1.8 - 8.0 K/UL    ABS. LYMPHOCYTES 1.0 0.8 - 3.5 K/UL    ABS. MONOCYTES 0.5 0.0 - 1.0 K/UL    ABS. EOSINOPHILS 0.3 0.0 - 0.4 K/UL    ABS. BASOPHILS 0.0 0.0 - 0.1 K/UL    ABS. IMM. GRANS. 0.0 K/UL    DF MANUAL      RBC COMMENTS ANISOCYTOSIS  1+        RBC COMMENTS MACROCYTOSIS  1+       METABOLIC PANEL, COMPREHENSIVE    Collection Time: 07/08/20 10:11 AM   Result Value Ref Range    Sodium 141 136 - 145 mmol/L    Potassium 3.6 3.5 - 5.1 mmol/L    Chloride 108 97 - 108 mmol/L    CO2 26 21 - 32 mmol/L    Anion gap 7 5 - 15 mmol/L    Glucose 99 65 - 100 mg/dL    BUN 7 6 - 20 MG/DL    Creatinine 0.47 (L) 0.55 - 1.02 MG/DL    BUN/Creatinine ratio 15 12 - 20      GFR est AA >60 >60 ml/min/1.73m2    GFR est non-AA >60 >60 ml/min/1.73m2    Calcium 9.5 8.5 - 10.1 MG/DL    Bilirubin, total 0.4 0.2 - 1.0 MG/DL    ALT (SGPT) 25 12 - 78 U/L    AST (SGOT) 30 15 - 37 U/L    Alk.  phosphatase 146 (H) 45 - 117 U/L    Protein, total 7.0 6.4 - 8.2 g/dL    Albumin 3.0 (L) 3.5 - 5.0 g/dL    Globulin 4.0 2.0 - 4.0 g/dL    A-G Ratio 0.8 (L) 1.1 - 2.2

## 2020-07-10 NOTE — PROGRESS NOTES
[facility] Short Note                       Date: July 10, 2020    Name: Africa Graham    MRN: [id number]         : 1970    1530 Pt admit to [facility] for removal of CADD pump and zofran ambulatory in stable condition. Assessment completed. No new concerns voiced. Ms. Alon Swan vitals were reviewed prior to treatment. Patient Vitals for the past 12 hrs:   Temp Pulse Resp BP   07/10/20 1534 98.9 °F (37.2 °C) 60 16 103/66       Port with positive blood return, flushed, heparinized and de-accessed per protocol. Medications given:   Medications Administered     heparin (porcine) pf 300-500 Units     Admin Date  07/10/2020 Action  Given Dose  500 Units Route  InterCATHeter Administered By  Tuan Landry RN          ondansetron [facility]) injection 8 mg     Admin Date  07/10/2020 Action  Given Dose  8 mg Route  IntraVENous Administered By  Tuan Landry RN          saline peripheral flush soln 10 mL     Admin Date  07/10/2020 Action  Given Dose  10 mL Route  InterCATHeter Administered By  Tuan Landry RN           Admin Date  07/10/2020 Action  Given Dose  10 mL Route  InterCATHeter Administered By  Tuan Landry RN                        [id number] Pt tolerated treatment well. D/c home ambulatory in no distress.      Future Appointments   Date Time Provider Thomas Espinoza   2020 10:00 AM F1 DIANNE LONG [address] [place] [facility]   2020 10:15 AM BART [address]   2020  2:30 PM H2 DIANNE FASTRACK [facility]   2020 10:00 AM B2 DIANNE LONG [address] [place] [facility]   2020  2:30 PM H2 DIANNE FASTRACK [facility] [address], RN  July 10, 2020  4:21 PM

## 2020-07-22 NOTE — PROGRESS NOTES
Cancer [facility] at [address], [address] [facility] [address] [facility] [address], [facility]: [phone]  F: [phone]    HEME/ONC FOLLOW UP    Reason for Visit:   Eloise Bernard is a 89 [facility] y.o. 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 Treatment History:   · EXPLORATORY LAPAROTOMY SIERRA BSO, TUMOR DEBULKING: ILEOCECAL RESECTION; OMENTECTOMY  · FOLFOX 50% DR 12/3/19 - 3/4/20  · Per [facility] recommendation, dropped 5FU Bolus/Leucovorin and increased Oxaliplatin to 60 mg/m2 and 5FU pump tp 1,800 mg/m2 with Cycle 8 on 3/18/20  · Oxaliplatin increased to 85 mg/m2 with Cycle 10 on 4/15/20 - 5/27/20  · CTs C/A/P 3/20 stable  · CTs C/A/P 5/26/20 stable  · Oxaliplatin DR 25% with Cycle 14 on 6/17/20 d/t thrombocytopenia   · Oxaliplatin DR 25% more (50% total) with Cycle 15 on 7/8/20     STAGE: 4 with carcinomatosis    History of Present Illness:   Eloise Bernard is a 89 [facility] y.o. 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 Labs pending today. Doing well overall. Has a teenager. Staying in due to [place]. Last visit:  She had CTs on 5/26/20 here that were read as stable/no mets. She had a VV with [facility] and they recommended continuing treatment for 2 more months. Treatment was held on 6/10/20 d/t thrombocytopenia - platelets were 29V. She had Cycle 14 on 6/17/20 and Oxaliplatin was DR 25% d/t thrombocytopenia. She is here today for Cycle 15. Patient is feeling well overall today - nothing new or different. She continues to have some neuropathy when touching cold things, not constant. She also has abdominal cramp/diarrhea but this is controlled with PRN Imodium/Lomotil. She was started on Hyoscamine per [facility] which is helping. Her appetite has been low but stable - down about 1 lb today. She has follow up at [facility] again at the end of July/early August - possible Hi[facility].  She denies fever, chills, and mouth sores. Platelets are 54H today so treatment will have to be held. CMP is still pending. She is here alone today d/t COVID. 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 ([facility] [address] 2019    Nausea & vomiting     Nausea and vomiting 12/9/2019    Splenic infarction 2019    Subphrenic abscess ([facility] [address] 1/8/2020    Symptomatic cholelithiasis 9/18/2019      Past Surgical History:   Procedure Laterality Date    ABDOMEN SURGERY PROC UNLISTED      HX APPENDECTOMY  09/2019    HX GI  09/2019    ILEOCECECTOMY    HX GYN  2003    CYST ON OVARY    HX HYSTERECTOMY  2019    IR THORACENTESIS CATH W IMAGE  11/6/2019      Social History     Tobacco Use    Smoking status: Never Smoker    Smokeless tobacco: Never Used   Substance Use Topics    Alcohol use: Not Currently      Family History   Problem Relation Age of Onset    Breast Cancer Paternal Grandmother 61    Crohn's Disease Mother     Heart Disease Mother     Cancer Father         BLADDER    Diabetes Father     No Known Problems Son     No Known Problems Daughter     Anesth Problems Neg Hx      Current Outpatient Medications   Medication Sig    potassium chloride (KLOR-CON) 10 mEq tablet Take 1 Tab by mouth two (2) times a day.  dexAMETHasone (DECADRON) 4 mg tablet Take 2 tabs (8mg) by mouth daily on days 2 and 3 after chemotherapy    diphenoxylate-atropine (LomotiL) 2.5-0.025 mg per tablet Take 2 Tabs by mouth four (4) times daily as needed for Diarrhea. Max Daily Amount: 8 Tabs. Indications: diarrhea caused by chemotherapy    hyoscyamine SL (LEVSIN/SL) 0.125 mg SL tablet 0.125 mg by SubLINGual route every four (4) hours as needed for Cramping.  LORazepam (ATIVAN) 0.5 mg tablet Take 1 Tab by mouth nightly as needed for Anxiety. Max Daily Amount: 0.5 mg.     prochlorperazine (Compazine) 5 mg tablet Take 1 tab by mouth every 6 hours as needed for nausea or vomiting    LORazepam (ATIVAN) 0.5 mg tablet Take 1 Tab by mouth nightly as needed for Anxiety. Max Daily Amount: 0.5 mg.    diphenoxylate-atropine (LomotiL) 2.5-0.025 mg per tablet Take 2 Tabs by mouth four (4) times daily as needed for Diarrhea. Max Daily Amount: 8 Tabs.  lidocaine-prilocaine (EMLA) topical cream Apply  to affected area as needed for Pain.  ondansetron (ZOFRAN ODT) 4 mg disintegrating tablet Take 1 Tab by mouth every eight (8) hours as needed for Nausea. (Patient taking differently: Take 8 mg by mouth every eight (8) hours as needed for Nausea. Indications: prevent nausea and vomiting from cancer chemotherapy)    acetaminophen (TYLENOL) 325 mg tablet Take 650 mg by mouth every four (4) hours as needed for Pain. Indications: pain    ibuprofen (MOTRIN) 200 mg tablet Take 3 Tabs by mouth every eight (8) hours as needed for Pain. No current facility-administered medications for this visit. No Known Allergies     Review of Systems: A complete review of systems was obtained, negative except as described above. Physical Exam:     Visit Vitals  /74 (BP 1 Location: Left arm, BP Patient Position: Sitting)   Pulse 65   Temp (!) 96.3 °F (35.7 °C) (Oral)   Ht 5' 3\" (1.6 m)   Wt 115 lb 6.4 oz (52.3 kg)   LMP 09/15/2019   SpO2 96%   BMI 20.44 kg/m²     ECOG PS: 1-2  General: No distress  Eyes: Anicteric sclerae  HENT: Atraumatic  Neck: Supple  Resp: Normal respiratory effort  CV: Regular   GI: Soft, less distended   MS: Ambulatory  Skin: Warm dry. Port site without redness/swelling  Psych: Alert, oriented, appropriate affect, normal judgment/insight    Results:     Lab Results   Component Value Date/Time    WBC 3.0 (L) 07/08/2020 10:11 AM    HGB 11.6 07/08/2020 10:11 AM    HCT 35.9 07/08/2020 10:11 AM    PLATELET 740 (L) 61/08/6757 10:11 AM    MCV 99.7 (H) 07/08/2020 10:11 AM    ABS.  NEUTROPHILS 1.2 (L) 07/08/2020 10:11 AM    Hemoglobin (POC) 10.8 (L) 09/25/2019 04:11 PM    Hematocrit (POC) 32 (L) 09/25/2019 12:10 PM     Lab Results   Component Value Date/Time    Sodium 141 07/08/2020 10:11 AM    Potassium 3.6 07/08/2020 10:11 AM    Chloride 108 07/08/2020 10:11 AM    CO2 26 07/08/2020 10:11 AM    Glucose 99 07/08/2020 10:11 AM    BUN 7 07/08/2020 10:11 AM    Creatinine 0.47 (L) 07/08/2020 10:11 AM    GFR est AA >60 07/08/2020 10:11 AM    GFR est non-AA >60 07/08/2020 10:11 AM    Calcium 9.5 07/08/2020 10:11 AM    Sodium (POC) 137 09/25/2019 12:10 PM    Potassium (POC) 3.7 09/25/2019 12:10 PM    Chloride (POC) 105 09/25/2019 12:10 PM    Glucose (POC) 85 09/25/2019 12:10 PM    BUN (POC) 4 (L) 09/25/2019 12:10 PM    Creatinine (POC) 0.5 (L) 09/25/2019 12:10 PM    Calcium, ionized (POC) 1.14 09/25/2019 12:10 PM     Lab Results   Component Value Date/Time    Bilirubin, total 0.4 07/08/2020 10:11 AM    ALT (SGPT) 25 07/08/2020 10:11 AM    Alk. phosphatase 146 (H) 07/08/2020 10:11 AM    Protein, total 7.0 07/08/2020 10:11 AM    Albumin 3.0 (L) 07/08/2020 10:11 AM    Globulin 4.0 07/08/2020 10:11 AM     CT Results (most recent):  Results from [place] encounter on 05/26/20   CT ABD PELV W CONT    Narrative INDICATION:   Appendiceal cancer     EXAM:  CT chest, abdomen, and pelvis WITH  CONTRAST    COMPARISON:  CT chest 11/26/2019, CT abdomen and pelvis 12/18/2010    TECHNIQUE:  Thin collimation axial images were obtained through the chest,  abdomen, and pelvis with IV contrast administration. Coronal and sagittal  reconstructions were obtained. Oral contrast was not administered. CT dose  reduction was achieved through use of a standardized protocol tailored for this  examination and automatic exposure control for dose modulation. FINDINGS:    Chest:    LUNGS: Left lower lobe basilar subsegmental atelectasis. No other significant  abnormality. Central airways are patent  LYMPH NODES: No thoracic lymphadenopathy. PLEURAL FLUID: No pleural effusion.   PERICARDIAL FLUID: No pericardial effusion. THYROID: No thyroid nodule. OTHER: Left chest port extends to the SVC    Abdomen:    LIVER: 2.5 cm left hepatic lobe cyst. No other focal liver abnormality   GALLBLADDER: Small calcification along the nondependent wall. The gallbladder is  otherwise unremarkable  SPLEEN: Anterior perisplenic of low-density/fluid and gas appears smaller  measuring 5.6 x 3.7 cm but is incompletely evaluated on this noncontrast exam.  No other splenic abnormality  PANCREAS: No mass or ductal dilatation. ADRENALS: Unremarkable. KIDNEYS/URETERS: Normal symmetric nephrograms without evidence for  focal mass. No hydronephrosis   PERITONEUM: No abdominal lymphadenopathy or ascites. COLON: Nonspecific bowel wall thickening involving the left colon  APPENDIX: Not seen  SMALL BOWEL: No dilatation or wall thickening. STOMACH: Unremarkable. Pelvis:      PELVIS: No pelvic lymphadenopathy or free fluid. BONES: No destructive bone lesion. ADDITIONAL COMMENTS: N/A      Impression IMPRESSION:    Decrease in size of anterior perisplenic low-density/fluid and gas which is  predominantly phlegmon. If there is concern for peritoneal carcinomatosis,  consider PET/CT. No obvious metastasis. Mild nonspecific colitis involving the left colon    Left lower lobe subsegmental atelectasis. 23X           Records reviewed and summarized above. Pathology report(s) reviewed above. Radiology report(s) reviewed above. Assessment/PLAN:     1) Stage 4 / Metastatic Appendiceal Cancer   post EXPLORATORY LAPAROTOMY SIERRA BSO, TUMOR DEBULKING: ILEOCECAL RESECTION; OMENTECTOMY 9/25/19  Surgery done for obstruction. She had left rib pain and SOB and on exam decreased bs on LEFT. Got CXR which showed pleural effusion then ultrasound which showed elevated diaphragm and ? Free air  CT which showed fluid collection and patient admitted for this. Had IR drain of splenic fluid collection. Patient went to [facility] and [address] [place] [address],[address] for second opinions. Discussed systemic therapy with FOLFOX/ +/- Avastin chemo or some variation of this regimen. Possible HiPEC down the road. Patient choose to have chemo locally and still see [address] [facility]. Patient had cycle 1 of FOLFOX chemotherapy on 12/3/19 reduced by 50%. CT A/P 12/12/19 shows worsening fluid collection which was drained via IR. Follow up CT A/P 12/17/19 better overall. CT A/P 1/7/20 stable. She had been tolerating FOLFOX 50% DR well overall. She went to [facility] for follow up on 3/13/20 with CTs there. [address] felt that CTs were stable overall compared to her last scans there in 11/19. [facility] recommended dropping 5FU Bolus/Leucovorin and increasing Oxaliplatin to 60 mg/m2 and 5FU pump tp 1,800 mg/m2 and then increasing Oxaliplatin to 85 mg/m2 if tolerated. Increased Oxaliplatin to 85 mg/m2 with Cycle 10 per [address] recommendation. She had CTs C/A/P 5/26/20 here that were read as stable/no mets. Patient had a VV follow up with [address] on 6/4/20 - okay to continue chemo for 2 more months. Chemo was held on 6/10/20 d/t thrombocytopenia - platelets were 03D. Bhumika Harmon was DR 25% on 6/17/20 d/t thrombocytopenia. She is here for chemo with FOLFOX. She is tolerating chemo well overall with mild side effects - neuropathy is stable. Patient is clinically stable overall today. DR Bhumika Harmon additional 25% (50% total). Labs pending today. 2) Left Pleural Effusion post Thoracentesis x 2  No cytology. Stable on CT. 3) Hx of Nausea and Vomiting  Continue anti-emetics as needed. Advised her that she can take 10 mg Compazine if needed. No recent nausea or vomiting. 4) Abdominal Pain/Cramping  Likely d/t carcinomatosis. She is taking Tylenol/Advil PRN. No pain today. 5) Chemo Induced Diarrhea  Well controlled with PRN Imodium/Lomotil. Will continue to monitor. 6) Chemo Induced Neuropathy  Mild, Grade 1 - not interfering with functioning. Will continue to monitor.     7) Insomnia  Takes Ativan PRN. 8) Psychosocial  Mood good, coping well considering difficult disease. She has great family support. SW support as needed. She is here alone today d/t COIVD. Call if questions. Follow up in 3 weeks in [facility]/office. I appreciate the opportunity to participate in Ms. Reuben Sim care.     Signed By: Suleman Goldstein DO

## 2020-07-22 NOTE — PROGRESS NOTES
[facility] Chemotherapy Progress Note    Date: 2020    Name: Trish Ndiaye    MRN: [id number]         : 1970      1000 Pt admit to [facility] for Folfox ambulatory in stable condition. Assessment completed. No new concerns voiced. Port with positive blood return. Treatment held        Chemotherapy Flowsheet 2020   Cycle C16   Date 2020   Drug / Regimen Folfox   Pre Hydration -   Pre Meds -   Notes HELD         Ms. Lynn's vitals were reviewed. Patient Vitals for the past 12 hrs:   Temp Pulse Resp BP   20 0958 98 °F (36.7 °C) 62 16 110/77         Lab results were obtained and reviewed. Recent Results (from the past 12 hour(s))   CBC WITH AUTOMATED DIFF    Collection Time: 20 10:06 AM   Result Value Ref Range    WBC 3.4 (L) 3.6 - 11.0 K/uL    RBC 3.44 (L) 3.80 - 5.20 M/uL    HGB 11.1 (L) 11.5 - 16.0 g/dL    HCT 34.4 (L) 35.0 - 47.0 %    .0 (H) 80.0 - 99.0 FL    MCH 32.3 26.0 - 34.0 PG    MCHC 32.3 30.0 - 36.5 g/dL    RDW 14.8 (H) 11.5 - 14.5 %    PLATELET 97 (L) 379 - 400 K/uL    MPV 10.6 8.9 - 12.9 FL    NRBC 0.0 0  WBC    ABSOLUTE NRBC 0.00 0.00 - 0.01 K/uL    NEUTROPHILS 50 32 - 75 %    LYMPHOCYTES 30 12 - 49 %    MONOCYTES 15 (H) 5 - 13 %    EOSINOPHILS 4 0 - 7 %    BASOPHILS 1 0 - 1 %    IMMATURE GRANULOCYTES 0 0.0 - 0.5 %    ABS. NEUTROPHILS 1.8 1.8 - 8.0 K/UL    ABS. LYMPHOCYTES 1.0 0.8 - 3.5 K/UL    ABS. MONOCYTES 0.5 0.0 - 1.0 K/UL    ABS. EOSINOPHILS 0.1 0.0 - 0.4 K/UL    ABS. BASOPHILS 0.0 0.0 - 0.1 K/UL    ABS. IMM.  GRANS. 0.0 0.00 - 0.04 K/UL    DF SMEAR SCANNED      RBC COMMENTS MACROCYTOSIS  1+       METABOLIC PANEL, COMPREHENSIVE    Collection Time: 20 10:06 AM   Result Value Ref Range    Sodium 142 136 - 145 mmol/L    Potassium 3.6 3.5 - 5.1 mmol/L    Chloride 110 (H) 97 - 108 mmol/L    CO2 24 21 - 32 mmol/L    Anion gap 8 5 - 15 mmol/L    Glucose 83 65 - 100 mg/dL    BUN 6 6 - 20 MG/DL    Creatinine 0.40 (L) 0.55 - 1.02 MG/DL BUN/Creatinine ratio 15 12 - 20      GFR est AA >60 >60 ml/min/1.73m2    GFR est non-AA >60 >60 ml/min/1.73m2    Calcium 9.1 8.5 - 10.1 MG/DL    Bilirubin, total 0.4 0.2 - 1.0 MG/DL    ALT (SGPT) 26 12 - 78 U/L    AST (SGOT) 31 15 - 37 U/L    Alk. phosphatase 154 (H) 45 - 117 U/L    Protein, total 6.8 6.4 - 8.2 g/dL    Albumin 3.0 (L) 3.5 - 5.0 g/dL    Globulin 3.8 2.0 - 4.0 g/dL    A-G Ratio 0.8 (L) 1.1 - 2.2         Pre-medications  were administered as ordered and chemotherapy was initiated. 1100 Pt tolerated treatment well. Port maintained positive blood return throughout treatment. Flushed, heparinized and de-accessed per protocol. D/c home ambulatory in no distress. Patient is going to call to reschedule appointment.      Future Appointments   Date Time Provider Thomas Espinoza   7/24/2020  2:30 PM [facility]   8/5/2020 10:00 AM B2 DIANNE LONG [facility]   8/5/2020 10:30 AM DO Miguel Junior [id number]   8/7/2020  2:30 PM H2 DIANNE FASTRACK [facility] [address], RN  July 22, 2020

## 2020-07-22 NOTE — PROGRESS NOTES
Platelets 00I today. Discussed with Dr. Delmer Reagan. HOLD chemo today and re-try in one week. Discussed with [facility] PharmD, Refugio Mckeon.

## 2020-07-22 NOTE — PROGRESS NOTES
Chief Complaint   Patient presents with    Follow-up      metastatic appendix cancer   Chase Elias is a 44 y.o. female  1. Have you been to the ER, urgent care clinic since your last visit? Hospitalized since your last visit? No.    2. Have you seen or consulted any other health care providers outside of the [address] [place] [address] since your last visit? Include any pap smears or colon screening.  No.

## 2020-07-22 NOTE — LETTER
7/22/20 Patient: Rubi Valle YOB: 1970 Date of Visit: 7/22/2020 Niko Souza MD 
[address] VIA Facsimile: [phone] Dear Niko Souza MD, Thank you for referring Ms. Rubi Valle to [address] [place] [address] for evaluation. My notes for this consultation are attached. If you have questions, please do not hesitate to call me. I look forward to following your patient along with you. Sincerely, Wai Hutton NP

## 2020-08-12 NOTE — PROGRESS NOTES
Chase Elias is a 44 y.o. female Chief Complaint Patient presents with  Follow-up  
  metastatic appendix cancer 1. Have you been to the ER, urgent care clinic since your last visit? Hospitalized since your last visit? Yes, patient was at [facility] getting a Laparoscopy Surgery done. 2. Have you seen or consulted any other health care providers outside of the [address] [place] [address] since your last visit? Include any pap smears or colon screening.   No.

## 2020-08-12 NOTE — PROGRESS NOTES
Cancer [place] at [facility] 
[address], [address], [address] W: [phone]  F: [phone] HEME/ONC FOLLOW UP 
 
Reason for Visit:  
Ponce Mclean is a 44 y.o. 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 Treatment History:  
· EXPLORATORY LAPAROTOMY SIERRA BSO, TUMOR DEBULKING: ILEOCECAL RESECTION; OMENTECTOMY · FOLFOX 50% DR 12/3/19 - 3/4/20 · Per [facility] recommendation, dropped 5FU Bolus/Leucovorin and increased Oxaliplatin to 60 mg/m2 and 5FU pump tp 1,800 mg/m2 with Cycle 8 on 3/18/20 · Oxaliplatin increased to 85 mg/m2 with Cycle 10 on 4/15/20 - 5/27/20 
· CTs C/A/P 3/20 stable · CTs C/A/P 5/26/20 stable · Chemo held on 6/20/20 d/t thrombocytopenia · Oxaliplatin DR 25% with Cycle 14 on 6/17/20 d/t thrombocytopenia · Oxaliplatin DR 25% more (50% total) with Cycle 15 on 7/8/20 · Chemo held on 7/22/20 d/t thrombocytopenia · CT C/A/P 7/29/20 at [facility] with re demonstrated infiltrative tissue in the pelvis, favored to represented peritoneal carcinomatosis; soft tissue tethering to the colon, multiple loops of small bowel, and the urinary bladder; redemonstrated ill-defined complex fluid in the left upper quadrant - not significantly changed relative to most recent prior study; few clustered small pulmonary nodules are identified in the right lower lobe Exploratory lap at [facility] 8/20. STAGE: 4 with carcinomatosis History of Present Illness:  
Ponce Mclean is a 44 y.o. 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 She had CTs on 5/26/20 here that were read as stable/no mets. She had a VV with [facility] and they recommended continuing treatment for 2 more months. Treatment was held on 6/10/20 d/t thrombocytopenia - platelets were 07Y. She had Cycle 14 on 6/17/20 and Oxaliplatin was DR 25% d/t thrombocytopenia.  She had Cycle 15 on 7/8/20 and Shan Mendoza was DR Hi. Chemo was held on 7/22/20 d/t thrombocytopenia. She is here today for Cycle 16. Shan Mendoza is DR Hi d/t thrombocytopenia. She reports that she is feeling well overall today. She went to [facility]5 Dr Niranjan Boyd on 7/9/20 and had laparoscopic surgery to see if HiPEC was an option. Per patient, HiPEC is not an option. She continues to have some neuropathy when touching cold things, not constant. She also has abdominal cramp/diarrhea but this is controlled with PRN Imodium. She was started on Hyoscamine per [facility] which is helping. Her appetite has been low but stable - down 3 lbs today. She denies fever, chills, and mouth sores. CBC is stable today and CMP is still pending today. She is here alone today. Past Medical History:  
Diagnosis Date  Cancer ([facility] [address] APPENDIX CANCER   
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  
Procedure Laterality Date [address] UNLISTED  HX APPENDECTOMY  09/2019  HX GI  09/2019 ILEOCECECTOMY  HX GYN  2003 CYST ON OVARY  HX HYSTERECTOMY  2019  IR THORACENTESIS CATH W IMAGE  11/6/2019 Social History Tobacco Use  Smoking status: Never Smoker  Smokeless tobacco: Never Used Substance Use Topics  Alcohol use: Not Currently Family History Problem Relation Age of Onset  Breast Cancer Paternal Grandmother 56  Crohn's Disease Mother  Heart Disease Mother  Cancer Father BLADDER  
 Diabetes Father  No Known Problems Son  No Known Problems Daughter  Anesth Problems Neg Hx Current Outpatient Medications Medication Sig  LORazepam (ATIVAN) 0.5 mg tablet Take 1 Tab by mouth nightly as needed for Anxiety.  Max Daily Amount: 0.5 mg.  
  potassium chloride (KLOR-CON) 10 mEq tablet Take 1 Tab by mouth two (2) times a day.  dexAMETHasone (DECADRON) 4 mg tablet Take 2 tabs (8mg) by mouth daily on days 2 and 3 after chemotherapy  diphenoxylate-atropine (LomotiL) 2.5-0.025 mg per tablet Take 2 Tabs by mouth four (4) times daily as needed for Diarrhea. Max Daily Amount: 8 Tabs. Indications: diarrhea caused by chemotherapy  hyoscyamine SL (LEVSIN/SL) 0.125 mg SL tablet 0.125 mg by SubLINGual route every four (4) hours as needed for Cramping.  prochlorperazine (Compazine) 5 mg tablet Take 1 tab by mouth every 6 hours as needed for nausea or vomiting  diphenoxylate-atropine (LomotiL) 2.5-0.025 mg per tablet Take 2 Tabs by mouth four (4) times daily as needed for Diarrhea. Max Daily Amount: 8 Tabs.  lidocaine-prilocaine (EMLA) topical cream Apply  to affected area as needed for Pain.  ondansetron (ZOFRAN ODT) 4 mg disintegrating tablet Take 1 Tab by mouth every eight (8) hours as needed for Nausea. (Patient taking differently: Take 8 mg by mouth every eight (8) hours as needed for Nausea. Indications: prevent nausea and vomiting from cancer chemotherapy)  acetaminophen (TYLENOL) 325 mg tablet Take 650 mg by mouth every four (4) hours as needed for Pain. Indications: pain  ibuprofen (MOTRIN) 200 mg tablet Take 3 Tabs by mouth every eight (8) hours as needed for Pain. No current facility-administered medications for this visit. No Known Allergies Review of Systems: A complete review of systems was obtained, negative except as described above. Physical Exam:  
 
Visit Vitals /76 (BP 1 Location: Left arm, BP Patient Position: Sitting) Pulse 78 Temp 97.7 °F (36.5 °C) (Temporal) Ht 5' 3\" (1.6 m) Wt 112 lb 3.2 oz (50.9 kg) LMP 09/15/2019 SpO2 96% BMI 19.88 kg/m² ECOG PS: 1-2 General: No distress Eyes: Anicteric sclerae HENT: Atraumatic Neck: Supple Resp: Normal respiratory effort CV: Regular GI: Soft, less distended, lap sites healing MS: Ambulatory Skin: Warm dry. Port site without redness/swelling Psych: Alert, oriented, appropriate affect, normal judgment/insight Results:  
 
Lab Results Component Value Date/Time WBC 4.8 08/12/2020 10:13 AM  
 HGB 11.7 08/12/2020 10:13 AM  
 HCT 35.1 08/12/2020 10:13 AM  
 PLATELET 557 (L) 10/75/8096 10:13 AM  
 MCV 98.0 08/12/2020 10:13 AM  
 ABS. NEUTROPHILS 2.5 08/12/2020 10:13 AM  
 Hemoglobin (POC) 10.8 (L) 09/25/2019 04:11 PM  
 Hematocrit (POC) 32 (L) 09/25/2019 12:10 PM  
 
Lab Results Component Value Date/Time Sodium 142 07/22/2020 10:06 AM  
 Potassium 3.6 07/22/2020 10:06 AM  
 Chloride 110 (H) 07/22/2020 10:06 AM  
 CO2 24 07/22/2020 10:06 AM  
 Glucose 83 07/22/2020 10:06 AM  
 BUN 6 07/22/2020 10:06 AM  
 Creatinine 0.40 (L) 07/22/2020 10:06 AM  
 GFR est AA >60 07/22/2020 10:06 AM  
 GFR est non-AA >60 07/22/2020 10:06 AM  
 Calcium 9.1 07/22/2020 10:06 AM  
 Sodium (POC) 137 09/25/2019 12:10 PM  
 Potassium (POC) 3.7 09/25/2019 12:10 PM  
 Chloride (POC) 105 09/25/2019 12:10 PM  
 Glucose (POC) 85 09/25/2019 12:10 PM  
 BUN (POC) 4 (L) 09/25/2019 12:10 PM  
 Creatinine (POC) 0.5 (L) 09/25/2019 12:10 PM  
 Calcium, ionized (POC) 1.14 09/25/2019 12:10 PM  
 
Lab Results Component Value Date/Time Bilirubin, total 0.4 07/22/2020 10:06 AM  
 ALT (SGPT) 26 07/22/2020 10:06 AM  
 Alk. phosphatase 154 (H) 07/22/2020 10:06 AM  
 Protein, total 6.8 07/22/2020 10:06 AM  
 Albumin 3.0 (L) 07/22/2020 10:06 AM  
 Globulin 3.8 07/22/2020 10:06 AM  
 
CT Results (most recent): 
Results from Hospital Encounter encounter on 05/26/20 CT ABD PELV W CONT Narrative INDICATION:   Appendiceal cancer EXAM:  CT chest, abdomen, and pelvis WITH  CONTRAST 
 
COMPARISON:  CT chest 11/26/2019, CT abdomen and pelvis 12/18/2010 TECHNIQUE:  Thin collimation axial images were obtained through the chest, 
 abdomen, and pelvis with IV contrast administration. Coronal and sagittal 
reconstructions were obtained. Oral contrast was not administered. CT dose 
reduction was achieved through use of a standardized protocol tailored for this 
examination and automatic exposure control for dose modulation. FINDINGS: 
 
Chest: 
 
LUNGS: Left lower lobe basilar subsegmental atelectasis. No other significant 
abnormality. Central airways are patent LYMPH NODES: No thoracic lymphadenopathy. PLEURAL FLUID: No pleural effusion. PERICARDIAL FLUID: No pericardial effusion. THYROID: No thyroid nodule. OTHER: Left chest port extends to the SVC Abdomen: LIVER: 2.5 cm left hepatic lobe cyst. No other focal liver abnormality GALLBLADDER: Small calcification along the nondependent wall. The gallbladder is 
otherwise unremarkable SPLEEN: Anterior perisplenic of low-density/fluid and gas appears smaller 
measuring 5.6 x 3.7 cm but is incompletely evaluated on this noncontrast exam. 
No other splenic abnormality PANCREAS: No mass or ductal dilatation. ADRENALS: Unremarkable. KIDNEYS/URETERS: Normal symmetric nephrograms without evidence for  focal mass. No hydronephrosis PERITONEUM: No abdominal lymphadenopathy or ascites. COLON: Nonspecific bowel wall thickening involving the left colon APPENDIX: Not seen SMALL BOWEL: No dilatation or wall thickening. STOMACH: Unremarkable. Pelvis: PELVIS: No pelvic lymphadenopathy or free fluid. BONES: No destructive bone lesion. ADDITIONAL COMMENTS: N/A Impression IMPRESSION: 
 
Decrease in size of anterior perisplenic low-density/fluid and gas which is 
predominantly phlegmon. If there is concern for peritoneal carcinomatosis, 
consider PET/CT. No obvious metastasis. Mild nonspecific colitis involving the left colon Left lower lobe subsegmental atelectasis. 23X Records reviewed and summarized above. Pathology report(s) reviewed above. Radiology report(s) reviewed above. Assessment/PLAN:  
 
1) Stage 4 / Metastatic Appendiceal Cancer  
post EXPLORATORY LAPAROTOMY SIERRA BSO, TUMOR DEBULKING: ILEOCECAL RESECTION; OMENTECTOMY 9/25/19 Surgery done for obstruction. She had left rib pain and SOB and on exam decreased bs on LEFT. Got CXR which showed pleural effusion then ultrasound which showed elevated diaphragm and ? Free air CT which showed fluid collection and patient admitted for this. Had IR drain of splenic fluid collection. Patient went to [facility] and [address] for second opinions. Discussed systemic therapy with FOLFOX/ +/- Avastin chemo or some variation of this regimen. Possible HiPEC down the road. Patient choose to have chemo locally and still see [address] [place] [address],[address]. Patient had cycle 1 of FOLFOX chemotherapy on 12/3/19 reduced by 50%. CT A/P 12/12/19 shows worsening fluid collection which was drained via IR. Follow up CT A/P 12/17/19 better overall. CT A/P 1/7/20 stable. She had been tolerating FOLFOX 50% DR well overall. She went to [facility] for follow up on 3/13/20 with CTs there. [address] [place] [address],[address] felt that CTs were stable overall compared to her last scans there in 11/19. [facility] recommended dropping 5FU Bolus/Leucovorin and increasing Oxaliplatin to 60 mg/m2 and 5FU pump tp 1,800 mg/m2 and then increasing Oxaliplatin to 85 mg/m2 if tolerated. Increased Oxaliplatin to 85 mg/m2 with Cycle 10 per [address] [place], [place] [address],[address] recommendation. She had CTs C/A/P 5/26/20 here that were read as stable/no mets. Patient had a VV follow up with [address] [place] [address],[address] on 6/4/20 - okay to continue chemo for 2 more months. Chemo was held on 6/10/20 d/t thrombocytopenia - platelets were 89M. Jodi Cheng was DR 25% on 6/17/20 d/t thrombocytopenia. Jodi Cheng was DR 50% on 7/8/20 d/t thrombocytopenia. Chemo was held on 7/22/20 d/t thrombocytopenia. She went to [facility] on 7/9/20 for laparoscopic surgery. Records reviewed. Per patient, HiPEC is not an option - would be too risky. She is here today for Cycle 16 of palliative FOLFOX - Oxali 50% DR. She is tolerating chemo well overall with mild side effects - neuropathy is stable. Patient is clinically stable overall today. No pain. CBC is stable today and CMP is still pending. Platelets stable at 978Y today. Follow up in 2 weeks. 2) Left Pleural Effusion post Thoracentesis x 2 No cytology. Stable on CT. 3) Hx of Nausea and Vomiting Continue anti-emetics as needed. Advised her that she can take 10 mg Compazine if needed. No recent nausea or vomiting. 4) Abdominal Pain/Cramping Likely d/t carcinomatosis. She is taking Tylenol/Advil PRN. No pain today. 5) Chemo Induced Diarrhea Well controlled with PRN Imodium. Will continue to monitor. 6) Chemo Induced Neuropathy Mild, Grade 1 - not interfering with functioning. Will continue to monitor. 7) Insomnia Takes Ativan PRN. 8) Psychosocial 
Mood good, coping well considering difficult disease. She has great family support. SW support as needed. She is here alone today d/t COIVD. Call if questions. Follow up in 2 weeks in [facility]/office. This patient was seen in conjunction with Kimberly Narayanan NP. I appreciate the opportunity to participate in Ms. Lincoln German care.  
 
Signed By: Anastacia Warner NP

## 2020-08-12 NOTE — PROGRESS NOTES
Outpatient Infusion [facility] - Chemotherapy Progress Note    1000 Pt admit to [facility] for Folfox ambulatory in stable condition. Assessment completed. No new concerns voiced. Port accessed with positive blood return, labs drawn and sent for processing. pt upstairs for MD appt. Patient returned and connected to saline bolus. Chemotherapy Flowsheet 8/12/2020   Cycle C16   Date 8/12/2020   Drug / Regimen Folfox   Pre Hydration -   Pre Meds -   Notes -         Visit Vitals  /72   Pulse 70   Temp 98.7 °F (37.1 °C)   Ht 5' 3\" (1.6 m)   Wt 50.9 kg (112 lb 3.2 oz)   LMP 09/15/2019   Breastfeeding No   BMI 19.88 kg/m²       Medications:  Medications Administered     dexamethasone (DECADRON) 12 mg in 0.9% sodium chloride 50 mL, overfill volume 5 mL IVPB     Admin Date  08/12/2020 Action  Given Dose  12 mg Rate  232 mL/hr Route  IntraVENous Administered By  Rosemarie Mcfadden, RN          dextrose 5% infusion     Admin Date  08/12/2020 Action  New Bag Dose  25 mL/hr Rate  25 mL/hr Route  IntraVENous Administered By  Rosemarie Mcfadden RN          fluorouraciL (ADRUCIL) 2,700 mg in 0.9% sodium chloride 100 mL CADD Cassette     Admin Date  08/12/2020 Action  New Bag Dose  2700 mg Rate  2.2 mL/hr Route  IntraVENous Administered By  Rosemarie Mcfadden RN          ondansetron [facility]) injection 8 mg     Admin Date  08/12/2020 Action  Given Dose  8 mg Route  IntraVENous Administered By  Rosemarie Mcfadden RN          oxaliplatin (ELOXATIN) 64 mg in dextrose 5% 250 mL, overfill volume 25 mL chemo infusion     Admin Date  08/12/2020 Action  New Bag Dose  64 mg Rate  143.9 mL/hr Route  IntraVENous Administered By  Rosemarie Mcfadden, RN          sodium chloride 0.9 % bolus infusion 1,000 mL     Admin Date  08/12/2020 Action  New Bag Dose  1000 mL Rate  1,000 mL/hr Route  IntraVENous Administered By  Rosemarie Mcfadden, RN                  4534 Pt tolerated treatment well.  Port maintained positive blood return throughout treatment, CADD pump connected to port and programmed to run at 2.2cc/hr x 46 hrs, D/c home ambulatory in no distress.  Pt aware of next appointment scheduled for   Future Appointments   Date Time Provider Thomas Espinoza   8/14/2020  3:30 PM G2 DIANNE [address]   8/26/2020 10:00 AM H2 DIANNE FASTRACK [facility]   8/26/2020 10:30 AM Cora Severe,  [address] BS AMB   8/28/2020  2:30 PM H2 DIANNE FASTRACK [facility]   9/9/2020 10:00 AM A2 DIANNE LONG [place] [facility]   9/11/2020  2:30 PM H2 DIANNE FASTRACK [facility]

## 2020-08-12 NOTE — LETTER
8/12/20 Patient: Beatrice Ahuja YOB: 1970 Date of Visit: 8/12/2020 Anita Cabezas MD 
[address] VIA Facsimile: [phone] Dear Anita Cabezas MD, Thank you for referring Ms. Beatrice Ahuja to [address] [place] [address] for evaluation. My notes for this consultation are attached. If you have questions, please do not hesitate to call me. I look forward to following your patient along with you. Sincerely, Addi Zhang NP

## 2020-08-14 NOTE — PROGRESS NOTES
Nelsy Randolph 37 Note: 
8899 Pt arrived at [facility] ambulatory and in no distress for *pump removal   
Assessment *stable no new complaints voiced. Medications received: 
none 1455 Tolerated treatment well, no adverse reaction noted. D/Cd from [facility] ambulatory and in no distress accompanied by self. Next appt 8/26  1000 Visit Vitals /78 Pulse 66 Temp 98.2 °F (36.8 °C) Resp 16 LMP 09/15/2019 No results found for this or any previous visit (from the past 12 hour(s)).

## 2020-08-26 NOTE — PROGRESS NOTES
Cancer [place] at [facility] 
[address], [address], [address] W: [phone]  F: [phone] HEME/ONC FOLLOW UP 
 
Reason for Visit:  
Ponce Mclean is a 44 y.o. 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 Treatment History:  
· EXPLORATORY LAPAROTOMY SIERRA BSO, TUMOR DEBULKING: ILEOCECAL RESECTION; OMENTECTOMY · FOLFOX 50% DR 12/3/19 - 3/4/20 · Per [facility] recommendation, dropped 5FU Bolus/Leucovorin and increased Oxaliplatin to 60 mg/m2 and 5FU pump tp 1,800 mg/m2 with Cycle 8 on 3/18/20 · Oxaliplatin increased to 85 mg/m2 with Cycle 10 on 4/15/20 - 5/27/20 
· CTs C/A/P 3/20 stable · CTs C/A/P 5/26/20 stable · Chemo held on 6/20/20 d/t thrombocytopenia · Oxaliplatin DR 25% with Cycle 14 on 6/17/20 d/t thrombocytopenia · Oxaliplatin DR 25% more (50% total) with Cycle 15 on 7/8/20 · Chemo held on 7/22/20 d/t thrombocytopenia · CT C/A/P 7/29/20 at [facility] with re demonstrated infiltrative tissue in the pelvis, favored to represented peritoneal carcinomatosis; soft tissue tethering to the colon, multiple loops of small bowel, and the urinary bladder; redemonstrated ill-defined complex fluid in the left upper quadrant - not significantly changed relative to most recent prior study; few clustered small pulmonary nodules are identified in the right lower lobe · Exploratory lap at [facility] 8/20. · Maintenance 5FU (pump only) 8/26/20 STAGE: 4 with carcinomatosis History of Present Illness:  
Ponce Mclean is a 44 y.o. 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 She had CTs on 5/26/20 here that were read as stable/no mets. She had a VV with [facility] and they recommended continuing treatment for 2 more months. Treatment was held on 6/10/20 d/t thrombocytopenia - platelets were 03D.  She had Cycle 14 on 6/17/20 and Oxaliplatin was  25% d/t thrombocytopenia. She had Cycle 15 on 7/8/20 and Rileylisa Delarosa was  50%. Chemo was held on 7/22/20 d/t thrombocytopenia. She went to [facility] on 7/29/20 and had laparoscopic surgery to see if HiPEC was an option on 8/6//20. Per patient, HiPEC is not an option. Discussed case with Dr. Jen Stevens - she recommends switching to 5FU maintenance (pump only). Can add Oxali back in or switch to FOLFIRI if scans show progression. She is here today for Cycle 17 - will do maintenance 5FU only per [email or web address] recommendation. She reports that she is feeling well overall today. She continues to have some neuropathy when touching cold things, not constant. She says that her toes are the worst. She continues to have some abdominal cramping, although stool is becoming more formed. She takes Imodium as needed for diarrhea. She only has nausea when pain is severe. Her appetite has been low but stable - down 1 lb today. She reports that she can only eat small amounts at a time. She denies fever, chills, and mouth sores. CBC and CMP are still pending today. She is here alone today. Past Medical History:  
Diagnosis Date  Cancer ([address] APPENDIX CANCER   
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  
Procedure Laterality Date [address] UNLISTED  HX APPENDECTOMY  09/2019  HX GI  09/2019 ILEOCECECTOMY  HX GYN  2003 CYST ON OVARY  HX HYSTERECTOMY  2019  IR THORACENTESIS CATH W IMAGE  11/6/2019 Social History Tobacco Use  Smoking status: Never Smoker  Smokeless tobacco: Never Used Substance Use Topics  Alcohol use: Not Currently Family History Problem Relation Age of Onset  Breast Cancer Paternal Grandmother 56  Crohn's Disease Mother  Heart Disease Mother  Cancer Father BLADDER  
 Diabetes Father  No Known Problems Son  No Known Problems Daughter  Anesth Problems Neg Hx Current Outpatient Medications Medication Sig  diphenoxylate-atropine (LomotiL) 2.5-0.025 mg per tablet Take 2 Tabs by mouth four (4) times daily as needed for Diarrhea. Max Daily Amount: 8 Tabs.  potassium chloride (KLOR-CON) 10 mEq tablet Take 1 Tab by mouth two (2) times a day.  dexAMETHasone (DECADRON) 4 mg tablet Take 2 tabs (8mg) by mouth daily on days 2 and 3 after chemotherapy  LORazepam (ATIVAN) 0.5 mg tablet Take 1 Tab by mouth nightly as needed for Anxiety. Max Daily Amount: 0.5 mg.  
 diphenoxylate-atropine (LomotiL) 2.5-0.025 mg per tablet Take 2 Tabs by mouth four (4) times daily as needed for Diarrhea. Max Daily Amount: 8 Tabs. Indications: diarrhea caused by chemotherapy  hyoscyamine SL (LEVSIN/SL) 0.125 mg SL tablet 0.125 mg by SubLINGual route every four (4) hours as needed for Cramping.  prochlorperazine (Compazine) 5 mg tablet Take 1 tab by mouth every 6 hours as needed for nausea or vomiting  lidocaine-prilocaine (EMLA) topical cream Apply  to affected area as needed for Pain.  ondansetron (ZOFRAN ODT) 4 mg disintegrating tablet Take 1 Tab by mouth every eight (8) hours as needed for Nausea. (Patient taking differently: Take 8 mg by mouth every eight (8) hours as needed for Nausea. Indications: prevent nausea and vomiting from cancer chemotherapy)  acetaminophen (TYLENOL) 325 mg tablet Take 650 mg by mouth every four (4) hours as needed for Pain. Indications: pain  ibuprofen (MOTRIN) 200 mg tablet Take 3 Tabs by mouth every eight (8) hours as needed for Pain. No current facility-administered medications for this visit. No Known Allergies Review of Systems: A complete review of systems was obtained, negative except as described above. Physical Exam:  
 
Visit Vitals /77 (BP 1 Location: Left arm, BP Patient Position: Sitting) Pulse 69 Temp 97.1 °F (36.2 °C) (Temporal) Ht 5' 3\" (1.6 m) Wt 111 lb 14.4 oz (50.8 kg) LMP 09/15/2019 SpO2 96% BMI 19.82 kg/m² ECOG PS: 1-2 General: No distress, wearing a mask Eyes: Anicteric sclerae HENT: Atraumatic Neck: Supple Resp: Normal respiratory effort, CTAB 
CV: Regular GI: Soft, less distended MS: Ambulatory Skin: Warm dry. Port site without redness/swelling Psych: Alert, oriented, appropriate affect, normal judgment/insight Results:  
 
Lab Results Component Value Date/Time WBC 3.6 08/26/2020 10:24 AM  
 HGB 11.7 08/26/2020 10:24 AM  
 HCT 35.7 08/26/2020 10:24 AM  
 PLATELET 653 (L) 44/44/9617 10:24 AM  
 MCV 99.4 (H) 08/26/2020 10:24 AM  
 ABS. NEUTROPHILS 1.6 (L) 08/26/2020 10:24 AM  
 Hemoglobin (POC) 10.8 (L) 09/25/2019 04:11 PM  
 Hematocrit (POC) 32 (L) 09/25/2019 12:10 PM  
 
Lab Results Component Value Date/Time Sodium 141 08/12/2020 10:13 AM  
 Potassium 3.6 08/12/2020 10:13 AM  
 Chloride 108 08/12/2020 10:13 AM  
 CO2 26 08/12/2020 10:13 AM  
 Glucose 87 08/12/2020 10:13 AM  
 BUN 7 08/12/2020 10:13 AM  
 Creatinine 0.39 (L) 08/12/2020 10:13 AM  
 GFR est AA >60 08/12/2020 10:13 AM  
 GFR est non-AA >60 08/12/2020 10:13 AM  
 Calcium 9.1 08/12/2020 10:13 AM  
 Sodium (POC) 137 09/25/2019 12:10 PM  
 Potassium (POC) 3.7 09/25/2019 12:10 PM  
 Chloride (POC) 105 09/25/2019 12:10 PM  
 Glucose (POC) 85 09/25/2019 12:10 PM  
 BUN (POC) 4 (L) 09/25/2019 12:10 PM  
 Creatinine (POC) 0.5 (L) 09/25/2019 12:10 PM  
 Calcium, ionized (POC) 1.14 09/25/2019 12:10 PM  
 
Lab Results Component Value Date/Time Bilirubin, total 0.4 08/12/2020 10:13 AM  
 ALT (SGPT) 23 08/12/2020 10:13 AM  
 Alk.  phosphatase 161 (H) 08/12/2020 10:13 AM  
 Protein, total 7.2 08/12/2020 10:13 AM  
 Albumin 3.2 (L) 08/12/2020 10:13 AM  
 Globulin 4.0 08/12/2020 10:13 AM  
 
CT Results (most recent): 
 Results from [facility] Encounter encounter on 05/26/20 CT ABD PELV W CONT Narrative INDICATION:   Appendiceal cancer EXAM:  CT chest, abdomen, and pelvis WITH  CONTRAST 
 
COMPARISON:  CT chest 11/26/2019, CT abdomen and pelvis 12/18/2010 TECHNIQUE:  Thin collimation axial images were obtained through the chest, 
abdomen, and pelvis with IV contrast administration. Coronal and sagittal 
reconstructions were obtained. Oral contrast was not administered. CT dose 
reduction was achieved through use of a standardized protocol tailored for this 
examination and automatic exposure control for dose modulation. FINDINGS: 
 
Chest: 
 
LUNGS: Left lower lobe basilar subsegmental atelectasis. No other significant 
abnormality. Central airways are patent LYMPH NODES: No thoracic lymphadenopathy. PLEURAL FLUID: No pleural effusion. PERICARDIAL FLUID: No pericardial effusion. THYROID: No thyroid nodule. OTHER: Left chest port extends to the SVC Abdomen: LIVER: 2.5 cm left hepatic lobe cyst. No other focal liver abnormality GALLBLADDER: Small calcification along the nondependent wall. The gallbladder is 
otherwise unremarkable SPLEEN: Anterior perisplenic of low-density/fluid and gas appears smaller 
measuring 5.6 x 3.7 cm but is incompletely evaluated on this noncontrast exam. 
No other splenic abnormality PANCREAS: No mass or ductal dilatation. ADRENALS: Unremarkable. KIDNEYS/URETERS: Normal symmetric nephrograms without evidence for  focal mass. No hydronephrosis PERITONEUM: No abdominal lymphadenopathy or ascites. COLON: Nonspecific bowel wall thickening involving the left colon APPENDIX: Not seen SMALL BOWEL: No dilatation or wall thickening. STOMACH: Unremarkable. Pelvis: PELVIS: No pelvic lymphadenopathy or free fluid. BONES: No destructive bone lesion. ADDITIONAL COMMENTS: N/A  Impression IMPRESSION: 
 
 Decrease in size of anterior perisplenic low-density/fluid and gas which is 
predominantly phlegmon. If there is concern for peritoneal carcinomatosis, 
consider PET/CT. No obvious metastasis. Mild nonspecific colitis involving the left colon Left lower lobe subsegmental atelectasis. 23X Records reviewed and summarized above. Pathology report(s) reviewed above. Radiology report(s) reviewed above. Assessment/PLAN:  
 
1) Stage 4 / Metastatic Appendiceal Cancer  
post EXPLORATORY LAPAROTOMY SIERRA BSO, TUMOR DEBULKING: ILEOCECAL RESECTION; OMENTECTOMY 9/25/19 Surgery done for obstruction. She had left rib pain and SOB and on exam decreased bs on LEFT. Got CXR which showed pleural effusion then ultrasound which showed elevated diaphragm and ? Free air CT which showed fluid collection and patient admitted for this. Had IR drain of splenic fluid collection. Patient went to [facility] and [address] for second opinions. Discussed systemic therapy with FOLFOX/ +/- Avastin chemo or some variation of this regimen. Possible HiPEC down the road. Patient choose to have chemo locally and still see [address]. Patient had cycle 1 of FOLFOX chemotherapy on 12/3/19 reduced by 50%. CT A/P 12/12/19 shows worsening fluid collection which was drained via IR. Follow up CT A/P 12/17/19 better overall. CT A/P 1/7/20 stable. She had been tolerating FOLFOX 50% DR well overall. She went to [facility] for follow up on 3/13/20 with CTs there. [address] felt that CTs were stable overall compared to her last scans there in 11/19. [facility] recommended dropping 5FU Bolus/Leucovorin and increasing Oxaliplatin to 60 mg/m2 and 5FU pump tp 1,800 mg/m2 and then increasing Oxaliplatin to 85 mg/m2 if tolerated. Increased Oxaliplatin to 85 mg/m2 with Cycle 10 per [address] recommendation. She had CTs C/A/P 5/26/20 here that were read as stable/no mets.  
Patient had a VV follow up with [address] on 6/4/20 - okay to continue chemo for 2 more months. Chemo was held on 6/10/20 d/t thrombocytopenia - platelets were 53Z. Riley Delarosa was DR 25% on 6/17/20 d/t thrombocytopenia. Riley Delarosa was DR 50% on 7/8/20 d/t thrombocytopenia. Chemo was held on 7/22/20 d/t thrombocytopenia. She went to [facility] on 7/9/20 for laparoscopic surgery - records reviewed. Discussed with Dr. Avitia Din is not an option - would be too risky. [place] recommends dropping Oxali and continuing 5FU maintenance (pump only). Can add back in Riley Beers or switch to FOLFIRI if scans show progression. [facility] will repeat scans again in 3 months. She is here today for Cycle 17. Will do treatment with maintenance 5FU today per [facility] recommendation. She is tolerating chemo well overall with mild side effects - neuropathy is stable overall. Patient is clinically stable overall today. She denies pain today - still has occasional abdominal cramping. CBC and CMP are still pending today. Follow up in 2 weeks. Patient agrees with plan. 2) Left Pleural Effusion post Thoracentesis x 2 No cytology. Stable on CT. 3) Hx of Nausea and Vomiting Continue anti-emetics as needed. Advised her that she can take 10 mg Compazine if needed. Nausea only with pain and 1 episode of vomiting recently d/t pain. 4) Abdominal Pain/Cramping Likely d/t carcinomatosis. She is taking Tylenol/Advil PRN. No pain today. 5) Chemo Induced Diarrhea Well controlled with PRN Imodium. Will continue to monitor. 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 Not interfering with functioning. Drop Oxaliplatin today - can add back in later if needed. Will continue to monitor. 7) Insomnia Takes Ativan PRN which helps. 8) Psychosocial 
Mood good, coping well considering difficult disease. She has great family support. SW support as needed. She is here alone today d/t COIVD. Call if questions. Follow up in 2 weeks in [facility]/office. This patient was seen in conjunction with Irma Chris NP. I appreciate the opportunity to participate in Ms. Meredith Kelly care.  
 
Signed By: Nael Cervantes NP

## 2020-08-26 NOTE — PROGRESS NOTES
Aramis Messina is a 44 y.o. female Chief Complaint Patient presents with  Chemotherapy  
  metastatic appendix cancer 1. Have you been to the ER, urgent care clinic since your last visit? Hospitalized since your last visit? No. 
2. Have you seen or consulted any other health care providers outside of the [address] [place] [address] since your last visit? Include any pap smears or colon screening.  No.

## 2020-08-26 NOTE — PROGRESS NOTES
[facility] Chemo Progress Note    Date: 2020    Name: Eloise Bernard    MRN: [id number]         : 1970    1015 Ms. Ribeiro Began Arrived to [facility] for  C17 Folfox Modified ambulatory in stable condition. Assessment was completed, no acute issues at this time, no new complaints voiced. Port  accessed with positive blood return. Labs drawn and sent for processing. Patient to MD office. Patient returned to [facility], Normal Saline bolus started. Chemotherapy Flowsheet 2020   Cycle C17   Date 2020   Drug / Regimen Folfox-modified   Pre Hydration given   Pre Meds -   Notes given     Medications Administered     0.9% sodium chloride injection 10 mL     Admin Date  2020 Action  Given Dose  10 mL Route  IntraVENous Administered By  Ely Pulliam RN          fluorouraciL (ADRUCIL) 2,700 mg in 0.9% sodium chloride 100 mL CADD Cassette     Admin Date  2020 Action  New Bag Dose  2700 mg Rate  2.2 mL/hr Route  IntraVENous Administered By  Ely Pulliam RN          saline peripheral flush soln 10 mL     Admin Date  2020 Action  Given Dose  10 mL Route  InterCATHeter Administered By  Ely Pulliam RN          sodium chloride 0.9 % bolus infusion 1,000 mL     Admin Date  2020 Action  New Bag Dose  1000 mL Rate  1,000 mL/hr Route  IntraVENous Administered By  Ely Pulliam RN                      Lab results were obtained and reviewed.   Recent Results (from the past 12 hour(s))   CBC WITH AUTOMATED DIFF    Collection Time: 20 10:24 AM   Result Value Ref Range    WBC 3.6 3.6 - 11.0 K/uL    RBC 3.59 (L) 3.80 - 5.20 M/uL    HGB 11.7 11.5 - 16.0 g/dL    HCT 35.7 35.0 - 47.0 %    MCV 99.4 (H) 80.0 - 99.0 FL    MCH 32.6 26.0 - 34.0 PG    MCHC 32.8 30.0 - 36.5 g/dL    RDW 13.9 11.5 - 14.5 %    PLATELET 310 (L) 914 - 400 K/uL    MPV 10.5 8.9 - 12.9 FL    NRBC 0.0 0  WBC    ABSOLUTE NRBC 0.00 0.00 - 0.01 K/uL    NEUTROPHILS 45 32 - 75 %    LYMPHOCYTES 29 12 - 49 % MONOCYTES 18 (H) 5 - 13 %    EOSINOPHILS 7 0 - 7 %    BASOPHILS 1 0 - 1 %    IMMATURE GRANULOCYTES 0 0.0 - 0.5 %    ABS. NEUTROPHILS 1.6 (L) 1.8 - 8.0 K/UL    ABS. LYMPHOCYTES 1.1 0.8 - 3.5 K/UL    ABS. MONOCYTES 0.6 0.0 - 1.0 K/UL    ABS. EOSINOPHILS 0.3 0.0 - 0.4 K/UL    ABS. BASOPHILS 0.0 0.0 - 0.1 K/UL    ABS. IMM. GRANS. 0.0 0.00 - 0.04 K/UL    DF AUTOMATED     METABOLIC PANEL, COMPREHENSIVE    Collection Time: 08/26/20 10:24 AM   Result Value Ref Range    Sodium 141 136 - 145 mmol/L    Potassium 3.8 3.5 - 5.1 mmol/L    Chloride 109 (H) 97 - 108 mmol/L    CO2 27 21 - 32 mmol/L    Anion gap 5 5 - 15 mmol/L    Glucose 93 65 - 100 mg/dL    BUN 7 6 - 20 MG/DL    Creatinine 0.42 (L) 0.55 - 1.02 MG/DL    BUN/Creatinine ratio 17 12 - 20      GFR est AA >60 >60 ml/min/1.73m2    GFR est non-AA >60 >60 ml/min/1.73m2    Calcium 9.3 8.5 - 10.1 MG/DL    Bilirubin, total 0.4 0.2 - 1.0 MG/DL    ALT (SGPT) 26 12 - 78 U/L    AST (SGOT) 28 15 - 37 U/L    Alk. phosphatase 143 (H) 45 - 117 U/L    Protein, total 7.1 6.4 - 8.2 g/dL    Albumin 3.3 (L) 3.5 - 5.0 g/dL    Globulin 3.8 2.0 - 4.0 g/dL    A-G Ratio 0.9 (L) 1.1 - 2.2         Pre-medications  were administered as ordered and chemotherapy was initiated.   Medications Administered     0.9% sodium chloride injection 10 mL     Admin Date  08/26/2020 Action  Given Dose  10 mL Route  IntraVENous Administered By  Jossy Gavin RN          fluorouraciL (ADRUCIL) 2,700 mg in 0.9% sodium chloride 100 mL CADD Cassette     Admin Date  08/26/2020 Action  New Bag Dose  2700 mg Rate  2.2 mL/hr Route  IntraVENous Administered By  Jossy Gavin RN          saline peripheral flush soln 10 mL     Admin Date  08/26/2020 Action  Given Dose  10 mL Route  InterCATHeter Administered By  Jossy Gavin RN          sodium chloride 0.9 % bolus infusion 1,000 mL     Admin Date  08/26/2020 Action  New Bag Dose  1000 mL Rate  1,000 mL/hr Route  IntraVENous Administered By  Jossy Gavin RN 1310 Patient tolerated treatment well. Port maintained positive blood return throughout treatment. 5/FU CADD infusing at discharge. Patient discharged from [place] [address].     Future Appointments   Date Time Provider Thomas Espinoza   8/28/2020  2:30 PM H2 DIANNE FASTRACK [facility]   9/9/2020 10:00 AM A2 DIANNE LONG [place] [facility]   9/11/2020  2:30 PM H2 [facility]ANNE [facility]STRACK [facility]   9/23/2020 10:00 AM C2 DIANNE LONG [place] [facility]   9/25/2020  3:30 PM G2 DIANNE [facility]STRACK [facility]   10/7/2020 10:00 AM A2 DIANNE LONG [facility]   10/9/2020  2:30 PM H1 DIANNE FASTRACK [facility]         Juan Giron RN  August 26, 2020

## 2020-08-26 NOTE — LETTER
8/26/20 Patient: Africa Graham YOB: 1970 Date of Visit: 8/26/2020 Nora Fernandez MD 
[place] [address]. VIA Facsimile: [phone] Dear Nora Fernandez MD, Thank you for referring Ms. Africa Graham to [address] [place] [address] for evaluation. My notes for this consultation are attached. If you have questions, please do not hesitate to call me. I look forward to following your patient along with you. Sincerely, Alicia Dhaliwal NP

## 2020-08-26 NOTE — TELEPHONE ENCOUNTER
DAVE with Dr. Deb Joaquin office at [facility] for call back to discuss patient. Received call back from Dr. Richard Pardo at [facility]. She recommends continuing with maintenance 5FU (pump only). They will re-scan in 3 months - can add Oxaliplatin back in or switch to FOLFIRI upon progression.

## 2020-08-28 NOTE — PROGRESS NOTES
[facility] Short Note                       Date: 2020    Name: Yvon Montiel    MRN: [id number]         : 1970      1445 Pt admit to [facility] for pump removal ambulatory in stable condition. Assessment completed. No new concerns voiced. Ms. Fior Crespo vitals were reviewed prior to and after treatment. Patient Vitals for the past 12 hrs:   Temp Pulse Resp BP   20 1447 98.6 °F (37 °C) 70 16 101/58     Medications given: PT refused hydration and anti-emetics today. Medications Administered     heparin (porcine) pf 300-500 Units     Admin Date  2020 Action  Given Dose  500 Units Route  InterCATHeter Administered By  Tanner Finch, RN          saline peripheral flush soln 10 mL     Admin Date  2020 Action  Given Dose  10 mL Route  InterCATHeter Administered By  Tanner Finch, RN              Port manitained positive blood return. Port flushed, heparinized and de-accessed per protocol. Ms. Peter Carrasquillo tolerated the infusion, had no complaints, and was discharged from [place] [address] in stable condition at 1450.      Future Appointments   Date Time Provider Thomas Espinoza   2020 10:00 AM A2 DIANNE LONG [address] [facility]   2020 10:30 AM Amanda Rice  [address] BS AMB   2020  2:30 PM H2 DIANNE FASTRACK [facility]   2020 10:00 AM C2 DIANNE LONG [place] [facility]   2020  3:30 PM G2 DIANNE FASTRACK [facility]   10/7/2020 10:00 AM A2 [place]NE LONG [place] [facility]   10/9/2020  2:30 PM H1 DIANNE FASTRACK [facility]       Sebastián Milian RN  2020  7:44 PM

## 2020-09-09 NOTE — PROGRESS NOTES
Alla Godwin is a 44 y.o. female Chief Complaint Patient presents with  Follow-up  
  metastatic appendix cancer 1. Have you been to the ER, urgent care clinic since your last visit? Hospitalized since your last visit? No 
 
2. Have you seen or consulted any other health care providers outside of the [address] [place] [address] since your last visit? Include any pap smears or colon screening.  No

## 2020-09-09 NOTE — LETTER
9/9/20 Patient: Stan Colbert YOB: 1970 Date of Visit: 9/9/2020 Whitney Belcher MD 
[address] VIA Facsimile: [phone] Dear Whitney Belcher MD, Thank you for referring Ms. Stan Colbert to [address] [place] [address] for evaluation. My notes for this consultation are attached. If you have questions, please do not hesitate to call me. I look forward to following your patient along with you. Sincerely, Margo Benoit, NP

## 2020-09-09 NOTE — PROGRESS NOTES
Cancer [place] at [facility] 
[address], [address] [facility], [address] [place] W: [phone]  F: [phone] HEME/ONC FOLLOW UP 
 
Reason for Visit:  
Stevie Montoya is a 44 y.o. 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 Treatment History:  
· EXPLORATORY LAPAROTOMY SIERRA BSO, TUMOR DEBULKING: ILEOCECAL RESECTION; OMENTECTOMY · FOLFOX 50% DR 12/3/19 - 3/4/20 · Per [facility] recommendation, dropped 5FU Bolus/Leucovorin and increased Oxaliplatin to 60 mg/m2 and 5FU pump tp 1,800 mg/m2 with Cycle 8 on 3/18/20 · Oxaliplatin increased to 85 mg/m2 with Cycle 10 on 4/15/20 - 5/27/20 
· CTs C/A/P 3/20 stable · CTs C/A/P 5/26/20 stable · Chemo held on 6/20/20 d/t thrombocytopenia · Oxaliplatin DR 25% with Cycle 14 on 6/17/20 d/t thrombocytopenia · Oxaliplatin DR 25% more (50% total) with Cycle 15 on 7/8/20 · Chemo held on 7/22/20 d/t thrombocytopenia · CT C/A/P 7/29/20 at [facility] with re demonstrated infiltrative tissue in the pelvis, favored to represented peritoneal carcinomatosis; soft tissue tethering to the colon, multiple loops of small bowel, and the urinary bladder; redemonstrated ill-defined complex fluid in the left upper quadrant - not significantly changed relative to most recent prior study; few clustered small pulmonary nodules are identified in the right lower lobe · Exploratory lap at [facility] 8/20. · Maintenance 5FU (pump only) 8/26/20 STAGE: 4 with carcinomatosis History of Present Illness:  
Stevie Montoya is a 44 y.o. female seen today in office for follow up of metastatic appendix cancer on 5FU only per [facility]. Having more abdominal pain and diarrhea. Tearful today. Feels gassy and taking gas x. Taking immodium. Troubles with eating. May want to talk with nutrition again. Labs good today. Last visit:  on palliative chemo FOLFOX.  She had CTs on 5/26/20 here that were read as stable/no mets. She had a VV with Juan Carlos and they recommended continuing treatment for 2 more months. Treatment was held on 6/10/20 d/t thrombocytopenia - platelets were 78M. She had Cycle 14 on 6/17/20 and Oxaliplatin was  25% d/t thrombocytopenia. She had Cycle 15 on 7/8/20 and Kat El was  50%. Chemo was held on 7/22/20 d/t thrombocytopenia. She went to [facility] on 7/29/20 and had laparoscopic surgery to see if HiPEC was an option on 8/6//20. Per patient, HiPEC is not an option. Discussed case with Dr. Romelia Colon - she recommends switching to 5FU maintenance (pump only). Can add Oxali back in or switch to FOLFIRI if scans show progression. She is here today for Cycle 17 - will do maintenance 5FU only per [facility] recommendation. She reports that she is feeling well overall today. She continues to have some neuropathy when touching cold things, not constant. She says that her toes are the worst. She continues to have some abdominal cramping, although stool is becoming more formed. She takes Imodium as needed for diarrhea. She only has nausea when pain is severe. Her appetite has been low but stable - down 1 lb today. She reports that she can only eat small amounts at a time. She denies fever, chills, and mouth sores. CBC and CMP are still pending today. She is here alone today. Past Medical History:  
Diagnosis Date  Cancer ([address] APPENDIX CANCER   
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  
Procedure Laterality Date [address] UNLISTED  HX APPENDECTOMY  09/2019  HX GI  09/2019 ILEOCECECTOMY  HX GYN  2003 CYST ON OVARY  HX HYSTERECTOMY  2019  IR THORACENTESIS CATH W IMAGE  11/6/2019 Social History Tobacco Use  Smoking status: Never Smoker  Smokeless tobacco: Never Used Substance Use Topics  Alcohol use: Not Currently Family History Problem Relation Age of Onset  Breast Cancer Paternal Grandmother 56  Crohn's Disease Mother  Heart Disease Mother  Cancer Father BLADDER  
 Diabetes Father  No Known Problems Son  No Known Problems Daughter  Anesth Problems Neg Hx Current Outpatient Medications Medication Sig  
 simethicone (Gas-X) 125 mg capsule Take 125 mg by mouth four (4) times daily as needed for Flatulence.  LORazepam (ATIVAN) 0.5 mg tablet Take 1 Tab by mouth nightly as needed for Anxiety. Max Daily Amount: 0.5 mg.  
 diphenoxylate-atropine (LomotiL) 2.5-0.025 mg per tablet Take 2 Tabs by mouth four (4) times daily as needed for Diarrhea. Max Daily Amount: 8 Tabs. Indications: diarrhea caused by chemotherapy  hyoscyamine SL (LEVSIN/SL) 0.125 mg SL tablet 0.125 mg by SubLINGual route every four (4) hours as needed for Cramping.  prochlorperazine (Compazine) 5 mg tablet Take 1 tab by mouth every 6 hours as needed for nausea or vomiting  diphenoxylate-atropine (LomotiL) 2.5-0.025 mg per tablet Take 2 Tabs by mouth four (4) times daily as needed for Diarrhea. Max Daily Amount: 8 Tabs.  potassium chloride (KLOR-CON) 10 mEq tablet Take 1 Tab by mouth two (2) times a day.  lidocaine-prilocaine (EMLA) topical cream Apply  to affected area as needed for Pain.  ondansetron (ZOFRAN ODT) 4 mg disintegrating tablet Take 1 Tab by mouth every eight (8) hours as needed for Nausea. (Patient taking differently: Take 8 mg by mouth every eight (8) hours as needed for Nausea. Indications: prevent nausea and vomiting from cancer chemotherapy)  dexAMETHasone (DECADRON) 4 mg tablet Take 2 tabs (8mg) by mouth daily on days 2 and 3 after chemotherapy  acetaminophen (TYLENOL) 325 mg tablet Take 650 mg by mouth every four (4) hours as needed for Pain. Indications: pain  ibuprofen (MOTRIN) 200 mg tablet Take 3 Tabs by mouth every eight (8) hours as needed for Pain. No current facility-administered medications for this visit. No Known Allergies Review of Systems: A complete review of systems was obtained, negative except as described above. Physical Exam:  
 
Visit Vitals /74 Temp (!) 96.4 °F (35.8 °C) Ht 5' 3\" (1.6 m) Wt 111 lb (50.3 kg) LMP 09/15/2019 SpO2 96% BMI 19.66 kg/m² ECOG PS: 1-2 General: No distress, wearing a mask Eyes: Anicteric sclerae HENT: Atraumatic Neck: Supple Resp: Normal respiratory effort, CTAB 
CV: Regular GI: Soft, less distended MS: Ambulatory Skin: Warm dry. Port site without redness/swelling Psych: Alert, oriented, appropriate affect, normal judgment/insight Results:  
 
Lab Results Component Value Date/Time WBC 3.9 09/09/2020 10:16 AM  
 HGB 12.1 09/09/2020 10:16 AM  
 HCT 37.0 09/09/2020 10:16 AM  
 PLATELET 456 (L) 53/48/0317 10:16 AM  
 MCV 98.4 09/09/2020 10:16 AM  
 ABS. NEUTROPHILS PENDING 09/09/2020 10:16 AM  
 Hemoglobin (POC) 10.8 (L) 09/25/2019 04:11 PM  
 Hematocrit (POC) 32 (L) 09/25/2019 12:10 PM  
 
Lab Results Component Value Date/Time Sodium 141 09/09/2020 10:16 AM  
 Potassium 3.7 09/09/2020 10:16 AM  
 Chloride 108 09/09/2020 10:16 AM  
 CO2 28 09/09/2020 10:16 AM  
 Glucose 80 09/09/2020 10:16 AM  
 BUN 5 (L) 09/09/2020 10:16 AM  
 Creatinine 0.41 (L) 09/09/2020 10:16 AM  
 GFR est AA >60 09/09/2020 10:16 AM  
 GFR est non-AA >60 09/09/2020 10:16 AM  
 Calcium 9.5 09/09/2020 10:16 AM  
 Sodium (POC) 137 09/25/2019 12:10 PM  
 Potassium (POC) 3.7 09/25/2019 12:10 PM  
 Chloride (POC) 105 09/25/2019 12:10 PM  
 Glucose (POC) 85 09/25/2019 12:10 PM  
 BUN (POC) 4 (L) 09/25/2019 12:10 PM  
 Creatinine (POC) 0.5 (L) 09/25/2019 12:10 PM  
 Calcium, ionized (POC) 1.14 09/25/2019 12:10 PM  
 
Lab Results Component Value Date/Time  Bilirubin, total 0.4 09/09/2020 10:16 AM  
 ALT (SGPT) 27 09/09/2020 10:16 AM  
 Alk. phosphatase 142 (H) 09/09/2020 10:16 AM  
 Protein, total 7.1 09/09/2020 10:16 AM  
 Albumin 3.4 (L) 09/09/2020 10:16 AM  
 Globulin 3.7 09/09/2020 10:16 AM  
 
CT Results (most recent): 
Results from Hospital Encounter encounter on 05/26/20 CT ABD PELV W CONT Narrative INDICATION:   Appendiceal cancer EXAM:  CT chest, abdomen, and pelvis WITH  CONTRAST 
 
COMPARISON:  CT chest 11/26/2019, CT abdomen and pelvis 12/18/2010 TECHNIQUE:  Thin collimation axial images were obtained through the chest, 
abdomen, and pelvis with IV contrast administration. Coronal and sagittal 
reconstructions were obtained. Oral contrast was not administered. CT dose 
reduction was achieved through use of a standardized protocol tailored for this 
examination and automatic exposure control for dose modulation. FINDINGS: 
 
Chest: 
 
LUNGS: Left lower lobe basilar subsegmental atelectasis. No other significant 
abnormality. Central airways are patent LYMPH NODES: No thoracic lymphadenopathy. PLEURAL FLUID: No pleural effusion. PERICARDIAL FLUID: No pericardial effusion. THYROID: No thyroid nodule. OTHER: Left chest port extends to the SVC Abdomen: LIVER: 2.5 cm left hepatic lobe cyst. No other focal liver abnormality GALLBLADDER: Small calcification along the nondependent wall. The gallbladder is 
otherwise unremarkable SPLEEN: Anterior perisplenic of low-density/fluid and gas appears smaller 
measuring 5.6 x 3.7 cm but is incompletely evaluated on this noncontrast exam. 
No other splenic abnormality PANCREAS: No mass or ductal dilatation. ADRENALS: Unremarkable. KIDNEYS/URETERS: Normal symmetric nephrograms without evidence for  focal mass. No hydronephrosis PERITONEUM: No abdominal lymphadenopathy or ascites. COLON: Nonspecific bowel wall thickening involving the left colon APPENDIX: Not seen SMALL BOWEL: No dilatation or wall thickening. STOMACH: Unremarkable. Pelvis: PELVIS: No pelvic lymphadenopathy or free fluid. BONES: No destructive bone lesion. ADDITIONAL COMMENTS: N/A Impression IMPRESSION: 
 
Decrease in size of anterior perisplenic low-density/fluid and gas which is 
predominantly phlegmon. If there is concern for peritoneal carcinomatosis, 
consider PET/CT. No obvious metastasis. Mild nonspecific colitis involving the left colon Left lower lobe subsegmental atelectasis. 23X Records reviewed and summarized above. Pathology report(s) reviewed above. Radiology report(s) reviewed above. Assessment/PLAN:  
 
1) Stage 4 / Metastatic Appendiceal Cancer  
post EXPLORATORY LAPAROTOMY SIERRA BSO, TUMOR DEBULKING: ILEOCECAL RESECTION; OMENTECTOMY 9/25/19 Surgery done for obstruction. She had left rib pain and SOB and on exam decreased bs on LEFT. Got CXR which showed pleural effusion then ultrasound which showed elevated diaphragm and ? Free air CT which showed fluid collection and patient admitted for this. Had IR drain of splenic fluid collection. Patient went to [facility] and [address] for second opinions. Discussed systemic therapy with FOLFOX/ +/- Avastin chemo or some variation of this regimen. Possible HiPEC down the road. Patient choose to have chemo locally and still see [address]. Patient had cycle 1 of FOLFOX chemotherapy on 12/3/19 reduced by 50%. CT A/P 12/12/19 shows worsening fluid collection which was drained via IR. Follow up CT A/P 12/17/19 better overall. CT A/P 1/7/20 stable. She had been tolerating FOLFOX 50% DR well overall. She went to [facility] for follow up on 3/13/20 with CTs there. [address] felt that CTs were stable overall compared to her last scans there in 11/19. [facility] recommended dropping 5FU Bolus/Leucovorin and increasing Oxaliplatin to 60 mg/m2 and 5FU pump tp 1,800 mg/m2 and then increasing Oxaliplatin to 85 mg/m2 if tolerated. Increased Oxaliplatin to 85 mg/m2 with Cycle 10 per [facility] recommendation. She had CTs C/A/P 5/26/20 here that were read as stable/no mets. Patient had a VV follow up with [facility] on 6/4/20 - okay to continue chemo for 2 more months. Chemo was held on 6/10/20 d/t thrombocytopenia - platelets were 80S. Harmeet Villanueva was DR 25% on 6/17/20 d/t thrombocytopenia. Harmeet Villanueva was DR 50% on 7/8/20 d/t thrombocytopenia. Chemo was held on 7/22/20 d/t thrombocytopenia. She went to [facility] on 7/9/20 for laparoscopic surgery - records reviewed. Discussed with Dr. Jerica Quigleyr is not an option - would be too risky. Logan recommends dropping Oxali and continuing 5FU maintenance (pump only). Can add back in Harmeet Villanueva or switch to FOLFIRI if scans show progression. Logan will repeat scans again in 3 months. She is here today for chemo maintenance 5FU today per [facility] recommendation. She is tolerating chemo well overall with diarrhea/ abdominal pain/ gassy. Adjusting meds today. Patient is clinically stable overall today. Labs good today. 2) Left Pleural Effusion post Thoracentesis x 2 No cytology. Stable on CT. 3) Hx of Nausea and Vomiting Continue anti-emetics as needed. Advised her that she can take 10 mg Compazine if needed. Nausea only with pain and 1 episode of vomiting recently d/t pain. 4) Abdominal Pain/Cramping Likely d/t carcinomatosis. She is taking Tylenol/Advil PRN. Has some pain pills at home from [facility]. Will try taking them prn.  ? Oxycodone. 5) Chemo Induced Diarrhea Well controlled with PRN Imodium. Try lomotil. Will continue to monitor. 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 Not interfering with functioning. Drop Oxaliplatin Will continue to monitor. 7) Insomnia Takes Ativan PRN which helps. 8) Psychosocial 
Mood good, coping well considering difficult disease. She has great family support. SW support as needed. She is here alone today d/t COIVD. Call if questions. Follow up in 2 weeks in [facility]/office. I appreciate the opportunity to participate in Ms. Myles Leo sorto.  
 
Signed By: Natalie Mendes DO

## 2020-09-11 NOTE — PROGRESS NOTES
Outpatient Infusion Center Short Visit Progress Note    1430 Pt admit to [facility] for pump removal/hydration/anti-emetics ambulatory in stable condition. Assessment completed. No new concerns voiced. 5FU CADD pump completed and removed; port flushed with positive blood return; Hydration infusing. Patient Vitals for the past 12 hrs:   Temp Pulse Resp BP   09/11/20 1433 98.5 °F (36.9 °C) 66 18 104/69     Medications Administered     heparin (porcine) pf 300-500 Units     Admin Date  09/11/2020 Action  Given Dose  500 Units Route  InterCATHeter Administered By  Aaron Hand, RN          ondansetron [facility]) injection 8 mg     Admin Date  09/11/2020 Action  Given Dose  8 mg Route  IntraVENous Administered By  Aaron Trimble, RN          saline peripheral flush soln 10 mL     Admin Date  09/11/2020 Action  Given Dose  10 mL Route  InterCATHeter Administered By  Aaron Hand, RN          sodium chloride 0.9 % bolus infusion 1,000 mL     Admin Date  09/11/2020 Action  New Bag Dose  1000 mL Rate  1,000 mL/hr Route  IntraVENous Administered By  Aaron Hand, RN                  1600 Pt tolerated treatment well. Port flushed, heparinized and de-accessed. D/c home ambulatory in no distress. Pt aware of next appointment scheduled for 09/23/2020.

## 2020-09-18 NOTE — TELEPHONE ENCOUNTER
Cancer [place] at [facility]   [address] [place], [address]  [place]: [phone]  F: [phone]    Medical Nutrition Therapy      Telephone Encounter:  Called and spoke with patient. She is struggling to maintain her weight. She is trying to write down what she is eating and become more aware of what she is eating. She is eating smaller portions. This morning she had 2 frozen pancakes and veggie sausage. We discussed strategies to minimize weight loss:  · Eating small amounts several times a day verses large meals. · Add protein and calories to favorite foods  (Ex. adding non-fat dry milk powder, butters, oils, whole milk, etc)  · Keep nutrient-dense foods close at hand and snack frequently   · Discussed consumption of nutrition supplements per day   · Ideas to make more calorically dense without increasing volume. Results:   Diagnosis: Metastatic appendiceal cancer   S/p exp lap, SIERRA, BSO, tumor debulking, ileocecal resection, omentectomy on 9/25/19     Chemotherapy Flowsheet 9/9/2020   Cycle C18   Date 9/9/2020   Drug / Regimen [id number]   Pre Hydration given   Pre Meds -   Notes -         Wt Readings from Last 5 Encounters:   09/09/20 111 lb (50.3 kg)   08/26/20 111 lb (50.3 kg)   08/26/20 111 lb 14.4 oz (50.8 kg)   08/12/20 112 lb 3.2 oz (50.9 kg)   08/12/20 112 lb 3.2 oz (50.9 kg)     Estimated Nutrition Needs:   Calorie Range: 1890-2160kcal/day      Protein Range: 64-74g/day     Fluid Needs: 2000ml      Assessment:   Altered GI Function related to recent surgery and disease as evidence by diarrhea and anatomical changes. Involuntary weight loss related to surgery and disease as evidence by weight loss of 20# x 10 weeks. Plan:   · Eating small amounts several times a day verses large meals.     · Add protein and calories to favorite foods  (Ex. adding non-fat dry milk powder, butters, oils, whole milk, etc)  · Keep nutrient-dense foods close at hand and snack frequently   · Discussed consumption of nutrition supplements per day   · Ideas to make more calorically dense without increasing volume. I appreciate the opportunity to participate in Ms. ZeeRenaarleth sorto.     Signed By: Ravinder Hodge, [address], [address]     Contact: [phone]

## 2020-09-23 NOTE — LETTER
9/23/20 Patient: Luis Fernando Miller YOB: 1970 Date of Visit: 9/23/2020 Christina Carcamo MD 
[address] [place] [address] VIA Facsimile: [phone] Dear Christina Carcamo MD, Thank you for referring Ms. Luis Fernando Miller to [address] [place] [address] for evaluation. My notes for this consultation are attached. If you have questions, please do not hesitate to call me. I look forward to following your patient along with you. Sincerely, Ildefonso Shin NP

## 2020-09-23 NOTE — PROGRESS NOTES
[facility] [place] Chemo Progress Note Date: 2020 Name: Eloise Bernard MRN: [id number] : 1970 
 
1000 Ms. Ribeiro Began Arrived to [facility] for  C19 5FU CADD ambulatory in stable condition. Assessment was completed, no acute issues at this time, no new complaints voiced. Port accessed with positive blood return. Labs drawn and sent for processing. Patient to MD office. Patient returned to [facility], Normal Saline bolus started. Chemotherapy Flowsheet 2020 Cycle C19 Date 2020 Drug / Regimen [id number] Pre Hydration given Pre Meds -  
Notes given Lab results were obtained and reviewed. Recent Results (from the past 12 hour(s)) CBC WITH AUTOMATED DIFF Collection Time: 20 10:19 AM  
Result Value Ref Range WBC 3.7 3.6 - 11.0 K/uL  
 RBC 3.81 3.80 - 5.20 M/uL  
 HGB 12.1 11.5 - 16.0 g/dL HCT 37.3 35.0 - 47.0 % MCV 97.9 80.0 - 99.0 FL  
 MCH 31.8 26.0 - 34.0 PG  
 MCHC 32.4 30.0 - 36.5 g/dL  
 RDW 14.0 11.5 - 14.5 % PLATELET 900 356 - 621 K/uL MPV 10.4 8.9 - 12.9 FL  
 NRBC 0.0 0  WBC ABSOLUTE NRBC 0.00 0.00 - 0.01 K/uL NEUTROPHILS 45 32 - 75 % LYMPHOCYTES 31 12 - 49 % MONOCYTES 16 (H) 5 - 13 % EOSINOPHILS 7 0 - 7 % BASOPHILS 1 0 - 1 % IMMATURE GRANULOCYTES 0 0.0 - 0.5 % ABS. NEUTROPHILS 1.6 (L) 1.8 - 8.0 K/UL  
 ABS. LYMPHOCYTES 1.2 0.8 - 3.5 K/UL  
 ABS. MONOCYTES 0.6 0.0 - 1.0 K/UL  
 ABS. EOSINOPHILS 0.3 0.0 - 0.4 K/UL  
 ABS. BASOPHILS 0.0 0.0 - 0.1 K/UL  
 ABS. IMM. GRANS. 0.0 0.00 - 0.04 K/UL  
 DF AUTOMATED METABOLIC PANEL, COMPREHENSIVE Collection Time: 20 10:19 AM  
Result Value Ref Range Sodium 141 136 - 145 mmol/L Potassium 3.8 3.5 - 5.1 mmol/L Chloride 107 97 - 108 mmol/L  
 CO2 28 21 - 32 mmol/L Anion gap 6 5 - 15 mmol/L Glucose 79 65 - 100 mg/dL BUN 7 6 - 20 MG/DL  Creatinine 0.42 (L) 0.55 - 1.02 MG/DL  
 BUN/Creatinine ratio 17 12 - 20    
 GFR est AA >60 >60 ml/min/1.73m2 GFR est non-AA >60 >60 ml/min/1.73m2 Calcium 9.4 8.5 - 10.1 MG/DL Bilirubin, total 0.5 0.2 - 1.0 MG/DL  
 ALT (SGPT) 24 12 - 78 U/L  
 AST (SGOT) 29 15 - 37 U/L Alk. phosphatase 139 (H) 45 - 117 U/L Protein, total 7.1 6.4 - 8.2 g/dL Albumin 3.3 (L) 3.5 - 5.0 g/dL Globulin 3.8 2.0 - 4.0 g/dL A-G Ratio 0.9 (L) 1.1 - 2.2 Pre-medications  were administered as ordered and chemotherapy was initiated. Medications Administered 0.9% sodium chloride injection 10 mL Admin Date 
09/23/2020 Action Given Dose 
10 mL Route IntraVENous Administered By 
Gage Dai RN  
  
  
 fluorouraciL (ADRUCIL) 2,700 mg in 0.9% sodium chloride 100 mL CADD Cassette Admin Date 
09/23/2020 Action New Bag Dose 
2700 mg Rate 2.2 mL/hr Route IntraVENous Administered By 
Gage Dai RN  
  
  
 saline peripheral flush soln 10 mL Admin Date 
09/23/2020 Action Given Dose 
10 mL Route InterCATHeter Administered By 
Gage Dai RN Admin Date 
09/23/2020 Action Given Dose 
10 mL Route InterCATHeter Administered By 
Gage Dai RN  
  
  
 sodium chloride 0.9 % bolus infusion 1,000 mL Admin Date 
09/23/2020 Action New Bag Dose 
1000 mL Rate 
1,000 mL/hr Route IntraVENous Administered By 
Gage Dai RN  
  
  
  
 
 
 
 
 
7464 Patient tolerated treatment well. Port maintained positive blood return throughout treatment. 5FU infusing at discharge. Patient was discharged from [place] [id number]. [facility] Appointments Date Time Provider Thomas Espinoza 9/25/2020  3:30 PM G2 [facility]  
10/7/2020 10:00 AM A2 DIANNE LONG [address] [place] [address]  
10/7/2020 10:00 AM Ramiro Henry,  [address]  AMB  
10/9/2020  2:30 PM H1 DIANNE TOVAR [facility] H Louis Easton RN September 23, 2020

## 2020-09-23 NOTE — PROGRESS NOTES
[facility] [place] [address] Keerthi Carranza, [address], [address] W: [phone]  F: [phone] HEME/ONC FOLLOW UP 
 
Reason for Visit:  
Michelle Jason is a 44 y.o. 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 Treatment History:  
· EXPLORATORY LAPAROTOMY SIERRA BSO, TUMOR DEBULKING: ILEOCECAL RESECTION; OMENTECTOMY · FOLFOX 50% DR 12/3/19 - 3/4/20 · Per [facility] recommendation, dropped 5FU Bolus/Leucovorin and increased Oxaliplatin to 60 mg/m2 and 5FU pump tp 1,800 mg/m2 with Cycle 8 on 3/18/20 · Oxaliplatin increased to 85 mg/m2 with Cycle 10 on 4/15/20 - 5/27/20 
· CTs C/A/P 3/20 stable · CTs C/A/P 5/26/20 stable · Chemo held on 6/20/20 d/t thrombocytopenia · Oxaliplatin DR 25% with Cycle 14 on 6/17/20 d/t thrombocytopenia · Oxaliplatin DR 25% more (50% total) with Cycle 15 on 7/8/20 · Chemo held on 7/22/20 d/t thrombocytopenia · CT C/A/P 7/29/20 at [facility] with re demonstrated infiltrative tissue in the pelvis, favored to represented peritoneal carcinomatosis; soft tissue tethering to the colon, multiple loops of small bowel, and the urinary bladder; redemonstrated ill-defined complex fluid in the left upper quadrant - not significantly changed relative to most recent prior study; few clustered small pulmonary nodules are identified in the right lower lobe · Exploratory lap at [facility] 8/20. · Maintenance 5FU (pump only) 8/26/20 STAGE: 4 with carcinomatosis History of Present Illness:  
Michelle Jason is a 44 y.o. female seen today in office for follow up of metastatic appendix cancer on 5FU only per [facility]. Feeling more abdominal pain lately. Thought would be feeling better on less chemo. Due to go back to [facility] 10/20. Last CT was at [facility] 7/20. Taking occ oxycodone. Needs a refill. Feels ok for chemo today. Last visit:   Having more abdominal pain and diarrhea. Tearful today. Feels gassy and taking gas x. Taking immodium. Troubles with eating. May want to talk with nutrition again. Labs good today. Past Medical History:  
Diagnosis Date  Cancer ([facility] APPENDIX CANCER   
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  
Procedure Laterality Date [address] UNLISTED  HX APPENDECTOMY  09/2019  HX GI  09/2019 ILEOCECECTOMY  HX GYN  2003 CYST ON OVARY  HX HYSTERECTOMY  2019  IR THORACENTESIS CATH W IMAGE  11/6/2019 Social History Tobacco Use  Smoking status: Never Smoker  Smokeless tobacco: Never Used Substance Use Topics  Alcohol use: Not Currently Family History Problem Relation Age of Onset  Breast Cancer Paternal Grandmother 56  Crohn's Disease Mother  Heart Disease Mother  Cancer Father BLADDER  
 Diabetes Father  No Known Problems Son  No Known Problems Daughter  Anesth Problems Neg Hx Current Outpatient Medications Medication Sig  diphenoxylate-atropine (LomotiL) 2.5-0.025 mg per tablet Take 2 Tabs by mouth four (4) times daily as needed for Diarrhea. Max Daily Amount: 8 Tabs. Indications: diarrhea caused by chemotherapy  simethicone (Gas-X) 125 mg capsule Take 125 mg by mouth four (4) times daily as needed for Flatulence.  LORazepam (ATIVAN) 0.5 mg tablet Take 1 Tab by mouth nightly as needed for Anxiety. Max Daily Amount: 0.5 mg.  
 hyoscyamine SL (LEVSIN/SL) 0.125 mg SL tablet 0.125 mg by SubLINGual route every four (4) hours as needed for Cramping.  prochlorperazine (Compazine) 5 mg tablet Take 1 tab by mouth every 6 hours as needed for nausea or vomiting  potassium chloride (KLOR-CON) 10 mEq tablet Take 1 Tab by mouth two (2) times a day.  lidocaine-prilocaine (EMLA) topical cream Apply  to affected area as needed for Pain.  ondansetron (ZOFRAN ODT) 4 mg disintegrating tablet Take 1 Tab by mouth every eight (8) hours as needed for Nausea. (Patient taking differently: Take 8 mg by mouth every eight (8) hours as needed for Nausea. Indications: prevent nausea and vomiting from cancer chemotherapy)  dexAMETHasone (DECADRON) 4 mg tablet Take 2 tabs (8mg) by mouth daily on days 2 and 3 after chemotherapy  acetaminophen (TYLENOL) 325 mg tablet Take 650 mg by mouth every four (4) hours as needed for Pain. Indications: pain  ibuprofen (MOTRIN) 200 mg tablet Take 3 Tabs by mouth every eight (8) hours as needed for Pain. No current facility-administered medications for this visit. No Known Allergies Review of Systems: A complete review of systems was obtained, negative except as described above. Physical Exam:  
 
Visit Vitals /77 (BP 1 Location: Left arm, BP Patient Position: Sitting) Pulse 70 Temp 97.2 °F (36.2 °C) (Temporal) Ht 5' 3\" (1.6 m) Wt 107 lb 8 oz (48.8 kg) LMP 09/15/2019 SpO2 95% BMI 19.04 kg/m² ECOG PS: 1-2 General: No distress, wearing a mask Eyes: Anicteric sclerae HENT: Atraumatic Neck: Supple Resp: Normal respiratory effort, CTAB 
CV: Regular GI: Soft, less distended MS: Ambulatory Skin: Warm dry. Port site without redness/swelling Psych: Alert, oriented, appropriate affect, normal judgment/insight Results:  
 
Lab Results Component Value Date/Time WBC 3.9 09/09/2020 10:16 AM  
 HGB 12.1 09/09/2020 10:16 AM  
 HCT 37.0 09/09/2020 10:16 AM  
 PLATELET 640 (L) 16/75/7887 10:16 AM  
 MCV 98.4 09/09/2020 10:16 AM  
 ABS. NEUTROPHILS 1.8 09/09/2020 10:16 AM  
 Hemoglobin (POC) 10.8 (L) 09/25/2019 04:11 PM  
 Hematocrit (POC) 32 (L) 09/25/2019 12:10 PM  
 
Lab Results Component Value Date/Time Sodium 141 09/09/2020 10:16 AM  
 Potassium 3.7 09/09/2020 10:16 AM  
 Chloride 108 09/09/2020 10:16 AM  
 CO2 28 09/09/2020 10:16 AM  
 Glucose 80 09/09/2020 10:16 AM  
 BUN 5 (L) 09/09/2020 10:16 AM  
 Creatinine 0.41 (L) 09/09/2020 10:16 AM  
 GFR est AA >60 09/09/2020 10:16 AM  
 GFR est non-AA >60 09/09/2020 10:16 AM  
 Calcium 9.5 09/09/2020 10:16 AM  
 Sodium (POC) 137 09/25/2019 12:10 PM  
 Potassium (POC) 3.7 09/25/2019 12:10 PM  
 Chloride (POC) 105 09/25/2019 12:10 PM  
 Glucose (POC) 85 09/25/2019 12:10 PM  
 BUN (POC) 4 (L) 09/25/2019 12:10 PM  
 Creatinine (POC) 0.5 (L) 09/25/2019 12:10 PM  
 Calcium, ionized (POC) 1.14 09/25/2019 12:10 PM  
 
Lab Results Component Value Date/Time Bilirubin, total 0.4 09/09/2020 10:16 AM  
 ALT (SGPT) 27 09/09/2020 10:16 AM  
 Alk. phosphatase 142 (H) 09/09/2020 10:16 AM  
 Protein, total 7.1 09/09/2020 10:16 AM  
 Albumin 3.4 (L) 09/09/2020 10:16 AM  
 Globulin 3.7 09/09/2020 10:16 AM  
 
CT Results (most recent): 
Results from Hospital Encounter encounter on 05/26/20 CT ABD PELV W CONT Narrative INDICATION:   Appendiceal cancer EXAM:  CT chest, abdomen, and pelvis WITH  CONTRAST 
 
COMPARISON:  CT chest 11/26/2019, CT abdomen and pelvis 12/18/2010 TECHNIQUE:  Thin collimation axial images were obtained through the chest, 
abdomen, and pelvis with IV contrast administration. Coronal and sagittal 
reconstructions were obtained. Oral contrast was not administered. CT dose 
reduction was achieved through use of a standardized protocol tailored for this 
examination and automatic exposure control for dose modulation. FINDINGS: 
 
Chest: 
 
LUNGS: Left lower lobe basilar subsegmental atelectasis. No other significant 
abnormality. Central airways are patent LYMPH NODES: No thoracic lymphadenopathy. PLEURAL FLUID: No pleural effusion. PERICARDIAL FLUID: No pericardial effusion. THYROID: No thyroid nodule. OTHER: Left chest port extends to the SVC Abdomen: LIVER: 2.5 cm left hepatic lobe cyst. No other focal liver abnormality GALLBLADDER: Small calcification along the nondependent wall. The gallbladder is 
otherwise unremarkable SPLEEN: Anterior perisplenic of low-density/fluid and gas appears smaller 
measuring 5.6 x 3.7 cm but is incompletely evaluated on this noncontrast exam. 
No other splenic abnormality PANCREAS: No mass or ductal dilatation. ADRENALS: Unremarkable. KIDNEYS/URETERS: Normal symmetric nephrograms without evidence for  focal mass. No hydronephrosis PERITONEUM: No abdominal lymphadenopathy or ascites. COLON: Nonspecific bowel wall thickening involving the left colon APPENDIX: Not seen SMALL BOWEL: No dilatation or wall thickening. STOMACH: Unremarkable. Pelvis: PELVIS: No pelvic lymphadenopathy or free fluid. BONES: No destructive bone lesion. ADDITIONAL COMMENTS: N/A Impression IMPRESSION: 
 
Decrease in size of anterior perisplenic low-density/fluid and gas which is 
predominantly phlegmon. If there is concern for peritoneal carcinomatosis, 
consider PET/CT. No obvious metastasis. Mild nonspecific colitis involving the left colon Left lower lobe subsegmental atelectasis. 23X Records reviewed and summarized above. Pathology report(s) reviewed above. Radiology report(s) reviewed above. Assessment/PLAN:  
 
1) Stage 4 / Metastatic Appendiceal Cancer  
post EXPLORATORY LAPAROTOMY SIERRA BSO, TUMOR DEBULKING: ILEOCECAL RESECTION; OMENTECTOMY 9/25/19 Surgery done for obstruction. She had left rib pain and SOB and on exam decreased bs on LEFT. Got CXR which showed pleural effusion then ultrasound which showed elevated diaphragm and ? Free air CT which showed fluid collection and patient admitted for this. Had IR drain of splenic fluid collection. Patient went to [facility] and [address] [place] [address],[address] for second opinions. Discussed systemic therapy with FOLFOX/ +/- Avastin chemo or some variation of this regimen. Possible [facility] down the road. Patient choose to have chemo locally and still see [address] [place] [address],[address]. Patient had cycle 1 of FOLFOX chemotherapy on 12/3/19 reduced by 50%. CT A/P 12/12/19 shows worsening fluid collection which was drained via IR. Follow up CT A/P 12/17/19 better overall. CT A/P 1/7/20 stable. She had been tolerating FOLFOX 50% DR well overall. She went to [facility] for follow up on 3/13/20 with CTs there. [address] [place] [address],[address] felt that CTs were stable overall compared to her last scans there in 11/19. [facility] recommended dropping 5FU Bolus/Leucovorin and increasing Oxaliplatin to 60 mg/m2 and 5FU pump tp 1,800 mg/m2 and then increasing Oxaliplatin to 85 mg/m2 if tolerated. Increased Oxaliplatin to 85 mg/m2 with Cycle 10 per [address] [place] [address],[address] recommendation. She had CTs C/A/P 5/26/20 here that were read as stable/no mets. Patient had a VV follow up with [address] [place] [address],[address] on 6/4/20 - okay to continue chemo for 2 more months. Chemo was held on 6/10/20 d/t thrombocytopenia - platelets were 58G. Marie Alvarado was DR 25% on 6/17/20 d/t thrombocytopenia. Marie Police was DR 50% on 7/8/20 d/t thrombocytopenia. Chemo was held on 7/22/20 d/t thrombocytopenia. She went to [facility] on 7/9/20 for laparoscopic surgery - records reviewed. Discussed with Dr. Shiva Nesbitt is not an option - would be too risky. [facility] recommends dropping Oxali and continuing 5FU maintenance (pump only). Can add back in Daphene Police or switch to FOLFIRI if scans show progression. [facility] will repeat scans again in 3 months. She is here today for chemo maintenance 5FU today per [address] [place] [address],[address] recommendation. She is tolerating chemo well overall with diarrhea/ abdominal pain/ gassy. Feels may be having more abdominal pain lately. Taking occ oxycodone which does helps. Refilled today. Back to [address] [place] [address],[address] 10/20. Last CT 7/20 at [address] [facility],[address]. Discussed with pt that doing a CT earlier for pain is fine if needed. Pt will let us know. Patient is clinically stable overall today. Wants to do chemo today. Labs pending today. 2) Left Pleural Effusion post Thoracentesis x 2 No cytology. Stable on CT. 3) Hx of Nausea and Vomiting Continue anti-emetics as needed. Advised her that she can take 10 mg Compazine if needed. Nausea only with pain and 1 episode of vomiting recently d/t pain. 4) Abdominal Pain/Cramping Likely d/t carcinomatosis. She is taking Tylenol/Advil PRN. Has some pain pills at home from [address] [facility],[address]. Will try taking them prn.  ? Oxycodone. 5) Chemo Induced Diarrhea Well controlled with PRN Imodium. Try lomotil. Will continue to monitor. 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 Not interfering with functioning. Drop Oxaliplatin Will continue to monitor. 7) Insomnia Takes Ativan PRN which helps. May try to increase to bid / start during day for pain. 8) Psychosocial 
Mood good, coping well considering difficult disease. She has great family support. SW support as needed. She is here alone today d/t COIVD. Call if questions. Follow up in 2 weeks in [facility]/office. I appreciate the opportunity to participate in Ms. Rena Villatoro macario.  
 
Signed By: Maria Victoria Beasley DO

## 2020-09-23 NOTE — PROGRESS NOTES
[facility] is a 44 y.o. female Chief Complaint Patient presents with  Chemotherapy  
  metastatic appendix cancer 1. Have you been to the ER, urgent care clinic since your last visit? Hospitalized since your last visit? No. 
2. Have you seen or consulted any other health care providers outside of the [address] [place] [address] since your last visit? Include any pap smears or colon screening.  No.

## 2020-09-25 NOTE — PROGRESS NOTES
[facility] Short Note                       Date: 2020    Name: Sanaz Dotson    MRN: [id number]         : 1970      1535 Pt admit to [facility] for pump removal ambulatory in stable condition. Assessment completed. No new concerns voiced. Ms. Matilde Mancia vitals were reviewed prior to and after treatment. Patient Vitals for the past 12 hrs:   Temp Pulse Resp BP   20 1538 97.9 °F (36.6 °C) 70 18 110/76     Medications given:   Medications Administered     heparin (porcine) pf 300-500 Units     Admin Date  2020 Action  Given Dose  500 Units Route  InterCATHeter Administered By  Trip Brian RN          saline peripheral flush soln 10 mL     Admin Date  2020 Action  Given Dose  10 mL Route  InterCATHeter Administered By  Trip Brian RN              Port maintained positive blood return. Port flushed, heparinized and de-accessed per protocol. Ms. Shaina Quevedo tolerated the infusion, had no complaints, and was discharged from [place] [address] in stable condition at 1540.      Future Appointments   Date Time Provider Thomas Espinoza   10/7/2020 10:00 AM A2 [address]   10/7/2020 10:00 AM Jorge Cotton  [address] BS AMB   10/9/2020  2:30 PM H1 DIANNE FASTRACK [facility]   10/21/2020  7:30 AM A2 DIANNE LONG [place] [facility]   10/23/2020  4:00 PM G2 DIANNE 16 Kasey Pierre RN  2020  6:51 PM

## 2020-10-05 NOTE — TELEPHONE ENCOUNTER
Dr. Myrna Cross was calling in regards to PT's CT scans done today at [facility].      [phone]

## 2020-10-06 NOTE — TELEPHONE ENCOUNTER
Returned call to Dr. Lyubov Olivo at #[phone]. Patient was seen at [facility] yesterday. CTs showed slight progression in right pelvis. She is recommending adding Oxaliplatin back in at 68 mg/m2 for 4 cycles. Informed her that patient has an appointment tomorrow and we will make these changes. She was appreciative of call back. [facility] notes are in [place].

## 2020-10-07 NOTE — PROGRESS NOTES
[facility] Progress Note    Date: 2020    Name: Aishwarya Combs    MRN: [id number]         : 1970    Ms. Brian Zapata Arrived ambulatory and in no distress for cycle 20 day 1 of Oxaliplatin/fluorouracil CADD regimen. Assessment was completed, no acute issues at this time, no new complaints voiced. Patient reports she continues to struggle with pain after eating. She states she is taking some medication to help with the pain, but is causes her to be constipated. No other problems reported at this time. Port accessed without difficulty, labs drawn and processed. Port capped and patient left the department for her medical oncology follow up. Chemotherapy Flowsheet 10/7/2020   Cycle C20   Date 10/7/2020   Drug / Regimen oxaliplatin/fluorouracil CADD   Dosage 102mg/2700mg   Time Up 1319   Time Down D/C with CADD   Pre Hydration given   Post Hydration none   Pre Meds given   Notes given         Ms. Lynn's vitals were reviewed. Patient Vitals for the past 12 hrs:   Temp Pulse Resp BP   10/07/20 1525 97.9 °F (36.6 °C) 81 18 106/72   10/07/20 1000 97.2 °F (36.2 °C) 71 18 102/66         Lab results were obtained and reviewed. Recent Results (from the past 12 hour(s))   CBC WITH AUTOMATED DIFF    Collection Time: 10/07/20 10:17 AM   Result Value Ref Range    WBC 4.6 3.6 - 11.0 K/uL    RBC 3.73 (L) 3.80 - 5.20 M/uL    HGB 12.0 11.5 - 16.0 g/dL    HCT 36.3 35.0 - 47.0 %    MCV 97.3 80.0 - 99.0 FL    MCH 32.2 26.0 - 34.0 PG    MCHC 33.1 30.0 - 36.5 g/dL    RDW 13.7 11.5 - 14.5 %    PLATELET 568 794 - 736 K/uL    MPV 10.4 8.9 - 12.9 FL    NRBC 0.0 0  WBC    ABSOLUTE NRBC 0.00 0.00 - 0.01 K/uL    NEUTROPHILS 63 32 - 75 %    LYMPHOCYTES 22 12 - 49 %    MONOCYTES 12 5 - 13 %    EOSINOPHILS 3 0 - 7 %    BASOPHILS 0 0 - 1 %    IMMATURE GRANULOCYTES 0 0.0 - 0.5 %    ABS. NEUTROPHILS 2.9 1.8 - 8.0 K/UL    ABS. LYMPHOCYTES 1.0 0.8 - 3.5 K/UL    ABS. MONOCYTES 0.6 0.0 - 1.0 K/UL    ABS.  EOSINOPHILS 0.1 0.0 - 0.4 K/UL    ABS. BASOPHILS 0.0 0.0 - 0.1 K/UL    ABS. IMM. GRANS. 0.0 0.00 - 0.04 K/UL    DF AUTOMATED     METABOLIC PANEL, COMPREHENSIVE    Collection Time: 10/07/20 10:17 AM   Result Value Ref Range    Sodium 138 136 - 145 mmol/L    Potassium 3.5 3.5 - 5.1 mmol/L    Chloride 106 97 - 108 mmol/L    CO2 27 21 - 32 mmol/L    Anion gap 5 5 - 15 mmol/L    Glucose 136 (H) 65 - 100 mg/dL    BUN 12 6 - 20 MG/DL    Creatinine 0.51 (L) 0.55 - 1.02 MG/DL    BUN/Creatinine ratio 24 (H) 12 - 20      GFR est AA >60 >60 ml/min/1.73m2    GFR est non-AA >60 >60 ml/min/1.73m2    Calcium 9.1 8.5 - 10.1 MG/DL    Bilirubin, total 0.4 0.2 - 1.0 MG/DL    ALT (SGPT) 24 12 - 78 U/L    AST (SGOT) 22 15 - 37 U/L    Alk.  phosphatase 139 (H) 45 - 117 U/L    Protein, total 7.1 6.4 - 8.2 g/dL    Albumin 3.3 (L) 3.5 - 5.0 g/dL    Globulin 3.8 2.0 - 4.0 g/dL    A-G Ratio 0.9 (L) 1.1 - 2.2         Medications Administered     dexamethasone (DECADRON) 12 mg in 0.9% sodium chloride 50 mL, overfill volume 5 mL IVPB     Admin Date  10/07/2020 Action  Given Dose  12 mg Rate  232 mL/hr Route  IntraVENous Administered By  [employer]          dextrose 5% infusion     Admin Date  10/07/2020 Action  New Bag Dose  25 mL/hr Rate  25 mL/hr Route  IntraVENous Administered By  [employer]          fluorouraciL (ADRUCIL) 2,700 mg in 0.9% sodium chloride 100 mL CADD Cassette     Admin Date  10/07/2020 Action  New Bag Dose  2700 mg Rate  2.2 mL/hr Route  IntraVENous Administered By  [employer]          ondansetron [facility]) injection 8 mg     Admin Date  10/07/2020 Action  Given Dose  8 mg Route  IntraVENous Administered By  [employer]          oxaliplatin (ELOXATIN) 102 mg in dextrose 5% 250 mL, overfill volume 25 mL chemo infusion     Admin Date  10/07/2020 Action  New Bag Dose  102 mg Rate  147.7 mL/hr Route  IntraVENous Administered By  Alex Mendoza          saline peripheral flush soln 10 mL     Admin Date  10/07/2020 Action  Given Dose  10 mL Route  InterCATHeter Administered By  Jens Buerger          sodium chloride 0.9 % bolus infusion 1,000 mL     Admin Date  10/07/2020 Action  New Bag Dose  1000 mL Rate  1,000 mL/hr Route  IntraVENous Administered By  Jens Buerger                    Ms. Cris Velazquez tolerated treatment well. Port maintained positive blood return throughout the course of treatment. CADD pump checked and connected to patient. Pump is in full working order prior to discharge. Patient is aware of her next appointment.     Future Appointments   Date Time Provider Thomas Espinoza   10/9/2020  1:30 PM H1 DIANNE FASTRACK [facility]   10/21/2020 10:00 AM D1 DIANNE LONG [address]   10/21/2020 10:45 AM Blu Yadav  [address] BS AMB   10/23/2020  4:00 PM G2 DIANNE FASTRACK [facility]   11/4/2020  9:00 AM E1 DIANNE LONG [address] H   11/18/2020 10:00 AM A2 DIANNE LONG [place] [facility]   12/2/2020 10:00 AM A2 DIANNE LONG [address] H         Kateryna Olivo  October 7, 2020

## 2020-10-07 NOTE — PROGRESS NOTES
Fran Marcum is a 44 y.o. female Chief Complaint Patient presents with  Chemotherapy  
  metastatic appendix cancer Radha Julien 1. Have you been to the ER, urgent care clinic since your last visit? Hospitalized since your last visit? No. 
2. Have you seen or consulted any other health care providers outside of the [address] [place] [address] since your last visit? Include any pap smears or colon screening. Yes, patient has gone to see [place].

## 2020-10-07 NOTE — PROGRESS NOTES
[facility] [place] [facility] 
[address], [address] W: [phone]  F: [phone] HEME/ONC FOLLOW UP 
 
Reason for Visit:  
iSxto Bell is a 44 y.o. 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 Treatment History:  
· EXPLORATORY LAPAROTOMY SIERRA BSO, TUMOR DEBULKING: ILEOCECAL RESECTION; OMENTECTOMY · FOLFOX 50% DR 12/3/19 - 3/4/20 · Per [facility] recommendation, dropped 5FU Bolus/Leucovorin and increased Oxaliplatin to 60 mg/m2 and 5FU pump tp 1,800 mg/m2 with Cycle 8 on 3/18/20 · Oxaliplatin increased to 85 mg/m2 with Cycle 10 on 4/15/20 - 5/27/20 
· CTs C/A/P 3/20 stable · CTs C/A/P 5/26/20 stable · Chemo held on 6/20/20 d/t thrombocytopenia · Oxaliplatin DR 25% with Cycle 14 on 6/17/20 d/t thrombocytopenia · Oxaliplatin DR 25% more (50% total) with Cycle 15 on 7/8/20 · Chemo held on 7/22/20 d/t thrombocytopenia · CT C/A/P 7/29/20 at [facility] with re demonstrated infiltrative tissue in the pelvis, favored to represented peritoneal carcinomatosis; soft tissue tethering to the colon, multiple loops of small bowel, and the urinary bladder; redemonstrated ill-defined complex fluid in the left upper quadrant - not significantly changed relative to most recent prior study; few clustered small pulmonary nodules are identified in the right lower lobe · Exploratory lap at [facility] 8/20. · Maintenance 5FU (pump only) 8/26/20 STAGE: 4 with carcinomatosis History of Present Illness:  
Sixto Bell is a 44 y.o. female seen today in office for follow up of metastatic appendix cancer on 5FU only per [facility]. Went to [facility] and scans slightly worse. Pt is to go back on oxaliplatin today. Feeling ok but still having some abdominal pain. Labs  Pending. Concerned about weight loss and poor appetite. Feeling more abdominal pain lately. Thought would be feeling better on less chemo. Due to go back to [facility] 10/20. Last CT was at [facility] 7/20. Taking occ oxycodone. Needs a refill. Feels ok for chemo today. Past Medical History:  
Diagnosis Date  Cancer ([facility] APPENDIX CANCER   
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  
Procedure Laterality Date [address] UNLISTED  HX APPENDECTOMY  09/2019  HX GI  09/2019 ILEOCECECTOMY  HX GYN  2003 CYST ON OVARY  HX HYSTERECTOMY  2019  IR THORACENTESIS CATH W IMAGE  11/6/2019 Social History Tobacco Use  Smoking status: Never Smoker  Smokeless tobacco: Never Used Substance Use Topics  Alcohol use: Not Currently Family History Problem Relation Age of Onset  Breast Cancer Paternal Grandmother 56  Crohn's Disease Mother  Heart Disease Mother  Cancer Father BLADDER  
 Diabetes Father  No Known Problems Son  No Known Problems Daughter  Anesth Problems Neg Hx Current Outpatient Medications Medication Sig  
 megestroL (MEGACE) 400 mg/10 mL (10 mL) suspension Take 5 mL by mouth two (2) times a day.  LORazepam (ATIVAN) 0.5 mg tablet Take 1 Tab by mouth two (2) times daily as needed for Anxiety. Max Daily Amount: 1 mg. Indications: anxious, nausea and vomiting caused by cancer drugs, difficulty sleeping  oxyCODONE IR (ROXICODONE) 5 mg immediate release tablet Take 1 Tab by mouth every four (4) hours as needed for Pain for up to 30 days. Max Daily Amount: 30 mg.  
 diphenoxylate-atropine (LomotiL) 2.5-0.025 mg per tablet Take 2 Tabs by mouth four (4) times daily as needed for Diarrhea. Max Daily Amount: 8 Tabs. Indications: diarrhea caused by chemotherapy  simethicone (Gas-X) 125 mg capsule Take 125 mg by mouth four (4) times daily as needed for Flatulence.  hyoscyamine SL (LEVSIN/SL) 0.125 mg SL tablet 0.125 mg by SubLINGual route every four (4) hours as needed for Cramping.  prochlorperazine (Compazine) 5 mg tablet Take 1 tab by mouth every 6 hours as needed for nausea or vomiting  potassium chloride (KLOR-CON) 10 mEq tablet Take 1 Tab by mouth two (2) times a day.  lidocaine-prilocaine (EMLA) topical cream Apply  to affected area as needed for Pain.  ondansetron (ZOFRAN ODT) 4 mg disintegrating tablet Take 1 Tab by mouth every eight (8) hours as needed for Nausea. (Patient taking differently: Take 8 mg by mouth every eight (8) hours as needed for Nausea. Indications: prevent nausea and vomiting from cancer chemotherapy)  dexAMETHasone (DECADRON) 4 mg tablet Take 2 tabs (8mg) by mouth daily on days 2 and 3 after chemotherapy  acetaminophen (TYLENOL) 325 mg tablet Take 650 mg by mouth every four (4) hours as needed for Pain. Indications: pain  ibuprofen (MOTRIN) 200 mg tablet Take 3 Tabs by mouth every eight (8) hours as needed for Pain. No current facility-administered medications for this visit. No Known Allergies Review of Systems: A complete review of systems was obtained, negative except as described above. Physical Exam:  
 
Visit Vitals /79 (BP 1 Location: Left arm, BP Patient Position: Sitting) Pulse 84 Temp 97.7 °F (36.5 °C) (Temporal) Ht 5' 3\" (1.6 m) Wt 105 lb 8 oz (47.9 kg) LMP 09/15/2019 SpO2 97% BMI 18.69 kg/m² ECOG PS: 1-2 General: No distress, wearing a mask Eyes: Anicteric sclerae HENT: Atraumatic Neck: Supple Resp: Normal respiratory effort, CTAB 
CV: Regular GI: Soft, less distended MS: Ambulatory Skin: Warm dry. Port site without redness/swelling Psych: Alert, oriented, appropriate affect, normal judgment/insight Results:  
 
Lab Results Component Value Date/Time WBC 4.6 10/07/2020 10:17 AM  
 HGB 12.0 10/07/2020 10:17 AM  
 HCT 36.3 10/07/2020 10:17 AM  
 PLATELET 194 12/95/8821 10:17 AM  
 MCV 97.3 10/07/2020 10:17 AM  
 ABS. NEUTROPHILS 2.9 10/07/2020 10:17 AM  
 Hemoglobin (POC) 10.8 (L) 09/25/2019 04:11 PM  
 Hematocrit (POC) 32 (L) 09/25/2019 12:10 PM  
 
Lab Results Component Value Date/Time Sodium 141 09/23/2020 10:19 AM  
 Potassium 3.8 09/23/2020 10:19 AM  
 Chloride 107 09/23/2020 10:19 AM  
 CO2 28 09/23/2020 10:19 AM  
 Glucose 79 09/23/2020 10:19 AM  
 BUN 7 09/23/2020 10:19 AM  
 Creatinine 0.42 (L) 09/23/2020 10:19 AM  
 GFR est AA >60 09/23/2020 10:19 AM  
 GFR est non-AA >60 09/23/2020 10:19 AM  
 Calcium 9.4 09/23/2020 10:19 AM  
 Sodium (POC) 137 09/25/2019 12:10 PM  
 Potassium (POC) 3.7 09/25/2019 12:10 PM  
 Chloride (POC) 105 09/25/2019 12:10 PM  
 Glucose (POC) 85 09/25/2019 12:10 PM  
 BUN (POC) 4 (L) 09/25/2019 12:10 PM  
 Creatinine (POC) 0.5 (L) 09/25/2019 12:10 PM  
 Calcium, ionized (POC) 1.14 09/25/2019 12:10 PM  
 
Lab Results Component Value Date/Time Bilirubin, total 0.5 09/23/2020 10:19 AM  
 ALT (SGPT) 24 09/23/2020 10:19 AM  
 Alk. phosphatase 139 (H) 09/23/2020 10:19 AM  
 Protein, total 7.1 09/23/2020 10:19 AM  
 Albumin 3.3 (L) 09/23/2020 10:19 AM  
 Globulin 3.8 09/23/2020 10:19 AM  
 
CT Results (most recent): 
Results from Hospital Encounter encounter on 05/26/20 CT ABD PELV W CONT Narrative INDICATION:   Appendiceal cancer EXAM:  CT chest, abdomen, and pelvis WITH  CONTRAST 
 
COMPARISON:  CT chest 11/26/2019, CT abdomen and pelvis 12/18/2010 TECHNIQUE:  Thin collimation axial images were obtained through the chest, 
abdomen, and pelvis with IV contrast administration. Coronal and sagittal 
reconstructions were obtained. Oral contrast was not administered. CT dose 
reduction was achieved through use of a standardized protocol tailored for this examination and automatic exposure control for dose modulation. FINDINGS: 
 
Chest: 
 
LUNGS: Left lower lobe basilar subsegmental atelectasis. No other significant 
abnormality. Central airways are patent LYMPH NODES: No thoracic lymphadenopathy. PLEURAL FLUID: No pleural effusion. PERICARDIAL FLUID: No pericardial effusion. THYROID: No thyroid nodule. OTHER: Left chest port extends to the SVC Abdomen: LIVER: 2.5 cm left hepatic lobe cyst. No other focal liver abnormality GALLBLADDER: Small calcification along the nondependent wall. The gallbladder is 
otherwise unremarkable SPLEEN: Anterior perisplenic of low-density/fluid and gas appears smaller 
measuring 5.6 x 3.7 cm but is incompletely evaluated on this noncontrast exam. 
No other splenic abnormality PANCREAS: No mass or ductal dilatation. ADRENALS: Unremarkable. KIDNEYS/URETERS: Normal symmetric nephrograms without evidence for  focal mass. No hydronephrosis PERITONEUM: No abdominal lymphadenopathy or ascites. COLON: Nonspecific bowel wall thickening involving the left colon APPENDIX: Not seen SMALL BOWEL: No dilatation or wall thickening. STOMACH: Unremarkable. Pelvis: PELVIS: No pelvic lymphadenopathy or free fluid. BONES: No destructive bone lesion. ADDITIONAL COMMENTS: N/A Impression IMPRESSION: 
 
Decrease in size of anterior perisplenic low-density/fluid and gas which is 
predominantly phlegmon. If there is concern for peritoneal carcinomatosis, 
consider PET/CT. No obvious metastasis. Mild nonspecific colitis involving the left colon Left lower lobe subsegmental atelectasis. 23X Records reviewed and summarized above. Pathology report(s) reviewed above. Radiology report(s) reviewed above. Assessment/PLAN:  
 
1) Stage 4 / Metastatic Appendiceal Cancer  
post EXPLORATORY LAPAROTOMY SIERRA BSO, TUMOR DEBULKING: ILEOCECAL RESECTION; OMENTECTOMY 9/25/19 Surgery done for obstruction. She had left rib pain and SOB and on exam decreased bs on LEFT. Got CXR which showed pleural effusion then ultrasound which showed elevated diaphragm and ? Free air CT which showed fluid collection and patient admitted for this. Had IR drain of splenic fluid collection. Patient went to [facility] and [address] [place] [address],[address] for second opinions. Discussed systemic therapy with FOLFOX/ +/- Avastin chemo or some variation of this regimen. Possible [facility] down the road. Patient choose to have chemo locally and still see [address] [place] [address],[facility]. Patient had cycle 1 of FOLFOX chemotherapy on 12/3/19 reduced by 50%. CT A/P 12/12/19 shows worsening fluid collection which was drained via IR. Follow up CT A/P 12/17/19 better overall. CT A/P 1/7/20 stable. She had been tolerating FOLFOX 50% DR well overall. She went to [facility] for follow up on 3/13/20 with CTs there. [address] [place] [address],[facility] [address] felt that CTs were stable overall compared to her last scans there in 11/19. [facility] recommended dropping 5FU Bolus/Leucovorin and increasing Oxaliplatin to 60 mg/m2 and 5FU pump tp 1,800 mg/m2 and then increasing Oxaliplatin to 85 mg/m2 if tolerated. Increased Oxaliplatin to 85 mg/m2 with Cycle 10 per [address] [place] [address],[facility] [address] recommendation. She had CTs C/A/P 5/26/20 here that were read as stable/no mets. Patient had a VV follow up with [address] [place] [address],[facility] [address] on 6/4/20 - okay to continue chemo for 2 more months. Chemo was held on 6/10/20 d/t thrombocytopenia - platelets were 08R. Faustine Buffy was DR 25% on 6/17/20 d/t thrombocytopenia. Faustine Buffy was DR 50% on 7/8/20 d/t thrombocytopenia. Chemo was held on 7/22/20 d/t thrombocytopenia. She went to [facility] on 7/9/20 for laparoscopic surgery - records reviewed. Discussed with Dr. Bell Null is not an option - would be too risky. [facility] recommends dropping Oxali and continuing 5FU maintenance (pump only). Can add back in Faustine Buffy or switch to FOLFIRI if scans show progression. Hooks will repeat scans again in 3 months. Was on chemo maintenance 5FU today per [facility] recommendation. Was back to [facility] 10/20 and CTs there with slight progression. Plan is to add oxaliplatin back in today since pt has not failed this regimen. Reviewed scan report and plan with pt today. Pt is tired overall. Concerned about weight loss and poor appetite. Will try megace rx. Continues to use occ oxycodone prn and lomotil and ativan. Refill on ativan prn today also. Patient is clinically stable overall today. Wants to do chemo today. Plan is for FOLFOX modified x 4 then re eval.   
Labs pending today. 2) Left Pleural Effusion post Thoracentesis x 2 No cytology. Stable on CT. 3) Hx of Nausea and Vomiting Continue anti-emetics as needed. Advised her that she can take 10 mg Compazine if needed. Nausea only with pain and 1 episode of vomiting recently d/t pain. 4) Abdominal Pain/Cramping Likely d/t carcinomatosis. She is taking Tylenol/Advil PRN. Has some pain pills at home from [facility]. Will try taking them prn.  ? Oxycodone. 5) Chemo Induced Diarrhea Well controlled with PRN Imodium and lomotil. Will continue to monitor. 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 Not interfering with functioning. Monitor on Oxaliplatin Will continue to monitor. 7) Insomnia/ anxiety about health. Takes Ativan PRN which helps. May try to increase to bid / start during day for pain. 8) Psychosocial 
Mood good, coping well considering difficult disease. She has great family support. SW support as needed. She is here alone today d/t COIVD. Call if questions. Follow up in 2 weeks in [facility]/office. I appreciate the opportunity to participate in Ms. Nadeem Hicks care.  
 
Signed By: Low Ames,

## 2020-10-08 NOTE — TELEPHONE ENCOUNTER
Incoming call from patient, HIPAA verified. States had Oxaliplatin added back into regimen yesterday and was feeling well until about 1400 today. She urinated, had some post urine pressure and noted that urine was mauve in appearance. States that next void had same post void discomfort/pressure and noticed \"clumpy, clot looking blood in toilet and on paper. \"  States PO fluid is slightly decreased d/t cold sensitivity post Oxaliplatin. Agrees to go to [facility] for urine. /NP/[facility] charge nurse notified.

## 2020-10-09 NOTE — ED TRIAGE NOTES
Triage:  Pt arrives from home with CC of hematuria x2 days. Had a UA yesterday at the [facility] which was reportedly negative for UTI. Reports some pelvic pain. Denies flank pain. Is getting active chemo treatments.

## 2020-10-09 NOTE — PROGRESS NOTES
Call to patient, HIPAA verified. Assessed patient over the phone regarding blood in urine. States continues, that \"it may be a little less today but not sure. \"  States has not had a good morning. States she is having some nausea and constipation, as well, but has medication for management of those. Is scheduled to have 5FU pump take down at [facility] today at 1330 and plans to visit ER after this appt. Reviewed with patient that kisha hematuria is never a normal finding d/t size of blood cells, but that test would not have shown if bleeding was from kidney or from bladder. That ER visit would possibly involve Uro w/u including imaging. States she will contact office with update. Thanked RN for call.

## 2020-10-09 NOTE — ED PROVIDER NOTES
Ania Dooley is a 44 y.o. female with PMH of appendiceal cancer currently undergoing chemotherapy, restarted on 10/07, also followed at [facility] presents to emergency room ambulatory with  for evaluation of kisha hematuria x 3 days and 4 weeks of post-void pressure. She had a routine f/u CT scan at [facility] on Monday 10/5 which chart review reveals: \"Lymph nodes and peritoneum: Increasing ill-defined, spiculated soft tissue density centered within the pelvis is increased from prior exam and measures 3.6 x 2.4 cm (series 3, image 176), previously 2.8 x 1.9 cm and results in tethering of the adjacent bowel. Similar soft tissue density and trace surrounding fluid also concerning for carcinomatosis within the left subphrenic space. PELVIS: Genitourinary system: Increased soft tissue thickening and enhancement involving the dome of the bladder (series 7, image 71). The patient is reported as status post hysterectomy. \" She does not take a blood thinner, has never seen a urologist. Had UA at [facility] yesterday which showed kisha hematuria but no bacteria or signs of infection. No hx of hematuria, denies blood in stool, fever, chills, vomiting, flank pain. PCP: Martha York MD 
 
Surgical hx- see chart Social hx- lives with  The patient has no other complaints at this time. Past Medical History:  
Diagnosis Date  Cancer ([address] APPENDIX CANCER   
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  
Procedure Laterality Date [address] UNLISTED  HX APPENDECTOMY  09/2019  HX GI  09/2019 ILEOCECECTOMY  HX GYN  2003 CYST ON OVARY  HX HYSTERECTOMY  2019  IR THORACENTESIS CATH W IMAGE  11/6/2019 Family History: Problem Relation Age of Onset  Breast Cancer Paternal Grandmother 56  Crohn's Disease Mother  Heart Disease Mother  Cancer Father BLADDER  
 Diabetes Father  No Known Problems Son  No Known Problems Daughter  Anesth Problems Neg Hx Social History Socioeconomic History  Marital status:  Spouse name: Not on file  Number of children: Not on file  Years of education: Not on file  Highest education level: Not on file Occupational History  Not on file Social Needs  Financial resource strain: Not on file  Food insecurity Worry: Not on file Inability: Not on file  Transportation needs Medical: Not on file Non-medical: Not on file Tobacco Use  Smoking status: Never Smoker  Smokeless tobacco: Never Used Substance and Sexual Activity  Alcohol use: Not Currently  Drug use: Not Currently  Sexual activity: Not on file Lifestyle  Physical activity Days per week: Not on file Minutes per session: Not on file  Stress: Not on file Relationships  Social connections Talks on phone: Not on file Gets together: Not on file Attends Taoism service: Not on file Active member of club or organization: Not on file Attends meetings of clubs or organizations: Not on file Relationship status: Not on file  Intimate partner violence Fear of current or ex partner: Not on file Emotionally abused: Not on file Physically abused: Not on file Forced sexual activity: Not on file Other Topics Concern  Not on file Social History Narrative  Not on file ALLERGIES: Patient has no known allergies. Review of Systems Constitutional: Negative. Negative for activity change, chills, fatigue and unexpected weight change. HENT: Negative for trouble swallowing. Respiratory: Negative for cough, chest tightness, shortness of breath and wheezing. Cardiovascular: Negative. Negative for chest pain and palpitations. Gastrointestinal: Negative. Negative for abdominal pain, diarrhea, nausea and vomiting. Genitourinary: Positive for hematuria and pelvic pain (pressure). Negative for dysuria, flank pain and frequency. Musculoskeletal: Negative. Negative for arthralgias, back pain, neck pain and neck stiffness. Skin: Negative. Negative for color change and rash. Neurological: Negative. Negative for dizziness, numbness and headaches. All other systems reviewed and are negative. There were no vitals filed for this visit. Physical Exam 
Vitals signs and nursing note reviewed. Constitutional:   
   General: She is not in acute distress. Appearance: She is well-developed. She is not toxic-appearing or diaphoretic. HENT:  
   Head: Normocephalic and atraumatic. Eyes:  
   General:     
   Right eye: No discharge. Left eye: No discharge. Conjunctiva/sclera: Conjunctivae normal.  
   Pupils: Pupils are equal, round, and reactive to light. Neck: Musculoskeletal: Full passive range of motion without pain and normal range of motion. Trachea: No tracheal tenderness. Cardiovascular:  
   Rate and Rhythm: Normal rate. Pulses: Normal pulses. Comments: Port to left upper chest wall Pulmonary:  
   Effort: Pulmonary effort is normal. No respiratory distress. Abdominal:  
   General: Bowel sounds are normal. There is no distension. Musculoskeletal: Normal range of motion. General: No tenderness. Skin: 
   General: Skin is warm and dry. Capillary Refill: Capillary refill takes less than 2 seconds. Findings: No abrasion, erythema or rash. Neurological:  
   General: No focal deficit present. Mental Status: She is alert and oriented to person, place, and time. Cranial Nerves: No cranial nerve deficit. Sensory: No sensory deficit. Coordination: Coordination normal.  
Psychiatric:     
   Speech: Speech normal.     
   Behavior: Behavior normal.  
 
  
 
MDM Number of Diagnoses or Management Options Acute cystitis with hematuria:  
Bladder mass:  
Gross hematuria:  
Diagnosis management comments:  
Ddx: UTI, bladder mass, anemia, KAIDEN Amount and/or Complexity of Data Reviewed Clinical lab tests: reviewed Tests in the radiology section of CPT®: ordered and reviewed Review and summarize past medical records: yes Discuss the patient with other providers: yes (ED attending, urology, oncology) Patient Progress Patient progress: stable Procedures I discussed patient's PMH, exam findings as well as careplan with the ER attending who agrees with care plan. Neil Law PA-C 
 
 
2:45pm- paged Dr. Samaria Arteaga. 4pm- repaged Dr. Samaria Arteaga CONSULT NOTE:  
4:30 PM 
Neil Law PA-C spoke with Dr. Samaria Arteaga, Specialty: oncologist 
Discussed pt's hx, disposition, and available diagnostic and imaging results. Reviewed care plans. Consultant agrees with plans as outlined. She states to speak with urology for further management with the recent CT finding with the bladder and now with gross hematuria. Written by Neil Law PA-C. 
 
 
CONSULT NOTE:  
5:55 PM 
Neil Law PA-C spoke with Dr. Jean-Paul Huerta, Specialty: Urology Discussed pt's hx, disposition, and available diagnostic and imaging results. Reviewed care plans. Consultant agrees with plans as outlined. He states if patient is having minimal pain, no urinary retention, normal creat and otherwise normal labs and stable vitals, and if the urine is not grossly concentrated, she can be discharged with outpatient f/u with her oncology surgeon for further management as this will likely require surgical management. He states if Dr. Samaria Arteaga would like to talk to him to call his cell and gave me the number. Written by Neil Law PA-C. I gave his contact information to Dr. Teja Barnard and gave urology's recommendation. I updated patient who states she would like to go home, she states she can still see through the urine, it's KoolAid consistent but not thick or concentrated. Her post-void bladder scan showed: 73mL. I will treat her for possible UTI as she now has 10-20 wbc and moderate leukocytes and being on chemo more at risk for infection and complications. Return precautions were given to  and patient and they would rather f/u on Monday, they agree to call her surgeons Dr. Elena Watkins and at [facility] to discuss further management. Yamilka Handy PA-C 
 
LABORATORY TESTS: 
Recent Results (from the past 12 hour(s)) URINALYSIS W/MICROSCOPIC Collection Time: 10/09/20  2:06 PM  
Result Value Ref Range Color RED Appearance CLEAR CLEAR Specific gravity 1.006 1.003 - 1.030    
 pH (UA) 6.5 5.0 - 8.0 Protein 100 (A) NEG mg/dL Glucose Negative NEG mg/dL Ketone Negative NEG mg/dL Blood LARGE (A) NEG Urobilinogen 0.2 0.2 - 1.0 EU/dL Nitrites Negative NEG Leukocyte Esterase MODERATE (A) NEG Bilirubin UA, confirm Negative NEG    
 WBC 10-20 0 - 4 /hpf  
 RBC >100 (H) 0 - 5 /hpf Epithelial cells FEW FEW /lpf Bacteria Negative NEG /hpf URINE CULTURE HOLD SAMPLE Collection Time: 10/09/20  2:06 PM  
 Specimen: Serum; Urine Result Value Ref Range Urine culture hold Urine on hold in Microbiology dept for 2 days. If unpreserved urine is submitted, it cannot be used for addtional testing after 24 hours, recollection will be required. CBC WITH AUTOMATED DIFF Collection Time: 10/09/20  2:32 PM  
Result Value Ref Range WBC 8.1 3.6 - 11.0 K/uL  
 RBC 3.84 3.80 - 5.20 M/uL  
 HGB 12.3 11.5 - 16.0 g/dL HCT 37.5 35.0 - 47.0 % MCV 97.7 80.0 - 99.0 FL  
 MCH 32.0 26.0 - 34.0 PG  
 MCHC 32.8 30.0 - 36.5 g/dL  
 RDW 14.0 11.5 - 14.5 % PLATELET 538 831 - 818 K/uL  MPV 10.2 8.9 - 12.9 FL  
 NRBC 0.0 0  WBC ABSOLUTE NRBC 0.00 0.00 - 0.01 K/uL NEUTROPHILS 87 (H) 32 - 75 % LYMPHOCYTES 9 (L) 12 - 49 % MONOCYTES 4 (L) 5 - 13 % EOSINOPHILS 0 0 - 7 % BASOPHILS 0 0 - 1 % IMMATURE GRANULOCYTES 0 0.0 - 0.5 % ABS. NEUTROPHILS 7.1 1.8 - 8.0 K/UL  
 ABS. LYMPHOCYTES 0.7 (L) 0.8 - 3.5 K/UL  
 ABS. MONOCYTES 0.3 0.0 - 1.0 K/UL  
 ABS. EOSINOPHILS 0.0 0.0 - 0.4 K/UL  
 ABS. BASOPHILS 0.0 0.0 - 0.1 K/UL  
 ABS. IMM. GRANS. 0.0 0.00 - 0.04 K/UL  
 DF SMEAR SCANNED    
 RBC COMMENTS NORMOCYTIC, NORMOCHROMIC METABOLIC PANEL, COMPREHENSIVE Collection Time: 10/09/20  2:32 PM  
Result Value Ref Range Sodium 139 136 - 145 mmol/L Potassium 3.4 (L) 3.5 - 5.1 mmol/L Chloride 107 97 - 108 mmol/L  
 CO2 26 21 - 32 mmol/L Anion gap 6 5 - 15 mmol/L Glucose 108 (H) 65 - 100 mg/dL BUN 8 6 - 20 MG/DL Creatinine 0.46 (L) 0.55 - 1.02 MG/DL  
 BUN/Creatinine ratio 17 12 - 20 GFR est AA >60 >60 ml/min/1.73m2 GFR est non-AA >60 >60 ml/min/1.73m2 Calcium 9.6 8.5 - 10.1 MG/DL Bilirubin, total 0.4 0.2 - 1.0 MG/DL  
 ALT (SGPT) 45 12 - 78 U/L  
 AST (SGOT) 47 (H) 15 - 37 U/L Alk. phosphatase 122 (H) 45 - 117 U/L Protein, total 7.7 6.4 - 8.2 g/dL Albumin 3.5 3.5 - 5.0 g/dL Globulin 4.2 (H) 2.0 - 4.0 g/dL A-G Ratio 0.8 (L) 1.1 - 2.2 PROTHROMBIN TIME + INR Collection Time: 10/09/20  2:32 PM  
Result Value Ref Range INR 1.0 0.9 - 1.1 Prothrombin time 10.7 9.0 - 11.1 sec MAGNESIUM Collection Time: 10/09/20  2:32 PM  
Result Value Ref Range Magnesium 2.0 1.6 - 2.4 mg/dL LIPASE Collection Time: 10/09/20  2:32 PM  
Result Value Ref Range Lipase 176 73 - 393 U/L  
SAMPLES BEING HELD Collection Time: 10/09/20  2:32 PM  
Result Value Ref Range SAMPLES BEING HELD 1RED COMMENT Add-on orders for these samples will be processed based on acceptable specimen integrity and analyte stability, which may vary by analyte. MEDICATIONS GIVEN: 
Medications  
cephALEXin (KEFLEX) capsule 500 mg (has no administration in time range)  
heparin (porcine) pf 300 Units (has no administration in time range) DISCHARGE NOTE: 
6:11 PM 
The patient's results have been reviewed with them and/or available family. Patient and/or family verbally conveyed their understanding and agreement of the patient's signs, symptoms, diagnosis, treatment and prognosis and additionally agree to follow up as recommended in the discharge instructions or to return to the Emergency Room should their condition change prior to their follow-up appointment. The patient/family verbally agrees with the care-plan and verbally conveys that all of their questions have been answered. The discharge instructions have also been provided to the patient and/or family with some educational information regarding the patient's diagnosis as well a list of reasons why the patient would want to return to the ER prior to their follow-up appointment, should their condition change. Plan: 
1. F/U with oncoloy surgeon on Monday 2. Rx Keflex 3. Return precautions discussed and advised to return to ER if worse

## 2020-10-09 NOTE — DISCHARGE INSTRUCTIONS
SUBJECTIVE:                                                    Carissa Spears is a 46 year old female who presents to clinic today for the following health issues:      Headaches      Duration: 1 day    Description  Location: bilateral in the temporal area, bilateral in the occipital area   Character: sharp pain, squeezing pain  Duration:  1 day    Intensity:  severe    Accompanying signs and symptoms:    Precipitating or Alleviating factors:  Nausea/vomiting: yes, nausea no vomiting  Dizziness: no  Weakness or numbness: no  Visual changes: Tunnel  Fever: no   Sinus or URI symptoms no     History  Head trauma: no   Family history of migraines: YES  Previous tests for headaches: YES  Neurologist evaluations: YES  Able to do daily activities when headache present: no   Wake with headaches: no   Daily pain medication use: no    Precipitating or Alleviating factors (light/sound/sleep/caffeine): light sound    Therapies tried and outcome: Tylenol and advi;   Outcome - not effective    Carissa had right knee replacement surgery 8 weeks ago and was taking oxycodone for pain management, weaned down to 4, 5mg tablets a day. Stopped taking pain medication last Tuesday. Was seen by ortho on Thursday for right knee pain and given cortisone injection and percocet for pain management. Last one was yesterday. Today at 5:30 am she woke up with what felt like her typical migraine. Tylenol and ibuprofen aren't helping. Denies neurologic deficits, vomiting, drowsiness. Cannot taken regular migraine meds, takes norco for migraine typically.          Problem list and histories reviewed & adjusted, as indicated.  Additional history: as documented    Patient Active Problem List   Diagnosis     HYPERLIPIDEMIA LDL GOAL <100     Hypertension goal BP (blood pressure) < 140/90     Bipolar disorder (H)     Polycystic ovarian syndrome     BMI 45.0-49.9, adult (H)     Tobacco abuse     Mild major depression (H)     Insomnia     Chronic abdominal  Patient Education        Blood in the Urine: Care Instructions  Your Care Instructions     Blood in the urine, or hematuria, may make the urine look red, brown, or pink. There may be blood every time you urinate or just from time to time. You cannot always see blood in the urine, but it will show up in a urine test.  Blood in the urine may be serious. It should always be checked by a doctor. Your doctor may recommend more tests, including an X-ray, a CT scan, or a cystoscopy (which lets a doctor look inside the urethra and bladder). Blood in the urine can be a sign of another problem. Common causes are bladder infections and kidney stones. An injury to your groin or your genital area can also cause bleeding in the urinary tract. Very hard exercise--such as running a marathon--can cause blood in the urine. Blood in the urine can also be a sign of kidney disease or cancer in the bladder or kidney. Many cases of blood in the urine are caused by a harmless condition that runs in families. This is called benign familial hematuria. It does not need any treatment. Sometimes your urine may look red or brown even though it does not contain blood. For example, not getting enough fluids (dehydration), taking certain medicines, or having a liver problem can change the color of your urine. Eating foods such as beets, rhubarb, or blackberries or foods with red food coloring can make your urine look red or pink. Follow-up care is a key part of your treatment and safety. Be sure to make and go to all appointments, and call your doctor if you are having problems. It's also a good idea to know your test results and keep a list of the medicines you take. When should you call for help? Call your doctor now or seek immediate medical care if:    · You have symptoms of a urinary infection. For example:  ? You have pus in your urine. ? You have pain in your back just below your rib cage. This is called flank pain. ?  You have a fever, chills, or body aches. ? It hurts to urinate. ? You have groin or belly pain.     · You have more blood in your urine. Watch closely for changes in your health, and be sure to contact your doctor if:    · You have new urination problems.     · You do not get better as expected. Where can you learn more? Go to http://www.gray.com/  Enter G275 in the search box to learn more about \"Blood in the Urine: Care Instructions. \"  Current as of: June 29, 2020               Content Version: 12.6  © 1521-6850 [employer], [employer] Care instructions adapted under license by [employer] (which disclaims liability or warranty for this information). If you have questions about a medical condition or this instruction, always ask your healthcare professional. [address] any warranty or liability for your use of this information. pain     Hyperglycemia     Myofascial pain     Migraine with aura and without status migrainosus, not intractable     Past Surgical History:   Procedure Laterality Date     C/SECTION, CLASSICAL      X2     HYSTERECTOMY, PAP NO LONGER INDICATED       HYSTERECTOMY, KAMILLA  4/8/08     LAPAROSCOPIC CHOLECYSTECTOMY  7/18/2012    Procedure: LAPAROSCOPIC CHOLECYSTECTOMY;  Laparoscopic cholecystectomy;  Surgeon: Miguel Fuentes MD;  Location: [facility] OR     SALPINGO OOPHORECTOMY,R/L/YANELIS  4/8/08    L     TONSILLECTOMY  1983     TUBAL LIGATION         Social History   Substance Use Topics     Smoking status: Former Smoker     Packs/day: 0.25     Years: 20.00     Quit date: 7/18/2012     Smokeless tobacco: Never Used     Alcohol use No     Family History   Problem Relation Age of Onset     CANCER Mother      skin cancer - non-melanoma     Hypertension Mother      Lipids Father            Reviewed and updated as needed this visit by clinical staff  Tobacco  Allergies  Meds  Med Hx  Surg Hx  Fam Hx  Soc Hx      Reviewed and updated as needed this visit by Provider         ROS:  Constitutional, HEENT, cardiovascular, pulmonary, GI, , musculoskeletal, neuro, skin, endocrine and psych systems are negative, except as otherwise noted.    OBJECTIVE:                                                    BP (!) 182/120  Pulse 99  Temp 98  F (36.7  C) (Tympanic)  Resp 16  SpO2 97%  Breastfeeding? No  There is no height or weight on file to calculate BMI.   Rechecked bp 160/98  GENERAL:  Alert, appears uncomfortable but no acute distress  EYES: Eyes grossly normal to inspection, PERRL and conjunctivae and sclerae normal  HENT: ear canals and TM's normal, nose and mouth without ulcers or lesions  NECK: no adenopathy, no asymmetry, masses, or scars and thyroid normal to palpation  RESP: lungs clear to auscultation - no rales, rhonchi or wheezes  CV: regular rate and rhythm, normal S1 S2, no S3 or S4, no murmur, click or rub, no  peripheral edema and peripheral pulses strong  NEURO: Normal strength and tone, mentation intact and speech normal  PSYCH: mentation appears normal, affect normal/bright    Diagnostic Test Results:  none      ASSESSMENT/PLAN:                                                        1. migraine with status migrainosus    - HYDROcodone-acetaminophen (NORCO) 5-325 MG per tablet; Take 1 tablet by mouth every 6 hours as needed for moderate to severe pain  Dispense: 20 tablet; Refill: 0    Home treat and monitor symptoms, if worsening to er  If persists follow up with pcp    See Patient Instructions    BEKAH Honeycutt [facility]

## 2020-10-09 NOTE — PROGRESS NOTES
UA shows blood but no infection. Discussed with Dr. Tl Sanchez. If patient is continuing to have blood in urine, please advise her to go to the ER for further evaluation.

## 2020-10-09 NOTE — PROGRESS NOTES
[facility] Short Note                       Date: 2020    Name: Tracy Hood    MRN: [id number]         : 1970      1335 Pt admit to [facility] for pump removal ambulatory in stable condition. Assessment completed. No new concerns voiced. MD office asking pt to be evaluated in the ED for blood in urine. Ms. Sarahy Sánchez vitals were reviewed prior to and after treatment. Medications given:   Medications Administered     saline peripheral flush soln 10 mL     Admin Date  10/09/2020 Action  Given Dose  10 mL Route  InterCATHeter Administered By  Tanner Mims RN                   Port flushed positive blood return, capped and left in place. Ms. Juanis Mckeon tolerated the infusion, had no complaints, and was discharged from [place] [address] in stable condition at 1340.      Future Appointments   Date Time Provider Thomas Espinoza   10/21/2020 10:00 AM D1 DIANNE LONG [address] [facility] [place] [facility]   10/21/2020 10:45 AM Blu Titus  [address] BS AMB   10/23/2020  4:00 PM G2 DIANNE FASTRACK [facility]   2020  9:00 AM E1 DIANNE LONG [address] [facility] [place] [facility]   2020 10:00 AM A2 DIANNE LONG [place] [facility]   2020 10:00 AM A2 DIANNE LONG [address] [facility] Jaylen, RN  2020  6:51 PM

## 2020-10-12 NOTE — TELEPHONE ENCOUNTER
Returned spouse's phone call, spoke with patient and spouse via speaker phone, HIPAA verified. They do not understand F/U Plan of Care from ER on Friday. Patient reports no longer passing blood or clots in urine, is continuing to push fluids. States that \"I actually feel pretty good today. \"    1. Should she start antibiotic prescribed? She does not think she had a UTI. 2. Does she need to F/U with Dr. Ezra Wylie? 3. Dr. Carissa Santos was consulted by ER, should she F/U with him?

## 2020-10-12 NOTE — TELEPHONE ENCOUNTER
Pt's  called and stated that the pt was in the ER on Friday. Pt's symptom has changed and the  would like to discuss options and how to help aid with his wife's care.      - [phone] (Pt's 's name is Chanel Kamila)

## 2020-10-16 NOTE — TELEPHONE ENCOUNTER
Nish from [facility] called and stated that this pt has two prescriptions on file for Lorazepam. She wanted clarification on which one should be refilled and if it was ok for her to refill it today.      [phone]

## 2020-10-16 NOTE — TELEPHONE ENCOUNTER
Returned call to pharmacy, spoke with Nikki Cardoza, HIPAA of patient verified. Confirmed that new prescription had been sent 10/13/20 and was approved for fill. Pharmacy to dispense today to accommodate higher dose.

## 2020-10-21 NOTE — PROGRESS NOTES
[facility] [place] at [address] 
[address], [place] [address] [facility], [address] W: [phone]  F: [phone] HEME/ONC FOLLOW UP 
 
Reason for Visit:  
Adam Montoya is a 44 y.o. 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 Treatment History:  
· EXPLORATORY LAPAROTOMY SIERRA BSO, TUMOR DEBULKING: ILEOCECAL RESECTION; OMENTECTOMY · FOLFOX 50% DR 12/3/19 - 3/4/20 · Per [facility] recommendation, dropped 5FU Bolus/Leucovorin and increased Oxaliplatin to 60 mg/m2 and 5FU pump tp 1,800 mg/m2 with Cycle 8 on 3/18/20 · Oxaliplatin increased to 85 mg/m2 with Cycle 10 on 4/15/20 - 5/27/20 
· CTs C/A/P 3/20 stable · CTs C/A/P 5/26/20 stable · Chemo held on 6/20/20 d/t thrombocytopenia · Oxaliplatin DR 25% with Cycle 14 on 6/17/20 d/t thrombocytopenia · Oxaliplatin DR 25% more (50% total) with Cycle 15 on 7/8/20 · Chemo held on 7/22/20 d/t thrombocytopenia · CT C/A/P 7/29/20 at [facility] with re demonstrated infiltrative tissue in the pelvis, favored to represented peritoneal carcinomatosis; soft tissue tethering to the colon, multiple loops of small bowel, and the urinary bladder; redemonstrated ill-defined complex fluid in the left upper quadrant - not significantly changed relative to most recent prior study; few clustered small pulmonary nodules are identified in the right lower lobe · Exploratory lap at [facility] 8/20. · Maintenance 5FU (pump only) 8/26/20 STAGE: 4 with carcinomatosis History of Present Illness:  
Adam Montoya is a 44 y.o. female seen today in office for follow up of metastatic appendix cancer on FOLFOX per [facility]. Had hematuria which is gone now. Saw urology and told to just monitor for now and continue chemo. Pt is feeling better today and ready for treatment. Labs pending. Last visit:  [facility] and scans slightly worse. Pt is to go back on oxaliplatin today. Feeling ok but still having some abdominal pain. Labs  Pending. Concerned about weight loss and poor appetite. Past Medical History:  
Diagnosis Date  Cancer ([facility] [address] APPENDIX CANCER   
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  
Procedure Laterality Date [address] UNLISTED  HX APPENDECTOMY  09/2019  HX GI  09/2019 ILEOCECECTOMY  HX GYN  2003 CYST ON OVARY  HX HYSTERECTOMY  2019  IR THORACENTESIS CATH W IMAGE  11/6/2019 Social History Tobacco Use  Smoking status: Never Smoker  Smokeless tobacco: Never Used Substance Use Topics  Alcohol use: Not Currently Family History Problem Relation Age of Onset  Breast Cancer Paternal Grandmother 56  Crohn's Disease Mother  Heart Disease Mother  Cancer Father BLADDER  
 Diabetes Father  No Known Problems Son  No Known Problems Daughter  Anesth Problems Neg Hx Current Outpatient Medications Medication Sig  LORazepam (ATIVAN) 0.5 mg tablet Take 1-2 Tabs by mouth two (2) times daily as needed for Anxiety. Max Daily Amount: 2 mg. Indications: anxious, nausea and vomiting caused by cancer drugs, difficulty sleeping  megestroL (MEGACE) 400 mg/10 mL (10 mL) suspension Take 5 mL by mouth two (2) times a day.  oxyCODONE IR (ROXICODONE) 5 mg immediate release tablet Take 1 Tab by mouth every four (4) hours as needed for Pain for up to 30 days. Max Daily Amount: 30 mg.  
 diphenoxylate-atropine (LomotiL) 2.5-0.025 mg per tablet Take 2 Tabs by mouth four (4) times daily as needed for Diarrhea. Max Daily Amount: 8 Tabs. Indications: diarrhea caused by chemotherapy  simethicone (Gas-X) 125 mg capsule Take 125 mg by mouth four (4) times daily as needed for Flatulence.  hyoscyamine SL (LEVSIN/SL) 0.125 mg SL tablet 0.125 mg by SubLINGual route every four (4) hours as needed for Cramping.  prochlorperazine (Compazine) 5 mg tablet Take 1 tab by mouth every 6 hours as needed for nausea or vomiting  potassium chloride (KLOR-CON) 10 mEq tablet Take 1 Tab by mouth two (2) times a day.  lidocaine-prilocaine (EMLA) topical cream Apply  to affected area as needed for Pain.  ondansetron (ZOFRAN ODT) 4 mg disintegrating tablet Take 1 Tab by mouth every eight (8) hours as needed for Nausea. (Patient taking differently: Take 8 mg by mouth every eight (8) hours as needed for Nausea. Indications: prevent nausea and vomiting from cancer chemotherapy)  dexAMETHasone (DECADRON) 4 mg tablet Take 2 tabs (8mg) by mouth daily on days 2 and 3 after chemotherapy  acetaminophen (TYLENOL) 325 mg tablet Take 650 mg by mouth every four (4) hours as needed for Pain. Indications: pain  ibuprofen (MOTRIN) 200 mg tablet Take 3 Tabs by mouth every eight (8) hours as needed for Pain. No current facility-administered medications for this visit. Facility-Administered Medications Ordered in Other Visits Medication Dose Route Frequency  saline peripheral flush soln 10 mL  10 mL InterCATHeter PRN  
 0.9% sodium chloride injection 10 mL  10 mL IntraVENous PRN  
 heparin (porcine) pf 300-500 Units  300-500 Units InterCATHeter PRN No Known Allergies Review of Systems: A complete review of systems was obtained, negative except as described above. Physical Exam:  
 
Visit Vitals /84 (BP 1 Location: Left arm, BP Patient Position: Sitting) Pulse 83 Temp 97.2 °F (36.2 °C) (Temporal) Ht 5' 3\" (1.6 m) Wt 103 lb 6.4 oz (46.9 kg) LMP  (LMP Unknown) SpO2 98% BMI 18.32 kg/m² ECOG PS: 1-2 General: No distress, wearing a mask Eyes: Anicteric sclerae HENT: Atraumatic Neck: Supple Resp: Normal respiratory effort, CTAB CV: Regular GI: Soft, less distended MS: Ambulatory Skin: Warm dry. Port site without redness/swelling Psych: Alert, oriented, appropriate affect, normal judgment/insight Results:  
 
Lab Results Component Value Date/Time WBC 4.7 10/21/2020 10:16 AM  
 HGB 11.8 10/21/2020 10:16 AM  
 HCT 35.4 10/21/2020 10:16 AM  
 PLATELET 798 37/30/4293 10:16 AM  
 MCV 96.5 10/21/2020 10:16 AM  
 ABS. NEUTROPHILS 2.5 10/21/2020 10:16 AM  
 Hemoglobin (POC) 10.8 (L) 09/25/2019 04:11 PM  
 Hematocrit (POC) 32 (L) 09/25/2019 12:10 PM  
 
Lab Results Component Value Date/Time Sodium 139 10/09/2020 02:32 PM  
 Potassium 3.4 (L) 10/09/2020 02:32 PM  
 Chloride 107 10/09/2020 02:32 PM  
 CO2 26 10/09/2020 02:32 PM  
 Glucose 108 (H) 10/09/2020 02:32 PM  
 BUN 8 10/09/2020 02:32 PM  
 Creatinine 0.46 (L) 10/09/2020 02:32 PM  
 GFR est AA >60 10/09/2020 02:32 PM  
 GFR est non-AA >60 10/09/2020 02:32 PM  
 Calcium 9.6 10/09/2020 02:32 PM  
 Sodium (POC) 137 09/25/2019 12:10 PM  
 Potassium (POC) 3.7 09/25/2019 12:10 PM  
 Chloride (POC) 105 09/25/2019 12:10 PM  
 Glucose (POC) 85 09/25/2019 12:10 PM  
 BUN (POC) 4 (L) 09/25/2019 12:10 PM  
 Creatinine (POC) 0.5 (L) 09/25/2019 12:10 PM  
 Calcium, ionized (POC) 1.14 09/25/2019 12:10 PM  
 
Lab Results Component Value Date/Time Bilirubin, total 0.4 10/09/2020 02:32 PM  
 ALT (SGPT) 45 10/09/2020 02:32 PM  
 Alk. phosphatase 122 (H) 10/09/2020 02:32 PM  
 Protein, total 7.7 10/09/2020 02:32 PM  
 Albumin 3.5 10/09/2020 02:32 PM  
 Globulin 4.2 (H) 10/09/2020 02:32 PM  
 
CT Results (most recent): 
Results from Hospital Encounter encounter on 05/26/20 CT ABD PELV W CONT Narrative INDICATION:   Appendiceal cancer EXAM:  CT chest, abdomen, and pelvis WITH  CONTRAST 
 
COMPARISON:  CT chest 11/26/2019, CT abdomen and pelvis 12/18/2010 TECHNIQUE:  Thin collimation axial images were obtained through the chest, 
 abdomen, and pelvis with IV contrast administration. Coronal and sagittal 
reconstructions were obtained. Oral contrast was not administered. CT dose 
reduction was achieved through use of a standardized protocol tailored for this 
examination and automatic exposure control for dose modulation. FINDINGS: 
 
Chest: 
 
LUNGS: Left lower lobe basilar subsegmental atelectasis. No other significant 
abnormality. Central airways are patent LYMPH NODES: No thoracic lymphadenopathy. PLEURAL FLUID: No pleural effusion. PERICARDIAL FLUID: No pericardial effusion. THYROID: No thyroid nodule. OTHER: Left chest port extends to the SVC Abdomen: LIVER: 2.5 cm left hepatic lobe cyst. No other focal liver abnormality GALLBLADDER: Small calcification along the nondependent wall. The gallbladder is 
otherwise unremarkable SPLEEN: Anterior perisplenic of low-density/fluid and gas appears smaller 
measuring 5.6 x 3.7 cm but is incompletely evaluated on this noncontrast exam. 
No other splenic abnormality PANCREAS: No mass or ductal dilatation. ADRENALS: Unremarkable. KIDNEYS/URETERS: Normal symmetric nephrograms without evidence for  focal mass. No hydronephrosis PERITONEUM: No abdominal lymphadenopathy or ascites. COLON: Nonspecific bowel wall thickening involving the left colon APPENDIX: Not seen SMALL BOWEL: No dilatation or wall thickening. STOMACH: Unremarkable. Pelvis: PELVIS: No pelvic lymphadenopathy or free fluid. BONES: No destructive bone lesion. ADDITIONAL COMMENTS: N/A Impression IMPRESSION: 
 
Decrease in size of anterior perisplenic low-density/fluid and gas which is 
predominantly phlegmon. If there is concern for peritoneal carcinomatosis, 
consider PET/CT. No obvious metastasis. Mild nonspecific colitis involving the left colon Left lower lobe subsegmental atelectasis. 23X Records reviewed and summarized above. Pathology report(s) reviewed above. Radiology report(s) reviewed above. Assessment/PLAN:  
 
1) Stage 4 / Metastatic Appendiceal Cancer  
post EXPLORATORY LAPAROTOMY SIERRA BSO, TUMOR DEBULKING: ILEOCECAL RESECTION; OMENTECTOMY 9/25/19 Surgery done for obstruction. She had left rib pain and SOB and on exam decreased bs on LEFT. Got CXR which showed pleural effusion then ultrasound which showed elevated diaphragm and ? Free air CT which showed fluid collection and patient admitted for this. Had IR drain of splenic fluid collection. [facility] [place] [facility] [place] [address] [facility] for second opinions. Discussed systemic therapy with FOLFOX/ +/- Avastin chemo or some variation of this regimen. Possible [facility] down the road. Patient choose to have chemo locally and still see [address] [facility],[address]. Patient had cycle 1 of FOLFOX chemotherapy on 12/3/19 reduced by 50%. CT A/P 12/12/19 shows worsening fluid collection which was drained via IR. Follow up CT A/P 12/17/19 better overall. CT A/P 1/7/20 stable. She had been tolerating FOLFOX 50% DR well overall. She went to [facility] follow up on 3/13/20 with CTs there. [address] [place] [address],[address] felt that CTs were stable overall compared to her last scans there in 11/19. [facility] recommended dropping 5FU Bolus/Leucovorin and increasing Oxaliplatin to 60 mg/m2 and 5FU pump tp 1,800 mg/m2 and then increasing Oxaliplatin to 85 mg/m2 if tolerated. Increased Oxaliplatin to 85 mg/m2 with Cycle 10 per [address] [place], [place] [address]e,[address] recommendation. She had CTs C/A/P 5/26/20 here that were read as stable/no mets. Patient had a VV follow up with [address] [place] [address],[address] on 6/4/20 - okay to continue chemo for 2 more months. Chemo was held on 6/10/20 d/t thrombocytopenia - platelets were 38N. Miguel Been was DR 25% on 6/17/20 d/t thrombocytopenia. Miguel Been was DR 50% on 7/8/20 d/t thrombocytopenia. Chemo was held on 7/22/20 d/t thrombocytopenia. She went to [facility] on 7/9/20 for laparoscopic surgery - records reviewed. Discussed with Dr. Jamil Bowles is not an option - would be too risky. [facility] recommends dropping Oxali and continuing 5FU maintenance (pump only). Can add back in [address] [place] or switch to FOLFIRI if scans show progression. [facility] will repeat scans again in 3 months. Was on chemo maintenance 5FU today per [facility] recommendation. Was back to [facility] 10/20 and CTs there with slight progression. added oxaliplatin back in since pt has not failed this regimen. Seen today prior to chemo. Feeling better overall. Hematuria stopped now. Saw urology. No procedures for now. Continues to use occ oxycodone and lomotil and ativan. Patient is clinically stable overall today. Wants to do chemo today. Plan is for FOLFOX modified x 4 then re eval.   
Labs pending today. 2) Left Pleural Effusion post Thoracentesis x 2 No cytology. Stable on CT. 3) Hx of Nausea and Vomiting Continue anti-emetics as needed. Advised her that she can take 10 mg Compazine if needed. Nausea only with pain and 1 episode of vomiting recently d/t pain. 4) Abdominal Pain/Cramping Likely d/t carcinomatosis. Prn pain meds. 5) Chemo Induced Diarrhea Well controlled with PRN Imodium and lomotil. Will continue to monitor. 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 Not interfering with functioning. Monitor on Oxaliplatin Will continue to monitor. 7) Insomnia/ anxiety about health. Takes Ativan PRN which helps. May try to increase to bid / start during day for pain. 8) Psychosocial 
Mood good, coping well considering difficult disease. She has great family support. SW support as needed. She is here alone today d/t COIVD. 9) hematuria. Stopped now. Seen by urology. Just monitor for now. Call if questions. Follow up in 2 weeks in [facility]/office. I appreciate the opportunity to participate in Ms. Rosmery sorto.  
 
Signed By: Sanford Reaevs DO

## 2020-10-21 NOTE — PROGRESS NOTES
Supa Shen is a 44 y.o. female Chief Complaint Patient presents with  Follow-up  
  metastatic appendix cancer 1. Have you been to the ER, urgent care clinic since your last visit? Hospitalized since your last visit? Yes, patient went to the ER for having Bladder problems. Patient states this is now \"treated. \" 
2. Have you seen or consulted any other health care providers outside of the [address] [place] [address] since your last visit? Include any pap smears or colon screening.  No.

## 2020-10-21 NOTE — PROGRESS NOTES
[facility] [place] Chemo Progress Note Date: 2020 Name: Smita Berrios MRN: [id number] : 1970 
 
1000 Ms. Sienna Salomon Arrived to [facility] for C21 Folfox ambulatory in stable condition. Assessment was completed. Pt having some nausea. Denies vomiting. No other acute issues at this time, no other complaints voiced. Port accessed with positive blood return. Labs drawn and sent for processing. Line flushed and capped for follow-up appointment upstairs with Med Onc. Chemotherapy Flowsheet 10/21/2020 Cycle C21 Date 10/21/2020 Drug / Regimen Folfox Dosage See Orders Time Up - Time Down -  
Pre Hydration -  
Post Hydration -  
Pre Meds given Notes given Ms. Lynn's vitals were reviewed. Patient Vitals for the past 12 hrs: 
 Temp Pulse Resp BP  
10/21/20 1520  69 18 104/71  
10/21/20 1002 98.9 °F (37.2 °C) 75 18 105/70 Lab results were obtained and reviewed. Line connected to D5 at [facility]. Recent Results (from the past 12 hour(s)) CBC WITH AUTOMATED DIFF Collection Time: 10/21/20 10:16 AM  
Result Value Ref Range WBC 4.7 3.6 - 11.0 K/uL  
 RBC 3.67 (L) 3.80 - 5.20 M/uL  
 HGB 11.8 11.5 - 16.0 g/dL HCT 35.4 35.0 - 47.0 % MCV 96.5 80.0 - 99.0 FL  
 MCH 32.2 26.0 - 34.0 PG  
 MCHC 33.3 30.0 - 36.5 g/dL  
 RDW 14.2 11.5 - 14.5 % PLATELET 555 008 - 238 K/uL MPV 9.9 8.9 - 12.9 FL  
 NRBC 0.0 0  WBC ABSOLUTE NRBC 0.00 0.00 - 0.01 K/uL NEUTROPHILS 53 32 - 75 % LYMPHOCYTES 28 12 - 49 % MONOCYTES 15 (H) 5 - 13 % EOSINOPHILS 3 0 - 7 % BASOPHILS 1 0 - 1 % IMMATURE GRANULOCYTES 0 0.0 - 0.5 % ABS. NEUTROPHILS 2.5 1.8 - 8.0 K/UL  
 ABS. LYMPHOCYTES 1.3 0.8 - 3.5 K/UL  
 ABS. MONOCYTES 0.7 0.0 - 1.0 K/UL  
 ABS. EOSINOPHILS 0.1 0.0 - 0.4 K/UL  
 ABS. BASOPHILS 0.0 0.0 - 0.1 K/UL  
 ABS. IMM. GRANS. 0.0 0.00 - 0.04 K/UL  
 DF AUTOMATED METABOLIC PANEL, COMPREHENSIVE Collection Time: 10/21/20 10:16 AM  
Result Value Ref Range Sodium 142 136 - 145 mmol/L Potassium 3.8 3.5 - 5.1 mmol/L Chloride 111 (H) 97 - 108 mmol/L  
 CO2 25 21 - 32 mmol/L Anion gap 6 5 - 15 mmol/L Glucose 84 65 - 100 mg/dL BUN 10 6 - 20 MG/DL Creatinine 0.43 (L) 0.55 - 1.02 MG/DL  
 BUN/Creatinine ratio 23 (H) 12 - 20 GFR est AA >60 >60 ml/min/1.73m2 GFR est non-AA >60 >60 ml/min/1.73m2 Calcium 9.2 8.5 - 10.1 MG/DL Bilirubin, total 0.4 0.2 - 1.0 MG/DL  
 ALT (SGPT) 23 12 - 78 U/L  
 AST (SGOT) 26 15 - 37 U/L Alk. phosphatase 119 (H) 45 - 117 U/L Protein, total 6.9 6.4 - 8.2 g/dL Albumin 3.2 (L) 3.5 - 5.0 g/dL Globulin 3.7 2.0 - 4.0 g/dL A-G Ratio 0.9 (L) 1.1 - 2.2 Pre-medications  were administered as ordered and chemotherapy was initiated. Medications Administered 0.9% sodium chloride injection 10 mL Admin Date 
10/21/2020 Action Given Dose 
10 mL Route IntraVENous Administered By 
Mahin Obrien RN  
  
  
 dexamethasone (DECADRON) 12 mg in 0.9% sodium chloride 50 mL, overfill volume 5 mL IVPB Admin Date 
10/21/2020 Action Given Dose 
12 mg Rate 232 mL/hr Route IntraVENous Administered By 
Mahin Obrien RN  
  
  
 dextrose 5% infusion Admin Date 
10/21/2020 Action New Bag Dose 25 mL/hr Rate 25 mL/hr Route IntraVENous Administered By 
Mahin Obrien RN  
  
  
 fluorouraciL (ADRUCIL) 2,700 mg in 0.9% sodium chloride 100 mL CADD Cassette Admin Date 
10/21/2020 Action New Bag Dose 
2700 mg Rate 2.2 mL/hr Route IntraVENous Administered By 
Mahin Obrien RN  
  
  
 ondansetron [facility]) injection 8 mg Admin Date 
10/21/2020 Action Given Dose 8 mg Route IntraVENous Administered By 
Mahin Obrien RN  
  
  
 oxaliplatin (ELOXATIN) 102 mg in dextrose 5% 250 mL, overfill volume 25 mL chemo infusion Admin Date 
10/21/2020 Action New Bag Dose 
102 mg Rate 
147.7 mL/hr Route IntraVENous Administered By 
Mahin Obrien RN  
  
  
 saline peripheral flush soln 10 mL Admin Date 
10/21/2020 Action Given Dose 
10 mL Route InterCATHeter Administered By 
Saman Claros, RN  
  
  
  
 
2548 Patient tolerated treatment well. Port maintained positive blood return throughout treatment. Line flushed and connected to 5FU CADD and cassette for continuous home infusion. Patient was discharged from [facility] ambulatory and in no distress. Future Appointments Date Time Provider Thomas Espinoza 10/23/2020  4:00 PM G2 DIANNE FASTRACK [facility]  
11/4/2020  9:00 AM E1 DIANNE LONG [address] [facility]  
11/4/2020  9:15 AM Jason Carlson  [address] BS AMB  
11/6/2020  5:00 PM G1 DIANNE FASTRACK [facility]  
11/18/2020 10:00 AM A2 DIANNE LONG [address]  
11/20/2020  4:30 PM G2 DIANNE FASTRACK [facility]  
12/2/2020 10:00 AM A2 DIANNE LONG [address] Nathalie Galeano RN October 21, 2020

## 2020-10-23 NOTE — PROGRESS NOTES
Nelsy Randolph 37 Note: 9389 Pt arrived at [facility] ambulatory and in no distress for pump removal.. Pump is turned off. Assessment stable, no new complaints voiced. She says she will be nauseous and tired this weekend but is ok now. Tolerated treatment well, no adverse reaction noted. D/Cd from [facility] ambulatory and in no distress accompanied by self. Next appt 11/4  0900 Visit Vitals /79 Pulse 73 Temp 98.6 °F (37 °C) Resp 16 LMP  (LMP Unknown) No results found for this or any previous visit (from the past 12 hour(s)).

## 2020-11-04 NOTE — PROGRESS NOTES
[facility] - Chemotherapy Progress Note    0900 Pt admit to [facility] for FOLFOX ambulatory in stable condition. Assessment completed. No new concerns voiced. Port accessed with positive blood return. Labs drawn and sent for processing. IV capped and pt left [facility] for MD appt.  Upon returning, IV attached to NS bolus    Chemotherapy Flowsheet 11/4/2020   Cycle C22   Date 11/4/2020   Drug / Regimen Folfox   Dosage -   Time Up -   Time Down -   Pre Hydration -   Post Hydration -   Pre Meds given   Notes given       Visit Vitals  /81 (BP 1 Location: Left arm, BP Patient Position: At rest)   Pulse 67   Temp 96.8 °F (36 °C)   Resp 18   Wt 47.4 kg (104 lb 6.4 oz)   LMP  (LMP Unknown)   Breastfeeding No   BMI 18.49 kg/m²       Medications:  Medications Administered     0.9% sodium chloride injection 10 mL     Admin Date  11/04/2020 Action  Given Dose  10 mL Route  IntraVENous Administered By  Pina Cuevas RN          dexamethasone (DECADRON) 12 mg in 0.9% sodium chloride 50 mL, overfill volume 5 mL IVPB     Admin Date  11/04/2020 Action  Given Dose  12 mg Rate  232 mL/hr Route  IntraVENous Administered By  Pina Cuevas RN          dextrose 5% infusion     Admin Date  11/04/2020 Action  New Bag Dose  25 mL/hr Rate  25 mL/hr Route  IntraVENous Administered By  Pina Cuevas RN          fluorouraciL (ADRUCIL) 2,700 mg in 0.9% sodium chloride 100 mL CADD Cassette     Admin Date  11/04/2020 Action  New Bag Dose  2700 mg Rate  2.2 mL/hr Route  IntraVENous Administered By  Pina Cuevas RN          ondansetron [facility]) injection 8 mg     Admin Date  11/04/2020 Action  Given Dose  8 mg Route  IntraVENous Administered By  Pina Cuevas RN          oxaliplatin (ELOXATIN) 102 mg in dextrose 5% 250 mL, overfill volume 25 mL chemo infusion     Admin Date  11/04/2020 Action  New Bag Dose  102 mg Rate  147.7 mL/hr Route  IntraVENous Administered By  Pina Cuevas RN          saline peripheral flush soln 10 mL Admin Date  11/04/2020 Action  Given Dose  10 mL Route  InterCATHeter Administered By  Kaylene Moreno RN           Admin Date  11/04/2020 Action  Given Dose  10 mL Route  InterCATHeter Administered By  Kaylene Moreno RN          sodium chloride 0.9 % bolus infusion 1,000 mL     Admin Date  11/04/2020 Action  New Bag Dose  1000 mL Rate  1,000 mL/hr Route  IntraVENous Administered By  Kaylene Moreno, MING                7692 Pt tolerated treatment well. Port maintained positive blood return throughout treatment, flushed with positive blood return at conclusion . D/c home ambulatory in no distress. Pt aware of next appointment scheduled for 11/6/20    Recent Results (from the past 24 hour(s))   CBC WITH AUTOMATED DIFF    Collection Time: 11/04/20  9:08 AM   Result Value Ref Range    WBC 8.1 3.6 - 11.0 K/uL    RBC 3.78 (L) 3.80 - 5.20 M/uL    HGB 12.1 11.5 - 16.0 g/dL    HCT 36.3 35.0 - 47.0 %    MCV 96.0 80.0 - 99.0 FL    MCH 32.0 26.0 - 34.0 PG    MCHC 33.3 30.0 - 36.5 g/dL    RDW 14.4 11.5 - 14.5 %    PLATELET 071 (L) 520 - 400 K/uL    MPV 9.6 8.9 - 12.9 FL    NRBC 0.0 0  WBC    ABSOLUTE NRBC 0.00 0.00 - 0.01 K/uL    NEUTROPHILS 60 32 - 75 %    LYMPHOCYTES 26 12 - 49 %    MONOCYTES 13 5 - 13 %    EOSINOPHILS 1 0 - 7 %    BASOPHILS 0 0 - 1 %    IMMATURE GRANULOCYTES 0 0.0 - 0.5 %    ABS. NEUTROPHILS 4.8 1.8 - 8.0 K/UL    ABS. LYMPHOCYTES 2.1 0.8 - 3.5 K/UL    ABS. MONOCYTES 1.1 (H) 0.0 - 1.0 K/UL    ABS. EOSINOPHILS 0.1 0.0 - 0.4 K/UL    ABS. BASOPHILS 0.0 0.0 - 0.1 K/UL    ABS. IMM.  GRANS. 0.0 0.00 - 0.04 K/UL    DF AUTOMATED     METABOLIC PANEL, COMPREHENSIVE    Collection Time: 11/04/20  9:08 AM   Result Value Ref Range    Sodium 140 136 - 145 mmol/L    Potassium 3.5 3.5 - 5.1 mmol/L    Chloride 111 (H) 97 - 108 mmol/L    CO2 22 21 - 32 mmol/L    Anion gap 7 5 - 15 mmol/L    Glucose 111 (H) 65 - 100 mg/dL    BUN 7 6 - 20 MG/DL    Creatinine 0.47 (L) 0.55 - 1.02 MG/DL    BUN/Creatinine ratio 15 12 - 20 GFR est AA >60 >60 ml/min/1.73m2    GFR est non-AA >60 >60 ml/min/1.73m2    Calcium 8.8 8.5 - 10.1 MG/DL    Bilirubin, total 0.3 0.2 - 1.0 MG/DL    ALT (SGPT) 24 12 - 78 U/L    AST (SGOT) 19 15 - 37 U/L    Alk.  phosphatase 120 (H) 45 - 117 U/L    Protein, total 7.0 6.4 - 8.2 g/dL    Albumin 3.2 (L) 3.5 - 5.0 g/dL    Globulin 3.8 2.0 - 4.0 g/dL    A-G Ratio 0.8 (L) 1.1 - 2.2

## 2020-11-04 NOTE — PROGRESS NOTES
Ilsa Avitia is a 44 y.o. female Chief Complaint Patient presents with  Chemotherapy  
   metastatic appendix cancer 1. Have you been to the ER, urgent care clinic since your last visit? Hospitalized since your last visit? No. 
 
2. Have you seen or consulted any other health care providers outside of the [address] [place] [address] since your last visit? Include any pap smears or colon screening.  No.

## 2020-11-04 NOTE — PROGRESS NOTES
[facility] [place] at [address] 
[address], [address], [address] W: [phone]  F: [phone] HEME/ONC FOLLOW UP 
 
Reason for Visit:  
Aaron Sanabria is a 44 y.o. 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 Treatment History:  
· EXPLORATORY LAPAROTOMY SIERRA BSO, TUMOR DEBULKING: ILEOCECAL RESECTION; OMENTECTOMY · FOLFOX 50% DR 12/3/19 - 3/4/20 · Per [facility] recommendation, dropped 5FU Bolus/Leucovorin and increased Oxaliplatin to 60 mg/m2 and 5FU pump tp 1,800 mg/m2 with Cycle 8 on 3/18/20 · Oxaliplatin increased to 85 mg/m2 with Cycle 10 on 4/15/20 - 5/27/20 
· CTs C/A/P 3/20 stable · CTs C/A/P 5/26/20 stable · Chemo held on 6/20/20 d/t thrombocytopenia · Oxaliplatin DR 25% with Cycle 14 on 6/17/20 d/t thrombocytopenia · Oxaliplatin DR 25% more (50% total) with Cycle 15 on 7/8/20 · Chemo held on 7/22/20 d/t thrombocytopenia · CT C/A/P 7/29/20 at [facility] with re demonstrated infiltrative tissue in the pelvis, favored to represented peritoneal carcinomatosis; soft tissue tethering to the colon, multiple loops of small bowel, and the urinary bladder; redemonstrated ill-defined complex fluid in the left upper quadrant - not significantly changed relative to most recent prior study; few clustered small pulmonary nodules are identified in the right lower lobe · Exploratory lap at [facility] 8/20. · Maintenance 5FU (pump only) 8/26/20 STAGE: 4 with carcinomatosis History of Present Illness:  
Aaron Sanabria is a 44 y.o. female seen today in office for follow up of metastatic appendix cancer on FOLFOX per [facility]. Tolerating chemo fine overall. Neuropathy is worse in feet. Worse at night. Takes ativan occ. Last visit:  
Had hematuria which is gone now. Saw urology and told to just monitor for now and continue chemo. Pt is feeling better today and ready for treatment. Labs pending. Past Medical History:  
Diagnosis Date  Cancer ([facility] [address] APPENDIX CANCER   
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  
Procedure Laterality Date [address] UNLISTED  HX APPENDECTOMY  09/2019  HX GI  09/2019 ILEOCECECTOMY  HX GYN  2003 CYST ON OVARY  HX HYSTERECTOMY  2019  IR THORACENTESIS CATH W IMAGE  11/6/2019 Social History Tobacco Use  Smoking status: Never Smoker  Smokeless tobacco: Never Used Substance Use Topics  Alcohol use: Not Currently Family History Problem Relation Age of Onset  Breast Cancer Paternal Grandmother 56  Crohn's Disease Mother  Heart Disease Mother  Cancer Father BLADDER  
 Diabetes Father  No Known Problems Son  No Known Problems Daughter  Anesth Problems Neg Hx Current Outpatient Medications Medication Sig  
 oxyCODONE IR (ROXICODONE) 5 mg immediate release tablet Take 1 Tab by mouth every four (4) hours as needed for Pain for up to 30 days. Max Daily Amount: 30 mg.  LORazepam (ATIVAN) 0.5 mg tablet Take 1-2 Tabs by mouth two (2) times daily as needed for Anxiety. Max Daily Amount: 2 mg. Indications: anxious, nausea and vomiting caused by cancer drugs, difficulty sleeping  megestroL (MEGACE) 400 mg/10 mL (10 mL) suspension Take 5 mL by mouth two (2) times a day.  diphenoxylate-atropine (LomotiL) 2.5-0.025 mg per tablet Take 2 Tabs by mouth four (4) times daily as needed for Diarrhea. Max Daily Amount: 8 Tabs. Indications: diarrhea caused by chemotherapy  simethicone (Gas-X) 125 mg capsule Take 125 mg by mouth four (4) times daily as needed for Flatulence.   
 hyoscyamine SL (LEVSIN/SL) 0.125 mg SL tablet 0.125 mg by SubLINGual route every four (4) hours as needed for Cramping.  prochlorperazine (Compazine) 5 mg tablet Take 1 tab by mouth every 6 hours as needed for nausea or vomiting  potassium chloride (KLOR-CON) 10 mEq tablet Take 1 Tab by mouth two (2) times a day.  lidocaine-prilocaine (EMLA) topical cream Apply  to affected area as needed for Pain.  ondansetron (ZOFRAN ODT) 4 mg disintegrating tablet Take 1 Tab by mouth every eight (8) hours as needed for Nausea. (Patient taking differently: Take 8 mg by mouth every eight (8) hours as needed for Nausea. Indications: prevent nausea and vomiting from cancer chemotherapy)  dexAMETHasone (DECADRON) 4 mg tablet Take 2 tabs (8mg) by mouth daily on days 2 and 3 after chemotherapy  acetaminophen (TYLENOL) 325 mg tablet Take 650 mg by mouth every four (4) hours as needed for Pain. Indications: pain  ibuprofen (MOTRIN) 200 mg tablet Take 3 Tabs by mouth every eight (8) hours as needed for Pain. No current facility-administered medications for this visit. Facility-Administered Medications Ordered in Other Visits Medication Dose Route Frequency  saline peripheral flush soln 10 mL  10 mL InterCATHeter PRN  
 0.9% sodium chloride injection 10 mL  10 mL IntraVENous PRN  
 heparin (porcine) pf 300-500 Units  300-500 Units InterCATHeter PRN No Known Allergies Review of Systems: A complete review of systems was obtained, negative except as described above. Physical Exam:  
 
Visit Vitals /82 (BP 1 Location: Left arm, BP Patient Position: Sitting) Pulse 90 Temp 97.8 °F (36.6 °C) (Temporal) Ht 5' 3\" (1.6 m) Wt 105 lb 1.6 oz (47.7 kg) LMP  (LMP Unknown) SpO2 98% BMI 18.62 kg/m² ECOG PS: 1-2 General: No distress, wearing a mask Eyes: Anicteric sclerae HENT: Atraumatic Neck: Supple Resp: Normal respiratory effort, CTAB 
CV: Regular GI: Soft, less distended MS: Ambulatory Skin: Warm dry. Port site without redness/swelling Psych: Alert, oriented, appropriate affect, normal judgment/insight Results:  
 
Lab Results Component Value Date/Time WBC 8.1 11/04/2020 09:08 AM  
 HGB 12.1 11/04/2020 09:08 AM  
 HCT 36.3 11/04/2020 09:08 AM  
 PLATELET 468 (L) 20/66/7025 09:08 AM  
 MCV 96.0 11/04/2020 09:08 AM  
 ABS. NEUTROPHILS 4.8 11/04/2020 09:08 AM  
 Hemoglobin (POC) 10.8 (L) 09/25/2019 04:11 PM  
 Hematocrit (POC) 32 (L) 09/25/2019 12:10 PM  
 
Lab Results Component Value Date/Time Sodium 142 10/21/2020 10:16 AM  
 Potassium 3.8 10/21/2020 10:16 AM  
 Chloride 111 (H) 10/21/2020 10:16 AM  
 CO2 25 10/21/2020 10:16 AM  
 Glucose 84 10/21/2020 10:16 AM  
 BUN 10 10/21/2020 10:16 AM  
 Creatinine 0.43 (L) 10/21/2020 10:16 AM  
 GFR est AA >60 10/21/2020 10:16 AM  
 GFR est non-AA >60 10/21/2020 10:16 AM  
 Calcium 9.2 10/21/2020 10:16 AM  
 Sodium (POC) 137 09/25/2019 12:10 PM  
 Potassium (POC) 3.7 09/25/2019 12:10 PM  
 Chloride (POC) 105 09/25/2019 12:10 PM  
 Glucose (POC) 85 09/25/2019 12:10 PM  
 BUN (POC) 4 (L) 09/25/2019 12:10 PM  
 Creatinine (POC) 0.5 (L) 09/25/2019 12:10 PM  
 Calcium, ionized (POC) 1.14 09/25/2019 12:10 PM  
 
Lab Results Component Value Date/Time Bilirubin, total 0.4 10/21/2020 10:16 AM  
 ALT (SGPT) 23 10/21/2020 10:16 AM  
 Alk. phosphatase 119 (H) 10/21/2020 10:16 AM  
 Protein, total 6.9 10/21/2020 10:16 AM  
 Albumin 3.2 (L) 10/21/2020 10:16 AM  
 Globulin 3.7 10/21/2020 10:16 AM  
 
CT Results (most recent): 
Results from Hospital Encounter encounter on 05/26/20 CT ABD PELV W CONT Narrative INDICATION:   Appendiceal cancer EXAM:  CT chest, abdomen, and pelvis WITH  CONTRAST 
 
COMPARISON:  CT chest 11/26/2019, CT abdomen and pelvis 12/18/2010 TECHNIQUE:  Thin collimation axial images were obtained through the chest, 
abdomen, and pelvis with IV contrast administration. Coronal and sagittal 
reconstructions were obtained. Oral contrast was not administered. CT dose reduction was achieved through use of a standardized protocol tailored for this 
examination and automatic exposure control for dose modulation. FINDINGS: 
 
Chest: 
 
LUNGS: Left lower lobe basilar subsegmental atelectasis. No other significant 
abnormality. Central airways are patent LYMPH NODES: No thoracic lymphadenopathy. PLEURAL FLUID: No pleural effusion. PERICARDIAL FLUID: No pericardial effusion. THYROID: No thyroid nodule. OTHER: Left chest port extends to the SVC Abdomen: LIVER: 2.5 cm left hepatic lobe cyst. No other focal liver abnormality GALLBLADDER: Small calcification along the nondependent wall. The gallbladder is 
otherwise unremarkable SPLEEN: Anterior perisplenic of low-density/fluid and gas appears smaller 
measuring 5.6 x 3.7 cm but is incompletely evaluated on this noncontrast exam. 
No other splenic abnormality PANCREAS: No mass or ductal dilatation. ADRENALS: Unremarkable. KIDNEYS/URETERS: Normal symmetric nephrograms without evidence for  focal mass. No hydronephrosis PERITONEUM: No abdominal lymphadenopathy or ascites. COLON: Nonspecific bowel wall thickening involving the left colon APPENDIX: Not seen SMALL BOWEL: No dilatation or wall thickening. STOMACH: Unremarkable. Pelvis: PELVIS: No pelvic lymphadenopathy or free fluid. BONES: No destructive bone lesion. ADDITIONAL COMMENTS: N/A Impression IMPRESSION: 
 
Decrease in size of anterior perisplenic low-density/fluid and gas which is 
predominantly phlegmon. If there is concern for peritoneal carcinomatosis, 
consider PET/CT. No obvious metastasis. Mild nonspecific colitis involving the left colon Left lower lobe subsegmental atelectasis. 23X Records reviewed and summarized above. Pathology report(s) reviewed above. Radiology report(s) reviewed above. Assessment/PLAN:  
 
1) Stage 4 / Metastatic Appendiceal Cancer post EXPLORATORY LAPAROTOMY SIERRA BSO, TUMOR DEBULKING: ILEOCECAL RESECTION; OMENTECTOMY 9/25/19 Surgery done for obstruction. Patient choose to have chemo locally and still see [address] [facility]. She went to [address] on 7/9/20 for laparoscopic surgery Discussed with Dr. Mahsa Lugos is not an option - would be too risky. Was back to [address] [facility] 10/20 and CTs there with slight progression. added oxaliplatin back in since pt has not failed this regimen. Seen today prior to chemo. Feeling fine overall. neuropathy worse in feet at night. Still with low appetite. Tries to eat but sometimes has to take pain meds with eating. Patient is clinically stable overall today. Wants to do chemo today. Plan is for FOLFOX modified x 4 then re eval.   
We discussed if neuropathy gets worse, we could drop oxali and switch to irinotecan. Pt is good with this plan. [facility]. Labs pending today. 2) Left Pleural Effusion post Thoracentesis x 2 No cytology. Stable on CT. 3) Hx of Nausea and Vomiting Continue anti-emetics as needed. Takes pain meds with eating. 4) Abdominal Pain/Cramping Likely d/t carcinomatosis. Prn pain meds. 5) Chemo Induced Diarrhea Well controlled with PRN Imodium and lomotil. Will continue to monitor. 6) Chemo Induced Neuropathy Worse now. Try neurontin at night. 7) Insomnia/ anxiety about health. Takes Ativan PRN which helps. 8) Psychosocial 
Mood good, coping well considering difficult disease. She has great family support. SW support as needed. She is here alone today d/t COIVD. 9) hematuria. Stopped now. Seen by urology. Just monitor for now. Call if questions. Follow up in 2 weeks in [facility]/office. I appreciate the opportunity to participate in Ms. Kaci Wells macario.  
 
Signed By: Jean Marie Bell,

## 2020-11-07 NOTE — PROGRESS NOTES
[facility] Short Note                       Date: 2020    Name: Fran Marcum    MRN: [id number]         : 1970      1705 Pt admit to [facility] for pump removal ambulatory in stable condition. Assessment completed. No new concerns voiced. Ms. Samia Mosley vitals were reviewed prior to and after treatment. Patient Vitals for the past 12 hrs:   Temp Pulse Resp BP   20 1708 98.6 °F (37 °C) 78 16 120/78     Medications given: PT REFUSED HYDRATION  Medications Administered     heparin (porcine) pf 300-500 Units     Admin Date  2020 Action  Given Dose  500 Units Route  InterCATHeter Administered By  Almas Shelton RN          saline peripheral flush soln 10 mL     Admin Date  2020 Action  Given Dose  10 mL Route  InterCATHeter Administered By  Almas Shelton RN              Port maintained positive blood return. Port flushed, heparinized and de-accessed per protocol. Ms. Kike Eisenberg tolerated the infusion, had no complaints, and was discharged from [place] [address] in stable condition at 1710.      Future Appointments   Date Time Provider Thomas Espinoza   2020 10:00 AM A2 DIANNE LONG [address] [facility] [place]   2020 10:15 AM Negrita Amador  [address] BS AMB   2020  4:30 PM G2 DIANNE FASTRACK [facility]   2020 10:00 AM A2 DIANNE LONG [address] [facility]   2020  5:00 PM H2 DIANNE FASTRACK [facility]   2020 10:00 AM A2 DIANNE LONG [place] [facility]   2020  5:00 PM H2 DIANNE FASTRACK [facility]       Nellie Falcon RN  2020  7:11 PM

## 2020-11-18 PROBLEM — F41.8 ANXIETY ABOUT HEALTH: Status: ACTIVE | Noted: 2020-01-01

## 2020-11-18 PROBLEM — R63.4 WEIGHT LOSS: Status: ACTIVE | Noted: 2020-01-01

## 2020-11-18 PROBLEM — Z87.448 HISTORY OF HEMATURIA: Status: ACTIVE | Noted: 2020-01-01

## 2020-11-18 PROBLEM — R63.0 POOR APPETITE: Status: ACTIVE | Noted: 2020-01-01

## 2020-11-18 NOTE — PROGRESS NOTES
[facility] at [facility] 
[address], [place] [address] [facility], [address]: [phone]  F: [phone] HEME/ONC FOLLOW UP 
 
Reason for Visit:  
Ricky Alberto is a 44 y.o. 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 Treatment History:  
· EXPLORATORY LAPAROTOMY SIERRA BSO, TUMOR DEBULKING: ILEOCECAL RESECTION; OMENTECTOMY · FOLFOX 50% DR 12/3/19 - 3/4/20 · Per [facility] recommendation, dropped 5FU Bolus/Leucovorin and increased Oxaliplatin to 60 mg/m2 and 5FU pump tp 1,800 mg/m2 with Cycle 8 on 3/18/20 · Oxaliplatin increased to 85 mg/m2 with Cycle 10 on 4/15/20 - 5/27/20 
· CTs C/A/P 3/20 stable · CTs C/A/P 5/26/20 stable · Chemo held on 6/20/20 d/t thrombocytopenia · Oxaliplatin DR 25% with Cycle 14 on 6/17/20 d/t thrombocytopenia · Oxaliplatin DR 25% more (50% total) with Cycle 15 on 7/8/20 · Chemo held on 7/22/20 d/t thrombocytopenia · CT C/A/P 7/29/20 at [facility] with re demonstrated infiltrative tissue in the pelvis, favored to represented peritoneal carcinomatosis; soft tissue tethering to the colon, multiple loops of small bowel, and the urinary bladder; redemonstrated ill-defined complex fluid in the left upper quadrant - not significantly changed relative to most recent prior study; few clustered small pulmonary nodules are identified in the right lower lobe · Exploratory lap at [facility] 8/20. · Maintenance 5FU (pump only) 8/26/20 - 9/23/20 · CTs 10/20 at [facility] showed slight progression · Added Oxaliplatin back in on 10/7/20 - Current STAGE: 4 with carcinomatosis History of Present Illness:  
Ricky Alberto is a 44 y.o. female seen today in office for follow up of metastatic appendix cancer on FOLFOX per [facility]. She is here today for Cycle 23 of chemo. She reports that she is feeling well overall today. Chemo side effects lasted a little longer than usual after last chemo.  She continues to have nausea, controlled with PRN Compazine. She says that Zofran doesn't really help. She has had no vomiting. She reports that diarrhea has resolved and she now has slight constipation. She reports that pain is mostly controlled on Roxicodone. She takes this prior to eating which helps. Appetite is still low overall but she is \"working on it. \" She continues to have neuropathy but has not started taking the Gabapentin yet. Neuropathy is no worse. She is sleeping pretty well, takes Ativan at night. She denies any new pain. She denies fever, chills, mouth sores, cough, SOB, CP, vomiting, and diarrhea. CBC is stable and CMP is still pending today. She is here alone today. Past Medical History:  
Diagnosis Date  Cancer ([facility] APPENDIX CANCER   
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  
Procedure Laterality Date [address] [facility] UNLISTED  HX APPENDECTOMY  09/2019  HX GI  09/2019 ILEOCECECTOMY  HX GYN  2003 CYST ON OVARY  HX HYSTERECTOMY  2019  IR THORACENTESIS CATH W IMAGE  11/6/2019 Social History Tobacco Use  Smoking status: Never Smoker  Smokeless tobacco: Never Used Substance Use Topics  Alcohol use: Not Currently Family History Problem Relation Age of Onset  Breast Cancer Paternal Grandmother 56  Crohn's Disease Mother  Heart Disease Mother  Cancer Father BLADDER  
 Diabetes Father  No Known Problems Son  No Known Problems Daughter  Anesth Problems Neg Hx Current Outpatient Medications Medication Sig  
 gabapentin (NEURONTIN) 100 mg capsule Take 1 Cap by mouth nightly. Max Daily Amount: 100 mg. Indications: neuropathic pain  oxyCODONE IR (ROXICODONE) 5 mg immediate release tablet Take 1 Tab by mouth every four (4) hours as needed for Pain for up to 30 days. Max Daily Amount: 30 mg.  LORazepam (ATIVAN) 0.5 mg tablet Take 1-2 Tabs by mouth two (2) times daily as needed for Anxiety. Max Daily Amount: 2 mg. Indications: anxious, nausea and vomiting caused by cancer drugs, difficulty sleeping  megestroL (MEGACE) 400 mg/10 mL (10 mL) suspension Take 5 mL by mouth two (2) times a day.  diphenoxylate-atropine (LomotiL) 2.5-0.025 mg per tablet Take 2 Tabs by mouth four (4) times daily as needed for Diarrhea. Max Daily Amount: 8 Tabs. Indications: diarrhea caused by chemotherapy  simethicone (Gas-X) 125 mg capsule Take 125 mg by mouth four (4) times daily as needed for Flatulence.  hyoscyamine SL (LEVSIN/SL) 0.125 mg SL tablet 0.125 mg by SubLINGual route every four (4) hours as needed for Cramping.  prochlorperazine (Compazine) 5 mg tablet Take 1 tab by mouth every 6 hours as needed for nausea or vomiting  potassium chloride (KLOR-CON) 10 mEq tablet Take 1 Tab by mouth two (2) times a day.  lidocaine-prilocaine (EMLA) topical cream Apply  to affected area as needed for Pain.  ondansetron (ZOFRAN ODT) 4 mg disintegrating tablet Take 1 Tab by mouth every eight (8) hours as needed for Nausea. (Patient taking differently: Take 8 mg by mouth every eight (8) hours as needed for Nausea. Indications: prevent nausea and vomiting from cancer chemotherapy)  dexAMETHasone (DECADRON) 4 mg tablet Take 2 tabs (8mg) by mouth daily on days 2 and 3 after chemotherapy  acetaminophen (TYLENOL) 325 mg tablet Take 650 mg by mouth every four (4) hours as needed for Pain. Indications: pain  ibuprofen (MOTRIN) 200 mg tablet Take 3 Tabs by mouth every eight (8) hours as needed for Pain. No current facility-administered medications for this visit. Facility-Administered Medications Ordered in Other Visits Medication Dose Route Frequency  sodium chloride 0.9 % bolus infusion 1,000 mL  1,000 mL IntraVENous ONCE  
 dextrose 5% infusion  25 mL/hr IntraVENous CONTINUOUS  
 ondansetron (ZOFRAN) injection 8 mg  8 mg IntraVENous ONCE  
 dexamethasone (DECADRON) 12 mg in 0.9% sodium chloride 50 mL, overfill volume 5 mL IVPB  12 mg IntraVENous ONCE  
 oxaliplatin (ELOXATIN) 102 mg in dextrose 5% 250 mL, overfill volume 25 mL chemo infusion  68 mg/m2 (Treatment Plan Recorded) IntraVENous ONCE  
 fluorouraciL (ADRUCIL) 2,700 mg in 0.9% sodium chloride 100 mL CADD Cassette  1,800 mg/m2 (Treatment Plan Recorded) IntraVENous ONCE  
 saline peripheral flush soln 10 mL  10 mL InterCATHeter PRN  
 0.9% sodium chloride injection 10 mL  10 mL IntraVENous PRN No Known Allergies A complete review of systems was obtained, negative except as described above and as reported on ROS sheet scanned into system. Review of Systems Constitutional: Positive for appetite change. Gastrointestinal: Positive for abdominal pain and nausea. Neurological: Positive for numbness. Psychiatric/Behavioral: Positive for sleep disturbance. All other systems reviewed and are negative. Physical Exam:  
 
Visit Vitals /78 (BP 1 Location: Left arm, BP Patient Position: Sitting) Pulse 76 Temp 97.6 °F (36.4 °C) (Temporal) Ht 5' 3\" (1.6 m) Wt 102 lb 3.2 oz (46.4 kg) LMP  (LMP Unknown) SpO2 98% BMI 18.10 kg/m² ECOG PS: 1-2 General: No distress, wearing a mask Eyes: Anicteric sclerae HENT: Atraumatic Neck: Supple Resp: Normal respiratory effort, CTAB 
CV: Regular GI: Soft, less distended MS: Ambulatory Skin: Warm dry. Port site without redness/swelling Psych: Alert, oriented, appropriate affect, normal judgment/insight Results:  
 
Lab Results Component Value Date/Time  WBC 4.8 11/18/2020 10:13 AM  
 HGB 11.4 (L) 11/18/2020 10:13 AM  
 HCT 34.2 (L) 11/18/2020 10:13 AM  
 PLATELET 444 52/95/7768 10:13 AM  
 MCV 98.6 11/18/2020 10:13 AM  
 ABS. NEUTROPHILS 2.3 11/18/2020 10:13 AM  
 Hemoglobin (POC) 10.8 (L) 09/25/2019 04:11 PM  
 Hematocrit (POC) 32 (L) 09/25/2019 12:10 PM  
 
Lab Results Component Value Date/Time Sodium 141 11/18/2020 10:13 AM  
 Potassium 3.4 (L) 11/18/2020 10:13 AM  
 Chloride 112 (H) 11/18/2020 10:13 AM  
 CO2 24 11/18/2020 10:13 AM  
 Glucose 92 11/18/2020 10:13 AM  
 BUN 7 11/18/2020 10:13 AM  
 Creatinine 0.48 (L) 11/18/2020 10:13 AM  
 GFR est AA >60 11/18/2020 10:13 AM  
 GFR est non-AA >60 11/18/2020 10:13 AM  
 Calcium 8.9 11/18/2020 10:13 AM  
 Sodium (POC) 137 09/25/2019 12:10 PM  
 Potassium (POC) 3.7 09/25/2019 12:10 PM  
 Chloride (POC) 105 09/25/2019 12:10 PM  
 Glucose (POC) 85 09/25/2019 12:10 PM  
 BUN (POC) 4 (L) 09/25/2019 12:10 PM  
 Creatinine (POC) 0.5 (L) 09/25/2019 12:10 PM  
 Calcium, ionized (POC) 1.14 09/25/2019 12:10 PM  
 
Lab Results Component Value Date/Time Bilirubin, total 0.4 11/18/2020 10:13 AM  
 ALT (SGPT) 25 11/18/2020 10:13 AM  
 Alk. phosphatase 118 (H) 11/18/2020 10:13 AM  
 Protein, total 6.7 11/18/2020 10:13 AM  
 Albumin 3.2 (L) 11/18/2020 10:13 AM  
 Globulin 3.5 11/18/2020 10:13 AM  
 
CT Results (most recent): 
Results from Hospital Encounter encounter on 05/26/20 CT ABD PELV W CONT Narrative INDICATION:   Appendiceal cancer EXAM:  CT chest, abdomen, and pelvis WITH  CONTRAST 
 
COMPARISON:  CT chest 11/26/2019, CT abdomen and pelvis 12/18/2010 TECHNIQUE:  Thin collimation axial images were obtained through the chest, 
abdomen, and pelvis with IV contrast administration. Coronal and sagittal 
reconstructions were obtained. Oral contrast was not administered. CT dose 
reduction was achieved through use of a standardized protocol tailored for this 
examination and automatic exposure control for dose modulation.   
 
FINDINGS: 
 
Chest: 
 
 LUNGS: Left lower lobe basilar subsegmental atelectasis. No other significant 
abnormality. Central airways are patent LYMPH NODES: No thoracic lymphadenopathy. PLEURAL FLUID: No pleural effusion. PERICARDIAL FLUID: No pericardial effusion. THYROID: No thyroid nodule. OTHER: Left chest port extends to the SVC Abdomen: LIVER: 2.5 cm left hepatic lobe cyst. No other focal liver abnormality GALLBLADDER: Small calcification along the nondependent wall. The gallbladder is 
otherwise unremarkable SPLEEN: Anterior perisplenic of low-density/fluid and gas appears smaller 
measuring 5.6 x 3.7 cm but is incompletely evaluated on this noncontrast exam. 
No other splenic abnormality PANCREAS: No mass or ductal dilatation. ADRENALS: Unremarkable. KIDNEYS/URETERS: Normal symmetric nephrograms without evidence for  focal mass. No hydronephrosis PERITONEUM: No abdominal lymphadenopathy or ascites. COLON: Nonspecific bowel wall thickening involving the left colon APPENDIX: Not seen SMALL BOWEL: No dilatation or wall thickening. STOMACH: Unremarkable. Pelvis: PELVIS: No pelvic lymphadenopathy or free fluid. BONES: No destructive bone lesion. ADDITIONAL COMMENTS: N/A Impression IMPRESSION: 
 
Decrease in size of anterior perisplenic low-density/fluid and gas which is 
predominantly phlegmon. If there is concern for peritoneal carcinomatosis, 
consider PET/CT. No obvious metastasis. Mild nonspecific colitis involving the left colon Left lower lobe subsegmental atelectasis. 23X Records reviewed and summarized above. Pathology report(s) reviewed above. Radiology report(s) reviewed above. Assessment/PLAN:  
 
1) Stage 4 / Metastatic Appendiceal Cancer Post EXPLORATORY LAPAROTOMY SIERRA BSO, TUMOR DEBULKING: ILEOCECAL RESECTION; OMENTECTOMY 9/25/19 Surgery done for obstruction. Patient choose to have chemo locally and still see [address] [facility]. She went to [address] on 7/9/20 for laparoscopic surgery Discussed with Dr. Burleson [place] is not an option - would be too risky. She went back to [facility] in 10/20 and CTs there with slight progression. Added Oxaliplatin back in since patient has not failed this regimen. She is here today for Cycle 23 of modified FOLFOX. She is clinically stable today - feeling okay overall. She continues to have neuropathy and is taking Gabapentin. She continues to have decreased appetite overall. We discussed if neuropathy gets worse, we could drop Oxali and switch to Irinotecan. Next CTs will be around due around [address]. Would get CTs as [facility] as last CTs done there. CBC is stable/good today. CMP is still pending. Patient is ready for treatment pending labs. Follow up in 2 weeks. Patient agrees with plan. 2) Left Pleural Effusion post Thoracentesis x 2 No cytology. Stable on CTs.  
 
3) Chemo Induced Nausea Continue anti-emetics as needed. Compazine works better than Zofran. 4) Abdominal Pain/Cramping Likely d/t carcinomatosis. Continue Roxicodone PRN - patient takes prior to eating which helps. 5) Chemo Induced Diarrhea Resolved - now having constipation. Will continue to monitor. 6) Chemo Induced Neuropathy Script for Gabapentin 100 mg QHS - has not started this yet. Stable/unchanged. 7) Insomnia/Anxiety about Health. Takes Ativan PRN which helps 8) Hx of Hematuria Resolved now. She was seen by Urology. Continue to monitor for now. 9) Psychosocial 
Mood good, coping well considering difficult disease. She has great family support. SW support as needed. She is here alone today d/t COIVD. Call if questions. Follow up in 2 weeks in [facility]/office. This patient was seen in conjunction with Aurora Murphy NP.  
I personally reviewed the history and all points in the assessment and plan with the patient, and performed the key points on the exam.  
 I appreciate the opportunity to participate in MsLaura Amie Hays macario.  
 
Signed By: Anais Hoffman, DO

## 2020-11-18 NOTE — PROGRESS NOTES
Katerina Ward is a 44 y.o. female Chief Complaint Patient presents with  Chemotherapy  
   metastatic appendix cancer 1. Have you been to the ER, urgent care clinic since your last visit? Hospitalized since your last visit? No. 
 
2. Have you seen or consulted any other health care providers outside of the [address] [place] [address] since your last visit? Include any pap smears or colon screening.  No.

## 2020-11-18 NOTE — PROGRESS NOTES
[facility] Chemo Progress Note Date: 2020 Name: Natalia Box MRN: [id number] : 1970 
 
1010 Ms. Gael Bhatt Arrived to [facility] for  C23 Folfox ambulatory in stable condition. Assessment was completed, no acute issues at this time, no new complaints voiced. Port accessed with positive blood return. Labs drawn and sent for processing. Patient to MD office. Patient returned to [facility], Normal Saline started at [facility]. Chemotherapy Flowsheet 2020 Cycle C23 Date 2020 Drug / Regimen Folfox Dosage - Time Up - Time Down -  
Pre Hydration given Post Hydration -  
Pre Meds given Notes given Ms. Lynn's vitals were reviewed. Patient Vitals for the past 12 hrs: 
 Temp Pulse Resp BP  
20 1617  76 18 99/67  
20 1012 97.8 °F (36.6 °C) 69 18 115/70 Lab results were obtained and reviewed. Recent Results (from the past 12 hour(s)) CBC WITH AUTOMATED DIFF Collection Time: 20 10:13 AM  
Result Value Ref Range WBC 4.8 3.6 - 11.0 K/uL  
 RBC 3.47 (L) 3.80 - 5.20 M/uL  
 HGB 11.4 (L) 11.5 - 16.0 g/dL HCT 34.2 (L) 35.0 - 47.0 % MCV 98.6 80.0 - 99.0 FL  
 MCH 32.9 26.0 - 34.0 PG  
 MCHC 33.3 30.0 - 36.5 g/dL  
 RDW 15.9 (H) 11.5 - 14.5 % PLATELET 049 418 - 275 K/uL MPV 9.5 8.9 - 12.9 FL  
 NRBC 0.0 0  WBC ABSOLUTE NRBC 0.00 0.00 - 0.01 K/uL NEUTROPHILS 47 32 - 75 % LYMPHOCYTES 33 12 - 49 % MONOCYTES 19 (H) 5 - 13 % EOSINOPHILS 1 0 - 7 % BASOPHILS 0 0 - 1 % IMMATURE GRANULOCYTES 0 0.0 - 0.5 % ABS. NEUTROPHILS 2.3 1.8 - 8.0 K/UL  
 ABS. LYMPHOCYTES 1.6 0.8 - 3.5 K/UL  
 ABS. MONOCYTES 0.9 0.0 - 1.0 K/UL  
 ABS. EOSINOPHILS 0.0 0.0 - 0.4 K/UL  
 ABS. BASOPHILS 0.0 0.0 - 0.1 K/UL  
 ABS. IMM. GRANS. 0.0 0.00 - 0.04 K/UL  
 DF AUTOMATED METABOLIC PANEL, COMPREHENSIVE Collection Time: 20 10:13 AM  
Result Value Ref Range  Sodium 141 136 - 145 mmol/L  
 Potassium 3.4 (L) 3.5 - 5.1 mmol/L Chloride 112 (H) 97 - 108 mmol/L  
 CO2 24 21 - 32 mmol/L Anion gap 5 5 - 15 mmol/L Glucose 92 65 - 100 mg/dL BUN 7 6 - 20 MG/DL Creatinine 0.48 (L) 0.55 - 1.02 MG/DL  
 BUN/Creatinine ratio 15 12 - 20 GFR est AA >60 >60 ml/min/1.73m2 GFR est non-AA >60 >60 ml/min/1.73m2 Calcium 8.9 8.5 - 10.1 MG/DL Bilirubin, total 0.4 0.2 - 1.0 MG/DL  
 ALT (SGPT) 25 12 - 78 U/L  
 AST (SGOT) 25 15 - 37 U/L Alk. phosphatase 118 (H) 45 - 117 U/L Protein, total 6.7 6.4 - 8.2 g/dL Albumin 3.2 (L) 3.5 - 5.0 g/dL Globulin 3.5 2.0 - 4.0 g/dL A-G Ratio 0.9 (L) 1.1 - 2.2 Pre-medications  were administered as ordered and chemotherapy was initiated. Medications Administered 0.9% sodium chloride injection 10 mL Admin Date 11/18/2020 Action Given Dose 
10 mL Route IntraVENous Administered By 
Fabiola Harley RN  
  
  
 dexamethasone (DECADRON) 12 mg in 0.9% sodium chloride 50 mL, overfill volume 5 mL IVPB Admin Date 11/18/2020 Action Given Dose 
12 mg Rate 232 mL/hr Route IntraVENous Administered By 
Fabiola Harley RN  
  
  
 dextrose 5% infusion Admin Date 11/18/2020 Action New Bag Dose 25 mL/hr Rate 25 mL/hr Route IntraVENous Administered By 
Fabiola Harley RN  
  
  
 fluorouraciL (ADRUCIL) 2,700 mg in 0.9% sodium chloride 100 mL CADD Cassette Admin Date 11/18/2020 Action New Bag Dose 
2700 mg Rate 2.2 mL/hr Route IntraVENous Administered By 
Fabiola Harley RN  
  
  
 ondansetron [facility]) injection 8 mg Admin Date 11/18/2020 Action Given Dose 8 mg Route IntraVENous Administered By 
Fabiola Harley RN  
  
  
 oxaliplatin (ELOXATIN) 102 mg in dextrose 5% 250 mL, overfill volume 25 mL chemo infusion Admin Date 11/18/2020 Action New Bag Dose 
102 mg Rate 
147.7 mL/hr Route IntraVENous Administered By 
Fabiola Harley RN  
  
  
 saline peripheral flush soln 10 mL Admin Date 11/18/2020 Action Given Dose 
10 mL Route InterCATHeter Administered By 
Priscilla Jha RN  
  
  
 sodium chloride 0.9 % bolus infusion 1,000 mL Admin Date 11/18/2020 Action New Bag Dose 
1000 mL Rate 
1,000 mL/hr Route IntraVENous Administered By 
Priscilla Jha, MING  
  
  
  
 
 
 
8374 Patient tolerated treatment well. Port maintained positive blood return throughout treatment. Port flushed, 5F/U CADD infusing at discharge. Patient was discharged from [place] [address]. Future Appointments Date Time Provider Thomas Espinoza 11/20/2020  4:30 PM G2 DIANNE FASTRACK [facility]  
12/2/2020 10:00 AM A2 DIANNE LONG [address] [facility]  
12/2/2020 10:45 AM Sabas Hull  [address] BS AMB  
12/4/2020  5:00 PM H2 DIANNE FASTRACK [facility]  
12/16/2020 10:00 AM A2 DIANNE LONG [facility]  
12/18/2020  5:00 PM H2 DIANNE FASTRACK [facility]  
12/30/2020  8:30 AM F1 DIANNE LONG [place] [facility]  
1/1/2021 10:00 AM H2 DIANNE FASTRACK [facility] Fely Ham RN November 18, 2020

## 2020-11-23 NOTE — PROGRESS NOTES
Attempted to reach patient on home phone number listed, no answer. Left a voicemail to give the office a call back.  
José Manuel Cunha

## 2020-11-24 NOTE — PROGRESS NOTES
JUDIE Verified. Patient stated she was already told this update but was appreciative.  
Marielle Conti

## 2020-12-02 NOTE — TELEPHONE ENCOUNTER
Spoke to Pt's  on the phone, Alejandro Javi verified, he reports Pt has taken Zofran and gone back to sleep. She would like to try to stay at home and manage symptom if possible.  reports he will see how she feels after she wakes up. He reports she has been eating and drinking. They are very conscious of staying hydrated and not getting constipated, and also of making sure she eats before taking any pain meds. He reports they are going to [address] next week for scans so would like to cancel treatment today and just come back in 2 weeks as scheduled. I will notify [facility]. Pt's  reports he will call back later this afternoon to update us. Advised to call if any changes.

## 2020-12-06 PROBLEM — R50.9 FEVER: Status: ACTIVE | Noted: 2020-01-01

## 2020-12-06 PROBLEM — R10.9 ABDOMINAL PAIN: Status: ACTIVE | Noted: 2020-01-01

## 2020-12-06 NOTE — PROGRESS NOTES
Day #1 of zosyn  Indication:  sepsis  Current regimen:  4.5 gm q8h  Abx regimen: zosyn  Recent Labs     20  1446   WBC 9.7   CREA 0.51*   BUN 9     Est CrCl: 60-70 ml/min  Temp (24hrs), Av.9 °F (37.2 °C), Min:98.9 °F (37.2 °C), Max:98.9 °F (37.2 °C)      Plan: Change to 3.375 gm q8h per protocol

## 2020-12-06 NOTE — ED PROVIDER NOTES
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 She has had a history of a subphrenic abscess as well and symptomatic cholelithiasis. She is currently undergoing chemotherapy with Dr. Adry De La Cruz. Over the last 2 weeks she has been complaining of intermittent severe abdominal pain more in the upper portion of the abdomen radiating to the left. This is more significant than what she has had previously. There is been in the past some nausea and vomiting over the last week or 2, however, today she has had only nausea. She was also being followed down at [facility]. She was due to have a CT scan of her abdomen and pelvis this coming week but last night she began running a temperature to 101-1/2 which was very concerning to them. This was new and different. She had no symptoms of cough or congestion, headache, diarrhea or urinary symptoms. She has not been around anyone who has been ill. I spoke with the on-call for Dr. Alanis Hogan and felt it with the fever they could not wait to be seen down in [address] [place] [address] at [facility]. They presented here for further evaluation of her fever and abdominal pain. I explained to them that we may need to do a scan here in which case would be able to give them a copy of the same to take to [address] [place] [address]. They are amenable with that idea.            Past Medical History:   Diagnosis Date    Cancer [facility])     APPENDIX CANCER     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 ([address] 2019    Nausea & vomiting     Nausea and vomiting 12/9/2019    Splenic infarction 2019    Subphrenic abscess ([address] 1/8/2020    Symptomatic cholelithiasis 9/18/2019       Past Surgical History:   Procedure Laterality Date    ABDOMEN SURGERY PROC UNLISTED      HX APPENDECTOMY  09/2019    HX GI  09/2019    ILEOCECECTOMY    HX GYN  2003    CYST ON OVARY    HX HYSTERECTOMY  2019    IR THORACENTESIS CATH W IMAGE  11/6/2019         Family History:   Problem Relation Age of Onset    Breast Cancer Paternal Grandmother 61    Crohn's Disease Mother     Heart Disease Mother     Cancer Father         BLADDER    Diabetes Father     No Known Problems Son     No Known Problems Daughter     Anesth Problems Neg Hx        Social History     Socioeconomic History    Marital status:      Spouse name: Not on file    Number of children: Not on file    Years of education: Not on file    Highest education level: Not on file   Occupational History    Not on file   Social Needs    Financial resource strain: Not on file    Food insecurity     Worry: Not on file     Inability: Not on file   [employer] needs     Medical: Not on file     Non-medical: Not on file   Tobacco Use    Smoking status: Never Smoker    Smokeless tobacco: Never Used   Substance and Sexual Activity    Alcohol use: Not Currently    Drug use: Not Currently    Sexual activity: Not on file   Lifestyle    Physical activity     Days per week: Not on file     Minutes per session: Not on file    Stress: Not on file   Relationships    Social connections     Talks on phone: Not on file     Gets together: Not on file     Attends Ep[employer] service: Not on file     Active member of club or organization: Not on file     Attends meetings of clubs or organizations: Not on file     Relationship status: Not on file    Intimate partner violence     Fear of current or ex partner: Not on file     Emotionally abused: Not on file     Physically abused: Not on file     Forced sexual activity: Not on file   Other Topics Concern    Not on file   Social History Narrative    Not on file         ALLERGIES: Patient has no known allergies. Review of Systems   Constitutional: Positive for chills and fever. Negative for activity change, appetite change and fatigue. HENT: Negative for ear pain, facial swelling, sore throat and trouble swallowing. Eyes: Negative for pain, discharge and visual disturbance. Respiratory: Negative for chest tightness, shortness of breath and wheezing. Cardiovascular: Negative for chest pain and palpitations. Gastrointestinal: Positive for abdominal pain, nausea and vomiting. Negative for blood in stool. Genitourinary: Negative for difficulty urinating, flank pain and hematuria. Musculoskeletal: Negative for arthralgias, joint swelling, myalgias and neck pain. Skin: Negative for color change and rash. Neurological: Negative for dizziness, weakness, numbness and headaches. Hematological: Negative for adenopathy. Does not bruise/bleed easily. Psychiatric/Behavioral: Negative for behavioral problems, confusion and sleep disturbance. All other systems reviewed and are negative. Vitals:    12/06/20 1434   BP: 102/69   Pulse: (!) 107   Resp: 15   Temp: 98.9 °F (37.2 °C)   SpO2: 95%   Weight: 44.5 kg (98 lb)   Height: 5' 3\" (1.6 m)            Physical Exam  Vitals signs and nursing note reviewed. Constitutional:       General: She is in acute distress ( Appears uncomfortable with left flank pain. ). Appearance: She is well-developed. She is ill-appearing ( Jaundiced). She is not diaphoretic. HENT:      Head: Normocephalic and atraumatic. Nose: Nose normal.   Eyes:      General: No scleral icterus. Conjunctiva/sclera: Conjunctivae normal.      Pupils: Pupils are equal, round, and reactive to light. Neck:      Musculoskeletal: Normal range of motion and neck supple. Thyroid: No thyromegaly. Vascular: No JVD. Trachea: No tracheal deviation. Comments: No carotid bruits noted. Cardiovascular:      Rate and Rhythm: Normal rate and regular rhythm. Heart sounds: Normal heart sounds. No murmur. No friction rub. No gallop. Pulmonary:      Effort: Pulmonary effort is normal. No respiratory distress. Breath sounds: Normal breath sounds. No wheezing or rales. Chest:      Chest wall: No tenderness.    Abdominal:      General: Bowel sounds are decreased. There is no distension. Palpations: Abdomen is soft. There is no mass. Tenderness: There is abdominal tenderness in the left upper quadrant. There is guarding. There is no rebound. Musculoskeletal: Normal range of motion. General: No tenderness. Lymphadenopathy:      Cervical: No cervical adenopathy. Skin:     General: Skin is warm and dry. Capillary Refill: Capillary refill takes 2 to 3 seconds. Coloration: Skin is jaundiced. Findings: No erythema or rash. Neurological:      General: No focal deficit present. Mental Status: She is alert and oriented to person, place, and time. Cranial Nerves: No cranial nerve deficit. Coordination: Coordination normal.      Deep Tendon Reflexes: Reflexes are normal and symmetric. Psychiatric:         Behavior: Behavior normal.         Thought Content: Thought content normal.         Judgment: Judgment normal.          MDM  Number of Diagnoses or Management Options  Abdominal pain, LLQ (left lower quadrant): new and requires workup  Fever, unspecified fever cause: new and requires workup     Amount and/or Complexity of Data Reviewed  Clinical lab tests: ordered and reviewed  Tests in the radiology section of CPT®: ordered and reviewed  Decide to obtain previous medical records or to obtain history from someone other than the patient: yes  Obtain history from someone other than the patient: yes  Review and summarize past medical records: yes  Discuss the patient with other providers: yes  Independent visualization of images, tracings, or specimens: yes    Risk of Complications, Morbidity, and/or Mortality  Presenting problems: high  Diagnostic procedures: high  Management options: high    Patient Progress  Patient progress: stable         Procedures    The patient's temperature was 101.3 at home. The  gave her 3 extra strength Tylenol.   Her temperature here was normal.  She is pale and icteric appearing. She is currently not having a spasm of pain in her stomach but states that it usually in the left upper quadrant. She has some localized tenderness to palpation in the left upper quadrant and left flank. The remainder of her abdomen is soft and not particularly tender. We will obtain labs, plain x-ray and an influenza swab. If no clear etiology of symptoms is noted, will consult oncology and likely do an abdominal CT with contrast.  Patient and  are okay with that and we can give them copies of the films to take with him to [place] this coming week. 3:55 PM on the portable film, there is a question of whether this patient may have either skinfold or pneumothorax. The radiologist is spoken with the x-ray technician and will obtain a slightly different view. Will then consult Dr. Guillermo Ireland    I have spoken with on-call for Dr. Guillermo Ireland and they are concerned about the fever and would like the patient admitted for further evaluation. Patient will need a CT of the abdomen and pelvis and she is requesting oral contrast as well. The patient states that it do the only use the IV contrast.  IV and oral have been ordered. A consult will be placed with the hospitalist to evaluate for admission and continued evaluation and care. Perfect Serve Consult for Admission  5:27 PM    ED Room Number: ER06/06  Patient Name and age:  Swetha Amador 44 y.o.  female  Working Diagnosis:   1. Abdominal pain, LLQ (left lower quadrant)    2.  Fever, unspecified fever cause        COVID-19 Suspicion:  no  Sepsis present:  no  Reassessment needed: yes  Code Status:  Full Code  Readmission: no  Isolation Requirements:  no  Recommended Level of Care:  med/surg  Department:[facility] Adult ED - 21   Other:

## 2020-12-06 NOTE — ED TRIAGE NOTES
Pt ambulatory to ED accompanied by  with c/o upper abdominal pain onset 2 weeks ago with nausea. Fever up to 101.6 last night. Pt has hx of cancer of appendix.

## 2020-12-06 NOTE — H&P
History & Physical    Primary Care Provider: Thierno Lamar MD  Source of Information: Patient     History of Presenting Illness:   Kaaren Crigler is a 44 y.o. 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 She has had a history of a subphrenic abscess as well and symptomatic cholelithiasis. She is currently undergoing chemotherapy with Dr. Jose Brugess. Over the last 2 weeks she has been complaining of intermittent severe abdominal pain more in the upper portion of the abdomen radiating to the left. This is more significant than what she has had previously. There is been in the past some nausea and vomiting over the last week or 2, however, today she has had only nausea. She was also being followed down at [facility]. She was due to have a CT scan of her abdomen and pelvis this coming week but last night she began running a temperature to 101-1/2 which was very concerning to them. + chills. This was new and different. She had no symptoms of cough or congestion, headache, diarrhea or urinary symptoms. She has not been around anyone who has been ill.  spoke with the on-call for Dr. Nithya Hogan and felt it with the fever they could not wait to be seen down in [place] at [facility]. They presented here for further evaluation of her fever and abdominal pain.   Pt said her abdominal pain is more in the LUQ, similar to her previous subphrenic abscess. + nausea, vomiting 3 times no diarrhea      Review of Systems:  General: hPI, no appetite . HEENT: no headache, no vision changes, no nose discharge, no hearing changes   RES: no wheezing, no cough, no sob  CVS: no cp, no palpitation.   Muscular: no joint swelling, no muscle pain, no leg swelling  Skin: no rash, no itching   GI: HPI   : no dysuria, no hematuria  Hemo: no gum bleeding, no petechial   Neuro: no sensation changes, no focal weakness   Endo: no polydipsia   Psych: denied depression     Past Medical History:   Diagnosis Date    Cancer [facility])     APPENDIX CANCER     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 ([facility] 2019    Nausea & vomiting     Nausea and vomiting 12/9/2019    Splenic infarction 2019    Subphrenic abscess ([facility] 1/8/2020    Symptomatic cholelithiasis 9/18/2019      Past Surgical History:   Procedure Laterality Date    ABDOMEN SURGERY PROC UNLISTED      HX APPENDECTOMY  09/2019    HX GI  09/2019    ILEOCECECTOMY    HX GYN  2003    CYST ON OVARY    HX HYSTERECTOMY  2019    IR THORACENTESIS CATH W IMAGE  11/6/2019     Prior to Admission medications    Medication Sig Start Date End Date Taking? Authorizing Provider   oxyCODONE IR (ROXICODONE) 5 mg immediate release tablet Take 1 Tab by mouth every four (4) hours as needed for Pain for up to 30 days. Max Daily Amount: 30 mg. 11/25/20 12/25/20  Yue Patricio NP   potassium chloride (KLOR-CON) 10 mEq tablet Take 1 Tab by mouth two (2) times a day. 11/23/20   Yue Zapata NP   LORazepam (ATIVAN) 0.5 mg tablet Take 1-2 Tabs by mouth two (2) times daily as needed for Anxiety. Max Daily Amount: 2 mg. Indications: anxious, nausea and vomiting caused by cancer drugs, difficulty sleeping 11/20/20   Yue Zapata NP   dexAMETHasone (DECADRON) 4 mg tablet Take 2 tabs (8mg) by mouth daily on days 2 and 3 after chemotherapy 11/20/20   Yue Zapata NP   gabapentin (NEURONTIN) 100 mg capsule Take 1 Cap by mouth nightly. Max Daily Amount: 100 mg. Indications: neuropathic pain 11/4/20   Iona Durham DO   megestroL (MEGACE) 400 mg/10 mL (10 mL) suspension Take 5 mL by mouth two (2) times a day. 10/7/20   Iona Durham DO   diphenoxylate-atropine (LomotiL) 2.5-0.025 mg per tablet Take 2 Tabs by mouth four (4) times daily as needed for Diarrhea. Max Daily Amount: 8 Tabs.  Indications: diarrhea caused by chemotherapy 9/18/20   Tanja Bello NP   simethicone (Gas-X) 125 mg capsule Take 125 mg by mouth four (4) times daily as needed for Flatulence. Provider, Historical   hyoscyamine SL (LEVSIN/SL) 0.125 mg SL tablet 0.125 mg by SubLINGual route every four (4) hours as needed for Cramping. Provider, Historical   prochlorperazine (Compazine) 5 mg tablet Take 1 tab by mouth every 6 hours as needed for nausea or vomiting 5/21/20   Tanja Bello NP   lidocaine-prilocaine (EMLA) topical cream Apply  to affected area as needed for Pain. 11/14/19   Tanja Bello NP   ondansetron (ZOFRAN ODT) 4 mg disintegrating tablet Take 1 Tab by mouth every eight (8) hours as needed for Nausea. Patient taking differently: Take 8 mg by mouth every eight (8) hours as needed for Nausea. Indications: prevent nausea and vomiting from cancer chemotherapy 11/14/19   Tanja Bello NP   acetaminophen (TYLENOL) 325 mg tablet Take 650 mg by mouth every four (4) hours as needed for Pain. Indications: pain    Provider, Historical   ibuprofen (MOTRIN) 200 mg tablet Take 3 Tabs by mouth every eight (8) hours as needed for Pain.  10/3/19   Carmenza Laguna PA-C     No Known Allergies   Family History   Problem Relation Age of Onset    Breast Cancer Paternal Grandmother 61    Crohn's Disease Mother     Heart Disease Mother     Cancer Father         BLADDER    Diabetes Father     No Known Problems Son     No Known Problems Daughter     Anesth Problems Neg Hx         SOCIAL HISTORY:  Patient resides:  Independently x   Assisted Living    SNF    With family care       Smoking history:   None x   Former    Chronic      Alcohol history:   None x   Social    Chronic      Ambulates:   Independently x   w/cane    w/walker    w/wc    CODE STATUS:  DNR    Full x   Other      Objective:     Physical Exam:     Visit Vitals  BP 96/76   Pulse (!) 107   Temp 98.9 °F (37.2 °C)   Resp 15   Ht 5' 3\" (1.6 m)   Wt 44.5 kg (98 lb)   LMP  (LMP Unknown)   SpO2 95%   BMI 17.36 kg/m²      O2 Device: Room air    General:  Alert, cooperative, no distress, appears stated age. Head:  Normocephalic, without obvious abnormality, atraumatic. Eyes:  Conjunctivae/corneas clear. PERRL, EOMs intact. Nose: Mask    Throat:    Neck: Supple, symmetrical, trachea midline, no adenopathy, thyroid: no enlargement/tenderness/nodules, no carotid bruit and no JVD. Back:   Symmetric, no curvature. ROM normal. No CVA tenderness. Lungs:   Clear to auscultation bilaterally. Chest wall:  No tenderness or deformity. Heart:  Regular rate and rhythm, S1, S2 normal, no murmur, click, rub or gallop. Abdomen:   Soft, epigastric tenderness, LUQ tender. Bowel sounds normal. No masses,  No organomegaly. Extremities: Extremities normal, atraumatic, no cyanosis or edema. Pulses: 2+ and symmetric all extremities. Skin: Skin color, texture, turgor normal. No rashes or lesions, cap refill normal    Neurologic: CNII-XII intact. 5/5                Data Review:     Recent Days:  Recent Labs     12/06/20  1446   WBC 9.7   HGB 12.9   HCT 37.0        Recent Labs     12/06/20  1446      K 3.1*      CO2 23   *   BUN 9   CREA 0.51*   CA 8.9   ALB 2.7*   ALT 24     No results for input(s): PH, PCO2, PO2, HCO3, FIO2 in the last 72 hours.     24 Hour Results:  Recent Results (from the past 24 hour(s))   CBC WITH AUTOMATED DIFF    Collection Time: 12/06/20  2:46 PM   Result Value Ref Range    WBC 9.7 3.6 - 11.0 K/uL    RBC 3.87 3.80 - 5.20 M/uL    HGB 12.9 11.5 - 16.0 g/dL    HCT 37.0 35.0 - 47.0 %    MCV 95.6 80.0 - 99.0 FL    MCH 33.3 26.0 - 34.0 PG    MCHC 34.9 30.0 - 36.5 g/dL    RDW 15.4 (H) 11.5 - 14.5 %    PLATELET 085 534 - 947 K/uL    MPV 9.9 8.9 - 12.9 FL    NRBC 0.0 0  WBC    ABSOLUTE NRBC 0.00 0.00 - 0.01 K/uL    NEUTROPHILS 86 (H) 32 - 75 %    LYMPHOCYTES 10 (L) 12 - 49 %    MONOCYTES 3 (L) 5 - 13 %    EOSINOPHILS 1 0 - 7 %    BASOPHILS 0 0 - 1 %    IMMATURE GRANULOCYTES 0 %    ABS. NEUTROPHILS 8.3 (H) 1.8 - 8.0 K/UL    ABS. LYMPHOCYTES 1.0 0.8 - 3.5 K/UL    ABS. MONOCYTES 0.3 0.0 - 1.0 K/UL    ABS. EOSINOPHILS 0.1 0.0 - 0.4 K/UL    ABS. BASOPHILS 0.0 0.0 - 0.1 K/UL    ABS. IMM. GRANS. 0.0 K/UL    DF MANUAL      RBC COMMENTS ANISOCYTOSIS  1+        RBC COMMENTS MACROCYTOSIS  1+       METABOLIC PANEL, COMPREHENSIVE    Collection Time: 12/06/20  2:46 PM   Result Value Ref Range    Sodium 137 136 - 145 mmol/L    Potassium 3.1 (L) 3.5 - 5.1 mmol/L    Chloride 103 97 - 108 mmol/L    CO2 23 21 - 32 mmol/L    Anion gap 11 5 - 15 mmol/L    Glucose 120 (H) 65 - 100 mg/dL    BUN 9 6 - 20 MG/DL    Creatinine 0.51 (L) 0.55 - 1.02 MG/DL    BUN/Creatinine ratio 18 12 - 20      GFR est AA >60 >60 ml/min/1.73m2    GFR est non-AA >60 >60 ml/min/1.73m2    Calcium 8.9 8.5 - 10.1 MG/DL    Bilirubin, total 0.6 0.2 - 1.0 MG/DL    ALT (SGPT) 24 12 - 78 U/L    AST (SGOT) 23 15 - 37 U/L    Alk. phosphatase 222 (H) 45 - 117 U/L    Protein, total 7.3 6.4 - 8.2 g/dL    Albumin 2.7 (L) 3.5 - 5.0 g/dL    Globulin 4.6 (H) 2.0 - 4.0 g/dL    A-G Ratio 0.6 (L) 1.1 - 2.2     LIPASE    Collection Time: 12/06/20  2:46 PM   Result Value Ref Range    Lipase 77 73 - 393 U/L   SAMPLES BEING HELD    Collection Time: 12/06/20  2:46 PM   Result Value Ref Range    SAMPLES BEING HELD 1RED     COMMENT        Add-on orders for these samples will be processed based on acceptable specimen integrity and analyte stability, which may vary by analyte.    URINALYSIS W/ RFLX MICROSCOPIC    Collection Time: 12/06/20  2:52 PM   Result Value Ref Range    Color DARK YELLOW      Appearance CLOUDY (A) CLEAR      Specific gravity 1.025      pH (UA) 6.0 5.0 - 8.0      Protein 100 (A) NEG mg/dL    Glucose Negative NEG mg/dL    Ketone TRACE (A) NEG mg/dL    Blood Negative NEG      Urobilinogen 2.0 (H) 0.2 - 1.0 EU/dL    Nitrites Negative NEG      Leukocyte Esterase TRACE (A) NEG      WBC 5-10 0 - 4 /hpf    RBC 0-5 0 - 5 /hpf    Epithelial cells FEW FEW /lpf    Bacteria Negative NEG /hpf    Mucus 2+ (A) NEG /lpf   URINE CULTURE HOLD SAMPLE    Collection Time: 12/06/20  2:52 PM    Specimen: Serum   Result Value Ref Range    Urine culture hold        Urine on hold in Microbiology dept for 2 days. If unpreserved urine is submitted, it cannot be used for addtional testing after 24 hours, recollection will be required. BILIRUBIN, CONFIRM    Collection Time: 12/06/20  2:52 PM   Result Value Ref Range    Bilirubin UA, confirm Negative     LACTIC ACID    Collection Time: 12/06/20  3:39 PM   Result Value Ref Range    Lactic acid 1.6 0.4 - 2.0 MMOL/L   INFLUENZA A+B VIRAL AGS    Collection Time: 12/06/20  3:39 PM   Result Value Ref Range    Influenza A Antigen Negative NEG      Influenza B Antigen Negative NEG           Imaging:   Xr Chest Port    Addendum Date: 12/6/2020    Addendum: Repeat portable chest x-ray was performed, and demonstrates no evidence of pneumothorax. There remains a trace left pleural effusion, improved from the prior study. There is no focal airspace disease. Result Date: 12/6/2020  IMPRESSION: 1. No evidence of pneumonia. 2. Trace left pleural effusion, improved from the prior study. 3. Skinfold versus small right lateral pneumothorax. Repeat chest radiograph recommended. I have telephoned the ER technologist with this request.      Assessment:     Active Problems:    Abdominal pain (12/6/2020)      Fever (12/6/2020)           Plan:     1. Sepsis: hypotensive in ER, noticed sbp 88-89,NS bolus x2 and will re-assess. Infection course unclear for now, UA and cxr unremarkable, no respiratory symptom, could be the abdominal source. Pending abd/pelvic ct. Cultures obtained. I will empirically cover with vanco and zosyn. Sepsis reassessment completed.    2. Hypokalemia: ivf with k  3. appendiceal cancer: oncologist consult        Signed By: Camilo Parson MD     December 6, 2020

## 2020-12-07 PROBLEM — E43 SEVERE PROTEIN-CALORIE MALNUTRITION (HCC): Chronic | Status: ACTIVE | Noted: 2020-01-01

## 2020-12-07 NOTE — ACP (ADVANCE CARE PLANNING)
Advance Care Planning     Advance Care Planning Activator (Inpatient)  Conversation Note      Date of ACP Conversation: 12/07/20     Conversation Conducted with:   Patient with Decision Making Capacity    ACP Activator: Andrew Herrera RN    *When Decision Maker makes decisions on behalf of the incapacitated patient: Decision Maker is asked to consider and make decisions based on patient values, known preferences, or best interests. Health Care Decision Maker:    Current Designated Health Care Decision Maker:   Primary Decision Maker: Ross Paredes - [facility] - [phone]  (If there is a valid Health Care Decision Maker named in the [facility]\" box in the ACP activity, but it is not visible above, be sure to open that field and then select the health care decision maker relationship (ie \"primary\") in the blank space to the right of the name.) Validate  this information as still accurate & up-to-date; edit [place] field as needed.)    Note: Assess and validate information in current ACP documents, as indicated. If no Decision Maker listed above or available through scanned documents, then:    If no Authorized Decision Maker has previously been identified, then patient chooses [place]:  \"Who would you like to name as your primary health care decision-maker? \"    Name: David Webber   Relationship:  Phone number: [phone]  Cristopher Schlatter this person be reached easily? \" YES    Care Preferences    Ventilation: \"If you were in your present state of health and suddenly became very ill and were unable to breathe on your own, what would your preference be about the use of a ventilator (breathing machine) if it were available to you? \"      If patient would desire the use of a ventilator (breathing machine), answer \"yes\", if not \"no\":yes    \"If your health worsens and it becomes clear that your chance of recovery is unlikely, what would your preference be about the use of a ventilator (breathing machine) if it were available to you? \"     Would the patient desire the use of a ventilator (breathing machine)? YES      Resuscitation  \"CPR works best to restart the heart when there is a sudden event, like a heart attack, in someone who is otherwise healthy. Unfortunately, CPR does not typically restart the heart for people who have serious health conditions or who are very sick. \"    \"In the event your heart stopped as a result of an underlying serious health condition, would you want attempts to be made to restart your heart (answer \"yes\" for attempt to resuscitate) or would you prefer a natural death (answer \"no\" for do not attempt to resuscitate)? \" yes  [x] Yes  [] No   Educated Patient / Jamarcus Garciary regarding differences between Advance Directives and portable DNR orders.     Length of ACP Conversation in minutes:   10    Conversation Outcomes:  [x] ACP discussion completed  [] Existing advance directive reviewed with patient; no changes to patient's previously recorded wishes     [] New Advance Directive completed   [] Portable Do Not Resuscitate prepared for Provider review and signature  [] POLST/POST/MOLST/MOST prepared for Provider review and signature      Follow-up plan:    [] Schedule follow-up conversation to continue planning  [] Referred individual to Provider for additional questions/concerns   [] Advised patient/agent/surrogate to review completed ACP document and update if needed with changes in condition, patient preferences or care setting     [] This note routed to one or more involved healthcare providers

## 2020-12-07 NOTE — PROGRESS NOTES
Bedside shift change report given to [facility], RN (oncoming nurse) by [employer], RN (offgoing nurse). Report included the following information SBAR, Kardex, Intake/Output, MAR, Recent Results, and Cardiac Rhythm NSR . Patient Vitals for the past 12 hrs:   Temp Pulse Resp BP SpO2   12/07/20 0719 98.1 °F (36.7 °C) 81 16 116/78 98 %   12/07/20 0400  84      12/07/20 0332 98.5 °F (36.9 °C) 86 16 109/68 97 %   12/07/20 0000  89      12/06/20 2334 98.3 °F (36.8 °C) (!) 113 15 98/74 98 %   12/06/20 2104 98.3 °F (36.8 °C) 90 11 107/72 100 %     Last 3 Recorded Weights in this Encounter    12/06/20 1434 12/06/20 2104 12/07/20 0332   Weight: 44.5 kg (98 lb) 46.4 kg (102 lb 4.7 oz) 46.5 kg (102 lb 8.2 oz)     Problem: Falls - Risk of  Goal: *Absence of Falls  Description: Document Susan Fall Risk and appropriate interventions in the flowsheet.   Outcome: Progressing Towards Goal  Note: Fall Risk Interventions:         Medication Interventions: Assess postural VS orthostatic hypotension, Bed/chair exit alarm, Evaluate medications/consider consulting pharmacy, Teach patient to arise slowly          Problem: General Medical Care Plan  Goal: *Vital signs within specified parameters  Outcome: Progressing Towards Goal  Goal: *Labs within defined limits  Outcome: Progressing Towards Goal  Goal: *Absence of infection signs and symptoms  Outcome: Progressing Towards Goal  Goal: *Optimal pain control at patient's stated goal  Outcome: Progressing Towards Goal  Goal: *Optimize nutritional status  Outcome: Progressing Towards Goal

## 2020-12-07 NOTE — PROGRESS NOTES
Reason for Admission:  Admitted from home withcomplaints of abdominal pain and fever. 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 Most recent on 11/20/20. RUR Score:  20%-Med                PCP: First and Last name: Mel Hayes    Name of Practice:    Are you a current patient: Yes/No: YES  Approximate date of last visit: 11/2020  Can you participate in a virtual visit if needed: YES    Do you (patient/family) have any concerns for transition/discharge? NO               Plan for utilizing home health: Independent will discharge home with . Current Advanced Directive/Advance Care Plan:  Does not have AMD.  Matt is NOK            Transition of Care Plan:  Oncology consult    Once medically stable will discharge home with  for support.   Care Management Interventions  PCP Verified by CM: Yes(11/2020)  Palliative Care Criteria Met (RRAT>21 & CHF Dx)?: No  Mode of Transport at Discharge: ( car)  Current Support Network: Own Home, Lives with Spouse  Confirm Follow Up Transport: Self  The Patient and/or Patient Representative was Provided with a Choice of Provider and Agrees with the Discharge Plan?: (Harika Alexander )  [address] Provided?: No  Bayron Menezes RN CRM  Ext [phone]

## 2020-12-07 NOTE — CONSULTS
[facility] [place] [facility] [place] [address], [address], [place]: [phone]  F: [phone]    Reason for Consult:   Eve Craven is a 44 y.o. female who is seen today in hospital consult at the request of Dr. 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 Treatment History:   · EXPLORATORY LAPAROTOMY SIERRA BSO, TUMOR DEBULKING: ILEOCECAL RESECTION; OMENTECTOMY  · FOLFOX 50% DR 12/3/19 - 3/4/20  · Per [facility] recommendation, dropped 5FU Bolus/Leucovorin and increased Oxaliplatin to 60 mg/m2 and 5FU pump tp 1,800 mg/m2 with Cycle 8 on 3/18/20  · Oxaliplatin increased to 85 mg/m2 with Cycle 10 on 4/15/20 - 5/27/20  · CTs C/A/P 3/20 stable  · CTs C/A/P 5/26/20 stable  · Chemo held on 6/20/20 d/t thrombocytopenia   · Oxaliplatin DR 25% with Cycle 14 on 6/17/20 d/t thrombocytopenia  · Oxaliplatin DR 25% more (50% total) with Cycle 15 on 7/8/20   · Chemo held on 7/22/20 d/t thrombocytopenia   · CT C/A/P 7/29/20 at [facility] with re demonstrated infiltrative tissue in the pelvis, favored to represented peritoneal carcinomatosis; soft tissue tethering to the colon, multiple loops of small bowel, and the urinary bladder; redemonstrated ill-defined complex fluid in the left upper quadrant - not significantly changed relative to most recent prior study; few clustered small pulmonary nodules are identified in the right lower lobe  · Exploratory lap at [facility] 8/20. · Maintenance 5FU (pump only) 8/26/20 - 9/23/20  · CTs 10/20 at [facility] showed slight progression  · Added Oxaliplatin back in on 10/7/20 - Current     STAGE: 4 with carcinomatosis    History of Present Illness:   Eve Craven is a 44 y.o. female well known to us from clinic who is seen today in hospital consult for metastatic appendix cancer on FOLFOX chemotherapy. She was due for chemo on 12/2/20 but was held due to patient not feeling well/nausea/vomiting. She presented to the ER last night with upper abdominal pain, nausea, and fever. She was tested for COVID and negative. CT A/P unremarkable. ID has been consulted for further evaluation. Oncology was consulted for further evaluation/management. She is resting in bed upon visit. Pt is in bed. Feeling ok. No appetite. Has some abdominal pain. BC + for GNRs. No F/C/CP/SOB/  occ N/ occ V       Last Office Note from 11/18/20:   She is here today for Cycle 23 of chemo. She reports that she is feeling well overall today. Chemo side effects lasted a little longer than usual after last chemo. She continues to have nausea, controlled with PRN Compazine. She says that Zofran doesn't really help. She has had no vomiting. She reports that diarrhea has resolved and she now has slight constipation. She reports that pain is mostly controlled on Roxicodone. She takes this prior to eating which helps. Appetite is still low overall but she is \"working on it. \" She continues to have neuropathy but has not started taking the Gabapentin yet. Neuropathy is no worse. She is sleeping pretty well, takes Ativan at night. She denies any new pain. She denies fever, chills, mouth sores, cough, SOB, CP, vomiting, and diarrhea. CBC is stable and CMP is still pending today. She is here alone today.     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 ([facility] [address] 2019    Nausea & vomiting     Nausea and vomiting 12/9/2019    Splenic infarction 2019    Subphrenic abscess ([facility] [address] 1/8/2020    Symptomatic cholelithiasis 9/18/2019      Past Surgical History:   Procedure Laterality Date    ABDOMEN SURGERY PROC UNLISTED      HX APPENDECTOMY  09/2019    HX GI  09/2019    ILEOCECECTOMY    HX GYN  2003    CYST ON OVARY    HX HYSTERECTOMY  2019    IR THORACENTESIS CATH W IMAGE  11/6/2019      Social History     Tobacco Use    Smoking status: Never Smoker    Smokeless tobacco: Never Used   Substance Use Topics    Alcohol use: Not Currently      Family History   Problem Relation Age of Onset    Breast Cancer Paternal Grandmother 61    Crohn's Disease Mother     Heart Disease Mother     Cancer Father         BLADDER    Diabetes Father     No Known Problems Son     No Known Problems Daughter     Anesth Problems Neg Hx      Current Facility-Administered Medications   Medication Dose Route Frequency    magnesium oxide (MAG-OX) tablet 400 mg  400 mg Oral DAILY    potassium, sodium phosphates (NEUTRA-PHOS) packet 1 Packet  1 Packet Oral QID    megestroL (MEGACE) 400 mg/10 mL (10 mL) oral suspension 200 mg  200 mg Oral BID    piperacillin-tazobactam (ZOSYN) 3.375 g in 0.9% sodium chloride (MBP/ADV) 100 mL MBP  3.375 g IntraVENous Q8H    gabapentin (NEURONTIN) capsule 100 mg  100 mg Oral QHS    hyoscyamine SL (LEVSIN/SL) tablet 0.125 mg  0.125 mg SubLINGual Q4H PRN    LORazepam (ATIVAN) tablet 0.5-1 mg  0.5-1 mg Oral BID PRN    oxyCODONE IR (ROXICODONE) tablet 5 mg  5 mg Oral Q4H PRN    prochlorperazine (COMPAZINE) tablet 5 mg  5 mg Oral Q6H PRN    simethicone (MYLICON) tablet 517 mg  120 mg Oral QID PRN    HYDROmorphone (PF) (DILAUDID) injection 1 mg  1 mg IntraVENous Q4H PRN    ondansetron (ZOFRAN) injection 4 mg  4 mg IntraVENous Q4H PRN    sodium chloride (NS) flush 5-40 mL  5-40 mL IntraVENous Q8H    sodium chloride (NS) flush 5-40 mL  5-40 mL IntraVENous PRN    acetaminophen (TYLENOL) tablet 650 mg  650 mg Oral Q6H PRN    Or    acetaminophen (TYLENOL) suppository 650 mg  650 mg Rectal Q6H PRN    polyethylene glycol (MIRALAX) packet 17 g  17 g Oral DAILY PRN    enoxaparin (LOVENOX) injection 40 mg  40 mg SubCUTAneous DAILY      No Known Allergies     A complete review of systems was obtained, negative except as described above   Review of Systems   Constitutional: Positive for appetite change. Gastrointestinal: Positive for abdominal pain and nausea. Neurological: Positive for numbness. Psychiatric/Behavioral: Positive for sleep disturbance. All other systems reviewed and are negative. Physical Exam:     Visit Vitals  /65 (BP 1 Location: Right arm, BP Patient Position: At rest)   Pulse 84   Temp 97.8 °F (36.6 °C)   Resp 17   Ht 5' 3\" (1.6 m)   Wt 102 lb 8.2 oz (46.5 kg)   LMP  (LMP Unknown)   SpO2 99%   Breastfeeding No   BMI 18.16 kg/m²     ECOG PS: 1-2 thin WF  General: No distress  Eyes: Anicteric sclerae  HENT: Atraumatic  Neck: Supple  Resp: Normal respiratory effort, CTAB  CV: Regular   GI: Soft, less distended  MS: Ambulatory  Skin: Warm dry. Port site without redness/swelling  Psych: Alert, oriented, appropriate affect, normal judgment/insight    Results:     Lab Results   Component Value Date/Time    WBC 7.9 12/07/2020 12:12 AM    HGB 10.1 (L) 12/07/2020 12:12 AM    HCT 30.4 (L) 12/07/2020 12:12 AM    PLATELET 327 75/76/8004 12:12 AM    MCV 98.1 12/07/2020 12:12 AM    ABS. NEUTROPHILS 5.8 12/07/2020 12:12 AM    Hemoglobin (POC) 10.8 (L) 09/25/2019 04:11 PM    Hematocrit (POC) 32 (L) 09/25/2019 12:10 PM     Lab Results   Component Value Date/Time    Sodium 139 12/07/2020 12:12 AM    Potassium 3.2 (L) 12/07/2020 12:12 AM    Chloride 109 (H) 12/07/2020 12:12 AM    CO2 22 12/07/2020 12:12 AM    Glucose 72 12/07/2020 12:12 AM    BUN 7 12/07/2020 12:12 AM    Creatinine 0.33 (L) 12/07/2020 12:12 AM    GFR est AA >60 12/07/2020 12:12 AM    GFR est non-AA >60 12/07/2020 12:12 AM    Calcium 7.6 (L) 12/07/2020 12:12 AM    Sodium (POC) 137 09/25/2019 12:10 PM    Potassium (POC) 3.7 09/25/2019 12:10 PM    Chloride (POC) 105 09/25/2019 12:10 PM    Glucose (POC) 85 09/25/2019 12:10 PM    BUN (POC) 4 (L) 09/25/2019 12:10 PM    Creatinine (POC) 0.5 (L) 09/25/2019 12:10 PM    Calcium, ionized (POC) 1.14 09/25/2019 12:10 PM     Lab Results   Component Value Date/Time    Bilirubin, total 0.6 12/07/2020 12:12 AM    ALT (SGPT) 22 12/07/2020 12:12 AM    Alk. phosphatase 157 (H) 12/07/2020 12:12 AM    Protein, total 5.8 (L) 12/07/2020 12:12 AM    Albumin 2.1 (L) 12/07/2020 12:12 AM    Globulin 3.7 12/07/2020 12:12 AM     CT Results (most recent):  [facility]   CT ABD PELV W CONT    Narrative EXAM: CT ABD PELV W CONT    INDICATION: metastatic appendiceal cancer    COMPARISON: May 26, 2020     CONTRAST: 100 mL of Isovue-370. TECHNIQUE:   Following the uneventful intravenous administration of contrast, thin axial  images were obtained through the abdomen and pelvis. Coronal and sagittal  reconstructions were generated. Oral contrast was administered. CT dose  reduction was achieved through use of a standardized protocol tailored for this  examination and automatic exposure control for dose modulation. FINDINGS:   LOWER THORAX: Small left pleural effusion with adjacent atelectasis. LIVER: No  Change, cyst  BILIARY TREE: Gallstones in a nondistended gallbladder. . CBD is not dilated. SPLEEN: within normal limits. PANCREAS: No mass or ductal dilatation. ADRENALS: Unremarkable. KIDNEYS: No mass, calculus, or hydronephrosis. STOMACH: Unremarkable. SMALL BOWEL: No dilatation or wall thickening. COLON: Prominent fecal stasis. PERITONEUM: There is a small amount of ascites in a patient there is multiple  area of peritoneal thickening suspicious for peritoneal carcinomatosis or. RETROPERITONEUM: No lymphadenopathy or aortic aneurysm. REPRODUCTIVE ORGANS:  URINARY BLADDER: No mass or calculus. BONES: No destructive bone lesion. ABDOMINAL WALL: No mass or hernia. ADDITIONAL COMMENTS: N/A      Impression IMPRESSION:  Suspect peritoneal carcinomatosis . Records reviewed and summarized above. Pathology report(s) reviewed above. Radiology report(s) reviewed above.     Assessment/PLAN:     1) Stage 4 / Metastatic Appendiceal Cancer   Post EXPLORATORY LAPAROTOMY SIERRA BSO, TUMOR DEBULKING: ILEOCECAL RESECTION; OMENTECTOMY 9/25/19  Surgery done for obstruction. Patient choose to have chemo locally and still see [facility]. She went to [facility] on 7/9/20 for laparoscopic surgery   Discussed with Dr. Elizabeth Ruiz is not an option - would be too risky. She was on 5FU only from 8/26/20 - 9/23/20. She went back to [facility] in 10/20 and CTs there showed slight progression. Added Oxaliplatin back in on 10/7/20 since patient has not failed this regimen. Last chemo with modified FOLFOX was on 11/18/20. She was due for chemo again on 12/2/20 but held due to patient not feeling well/nausea/vomiting. She presented to the ER last night with fever/nausea/abdominal pain. CT A/P was negative for acute process/abscess and cancer appears stable. COVID negative. Initial BC + for GNR. Repeat blood cultures are still pending. ID has been consulted. Pt is in bed doing ok at my visit. Continue to have nausea/abdominal pain likely due to cancer. Continue to hold chemo while admitted. Consider palliative care. 2) Abdominal Pain/Cramping  Likely d/t carcinomatosis. Continue Roxicodone PRN - patient takes prior to eating which helps. IV Dilaudid for breakthrough pain. Consider Palliative Care consult. 3) Chemo Induced Nausea  Continue anti-emetics as needed. Compazine works better than Zofran. 4) Hx of Left Pleural Effusion post Thoracentesis x 2  No cytology. Stable on CTs.     5) Chemo Induced Diarrhea  Resolved. Will continue to monitor. 6) Chemo Induced Neuropathy  On Gabapentin 100 mg QHS. Stable/unchanged. 7) Insomnia/Anxiety about Health. Takes Ativan PRN which helps    8) Hx of Hematuria  Resolved now. She was seen by Urology. Continue to monitor for now. 9) Psychosocial  Mood good, coping well considering difficult disease. She has great family support. SW support as needed. Will follow. Call if questions. Patient seen and examined by me today. Assessment and plan done by me.     Note done in conjunction with Reynaldo Nieves NP. I appreciate the opportunity to participate in Ms. Giulia Justine sorto.     Signed By: Zuleika Dooley DO

## 2020-12-07 NOTE — PROGRESS NOTES
[address] [facility]  Hospitalist Group                                                                                          Hospitalist Progress Note  Dina Ramirez   Answering service: [phone] OR [phone] from in house phone        Date of Service:  2020  NAME:  Perez Noble  :  1970  MRN:  [id number]      Admission Summary:   44 y.o. 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  She has had a history of a subphrenic abscess as well and symptomatic cholelithiasis. Lester Wooten is currently undergoing chemotherapy with Dr. Tito Caceres the last 2 weeks she has been complaining of intermittent severe abdominal pain more in the upper portion of the abdomen radiating to the left.  This is more significant than what she has had previously. Krish Martinez is been in the past some nausea and vomiting over the last week or 2, however, today she has had only nausea.  She was also being followed down at [facility] was due to have a CT scan of her abdomen and pelvis this coming week but last night she began running a temperature to 101-1/2 which was very concerning to them. + chills.   This was new and different.  She had no symptoms of cough or congestion, headache, diarrhea or urinary symptoms.  She has not been around anyone who has been ill.  spoke with the on-call for Dr. Veronique oGmez and felt it with the fever they could not wait to be seen down in [place] at [facility] presented here for further evaluation of her fever and abdominal pain.   Pt said her abdominal pain is more in the LUQ, similar to her previous subphrenic abscess. + nausea, vomiting 3 times no diarrhea     Interval history / Subjective: Follow-up abdominal pain. Patient seen and examined. Has ongoing abdominal pain. Blood cultures positive for GNR. Reviewed CT scan with patient.   Compared to read completed at [facility] in October 2020 and appears similar. Questions answered. Assessment & Plan:     Sepsis (fever at home, tachycardia, hypotension) secondary to GNR bacteremia:  -CT scan with peritoneal carcinomatosis, no evidence of abscess  -Pulmonary blood cultures with GNR bacteremia, follow speciation  -Repeat blood cultures  -Discontinue vancomycin, continue Zosyn  -Infectious disease consult    Appendiceal cancer:  -Oncology consult    Acute on chronic pain: Oxycodone as needed with Dilaudid breakthrough  -Continue home gabapentin    Hypokalemia: Replete as needed    Chronic severe protein malnutrition:  -Nutrition consult, continue home Megace    Code status: full   DVT prophylaxis: Lovenox    Care Plan discussed with: Patient/Family  Anticipated Disposition: Home w/Family  Anticipated Discharge: >48 hours     Hospital Problems  Date Reviewed: 11/18/2020          Codes Class Noted POA    Severe protein-calorie malnutrition ([facility] [address] (Chronic) ICD-10-CM: T35  ICD-9-CM: 262  12/7/2020 Yes        Abdominal pain ICD-10-CM: R10.9  ICD-9-CM: 789.00  12/6/2020 Unknown        Fever ICD-10-CM: R50.9  ICD-9-CM: 780.60  12/6/2020 Unknown                Review of Systems:   Negative unless stated above      Vital Signs:    Last 24hrs VS reviewed since prior progress note.  Most recent are:  Visit Vitals  /82 (BP 1 Location: Right arm, BP Patient Position: At rest)   Pulse 83   Temp 97.8 °F (36.6 °C)   Resp 18   Ht 5' 3\" (1.6 m)   Wt 46.5 kg (102 lb 8.2 oz)   SpO2 99%   Breastfeeding No   BMI 18.16 kg/m²         Intake/Output Summary (Last 24 hours) at 12/7/2020 1616  Last data filed at 12/7/2020 0600  Gross per 24 hour   Intake    Output 1200 ml   Net -1200 ml        Physical Examination:     I had a face to face encounter with this patient and independently examined them on 12/7/2020 as outlined below:          Constitutional:  No acute distress, cooperative, pleasant , thin appearing   ENT:  Oral mucosa moist, oropharynx benign. Resp:  CTA bilaterally. No wheezing/rhonchi/rales. No accessory muscle use   CV:  Regular rhythm, normal rate, no murmurs, gallops, rubs    GI:  Soft, non distended, tender to palpation, more in left lower quadrant. normoactive bowel sounds, no hepatosplenomegaly     Musculoskeletal:  No edema, warm, 2+ pulses throughout    Neurologic:  Moves all extremities. Data Review:    Review and/or order of clinical lab test  Review and/or order of tests in the radiology section of CPT  Review and/or order of tests in the medicine section of CPT      Labs:     Recent Labs     12/07/20  0012 12/06/20  1446   WBC 7.9 9.7   HGB 10.1* 12.9   HCT 30.4* 37.0    185     Recent Labs     12/07/20  0012 12/06/20  1446    137   K 3.2* 3.1*   * 103   CO2 22 23   BUN 7 9   CREA 0.33* 0.51*   GLU 72 120*   CA 7.6* 8.9   MG 1.5*  --    PHOS 2.3*  --      Recent Labs     12/07/20  0012 12/06/20  1446   ALT 22 24   * 222*   TBILI 0.6 0.6   TP 5.8* 7.3   ALB 2.1* 2.7*   GLOB 3.7 4.6*   LPSE  --  77     No results for input(s): INR, PTP, APTT, INREXT in the last 72 hours. No results for input(s): FE, TIBC, PSAT, FERR in the last 72 hours. No results found for: FOL, RBCF   No results for input(s): PH, PCO2, PO2 in the last 72 hours. No results for input(s): CPK, CKNDX, TROIQ in the last 72 hours.     No lab exists for component: CPKMB  No results found for: CHOL, CHOLX, CHLST, CHOLV, HDL, HDLP, LDL, LDLC, DLDLP, TGLX, TRIGL, TRIGP, CHHD, CHHDX  Lab Results   Component Value Date/Time    Glucose (POC) 85 09/25/2019 12:10 PM     Lab Results   Component Value Date/Time    Color DARK YELLOW 12/06/2020 02:52 PM    Appearance CLOUDY (A) 12/06/2020 02:52 PM    Specific gravity 1.025 12/06/2020 02:52 PM    Specific gravity 1.006 10/09/2020 02:06 PM    pH (UA) 6.0 12/06/2020 02:52 PM    Protein 100 (A) 12/06/2020 02:52 PM    Glucose Negative 12/06/2020 02:52 PM    Ketone TRACE (A) 12/06/2020 02:52 PM Bilirubin Negative 10/08/2020 04:29 PM    Urobilinogen 2.0 (H) 12/06/2020 02:52 PM    Nitrites Negative 12/06/2020 02:52 PM    Leukocyte Esterase TRACE (A) 12/06/2020 02:52 PM    Epithelial cells FEW 12/06/2020 02:52 PM    Bacteria Negative 12/06/2020 02:52 PM    WBC 5-10 12/06/2020 02:52 PM    RBC 0-5 12/06/2020 02:52 PM         Medications Reviewed:     Current Facility-Administered Medications   Medication Dose Route Frequency    magnesium oxide (MAG-OX) tablet 400 mg  400 mg Oral DAILY    potassium, sodium phosphates (NEUTRA-PHOS) packet 1 Packet  1 Packet Oral QID    megestroL (MEGACE) 400 mg/10 mL (10 mL) oral suspension 200 mg  200 mg Oral BID    piperacillin-tazobactam (ZOSYN) 3.375 g in 0.9% sodium chloride (MBP/ADV) 100 mL MBP  3.375 g IntraVENous Q8H    gabapentin (NEURONTIN) capsule 100 mg  100 mg Oral QHS    hyoscyamine SL (LEVSIN/SL) tablet 0.125 mg  0.125 mg SubLINGual Q4H PRN    LORazepam (ATIVAN) tablet 0.5-1 mg  0.5-1 mg Oral BID PRN    oxyCODONE IR (ROXICODONE) tablet 5 mg  5 mg Oral Q4H PRN    prochlorperazine (COMPAZINE) tablet 5 mg  5 mg Oral Q6H PRN    simethicone (MYLICON) tablet 095 mg  120 mg Oral QID PRN    HYDROmorphone (PF) (DILAUDID) injection 1 mg  1 mg IntraVENous Q4H PRN    ondansetron (ZOFRAN) injection 4 mg  4 mg IntraVENous Q4H PRN    pantoprazole (PROTONIX) 40 mg in 0.9% sodium chloride 10 mL injection  40 mg IntraVENous Q24H    sodium chloride (NS) flush 5-40 mL  5-40 mL IntraVENous Q8H    sodium chloride (NS) flush 5-40 mL  5-40 mL IntraVENous PRN    acetaminophen (TYLENOL) tablet 650 mg  650 mg Oral Q6H PRN    Or    acetaminophen (TYLENOL) suppository 650 mg  650 mg Rectal Q6H PRN    polyethylene glycol (MIRALAX) packet 17 g  17 g Oral DAILY PRN    enoxaparin (LOVENOX) injection 40 mg  40 mg SubCUTAneous DAILY     ______________________________________________________________________  EXPECTED LENGTH OF STAY: - - -  ACTUAL LENGTH OF STAY: [facility]

## 2020-12-07 NOTE — PROGRESS NOTES
Pharmacist Note - Vancomycin Dosing    Consult provided for this 44 y.o. female for indication of sepsis. Antibiotic regimen(s): Vanc + Zosyn    Recent Labs     20  1446   WBC 9.7   CREA 0.51*   BUN 9     Height: 160 cm  Weight: 45 kg  Est CrCl: 66 ml/min  Temp (24hrs), Av.7 °F (37.1 °C), Min:98.5 °F (36.9 °C), Max:98.9 °F (37.2 °C)    Goal trough = 15 - 20 mcg/mL    Therapy will be initiated with a loading dose of 1000 mg IV x 1 to be followed by a maintenance dose of 750 mg IV every 12 hours. Pharmacy to follow patient daily and order levels / make dose adjustments as appropriate.

## 2020-12-07 NOTE — PROGRESS NOTES
Problem: Falls - Risk of  Goal: *Absence of Falls  Description: Document Authur Senters Fall Risk and appropriate interventions in the flowsheet. Outcome: Progressing Towards Goal  Pt remains free of falls during admission. Call bell and frequently used items within reach. Bedside table within reach. Pt provided nonskid socks and instructed to call out for nurse when needing assistance.      Problem: General Medical Care Plan  Goal: *Vital signs within specified parameters  Outcome: Progressing Towards Goal

## 2020-12-07 NOTE — PROGRESS NOTES
TRANSFER - IN REPORT:    Verbal report received from MING Loera(name) on Zia Sigala  being received from ED(unit) for routine progression of care      Report consisted of patients Situation, Background, Assessment and   Recommendations(SBAR). Information from the following report(s) SBAR, Kardex, ED Summary, Recent Results and Cardiac Rhythm NSR was reviewed with the receiving nurse. Opportunity for questions and clarification was provided. Assessment completed upon patients arrival to unit and care assumed.

## 2020-12-07 NOTE — PROGRESS NOTES
Comprehensive Nutrition Assessment    Type and Reason for Visit: Initial, Positive nutrition screen    Nutrition Recommendations/Plan:    1. Advanced diet to regular. ONS and snacks ordered. 2. Resume home Megace if not contraindicated  3. Pt meets criteria for chronic, severe protein-calorie malnutrition (POA)    Malnutrition Assessment:  Malnutrition Status:  Severe malnutrition    Context:  Chronic illness     Findings of the 6 clinical characteristics of malnutrition:   Energy Intake:  7 - 75% or less est energy requirements for 1 month or longer  Weight Loss:  7.00 - Greater than 10% over 6 months     Body Fat Loss:  7 - Severe body fat loss,     Muscle Mass Loss:  7 - Severe muscle mass loss,    Fluid Accumulation:  No significant fluid accumulation,     Strength:  Not performed     Nutrition Assessment:     44 y.o. female with PMHx significant for metastatic appendiceal cancer s/p ex laparotomy, debulking surgery,SIERRA, BSO, ileocectomy, and omentectomy in 9/2019. Currently being treated with chemo. Additionally, she has hx of subphrenic abscess and symptomatic cholelithiasis. Admitted with Fever [R50.9]  Abdominal pain [R10.9]    Noted pt seen by OP oncology dietitian in Sept 2020 - struggling to maintain weight. Requires smaller portions, calorically dense food. Has not been receptive to ONS in past.  Seen by IP dietitian ~1 year ago at which time wt was ~120 lb (after losing 20 lb in ~10 wks s/p surgery). Since then, appears wt 110-115 lb through Aug 2020 and has been declining since. Down to 98 lb on admission. Total 18.3% BW in 1 year, but 14.7% of this just in the past 4 months which is severe. Pt has struggled with chemo-induced diarrhea, early satiety, and nausea. She was started on Megace in Oct 2020. Takes PRN compazine for nausea and Lomotil for diarrhea. Also recently started on Ativan to help with N/V as well. Spoke with pt. Confirms above.   Denies any current diarrhea or vomiting. Wants to eat regular food. States it doesn't help when they don't call for your order and just send something up. Reports she doesn't eat beef and doesn't eat a lot of dairy. Open to snacks (PB crackers or fruits like grapes/bananas). Also willing to take Ensure Clear and try Magic Cup. Does not like creamier supplements. Discussed diet advancement with Dr. Brittni Ortiz and OK to increase to regular diet. Supplements, snacks, and preferences added. Pt meets criteria for chronic, severe protein-calorie malnutrition which was POA. RD will continue to follow closely. Nutritionally Significant Medications:   [email or web address] mL/hr, Mag-Ox, Zofran PRN, oxycodone, Protonix, Zosyn, Neutra-Phos, compazine PRN, vancomycin, Effer-K, Klor      Estimated Daily Nutrient Needs:  Energy (kcal): 7736-3750(35-40 kcal/kg)  Protein (g): 70-95(1.5-2 gm/kg)  Fluid (ml/day): 1 mL/kcal    Nutrition Related Findings:   Edema: none  Last BM: 12/6    Wounds:    None       Current Nutrition Therapies:  DIET FULL LIQUID  Meal intake: No data found.       Anthropometric Measures:  · Height:  5' 3\" (160 cm)  · Current Body Wt:  46.5 kg (102 lb 8.2 oz)   · Admission Body Wt:  98 lb(44.5 kg - stated)    · Usual Body Wt:  (110-115 lb past 1 year; 140s in 2019; 160s 2018)     · Ideal Body Wt:  115 lbs:  89.1 %   · BMI Category:  Underweight (BMI less than 18.5)       Wt Readings from Last 100 Encounters:   12/07/20 46.5 kg (102 lb 8.2 oz)   12/06/20 44.5 kg (98 lb)   11/18/20 46.4 kg (102 lb 3.2 oz)   11/04/20 47.4 kg (104 lb 6.4 oz)   10/21/20 46.9 kg (103 lb 6.4 oz)   10/07/20 47.9 kg (105 lb 8 oz)   09/23/20 48.8 kg (107 lb 8 oz)   08/26/20 50.8 kg (111 lb 14.4 oz)   08/12/20 50.9 kg (112 lb 3.2 oz)   07/22/20 52.3 kg (115 lb 6.4 oz)   07/08/20 51.4 kg (113 lb 6.4 oz)   07/01/20 52 kg (114 lb 9.6 oz)   06/10/20 52.1 kg (114 lb 14.4 oz)   05/27/20 51.5 kg (113 lb 8 oz)   04/15/20 51.5 kg (113 lb 9.6 oz)   04/01/20 50.4 kg (111 lb 1.6 oz)   03/18/20 50.8 kg (112 lb 1.6 oz)   03/04/20 50.2 kg (110 lb 9.6 oz)   02/19/20 49.4 kg (108 lb 14.4 oz)   02/05/20 50 kg (110 lb 3.2 oz)   01/22/20 49.4 kg (109 lb)   01/15/20 48.8 kg (107 lb 8 oz)   01/08/20 49.7 kg (109 lb 8 oz)   12/26/19 50.1 kg (110 lb 6.4 oz)   12/23/19 51.3 kg (113 lb)   12/13/19 54.8 kg (120 lb 13 oz)   12/03/19 54.4 kg (120 lb)   11/18/19 54.9 kg (121 lb)   11/14/19 57 kg (125 lb 9.6 oz)   11/11/19 57.2 kg (126 lb 3.2 oz)   11/06/19 55.3 kg (122 lb)   10/28/19 55.8 kg (123 lb)   10/23/19 58.1 kg (128 lb)   10/15/19 64.4 kg (142 lb)   10/14/19 65.2 kg (143 lb 12.8 oz)   10/09/19 67.9 kg (149 lb 12.8 oz)   09/18/19 64.2 kg (141 lb 8 oz)   07/21/18 73.2 kg (161 lb 6 oz)       Nutrition Diagnosis:   · Severe malnutrition, In context of chronic illness related to catabolic illness, altered GI function, increased demand for energy/nutrients, early satiety, inadequate protein-energy intake as evidenced by poor intake prior to admission, BMI, weight loss      Nutrition Interventions:   Food and/or Nutrient Delivery: Continue current diet  Nutrition Education and Counseling: No recommendations at this time  Coordination of Nutrition Care: Continue to monitor while inpatient, Interdisciplinary rounds    Goals:  tolerance/ intake of >25% of meals over next 3-4 days       Nutrition Monitoring and Evaluation:   Behavioral-Environmental Outcomes: None identified  Food/Nutrient Intake Outcomes: Diet advancement/tolerance, Food and nutrient intake  Physical Signs/Symptoms Outcomes: Biochemical data, Weight, GI status, Nausea/vomiting, Meal time behavior, Nutrition focused physical findings    Discharge Planning:     Too soon to determine     Recent Labs     12/07/20 0012 12/06/20  1446   GLU 72 120*   BUN 7 9   CREA 0.33* 0.51*    137   K 3.2* 3.1*   * 103   CO2 22 23   CA 7.6* 8.9   PHOS 2.3*  --    MG 1.5*  --        Recent Labs     12/07/20 0012 12/06/20  1446   ALT 22 24   AP 157* 222*   TBILI 0.6 0.6   TP 5.8* 7.3   ALB 2.1* 2.7*   GLOB 3.7 4.6*   LPSE  --  77       Recent Labs     12/06/20  1539   LAC 1.6       Recent Labs     12/07/20  0012 12/06/20  1446   WBC 7.9 9.7   HGB 10.1* 12.9   HCT 30.4* 37.0    185       No results for input(s): PREALB in the last 72 hours. No results for input(s): TRIGL in the last 72 hours. No results for input(s): GLUCPOC in the last 72 hours.     No results found for: HBA1C, HGBE8, KAG3LTDL, QZP2NFDN, PWE9EEIN    Iron Profile  Iron   Date Value Ref Range Status   10/18/2019 22 (L) 35 - 150 ug/dL Final       Ferritin   Date Value Ref Range Status   10/18/2019 483 (H) 26 - 388 NG/ML Final         Charlee Cunha RD  Available via [employer]  Or Pager# [phone]

## 2020-12-08 NOTE — PROGRESS NOTES
INFECTIOUS DISEASES CONSULT: See Dictation      IMPRESSION:    1. GN bacteremia -- likely from intraabdominal source -- R/O Portacath infection    2. Metastatic appendiceal cancer      PLAN    1. Continue Zosyn -- she is clinically better    2. Likely won't have sensitivity data til 12/9/2020    3. Repeat blood cultures -- if bacteremia does not clear, I would worry more about Portacath infection (blood cultures ordered for 1415 hours today werwe not drawn -- will reorder with AM labs)      Thanks,  Josefa Pelayo M.D.

## 2020-12-08 NOTE — PROGRESS NOTES
ID Progress Note  2020    Subjective:     Patient seen and examined, chart reviewed--no new complaints. Awaiting identification of GNR from blood culture. Objective:     Antibiotics:  1. Zosyn       Vitals:   Visit Vitals  /87 (BP 1 Location: Right arm, BP Patient Position: At rest)   Pulse 93   Temp 98.3 °F (36.8 °C)   Resp 16   Ht 5' 3\" (1.6 m)   Wt 47.3 kg (104 lb 4.4 oz)   LMP  (LMP Unknown)   SpO2 95%   Breastfeeding No   BMI 18.47 kg/m²        Tmax:  Temp (24hrs), Av.1 °F (36.7 °C), Min:97.6 °F (36.4 °C), Max:98.6 °F (37 °C)      Exam:  Lungs:  clear to auscultation bilaterally  Heart:  regular rate and rhythm  Abdomen:  Mildly distended, non-tender    Labs:      Recent Labs     20  0206 20  0012 20  1446   WBC 8.1 7.9 9.7   HGB 12.1 10.1* 12.9    154 185   BUN 5* 7 9   CREA 0.36* 0.33* 0.51*   AP  --  157* 222*   TBILI  --  0.6 0.6       Cultures:     Lab Results   Component Value Date/Time    Specimen Description: VAGINA 2009 11:45 AM    Specimen Description: URINE 2009 11:45 AM     Lab Results   Component Value Date/Time    Culture result: (A) 2020 03:41 PM     GRAM NEGATIVE RODS GROWING IN 2 OF 4 BOTTLES DRAWN (SITES=L FOREARM AT 1555 AND 1541)    Culture result: (A) 2020 03:41 PM     PRELIMINARY REPORT OF GRAM NEGATIVE RODS GROWING IN BOTH BOTTLES DRAWN CALLED TO AND READ BACK BY Toby Mcgee RN AT 14 [facility] 21. LW    Culture result: REMAINING BOTTLE(S) HAS/HAVE NO GROWTH SO FAR 2020 03:41 PM       Radiology:     Line/Insert Date:           Assessment:     1. GNR bacteremia--abdomen vs line infection  2. CT not suggestive of GI source of bacteremia    Objective:     1. Continue current therapy  2.  Follow up cultures and adjust antibiotic therapy    Yashira King MD

## 2020-12-08 NOTE — PROGRESS NOTES
[address] [facility]                                                                                          Hospitalist Progress Note  Amber Waldemar Dance   Answering service: [phone] OR [phone] from in house phone        Date of Service:  2020  NAME:  Griffin Peña  :  1970  MRN:  [id number]      Admission Summary:   44 y.o. 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  She has had a history of a subphrenic abscess as well and symptomatic cholelithiasis. Peggy Neely is currently undergoing chemotherapy with Dr. Lucinda Santoyo the last 2 weeks she has been complaining of intermittent severe abdominal pain more in the upper portion of the abdomen radiating to the left.  This is more significant than what she has had previously. Kurt Thomas is been in the past some nausea and vomiting over the last week or 2, however, today she has had only nausea.  She was also being followed down at [facility] was due to have a CT scan of her abdomen and pelvis this coming week but last night she began running a temperature to 101-1/2 which was very concerning to them. + chills.   This was new and different.  She had no symptoms of cough or congestion, headache, diarrhea or urinary symptoms.  She has not been around anyone who has been ill.  spoke with the on-call for Dr. Zeke Rea and felt it with the fever they could not wait to be seen down in [place] at [facility] presented here for further evaluation of her fever and abdominal pain.   Pt said her abdominal pain is more in the LUQ, similar to her previous subphrenic abscess. + nausea, vomiting 3 times no diarrhea     Interval history / Subjective: Follow-up abdominal pain. Patient seen and examined. Pain more tolerable. Feels her pain has increased over time with cancer progression.  Discussed palliative care involvement and patient okay with it. Assessment & Plan:     Sepsis (fever at home, tachycardia, hypotension) secondary to GNR bacteremia:  -CT scan with peritoneal carcinomatosis, no evidence of abscess  -Pulmonary blood cultures with GNR bacteremia, follow speciation  -Repeat blood cultures  -Continue Zosyn  -Infectious disease consult    Appendiceal cancer:  -Oncology consult, appreciate recommendations    Acute on chronic pain: Oxycodone as needed with Dilaudid breakthrough  -Continue home gabapentin  -palliative care consult, pain appears uncontrolled as outpatient, could benefit from continued follow up    Hypokalemia: Replete as needed    Hyponatremia, mild: mild     Chronic severe protein malnutrition:  -Nutrition consult, continue home Megace    Code status: full   DVT prophylaxis: Lovenox    Care Plan discussed with: Patient/Family  Anticipated Disposition: Home w/Family  Anticipated Discharge: >48 hours     Hospital Problems  Date Reviewed: 11/18/2020          Codes Class Noted POA    Severe protein-calorie malnutrition ([address] (Chronic) ICD-10-CM: R23  ICD-9-CM: 262  12/7/2020 Yes        Abdominal pain ICD-10-CM: R10.9  ICD-9-CM: 789.00  12/6/2020 Unknown        Fever ICD-10-CM: R50.9  ICD-9-CM: 780.60  12/6/2020 Unknown                Review of Systems:   Negative unless stated above      Vital Signs:    Last 24hrs VS reviewed since prior progress note.  Most recent are:  Visit Vitals  /87 (BP 1 Location: Right arm, BP Patient Position: At rest)   Pulse 93   Temp 98.3 °F (36.8 °C)   Resp 16   Ht 5' 3\" (1.6 m)   Wt 47.3 kg (104 lb 4.4 oz)   SpO2 95%   Breastfeeding No   BMI 18.47 kg/m²         Intake/Output Summary (Last 24 hours) at 12/8/2020 1200  Last data filed at 12/7/2020 1918  Gross per 24 hour   Intake 240 ml   Output    Net 240 ml        Physical Examination:     I had a face to face encounter with this patient and independently examined them on 12/8/2020 as outlined below:          Constitutional:  No acute distress, cooperative, pleasant , thin appearing   ENT:  Oral mucosa moist, oropharynx benign. Resp:  CTA bilaterally. No wheezing/rhonchi/rales. No accessory muscle use   CV:  Regular rhythm, normal rate, no murmurs, gallops, rubs    GI:  Soft, non distended, tender to palpation. normoactive bowel sounds, no hepatosplenomegaly     Musculoskeletal:  No edema, warm, 2+ pulses throughout    Neurologic:  Moves all extremities. Data Review:    Review and/or order of clinical lab test  Review and/or order of tests in the radiology section of CPT  Review and/or order of tests in the medicine section of CPT      Labs:     Recent Labs     12/08/20  0206 12/07/20  0012   WBC 8.1 7.9   HGB 12.1 10.1*   HCT 34.8* 30.4*    154     Recent Labs     12/08/20  0206 12/07/20  0012 12/06/20  1446   * 139 137   K 3.9 3.2* 3.1*    109* 103   CO2 24 22 23   BUN 5* 7 9   CREA 0.36* 0.33* 0.51*   GLU 76 72 120*   CA 8.6 7.6* 8.9   MG 1.8 1.5*  --    PHOS 3.4 2.3*  --      Recent Labs     12/07/20  0012 12/06/20  1446   ALT 22 24   * 222*   TBILI 0.6 0.6   TP 5.8* 7.3   ALB 2.1* 2.7*   GLOB 3.7 4.6*   LPSE  --  77     No results for input(s): INR, PTP, APTT, INREXT, INREXT in the last 72 hours. No results for input(s): FE, TIBC, PSAT, FERR in the last 72 hours. No results found for: FOL, RBCF   No results for input(s): PH, PCO2, PO2 in the last 72 hours. No results for input(s): CPK, CKNDX, TROIQ in the last 72 hours.     No lab exists for component: CPKMB  No results found for: CHOL, CHOLX, CHLST, CHOLV, HDL, HDLP, LDL, LDLC, DLDLP, TGLX, TRIGL, TRIGP, CHHD, CHHDX  Lab Results   Component Value Date/Time    Glucose (POC) 85 09/25/2019 12:10 PM     Lab Results   Component Value Date/Time    Color DARK YELLOW 12/06/2020 02:52 PM    Appearance CLOUDY (A) 12/06/2020 02:52 PM    Specific gravity 1.025 12/06/2020 02:52 PM    Specific gravity 1.006 10/09/2020 02:06 PM    pH (UA) 6.0 12/06/2020 02:52 PM    Protein 100 (A) 12/06/2020 02:52 PM    Glucose Negative 12/06/2020 02:52 PM    Ketone TRACE (A) 12/06/2020 02:52 PM    Bilirubin Negative 10/08/2020 04:29 PM    Urobilinogen 2.0 (H) 12/06/2020 02:52 PM    Nitrites Negative 12/06/2020 02:52 PM    Leukocyte Esterase TRACE (A) 12/06/2020 02:52 PM    Epithelial cells FEW 12/06/2020 02:52 PM    Bacteria Negative 12/06/2020 02:52 PM    WBC 5-10 12/06/2020 02:52 PM    RBC 0-5 12/06/2020 02:52 PM         Medications Reviewed:     Current Facility-Administered Medications   Medication Dose Route Frequency    calcium carbonate (TUMS) chewable tablet 200 mg [elemental]  200 mg Oral TID PRN    magnesium oxide (MAG-OX) tablet 400 mg  400 mg Oral DAILY    potassium, sodium phosphates (NEUTRA-PHOS) packet 1 Packet  1 Packet Oral QID    megestroL (MEGACE) 400 mg/10 mL (10 mL) oral suspension 200 mg  200 mg Oral BID    piperacillin-tazobactam (ZOSYN) 3.375 g in 0.9% sodium chloride (MBP/ADV) 100 mL MBP  3.375 g IntraVENous Q8H    gabapentin (NEURONTIN) capsule 100 mg  100 mg Oral QHS    hyoscyamine SL (LEVSIN/SL) tablet 0.125 mg  0.125 mg SubLINGual Q4H PRN    LORazepam (ATIVAN) tablet 0.5-1 mg  0.5-1 mg Oral BID PRN    oxyCODONE IR (ROXICODONE) tablet 5 mg  5 mg Oral Q4H PRN    prochlorperazine (COMPAZINE) tablet 5 mg  5 mg Oral Q6H PRN    simethicone (MYLICON) tablet 994 mg  120 mg Oral QID PRN    HYDROmorphone (PF) (DILAUDID) injection 1 mg  1 mg IntraVENous Q4H PRN    ondansetron (ZOFRAN) injection 4 mg  4 mg IntraVENous Q4H PRN    sodium chloride (NS) flush 5-40 mL  5-40 mL IntraVENous Q8H    sodium chloride (NS) flush 5-40 mL  5-40 mL IntraVENous PRN    acetaminophen (TYLENOL) tablet 650 mg  650 mg Oral Q6H PRN    Or    acetaminophen (TYLENOL) suppository 650 mg  650 mg Rectal Q6H PRN    polyethylene glycol (MIRALAX) packet 17 g  17 g Oral DAILY PRN    enoxaparin (LOVENOX) injection 40 mg  40 mg SubCUTAneous DAILY ______________________________________________________________________  EXPECTED LENGTH OF STAY: - - -  ACTUAL LENGTH OF STAY:          2                 Dina Randy Lorenzo DO

## 2020-12-08 NOTE — CONSULTS
[address]    Name:  Poornima Dia  MR#:  [id number]  :  1970  ACCOUNT #:  [de-identified]  DATE OF SERVICE:  2020    LOCATION:  The patient is currently in room 610. [facility]    CHIEF COMPLAINT:  Abdominal pain, fever, and chills. REASON FOR CONSULTATION:  Positive blood cultures. The consultation was requested by Dr. Hugo Pearson. The history is obtained by interview of the patient and review of the medical records. HISTORY OF PRESENT ILLNESS:  This patient is a 68-year-old white female who was diagnosed with metastatic appendiceal cancer in about 2019. She has had several abdominal surgical procedures since then but still has persistent disease with peritoneal carcinomatosis. The patient has been followed both here in [place] and at [facility] with the chemotherapy coordinated between the two campuses. The patient's last chemotherapy was about 2-1/2 weeks ago. She does have an indwelling left-sided Port-A-Cath. This has not been bothering her. The patient states that for about the past 10-14 days, she has had some increasing vague diffuse abdominal pain. She otherwise felt about the same until the night of 2020 when she had fevers up to 101 and shaking chills. She has vomited about three times over the past week or so. She has had no diarrhea. She has had no  symptoms or pulmonary symptoms. Because of the increasing pain as well as the fever and chills, she presented to the emergency room on the afternoon of 2020. Blood cultures were obtained. She was admitted to the hospital.  She was started on vancomycin and Zosyn. Today, the blood cultures returned positive revealing Gram-negative rods in 2/4 bottles. Vancomycin was stopped and she remains on Zosyn. She feels better. The shaking chills have stopped. She has had no recurrent fever.   The vague abdominal discomfort also seems to be a little bit better. She seems to be resting comfortably. PAST MEDICAL HISTORY:  Tobacco none, alcohol none. MEDICATIONS PRIOR TO ADMISSION:  1. Chemotherapy. 2.  Decadron is given on day two and three after each cycle of chemotherapy. 3.  Neurontin 100 mg p.o. at bedtime. 4.  Hyoscyamine SL 0.125 mg sublingually every 4 hours as needed for abdominal cramping. 5.  Ibuprofen 600 mg p.o. every 8 hours p.r.n. pain. 6.  Lorazepam 0.5 mg - one or two tablets twice a day p.r.n. anxiety. 7.  Megace 200 mg p.o. b.i.d.  8.  Zofran 4 mg sublingually q.8 h. p.r.n. nausea. 9.  Roxicodone ? mg p.o. q.4 h. p.r.n. pain. 10.  Potassium chloride 10 mEq p.o. b.i.d. 11.  Compazine 5 mg p.o. q.6 h. p.r.n.  12.  Simethicone p.r.n. ALLERGIES:  NONE KNOWN. ILLNESSES:  Metastatic appendiceal cancer, as noted above. SURGERIES:  She has had several abdominal operations and laparoscopies related to her peritoneal carcinomatosis and appendiceal carcinoma. No other surgical procedures. SOCIAL HISTORY:  The patient lives here in the [address] [place] [address] area with her  and her 54-year-old son. FAMILY HISTORY:  Negative for appendiceal carcinoma. REVIEW OF SYSTEMS:  CONSTITUTIONAL:  She did have fever and chills prior to admission as in the HPI. She has lost a significant amount of weight. She states her weight was about 140 pounds before the onset of the cancer, and she now weighs maybe 95 pounds. HEENT:  No headache. No recent change in vision or hearing. No sore throat. LYMPHATIC:  No history of adenopathy. DERMATOLOGIC:  No recent rashes. RESPIRATORY:  No cough or hemoptysis. CARDIOVASCULAR:  No chest pain and no history of heart murmurs. GI:  Appetite is poor. She has had occasional vomiting over the past week or 10 days. No diarrhea. :  No dysuria. MUSCULOSKELETAL:  No significant back pain or neck pain. No joint pains. NEUROLOGIC:  No history of seizure or stroke.     PHYSICAL EXAMINATION:  GENERAL:  Thin female, in no acute distress. VITAL SIGNS:  Blood pressure is 114/65, heart rate 84, respiratory rate 18. She has been afebrile since admission. Weight was 98 pounds on admission, 102 pounds today. HEENT:  Sclerae and conjunctivae benign. NECK:  Supple. NODES:  No adenopathy. SKIN:  No rashes. LUNGS:  Clear. CHEST WALL:  Left-sided Port-A-Cath pocket and tunnel were benign. CARDIOVASCULAR:  Regular rate and rhythm without murmurs. ABDOMEN:  Nondistended, soft, mild diffuse tenderness; no masses are felt; bowel sounds are present. BACK:  No CVA tenderness. PELVIC:  Not done. EXTREMITIES:  No cellulitis. No edema. MUSCULOSKELETAL:  Muscles nontender and joints benign. NEUROLOGIC:  Alert and oriented. LABORATORY DATA:  CBC reveals a hemoglobin of 12.9, white count 9700 with neutrophil count of 8300 on admission. The platelet count was 397,775. Chemistry data on admission revealed a BUN of 9, creatinine of 0.51 with normal liver function tests except alkaline phosphatase of 222. Urinalysis revealed 5-10 white cells, 0-5 red cells, and no bacteria. MICROBIOLOGY DATA:  Urine cultures negative. Blood cultures have Gram-negative rods in 2/4 bottles. RADIOLOGY DATA:  CT scan of the abdomen and pelvis done on 12/06/2020 revealed probable peritoneal carcinomatosis with no abscess seen. IMPRESSION:  1.  Gram-negative bacteremia - most likely from an intra-abdominal source - rule out Port-A-Cath infection:  For about 10-14 days prior to admission, the patient had noted some increasing abdominal pain and occasional vomiting. She then had fever and rigors. I suspect bacteria entered her blood stream from her gastrointestinal tract or abdomen. However, she does have an indwelling Port-A-Cath and infection of the Port-A-Cath is possible. We will repeat her blood cultures. If the bacteremia does not clear, we would need to at least consider removing the Port-A-Cath. She is clinically better on Zosyn, and I will leave her on this for now. 2.  Metastatic appendiceal cancer - on palliative chemotherapy. PLAN:  1. Continue Zosyn. 2.  We probably would not have sensitivity data on the Gram-negative sol until 12/09/2020.  3.  Repeat blood cultures. If the bacteremia does not clear, we would need to consider the possibility of a Port-A-Cath infection. Thank you very much for allowing us to see this patient with you.       Elder Corley MD      [id number]  D:  12/07/2020 21:02  T:  12/08/2020 1:26  JOB #:  [id number]  CC:  DO Filippo Sterling MD Coleridge Rivers, MD Leanna Gata, DO

## 2020-12-08 NOTE — PROGRESS NOTES
Problem: Falls - Risk of  Goal: *Absence of Falls  Description: Document Gifty Edouard Fall Risk and appropriate interventions in the flowsheet.   Outcome: Progressing Towards Goal  Note: Fall Risk Interventions:            Medication Interventions: Evaluate medications/consider consulting pharmacy                   Problem: Patient Education: Go to Patient Education Activity  Goal: Patient/Family Education  Outcome: Progressing Towards Goal     Problem: General Medical Care Plan  Goal: *Vital signs within specified parameters  Outcome: Progressing Towards Goal

## 2020-12-09 NOTE — PROGRESS NOTES
Problem: Falls - Risk of  Goal: *Absence of Falls  Description: Document [place] Fall Risk and appropriate interventions in the flowsheet.   Outcome: Progressing Towards Goal  Note: Fall Risk Interventions:            Medication Interventions: Evaluate medications/consider consulting pharmacy

## 2020-12-09 NOTE — PROGRESS NOTES
ID Progress Note 
2020 Subjective: She states that she is still having abdominal pain, but new pain med regimen has helped Objective: Antibiotics: 1. Zosyn Vitals:  
Visit Vitals /75 Pulse 96 Temp 98 °F (36.7 °C) Resp 18 Ht 5' 3\" (1.6 m) Wt 44 kg (96 lb 14.4 oz) LMP  (LMP Unknown) SpO2 94% Breastfeeding No  
BMI 17.17 kg/m² Tmax:  Temp (24hrs), Av.9 °F (36.6 °C), Min:97.4 °F (36.3 °C), Max:98.3 °F (36.8 °C) Exam:  Lungs:  clear to auscultation bilaterally Heart:  regular rate and rhythm Abdomen:  soft, tender. Bowel sounds normal. No masses,  no organomegaly Labs:     
Recent Labs 20 
0206 20 
0012 WBC 8.1 7.9 HGB 12.1 10.1*  
 154 BUN 5* 7 CREA 0.36* 0.33* AP  --  157* TBILI  --  0.6 Cultures:  
 
Lab Results Component Value Date/Time Specimen Description: VAGINA 2009 11:45 AM  
 Specimen Description: URINE 2009 11:45 AM  
 
Lab Results Component Value Date/Time Culture result: NO GROWTH 1 DAY 2020 02:06 AM  
 Culture result: (A) 2020 03:41 PM  
  ESCHERICHIA COLI GROWING IN 3 OF 4 BOTTLES DRAWN (SITES = [facility] AND [facility]) Culture result: (A) 2020 03:41 PM  
  PRELIMINARY REPORT OF GRAM NEGATIVE RODS GROWING IN BOTH BOTTLES DRAWN CALLED TO AND READ BACK BY Harley Raya RN AT [address] [place] [address] 21. Nena Tirado Culture result: GRAM NEGATIVE RODS GROWING IN THE FOURTH BOTTLE (A) 2020 03:41 PM  
 
 
Radiology:  
 
Line/Insert Date:        
 
Assessment: 1. E coli bacteremia--could be abdomen or line--would favor IV antibiotic therapy through the port for 2 weeks and follow up cultures after completion--if still positive cultures at that point, then would remove port 2. Advanced GI cancer Objective: 1. Change to ceftriaxone 2. HH orders on chart Isamar Kinsey MD

## 2020-12-09 NOTE — PROGRESS NOTES
[address] [facility] Adult  Hospitalist Group Hospitalist Progress Note Rafael Santoro DO Answering service: [phone] or [phone] from in house phone Date of Service:  2020 NAME:  Spike Mckeon :  1970 MRN:  [id number] Admission Summary:  
44 y.o. 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  She has had a history of a subphrenic abscess as well and symptomatic cholelithiasis. Flako Light is currently undergoing chemotherapy with Dr. Radha Burton the last 2 weeks she has been complaining of intermittent severe abdominal pain more in the upper portion of the abdomen radiating to the left.  This is more significant than what she has had previously. Abdullahi Mejia is been in the past some nausea and vomiting over the last week or 2, however, today she has had only nausea.  She was also being followed down at [facility] was due to have a CT scan of her abdomen and pelvis this coming week but last night she began running a temperature to 101-1/2 which was very concerning to them. + chills.   This was new and different.  She had no symptoms of cough or congestion, headache, diarrhea or urinary symptoms.  She has not been around anyone who has been ill.  spoke with the on-call for Dr. Burak Jones and felt it with the fever they could not wait to be seen down [facility] [place] [facility] presented here for further evaluation of her fever and abdominal pain.  
Pt said her abdominal pain is more in the LUQ, similar to her previous subphrenic abscess. + nausea, vomiting 3 times no diarrhea Interval history / Subjective: Follow-up abdominal pain. Patient seen and examined. Reports pain more manageable. Appreciate palliative care. Cultures with E. Coli. Plans for IV antibiotics. Assessment & Plan:  
 
Sepsis (fever at home, tachycardia, hypotension) secondary to E. coli bacteremia: 
-CT scan with peritoneal carcinomatosis, no evidence of abscess -Repeat blood cultures NGTD- follow 
-s/p zosyn. Transitioned to ceftriaxone 2 grams once daily x 2 weeks with repeat cultures at end of treatment 
-Infectious disease consult Appendiceal cancer: 
-Oncology consult, appreciate recommendations Acute on chronic pain:  
-palliative care consulted, appreciate recommendations 
-continue increased oxycodone 10 mg every 4 hours, dilaudid for breakthrough 
-gabapentin qhs Hypokalemia: Replete as needed Hyponatremia, mild: mild Chronic severe protein malnutrition: 
-Nutrition consult, continue home Megace Code status: full DVT prophylaxis: Lovenox Care Plan discussed with: Patient/Family Anticipated Disposition: Home w/Family Anticipated Discharge: 1-2 days, f/u CM Hospital Problems  Date Reviewed: 11/18/2020 Codes Class Noted POA Severe protein-calorie malnutrition ([address] (Chronic) ICD-10-CM: Y00 ICD-9-CM: 503  12/7/2020 Yes Abdominal pain ICD-10-CM: R10.9 ICD-9-CM: 789.00  12/6/2020 Unknown Fever ICD-10-CM: R50.9 ICD-9-CM: 780.60  12/6/2020 Unknown Review of Systems:  
Negative unless stated above Vital Signs:  
 Last 24hrs VS reviewed since prior progress note. Most recent are: 
Visit Vitals /75 Pulse 96 Temp 98 °F (36.7 °C) Resp 18 Ht 5' 3\" (1.6 m) Wt 44 kg (96 lb 14.4 oz) SpO2 94% Breastfeeding No  
BMI 17.17 kg/m² No intake or output data in the 24 hours ending 12/09/20 5736 Physical Examination:  
 
I had a face to face encounter with this patient and independently examined them on 12/9/2020 as outlined below: 
 
     
Constitutional:  No acute distress, cooperative, pleasant , thin appearing ENT:  Oral mucosa moist, oropharynx benign. Resp: CTA bilaterally. No wheezing/rhonchi/rales. No accessory muscle use CV:  Regular rhythm, normal rate, no murmurs, gallops, rubs GI:  Soft, non distended, non tender to palpation. normoactive bowel sounds, no hepatosplenomegaly Musculoskeletal:  No edema, warm, 2+ pulses throughout Neurologic:  Moves all extremities. Data Review:  
 Review and/or order of clinical lab test 
Review and/or order of tests in the radiology section of CPT Review and/or order of tests in the medicine section of CPT Labs:  
 
Recent Labs 12/08/20 0206 12/07/20 
0012 WBC 8.1 7.9 HGB 12.1 10.1* HCT 34.8* 30.4*  154 Recent Labs 12/08/20 0206 12/07/20 0012 * 139  
K 3.9 3.2*  
 109* CO2 24 22 BUN 5* 7 CREA 0.36* 0.33* GLU 76 72 CA 8.6 7.6*  
MG 1.8 1.5* PHOS 3.4 2.3* Recent Labs 12/07/20 0012 ALT 22 * TBILI 0.6 TP 5.8* ALB 2.1*  
GLOB 3.7 No results for input(s): INR, PTP, APTT, INREXT, INREXT in the last 72 hours. No results for input(s): FE, TIBC, PSAT, FERR in the last 72 hours. No results found for: FOL, RBCF No results for input(s): PH, PCO2, PO2 in the last 72 hours. No results for input(s): CPK, CKNDX, TROIQ in the last 72 hours. No lab exists for component: CPKMB No results found for: CHOL, CHOLX, CHLST, CHOLV, HDL, HDLP, LDL, LDLC, DLDLP, TGLX, TRIGL, TRIGP, CHHD, CHHDX Lab Results Component Value Date/Time Glucose (POC) 85 09/25/2019 12:10 PM  
 
Lab Results Component Value Date/Time  Color DARK YELLOW 12/06/2020 02:52 PM  
 Appearance CLOUDY (A) 12/06/2020 02:52 PM  
 Specific gravity 1.025 12/06/2020 02:52 PM  
 Specific gravity 1.006 10/09/2020 02:06 PM  
 pH (UA) 6.0 12/06/2020 02:52 PM  
 Protein 100 (A) 12/06/2020 02:52 PM  
 Glucose Negative 12/06/2020 02:52 PM  
 Ketone TRACE (A) 12/06/2020 02:52 PM  
 Bilirubin Negative 10/08/2020 04:29 PM  
 Urobilinogen 2.0 (H) 12/06/2020 02:52 PM  
 Nitrites Negative 12/06/2020 02:52 PM  
 Leukocyte Esterase TRACE (A) 12/06/2020 02:52 PM  
 Epithelial cells FEW 12/06/2020 02:52 PM  
 Bacteria Negative 12/06/2020 02:52 PM  
 WBC 5-10 12/06/2020 02:52 PM  
 RBC 0-5 12/06/2020 02:52 PM  
 
 
 
Medications Reviewed:  
 
Current Facility-Administered Medications Medication Dose Route Frequency  oxyCODONE IR (ROXICODONE) tablet 10 mg  10 mg Oral Q4H PRN  
 HYDROmorphone (PF) (DILAUDID) injection 0.5 mg  0.5 mg IntraVENous Q4H PRN  
 bisacodyL (DULCOLAX) tablet 5 mg  5 mg Oral DAILY  bacitracin 500 unit/gram packet 1 Packet  1 Packet Topical PRN  
 heparin (porcine) pf 500 Units  500 Units InterCATHeter PRN  
 calcium carbonate (TUMS) chewable tablet 200 mg [elemental]  200 mg Oral TID PRN  
 magnesium oxide (MAG-OX) tablet 400 mg  400 mg Oral DAILY  potassium, sodium phosphates (NEUTRA-PHOS) packet 1 Packet  1 Packet Oral QID  megestroL (MEGACE) 400 mg/10 mL (10 mL) oral suspension 200 mg  200 mg Oral BID  piperacillin-tazobactam (ZOSYN) 3.375 g in 0.9% sodium chloride (MBP/ADV) 100 mL MBP  3.375 g IntraVENous Q8H  
 gabapentin (NEURONTIN) capsule 100 mg  100 mg Oral QHS  hyoscyamine SL (LEVSIN/SL) tablet 0.125 mg  0.125 mg SubLINGual Q4H PRN  
 LORazepam (ATIVAN) tablet 0.5-1 mg  0.5-1 mg Oral BID PRN  prochlorperazine (COMPAZINE) tablet 5 mg  5 mg Oral Q6H PRN  
 simethicone (MYLICON) tablet 286 mg  120 mg Oral QID PRN  
 ondansetron (ZOFRAN) injection 4 mg  4 mg IntraVENous Q4H PRN  
 sodium chloride (NS) flush 5-40 mL  5-40 mL IntraVENous Q8H  
 sodium chloride (NS) flush 5-40 mL  5-40 mL IntraVENous PRN  
 acetaminophen (TYLENOL) tablet 650 mg  650 mg Oral Q6H PRN Or  
 acetaminophen (TYLENOL) suppository 650 mg  650 mg Rectal Q6H PRN  polyethylene glycol (MIRALAX) packet 17 g  17 g Oral DAILY PRN  
 enoxaparin (LOVENOX) injection 40 mg  40 mg SubCUTAneous DAILY ______________________________________________________________________ EXPECTED LENGTH OF STAY: 5d 9h 
ACTUAL LENGTH OF STAY:          3 Yashira Bailey DO

## 2020-12-09 NOTE — PROGRESS NOTES
[facility] at [address], [address] [address], [place]: [phone]  F: [phone]    Reason for Consult:   Elma Fuller is a 44 y.o. female who is seen today in hospital consult fu at the request of Dr. Myrna Llanes for metastatic appendix cancer on palliative chemotherapy admitted with upper abdominal pain and fever. Treatment History:   · EXPLORATORY LAPAROTOMY SIERRA BSO, TUMOR DEBULKING: ILEOCECAL RESECTION; OMENTECTOMY  · FOLFOX 50% DR 12/3/19 - 3/4/20  · Per [facility] recommendation, dropped 5FU Bolus/Leucovorin and increased Oxaliplatin to 60 mg/m2 and 5FU pump tp 1,800 mg/m2 with Cycle 8 on 3/18/20  · Oxaliplatin increased to 85 mg/m2 with Cycle 10 on 4/15/20 - 5/27/20  · CTs C/A/P 3/20 stable  · CTs C/A/P 5/26/20 stable  · Chemo held on 6/20/20 d/t thrombocytopenia   · Oxaliplatin DR 25% with Cycle 14 on 6/17/20 d/t thrombocytopenia  · Oxaliplatin DR 25% more (50% total) with Cycle 15 on 7/8/20   · Chemo held on 7/22/20 d/t thrombocytopenia   · CT C/A/P 7/29/20 at [facility] with re demonstrated infiltrative tissue in the pelvis, favored to represented peritoneal carcinomatosis; soft tissue tethering to the colon, multiple loops of small bowel, and the urinary bladder; redemonstrated ill-defined complex fluid in the left upper quadrant - not significantly changed relative to most recent prior study; few clustered small pulmonary nodules are identified in the right lower lobe  · Exploratory lap at [facility] 8/20. · Maintenance 5FU (pump only) 8/26/20 - 9/23/20  · CTs 10/20 at [facility] showed slight progression  · Added Oxaliplatin back in on 10/7/20 - Current     STAGE: 4 with carcinomatosis    History of Present Illness:   Elma Fuller is a 44 y.o. female well known to us from clinic who is seen today in hospital consult for metastatic appendix cancer on FOLFOX chemotherapy.  She was due for chemo on 12/2/20 but was held due to patient not feeling well/nausea/vomiting. She presented to the ER last night with upper abdominal pain, nausea, and fever. She was tested for COVID and negative. CT A/P unremarkable. ID has been consulted for further evaluation. Oncology was consulted for further evaluation/management. She is resting in bed upon visit. Pt is in bed. Feeling ok. No appetite. Has some abdominal pain. BC + for GNRs. No F/C/CP/SOB/  occ N/ occ V     INTERIM HX: pt seen today for hospital fu. States doing ok but still having abdominal pain and fullness. Had CT which was basically stable regarding cancer. Pt has been on chemo but on hold due to infection. ID seeing pt. Pt plans to continue chemo.      Past Medical History:   Diagnosis Date    Cancer [facility])     APPENDIX CANCER     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 ([facility] [address] 2019    Nausea & vomiting     Nausea and vomiting 12/9/2019    Splenic infarction 2019    Subphrenic abscess ([facility] [address] 1/8/2020    Symptomatic cholelithiasis 9/18/2019      Past Surgical History:   Procedure Laterality Date    ABDOMEN SURGERY PROC UNLISTED      HX APPENDECTOMY  09/2019    HX GI  09/2019    ILEOCECECTOMY    HX GYN  2003    CYST ON OVARY    HX HYSTERECTOMY  2019    IR THORACENTESIS CATH W IMAGE  11/6/2019      Social History     Tobacco Use    Smoking status: Never Smoker    Smokeless tobacco: Never Used   Substance Use Topics    Alcohol use: Not Currently      Family History   Problem Relation Age of Onset    Breast Cancer Paternal Grandmother 61    Crohn's Disease Mother     Heart Disease Mother     Cancer Father         BLADDER    Diabetes Father     No Known Problems Son     No Known Problems Daughter     Anesth Problems Neg Hx      Current Facility-Administered Medications   Medication Dose Route Frequency    calcium carbonate (TUMS) chewable tablet 200 mg [elemental]  200 mg Oral TID PRN    magnesium oxide (MAG-OX) tablet 400 mg  400 mg Oral DAILY    potassium, sodium phosphates (NEUTRA-PHOS) packet 1 Packet  1 Packet Oral QID    megestroL (MEGACE) 400 mg/10 mL (10 mL) oral suspension 200 mg  200 mg Oral BID    piperacillin-tazobactam (ZOSYN) 3.375 g in 0.9% sodium chloride (MBP/ADV) 100 mL MBP  3.375 g IntraVENous Q8H    gabapentin (NEURONTIN) capsule 100 mg  100 mg Oral QHS    hyoscyamine SL (LEVSIN/SL) tablet 0.125 mg  0.125 mg SubLINGual Q4H PRN    LORazepam (ATIVAN) tablet 0.5-1 mg  0.5-1 mg Oral BID PRN    oxyCODONE IR (ROXICODONE) tablet 5 mg  5 mg Oral Q4H PRN    prochlorperazine (COMPAZINE) tablet 5 mg  5 mg Oral Q6H PRN    simethicone (MYLICON) tablet 485 mg  120 mg Oral QID PRN    HYDROmorphone (PF) (DILAUDID) injection 1 mg  1 mg IntraVENous Q4H PRN    ondansetron (ZOFRAN) injection 4 mg  4 mg IntraVENous Q4H PRN    sodium chloride (NS) flush 5-40 mL  5-40 mL IntraVENous Q8H    sodium chloride (NS) flush 5-40 mL  5-40 mL IntraVENous PRN    acetaminophen (TYLENOL) tablet 650 mg  650 mg Oral Q6H PRN    Or    acetaminophen (TYLENOL) suppository 650 mg  650 mg Rectal Q6H PRN    polyethylene glycol (MIRALAX) packet 17 g  17 g Oral DAILY PRN    enoxaparin (LOVENOX) injection 40 mg  40 mg SubCUTAneous DAILY      No Known Allergies     A complete review of systems was obtained, negative except as described above   Review of Systems   Constitutional: Positive for appetite change. Gastrointestinal: Positive for abdominal pain and nausea. Neurological: Positive for numbness. Psychiatric/Behavioral: Positive for sleep disturbance. All other systems reviewed and are negative.     Physical Exam:     Visit Vitals  /77 (BP 1 Location: Right arm, BP Patient Position: At rest)   Pulse 88   Temp 98.3 °F (36.8 °C)   Resp 16   Ht 5' 3\" (1.6 m)   Wt 104 lb 4.4 oz (47.3 kg)   LMP  (LMP Unknown)   SpO2 97%   Breastfeeding No   BMI 18.47 kg/m²     ECOG PS: 1-2 thin WF  General: No distress  Eyes: Anicteric sclerae  HENT: Atraumatic  Neck: Supple  Resp: Normal respiratory effort  GI: more distended  MS: Ambulatory usually  Skin: Warm dry. Port site without redness/swelling  Psych: Alert, oriented, appropriate affect, normal judgment/insight    Results:     Lab Results   Component Value Date/Time    WBC 8.1 12/08/2020 02:06 AM    HGB 12.1 12/08/2020 02:06 AM    HCT 34.8 (L) 12/08/2020 02:06 AM    PLATELET 070 58/65/0589 02:06 AM    MCV 95.9 12/08/2020 02:06 AM    ABS. NEUTROPHILS 5.8 12/07/2020 12:12 AM    Hemoglobin (POC) 10.8 (L) 09/25/2019 04:11 PM    Hematocrit (POC) 32 (L) 09/25/2019 12:10 PM     Lab Results   Component Value Date/Time    Sodium 134 (L) 12/08/2020 02:06 AM    Potassium 3.9 12/08/2020 02:06 AM    Chloride 101 12/08/2020 02:06 AM    CO2 24 12/08/2020 02:06 AM    Glucose 76 12/08/2020 02:06 AM    BUN 5 (L) 12/08/2020 02:06 AM    Creatinine 0.36 (L) 12/08/2020 02:06 AM    GFR est AA >60 12/08/2020 02:06 AM    GFR est non-AA >60 12/08/2020 02:06 AM    Calcium 8.6 12/08/2020 02:06 AM    Sodium (POC) 137 09/25/2019 12:10 PM    Potassium (POC) 3.7 09/25/2019 12:10 PM    Chloride (POC) 105 09/25/2019 12:10 PM    Glucose (POC) 85 09/25/2019 12:10 PM    BUN (POC) 4 (L) 09/25/2019 12:10 PM    Creatinine (POC) 0.5 (L) 09/25/2019 12:10 PM    Calcium, ionized (POC) 1.14 09/25/2019 12:10 PM     Lab Results   Component Value Date/Time    Bilirubin, total 0.6 12/07/2020 12:12 AM    ALT (SGPT) 22 12/07/2020 12:12 AM    Alk. phosphatase 157 (H) 12/07/2020 12:12 AM    Protein, total 5.8 (L) 12/07/2020 12:12 AM    Albumin 2.1 (L) 12/07/2020 12:12 AM    Globulin 3.7 12/07/2020 12:12 AM     CT Results (most recent):  [facility]   CT ABD PELV W CONT    Narrative EXAM: CT ABD PELV W CONT    INDICATION: metastatic appendiceal cancer    COMPARISON: May 26, 2020     CONTRAST: 100 mL of Isovue-370. TECHNIQUE:   Following the uneventful intravenous administration of contrast, thin axial  images were obtained through the abdomen and pelvis.  Coronal and sagittal  reconstructions were generated. Oral contrast was administered. CT dose  reduction was achieved through use of a standardized protocol tailored for this  examination and automatic exposure control for dose modulation. FINDINGS:   LOWER THORAX: Small left pleural effusion with adjacent atelectasis. LIVER: No  Change, cyst  BILIARY TREE: Gallstones in a nondistended gallbladder. . CBD is not dilated. SPLEEN: within normal limits. PANCREAS: No mass or ductal dilatation. ADRENALS: Unremarkable. KIDNEYS: No mass, calculus, or hydronephrosis. STOMACH: Unremarkable. SMALL BOWEL: No dilatation or wall thickening. COLON: Prominent fecal stasis. PERITONEUM: There is a small amount of ascites in a patient there is multiple  area of peritoneal thickening suspicious for peritoneal carcinomatosis or. RETROPERITONEUM: No lymphadenopathy or aortic aneurysm. REPRODUCTIVE ORGANS:  URINARY BLADDER: No mass or calculus. BONES: No destructive bone lesion. ABDOMINAL WALL: No mass or hernia. ADDITIONAL COMMENTS: N/A      Impression IMPRESSION:  Suspect peritoneal carcinomatosis . Records reviewed and summarized above. Pathology report(s) reviewed above. Radiology report(s) reviewed above. Assessment/PLAN:     1) Stage 4 / Metastatic Appendiceal Cancer   Post EXPLORATORY LAPAROTOMY SIERRA BSO, TUMOR DEBULKING: ILEOCECAL RESECTION; OMENTECTOMY 9/25/19  Surgery done for obstruction. Patient choose to have chemo locally and still see [facility]. She was on 5FU only from 8/26/20 - 9/23/20. She went back to [facility] in 10/20 and CTs there showed slight progression. Added Oxaliplatin back in on 10/7/20 since patient has not failed this regimen. Last chemo with modified FOLFOX was on 11/18/20. She was due for chemo again on 12/2/20 but held due to patient not feeling well/nausea/vomiting. Now admitted with fever/nausea/abdominal pain. CT A/P was negative for acute process/abscess and cancer appears stable. COVID negative. Initial BC + for GNR. Repeat blood cultures are still pending. ID seeing pt. Pt is in bed doing ok at my visit. Continue to have nausea/abdominal pain likely due to cancer. Continue to hold chemo while admitted. Consider palliative care eval.     2) Abdominal Pain/Cramping  Likely d/t carcinomatosis. Continue Roxicodone PRN - patient takes prior to eating which helps. IV Dilaudid for breakthrough pain. Consider Palliative Care consult. 3) Chemo Induced Nausea  Continue anti-emetics as needed. Compazine works better than Zofran. 4) Hx of Left Pleural Effusion post Thoracentesis x 2  No cytology. Stable on CTs.     5) Chemo Induced Diarrhea  Resolved. Will continue to monitor. 6) Chemo Induced Neuropathy  On Gabapentin 100 mg QHS. Stable/unchanged. 7) Insomnia/Anxiety about Health. Takes Ativan PRN which helps    8) Hx of Hematuria  Resolved now. She was seen by Urology. Continue to monitor for now. 9) Psychosocial  Mood good, coping well considering difficult disease. She has great family support. SW support as needed. Will follow. Call if questions. I appreciate the opportunity to participate in Ms. Lisa sorto.     Signed By: Pia Lockwood, DO

## 2020-12-09 NOTE — PROGRESS NOTES
JESSICA: 
RUR: 16% Disposition:  Home with IV abx. Chart reviewed. CM spoke with Dr. Saranya [place] and was informed of patient d/c plan to include home IV abx. Home IV abx orders noted. CM will discuss with patient in the morning. CM continuing to follow. [facility], [address]

## 2020-12-09 NOTE — PROGRESS NOTES
IV Antibiotic Orders 1. Diagnosis:  E coli bacteremia 2. Routine Port care per protocol 3. Antibiotic:  Ceftriaxone 2 grams IV Q 24 hours 4. Lab each Monday: CBC/diff/platelets BMP 5. Lab each Thursday: CBC/diff/platelets BMP 6. Fax lab to Dr. Sammy Escobar @ [phone]. 
7.  Call Dr. Sammy Escobar @ [phone] for WBC under 2 or creatinine over 2 
8. Duration of therapy: 12/23/20 
9. Allergies:  None 10. Call [phone] with name of [address] [place] [address] Chase Núñez MD

## 2020-12-09 NOTE — PROGRESS NOTES
[facility] [place] [facility] 
[address], [address] [place] W: [phone]  F: [phone] Reason for Consult:  
Opal Hernandez is a 44 y.o. 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 Treatment History:  
· EXPLORATORY LAPAROTOMY SIERRA BSO, TUMOR DEBULKING: ILEOCECAL RESECTION; OMENTECTOMY · FOLFOX 50% DR 12/3/19 - 3/4/20 · Per [facility] recommendation, dropped 5FU Bolus/Leucovorin and increased Oxaliplatin to 60 mg/m2 and 5FU pump tp 1,800 mg/m2 with Cycle 8 on 3/18/20 · Oxaliplatin increased to 85 mg/m2 with Cycle 10 on 4/15/20 - 5/27/20 
· CTs C/A/P 3/20 stable · CTs C/A/P 5/26/20 stable · Chemo held on 6/20/20 d/t thrombocytopenia · Oxaliplatin DR 25% with Cycle 14 on 6/17/20 d/t thrombocytopenia · Oxaliplatin DR 25% more (50% total) with Cycle 15 on 7/8/20 · Chemo held on 7/22/20 d/t thrombocytopenia · CT C/A/P 7/29/20 at [facility] with re demonstrated infiltrative tissue in the pelvis, favored to represented peritoneal carcinomatosis; soft tissue tethering to the colon, multiple loops of small bowel, and the urinary bladder; redemonstrated ill-defined complex fluid in the left upper quadrant - not significantly changed relative to most recent prior study; few clustered small pulmonary nodules are identified in the right lower lobe · Exploratory lap at [facility] 8/20. · Maintenance 5FU (pump only) 8/26/20 - 9/23/20 · CTs 10/20 at [facility] showed slight progression · Added Oxaliplatin back in on 10/7/20 - Current · CT A/P 12/6/20: Suspect peritoneal carcinomatosis STAGE: 4 with carcinomatosis History of Present Illness:  
Opal Hernandez is a 44 y.o. female well known to us from clinic who is seen today in hospital for follow up of metastatic appendix cancer on FOLFOX chemotherapy.  She was due for chemo on 12/2/20 but was held due to patient not feeling well/nausea/vomiting. She presented to the ER last night with upper abdominal pain, nausea, and fever. She was tested for COVID and negative. CT A/P unremarkable. ID has been consulted for further evaluation. Oncology was consulted for further evaluation/management. Interval History:  
Patient seen today in hospital follow up. She is resting in bed upon visit. She reports that she is feeling okay overall - continues to have abdominal pain and fullness and occasional nausea. Appetite has been low overall. Chemo is currently on hold due to admission but patient hopes to resume chemo again once discharged. ID continues to follow. Palliative Care is now seeing patient as well to assist with pain management. She denies fever, chills, cough, SOB, chest pain. Past Medical History:  
Diagnosis Date  Cancer ([address] APPENDIX CANCER   
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  
Procedure Laterality Date [address] UNLISTED  HX APPENDECTOMY  09/2019  HX GI  09/2019 ILEOCECECTOMY  HX GYN  2003 CYST ON OVARY  HX HYSTERECTOMY  2019  IR THORACENTESIS CATH W IMAGE  11/6/2019 Social History Tobacco Use  Smoking status: Never Smoker  Smokeless tobacco: Never Used Substance Use Topics  Alcohol use: Not Currently Family History Problem Relation Age of Onset  Breast Cancer Paternal Grandmother 56  Crohn's Disease Mother  Heart Disease Mother  Cancer Father BLADDER  
 Diabetes Father  No Known Problems Son  No Known Problems Daughter  Anesth Problems Neg Hx Current Facility-Administered Medications Medication Dose Route Frequency  oxyCODONE IR (ROXICODONE) tablet 10 mg  10 mg Oral Q4H PRN  
  HYDROmorphone (PF) (DILAUDID) injection 0.5 mg  0.5 mg IntraVENous Q4H PRN  
 bisacodyL (DULCOLAX) tablet 5 mg  5 mg Oral DAILY  calcium carbonate (TUMS) chewable tablet 200 mg [elemental]  200 mg Oral TID PRN  
 magnesium oxide (MAG-OX) tablet 400 mg  400 mg Oral DAILY  potassium, sodium phosphates (NEUTRA-PHOS) packet 1 Packet  1 Packet Oral QID  megestroL (MEGACE) 400 mg/10 mL (10 mL) oral suspension 200 mg  200 mg Oral BID  piperacillin-tazobactam (ZOSYN) 3.375 g in 0.9% sodium chloride (MBP/ADV) 100 mL MBP  3.375 g IntraVENous Q8H  
 gabapentin (NEURONTIN) capsule 100 mg  100 mg Oral QHS  hyoscyamine SL (LEVSIN/SL) tablet 0.125 mg  0.125 mg SubLINGual Q4H PRN  
 LORazepam (ATIVAN) tablet 0.5-1 mg  0.5-1 mg Oral BID PRN  prochlorperazine (COMPAZINE) tablet 5 mg  5 mg Oral Q6H PRN  
 simethicone (MYLICON) tablet 139 mg  120 mg Oral QID PRN  
 ondansetron (ZOFRAN) injection 4 mg  4 mg IntraVENous Q4H PRN  
 sodium chloride (NS) flush 5-40 mL  5-40 mL IntraVENous Q8H  
 sodium chloride (NS) flush 5-40 mL  5-40 mL IntraVENous PRN  
 acetaminophen (TYLENOL) tablet 650 mg  650 mg Oral Q6H PRN Or  
 acetaminophen (TYLENOL) suppository 650 mg  650 mg Rectal Q6H PRN  polyethylene glycol (MIRALAX) packet 17 g  17 g Oral DAILY PRN  
 enoxaparin (LOVENOX) injection 40 mg  40 mg SubCUTAneous DAILY No Known Allergies Review of Systems Constitutional: Positive for appetite change. Gastrointestinal: Positive for abdominal pain and nausea. Neurological: Positive for numbness. Psychiatric/Behavioral: Positive for sleep disturbance. All other systems reviewed and are negative. A complete review of systems was obtained, negative except as described above Physical Exam:  
 
Visit Vitals /75 Pulse 96 Temp 98 °F (36.7 °C) Resp 18 Ht 5' 3\" (1.6 m) Wt 96 lb 14.4 oz (44 kg) LMP  (LMP Unknown) SpO2 94% Breastfeeding No  
BMI 17.17 kg/m² ECOG PS: 1-2 thin WF General: No distress Eyes: Anicteric sclerae HENT: Atraumatic Neck: Supple Resp: Normal respiratory effort GI: More distended MS: Ambulatory usually Skin: Warm dry. Port site without redness/swelling Psych: Alert, oriented, appropriate affect, normal judgment/insight Results:  
 
Lab Results Component Value Date/Time WBC 8.1 12/08/2020 02:06 AM  
 HGB 12.1 12/08/2020 02:06 AM  
 HCT 34.8 (L) 12/08/2020 02:06 AM  
 PLATELET 858 59/57/9968 02:06 AM  
 MCV 95.9 12/08/2020 02:06 AM  
 ABS. NEUTROPHILS 5.8 12/07/2020 12:12 AM  
 Hemoglobin (POC) 10.8 (L) 09/25/2019 04:11 PM  
 Hematocrit (POC) 32 (L) 09/25/2019 12:10 PM  
 
Lab Results Component Value Date/Time Sodium 134 (L) 12/08/2020 02:06 AM  
 Potassium 3.9 12/08/2020 02:06 AM  
 Chloride 101 12/08/2020 02:06 AM  
 CO2 24 12/08/2020 02:06 AM  
 Glucose 76 12/08/2020 02:06 AM  
 BUN 5 (L) 12/08/2020 02:06 AM  
 Creatinine 0.36 (L) 12/08/2020 02:06 AM  
 GFR est AA >60 12/08/2020 02:06 AM  
 GFR est non-AA >60 12/08/2020 02:06 AM  
 Calcium 8.6 12/08/2020 02:06 AM  
 Sodium (POC) 137 09/25/2019 12:10 PM  
 Potassium (POC) 3.7 09/25/2019 12:10 PM  
 Chloride (POC) 105 09/25/2019 12:10 PM  
 Glucose (POC) 85 09/25/2019 12:10 PM  
 BUN (POC) 4 (L) 09/25/2019 12:10 PM  
 Creatinine (POC) 0.5 (L) 09/25/2019 12:10 PM  
 Calcium, ionized (POC) 1.14 09/25/2019 12:10 PM  
 
Lab Results Component Value Date/Time Bilirubin, total 0.6 12/07/2020 12:12 AM  
 ALT (SGPT) 22 12/07/2020 12:12 AM  
 Alk. phosphatase 157 (H) 12/07/2020 12:12 AM  
 Protein, total 5.8 (L) 12/07/2020 12:12 AM  
 Albumin 2.1 (L) 12/07/2020 12:12 AM  
 Globulin 3.7 12/07/2020 12:12 AM  
 
CT Results (most recent): 
Results from Hospital Encounter encounter on 12/06/20 CT ABD PELV W CONT Narrative EXAM: CT ABD PELV W CONT INDICATION: metastatic appendiceal cancer COMPARISON: May 26, 2020 CONTRAST: 100 mL of Isovue-370.  
 
TECHNIQUE:  
 Following the uneventful intravenous administration of contrast, thin axial 
images were obtained through the abdomen and pelvis. Coronal and sagittal 
reconstructions were generated. Oral contrast was administered. CT dose 
reduction was achieved through use of a standardized protocol tailored for this 
examination and automatic exposure control for dose modulation. FINDINGS:  
LOWER THORAX: Small left pleural effusion with adjacent atelectasis. LIVER: No 
Change, cyst 
BILIARY TREE: Gallstones in a nondistended gallbladder. . CBD is not dilated. SPLEEN: within normal limits. PANCREAS: No mass or ductal dilatation. ADRENALS: Unremarkable. KIDNEYS: No mass, calculus, or hydronephrosis. STOMACH: Unremarkable. SMALL BOWEL: No dilatation or wall thickening. COLON: Prominent fecal stasis. PERITONEUM: There is a small amount of ascites in a patient there is multiple 
area of peritoneal thickening suspicious for peritoneal carcinomatosis or. RETROPERITONEUM: No lymphadenopathy or aortic aneurysm. REPRODUCTIVE ORGANS: 
URINARY BLADDER: No mass or calculus. BONES: No destructive bone lesion. ABDOMINAL WALL: No mass or hernia. ADDITIONAL COMMENTS: N/A Impression IMPRESSION: 
Suspect peritoneal carcinomatosis . Records reviewed and summarized above. Pathology report(s) reviewed above. Radiology report(s) reviewed above. Assessment/PLAN:  
 
1) Stage 4 / Metastatic Appendiceal Cancer Post EXPLORATORY LAPAROTOMY SIERRA BSO, TUMOR DEBULKING: ILEOCECAL RESECTION; OMENTECTOMY 9/25/19 Surgery done for obstruction. Patient choose to have chemo locally and still see [facility]. She was on 5FU only from 8/26/20 - 9/23/20. She went back to [facility] in 10/20 and CTs there showed slight progression. Added Oxaliplatin back in on 10/7/20 since patient has not failed this regimen. Last chemo with modified FOLFOX was on 11/18/20. She was due for chemo again on 12/2/20 but held due to patient not feeling well/nausea/vomiting. Now admitted with fever/nausea/abdominal pain. CT A/P was negative for acute process/abscess and cancer appears stable. COVID negative. Initial BC + for GNR. Repeat blood cultures with no growth this far. ID is following, she remains on IV Zosyn. Port removal if ID thinks needed. Patient is in bed during my visit - feels okay overall. Continues to have nausea/abdominal pain likely due to cancer. Continue to hold chemo while admitted. Will re-evaluate as OP. Palliative Care has been consulted to assist with pain management. 2) Abdominal Pain/Cramping Likely d/t carcinomatosis. Continue Roxicodone PRN - increased to 10 mg per Palliative. IV Dilaudid for breakthrough pain. Palliative Care is now following. 3) Chemo Induced Nausea Continue anti-emetics as needed. Compazine works better than Zofran. 4) Hx of Left Pleural Effusion post Thoracentesis x 2 No cytology. Stable on CTs.  
 
5) Chemo Induced Diarrhea Resolved. Will continue to monitor. 6) Chemo Induced Neuropathy On Gabapentin 100 mg QHS. Stable/unchanged. 7) Insomnia/Anxiety about Health. Takes Ativan PRN which helps 8) Hx of Hematuria Resolved now. She was seen by Urology. Continue to monitor for now. 9) Psychosocial 
Mood good, coping well considering difficult disease. She has great family support. SW support as needed. Will follow. Call if questions. Patient seen by me today. Note done in conjunction with Rachna Hatfield NP I appreciate the opportunity to participate in [facility]. [facility] care.  
 
Signed By: Candie Goldsmith,

## 2020-12-10 NOTE — PROGRESS NOTES
Palliative Medicine Consult Benjy: [phone] ([phone]) Patient Name: Michelle Jason YOB: 1970 Date of Initial Consult: 12/9/20 Reason for Consult: Overwhelming sx Requesting Provider: Siddharth Varma Primary Care Physician: Dawit Carson MD 
 
 SUMMARY:  
Michelle Jason is a 44 y.o. with a past history of appendiceal cancer dx 2019 s/p SIERRA/BSO/ileocecal resection/omentectomy on palliative chemo w/ JAN Leslie pleural effusion s/p thoracentesis, anxiety, chemo induced neuropathy who was admitted on 12/6/2020 from fever, abdominal pain, nausea. CT AP 12/6/20 showing carcinomatosis. BCx 12/6 +GNR. On Zosyn. ID and Onc following. Plan to go home on IV abx. Current medical issues leading to Palliative Medicine involvement include: Overwhelming sx. At home takes Oxycodone 5mg prn (11/30 Rx filled for 60 tabs) and Gabapentin. Also w/ chemo induced nausea, compazine works better for her than Zofran. Social:  to [place]. 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 PALLIATIVE DIAGNOSES:  
1. Abdominal pain- cancer 2. Peripheral neuropathy from chemotherapy 3. Cancer anorexia PLAN:  
1. Doing better w/ Oxycodone regimen. 2. Will see at [facility] clinic. 3. Communicated plan of care with: Palliative IDT, [address] Team incl Dr Fabian Cardoza Dear Ms. Gin Ta, It was a pleasure to meet you in the hospital. We discussed your abdominal pain from cancer and your neuropathy in your feet.  
 
-We increased your dose of Oxycodone to 10mg and you can take every 4 hours as you need for pain. Would recommend taking 30-60 min before every meal at least. I prescribed this for you. 
 
-You tried Gabapentin here for your neuropathy but it caused some indigestion and confusion.  Your neuropathy is not painful, so we decided to stop this medication. In the future you could always try a lower dose (50mg bedtime) or another medication. You are also welcome to see me at [facility] for acupuncture at no cost through the [facility].  
 
-Stay ahead of constipation, you may need to add Miralax (over the counter) daily as we increased your opioids.  
 
-My nurse Evon Ibrahim will call you early next week to set up an appointment with my colleague Dr Mauricio Villela at the [facility] clinic location. Have a wonderful holiday season, 
 
Jay Guo MD 
 
 GOALS OF CARE / TREATMENT PREFERENCES:  
 
GOALS OF CARE: 
Patient/Health Care Proxy Stated Goals: Other (comment)(Sx management) TREATMENT PREFERENCES:  
Code Status: Full Code Advance Care Planning: 
[x] The [facility] Interdisciplinary Team has updated the ACP Navigator with [place] and Patient Capacity Primary Decision Maker: Margie Marshalns - Spouse - [phone] Advance Care Planning 12/7/2020 Patient's Healthcare Decision Maker is: Verbal statement (Legal Next of Kin remains as decision maker) Confirm Advance Directive None Patient Would Like to Complete Advance Directive No  
Does the patient have other document types - Medical Interventions: Full interventions Other: As far as possible, the palliative care team has discussed with patient / health care proxy about goals of care / treatment preferences for patient. HISTORY:  
 
History obtained from: Pt, chart, staff CHIEF COMPLAINT: Abdominal pain HPI/SUBJECTIVE: The patient is:  
[x] Verbal and participatory [] Non-participatory due to: Pt comfortable, but had a rough night- had AMS and some indigestion with the gabapentin. Just starting to feel \"normal\" now. +flatus. Should be going home today. Clinical Pain Assessment (nonverbal scale for severity on nonverbal patients):  
Clinical Pain Assessment Severity: 2 Location: Abdomen Character: Aching Duration: Worse over past couple weeks Effect: Harder to eat Factors: Worse w/ eating Frequency: Constant Duration: for how long has pt been experiencing pain (e.g., 2 days, 1 month, years) Frequency: how often pain is an issue (e.g., several times per day, once every few days, constant) FUNCTIONAL ASSESSMENT:  
 
Palliative Performance Scale (PPS): PPS: 80 PSYCHOSOCIAL/SPIRITUAL SCREENING:  
 
Palliative IDT has assessed this patient for cultural preferences / practices and a referral made as appropriate to needs (Cultural Services, Patient Advocacy, Ethics, etc.) Any spiritual / Religion concerns: 
[] Yes /  [x] No 
 
Caregiver Burnout: 
[] Yes /  [x] No /  [] No Caregiver Present Anticipatory grief assessment:  
[x] Normal  / [] Maladaptive ESAS Anxiety: Anxiety: 0 
 
ESAS Depression: Depression: 0 REVIEW OF SYSTEMS:  
 
Positive and pertinent negative findings in ROS are noted above in HPI. The following systems were [x] reviewed / [] unable to be reviewed as noted in HPI Other findings are noted below. Systems: constitutional, ears/nose/mouth/throat, respiratory, gastrointestinal, genitourinary, musculoskeletal, integumentary, neurologic, psychiatric, endocrine. Positive findings noted below. Modified ESAS Completed by: provider Fatigue: 2 Drowsiness: 0 Depression: 0 Pain: 2 Anxiety: 0 Nausea: 3 Anorexia: 6 Dyspnea: 0 Constipation: No  
     
 
 
 PHYSICAL EXAM:  
 
From RN flowsheet: 
Wt Readings from Last 3 Encounters:  
12/10/20 100 lb 5 oz (45.5 kg)  
11/18/20 102 lb (46.3 kg)  
11/18/20 102 lb 3.2 oz (46.4 kg) Blood pressure 128/86, pulse 99, temperature 99 °F (37.2 °C), resp. rate 18, height 5' 3\" (1.6 m), weight 100 lb 5 oz (45.5 kg), SpO2 95 %, not currently breastfeeding. Pain Scale 1: Numeric (0 - 10) Pain Intensity 1: 0 Pain Onset 1: Chronic Pain Location 1: Abdomen Pain Orientation 1: Mid 
Pain Description 1: Aching Pain Intervention(s) 1: Medication (see MAR) Last bowel movement, if known: 12/7 Constitutional: awake, alert, oriented Eyes: pupils equal, anicteric ENMT: no nasal discharge, moist mucous membranes Cardiovascular: regular rhythm Respiratory: breathing not labored, symmetric Gastrointestinal: soft, +BS Musculoskeletal: no deformity, no tenderness to palpation Skin: warm, dry Neurologic: following commands, moving all extremities Psychiatric: full affect, no hallucinations HISTORY:  
 
Active Problems: 
  Abdominal pain (12/6/2020) Fever (12/6/2020) Severe protein-calorie malnutrition ([address] (12/7/2020) Past Medical History:  
Diagnosis Date  Cancer ([address] APPENDIX CANCER   
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  
Procedure Laterality Date [address] [place] [address] UNLISTED  HX APPENDECTOMY  09/2019  HX GI  09/2019 ILEOCECECTOMY  HX GYN  2003 CYST ON OVARY  HX HYSTERECTOMY  2019  IR THORACENTESIS CATH W IMAGE  11/6/2019 Family History Problem Relation Age of Onset  Breast Cancer Paternal Grandmother 56  Crohn's Disease Mother  Heart Disease Mother  Cancer Father BLADDER  
 Diabetes Father  No Known Problems Son  No Known Problems Daughter  Anesth Problems Neg Hx History reviewed, no pertinent family history. Social History Tobacco Use  Smoking status: Never Smoker  Smokeless tobacco: Never Used Substance Use Topics  Alcohol use: Not Currently No Known Allergies Current Facility-Administered Medications Medication Dose Route Frequency  oxyCODONE IR (ROXICODONE) tablet 10 mg  10 mg Oral Q4H PRN  
 HYDROmorphone (PF) (DILAUDID) injection 0.5 mg  0.5 mg IntraVENous Q4H PRN  
  bisacodyL (DULCOLAX) tablet 5 mg  5 mg Oral DAILY  bacitracin 500 unit/gram packet 1 Packet  1 Packet Topical PRN  
 heparin (porcine) pf 500 Units  500 Units InterCATHeter PRN  
 cefTRIAXone (ROCEPHIN) 2 g in 0.9% sodium chloride (MBP/ADV) 50 mL MBP  2 g IntraVENous Q24H  calcium carbonate (TUMS) chewable tablet 200 mg [elemental]  200 mg Oral TID PRN  potassium, sodium phosphates (NEUTRA-PHOS) packet 1 Packet  1 Packet Oral QID  megestroL (MEGACE) 400 mg/10 mL (10 mL) oral suspension 200 mg  200 mg Oral BID  hyoscyamine SL (LEVSIN/SL) tablet 0.125 mg  0.125 mg SubLINGual Q4H PRN  
 LORazepam (ATIVAN) tablet 0.5-1 mg  0.5-1 mg Oral BID PRN  prochlorperazine (COMPAZINE) tablet 5 mg  5 mg Oral Q6H PRN  
 simethicone (MYLICON) tablet 745 mg  120 mg Oral QID PRN  
 ondansetron (ZOFRAN) injection 4 mg  4 mg IntraVENous Q4H PRN  
 sodium chloride (NS) flush 5-40 mL  5-40 mL IntraVENous Q8H  
 sodium chloride (NS) flush 5-40 mL  5-40 mL IntraVENous PRN  
 acetaminophen (TYLENOL) tablet 650 mg  650 mg Oral Q6H PRN Or  
 acetaminophen (TYLENOL) suppository 650 mg  650 mg Rectal Q6H PRN  polyethylene glycol (MIRALAX) packet 17 g  17 g Oral DAILY PRN  
 enoxaparin (LOVENOX) injection 40 mg  40 mg SubCUTAneous DAILY  
 
 
 
 LAB AND IMAGING FINDINGS:  
 
Lab Results Component Value Date/Time WBC 8.1 12/08/2020 02:06 AM  
 HGB 12.1 12/08/2020 02:06 AM  
 PLATELET 768 72/14/3697 02:06 AM  
 
Lab Results Component Value Date/Time Sodium 134 (L) 12/08/2020 02:06 AM  
 Potassium 3.9 12/08/2020 02:06 AM  
 Chloride 101 12/08/2020 02:06 AM  
 CO2 24 12/08/2020 02:06 AM  
 BUN 5 (L) 12/08/2020 02:06 AM  
 Creatinine 0.36 (L) 12/08/2020 02:06 AM  
 Calcium 8.6 12/08/2020 02:06 AM  
 Magnesium 1.8 12/08/2020 02:06 AM  
 Phosphorus 3.4 12/08/2020 02:06 AM  
  
Lab Results Component Value Date/Time Alk.  phosphatase 157 (H) 12/07/2020 12:12 AM  
 Protein, total 5.8 (L) 12/07/2020 12:12 AM  
 Albumin 2.1 (L) 12/07/2020 12:12 AM  
 Globulin 3.7 12/07/2020 12:12 AM  
 
Lab Results Component Value Date/Time INR 1.0 10/09/2020 02:32 PM  
 Prothrombin time 10.7 10/09/2020 02:32 PM  
  
Lab Results Component Value Date/Time Iron 22 (L) 10/18/2019 05:49 AM  
 Ferritin 483 (H) 10/18/2019 05:49 AM  
  
No results found for: PH, PCO2, PO2 No components found for: [place] No results found for: CPK, CKMB Total time:  
Counseling / coordination time, spent as noted above:  
> 50% counseling / coordination?:  
 
Prolonged service was provided for  []30 min   []75 min in face to face time in the presence of the patient, spent as noted above. Time Start:  
Time End:  
Note: this can only be billed with 61335 (initial) or 92252 (follow up). If multiple start / stop times, list each separately.

## 2020-12-10 NOTE — PROGRESS NOTES
[facility] [place] [facility] 
[address], [address] W: [phone]  F: [phone] Reason for Consult:  
Chase Elias is a 44 y.o. 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 Treatment History:  
· EXPLORATORY LAPAROTOMY [facility] BSO, TUMOR DEBULKING: ILEOCECAL RESECTION; OMENTECTOMY · FOLFOX 50% DR 12/3/19 - 3/4/20 · Per [facility] recommendation, dropped 5FU Bolus/Leucovorin and increased Oxaliplatin to 60 mg/m2 and 5FU pump tp 1,800 mg/m2 with Cycle 8 on 3/18/20 · Oxaliplatin increased to 85 mg/m2 with Cycle 10 on 4/15/20 - 5/27/20 
· CTs C/A/P 3/20 stable · CTs C/A/P 5/26/20 stable · Chemo held on 6/20/20 d/t thrombocytopenia · Oxaliplatin DR 25% with Cycle 14 on 6/17/20 d/t thrombocytopenia · Oxaliplatin DR 25% more (50% total) with Cycle 15 on 7/8/20 · Chemo held on 7/22/20 d/t thrombocytopenia · CT C/A/P 7/29/20 at [facility] with re demonstrated infiltrative tissue in the pelvis, favored to represented peritoneal carcinomatosis; soft tissue tethering to the colon, multiple loops of small bowel, and the urinary bladder; redemonstrated ill-defined complex fluid in the left upper quadrant - not significantly changed relative to most recent prior study; few clustered small pulmonary nodules are identified in the right lower lobe · Exploratory lap at [facility] 8/20. · Maintenance 5FU (pump only) 8/26/20 - 9/23/20 · CTs 10/20 at [facility] showed slight progression · Added Oxaliplatin back in on 10/7/20 - Current · CT A/P 12/6/20: Suspect peritoneal carcinomatosis STAGE: 4 with carcinomatosis History of Present Illness: Priti Alexander is a 44 y.o. female well known to us from clinic who is seen today in hospital for follow up of metastatic appendix cancer on FOLFOX chemotherapy. She was due for chemo on 12/2/20 but was held due to patient not feeling well/nausea/vomiting. She presented to the ER last night with upper abdominal pain, nausea, and fever. She was tested for COVID and negative. CT A/P unremarkable. ID has been consulted for further evaluation. Oncology was consulted for further evaluation/management. Interval History:  
Patient seen today in hospital follow up. She is resting in bed upon visit. She reports that she is feeling okay overall - continues to have abdominal pain and fullness and occasional nausea. Palliative Care is following and has increased Oxycodone which has helped with the pain. Appetite has been low overall. Chemo is currently on hold due to admission but patient hopes to resume chemo again once discharged. ID continues to follow - repeat blood cultures from 12/8/20 have no growth thus far. ID is recommending IV antibiotic therapy for 2 weeks with follow up blood cultures at completion. If still positive, would remove port. Neuropathy is mild/stable. She had indigestion/confusion with Gabapentin so this has been stopped. She denies fever, chills, cough, SOB, chest pain. Past Medical History:  
Diagnosis Date  Cancer ([address] APPENDIX CANCER   
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  
Procedure Laterality Date [address] UNLISTED  HX APPENDECTOMY  09/2019  HX GI  09/2019 ILEOCECECTOMY  HX GYN  2003 CYST ON OVARY  HX HYSTERECTOMY  2019  IR THORACENTESIS CATH W IMAGE  11/6/2019 Social History Tobacco Use  Smoking status: Never Smoker  Smokeless tobacco: Never Used Substance Use Topics  Alcohol use: Not Currently Family History Problem Relation Age of Onset  Breast Cancer Paternal Grandmother 56  Crohn's Disease Mother  Heart Disease Mother  Cancer Father BLADDER  
 Diabetes Father  No Known Problems Son  No Known Problems Daughter  Anesth Problems Neg Hx Current Facility-Administered Medications Medication Dose Route Frequency  oxyCODONE IR (ROXICODONE) tablet 10 mg  10 mg Oral Q4H PRN  
 HYDROmorphone (PF) (DILAUDID) injection 0.5 mg  0.5 mg IntraVENous Q4H PRN  
 bisacodyL (DULCOLAX) tablet 5 mg  5 mg Oral DAILY  bacitracin 500 unit/gram packet 1 Packet  1 Packet Topical PRN  
 heparin (porcine) pf 500 Units  500 Units InterCATHeter PRN  
 cefTRIAXone (ROCEPHIN) 2 g in 0.9% sodium chloride (MBP/ADV) 50 mL MBP  2 g IntraVENous Q24H  calcium carbonate (TUMS) chewable tablet 200 mg [elemental]  200 mg Oral TID PRN  potassium, sodium phosphates (NEUTRA-PHOS) packet 1 Packet  1 Packet Oral QID  megestroL (MEGACE) 400 mg/10 mL (10 mL) oral suspension 200 mg  200 mg Oral BID  hyoscyamine SL (LEVSIN/SL) tablet 0.125 mg  0.125 mg SubLINGual Q4H PRN  
 LORazepam (ATIVAN) tablet 0.5-1 mg  0.5-1 mg Oral BID PRN  prochlorperazine (COMPAZINE) tablet 5 mg  5 mg Oral Q6H PRN  
 simethicone (MYLICON) tablet 947 mg  120 mg Oral QID PRN  
 ondansetron (ZOFRAN) injection 4 mg  4 mg IntraVENous Q4H PRN  
 sodium chloride (NS) flush 5-40 mL  5-40 mL IntraVENous Q8H  
 sodium chloride (NS) flush 5-40 mL  5-40 mL IntraVENous PRN  
 acetaminophen (TYLENOL) tablet 650 mg  650 mg Oral Q6H PRN Or  
 acetaminophen (TYLENOL) suppository 650 mg  650 mg Rectal Q6H PRN  polyethylene glycol (MIRALAX) packet 17 g  17 g Oral DAILY PRN  
 enoxaparin (LOVENOX) injection 40 mg  40 mg SubCUTAneous DAILY No Known Allergies Review of Systems Constitutional: Positive for appetite change. Gastrointestinal: Positive for abdominal pain and nausea. Neurological: Positive for numbness. Psychiatric/Behavioral: Positive for sleep disturbance. All other systems reviewed and are negative. A complete review of systems was obtained, negative except as described above Physical Exam:  
 
Visit Vitals /75 Pulse 80 Temp 98.1 °F (36.7 °C) Resp 18 Ht 5' 3\" (1.6 m) Wt 100 lb 5 oz (45.5 kg) LMP  (LMP Unknown) SpO2 95% Breastfeeding No  
BMI 17.77 kg/m² ECOG PS: 1-2 thin WF General: No distress Eyes: Anicteric sclerae HENT: Atraumatic Neck: Supple Resp: Normal respiratory effort GI: slight distended MS: Ambulatory usually Skin: Warm dry. Port site without redness/swelling Psych: Alert, oriented, appropriate affect, normal judgment/insight Results:  
 
Lab Results Component Value Date/Time WBC 8.1 12/08/2020 02:06 AM  
 HGB 12.1 12/08/2020 02:06 AM  
 HCT 34.8 (L) 12/08/2020 02:06 AM  
 PLATELET 506 10/52/8262 02:06 AM  
 MCV 95.9 12/08/2020 02:06 AM  
 ABS. NEUTROPHILS 5.8 12/07/2020 12:12 AM  
 Hemoglobin (POC) 10.8 (L) 09/25/2019 04:11 PM  
 Hematocrit (POC) 32 (L) 09/25/2019 12:10 PM  
 
Lab Results Component Value Date/Time Sodium 134 (L) 12/08/2020 02:06 AM  
 Potassium 3.9 12/08/2020 02:06 AM  
 Chloride 101 12/08/2020 02:06 AM  
 CO2 24 12/08/2020 02:06 AM  
 Glucose 76 12/08/2020 02:06 AM  
 BUN 5 (L) 12/08/2020 02:06 AM  
 Creatinine 0.36 (L) 12/08/2020 02:06 AM  
 GFR est AA >60 12/08/2020 02:06 AM  
 GFR est non-AA >60 12/08/2020 02:06 AM  
 Calcium 8.6 12/08/2020 02:06 AM  
 Sodium (POC) 137 09/25/2019 12:10 PM  
 Potassium (POC) 3.7 09/25/2019 12:10 PM  
 Chloride (POC) 105 09/25/2019 12:10 PM  
 Glucose (POC) 85 09/25/2019 12:10 PM  
 BUN (POC) 4 (L) 09/25/2019 12:10 PM  
 Creatinine (POC) 0.5 (L) 09/25/2019 12:10 PM  
 Calcium, ionized (POC) 1.14 09/25/2019 12:10 PM  
 
Lab Results Component Value Date/Time Bilirubin, total 0.6 12/07/2020 12:12 AM  
 ALT (SGPT) 22 12/07/2020 12:12 AM  
 Alk. phosphatase 157 (H) 12/07/2020 12:12 AM  
 Protein, total 5.8 (L) 12/07/2020 12:12 AM  
 Albumin 2.1 (L) 12/07/2020 12:12 AM  
 Globulin 3.7 12/07/2020 12:12 AM  
 
CT Results (most recent): 
Results from Hospital Encounter encounter on 12/06/20 CT ABD PELV W CONT Narrative EXAM: CT ABD PELV W CONT INDICATION: metastatic appendiceal cancer COMPARISON: May 26, 2020 CONTRAST: 100 mL of Isovue-370. TECHNIQUE:  
Following the uneventful intravenous administration of contrast, thin axial 
images were obtained through the abdomen and pelvis. Coronal and sagittal 
reconstructions were generated. Oral contrast was administered. CT dose 
reduction was achieved through use of a standardized protocol tailored for this 
examination and automatic exposure control for dose modulation. FINDINGS:  
LOWER THORAX: Small left pleural effusion with adjacent atelectasis. LIVER: No 
Change, cyst 
BILIARY TREE: Gallstones in a nondistended gallbladder. . CBD is not dilated. SPLEEN: within normal limits. PANCREAS: No mass or ductal dilatation. ADRENALS: Unremarkable. KIDNEYS: No mass, calculus, or hydronephrosis. STOMACH: Unremarkable. SMALL BOWEL: No dilatation or wall thickening. COLON: Prominent fecal stasis. PERITONEUM: There is a small amount of ascites in a patient there is multiple 
area of peritoneal thickening suspicious for peritoneal carcinomatosis or. RETROPERITONEUM: No lymphadenopathy or aortic aneurysm. REPRODUCTIVE ORGANS: 
URINARY BLADDER: No mass or calculus. BONES: No destructive bone lesion. ABDOMINAL WALL: No mass or hernia. ADDITIONAL COMMENTS: N/A Impression IMPRESSION: 
Suspect peritoneal carcinomatosis . Records reviewed and summarized above. Pathology report(s) reviewed above. Radiology report(s) reviewed above.  
 
Assessment/PLAN:  
 
 1) Stage 4 / Metastatic Appendiceal Cancer Post EXPLORATORY LAPAROTOMY [facility] BSO, TUMOR DEBULKING: ILEOCECAL RESECTION; OMENTECTOMY 9/25/19 Surgery done for obstruction. Patient choose to have chemo locally and still see [address] [facility]. She was on 5FU only from 8/26/20 - 9/23/20. She went back to [address] [facility] in 10/20 and CTs there showed slight progression. Added Oxaliplatin back in on 10/7/20 since patient has not failed this regimen. Last chemo with modified FOLFOX was on 11/18/20. She was due for chemo again on 12/2/20 but held due to patient not feeling well/nausea/vomiting. Admitted with fever/nausea/abdominal pain. CT A/P was negative for acute process/abscess and cancer appears stable. COVID negative. Initial BC + for GNR. Repeat blood cultures 12/8/20 with no growth this far. ID is following, Zosyn switched to Ceftriaxone. Per ID, will need 2 weeks of IV abx and then repeat blood cultures. If cultures positive after 2 weeks of IV abx, will likely need to remove port. Patient is in bed during my visit - feels okay overall. Continues to have nausea/abdominal pain due to cancer. Continue to hold chemo while admitted. Will re-evaluate as OP after IV abx done. Palliative Care is following/helping with pain management. Pain is better with increased Oxycodone dose. Will follow over weekend as needed. 2) Abdominal Pain/Cramping Likely d/t carcinomatosis. Continue Roxicodone PRN - increased to 10 mg per Palliative which has helped. Continue IV Dilaudid for breakthrough pain. [place] [id number] [facility] clinic. 3) Chemo Induced Nausea Continue anti-emetics as needed. Compazine works better than Zofran. 4) Hx of Left Pleural Effusion post Thoracentesis x 2 No cytology. Stable on CTs.  
 
5) Chemo Induced Diarrhea Resolved. Will continue to monitor. 6) Chemo Induced Neuropathy Stopped Gabapentin due to confusion/indigestion. Can consider acupuncture with Dr. Maximiliano Isaacs once discharged. 7) Insomnia/Anxiety about Health. Takes Ativan PRN which helps 8) Hx of Hematuria Resolved now. She was seen by Urology. Continue to monitor for now. 9) Psychosocial 
Mood good, coping well considering difficult disease. She has great family support. SW support as needed. Will follow. Call if questions over the weekend. Patient seen by me today. Note done in conjunction with Arline Rodriguez NP I appreciate the opportunity to participate in Ms. Jose A Gant care.  
 
Signed By: Rola Quijano,

## 2020-12-10 NOTE — ROUTINE PROCESS
Attempted to schedule JESSICA PCP appt with Dr. Essie Whittington,  informed me the patient will have to call and schedule their own appt.

## 2020-12-10 NOTE — DISCHARGE SUMMARY
Inpatient hospitalist discharge summary Brief Overview PATIENT ID: Griffin Peña MRN: [id number] YOB: 1970 Admitting Provider: Soila Chaudhary MD 
 
Discharging Provider: Eben Dawn MD  
To contact this individual call [phone] and ask the  to page. If unavailable ask to be transferred the Adult Hospitalist Department. PCP at discharge: Washington Thomas, [facility],[address]  
[place] [address] [address] / [address] [address] Admission date: 12/6/2020  Date of Discharge: 12/10/20 Chief complaint:  
Chief Complaint Patient presents with  Abdominal Pain Patient Active Problem List  
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  
 Carcinomatosis (HCC) C80.0  Chemotherapy follow-up examination Z09  Port-A-Cath in place Z95.828  Thrombocytosis (HCC) D47.3  Subphrenic abscess ([address] K65.1  Generalized abdominal pain R10.84  
 Diarrhea R19.7  Insomnia G47.00  Chemotherapy induced diarrhea K52.1, T45.1X5A  History of pleural effusion Z87.09  
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 female who presents with abdominal pain and fever  
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  She has had a history of a subphrenic abscess as well and symptomatic cholelithiasis. Kalyan Naidu is currently undergoing chemotherapy with Dr. Osmin Mtz the last 2 weeks she has been complaining of intermittent severe abdominal pain more in the upper portion of the abdomen radiating to the left.  This is more significant than what she has had previously. Carlos A Wright is been in the past some nausea and vomiting over the last week or 2, however, today she has had only nausea.  She was also being followed down at [facility] was due to have a CT scan of her abdomen and pelvis this coming week but last night she began running a temperature to 101-1/2 which was very concerning to them. + chills.   This was new and different.  She had no symptoms of cough or congestion, headache, diarrhea or urinary symptoms.  She has not been around anyone who has been ill.  spoke with the on-call for Dr. Ramsey Martinez and felt it with the fever they could not wait to be seen down in [place] at [facility] presented here for further evaluation of her fever and abdominal pain.  
Pt said her abdominal pain is more in the LUQ, similar to her previous subphrenic abscess. + nausea, vomiting 3 times no diarrhea  
 
Sepsis (fever at home, tachycardia, hypotension) secondary to E. coli bacteremia: resolved  
-CT scan with peritoneal carcinomatosis, no evidence of abscess -Repeat blood cultures NGTD- 
-s/p zosyn. Transitioned to ceftriaxone 2 grams once daily x 2 weeks with repeat cultures at end of treatment 
-Infectious disease consult, recommended to continue ceftriaxone till 12/23 Patient ready to go home.  
  
Appendiceal cancer: 
-Oncology consult, need to follow up with oncology as outpatient basis  
  
Acute on chronic pain: improved -palliative care consulted, appreciate recommendations 
-continue increased oxycodone 10 mg every 4 hours, dilaudid for breakthrough 
-gabapentin stopped as patient did not tolerate it 
-pain medications prescribed as per palliative  
 
  
Hypokalemia: Resolved  
  
Hyponatremia: resolved  
  
Chronic severe protein malnutrition: 
-continue home [place] On the date of discharge, diagnostic face to face encounter was performed. Patient was hemodynamically stable, offering no new complaints. Denies any shortness of breath at rest, no fevers or chills, no diarrhea or constipation. Patient is agreeable for discharge. Patient understood and verbalized the understanding of the discharge plan. Patient was advised to seek medical help/ care or return to ED, if symptoms recur, worsen or new symptoms develop. Discharge Disposition: 
Home or Self Care Discharge activity: 
Activity as tolerated Code status at discharge: 
Full Code Outpatient follow up: 
Please follow up with your PCP in one week In addition follow up with oncology, ID, and palliative Future appointments- 
Future Appointments Date Time Provider Thomas Novaki 12/16/2020 10:00 AM A2 DIANNE LONG [address] [facility] H  
12/16/2020 10:15 AM Rj Flores  [address] BS AMB  
12/18/2020  5:00 PM H2 DIANNE FASTRACK [facility]  
12/30/2020  8:30 AM F1 DIANNE LONG [place] [facility]  
1/1/2021 10:00 AM H2 DIANNE FASTRACK [facility]  
1/13/2021  8:30 AM F1 DIANNE LONG [place] [facility]  
1/15/2021 10:00 AM H2 DIANNE FASTRACK [facility]  
1/27/2021  8:30 AM F1 DIANNE LONG [place] [facility]  
1/29/2021 10:00 AM H2 DIANNE FASTRACK [facility] Follow-up Information Follow up With Specialties Details Why Contact Info Dash Brown MD Pediatric Medicine, Internal Medicine In 1 week  [address] [facility] 
[phone] Ang Figueroa MD Palliative Medicine In 1 week  [address] [facility] [address] [place] [address] 
[phone] Iona Durham DO Hematology and Oncology, Internal Medicine, Hematology, Oncology In 1 week  [address] [facility] Shiv[address] [place] [address] 
[phone] Delia Garcia MD Infectious Disease In 1 week  [address] [place] [address] 
[phone] Operative procedures performed: 
 
 
Consults:  IP CONSULT TO ONCOLOGY 
IP CONSULT TO INFECTIOUS DISEASES 
IP CONSULT TO PALLIATIVE CARE - PROVIDER Procedures: * No surgery found * Diet:  DIET NUTRITIONAL SUPPLEMENTS 
DIET NUTRITIONAL SUPPLEMENTS 
DIET SNACKS 
DIET ONE TIME MESSAGE 
DIET ONE TIME MESSAGE 
DIET REGULAR Pertinent test results: 
Ct Abd Pelv W Cont Result Date: 12/7/2020 EXAM: CT ABD PELV W CONT INDICATION: metastatic appendiceal cancer COMPARISON: May 26, 2020 CONTRAST: 100 mL of Isovue-370. TECHNIQUE: Following the uneventful intravenous administration of contrast, thin axial images were obtained through the abdomen and pelvis. Coronal and sagittal reconstructions were generated. Oral contrast was administered. CT dose reduction was achieved through use of a standardized protocol tailored for this examination and automatic exposure control for dose modulation. FINDINGS: LOWER THORAX: Small left pleural effusion with adjacent atelectasis. LIVER: No Change, cyst BILIARY TREE: Gallstones in a nondistended gallbladder. . CBD is not dilated. SPLEEN: within normal limits. PANCREAS: No mass or ductal dilatation. ADRENALS: Unremarkable. KIDNEYS: No mass, calculus, or hydronephrosis. STOMACH: Unremarkable. SMALL BOWEL: No dilatation or wall thickening. COLON: Prominent fecal stasis. PERITONEUM: There is a small amount of ascites in a patient there is multiple area of peritoneal thickening suspicious for peritoneal carcinomatosis or. RETROPERITONEUM: No lymphadenopathy or aortic aneurysm. REPRODUCTIVE ORGANS: URINARY BLADDER: No mass or calculus. BONES: No destructive bone lesion. ABDOMINAL WALL: No mass or hernia. ADDITIONAL COMMENTS: N/A IMPRESSION: Suspect peritoneal carcinomatosis . Xr Chest [place] Addendum Date: 12/6/2020 Addendum: Repeat portable chest x-ray was performed, and demonstrates no evidence of pneumothorax. There remains a trace left pleural effusion, improved from the prior study. There is no focal airspace disease. Result Date: 12/6/2020 INDICATION:  fever/chemo/rule out pneumonia COMPARISON: 11/18/2019 FINDINGS: Single AP portable view of the chest obtained at 1528 demonstrates a stable cardiomediastinal silhouette. There is a left chest stacey catheter. There is a skin fold versus small right lateral pneumothorax. There is a trace left pleural effusion, improved. No osseous abnormalities are seen. IMPRESSION: 1. No evidence of pneumonia. 2. Trace left pleural effusion, improved from the prior study. 3. Skinfold versus small right lateral pneumothorax. Repeat chest radiograph recommended. I have telephoned the ER technologist with this request. 
 
 
Recent Results (from the past 168 hour(s)) CBC WITH AUTOMATED DIFF Collection Time: 12/06/20  2:46 PM  
Result Value Ref Range WBC 9.7 3.6 - 11.0 K/uL  
 RBC 3.87 3.80 - 5.20 M/uL  
 HGB 12.9 11.5 - 16.0 g/dL HCT 37.0 35.0 - 47.0 % MCV 95.6 80.0 - 99.0 FL  
 MCH 33.3 26.0 - 34.0 PG  
 MCHC 34.9 30.0 - 36.5 g/dL  
 RDW 15.4 (H) 11.5 - 14.5 % PLATELET 300 557 - 861 K/uL MPV 9.9 8.9 - 12.9 FL  
 NRBC 0.0 0  WBC ABSOLUTE NRBC 0.00 0.00 - 0.01 K/uL NEUTROPHILS 86 (H) 32 - 75 % LYMPHOCYTES 10 (L) 12 - 49 % MONOCYTES 3 (L) 5 - 13 % EOSINOPHILS 1 0 - 7 % BASOPHILS 0 0 - 1 % IMMATURE GRANULOCYTES 0 %  
 ABS. NEUTROPHILS 8.3 (H) 1.8 - 8.0 K/UL  
 ABS. LYMPHOCYTES 1.0 0.8 - 3.5 K/UL  
 ABS. MONOCYTES 0.3 0.0 - 1.0 K/UL  
 ABS. EOSINOPHILS 0.1 0.0 - 0.4 K/UL ABS. BASOPHILS 0.0 0.0 - 0.1 K/UL  
 ABS. IMM. GRANS. 0.0 K/UL  
 DF MANUAL    
 RBC COMMENTS ANISOCYTOSIS 1+ 
    
 RBC COMMENTS MACROCYTOSIS 
1+ METABOLIC PANEL, COMPREHENSIVE Collection Time: 12/06/20  2:46 PM  
Result Value Ref Range Sodium 137 136 - 145 mmol/L Potassium 3.1 (L) 3.5 - 5.1 mmol/L Chloride 103 97 - 108 mmol/L  
 CO2 23 21 - 32 mmol/L Anion gap 11 5 - 15 mmol/L Glucose 120 (H) 65 - 100 mg/dL BUN 9 6 - 20 MG/DL Creatinine 0.51 (L) 0.55 - 1.02 MG/DL  
 BUN/Creatinine ratio 18 12 - 20 GFR est AA >60 >60 ml/min/1.73m2 GFR est non-AA >60 >60 ml/min/1.73m2 Calcium 8.9 8.5 - 10.1 MG/DL Bilirubin, total 0.6 0.2 - 1.0 MG/DL  
 ALT (SGPT) 24 12 - 78 U/L  
 AST (SGOT) 23 15 - 37 U/L Alk. phosphatase 222 (H) 45 - 117 U/L Protein, total 7.3 6.4 - 8.2 g/dL Albumin 2.7 (L) 3.5 - 5.0 g/dL Globulin 4.6 (H) 2.0 - 4.0 g/dL A-G Ratio 0.6 (L) 1.1 - 2.2 LIPASE Collection Time: 12/06/20  2:46 PM  
Result Value Ref Range Lipase 77 73 - 393 U/L  
SAMPLES BEING HELD Collection Time: 12/06/20  2:46 PM  
Result Value Ref Range SAMPLES BEING HELD 1RED COMMENT Add-on orders for these samples will be processed based on acceptable specimen integrity and analyte stability, which may vary by analyte. URINALYSIS W/ RFLX MICROSCOPIC Collection Time: 12/06/20  2:52 PM  
Result Value Ref Range Color DARK YELLOW Appearance CLOUDY (A) CLEAR Specific gravity 1.025    
 pH (UA) 6.0 5.0 - 8.0 Protein 100 (A) NEG mg/dL Glucose Negative NEG mg/dL Ketone TRACE (A) NEG mg/dL Blood Negative NEG Urobilinogen 2.0 (H) 0.2 - 1.0 EU/dL Nitrites Negative NEG Leukocyte Esterase TRACE (A) NEG    
 WBC 5-10 0 - 4 /hpf  
 RBC 0-5 0 - 5 /hpf Epithelial cells FEW FEW /lpf Bacteria Negative NEG /hpf Mucus 2+ (A) NEG /lpf URINE CULTURE HOLD SAMPLE Collection Time: 12/06/20  2:52 PM  
 Specimen: Serum Result Value Ref Range Urine culture hold Urine on hold in Microbiology dept for 2 days. If unpreserved urine is submitted, it cannot be used for addtional testing after 24 hours, recollection will be required. BILIRUBIN, CONFIRM Collection Time: 12/06/20  2:52 PM  
Result Value Ref Range Bilirubin UA, confirm Negative CULTURE, URINE Collection Time: 12/06/20  2:52 PM  
 Specimen: Clean catch; Urine Result Value Ref Range Special Requests: NO SPECIAL REQUESTS Culture result: No significant growth, <10,000 CFU/mL LACTIC ACID Collection Time: 12/06/20  3:39 PM  
Result Value Ref Range Lactic acid 1.6 0.4 - 2.0 MMOL/L  
INFLUENZA A+B VIRAL AGS Collection Time: 12/06/20  3:39 PM  
Result Value Ref Range Influenza A Antigen Negative NEG Influenza B Antigen Negative NEG    
CULTURE, BLOOD, PAIRED Collection Time: 12/06/20  3:41 PM  
 Specimen: Blood Result Value Ref Range Special Requests: NO SPECIAL REQUESTS Culture result: (A) ESCHERICHIA COLI GROWING IN ALL 4 BOTTLES DRAWN (SITES = RFA AND LFA) Culture result: (A) PRELIMINARY REPORT OF GRAM NEGATIVE RODS GROWING IN 2 OF 4 BOTTLES DRAWN CALLED TO AND READ BACK BY Jo Chong RN AT [id number] 12/7/21. Yazmin Farah Susceptibility Escherichia coli - BOB Amikacin ($) <=2 Susceptible ug/mL Ampicillin ($) 4 Susceptible ug/mL Ampicillin/sulbactam ($) <=2 Susceptible ug/mL Cefazolin ($) <=4 Susceptible ug/mL Ceftazidime ($) <=1 Susceptible ug/mL Ceftriaxone ($) <=1 Susceptible ug/mL Cefepime ($$) <=1 Susceptible ug/mL Ciprofloxacin ($) <=0.25 Susceptible ug/mL Gentamicin ($) <=1 Susceptible ug/mL Levofloxacin ($) <=0.12 Susceptible ug/mL Meropenem ($$) <=0.25 Susceptible ug/mL Piperacillin/Tazobac ($) <=4 Susceptible ug/mL Tobramycin ($) <=1 Susceptible ug/mL Trimeth/Sulfa <=20 Susceptible ug/mL Cefoxitin <=4 Susceptible ug/mL SARS-COV-2 Collection Time: 12/06/20  6:40 PM  
Result Value Ref Range Specimen source Nasopharyngeal    
 SARS-CoV-2 Not detected NOTD Specimen source Nasopharyngeal    
 Specimen type NP Swab Health status Symptomatic Testing EKG, 12 LEAD, INITIAL Collection Time: 12/07/20 12:02 AM  
Result Value Ref Range Ventricular Rate 75 BPM  
 Atrial Rate 75 BPM  
 P-R Interval 138 ms QRS Duration 80 ms  
 Q-T Interval 420 ms QTC Calculation (Bezet) 469 ms Calculated P Axis 28 degrees Calculated R Axis 24 degrees Calculated T Axis 31 degrees Diagnosis Normal sinus rhythm No previous ECGs available Confirmed by Renell Dancer, M.D., Marda Nobles ([id number]) on 12/7/2020 11:01:10 AM 
  
CBC WITH AUTOMATED DIFF Collection Time: 12/07/20 12:12 AM  
Result Value Ref Range WBC 7.9 3.6 - 11.0 K/uL  
 RBC 3.10 (L) 3.80 - 5.20 M/uL  
 HGB 10.1 (L) 11.5 - 16.0 g/dL HCT 30.4 (L) 35.0 - 47.0 % MCV 98.1 80.0 - 99.0 FL  
 MCH 32.6 26.0 - 34.0 PG  
 MCHC 33.2 30.0 - 36.5 g/dL  
 RDW 15.6 (H) 11.5 - 14.5 % PLATELET 598 916 - 274 K/uL MPV 9.6 8.9 - 12.9 FL  
 NRBC 0.0 0  WBC ABSOLUTE NRBC 0.00 0.00 - 0.01 K/uL NEUTROPHILS 69 32 - 75 % BAND NEUTROPHILS 4 0 - 6 % LYMPHOCYTES 19 12 - 49 % MONOCYTES 5 5 - 13 % EOSINOPHILS 0 0 - 7 % BASOPHILS 1 0 - 1 % METAMYELOCYTES 2 (H) 0 % IMMATURE GRANULOCYTES 0 %  
 ABS. NEUTROPHILS 5.8 1.8 - 8.0 K/UL  
 ABS. LYMPHOCYTES 1.5 0.8 - 3.5 K/UL  
 ABS. MONOCYTES 0.4 0.0 - 1.0 K/UL  
 ABS. EOSINOPHILS 0.0 0.0 - 0.4 K/UL  
 ABS. BASOPHILS 0.1 0.0 - 0.1 K/UL  
 ABS. IMM. GRANS. 0.0 K/UL  
 DF MANUAL PLATELET COMMENTS Large Platelets RBC COMMENTS ANISOCYTOSIS 1+ 
    
 RBC COMMENTS MACROCYTOSIS 1+ 
    
 RBC COMMENTS OVALOCYTES PRESENT 
    
METABOLIC PANEL, COMPREHENSIVE Collection Time: 12/07/20 12:12 AM  
Result Value Ref Range Sodium 139 136 - 145 mmol/L  Potassium 3.2 (L) 3.5 - 5.1 mmol/L  
 Chloride 109 (H) 97 - 108 mmol/L  
 CO2 22 21 - 32 mmol/L Anion gap 8 5 - 15 mmol/L Glucose 72 65 - 100 mg/dL BUN 7 6 - 20 MG/DL Creatinine 0.33 (L) 0.55 - 1.02 MG/DL  
 BUN/Creatinine ratio 21 (H) 12 - 20 GFR est AA >60 >60 ml/min/1.73m2 GFR est non-AA >60 >60 ml/min/1.73m2 Calcium 7.6 (L) 8.5 - 10.1 MG/DL Bilirubin, total 0.6 0.2 - 1.0 MG/DL  
 ALT (SGPT) 22 12 - 78 U/L  
 AST (SGOT) 26 15 - 37 U/L Alk. phosphatase 157 (H) 45 - 117 U/L Protein, total 5.8 (L) 6.4 - 8.2 g/dL Albumin 2.1 (L) 3.5 - 5.0 g/dL Globulin 3.7 2.0 - 4.0 g/dL A-G Ratio 0.6 (L) 1.1 - 2.2 MAGNESIUM Collection Time: 12/07/20 12:12 AM  
Result Value Ref Range Magnesium 1.5 (L) 1.6 - 2.4 mg/dL PHOSPHORUS Collection Time: 12/07/20 12:12 AM  
Result Value Ref Range Phosphorus 2.3 (L) 2.6 - 4.7 MG/DL  
EKG, 12 LEAD, INITIAL Collection Time: 12/07/20 12:28 AM  
Result Value Ref Range Ventricular Rate 88 BPM  
 Atrial Rate 88 BPM  
 P-R Interval 140 ms QRS Duration 74 ms Q-T Interval 390 ms QTC Calculation (Bezet) 471 ms Calculated P Axis 51 degrees Calculated R Axis 18 degrees Calculated T Axis 31 degrees Diagnosis Normal sinus rhythm When compared with ECG of 07-DEC-2020 00:02, No significant change was found Confirmed by Canelo Hernandez M.D., [place] Jonas ([id number]) on 12/7/2020 11:01:19 AM 
  
CBC W/O DIFF Collection Time: 12/08/20  2:06 AM  
Result Value Ref Range WBC 8.1 3.6 - 11.0 K/uL  
 RBC 3.63 (L) 3.80 - 5.20 M/uL  
 HGB 12.1 11.5 - 16.0 g/dL HCT 34.8 (L) 35.0 - 47.0 % MCV 95.9 80.0 - 99.0 FL  
 MCH 33.3 26.0 - 34.0 PG  
 MCHC 34.8 30.0 - 36.5 g/dL  
 RDW 15.2 (H) 11.5 - 14.5 % PLATELET 081 841 - 454 K/uL MPV 9.2 8.9 - 12.9 FL  
 NRBC 0.0 0  WBC ABSOLUTE NRBC 0.00 0.00 - 0.01 K/uL METABOLIC PANEL, BASIC Collection Time: 12/08/20  2:06 AM  
Result Value Ref Range Sodium 134 (L) 136 - 145 mmol/L  Potassium 3.9 3.5 - 5.1 mmol/L  
 Chloride 101 97 - 108 mmol/L  
 CO2 24 21 - 32 mmol/L Anion gap 9 5 - 15 mmol/L Glucose 76 65 - 100 mg/dL BUN 5 (L) 6 - 20 MG/DL Creatinine 0.36 (L) 0.55 - 1.02 MG/DL  
 BUN/Creatinine ratio 14 12 - 20 GFR est AA >60 >60 ml/min/1.73m2 GFR est non-AA >60 >60 ml/min/1.73m2 Calcium 8.6 8.5 - 10.1 MG/DL MAGNESIUM Collection Time: 12/08/20  2:06 AM  
Result Value Ref Range Magnesium 1.8 1.6 - 2.4 mg/dL PHOSPHORUS Collection Time: 12/08/20  2:06 AM  
Result Value Ref Range Phosphorus 3.4 2.6 - 4.7 MG/DL  
CULTURE, BLOOD, PAIRED Collection Time: 12/08/20  2:06 AM  
 Specimen: Blood Result Value Ref Range Special Requests: NO SPECIAL REQUESTS Culture result: NO GROWTH 2 DAYS Physical Exam on Discharge: 
 
Discharge condition: good Vital signs:  
Patient Vitals for the past 12 hrs: 
 Temp Pulse Resp BP SpO2  
12/10/20 0853 99 °F (37.2 °C) 99 18 128/86 95 % 12/10/20 0244 98.4 °F (36.9 °C) 85 17 104/70 97 % Visit Vitals /86 (BP 1 Location: Right arm, BP Patient Position: At rest) Pulse 99 Temp 99 °F (37.2 °C) Resp 18 Ht 5' 3\" (1.6 m) Wt 45.5 kg (100 lb 5 oz) LMP  (LMP Unknown) SpO2 95% Breastfeeding No  
BMI 17.77 kg/m² General:  Alert, cooperative, no distress, appears stated age. Head:  Normocephalic, without obvious abnormality, atraumatic. Lungs:   Clear to auscultation bilaterally. Chest wall:  No tenderness or deformity. Heart:  Regular rate and rhythm, S1, S2 normal, no murmur, click, rub or gallop. Abdomen:   Soft, non-tender. Bowel sounds normal. No masses,  No organomegaly. Current Discharge Medication List  
  
START taking these medications Details  
naloxone (NARCAN) 4 mg/actuation nasal spray Use 1 spray intranasally, then discard. Repeat with new spray every 2 min as needed for opioid overdose symptoms, alternating nostrils. Qty: 1 Each, Refills: 1 bisacodyL (DULCOLAX) 5 mg EC tablet Take 1 Tab by mouth daily. Qty: 15 Tab, Refills: 0  
  
cefTRIAXone 2 gram 2 g IVPB 2 g by IntraVENous route every twenty-four (24) hours. Qty: 1 Dose, Refills: 0 Comments: As per ID recommendations CONTINUE these medications which have CHANGED Details  
oxyCODONE IR (ROXICODONE) 10 mg tab immediate release tablet Take 1 Tab by mouth every four (4) hours as needed for Pain for up to 30 days. Max Daily Amount: 60 mg. Indications: pain 
Qty: 180 Tab, Refills: 0 Associated Diagnoses: Mucinous adenocarcinoma of appendix ([address] CONTINUE these medications which have NOT CHANGED Details  
potassium chloride (KLOR-CON) 10 mEq tablet Take 1 Tab by mouth two (2) times a day. 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 Max Daily Amount: 2 mg. Indications: anxious, nausea and vomiting caused by cancer drugs, difficulty sleeping 
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  
  
dexAMETHasone (DECADRON) 4 mg tablet Take 2 tabs (8mg) by mouth daily on days 2 and 3 after chemotherapy Qty: 60 Tab, Refills: 2 Associated Diagnoses: Mucinous adenocarcinoma of appendix ([address] Carcinoma of appendix ([address] megestroL (MEGACE) 400 mg/10 mL (10 mL) suspension Take 5 mL by mouth two (2) times a day. 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  
  
simethicone (Gas-X) 125 mg capsule Take 125 mg by mouth four (4) times daily as needed for Flatulence. hyoscyamine SL (LEVSIN/SL) 0.125 mg SL tablet 0.125 mg by SubLINGual route every four (4) hours as needed for Cramping. prochlorperazine (Compazine) 5 mg tablet Take 1 tab by mouth every 6 hours as needed for nausea or vomiting 
Qty: 30 Tab, Refills: 1 Associated Diagnoses: Mucinous adenocarcinoma of appendix ([address] Carcinoma of appendix ([address] Chemotherapy-induced nausea  
  
ondansetron (ZOFRAN ODT) 4 mg disintegrating tablet Take 1 Tab by mouth every eight (8) hours as needed for Nausea. Qty: 60 Tab, Refills: 1 Associated Diagnoses: Mucinous adenocarcinoma of appendix ([address] Carcinoma of appendix ([address] diphenoxylate-atropine (LomotiL) 2.5-0.025 mg per tablet Take 2 Tabs by mouth four (4) times daily as needed for Diarrhea. Max Daily Amount: 8 Tabs. Indications: diarrhea caused by chemotherapy Qty: 60 Tab, Refills: 1 Associated Diagnoses: Mucinous adenocarcinoma of appendix ([address] Diarrhea, unspecified type  
  
lidocaine-prilocaine (EMLA) topical cream Apply  to affected area as needed for Pain. Qty: 30 g, Refills: 0 Associated Diagnoses: Mucinous adenocarcinoma of appendix ([address] Carcinoma of appendix ([address]  
  
acetaminophen (TYLENOL) 325 mg tablet Take 650 mg by mouth every four (4) hours as needed for Pain. Indications: pain STOP taking these medications  
  
 gabapentin (NEURONTIN) 100 mg capsule Comments:  
Reason for Stopping:   
   
 ibuprofen (MOTRIN) 200 mg tablet Comments:  
Reason for Stopping: Total time spent on discharge planning, counseling and co-ordination of care:  
35 minutes Eben Dawn MD 
12/10/20 
12:50 PM

## 2020-12-10 NOTE — DISCHARGE INSTRUCTIONS
Discharge Instructions       PATIENT ID: Morgan Mercedes  MRN: [id number]   YOB: 1970    DATE OF ADMISSION: 12/6/2020  2:58 PM    DATE OF DISCHARGE: 12/10/2020    PRIMARY CARE PROVIDER: Ian Lawrence MD     ATTENDING PHYSICIAN: Teodora Cunha MD  DISCHARGING PROVIDER: Stefan Moyer MD    To contact this individual call [phone] and ask the  to page. If unavailable ask to be transferred the Adult Hospitalist Department. DISCHARGE DIAGNOSES bacteriemia    CONSULTATIONS: IP CONSULT TO ONCOLOGY  IP CONSULT TO INFECTIOUS DISEASES  IP CONSULT TO PALLIATIVE CARE - PROVIDER    PROCEDURES/SURGERIES: * No surgery found *      FOLLOW UP APPOINTMENTS:   Follow-up Information     Follow up With Specialties Details Why Contact Info    Ian Lawrence MD Pediatric Medicine, Internal Medicine In 1 week  [id number] [facility]  [address] [facility]      Joselo Steevnson MD Palliative Medicine In 1 week  [address] [facility] [address]  [address], [address] [place] [address],  Hematology and Oncology, Internal Medicine, Hematology, Oncology In 1 week  [address] [address] [facility]  [phone]      Chelsea Cunha MD Infectious Disease In 1 week  [address] [facility]  [phone]             ADDITIONAL CARE RECOMMENDATIONS:   Follow up with PCP in a week  Follow up with Infectious disease, oncology and palliative medicine as scheduled     DIET: Regular Diet    ACTIVITY: Activity as tolerated        DISCHARGE MEDICATIONS:   See Medication Reconciliation Form    · It is important that you take the medication exactly as they are prescribed. · Keep your medication in the bottles provided by the pharmacist and keep a list of the medication names, dosages, and times to be taken in your wallet. · Do not take other medications without consulting your doctor.        NOTIFY [address] OF THE FOLLOWING:   Fever over 101 degrees for 24 hours. Chest pain, shortness of breath, fever, chills, nausea, vomiting, diarrhea, change in mentation, falling, weakness, bleeding. Severe pain or pain not relieved by medications. Or, any other signs or symptoms that you may have questions about. DISPOSITION:    Home With:   OT  PT  HH  RN       SNF/Inpatient Rehab/LTAC    Independent/assisted living    Hospice    Other:     CDMP Checked:   Yes ***     PROBLEM LIST Updated:  Yes ***       Information obtained by :   I understand that if any problems occur once I am at home I am to contact my physician. I understand and acknowledge receipt of the instructions indicated above.                                                                                                                                              Physician's or R.N.'s Signature                                                                  Date/Time                                                                                                                                              Patient or Representative Signature                                                          Date/Time          Signed:   Gladys Angel MD  12/10/2020  1:00 PM

## 2020-12-10 NOTE — PROGRESS NOTES
JESSICA: 
RUR: 17% Disposition:  Discharge today. Home with Home IV abx. CM met with patient to discuss plans for home IV abx. CM offered freedom of choice. No preference given for agencies as long as par with her insurance for infusion company and home health agency. Referrals sent via [employer] to [facility] and [facility] home health agencies CM received text message from Sandy Roy, [address] [place] [address] with [facility]. She will be on unit shortly to visit patient. Agency is checking insurance to determine if any out of pocket costs to the patient. Daljit Goodwin, [facility], [address] [place] [address] 
 
4:16pm  [facility] has accepted patient for servicing with start date of Friday 12/11/20. Agency has spoken with patient and is aware that Sandy Roy RN (liaison with [facility]) is here at bedside.

## 2020-12-10 NOTE — PALLIATIVE CARE DISCHARGE
Palliative medicine Dear Ms. Kwaku Katz, It was a pleasure to meet you in the hospital. We discussed your abdominal pain from cancer and your neuropathy in your feet.  
 
-We increased your dose of Oxycodone to 10mg and you can take every 4 hours as you need for pain. Would recommend taking 30-60 min before every meal at least. I prescribed this for you. 
 
-You tried Gabapentin here for your neuropathy but it caused some indigestion and confusion. Your neuropathy is not painful, so we decided to stop this medication. In the future you could always try a lower dose (50mg bedtime) or another medication. You are also welcome to see me at [facility] for acupuncture at no cost through the [facility].  
 
-Stay ahead of constipation, you may need to add Miralax (over the counter) daily as we increased your opioids.  
 
-My nurse Kevin Medina will call you early next week to set up an appointment with my colleague Dr Yamilka Harvey at the [facility] clinic location.   
 
Have a wonderful holiday season, 
 
Maximo Ellis MD

## 2020-12-10 NOTE — PROGRESS NOTES
Spiritual Care Assessment/Progress Note [address] 
 
 
NAME: Jarrett Johnston      MRN: [id number] AGE: 44 y.o. SEX: female Presybeterian Affiliation: Catholic  
Language: English  
 
12/10/2020     Total Time (in minutes): 10 Spiritual Assessment begun in [facility] through conversation with: 
  
    [x]Patient        [] Family    [] Friend(s) Reason for Consult: Palliative Care, Initial/Spiritual Assessment Spiritual beliefs: (Please include comment if needed) [x] Identifies with a hay tradition: Catholic    
   [] Supported by a hay community:        
   [] Claims no spiritual orientation:       
   [] Seeking spiritual identity:            
   [] Adheres to an individual form of spirituality:       
   [] Not able to assess:                   
 
    
Identified resources for coping:  
   [] Prayer                           
   [] Music                  [] Guided Imagery [x] Family/friends                 [] Pet visits [] Devotional reading                         [] Unknown 
   [] Other:                                         
 
 
Interventions offered during this visit: (See comments for more details) Patient Interventions: Affirmation of emotions/emotional suffering, Affirmation of hay, Catharsis/review of pertinent events in supportive environment, Coping skills reviewed/reinforced, Iconic (affirming the presence of God/Higher Power) Plan of Care: 
 
 [] Support spiritual and/or cultural needs  
 [] Support AMD and/or advance care planning process    
 [] Support grieving process 
 [] Coordinate Rites and/or Rituals  
 [] Coordination with community clergy [] No spiritual needs identified at this time 
 [] Detailed Plan of Care below (See Comments)  [] Make referral to Music Therapy 
[] Make referral to Pet Therapy    
[] Make referral to Addiction services 
[] Make referral to [facility] 
[] Make referral to Spiritual Care Partner [] No future visits requested       
[x] Follow up upon further referrals Comments:  visit for initial spiritual assessment, palliative care consult. Patient sitting up in bed, good eye contact, smiling, friendly. Says she is feeling much better than when she arrived. Provided spiritual presence and listening as she spoke of her present thoughts, feelings, and concerns. Spoke briefly of her health. Patient is being discharged and says she will be leaving a little later this afternoon.  is planning on picking her up. Expressed gratitude for the care she has received. Appeared comforted as a result of this visit and expressed gratitude for this visit. Rev. Rosy Pierson MDiv, [facility], [facility]  paging service: [phone])

## 2020-12-11 NOTE — PROGRESS NOTES
Discharge instructions reviewed with patient. Krystle deaccessed. Received dose of Ceftriaxone at 5pm today. Plan for home health at patient's house tomorrow for continuation of antibiotics. Verbalized understanding of instructions. Discharged to home via wheelchair.

## 2020-12-14 NOTE — ADT AUTH CERT NOTES
Utilization Reviews  
 
   
Sepsis and Other Febrile Illness, without Focal Infection - Care Day 4 (12/9/2020) by Radha Nickerson RN  
 
   
Review Entered  Review Status 12/14/2020 15:17  Completed   
   
Criteria Review Care Day: 4 Care Date: 12/9/2020 Level of Care: Inpatient Floor Guideline Day 4 Level Of Care (X) Floor to discharge [K]   
12/14/2020 15:17:39 EST by Miya Hutchison   
  medical   
Clinical Status   
(X) * Hemodynamic stability 12/14/2020 15:17:39 EST by Miya Hutchison   
  Vitals: 98.3, HR 99, RR 18, /78, 94% on RA   
(X) * Afebrile or temperature acceptable for next level of care 12/14/2020 15:17:39 EST by Miya Hutchison   
  afebrile 98.3   
(X) * Hypoxemia absent 12/14/2020 15:17:39 EST by Miya Hutchison   
  94% on RA   
(X) * Tachypnea absent 12/14/2020 15:17:39 EST by Miya Hutchison   
  RR 17-18   
(X) * Cultures negative or infection identified and under adequate treatment 12/14/2020 15:17:39 EST by Miya Hutchison   
  no growth after 5 days   
(X) * Mental status at baseline 12/14/2020 15:17:39 EST by Miya Hutchison   
  No acute distress, cooperative, pleasant   
( ) * Metabolic derangement (eg, dehydration, acidosis) absent   
( ) * End organ dysfunction (eg, myocardial ischemia, renal failure) absent   
( ) * Discharge plans and education understood Activity   
(X) * Ambulatory or acceptable for next level of care 12/14/2020 15:17:39 EST by Shira Wahl up ad zulma Routes   
(X) * Oral hydration, medications [L]   
12/14/2020 15:17:39 EST by Myia Hutchison   
  Mag Ox 400mg PO daily Neurontin 100mg PO qhs 
Megace 200mg PO BID Oxycodone 10mg PO q4h PRn x 2 Oxycodone 5mg PO q4h PRN x 1 Neutral-phos PO 4x daily   
(X) Usual diet 12/14/2020 15:17:39 EST by Shira Wahl regular diet Interventions ( ) * Isolation not indicated, or is performable at next level of care Medications ( ) * Antimicrobial treatment not necessary or treatment at next level of care arranged [F] * Milestone Additional Notes 12/9/2020 LOC - IP - medical   
IM note: Follow-up abdominal pain.  Patient seen and examined.  Reports pain more manageable. Appreciate palliative care. Cultures with E. Coli. Plans for IV antibiotics Assessment & Plan:   
    
Sepsis (fever at home, tachycardia, hypotension) secondary to E. coli bacteremia:   
-CT scan with peritoneal carcinomatosis, no evidence of abscess -Repeat blood cultures NGTD- follow   
-s/p zosyn. Transitioned to ceftriaxone 2 grams once daily x 2 weeks with repeat cultures at end of treatment   
-Infectious disease consult   
    
Appendiceal cancer:   
-Oncology consult, appreciate recommendations   
    
Acute on chronic pain:   
-palliative care consulted, appreciate recommendations   
-continue increased oxycodone 10 mg every 4 hours, dilaudid for breakthrough   
-gabapentin qhs   
    
Hypokalemia: Replete as needed   
    
Hyponatremia, mild: mild   
    
Chronic severe protein malnutrition:   
-Nutrition consult, continue home Megace   
    
Code status: full DVT prophylaxis: Lovenox Constitutional: No acute distress, cooperative, pleasant , thin appearing ENT: Oral mucosa moist, oropharynx benign. Resp: CTA bilaterally. No wheezing/rhonchi/rales. No accessory muscle use CV: Regular rhythm, normal rate, no murmurs, gallops, rubs   
 GI: Soft, non distended, non tender to palpation. normoactive bowel sounds, no hepatosplenomegaly   
 Musculoskeletal: No edema, warm, 2+ pulses throughout   
 Neurologic: Moves all extremities Hematology/oncology note:   
Patient seen today in hospital follow up. She is resting in bed upon visit. She reports that she is feeling okay overall - continues to have abdominal pain and fullness and occasional nausea.  Appetite has been low overall. Chemo is currently on hold due to admission but patient hopes to resume chemo again once discharged. ID continues to follow. Palliative Care is now seeing patient as well to assist with pain management. She denies fever, chills, cough, SOB, chest pain Vitals: 98.3, HR 99, RR 18, /78, 94% on RA Labs: none this care date Meds:   
Rocephin 2g IV q24h Lovenox 40mg SC daily Zosyn 3.375g IV q8h Plan: cardiac monitoring, up ad zulma, regular diet   
  
   
Sepsis and Other Febrile Illness, without Focal Infection - Care Day 3 (12/8/2020) by Nasim Peña RN  
 
   
Review Entered  Review Status 12/14/2020 15:11  Completed   
   
Criteria Review Care Day: 3 Care Date: 12/8/2020 Level of Care: Inpatient Floor Guideline Day 3 Level Of Care (X) Floor 12/14/2020 15:11:47 EST by Stan Ly   
  medical   
Clinical Status   
(X) * Mental status at baseline 12/14/2020 15:11:47 EST by Stan Ly   
  No acute distress, cooperative, pleasant   
(X) * Afebrile or fever improved 12/14/2020 15:11:47 EST by Stan Ly   
  afebrile 98.3 Activity (X) Activity as tolerated 12/14/2020 15:11:47 EST by Niranjan Sylvester up ad zulma Routes   
(X) * Oral hydration 12/14/2020 15:11:47 EST by Niranjan Sylvester regular diet (X) Diet as tolerated 12/14/2020 15:11:47 EST by Niranjan Sylvester regular diet   
(X) Parenteral or oral medications 12/14/2020 15:11:47 EST by Stan Ly   
  Meds: 
Mag Ox 400mg PO daily Neurontin 100mg PO qhs 
Dilaudid 1mg IV q4h PRn x 2 Megace 200mg PO BID Oxycodone 5mg PO q4h PRN x 1 Neutral-phos PO 4x daily Medications (X) Possible antimicrobial treatment 12/14/2020 15:11:47 EST by Stan Ly   
  Zosyn 3.375g IV q8h   
(X) Possible DVT prophylaxis 12/14/2020 15:11:47 EST by Stan Ly   
  Lovenox 40mg SC daily * Milestone Additional Notes 12/8/2020 LOC - IP - medical   
IM note: Follow-up abdominal pain.  Patient seen and examined.  Pain more tolerable. Feels her pain has increased over time with cancer progression. Discussed palliative care involvement and patient okay with it.   
    
Assessment & Plan:   
    
Sepsis (fever at home, tachycardia, hypotension) secondary to GNR bacteremia:   
-CT scan with peritoneal carcinomatosis, no evidence of abscess   
-Pulmonary blood cultures with GNR bacteremia, follow speciation   
-Repeat blood cultures   
-Continue Zosyn -Infectious disease consult   
    
Appendiceal cancer:   
-Oncology consult, appreciate recommendations   
    
Acute on chronic pain: Oxycodone as needed with Dilaudid breakthrough   
-Continue home gabapentin   
-palliative care consult, pain appears uncontrolled as outpatient, could benefit from continued follow up   
    
Hypokalemia: Replete as needed   
    
Hyponatremia, mild: mild   
    
Chronic severe protein malnutrition:   
-Nutrition consult, continue home Megace   
    
Code status: full DVT prophylaxis: Lovenox   
    
Constitutional: No acute distress, cooperative, pleasant , thin appearing ENT: Oral mucosa moist, oropharynx benign. Resp: CTA bilaterally. No wheezing/rhonchi/rales. No accessory muscle use CV: Regular rhythm, normal rate, no murmurs, gallops, rubs   
 GI: Soft, non distended, tender to palpation. normoactive bowel sounds, no hepatosplenomegaly   
 Musculoskeletal: No edema, warm, 2+ pulses throughout   
 Neurologic: Moves all extremities Hematology/oncology:   
Assessment/PLAN:   
    
1) Stage 4 / Metastatic Appendiceal Cancer Post EXPLORATORY LAPAROTOMY SIERRA BSO, TUMOR DEBULKING: ILEOCECAL RESECTION; OMENTECTOMY 9/25/19 Surgery done for obstruction. Patient choose to have chemo locally and still see [address] [facility].   
    
She was on 5FU only from 8/26/20 - 9/23/20. She went back to [facility] in 10/20 and CTs there showed slight progression. Added Oxaliplatin back in on 10/7/20 since patient has not failed this regimen. Last chemo with modified FOLFOX was on 11/18/20. She was due for chemo again on 12/2/20 but held due to patient not feeling well/nausea/vomiting.   
    
Now admitted with fever/nausea/abdominal pain. CT A/P was negative for acute process/abscess and cancer appears stable. COVID negative. Initial BC + for GNR. Repeat blood cultures are still pending. ID seeing pt. Pt is in bed doing ok at my visit. Continue to have nausea/abdominal pain likely due to cancer. Continue to hold chemo while admitted. Consider palliative care eval.   
    
2) Abdominal Pain/Cramping Likely d/t carcinomatosis. Continue Roxicodone PRN - patient takes prior to eating which helps. IV Dilaudid for breakthrough pain. Consider Palliative Care consult.     
3) Chemo Induced Nausea Continue anti-emetics as needed. Compazine works better than Zofran.   
    
4) Hx of Left Pleural Effusion post Thoracentesis x 2 No cytology. Stable on CTs.   
    
5) Chemo Induced Diarrhea Resolved. Will continue to monitor.   
    
6) Chemo Induced Neuropathy On Gabapentin 100 mg QHS. Stable/unchanged.   
    
7) Insomnia/Anxiety about Health. Takes Ativan PRN which helps     
8) Hx of Hematuria Resolved now. She was seen by Urology. Continue to monitor for now.   
    
9) Psychosocial   
Mood good, coping well considering difficult disease. She has great family support. SW support as needed. Vitals: 98.3, HR 88, RR 16, /87, 95% on RA   
  
RBC: 3.63 (L) HCT: 34.8 (L) RDW: 15.2 (H) Sodium: 134 (L) BUN: 5 (L) Creatinine: 0.36 (L) Plan: cardiac monitoring, up ad zulma, regular diet

## 2020-12-31 NOTE — PROGRESS NOTES
Bedside and Verbal shift change report given to VEDA Aguilar RN (oncoming nurse) by Blair France RN (offgoing nurse). Report included the following information SBAR, Kardex, Intake/Output, MAR and Accordion. Pt p/w BRBPR , 8-10 BM YESTERDAY AND twice today  on exam no tenderness  Hgb= 12 (Baseline= 8 on 2016)  - FOBT positive  -Will continue with IVF hydration  -Will send Retic Count and Iron Panel (Iron, TIBC, Ferritin)   -Patient's vital signs demonstrates no suggestion of any acute hemodynamic instability (ie. tachycardia, hypotension, etc.), therefore no current need for PRBC transfusion  -Serial CBC and coags at least Q8H  -Will start patient on protonix 40mg bid IV   -TTE (with attention to mPAP and LVEF)  -Will provide supplemental oxygen via NC if needed in order to maintain SaO2 >=90%  -Will keep patient NPO (except meds), for now  -Should hold dvt prophylaxis, for now  -Will consult GI for possible EGD/Colonoscopy as well as for any additional recommendations  ***Transfusion consent form in chart******* Pt p/w BRBPR , 8-10 BM YESTERDAY AND twice today  on exam no tenderness  Hgb= 12 (Baseline= 8 on 2016)  - FOBT positive  -Will continue with IVF hydration  -Will send Retic Count and Iron Panel (Iron, TIBC, Ferritin)   -Patient's vital signs demonstrates no suggestion of any acute hemodynamic instability (ie. tachycardia, hypotension, etc.), therefore no current need for PRBC transfusion  -Serial CBC and coags at least Q8H  -Will start patient on protonix 40mg bid IV   -TTE (with attention to mPAP and LVEF)  -Will provide supplemental oxygen via NC if needed in order to maintain SaO2 >=90%  -Will start clear liquid diet  -Should hold dvt prophylaxis, for now  -Will consult GI for possible EGD/Colonoscopy as well as for any additional recommendations  ***Transfusion consent form in chart*******

## 2021-01-01 ENCOUNTER — HOSPITAL ENCOUNTER (INPATIENT)
Age: 51
LOS: 8 days | Discharge: HOSPICE | DRG: 375 | End: 2021-01-13
Attending: EMERGENCY MEDICINE | Admitting: STUDENT IN AN ORGANIZED HEALTH CARE EDUCATION/TRAINING PROGRAM
Payer: COMMERCIAL

## 2021-01-01 ENCOUNTER — APPOINTMENT (OUTPATIENT)
Dept: INTERVENTIONAL RADIOLOGY/VASCULAR | Age: 51
DRG: 375 | End: 2021-01-01
Attending: INTERNAL MEDICINE
Payer: COMMERCIAL

## 2021-01-01 ENCOUNTER — ANESTHESIA (OUTPATIENT)
Dept: ENDOSCOPY | Age: 51
DRG: 375 | End: 2021-01-01
Payer: COMMERCIAL

## 2021-01-01 ENCOUNTER — APPOINTMENT (OUTPATIENT)
Dept: CT IMAGING | Age: 51
DRG: 375 | End: 2021-01-01
Attending: EMERGENCY MEDICINE
Payer: COMMERCIAL

## 2021-01-01 ENCOUNTER — HOME CARE VISIT (OUTPATIENT)
Dept: HOSPICE | Facility: HOSPICE | Age: 51
End: 2021-01-01
Payer: COMMERCIAL

## 2021-01-01 ENCOUNTER — ANESTHESIA EVENT (OUTPATIENT)
Dept: ENDOSCOPY | Age: 51
DRG: 375 | End: 2021-01-01
Payer: COMMERCIAL

## 2021-01-01 ENCOUNTER — HOSPITAL ENCOUNTER (OUTPATIENT)
Dept: INFUSION THERAPY | Age: 51
End: 2021-01-01

## 2021-01-01 ENCOUNTER — APPOINTMENT (OUTPATIENT)
Dept: GENERAL RADIOLOGY | Age: 51
DRG: 375 | End: 2021-01-01
Attending: INTERNAL MEDICINE
Payer: COMMERCIAL

## 2021-01-01 ENCOUNTER — APPOINTMENT (OUTPATIENT)
Dept: INFUSION THERAPY | Age: 51
End: 2021-01-01

## 2021-01-01 ENCOUNTER — APPOINTMENT (OUTPATIENT)
Dept: GENERAL RADIOLOGY | Age: 51
DRG: 375 | End: 2021-01-01
Attending: NURSE PRACTITIONER
Payer: COMMERCIAL

## 2021-01-01 ENCOUNTER — HOSPICE ADMISSION (OUTPATIENT)
Dept: HOSPICE | Facility: HOSPICE | Age: 51
End: 2021-01-01
Payer: COMMERCIAL

## 2021-01-01 VITALS
SYSTOLIC BLOOD PRESSURE: 102 MMHG | WEIGHT: 107.5 LBS | HEART RATE: 119 BPM | BODY MASS INDEX: 19.04 KG/M2 | RESPIRATION RATE: 15 BRPM | TEMPERATURE: 99.4 F | OXYGEN SATURATION: 94 % | DIASTOLIC BLOOD PRESSURE: 65 MMHG

## 2021-01-01 DIAGNOSIS — C80.0 CARCINOMATOSIS (HCC): ICD-10-CM

## 2021-01-01 DIAGNOSIS — K56.609 SMALL BOWEL OBSTRUCTION (HCC): Primary | ICD-10-CM

## 2021-01-01 DIAGNOSIS — R11.2 INTRACTABLE VOMITING WITH NAUSEA, UNSPECIFIED VOMITING TYPE: ICD-10-CM

## 2021-01-01 DIAGNOSIS — G89.3 CANCER ASSOCIATED PAIN: ICD-10-CM

## 2021-01-01 DIAGNOSIS — K56.609 SBO (SMALL BOWEL OBSTRUCTION) (HCC): ICD-10-CM

## 2021-01-01 DIAGNOSIS — G62.0 CHEMOTHERAPY-INDUCED NEUROPATHY (HCC): ICD-10-CM

## 2021-01-01 DIAGNOSIS — R63.4 WEIGHT LOSS: ICD-10-CM

## 2021-01-01 DIAGNOSIS — T45.1X5A CHEMOTHERAPY-INDUCED NEUROPATHY (HCC): ICD-10-CM

## 2021-01-01 DIAGNOSIS — R10.84 GENERALIZED ABDOMINAL PAIN: ICD-10-CM

## 2021-01-01 DIAGNOSIS — C79.9 METASTATIC MALIGNANT NEOPLASM, UNSPECIFIED SITE (HCC): ICD-10-CM

## 2021-01-01 DIAGNOSIS — Z66 DO NOT RESUSCITATE: ICD-10-CM

## 2021-01-01 DIAGNOSIS — R11.0 NAUSEA: ICD-10-CM

## 2021-01-01 DIAGNOSIS — R10.9 ABDOMINAL PAIN, UNSPECIFIED ABDOMINAL LOCATION: ICD-10-CM

## 2021-01-01 DIAGNOSIS — C18.1 CANCER OF APPENDIX METASTATIC TO INTRA-ABDOMINAL LYMPH NODE (HCC): ICD-10-CM

## 2021-01-01 DIAGNOSIS — C77.2 CANCER OF APPENDIX METASTATIC TO INTRA-ABDOMINAL LYMPH NODE (HCC): ICD-10-CM

## 2021-01-01 DIAGNOSIS — E87.1 HYPONATREMIA: ICD-10-CM

## 2021-01-01 DIAGNOSIS — C18.1 MUCINOUS ADENOCARCINOMA OF APPENDIX (HCC): ICD-10-CM

## 2021-01-01 LAB
ALBUMIN SERPL-MCNC: 1.2 G/DL (ref 3.5–5)
ALBUMIN SERPL-MCNC: 1.3 G/DL (ref 3.5–5)
ALBUMIN SERPL-MCNC: 2.4 G/DL (ref 3.5–5)
ALBUMIN SERPL-MCNC: 2.4 G/DL (ref 3.5–5)
ALBUMIN/GLOB SERPL: 0.3 {RATIO} (ref 1.1–2.2)
ALBUMIN/GLOB SERPL: 0.3 {RATIO} (ref 1.1–2.2)
ALBUMIN/GLOB SERPL: 0.5 {RATIO} (ref 1.1–2.2)
ALBUMIN/GLOB SERPL: 0.5 {RATIO} (ref 1.1–2.2)
ALP SERPL-CCNC: 118 U/L (ref 45–117)
ALP SERPL-CCNC: 139 U/L (ref 45–117)
ALP SERPL-CCNC: 139 U/L (ref 45–117)
ALP SERPL-CCNC: 145 U/L (ref 45–117)
ALT SERPL-CCNC: 13 U/L (ref 12–78)
ALT SERPL-CCNC: 16 U/L (ref 12–78)
ALT SERPL-CCNC: 9 U/L (ref 12–78)
ALT SERPL-CCNC: 9 U/L (ref 12–78)
ANION GAP SERPL CALC-SCNC: 11 MMOL/L (ref 5–15)
ANION GAP SERPL CALC-SCNC: 12 MMOL/L (ref 5–15)
ANION GAP SERPL CALC-SCNC: 5 MMOL/L (ref 5–15)
ANION GAP SERPL CALC-SCNC: 9 MMOL/L (ref 5–15)
APPEARANCE UR: CLEAR
AST SERPL-CCNC: 11 U/L (ref 15–37)
AST SERPL-CCNC: 16 U/L (ref 15–37)
AST SERPL-CCNC: 18 U/L (ref 15–37)
AST SERPL-CCNC: 19 U/L (ref 15–37)
BACTERIA URNS QL MICRO: NEGATIVE /HPF
BASOPHILS # BLD: 0 K/UL (ref 0–0.1)
BASOPHILS # BLD: 0 K/UL (ref 0–0.1)
BASOPHILS # BLD: 0.1 K/UL (ref 0–0.1)
BASOPHILS NFR BLD: 0 % (ref 0–1)
BASOPHILS NFR BLD: 0 % (ref 0–1)
BASOPHILS NFR BLD: 1 % (ref 0–1)
BILIRUB SERPL-MCNC: 0.6 MG/DL (ref 0.2–1)
BILIRUB SERPL-MCNC: 0.7 MG/DL (ref 0.2–1)
BILIRUB SERPL-MCNC: 0.7 MG/DL (ref 0.2–1)
BILIRUB SERPL-MCNC: 0.8 MG/DL (ref 0.2–1)
BILIRUB UR QL: NEGATIVE
BUN SERPL-MCNC: 10 MG/DL (ref 6–20)
BUN SERPL-MCNC: 11 MG/DL (ref 6–20)
BUN SERPL-MCNC: 12 MG/DL (ref 6–20)
BUN SERPL-MCNC: 12 MG/DL (ref 6–20)
BUN SERPL-MCNC: 9 MG/DL (ref 6–20)
BUN SERPL-MCNC: 9 MG/DL (ref 6–20)
BUN/CREAT SERPL: 21 (ref 12–20)
BUN/CREAT SERPL: 22 (ref 12–20)
BUN/CREAT SERPL: 28 (ref 12–20)
BUN/CREAT SERPL: 33 (ref 12–20)
BUN/CREAT SERPL: 35 (ref 12–20)
BUN/CREAT SERPL: 38 (ref 12–20)
CALCIUM SERPL-MCNC: 7.8 MG/DL (ref 8.5–10.1)
CALCIUM SERPL-MCNC: 7.9 MG/DL (ref 8.5–10.1)
CALCIUM SERPL-MCNC: 8 MG/DL (ref 8.5–10.1)
CALCIUM SERPL-MCNC: 8.3 MG/DL (ref 8.5–10.1)
CALCIUM SERPL-MCNC: 8.5 MG/DL (ref 8.5–10.1)
CALCIUM SERPL-MCNC: 8.9 MG/DL (ref 8.5–10.1)
CHLORIDE SERPL-SCNC: 100 MMOL/L (ref 97–108)
CHLORIDE SERPL-SCNC: 102 MMOL/L (ref 97–108)
CHLORIDE SERPL-SCNC: 103 MMOL/L (ref 97–108)
CHLORIDE SERPL-SCNC: 87 MMOL/L (ref 97–108)
CHLORIDE SERPL-SCNC: 91 MMOL/L (ref 97–108)
CHLORIDE SERPL-SCNC: 99 MMOL/L (ref 97–108)
CO2 SERPL-SCNC: 19 MMOL/L (ref 21–32)
CO2 SERPL-SCNC: 21 MMOL/L (ref 21–32)
CO2 SERPL-SCNC: 22 MMOL/L (ref 21–32)
CO2 SERPL-SCNC: 23 MMOL/L (ref 21–32)
CO2 SERPL-SCNC: 25 MMOL/L (ref 21–32)
CO2 SERPL-SCNC: 27 MMOL/L (ref 21–32)
COLOR UR: YELLOW
COVID-19 RAPID TEST, COVR: NOT DETECTED
CREAT SERPL-MCNC: 0.26 MG/DL (ref 0.55–1.02)
CREAT SERPL-MCNC: 0.3 MG/DL (ref 0.55–1.02)
CREAT SERPL-MCNC: 0.32 MG/DL (ref 0.55–1.02)
CREAT SERPL-MCNC: 0.4 MG/DL (ref 0.55–1.02)
CREAT SERPL-MCNC: 0.41 MG/DL (ref 0.55–1.02)
CREAT SERPL-MCNC: 0.56 MG/DL (ref 0.55–1.02)
DIFFERENTIAL METHOD BLD: ABNORMAL
EOSINOPHIL # BLD: 0 K/UL (ref 0–0.4)
EOSINOPHIL NFR BLD: 0 % (ref 0–7)
EPITH CASTS URNS QL MICRO: ABNORMAL /LPF
ERYTHROCYTE [DISTWIDTH] IN BLOOD BY AUTOMATED COUNT: 14.3 % (ref 11.5–14.5)
ERYTHROCYTE [DISTWIDTH] IN BLOOD BY AUTOMATED COUNT: 14.6 % (ref 11.5–14.5)
ERYTHROCYTE [DISTWIDTH] IN BLOOD BY AUTOMATED COUNT: 14.8 % (ref 11.5–14.5)
EST. AVERAGE GLUCOSE BLD GHB EST-MCNC: 97 MG/DL
GLOBULIN SER CALC-MCNC: 4 G/DL (ref 2–4)
GLOBULIN SER CALC-MCNC: 4.3 G/DL (ref 2–4)
GLOBULIN SER CALC-MCNC: 4.6 G/DL (ref 2–4)
GLOBULIN SER CALC-MCNC: 4.7 G/DL (ref 2–4)
GLUCOSE BLD STRIP.AUTO-MCNC: 146 MG/DL (ref 65–100)
GLUCOSE BLD STRIP.AUTO-MCNC: 148 MG/DL (ref 65–100)
GLUCOSE BLD STRIP.AUTO-MCNC: 169 MG/DL (ref 65–100)
GLUCOSE BLD STRIP.AUTO-MCNC: 170 MG/DL (ref 65–100)
GLUCOSE BLD STRIP.AUTO-MCNC: 172 MG/DL (ref 65–100)
GLUCOSE BLD STRIP.AUTO-MCNC: 199 MG/DL (ref 65–100)
GLUCOSE BLD STRIP.AUTO-MCNC: 202 MG/DL (ref 65–100)
GLUCOSE BLD STRIP.AUTO-MCNC: 205 MG/DL (ref 65–100)
GLUCOSE BLD STRIP.AUTO-MCNC: 209 MG/DL (ref 65–100)
GLUCOSE BLD STRIP.AUTO-MCNC: 222 MG/DL (ref 65–100)
GLUCOSE SERPL-MCNC: 126 MG/DL (ref 65–100)
GLUCOSE SERPL-MCNC: 128 MG/DL (ref 65–100)
GLUCOSE SERPL-MCNC: 168 MG/DL (ref 65–100)
GLUCOSE SERPL-MCNC: 213 MG/DL (ref 65–100)
GLUCOSE SERPL-MCNC: 67 MG/DL (ref 65–100)
GLUCOSE SERPL-MCNC: 97 MG/DL (ref 65–100)
GLUCOSE UR STRIP.AUTO-MCNC: NEGATIVE MG/DL
HBA1C MFR BLD: 5 % (ref 4–5.6)
HCT VFR BLD AUTO: 26.1 % (ref 35–47)
HCT VFR BLD AUTO: 36 % (ref 35–47)
HCT VFR BLD AUTO: 38.3 % (ref 35–47)
HGB BLD-MCNC: 12.5 G/DL (ref 11.5–16)
HGB BLD-MCNC: 13 G/DL (ref 11.5–16)
HGB BLD-MCNC: 9.4 G/DL (ref 11.5–16)
HGB UR QL STRIP: ABNORMAL
HYALINE CASTS URNS QL MICRO: ABNORMAL /LPF (ref 0–5)
IMM GRANULOCYTES # BLD AUTO: 0 K/UL
IMM GRANULOCYTES # BLD AUTO: 0 K/UL
IMM GRANULOCYTES # BLD AUTO: 0.1 K/UL (ref 0–0.04)
IMM GRANULOCYTES NFR BLD AUTO: 0 %
IMM GRANULOCYTES NFR BLD AUTO: 0 %
IMM GRANULOCYTES NFR BLD AUTO: 1 % (ref 0–0.5)
KETONES UR QL STRIP.AUTO: ABNORMAL MG/DL
LEUKOCYTE ESTERASE UR QL STRIP.AUTO: NEGATIVE
LIPASE SERPL-CCNC: 32 U/L (ref 73–393)
LIPASE SERPL-CCNC: 34 U/L (ref 73–393)
LYMPHOCYTES # BLD: 1.2 K/UL (ref 0.8–3.5)
LYMPHOCYTES # BLD: 1.3 K/UL (ref 0.8–3.5)
LYMPHOCYTES # BLD: 1.4 K/UL (ref 0.8–3.5)
LYMPHOCYTES NFR BLD: 12 % (ref 12–49)
LYMPHOCYTES NFR BLD: 15 % (ref 12–49)
LYMPHOCYTES NFR BLD: 8 % (ref 12–49)
MAGNESIUM SERPL-MCNC: 1.6 MG/DL (ref 1.6–2.4)
MAGNESIUM SERPL-MCNC: 2 MG/DL (ref 1.6–2.4)
MAGNESIUM SERPL-MCNC: 2 MG/DL (ref 1.6–2.4)
MAGNESIUM SERPL-MCNC: 2.3 MG/DL (ref 1.6–2.4)
MCH RBC QN AUTO: 31.9 PG (ref 26–34)
MCH RBC QN AUTO: 32.3 PG (ref 26–34)
MCH RBC QN AUTO: 32.4 PG (ref 26–34)
MCHC RBC AUTO-ENTMCNC: 33.9 G/DL (ref 30–36.5)
MCHC RBC AUTO-ENTMCNC: 34.7 G/DL (ref 30–36.5)
MCHC RBC AUTO-ENTMCNC: 36 G/DL (ref 30–36.5)
MCV RBC AUTO: 89.7 FL (ref 80–99)
MCV RBC AUTO: 93.3 FL (ref 80–99)
MCV RBC AUTO: 94.1 FL (ref 80–99)
MONOCYTES # BLD: 0.5 K/UL (ref 0–1)
MONOCYTES # BLD: 1.3 K/UL (ref 0–1)
MONOCYTES # BLD: 1.7 K/UL (ref 0–1)
MONOCYTES NFR BLD: 14 % (ref 5–13)
MONOCYTES NFR BLD: 16 % (ref 5–13)
MONOCYTES NFR BLD: 3 % (ref 5–13)
MUCOUS THREADS URNS QL MICRO: ABNORMAL /LPF
NEUTS BAND NFR BLD MANUAL: 6 % (ref 0–6)
NEUTS BAND NFR BLD MANUAL: 9 % (ref 0–6)
NEUTS SEG # BLD: 13.7 K/UL (ref 1.8–8)
NEUTS SEG # BLD: 6.8 K/UL (ref 1.8–8)
NEUTS SEG # BLD: 7.5 K/UL (ref 1.8–8)
NEUTS SEG NFR BLD: 63 % (ref 32–75)
NEUTS SEG NFR BLD: 69 % (ref 32–75)
NEUTS SEG NFR BLD: 83 % (ref 32–75)
NITRITE UR QL STRIP.AUTO: NEGATIVE
NRBC # BLD: 0 K/UL (ref 0–0.01)
NRBC BLD-RTO: 0 PER 100 WBC
PH UR STRIP: 5.5 [PH] (ref 5–8)
PHOSPHATE SERPL-MCNC: 1.7 MG/DL (ref 2.6–4.7)
PHOSPHATE SERPL-MCNC: 2.9 MG/DL (ref 2.6–4.7)
PLATELET # BLD AUTO: 100 K/UL (ref 150–400)
PLATELET # BLD AUTO: 345 K/UL (ref 150–400)
PLATELET # BLD AUTO: 357 K/UL (ref 150–400)
PMV BLD AUTO: 8.9 FL (ref 8.9–12.9)
PMV BLD AUTO: 8.9 FL (ref 8.9–12.9)
PMV BLD AUTO: 9.9 FL (ref 8.9–12.9)
POTASSIUM SERPL-SCNC: 3.2 MMOL/L (ref 3.5–5.1)
POTASSIUM SERPL-SCNC: 3.4 MMOL/L (ref 3.5–5.1)
POTASSIUM SERPL-SCNC: 3.5 MMOL/L (ref 3.5–5.1)
POTASSIUM SERPL-SCNC: 3.7 MMOL/L (ref 3.5–5.1)
POTASSIUM SERPL-SCNC: 4.2 MMOL/L (ref 3.5–5.1)
POTASSIUM SERPL-SCNC: 4.9 MMOL/L (ref 3.5–5.1)
PROT SERPL-MCNC: 5.3 G/DL (ref 6.4–8.2)
PROT SERPL-MCNC: 5.5 G/DL (ref 6.4–8.2)
PROT SERPL-MCNC: 7 G/DL (ref 6.4–8.2)
PROT SERPL-MCNC: 7.1 G/DL (ref 6.4–8.2)
PROT UR STRIP-MCNC: NEGATIVE MG/DL
RBC # BLD AUTO: 2.91 M/UL (ref 3.8–5.2)
RBC # BLD AUTO: 3.86 M/UL (ref 3.8–5.2)
RBC # BLD AUTO: 4.07 M/UL (ref 3.8–5.2)
RBC #/AREA URNS HPF: ABNORMAL /HPF (ref 0–5)
RBC MORPH BLD: ABNORMAL
RBC MORPH BLD: ABNORMAL
SERVICE CMNT-IMP: ABNORMAL
SODIUM SERPL-SCNC: 126 MMOL/L (ref 136–145)
SODIUM SERPL-SCNC: 127 MMOL/L (ref 136–145)
SODIUM SERPL-SCNC: 127 MMOL/L (ref 136–145)
SODIUM SERPL-SCNC: 128 MMOL/L (ref 136–145)
SODIUM SERPL-SCNC: 128 MMOL/L (ref 136–145)
SODIUM SERPL-SCNC: 130 MMOL/L (ref 136–145)
SOURCE, COVRS: NORMAL
SP GR UR REFRACTOMETRY: 1.01 (ref 1–1.03)
SPECIMEN SOURCE, FCOV2M: NORMAL
SPECIMEN TYPE, XMCV1T: NORMAL
TSH SERPL DL<=0.05 MIU/L-ACNC: 3.67 UIU/ML (ref 0.36–3.74)
UR CULT HOLD, URHOLD: NORMAL
UROBILINOGEN UR QL STRIP.AUTO: 0.2 EU/DL (ref 0.2–1)
WBC # BLD AUTO: 10.5 K/UL (ref 3.6–11)
WBC # BLD AUTO: 15.4 K/UL (ref 3.6–11)
WBC # BLD AUTO: 9.7 K/UL (ref 3.6–11)
WBC URNS QL MICRO: ABNORMAL /HPF (ref 0–4)

## 2021-01-01 PROCEDURE — 82962 GLUCOSE BLOOD TEST: CPT

## 2021-01-01 PROCEDURE — 74011000250 HC RX REV CODE- 250: Performed by: NURSE ANESTHETIST, CERTIFIED REGISTERED

## 2021-01-01 PROCEDURE — C9113 INJ PANTOPRAZOLE SODIUM, VIA: HCPCS | Performed by: INTERNAL MEDICINE

## 2021-01-01 PROCEDURE — T4534 YOUTH SIZE PULL-ON: HCPCS

## 2021-01-01 PROCEDURE — 94760 N-INVAS EAR/PLS OXIMETRY 1: CPT

## 2021-01-01 PROCEDURE — 0651 HSPC ROUTINE HOME CARE

## 2021-01-01 PROCEDURE — 74011250636 HC RX REV CODE- 250/636: Performed by: INTERNAL MEDICINE

## 2021-01-01 PROCEDURE — 99284 EMERGENCY DEPT VISIT MOD MDM: CPT

## 2021-01-01 PROCEDURE — 83735 ASSAY OF MAGNESIUM: CPT

## 2021-01-01 PROCEDURE — 65660000000 HC RM CCU STEPDOWN

## 2021-01-01 PROCEDURE — 80053 COMPREHEN METABOLIC PANEL: CPT

## 2021-01-01 PROCEDURE — 74011250637 HC RX REV CODE- 250/637: Performed by: PHYSICAL MEDICINE & REHABILITATION

## 2021-01-01 PROCEDURE — 74011000258 HC RX REV CODE- 258: Performed by: INTERNAL MEDICINE

## 2021-01-01 PROCEDURE — 77030012865 HC BG URIN LEG MDII -A

## 2021-01-01 PROCEDURE — 74011000250 HC RX REV CODE- 250: Performed by: INTERNAL MEDICINE

## 2021-01-01 PROCEDURE — 74011250637 HC RX REV CODE- 250/637: Performed by: INTERNAL MEDICINE

## 2021-01-01 PROCEDURE — 76060000032 HC ANESTHESIA 0.5 TO 1 HR: Performed by: INTERNAL MEDICINE

## 2021-01-01 PROCEDURE — 83690 ASSAY OF LIPASE: CPT

## 2021-01-01 PROCEDURE — 74011250636 HC RX REV CODE- 250/636: Performed by: NURSE ANESTHETIST, CERTIFIED REGISTERED

## 2021-01-01 PROCEDURE — 65270000029 HC RM PRIVATE

## 2021-01-01 PROCEDURE — 81001 URINALYSIS AUTO W/SCOPE: CPT

## 2021-01-01 PROCEDURE — T4526 ADULT SIZE PULL-ON MED: HCPCS

## 2021-01-01 PROCEDURE — 3336500001 HSPC ELECTION

## 2021-01-01 PROCEDURE — 84443 ASSAY THYROID STIM HORMONE: CPT

## 2021-01-01 PROCEDURE — 77030004561 IR INSERT GASTROSTOMY TUBE PERC

## 2021-01-01 PROCEDURE — 74011000636 HC RX REV CODE- 636: Performed by: INTERNAL MEDICINE

## 2021-01-01 PROCEDURE — 74011250636 HC RX REV CODE- 250/636

## 2021-01-01 PROCEDURE — 36415 COLL VENOUS BLD VENIPUNCTURE: CPT

## 2021-01-01 PROCEDURE — 85025 COMPLETE CBC W/AUTO DIFF WBC: CPT

## 2021-01-01 PROCEDURE — 99233 SBSQ HOSP IP/OBS HIGH 50: CPT | Performed by: PHYSICAL MEDICINE & REHABILITATION

## 2021-01-01 PROCEDURE — 80048 BASIC METABOLIC PNL TOTAL CA: CPT

## 2021-01-01 PROCEDURE — 74011250636 HC RX REV CODE- 250/636: Performed by: STUDENT IN AN ORGANIZED HEALTH CARE EDUCATION/TRAINING PROGRAM

## 2021-01-01 PROCEDURE — 74011000250 HC RX REV CODE- 250: Performed by: EMERGENCY MEDICINE

## 2021-01-01 PROCEDURE — 74011250636 HC RX REV CODE- 250/636: Performed by: PHYSICAL MEDICINE & REHABILITATION

## 2021-01-01 PROCEDURE — C1769 GUIDE WIRE: HCPCS | Performed by: INTERNAL MEDICINE

## 2021-01-01 PROCEDURE — 74011250636 HC RX REV CODE- 250/636: Performed by: EMERGENCY MEDICINE

## 2021-01-01 PROCEDURE — 74018 RADEX ABDOMEN 1 VIEW: CPT

## 2021-01-01 PROCEDURE — 0DJD8ZZ INSPECTION OF LOWER INTESTINAL TRACT, VIA NATURAL OR ARTIFICIAL OPENING ENDOSCOPIC: ICD-10-PCS | Performed by: INTERNAL MEDICINE

## 2021-01-01 PROCEDURE — 74011250636 HC RX REV CODE- 250/636: Performed by: FAMILY MEDICINE

## 2021-01-01 PROCEDURE — 74177 CT ABD & PELVIS W/CONTRAST: CPT

## 2021-01-01 PROCEDURE — 99254 IP/OBS CNSLTJ NEW/EST MOD 60: CPT | Performed by: NURSE PRACTITIONER

## 2021-01-01 PROCEDURE — 76040000019: Performed by: INTERNAL MEDICINE

## 2021-01-01 PROCEDURE — 74011636637 HC RX REV CODE- 636/637: Performed by: NURSE PRACTITIONER

## 2021-01-01 PROCEDURE — 84100 ASSAY OF PHOSPHORUS: CPT

## 2021-01-01 PROCEDURE — 71045 X-RAY EXAM CHEST 1 VIEW: CPT

## 2021-01-01 PROCEDURE — 74011250637 HC RX REV CODE- 250/637: Performed by: NURSE PRACTITIONER

## 2021-01-01 PROCEDURE — A4927 NON-STERILE GLOVES: HCPCS

## 2021-01-01 PROCEDURE — 74011000258 HC RX REV CODE- 258: Performed by: NURSE PRACTITIONER

## 2021-01-01 PROCEDURE — 0DH63UZ INSERTION OF FEEDING DEVICE INTO STOMACH, PERCUTANEOUS APPROACH: ICD-10-PCS | Performed by: INTERNAL MEDICINE

## 2021-01-01 PROCEDURE — 77030008684 HC TU ET CUF COVD -B: Performed by: ANESTHESIOLOGY

## 2021-01-01 PROCEDURE — 83036 HEMOGLOBIN GLYCOSYLATED A1C: CPT

## 2021-01-01 PROCEDURE — 74011000258 HC RX REV CODE- 258: Performed by: EMERGENCY MEDICINE

## 2021-01-01 PROCEDURE — 87635 SARS-COV-2 COVID-19 AMP PRB: CPT

## 2021-01-01 PROCEDURE — 99231 SBSQ HOSP IP/OBS SF/LOW 25: CPT | Performed by: SURGERY

## 2021-01-01 PROCEDURE — G0299 HHS/HOSPICE OF RN EA 15 MIN: HCPCS

## 2021-01-01 PROCEDURE — 99497 ADVNCD CARE PLAN 30 MIN: CPT | Performed by: PHYSICAL MEDICINE & REHABILITATION

## 2021-01-01 PROCEDURE — 76040000007: Performed by: INTERNAL MEDICINE

## 2021-01-01 PROCEDURE — 2709999900 HC NON-CHARGEABLE SUPPLY: Performed by: INTERNAL MEDICINE

## 2021-01-01 PROCEDURE — 99233 SBSQ HOSP IP/OBS HIGH 50: CPT | Performed by: INTERNAL MEDICINE

## 2021-01-01 PROCEDURE — 96374 THER/PROPH/DIAG INJ IV PUSH: CPT

## 2021-01-01 PROCEDURE — 77030010803: Performed by: INTERNAL MEDICINE

## 2021-01-01 PROCEDURE — 76060000031 HC ANESTHESIA FIRST 0.5 HR: Performed by: INTERNAL MEDICINE

## 2021-01-01 PROCEDURE — 74011000636 HC RX REV CODE- 636: Performed by: EMERGENCY MEDICINE

## 2021-01-01 PROCEDURE — A6250 SKIN SEAL PROTECT MOISTURIZR: HCPCS

## 2021-01-01 PROCEDURE — A9270 NON-COVERED ITEM OR SERVICE: HCPCS

## 2021-01-01 PROCEDURE — 99232 SBSQ HOSP IP/OBS MODERATE 35: CPT | Performed by: INTERNAL MEDICINE

## 2021-01-01 PROCEDURE — 99223 1ST HOSP IP/OBS HIGH 75: CPT | Performed by: PHYSICAL MEDICINE & REHABILITATION

## 2021-01-01 PROCEDURE — T4541 LARGE DISPOSABLE UNDERPAD: HCPCS

## 2021-01-01 RX ORDER — ACETAMINOPHEN 650 MG/1
650 SUPPOSITORY RECTAL
Status: DISCONTINUED | OUTPATIENT
Start: 2021-01-01 | End: 2021-01-01 | Stop reason: HOSPADM

## 2021-01-01 RX ORDER — ACETAMINOPHEN 325 MG/1
650 TABLET ORAL
Status: DISCONTINUED | OUTPATIENT
Start: 2021-01-01 | End: 2021-01-01 | Stop reason: HOSPADM

## 2021-01-01 RX ORDER — MORPHINE SULFATE ORAL SOLUTION 10 MG/5ML
10 SOLUTION ORAL EVERY 4 HOURS
Status: DISCONTINUED | OUTPATIENT
Start: 2021-01-01 | End: 2021-01-01 | Stop reason: HOSPADM

## 2021-01-01 RX ORDER — PROMETHAZINE HYDROCHLORIDE 25 MG/1
12.5 TABLET ORAL
Status: DISCONTINUED | OUTPATIENT
Start: 2021-01-01 | End: 2021-01-01 | Stop reason: HOSPADM

## 2021-01-01 RX ORDER — MORPHINE SULFATE 2 MG/ML
2 INJECTION, SOLUTION INTRAMUSCULAR; INTRAVENOUS ONCE
Status: COMPLETED | OUTPATIENT
Start: 2021-01-01 | End: 2021-01-01

## 2021-01-01 RX ORDER — DEXTROSE 50 % IN WATER (D50W) INTRAVENOUS SYRINGE
12.5-25 AS NEEDED
Status: DISCONTINUED | OUTPATIENT
Start: 2021-01-01 | End: 2021-01-01 | Stop reason: HOSPADM

## 2021-01-01 RX ORDER — DEXTROMETHORPHAN/PSEUDOEPHED 2.5-7.5/.8
1.2 DROPS ORAL
Status: DISCONTINUED | OUTPATIENT
Start: 2021-01-01 | End: 2021-01-01 | Stop reason: HOSPADM

## 2021-01-01 RX ORDER — SODIUM CHLORIDE 9 MG/ML
50 INJECTION, SOLUTION INTRAVENOUS CONTINUOUS
Status: DISPENSED | OUTPATIENT
Start: 2021-01-01 | End: 2021-01-01

## 2021-01-01 RX ORDER — HEPARIN SODIUM 5000 [USP'U]/ML
5000 INJECTION, SOLUTION INTRAVENOUS; SUBCUTANEOUS EVERY 8 HOURS
Status: DISCONTINUED | OUTPATIENT
Start: 2021-01-01 | End: 2021-01-01 | Stop reason: HOSPADM

## 2021-01-01 RX ORDER — EPINEPHRINE 0.1 MG/ML
1 INJECTION INTRACARDIAC; INTRAVENOUS
Status: DISCONTINUED | OUTPATIENT
Start: 2021-01-01 | End: 2021-01-01 | Stop reason: HOSPADM

## 2021-01-01 RX ORDER — MORPHINE SULFATE ORAL SOLUTION 10 MG/5ML
10 SOLUTION ORAL
Status: DISCONTINUED | OUTPATIENT
Start: 2021-01-01 | End: 2021-01-01

## 2021-01-01 RX ORDER — CEFAZOLIN SODIUM 1 G/3ML
INJECTION, POWDER, FOR SOLUTION INTRAMUSCULAR; INTRAVENOUS
Status: COMPLETED
Start: 2021-01-01 | End: 2021-01-01

## 2021-01-01 RX ORDER — MIDAZOLAM HYDROCHLORIDE 1 MG/ML
.25-1 INJECTION, SOLUTION INTRAMUSCULAR; INTRAVENOUS
Status: DISCONTINUED | OUTPATIENT
Start: 2021-01-01 | End: 2021-01-01 | Stop reason: HOSPADM

## 2021-01-01 RX ORDER — SODIUM CHLORIDE 9 MG/ML
INJECTION, SOLUTION INTRAVENOUS
Status: DISCONTINUED | OUTPATIENT
Start: 2021-01-01 | End: 2021-01-01 | Stop reason: HOSPADM

## 2021-01-01 RX ORDER — MORPHINE SULFATE 2 MG/ML
4 INJECTION, SOLUTION INTRAMUSCULAR; INTRAVENOUS
Status: DISCONTINUED | OUTPATIENT
Start: 2021-01-01 | End: 2021-01-01 | Stop reason: HOSPADM

## 2021-01-01 RX ORDER — LIDOCAINE HYDROCHLORIDE 20 MG/ML
INJECTION, SOLUTION EPIDURAL; INFILTRATION; INTRACAUDAL; PERINEURAL AS NEEDED
Status: DISCONTINUED | OUTPATIENT
Start: 2021-01-01 | End: 2021-01-01 | Stop reason: HOSPADM

## 2021-01-01 RX ORDER — SODIUM CHLORIDE 9 MG/ML
50 INJECTION, SOLUTION INTRAVENOUS
Status: COMPLETED | OUTPATIENT
Start: 2021-01-01 | End: 2021-01-01

## 2021-01-01 RX ORDER — SUCCINYLCHOLINE CHLORIDE 20 MG/ML
INJECTION INTRAMUSCULAR; INTRAVENOUS AS NEEDED
Status: DISCONTINUED | OUTPATIENT
Start: 2021-01-01 | End: 2021-01-01 | Stop reason: HOSPADM

## 2021-01-01 RX ORDER — FENTANYL CITRATE 50 UG/ML
INJECTION, SOLUTION INTRAMUSCULAR; INTRAVENOUS AS NEEDED
Status: DISCONTINUED | OUTPATIENT
Start: 2021-01-01 | End: 2021-01-01 | Stop reason: HOSPADM

## 2021-01-01 RX ORDER — HEPARIN 100 UNIT/ML
500 SYRINGE INTRAVENOUS AS NEEDED
Status: DISCONTINUED | OUTPATIENT
Start: 2021-01-01 | End: 2021-01-01 | Stop reason: HOSPADM

## 2021-01-01 RX ORDER — LIDOCAINE HYDROCHLORIDE 20 MG/ML
20 INJECTION, SOLUTION INFILTRATION; PERINEURAL
Status: COMPLETED | OUTPATIENT
Start: 2021-01-01 | End: 2021-01-01

## 2021-01-01 RX ORDER — ONDANSETRON 2 MG/ML
4 INJECTION INTRAMUSCULAR; INTRAVENOUS ONCE
Status: COMPLETED | OUTPATIENT
Start: 2021-01-01 | End: 2021-01-01

## 2021-01-01 RX ORDER — ROCURONIUM BROMIDE 10 MG/ML
INJECTION, SOLUTION INTRAVENOUS AS NEEDED
Status: DISCONTINUED | OUTPATIENT
Start: 2021-01-01 | End: 2021-01-01 | Stop reason: HOSPADM

## 2021-01-01 RX ORDER — SODIUM CHLORIDE 0.9 % (FLUSH) 0.9 %
5-40 SYRINGE (ML) INJECTION AS NEEDED
Status: DISCONTINUED | OUTPATIENT
Start: 2021-01-01 | End: 2021-01-01 | Stop reason: HOSPADM

## 2021-01-01 RX ORDER — HYDROMORPHONE HYDROCHLORIDE 1 MG/ML
INJECTION, SOLUTION INTRAMUSCULAR; INTRAVENOUS; SUBCUTANEOUS AS NEEDED
Status: DISCONTINUED | OUTPATIENT
Start: 2021-01-01 | End: 2021-01-01 | Stop reason: HOSPADM

## 2021-01-01 RX ORDER — MIDAZOLAM HYDROCHLORIDE 1 MG/ML
5 INJECTION, SOLUTION INTRAMUSCULAR; INTRAVENOUS
Status: DISCONTINUED | OUTPATIENT
Start: 2021-01-01 | End: 2021-01-01 | Stop reason: HOSPADM

## 2021-01-01 RX ORDER — GUAIFENESIN 100 MG/5ML
400 SOLUTION ORAL
Status: DISCONTINUED | OUTPATIENT
Start: 2021-01-01 | End: 2021-01-01 | Stop reason: HOSPADM

## 2021-01-01 RX ORDER — FENTANYL CITRATE 50 UG/ML
200 INJECTION, SOLUTION INTRAMUSCULAR; INTRAVENOUS
Status: DISCONTINUED | OUTPATIENT
Start: 2021-01-01 | End: 2021-01-01 | Stop reason: HOSPADM

## 2021-01-01 RX ORDER — MORPHINE SULFATE 2 MG/ML
2 INJECTION, SOLUTION INTRAMUSCULAR; INTRAVENOUS
Status: DISCONTINUED | OUTPATIENT
Start: 2021-01-01 | End: 2021-01-01 | Stop reason: HOSPADM

## 2021-01-01 RX ORDER — SODIUM CHLORIDE 0.9 % (FLUSH) 0.9 %
5-40 SYRINGE (ML) INJECTION EVERY 8 HOURS
Status: DISCONTINUED | OUTPATIENT
Start: 2021-01-01 | End: 2021-01-01 | Stop reason: HOSPADM

## 2021-01-01 RX ORDER — FLUMAZENIL 0.1 MG/ML
0.2 INJECTION INTRAVENOUS
Status: DISCONTINUED | OUTPATIENT
Start: 2021-01-01 | End: 2021-01-01 | Stop reason: HOSPADM

## 2021-01-01 RX ORDER — MAGNESIUM SULFATE 100 %
4 CRYSTALS MISCELLANEOUS AS NEEDED
Status: DISCONTINUED | OUTPATIENT
Start: 2021-01-01 | End: 2021-01-01 | Stop reason: HOSPADM

## 2021-01-01 RX ORDER — POTASSIUM CHLORIDE AND SODIUM CHLORIDE 900; 300 MG/100ML; MG/100ML
INJECTION, SOLUTION INTRAVENOUS CONTINUOUS
Status: DISCONTINUED | OUTPATIENT
Start: 2021-01-01 | End: 2021-01-01

## 2021-01-01 RX ORDER — CEFAZOLIN SODIUM 1 G/3ML
INJECTION, POWDER, FOR SOLUTION INTRAMUSCULAR; INTRAVENOUS AS NEEDED
Status: DISCONTINUED | OUTPATIENT
Start: 2021-01-01 | End: 2021-01-01 | Stop reason: HOSPADM

## 2021-01-01 RX ORDER — ONDANSETRON 2 MG/ML
4 INJECTION INTRAMUSCULAR; INTRAVENOUS
Status: DISCONTINUED | OUTPATIENT
Start: 2021-01-01 | End: 2021-01-01 | Stop reason: HOSPADM

## 2021-01-01 RX ORDER — HEPARIN 100 UNIT/ML
300 SYRINGE INTRAVENOUS AS NEEDED
Status: DISCONTINUED | OUTPATIENT
Start: 2021-01-01 | End: 2021-01-01 | Stop reason: HOSPADM

## 2021-01-01 RX ORDER — ATROPINE SULFATE 0.1 MG/ML
0.5 INJECTION INTRAVENOUS
Status: DISCONTINUED | OUTPATIENT
Start: 2021-01-01 | End: 2021-01-01 | Stop reason: HOSPADM

## 2021-01-01 RX ORDER — FENTANYL CITRATE 50 UG/ML
100 INJECTION, SOLUTION INTRAMUSCULAR; INTRAVENOUS
Status: DISCONTINUED | OUTPATIENT
Start: 2021-01-01 | End: 2021-01-01 | Stop reason: HOSPADM

## 2021-01-01 RX ORDER — NALOXONE HYDROCHLORIDE 0.4 MG/ML
0.4 INJECTION, SOLUTION INTRAMUSCULAR; INTRAVENOUS; SUBCUTANEOUS
Status: DISCONTINUED | OUTPATIENT
Start: 2021-01-01 | End: 2021-01-01 | Stop reason: HOSPADM

## 2021-01-01 RX ORDER — FENTANYL 12.5 UG/1
1 PATCH TRANSDERMAL
Status: DISCONTINUED | OUTPATIENT
Start: 2021-01-01 | End: 2021-01-01 | Stop reason: HOSPADM

## 2021-01-01 RX ORDER — PROPOFOL 10 MG/ML
INJECTION, EMULSION INTRAVENOUS AS NEEDED
Status: DISCONTINUED | OUTPATIENT
Start: 2021-01-01 | End: 2021-01-01 | Stop reason: HOSPADM

## 2021-01-01 RX ORDER — SODIUM CHLORIDE, SODIUM LACTATE, POTASSIUM CHLORIDE, CALCIUM CHLORIDE 600; 310; 30; 20 MG/100ML; MG/100ML; MG/100ML; MG/100ML
INJECTION, SOLUTION INTRAVENOUS
Status: DISCONTINUED | OUTPATIENT
Start: 2021-01-01 | End: 2021-01-01

## 2021-01-01 RX ORDER — FENTANYL CITRATE 50 UG/ML
INJECTION, SOLUTION INTRAMUSCULAR; INTRAVENOUS
Status: COMPLETED
Start: 2021-01-01 | End: 2021-01-01

## 2021-01-01 RX ORDER — MORPHINE SULFATE IN 0.9 % NACL 150MG/30ML
PATIENT CONTROLLED ANALGESIA SYRINGE INTRAVENOUS CONTINUOUS
Status: DISCONTINUED | OUTPATIENT
Start: 2021-01-01 | End: 2021-01-01

## 2021-01-01 RX ORDER — HYDROMORPHONE HYDROCHLORIDE 1 MG/ML
INJECTION, SOLUTION INTRAMUSCULAR; INTRAVENOUS; SUBCUTANEOUS
Status: COMPLETED
Start: 2021-01-01 | End: 2021-01-01

## 2021-01-01 RX ORDER — SODIUM CHLORIDE, SODIUM LACTATE, POTASSIUM CHLORIDE, CALCIUM CHLORIDE 600; 310; 30; 20 MG/100ML; MG/100ML; MG/100ML; MG/100ML
125 INJECTION, SOLUTION INTRAVENOUS CONTINUOUS
Status: DISCONTINUED | OUTPATIENT
Start: 2021-01-01 | End: 2021-01-01

## 2021-01-01 RX ORDER — ONDANSETRON 2 MG/ML
INJECTION INTRAMUSCULAR; INTRAVENOUS
Status: COMPLETED
Start: 2021-01-01 | End: 2021-01-01

## 2021-01-01 RX ORDER — DEXAMETHASONE SODIUM PHOSPHATE 4 MG/ML
INJECTION, SOLUTION INTRA-ARTICULAR; INTRALESIONAL; INTRAMUSCULAR; INTRAVENOUS; SOFT TISSUE AS NEEDED
Status: DISCONTINUED | OUTPATIENT
Start: 2021-01-01 | End: 2021-01-01 | Stop reason: HOSPADM

## 2021-01-01 RX ORDER — ONDANSETRON 2 MG/ML
INJECTION INTRAMUSCULAR; INTRAVENOUS AS NEEDED
Status: DISCONTINUED | OUTPATIENT
Start: 2021-01-01 | End: 2021-01-01 | Stop reason: HOSPADM

## 2021-01-01 RX ORDER — MORPHINE SULFATE 4 MG/ML
4 INJECTION, SOLUTION INTRAMUSCULAR; INTRAVENOUS
Status: DISCONTINUED | OUTPATIENT
Start: 2021-01-01 | End: 2021-01-01 | Stop reason: SDUPTHER

## 2021-01-01 RX ORDER — SODIUM CHLORIDE 9 MG/ML
10 INJECTION INTRAMUSCULAR; INTRAVENOUS; SUBCUTANEOUS ONCE
Status: COMPLETED | OUTPATIENT
Start: 2021-01-01 | End: 2021-01-01

## 2021-01-01 RX ORDER — MORPHINE SULFATE 2 MG/ML
2 INJECTION, SOLUTION INTRAMUSCULAR; INTRAVENOUS
Status: DISCONTINUED | OUTPATIENT
Start: 2021-01-01 | End: 2021-01-01

## 2021-01-01 RX ORDER — INSULIN LISPRO 100 [IU]/ML
INJECTION, SOLUTION INTRAVENOUS; SUBCUTANEOUS EVERY 6 HOURS
Status: DISCONTINUED | OUTPATIENT
Start: 2021-01-01 | End: 2021-01-01 | Stop reason: HOSPADM

## 2021-01-01 RX ADMIN — Medication: at 20:17

## 2021-01-01 RX ADMIN — HEPARIN SODIUM 5000 UNITS: 5000 INJECTION INTRAVENOUS; SUBCUTANEOUS at 14:18

## 2021-01-01 RX ADMIN — MORPHINE SULFATE 2 MG: 2 INJECTION, SOLUTION INTRAMUSCULAR; INTRAVENOUS at 06:35

## 2021-01-01 RX ADMIN — Medication 10 ML: at 07:30

## 2021-01-01 RX ADMIN — Medication 10 ML: at 00:00

## 2021-01-01 RX ADMIN — HEPARIN SODIUM 5000 UNITS: 5000 INJECTION INTRAVENOUS; SUBCUTANEOUS at 22:00

## 2021-01-01 RX ADMIN — DEXAMETHASONE SODIUM PHOSPHATE 4 MG: 4 INJECTION, SOLUTION INTRAMUSCULAR; INTRAVENOUS at 16:21

## 2021-01-01 RX ADMIN — LIDOCAINE HYDROCHLORIDE 40 MG: 20 INJECTION, SOLUTION EPIDURAL; INFILTRATION; INTRACAUDAL; PERINEURAL at 11:53

## 2021-01-01 RX ADMIN — CALCIUM GLUCONATE: 94 INJECTION, SOLUTION INTRAVENOUS at 19:04

## 2021-01-01 RX ADMIN — INSULIN LISPRO 2 UNITS: 100 INJECTION, SOLUTION INTRAVENOUS; SUBCUTANEOUS at 13:30

## 2021-01-01 RX ADMIN — LEUCINE, PHENYLALANINE, LYSINE, METHIONINE, ISOLEUCINE, VALINE, HISTIDINE, THREONINE, TRYPTOPHAN, ALANINE, GLYCINE, ARGININE, PROLINE, SERINE, TYROSINE, SODIUM ACETATE, DIBASIC POTASSIUM PHOSPHATE, MAGNESIUM CHLORIDE, SODIUM CHLORIDE, CALCIUM CHLORIDE, DEXTROSE
365; 280; 290; 200; 300; 290; 240; 210; 90; 1035; 515; 575; 340; 250; 20; 340; 261; 51; 59; 33; 15 INJECTION INTRAVENOUS at 18:53

## 2021-01-01 RX ADMIN — HEPARIN SODIUM 5000 UNITS: 5000 INJECTION INTRAVENOUS; SUBCUTANEOUS at 06:24

## 2021-01-01 RX ADMIN — POTASSIUM CHLORIDE AND SODIUM CHLORIDE: 900; 300 INJECTION, SOLUTION INTRAVENOUS at 08:03

## 2021-01-01 RX ADMIN — SODIUM CHLORIDE 250 ML: 900 INJECTION, SOLUTION INTRAVENOUS at 17:00

## 2021-01-01 RX ADMIN — HEPARIN SODIUM 5000 UNITS: 5000 INJECTION INTRAVENOUS; SUBCUTANEOUS at 16:31

## 2021-01-01 RX ADMIN — MORPHINE SULFATE 10 MG: 10 SOLUTION ORAL at 07:36

## 2021-01-01 RX ADMIN — SODIUM CHLORIDE: 900 INJECTION, SOLUTION INTRAVENOUS at 16:01

## 2021-01-01 RX ADMIN — PROPOFOL 100 MG: 10 INJECTION, EMULSION INTRAVENOUS at 16:06

## 2021-01-01 RX ADMIN — FENTANYL CITRATE 25 MCG: 50 INJECTION, SOLUTION INTRAMUSCULAR; INTRAVENOUS at 14:42

## 2021-01-01 RX ADMIN — SODIUM CHLORIDE 40 MG: 9 INJECTION INTRAMUSCULAR; INTRAVENOUS; SUBCUTANEOUS at 08:49

## 2021-01-01 RX ADMIN — SODIUM CHLORIDE 10 ML: 9 INJECTION INTRAMUSCULAR; INTRAVENOUS; SUBCUTANEOUS at 22:26

## 2021-01-01 RX ADMIN — Medication 10 ML: at 07:26

## 2021-01-01 RX ADMIN — ONDANSETRON 4 MG: 2 INJECTION INTRAMUSCULAR; INTRAVENOUS at 05:06

## 2021-01-01 RX ADMIN — PROPOFOL 50 MG: 10 INJECTION, EMULSION INTRAVENOUS at 11:56

## 2021-01-01 RX ADMIN — PROCHLORPERAZINE EDISYLATE 10 MG: 5 INJECTION INTRAMUSCULAR; INTRAVENOUS at 21:26

## 2021-01-01 RX ADMIN — Medication 10 ML: at 06:35

## 2021-01-01 RX ADMIN — IOPAMIDOL 20 ML: 612 INJECTION, SOLUTION INTRAVENOUS at 14:50

## 2021-01-01 RX ADMIN — MORPHINE SULFATE 10 MG: 10 SOLUTION ORAL at 16:15

## 2021-01-01 RX ADMIN — Medication 10 ML: at 00:12

## 2021-01-01 RX ADMIN — GUAIFENESIN 400 MG: 200 SOLUTION ORAL at 20:42

## 2021-01-01 RX ADMIN — HEPARIN SODIUM 5000 UNITS: 5000 INJECTION INTRAVENOUS; SUBCUTANEOUS at 07:09

## 2021-01-01 RX ADMIN — HYDROMORPHONE HYDROCHLORIDE 1 MG: 1 INJECTION, SOLUTION INTRAMUSCULAR; INTRAVENOUS; SUBCUTANEOUS at 16:24

## 2021-01-01 RX ADMIN — SODIUM CHLORIDE 1000 ML: 9 INJECTION, SOLUTION INTRAVENOUS at 21:25

## 2021-01-01 RX ADMIN — SODIUM CHLORIDE 40 MG: 9 INJECTION INTRAMUSCULAR; INTRAVENOUS; SUBCUTANEOUS at 10:13

## 2021-01-01 RX ADMIN — HEPARIN SODIUM 5000 UNITS: 5000 INJECTION INTRAVENOUS; SUBCUTANEOUS at 07:26

## 2021-01-01 RX ADMIN — HEPARIN SODIUM 5000 UNITS: 5000 INJECTION INTRAVENOUS; SUBCUTANEOUS at 09:31

## 2021-01-01 RX ADMIN — SODIUM CHLORIDE 40 MG: 9 INJECTION INTRAMUSCULAR; INTRAVENOUS; SUBCUTANEOUS at 09:19

## 2021-01-01 RX ADMIN — HEPARIN SODIUM 5000 UNITS: 5000 INJECTION INTRAVENOUS; SUBCUTANEOUS at 08:50

## 2021-01-01 RX ADMIN — PROPOFOL 50 MG: 10 INJECTION, EMULSION INTRAVENOUS at 11:53

## 2021-01-01 RX ADMIN — Medication 10 ML: at 00:17

## 2021-01-01 RX ADMIN — LIDOCAINE HYDROCHLORIDE 10 ML: 20 INJECTION, SOLUTION INFILTRATION; PERINEURAL at 14:50

## 2021-01-01 RX ADMIN — ROCURONIUM BROMIDE 10 MG: 10 SOLUTION INTRAVENOUS at 16:06

## 2021-01-01 RX ADMIN — FENTANYL CITRATE 100 MCG: 50 INJECTION, SOLUTION INTRAMUSCULAR; INTRAVENOUS at 16:06

## 2021-01-01 RX ADMIN — SODIUM CHLORIDE: 900 INJECTION, SOLUTION INTRAVENOUS at 11:52

## 2021-01-01 RX ADMIN — INSULIN LISPRO 2 UNITS: 100 INJECTION, SOLUTION INTRAVENOUS; SUBCUTANEOUS at 19:22

## 2021-01-01 RX ADMIN — GUAIFENESIN 400 MG: 200 SOLUTION ORAL at 02:03

## 2021-01-01 RX ADMIN — Medication 300 UNITS: at 10:05

## 2021-01-01 RX ADMIN — SODIUM CHLORIDE 40 MG: 9 INJECTION INTRAMUSCULAR; INTRAVENOUS; SUBCUTANEOUS at 09:54

## 2021-01-01 RX ADMIN — ONDANSETRON HYDROCHLORIDE: 2 INJECTION, SOLUTION INTRAMUSCULAR; INTRAVENOUS at 06:00

## 2021-01-01 RX ADMIN — SODIUM CHLORIDE, POTASSIUM CHLORIDE, SODIUM LACTATE AND CALCIUM CHLORIDE 125 ML/HR: 600; 310; 30; 20 INJECTION, SOLUTION INTRAVENOUS at 09:32

## 2021-01-01 RX ADMIN — Medication 5 ML: at 13:33

## 2021-01-01 RX ADMIN — BISACODYL 1 ENEMA: 10 ENEMA RECTAL at 14:25

## 2021-01-01 RX ADMIN — IOPAMIDOL 100 ML: 755 INJECTION, SOLUTION INTRAVENOUS at 22:25

## 2021-01-01 RX ADMIN — INSULIN LISPRO 3 UNITS: 100 INJECTION, SOLUTION INTRAVENOUS; SUBCUTANEOUS at 07:29

## 2021-01-01 RX ADMIN — HEPARIN SODIUM 5000 UNITS: 5000 INJECTION INTRAVENOUS; SUBCUTANEOUS at 22:40

## 2021-01-01 RX ADMIN — SODIUM CHLORIDE 50 ML: 900 INJECTION, SOLUTION INTRAVENOUS at 22:25

## 2021-01-01 RX ADMIN — MORPHINE SULFATE 10 MG: 10 SOLUTION ORAL at 01:27

## 2021-01-01 RX ADMIN — LIDOCAINE HYDROCHLORIDE 60 MG: 20 INJECTION, SOLUTION EPIDURAL; INFILTRATION; INTRACAUDAL; PERINEURAL at 16:06

## 2021-01-01 RX ADMIN — ONDANSETRON HYDROCHLORIDE 4 MG: 2 INJECTION, SOLUTION INTRAMUSCULAR; INTRAVENOUS at 16:21

## 2021-01-01 RX ADMIN — MORPHINE SULFATE 10 MG: 10 SOLUTION ORAL at 11:07

## 2021-01-01 RX ADMIN — SODIUM CHLORIDE 40 MG: 9 INJECTION INTRAMUSCULAR; INTRAVENOUS; SUBCUTANEOUS at 09:31

## 2021-01-01 RX ADMIN — ASCORBIC ACID, VITAMIN A PALMITATE, CHOLECALCIFEROL, THIAMINE HYDROCHLORIDE, RIBOFLAVIN-5 PHOSPHATE SODIUM, PYRIDOXINE HYDROCHLORIDE, NIACINAMIDE, DEXPANTHENOL, ALPHA-TOCOPHEROL ACETATE, VITAMIN K1, FOLIC ACID, BIOTIN, CYANOCOBALAMIN: 200; 3300; 200; 6; 3.6; 6; 40; 15; 10; 150; 600; 60; 5 INJECTION, SOLUTION INTRAVENOUS at 19:54

## 2021-01-01 RX ADMIN — Medication 10 ML: at 22:00

## 2021-01-01 RX ADMIN — GLUCAGON HYDROCHLORIDE 1 MG: 1 INJECTION, POWDER, FOR SOLUTION INTRAMUSCULAR; INTRAVENOUS; SUBCUTANEOUS at 14:42

## 2021-01-01 RX ADMIN — LEUCINE, PHENYLALANINE, LYSINE, METHIONINE, ISOLEUCINE, VALINE, HISTIDINE, THREONINE, TRYPTOPHAN, ALANINE, GLYCINE, ARGININE, PROLINE, SERINE, TYROSINE, SODIUM ACETATE, DIBASIC POTASSIUM PHOSPHATE, MAGNESIUM CHLORIDE, SODIUM CHLORIDE, CALCIUM CHLORIDE, DEXTROSE
365; 280; 290; 200; 300; 290; 240; 210; 90; 1035; 515; 575; 340; 250; 20; 340; 261; 51; 59; 33; 15 INJECTION INTRAVENOUS at 19:21

## 2021-01-01 RX ADMIN — SODIUM CHLORIDE 40 MG: 9 INJECTION INTRAMUSCULAR; INTRAVENOUS; SUBCUTANEOUS at 10:22

## 2021-01-01 RX ADMIN — INSULIN LISPRO 3 UNITS: 100 INJECTION, SOLUTION INTRAVENOUS; SUBCUTANEOUS at 19:00

## 2021-01-01 RX ADMIN — FENTANYL CITRATE 25 MCG: 50 INJECTION, SOLUTION INTRAMUSCULAR; INTRAVENOUS at 14:28

## 2021-01-01 RX ADMIN — CEFAZOLIN 1.2 G: 330 INJECTION, POWDER, FOR SOLUTION INTRAMUSCULAR; INTRAVENOUS at 11:53

## 2021-01-01 RX ADMIN — POTASSIUM CHLORIDE AND SODIUM CHLORIDE: 900; 300 INJECTION, SOLUTION INTRAVENOUS at 11:25

## 2021-01-01 RX ADMIN — MIDAZOLAM HYDROCHLORIDE 1 MG: 1 INJECTION, SOLUTION INTRAMUSCULAR; INTRAVENOUS at 14:22

## 2021-01-01 RX ADMIN — MORPHINE SULFATE 2 MG: 2 INJECTION, SOLUTION INTRAMUSCULAR; INTRAVENOUS at 10:50

## 2021-01-01 RX ADMIN — MORPHINE SULFATE 10 MG: 10 SOLUTION ORAL at 04:41

## 2021-01-01 RX ADMIN — MORPHINE SULFATE 2 MG: 2 INJECTION, SOLUTION INTRAMUSCULAR; INTRAVENOUS at 13:56

## 2021-01-01 RX ADMIN — INSULIN LISPRO 2 UNITS: 100 INJECTION, SOLUTION INTRAVENOUS; SUBCUTANEOUS at 08:52

## 2021-01-01 RX ADMIN — SODIUM CHLORIDE 50 ML/HR: 9 INJECTION, SOLUTION INTRAVENOUS at 14:10

## 2021-01-01 RX ADMIN — HEPARIN SODIUM 5000 UNITS: 5000 INJECTION INTRAVENOUS; SUBCUTANEOUS at 14:05

## 2021-01-01 RX ADMIN — Medication 10 ML: at 14:00

## 2021-01-01 RX ADMIN — POTASSIUM CHLORIDE AND SODIUM CHLORIDE: 900; 300 INJECTION, SOLUTION INTRAVENOUS at 19:42

## 2021-01-01 RX ADMIN — HEPARIN SODIUM 5000 UNITS: 5000 INJECTION INTRAVENOUS; SUBCUTANEOUS at 02:03

## 2021-01-01 RX ADMIN — FENTANYL CITRATE 25 MCG: 50 INJECTION, SOLUTION INTRAMUSCULAR; INTRAVENOUS at 14:22

## 2021-01-01 RX ADMIN — HEPARIN SODIUM 5000 UNITS: 5000 INJECTION INTRAVENOUS; SUBCUTANEOUS at 00:00

## 2021-01-01 RX ADMIN — SUCCINYLCHOLINE CHLORIDE 100 MG: 20 INJECTION, SOLUTION INTRAMUSCULAR; INTRAVENOUS; PARENTERAL at 16:06

## 2021-01-01 RX ADMIN — HEPARIN SODIUM 5000 UNITS: 5000 INJECTION INTRAVENOUS; SUBCUTANEOUS at 19:22

## 2021-01-01 RX ADMIN — Medication: at 02:24

## 2021-01-01 RX ADMIN — SODIUM CHLORIDE 40 MG: 9 INJECTION INTRAMUSCULAR; INTRAVENOUS; SUBCUTANEOUS at 09:16

## 2021-01-05 PROBLEM — K56.609 SBO (SMALL BOWEL OBSTRUCTION) (HCC): Status: ACTIVE | Noted: 2021-01-01

## 2021-01-06 NOTE — ED NOTES
Room assignment change, report recalled. TRANSFER - OUT REPORT: 
 
Verbal report given to Fracisco Lyle RN(name) on Shira Whitten  being transferred to [facility](unit) for routine progression of care Report consisted of patients Situation, Background, Assessment and  
Recommendations(SBAR). Information from the following report(s) SBAR, ED Summary, [facility] and Recent Results was reviewed with the receiving nurse. Lines:  
Peripheral IV 01/05/21 Left; Anterior Antecubital (Active) Site Assessment Clean, dry, & intact 01/05/21 2119 Phlebitis Assessment 0 01/05/21 2119 Infiltration Assessment 0 01/05/21 2119 Dressing Status Clean, dry, & intact 01/05/21 2119 Dressing Type Transparent 01/05/21 2119 Hub Color/Line Status Pink;Capped;Flushed;Patent 01/05/21 2119 Action Taken Blood drawn 01/05/21 2119 Opportunity for questions and clarification was provided. Report given to PAUL.

## 2021-01-06 NOTE — ED PROVIDER NOTES
HPI patient is a 77-year-old female with past medical history significant for metastatic cancer and recent colonic stent placement who presents the ED reporting multiple episodes of foul-smelling/stool vomitus for 1 day. She had a colonic stent placement at [facility] December 28. She has had a small amount of Ensure liquid stay down since her last vomitus along with Colace. She contacted her oncologist and was referred to the ED for evaluation and CT. Denies fever, cold symptoms, cough, headache, neck pain, visual changes, focal weakness or rash. Denies any difficulty breathing, difficulty swallowing, SOB or chest pain. Denies any nausea, vomiting or diarrhea. Pt. Reports taking her OxyContin 10 mg at 730 with some relief of her lower abdominal pain. She continues to have nausea. She had a small bowel movement earlier today. Old charts reviewed. Past Medical History:  
Diagnosis Date  Cancer ([facility] [address] APPENDIX CANCER   
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  
Procedure Laterality Date  HX APPENDECTOMY  09/2019  HX GI  09/2019 ILEOCECECTOMY  HX GYN  2003 CYST ON OVARY  HX HYSTERECTOMY  2019  IR THORACENTESIS CATH W IMAGE  11/6/2019  SD ABDOMEN SURGERY PROC UNLISTED Family History:  
Problem Relation Age of Onset  Breast Cancer Paternal Grandmother 56  Crohn's Disease Mother  Heart Disease Mother  Cancer Father BLADDER  
 Diabetes Father  No Known Problems Son  No Known Problems Daughter  Anesth Problems Neg Hx Social History Socioeconomic History  Marital status:  Spouse name: Not on file  Number of children: Not on file  Years of education: Not on file  Highest education level: Not on file Occupational History  Not on file Social Needs  Financial resource strain: Not on file  Food insecurity Worry: Not on file Inability: Not on file  Transportation needs Medical: Not on file Non-medical: Not on file Tobacco Use  Smoking status: Never Smoker  Smokeless tobacco: Never Used Substance and Sexual Activity  Alcohol use: Not Currently  Drug use: Not Currently  Sexual activity: Not on file Lifestyle  Physical activity Days per week: Not on file Minutes per session: Not on file  Stress: Not on file Relationships  Social connections Talks on phone: Not on file Gets together: Not on file Attends [employer] service: Not on file Active member of club or organization: Not on file Attends meetings of clubs or organizations: Not on file Relationship status: Not on file  Intimate partner violence Fear of current or ex partner: Not on file Emotionally abused: Not on file Physically abused: Not on file Forced sexual activity: Not on file Other Topics Concern  Not on file Social History Narrative  Not on file ALLERGIES: Patient has no known allergies. Review of Systems Constitutional: Positive for appetite change. Negative for fever and unexpected weight change. HENT: Negative for congestion, sore throat and trouble swallowing. Respiratory: Negative for cough and shortness of breath. Cardiovascular: Negative for chest pain, palpitations and leg swelling. Gastrointestinal: Positive for abdominal pain, nausea and vomiting. Negative for diarrhea. Genitourinary: Negative for difficulty urinating and dysuria. Musculoskeletal: Negative for arthralgias and gait problem. Skin: Negative for rash. Neurological: Negative for dizziness and headaches. All other systems reviewed and are negative. Vitals:  
 01/05/21 2103 BP: (!) 115/92 Pulse: (!) 112 Resp: 16  
 Temp: 98.8 °F (37.1 °C) Weight: 40.7 kg (89 lb 11.6 oz) Physical Exam 
Vitals signs and nursing note reviewed. Constitutional:   
   General: She is not in acute distress. Appearance: She is not toxic-appearing or diaphoretic. Comments: Female; ; non smoking; chronically ill appearing HENT:  
   Head: Normocephalic. Nose: Nose normal.  
   Mouth/Throat:  
   Mouth: Mucous membranes are moist.  
Neck: Musculoskeletal: Normal range of motion and neck supple. Cardiovascular:  
   Rate and Rhythm: Regular rhythm. Tachycardia present. Pulmonary:  
   Effort: Pulmonary effort is normal.  
   Breath sounds: Normal breath sounds. Abdominal:  
   Palpations: Abdomen is soft. Tenderness: There is abdominal tenderness. There is no guarding or rebound. Hernia: No hernia is present. Comments: Lower abdominal quadrants slightly tender to palpation; incision sites are well-healed Musculoskeletal: Normal range of motion. Right lower leg: No edema. Left lower leg: No edema. Lymphadenopathy:  
   Cervical: No cervical adenopathy. Skin: 
   General: Skin is warm and dry. Findings: No rash. Neurological:  
   General: No focal deficit present. Mental Status: She is alert and oriented to person, place, and time. MDM Procedures Change of shift.   Care of patient signed over to Dr. De La Rosa Last abdomen and pelvis with contrast.  
Tiana Eckert NP9:52 PM

## 2021-01-06 NOTE — H&P
[address] [facility] HISTORY AND PHYSICAL Name:  Melba Max 
MR#:  [id number] :  1970 ACCOUNT #:  [de-identified] ADMIT DATE:  2021 Admission order was placed while the patient was still at the [facility] Emergency Room at [facility], but the patient did not arrive at [facility] to be seen and admitted until 2021, so the patient was seen and evaluated by me on 2021. PRIMARY CARE PHYSICIAN:  Desmond Levy MD 
 
SOURCE OF INFORMATION:  The patient and review of ED and old medical records. CHIEF COMPLAINT:  Abdominal pain. HISTORY OF PRESENT ILLNESS:  This is a 75-year-old woman with a past medical history significant for metastatic cancer of the appendix, who was in her usual state of health until yesterday when the patient developed abdominal pain. The pain is located at the center of the abdomen, sharp pain, constant with no radiation, 9/10 in severity, associated with nausea, but no vomiting. The patient recently had colonic stent placed at the [facility]. The patient was taken to [facility] Emergency Room at [facility] for further evaluation of the abdominal pain. When the patient arrived at the emergency room, CT scan of the abdomen and pelvis was performed. The CT scan shows evidence of small bowel obstruction. The emergency room physician consulted the general surgeon on-call, admission to the hospitalist service was advised. The patient denies fever, rigors, or chills. She was last admitted to this hospital from 2020 to 12/10/2020. The patient was admitted and treated for sepsis, secondary to E. coli bacteremia. PAST MEDICAL HISTORY:  Metastatic cancer of the appendix. ALLERGIES:  NO KNOWN DRUG ALLERGIES. MEDICATIONS: 
1. Tylenol 650 mg every 4 hours as needed for pain. 2.  Dulcolax 5 mg daily. 3.  Rocephin 2 g every 24 hours. 4.  Decadron, dosage as directed. 5. Lomotil 2 tablets 4 times daily as needed for diarrhea. 6.  Levsin 0.125 mg every 4 hours as needed. 7.  Ativan 0.5 mg twice daily as needed for anxiety. 8.  Megace 400 mg twice daily. 9.  Zofran 4 mg every 8 hours as needed for nausea. 10.  Oxycodone IR 10 mg every 4 hours as needed for pain. 11.  Potassium chloride 10 mEq daily. 12.  Compazine 5 mg every 6 hours as needed for nausea and vomiting. FAMILY HISTORY:  This was reviewed. Her mother had heart disease and Crohn's disease. Her father had diabetes and bladder cancer. PAST SURGICAL HISTORY:  This is significant for appendectomy, hysterectomy, recent placement of colonic stent at [facility]. SOCIAL HISTORY:  No history of alcohol or tobacco abuse. REVIEW OF SYSTEMS: 
HEAD, EYES, EARS, NOSE, AND THROAT:  No headache, no dizziness, no blurring of vision, no photophobia. RESPIRATORY SYSTEM:  No cough, no shortness of breath, no hemoptysis. CARDIOVASCULAR SYSTEM:  No chest pain, no orthopnea, no palpitation. GASTROINTESTINAL SYSTEM:  This is positive for abdominal pain, nausea and vomiting. No diarrhea. No constipation. GENITOURINARY SYSTEM:  No dysuria, no urgency, and no frequency. All other systems are reviewed and they are negative. PHYSICAL EXAMINATION: 
GENERAL APPEARANCE:  The patient appeared ill, in moderate distress. VITAL SIGNS:  On arrival at the emergency room, temperature 98.8, pulse 112, respiratory rate 16, blood pressure 115/92, oxygen saturation 97% on room air. HEENT:  Head:  Normocephalic, atraumatic. Eyes:  Normal eye movement. No redness, no drainage, no discharge. Ears:  Normal external ears with no evidence of drainage. Nose:  No deformity and no drainage. Mouth and Throat:  No visible oral lesion. Dry oral mucosa. NECK:  Neck is supple. No JVD, no thyromegaly. CHEST:  Clear breath sounds. No wheezing, no crackles. HEART:  Normal S1 and S2, regular. No clinically appreciable murmur. ABDOMEN:  Distended, diffuse tenderness. No rebound tenderness. Reduced bowel sounds. CNS:  Alert and oriented x3. No gross focal neurological deficit. EXTREMITIES:  No edema. Pulses 2+ bilaterally. MUSCULOSKELETAL SYSTEM:  No evidence of joint deformity or swelling. SKIN:  No active skin lesions seen in the exposed part of the body. PSYCHIATRY:  Normal mood and affect. LYMPHATIC SYSTEM:  No cervical lymphadenopathy. DIAGNOSTIC DATA:  CT scan of the abdomen and pelvis with contrast shows small bowel obstruction likely related to adhesion formation in the mid abdomen with decompressed loops in the right lower quadrant. LABORATORY DATA:  Hematology:  WBC 9.7, hemoglobin at 13.0, hematocrit 38.3, platelets 663. Magnesium 2.0. Lipase level 34. Chemistry:  Sodium 126, potassium 3.4, chloride 87, CO2 27, glucose 126, BUN 12, creatinine 0.56, calcium 8.9, total bilirubin 0.7, ALT 13, AST 18, alkaline phosphatase 139, total protein at 7.1, albumin level 2.4, globulin at 4.7. Urinalysis: This is significant for negative nitrite, negative leukocyte esterase, negative bacteria. ASSESSMENT: 
1.  Small bowel obstruction. 2.  Hyponatremia. 3.  Metastatic cancer of the appendix. 4.  Hyperglycemia. PLAN: 
1. Small bowel obstruction. We will admit the patient for further evaluation and treatment. We will carry out pain control, fluid therapy. The patient will be n.p.o. We will await further recommendation from the general surgeon, consulted by the emergency room physician. 2.  Hyponatremia. This may be due to volume depletion. It could also be related to the patient's metastatic cancer. We will carry out fluid therapy and monitor the patient's sodium level. 3.  Metastatic cancer of the appendix. The patient's oncologist will be consulted for continuation of care during hospitalization and for further recommendation. 4.  Hyperglycemia. The patient has no history of diabetes. We will check hemoglobin A1c level. 5.  Other Issues:  Code Status: The patient is a full code. We will place the patient on heparin for DVT prophylaxis. FUNCTIONAL STATUS PRIOR TO ADMISSION:  The patient came from home. The patient is ambulatory with no assistant or device. COVID PRECAUTION:  The patient was wearing a face mask. I was wearing a face mask and gloves for this patient's encounter. Ashlyn Ramey MD 
 
 
[id number] 
D:  01/06/2021 6:54 
T:  01/06/2021 8:21 
JOB #:  [id number] CC:  Michael Kumar MD

## 2021-01-06 NOTE — ANESTHESIA PREPROCEDURE EVALUATION
Relevant Problems  
HEMATOLOGY  
(+) Splenic abscess  
(+) Splenic infarction  
  
PERSONAL HX & FAMILY HX OF CANCER  
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  
(+) Carcinoma of appendix (HCC)  
(+) Carcinomatosis (HCC)  
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  
(+) Mucinous adenocarcinoma of appendix (HCC)  
 
 
Anesthetic History  
No history of anesthetic complications 
  
 
 
  
Review of Systems / Medical History 
Patient summary reviewed, nursing notes reviewed and pertinent labs reviewed 
 
Pulmonary 
Within defined limits 
 
 
 
 
 
 
 
 Neuro/Psych  
Within defined limits 
 
 
 
 
 Cardiovascular 
Within defined limits 
 
 
 
 
 
 
 
Exercise tolerance: <4 METS 
  
GI/Hepatic/Renal 
  
 
 
 
 
 
Comments: Metastatic appendiceal CA with SBO Endo/Other 
 
 
 
Blood dyscrasia 
 
 Other Findings  
  
 
 
 
 
Physical Exam 
 
Airway 
Mallampati: II 
TM Distance: > 6 cm 
Neck ROM: normal range of motion  
Mouth opening: Normal 
 
 Cardiovascular 
Regular rate and rhythm,  S1 and S2 normal,  no murmur, click, rub, or gallop 
 
 
 
 
 
 Dental 
No notable dental hx 
 
  
Pulmonary 
Breath sounds clear to auscultation 
 
 
 
 
 
 
 Abdominal 
GI exam deferred 
 
 
 Other Findings  
  
 
 
 
Anesthetic Plan 
 
ASA: 3 
Anesthesia type: general 
 
 
 
 
Induction: Intravenous 
Anesthetic plan and risks discussed with: Patient 
 
 
RSI and NGT

## 2021-01-06 NOTE — ED NOTES
Addendum: 
 
11:21 PM 
Change of shift. Care of patient taken over from [facility]; H&P reviewed, bedside handoff complete. Awaiting imaging. VITAL SIGNS: 
Patient Vitals for the past 4 hrs: 
 Temp Pulse Resp BP SpO2  
01/05/21 2115  (!) 103 16 105/88 97 % 01/05/21 2103 98.8 °F (37.1 °C) (!) 112 16 (!) 115/92  LABS: 
Recent Results (from the past 6 hour(s)) CBC WITH AUTOMATED DIFF Collection Time: 01/05/21  9:16 PM  
Result Value Ref Range WBC 9.7 3.6 - 11.0 K/uL  
 RBC 4.07 3.80 - 5.20 M/uL  
 HGB 13.0 11.5 - 16.0 g/dL HCT 38.3 35.0 - 47.0 % MCV 94.1 80.0 - 99.0 FL  
 MCH 31.9 26.0 - 34.0 PG  
 MCHC 33.9 30.0 - 36.5 g/dL  
 RDW 14.3 11.5 - 14.5 % PLATELET 162 113 - 824 K/uL MPV 8.9 8.9 - 12.9 FL  
 NRBC 0.0 0  WBC ABSOLUTE NRBC 0.00 0.00 - 0.01 K/uL NEUTROPHILS 69 32 - 75 % LYMPHOCYTES 15 12 - 49 % MONOCYTES 14 (H) 5 - 13 % EOSINOPHILS 0 0 - 7 % BASOPHILS 1 0 - 1 % IMMATURE GRANULOCYTES 1 (H) 0.0 - 0.5 % ABS. NEUTROPHILS 6.8 1.8 - 8.0 K/UL  
 ABS. LYMPHOCYTES 1.4 0.8 - 3.5 K/UL  
 ABS. MONOCYTES 1.3 (H) 0.0 - 1.0 K/UL  
 ABS. EOSINOPHILS 0.0 0.0 - 0.4 K/UL  
 ABS. BASOPHILS 0.1 0.0 - 0.1 K/UL  
 ABS. IMM. GRANS. 0.1 (H) 0.00 - 0.04 K/UL  
 DF AUTOMATED METABOLIC PANEL, COMPREHENSIVE Collection Time: 01/05/21  9:16 PM  
Result Value Ref Range Sodium 126 (L) 136 - 145 mmol/L Potassium 3.4 (L) 3.5 - 5.1 mmol/L Chloride 87 (L) 97 - 108 mmol/L  
 CO2 27 21 - 32 mmol/L Anion gap 12 5 - 15 mmol/L Glucose 126 (H) 65 - 100 mg/dL BUN 12 6 - 20 MG/DL Creatinine 0.56 0.55 - 1.02 MG/DL  
 BUN/Creatinine ratio 21 (H) 12 - 20 GFR est AA >60 >60 ml/min/1.73m2 GFR est non-AA >60 >60 ml/min/1.73m2 Calcium 8.9 8.5 - 10.1 MG/DL Bilirubin, total 0.7 0.2 - 1.0 MG/DL  
 ALT (SGPT) 13 12 - 78 U/L  
 AST (SGOT) 18 15 - 37 U/L Alk. phosphatase 139 (H) 45 - 117 U/L Protein, total 7.1 6.4 - 8.2 g/dL Albumin 2.4 (L) 3.5 - 5.0 g/dL Globulin 4.7 (H) 2.0 - 4.0 g/dL A-G Ratio 0.5 (L) 1.1 - 2.2 LIPASE Collection Time: 01/05/21  9:16 PM  
Result Value Ref Range Lipase 34 (L) 73 - 393 U/L MAGNESIUM Collection Time: 01/05/21  9:16 PM  
Result Value Ref Range Magnesium 2.0 1.6 - 2.4 mg/dL URINALYSIS W/MICROSCOPIC Collection Time: 01/05/21 10:42 PM  
Result Value Ref Range Color YELLOW Appearance CLEAR CLEAR Specific gravity 1.010 1.003 - 1.030    
 pH (UA) 5.5 5.0 - 8.0 Protein Negative NEG mg/dL Glucose Negative NEG mg/dL Ketone TRACE (A) NEG mg/dL Bilirubin Negative NEG Blood SMALL (A) NEG Urobilinogen 0.2 0.2 - 1.0 EU/dL Nitrites Negative NEG Leukocyte Esterase Negative NEG    
 WBC 0-4 0 - 4 /hpf  
 RBC 10-20 0 - 5 /hpf Epithelial cells FEW FEW /lpf Bacteria Negative NEG /hpf Mucus TRACE (A) NEG /lpf Hyaline cast 0-2 0 - 5 /lpf URINE CULTURE HOLD SAMPLE Collection Time: 01/05/21 10:42 PM  
 Specimen: Serum; Urine Result Value Ref Range Urine culture hold Urine on hold in Microbiology dept for 2 days. If unpreserved urine is submitted, it cannot be used for addtional testing after 24 hours, recollection will be required. IMAGING: 
CT ABD PELV W CONT Final Result IMPRESSION:  
Small bowel obstruction likely related to adhesion formation in the mid abdomen,  
with decompressed loops in the right lower quadrant. Diffuse colonic distention  
and moderate fecal stasis. Interval placement of a sigmoid colon stent. Increased free fluid somewhat loculated in appearance in the right upper  
quadrant. Likely peritoneal disease unchanged in the deep pelvis. Mild bilateral  
hydronephrosis right greater than left. Cholelithiasis. Medications During Visit: 
Medications  
prochlorperazine (COMPAZINE) with saline injection 10 mg (10 mg IntraVENous Given 1/5/21 2126) sodium chloride 0.9 % bolus infusion 1,000 mL (0 mL IntraVENous IV Completed 1/5/21 2212)  
0.9% sodium chloride infusion 50 mL (0 mL IntraVENous IV Completed 1/5/21 2248) iopamidoL (ISOVUE-370) 76 % injection 100 mL (100 mL IntraVENous Given 1/5/21 2225)  
0.9% sodium chloride injection 10 mL (10 mL IntraVENous Given 1/5/21 2226) DECISION MAKING: 
Niranjan Hernandez is a 44 y.o. female who comes in as above. Labs and imaging as above. I have spoken with general surgery as well as the patients oncologist at [address] in [place] about placement of this patient. At this time, patient to be admitted to [facility]. No need to transfer. No vomiting currently and patient feels better. Patient agrees to plan. No interventions needed under my care. IMPRESSION: 
1. Small bowel obstruction ([address] 2. Hyponatremia 3. Metastatic malignant neoplasm, unspecified site ([address] DISPOSITION: 
Admitted Perfect Serve Consult for Admission 11:22 PM 
 
ED Room Number: SER07/07 Patient Name and age:  Niranjan Hernandez 44 y.o.  female Working Diagnosis: 1. Small bowel obstruction ([address] 2. Hyponatremia 3. Metastatic malignant neoplasm, unspecified site ([address] COVID-19 Suspicion:  no 
Sepsis present:  no  Reassessment needed: no 
Code Status:  Full Code Readmission: no 
Isolation Requirements:  no 
Recommended Level of Care:  med/surg Department:[place] [facility] - [phone] Other:  SBO. Met Ca with recent stent at [address]. I have spoken with [facility] - no need to transfer. I spoke with Gen surg- not a surgical candidate. Hospitalist admission and gen surg can follow as needed> No vomiting here. Doing better with anti emetics.

## 2021-01-06 NOTE — ROUTINE PROCESS
Bedside shift change report given to Bereket Dunbar (oncoming nurse) by Herlinda Lu (offgoing nurse). Report included the following information SBAR.

## 2021-01-06 NOTE — ED NOTES
TRANSFER - OUT REPORT: 
 
Verbal report given to [id number] Prince Frey RN(name) on Edmundo Peng  being transferred to 3(unit) for routine progression of care Report consisted of patients Situation, Background, Assessment and  
Recommendations(SBAR). Information from the following report(s) SBAR, ED Summary, [facility] and Recent Results was reviewed with the receiving nurse. Lines:  
Peripheral IV 01/05/21 Left; Anterior Antecubital (Active) Site Assessment Clean, dry, & intact 01/05/21 2119 Phlebitis Assessment 0 01/05/21 2119 Infiltration Assessment 0 01/05/21 2119 Dressing Status Clean, dry, & intact 01/05/21 2119 Dressing Type Transparent 01/05/21 2119 Hub Color/Line Status Pink;Capped;Flushed;Patent 01/05/21 2119 Action Taken Blood drawn 01/05/21 2119 Opportunity for questions and clarification was provided.

## 2021-01-06 NOTE — ED NOTES
Patient ambulatory with steady gait to , returned to [facility]. Spouse remains at bedside. Call bell in reach. Awaiting bed assignment.

## 2021-01-06 NOTE — PROCEDURES
[address]  [address], [address]  [phone]                              Flexible Sigmoidoscopy  Procedure Note      Indications:    Colon obstruction, small bowel obstruction     :  Jordan Polo MD    Surgical Assistant: Endoscopy Technician-1: Jeannie Snyder  Endoscopy RN-1: Dee Dee Landry RN    Implants: None    Referring Provider: Eileen Haddad MD    Sedation:  MAC anesthesia    Procedure Details:  After informed consent was obtained with all risks and benefits of procedure explained and preoperative exam completed, the patient was taken to the endoscopy suite and placed in the left lateral decubitus position. Upon sequential sedation as per above, a digital rectal exam was performed  And was normal.  The Olympus videocolonoscope  was inserted in the rectum and carefully advanced to the sigmoid colon. The quality of preparation was inadequate. The colonoscope was slowly withdrawn with careful evaluation between folds. Retroflexion in the rectum was performed and was normal..     Findings:   Rectum: Stool present. Distal end of the stent was noted in the rectosigmoid. Stent was entered from the distal end with the scope. Sigmoid: The middle portion of the stent was infolded on one side and barely embedded in the tissue on the other side. Tight narrowing as well as angulation was noted. This precluded advancement of the scope into the proximal portion of the stent. I attempted to advance the guidewire through the stent but due to the sharp angulation, the guidewire kept exiting out of the stent sideholes. Hence, another stent was not placed within the previous stent. Procedure was aborted  Descending Colon: not intubated    Interventions:  Stent was entered from the distal end with the scope. The middle portion of the stent was infolded on one side and barely embedded in the tissue on the other side.   Tight narrowing as well as angulation was noted. This precluded advancement of the scope into the proximal portion of the stent. I attempted to advance the guidewire through the stent under fluoroscopic guidance as well as direct visualization, but due to the sharp angulation, the guidewire kept exiting out of the stent sideholes. Hence, another stent was not placed within the previous stent. Procedure was aborted    Specimen Removed:  * No specimens in log *    Complications: None. EBL:  None. Recommendations:   -Keep NPO and continue NG aspiration.     -Resume normal medication(s).    -Surgery follow up  Discharge Disposition: Continue care in hospital per primary team.    Marielena Sadler MD  1/6/2021  4:53 PM

## 2021-01-06 NOTE — CONSULTS
[facility] 
Inpatient Consultation Admit Date: 1/5/2021 Reason for Consultation: SBO 
 
HPI: 
Jackie Roberts is a 44 y.o. female w/ hx of appendiceal cancer dating back to 2019 was admitted with abd pain, n/v whom we are asked to see in consultation by Dr. Fredna Cooks for the above complaint. Pt had a long, colonic stent placed at [facility] last week after having a SBO. She had been doing well until 2 days ago and started vomiting brown, thick material.   She is currently having diffuse pain in her abdomen and is nauseated; no BM or flatus. CT scan Small bowel obstruction likely related to adhesion formation in the mid abdomen, 
with decompressed loops in the right lower quadrant. Diffuse colonic distention 
and moderate fecal stasis. Interval placement of a sigmoid colon stent. Increased free fluid somewhat loculated in appearance in the right upper 
quadrant. Likely peritoneal disease unchanged in the deep pelvis. Mild bilateral 
hydronephrosis right greater than left. Cholelithiasis. Patient Active Problem List  
 Diagnosis Date Noted  SBO (small bowel obstruction) ([address] 01/05/2021  Severe protein-calorie malnutrition ([address] 12/07/2020  Abdominal pain 12/06/2020  Fever 12/06/2020  Poor appetite 11/18/2020  Weight loss 11/18/2020  Anxiety about health 11/18/2020  
 History of hematuria 11/18/2020  Chemotherapy-induced thrombocytopenia 06/17/2020  Chemotherapy-induced nausea 05/27/2020  Chemotherapy-induced neuropathy ([address] 05/27/2020  
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  
Diagnosis Date  Cancer ([address] APPENDIX CANCER   
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  
Procedure Laterality Date  HX APPENDECTOMY  09/2019  HX GI  09/2019 ILEOCECECTOMY  HX GYN  2003 CYST ON OVARY  HX HYSTERECTOMY  2019  IR THORACENTESIS CATH W IMAGE  11/6/2019  UT ABDOMEN SURGERY PROC UNLISTED Social History Tobacco Use  Smoking status: Never Smoker  Smokeless tobacco: Never Used Substance Use Topics  Alcohol use: Not Currently Family History Problem Relation Age of Onset  Breast Cancer Paternal Grandmother 56  Crohn's Disease Mother  Heart Disease Mother  Cancer Father BLADDER  
 Diabetes Father  No Known Problems Son  No Known Problems Daughter  Anesth Problems Neg Hx Prior to Admission medications Medication Sig Start Date End Date Taking? Authorizing Provider  
megestroL (MEGACE) 400 mg/10 mL (40 mg/mL) suspension TAKE 5 ML BY MOUTH TWO (2) TIMES A DAY. 12/16/20   Cecy Newell, Deepa Reed, DO  
oxyCODONE IR (ROXICODONE) 10 mg tab immediate release tablet Take 1 Tab by mouth every four (4) hours as needed for Pain for up to 30 days. Max Daily Amount: 60 mg.  Indications: pain 12/10/20 1/9/21  Gumaro Peraza MD  
 naloxone (NARCAN) 4 mg/actuation nasal spray Use 1 spray intranasally, then discard. Repeat with new spray every 2 min as needed for opioid overdose symptoms, alternating nostrils. 12/10/20   Reynold Kwan MD  
bisacodyL (DULCOLAX) 5 mg EC tablet Take 1 Tab by mouth daily. 12/11/20   Susanna Smith MD  
cefTRIAXone 2 gram 2 g IVPB 2 g by IntraVENous route every twenty-four (24) hours. 12/10/20   Susanna Smith MD  
potassium chloride (KLOR-CON) 10 mEq tablet Take 1 Tab by mouth two (2) times a day. 11/23/20   Adithya Wheeler NP  
LORazepam (ATIVAN) 0.5 mg tablet Take 1-2 Tabs by mouth two (2) times daily as needed for Anxiety. Max Daily Amount: 2 mg. Indications: anxious, nausea and vomiting caused by cancer drugs, difficulty sleeping 11/20/20   Adithya Wheeler NP  
dexAMETHasone (DECADRON) 4 mg tablet Take 2 tabs (8mg) by mouth daily on days 2 and 3 after chemotherapy 11/20/20   Adithya Wheeler NP  
diphenoxylate-atropine (LomotiL) 2.5-0.025 mg per tablet Take 2 Tabs by mouth four (4) times daily as needed for Diarrhea. Max Daily Amount: 8 Tabs. Indications: diarrhea caused by chemotherapy 9/18/20   Adithya Wheeler NP  
simethicone (Gas-X) 125 mg capsule Take 125 mg by mouth four (4) times daily as needed for Flatulence. Provider, Historical  
hyoscyamine SL (LEVSIN/SL) 0.125 mg SL tablet 0.125 mg by SubLINGual route every four (4) hours as needed for Cramping. Provider, Historical  
prochlorperazine (Compazine) 5 mg tablet Take 1 tab by mouth every 6 hours as needed for nausea or vomiting 5/21/20   Adithya Wheeler NP  
lidocaine-prilocaine (EMLA) topical cream Apply  to affected area as needed for Pain. 11/14/19   Adithya Wheeler NP  
ondansetron (ZOFRAN ODT) 4 mg disintegrating tablet Take 1 Tab by mouth every eight (8) hours as needed for Nausea. Patient taking differently: Take 8 mg by mouth every eight (8) hours as needed for Nausea. Indications: prevent nausea and vomiting from cancer chemotherapy 19   Gaby Dawn NP  
acetaminophen (TYLENOL) 325 mg tablet Take 650 mg by mouth every four (4) hours as needed for Pain. Indications: pain    Provider, Historical  
 
Current Facility-Administered Medications Medication Dose Route Frequency  sodium chloride (NS) flush 5-40 mL  5-40 mL IntraVENous Q8H  
 sodium chloride (NS) flush 5-40 mL  5-40 mL IntraVENous PRN  
 acetaminophen (TYLENOL) tablet 650 mg  650 mg Oral Q6H PRN Or  
 acetaminophen (TYLENOL) suppository 650 mg  650 mg Rectal Q6H PRN  promethazine (PHENERGAN) tablet 12.5 mg  12.5 mg Oral Q6H PRN Or  
 ondansetron (ZOFRAN) injection 4 mg  4 mg IntraVENous Q6H PRN  
 heparin (porcine) injection 5,000 Units  5,000 Units SubCUTAneous Q8H  
 lactated Ringers infusion  125 mL/hr IntraVENous CONTINUOUS  
 pantoprazole (PROTONIX) 40 mg in 0.9% sodium chloride 10 mL injection  40 mg IntraVENous DAILY  morphine injection 2 mg  2 mg IntraVENous Q4H PRN No Known Allergies Subjective:  
 
Review of Systems: A comprehensive review of systems was negative except for that written in the History of Present Illness. Objective:  
 
Blood pressure 106/76, pulse (!) 115, temperature 98 °F (36.7 °C), resp. rate 16, weight 40.7 kg (89 lb 11.6 oz), SpO2 95 %. Temp (24hrs), Av.3 °F (36.8 °C), Min:98 °F (36.7 °C), Max:98.8 °F (37.1 °C) Recent Labs 21 
9255 21 WBC 10.5 9.7 HGB 12.5 13.0 HCT 36.0 38.3  357 Recent Labs 21 
0115 21 * 126*  
K 3.2* 3.4*  
CL 91* 87* CO2 25 27 GLU 97 126* BUN 12 12 CREA 0.32* 0.56  
CA 8.5 8.9 MG 2.3 2.0 PHOS 2.9  --   
ALB 2.4* 2.4* TBILI 0.7 0.7 ALT 16 13 Recent Labs 21 
2926 21 
2116 LPSE 32* 34* Intake/Output Summary (Last 24 hours) at 1/6/2021 1031 Last data filed at 1/5/2021 2212 Gross per 24 hour Intake 1000 ml Output  Net 1000 ml  
 
 
_____________________ Physical Exam:  
 
General:  Alert, cooperative, no distress, frail Eyes:   Sclera clear. Throat: Lips, mucosa, and tongue normal.  
Neck: Supple, symmetrical, trachea midline. Lungs:   Clear to auscultation bilaterally. Has a port - L anterior chest  
Heart:  Regular rate and rhythm. Abdomen:   Mild distention, few BS; diffuse tenderness throughout Extremities: Extremities normal, atraumatic, no cyanosis or edema. Skin: Skin color, texture, turgor normal. No rashes or lesions. Assessment:  
Principal Problem: 
  SBO (small bowel obstruction) ([address] (1/5/2021) Plan:  
 
Consult GI - [facility] who has seen her in the past - Dr Carissa Chang No surgical intervention warranted at this time Bowel rest, serial KUBs, hydration, pain mgmt per medicine Thank you for allowing us to participate in the care of this patient. Total time spent with patient: 30 minutes. Signed By: Goran Perez NP   
 January 6, 2021 Patient seen and examined She never really got better after having the stent placed approximately 8 days ago done at [facility]. She has been having ongoing nausea and vomiting now feculent. CT scan was reviewed and compared to previous CT scan. On the last CT scan she apparently had mild small bowel dilatation due to something in the distal ileum. This appears to be getting worse. This is in addition to the obstruction in her sigmoid colon that was initially treated with colonic stent however the colonic stent appears to be old on itself not allowing any stool to come through. GI has seen patient and is planning to see if they can open the stent which may give her some relief. I have recommended to them to have an NG tube placed to help alleviate the symptoms of nausea and vomiting. Oncology to see patient. I think they need to have a discussion with regards to treatment goals at this point. It is unclear whether or not there is anything surgically that can be done to help alleviate this obstruction. May need to have a palliative PEG tube placed in that case.

## 2021-01-06 NOTE — PROGRESS NOTES
TRANSFER - OUT REPORT: 
 
Verbal report given to Otilia(name) on Sharla Ferrera  being transferred to (unit) for routine progression of care Report consisted of patients Situation, Background, Assessment and  
Recommendations(SBAR). Information from the following report(s) SBAR, Procedure Summary, Intake/Output and MAR was reviewed with the receiving nurse. Lines:  
Venous Access Device Port Upper chest (subclavicular area), left (Active) Central Line Being Utilized Yes 01/06/21 1520 Criteria for Appropriate Use Limited/no vessel suitable for conventional peripheral access 01/06/21 1520 Site Assessment Clean, dry, & intact 01/06/21 1520 Date of Last Dressing Change 01/06/20 01/06/21 1520 Dressing Status Clean, dry, & intact 01/06/21 1520 Dressing Type Disk with Chlorhexadine gluconate (CHG); Transparent 01/06/21 1520 Action Taken Open ports on tubing capped 01/06/21 1520 Date Accessed (Medial Site) 01/06/21 01/06/21 1320 Access Time (Medial Site([id number]) [id number] 01/06/21 1244 Access Needle Size (Site #1) 20 G 01/06/21 1244 Access Needle Length (Medial Site) 0.75 inches 01/06/21 1244 Positive Blood Return (Medial Site) Yes 01/06/21 1244 Alcohol Cap Used Yes 01/06/21 1520 Peripheral IV 01/05/21 Left; Anterior Antecubital (Active) Site Assessment Clean, dry, & intact 01/06/21 1520 Phlebitis Assessment 0 01/06/21 1520 Infiltration Assessment 0 01/06/21 1520 Dressing Status Clean, dry, & intact 01/06/21 1520 Dressing Type Transparent 01/06/21 1520 Hub Color/Line Status Pink;Capped 01/06/21 1520 Action Taken Open ports on tubing capped 01/06/21 1520 Alcohol Cap Used Yes 01/06/21 1520

## 2021-01-06 NOTE — CONSULTS
[facility]. 
[address] [place], [address] 
[phone] GI CONSULTATION NOTE 
 
 
NAME:  Kai Vance :   1970 MRN:   [id number] Referring Physician: Dr. Ronnie St Consult Date: 2021 Chief Complaint: Bowel obstruction History of Present Illness:  Patient is a 44 y.o. who is seen in consultation at the request of Gabriela Rosenthal for bowel obstruction. Patient admitted 2021 for complaint of nausea, vomiting, and abdominal pain. Patient reports having colonic stent placed at [facility] on 2020 for colonic obstruction. Patient with past medical history of appendiceal cancer and carcinomatosis. Patient underwent exploratory lap with ileocecal resection with anastomosis along with total abdominal hysterectomy in 2019. Patient is followed by Dr. Phil Wheat for oncology and also followed by [facility] with Dr. Akua Dumont. Patient reports receiving chemotherapy- last received in 2020. Patient hospitalized in early 2020 for sepsis and received antibiotic therapy- during this time CT scan performed with findings of peritoneal carcinomatosis. Patient reports since having colonic stent placed she has had intermittent nausea, vomiting, abdominal pain, and loss of appetite. Patient  reports approximate 9-10 pound weight loss since procedure. Stated with emesis- thick mucous like output that would be brown in colon. Stated hadn't had bowel movement in weeks except small smear with presence of bright red blood occasionally. Patient reports taking Miralax and Colace daily. Stated today was first time she had bowel movement that was more formed-no presence of blood. Denies chills, no chest pain, no shortness of breath. Stated had low grade fever once in the last week. Denies melena Notes reviewed from [facility] for placement of colonic stent on 2020. Placement with uncovered stent for stenosis found in sigmoid colon. Also with dilatation with colonic balloon dilator performed for the narrow are of the stent as well. PMH: 
Past Medical History:  
Diagnosis Date  Cancer ([address] APPENDIX CANCER   
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 
Past Surgical History:  
Procedure Laterality Date  HX APPENDECTOMY  2019  HX GI  2019 ILEOCECECTOMY  HX GYN  2003 CYST ON OVARY  HX HYSTERECTOMY    IR THORACENTESIS CATH W IMAGE  2019  [facility] ABDOMEN SURGERY PROC UNLISTED Allergies: 
No Known Allergies Home Medications: 
Prior to Admission Medications Prescriptions Last Dose Informant Patient Reported? Taking? LORazepam (ATIVAN) 0.5 mg tablet   No No  
Sig: Take 1-2 Tabs by mouth two (2) times daily as needed for Anxiety. Max Daily Amount: 2 mg. Indications: anxious, nausea and vomiting caused by cancer drugs, difficulty sleeping  
acetaminophen (TYLENOL) 325 mg tablet   Yes No  
Sig: Take 650 mg by mouth every four (4) hours as needed for Pain. Indications: pain  
bisacodyL (DULCOLAX) 5 mg EC tablet   No No  
Sig: Take 1 Tab by mouth daily. cefTRIAXone 2 gram 2 g IVPB   No No  
Si g by IntraVENous route every twenty-four (24) hours. dexAMETHasone (DECADRON) 4 mg tablet   No No  
Sig: Take 2 tabs (8mg) by mouth daily on days 2 and 3 after chemotherapy diphenoxylate-atropine (LomotiL) 2.5-0.025 mg per tablet   No No  
Sig: Take 2 Tabs by mouth four (4) times daily as needed for Diarrhea. Max Daily Amount: 8 Tabs.  Indications: diarrhea caused by chemotherapy  
hyoscyamine SL (LEVSIN/SL) 0.125 mg SL tablet   Yes No  
 Si.125 mg by SubLINGual route every four (4) hours as needed for Cramping.  
lidocaine-prilocaine (EMLA) topical cream   No No  
Sig: Apply  to affected area as needed for Pain.  
megestroL (MEGACE) 400 mg/10 mL (40 mg/mL) suspension   No No  
Sig: TAKE 5 ML BY MOUTH TWO (2) TIMES A DAY. naloxone (NARCAN) 4 mg/actuation nasal spray   No No  
Sig: Use 1 spray intranasally, then discard. Repeat with new spray every 2 min as needed for opioid overdose symptoms, alternating nostrils. ondansetron (ZOFRAN ODT) 4 mg disintegrating tablet   No No  
Sig: Take 1 Tab by mouth every eight (8) hours as needed for Nausea. Patient taking differently: Take 8 mg by mouth every eight (8) hours as needed for Nausea. Indications: prevent nausea and vomiting from cancer chemotherapy  
oxyCODONE IR (ROXICODONE) 10 mg tab immediate release tablet   No No  
Sig: Take 1 Tab by mouth every four (4) hours as needed for Pain for up to 30 days. Max Daily Amount: 60 mg. Indications: pain  
potassium chloride (KLOR-CON) 10 mEq tablet   No No  
Sig: Take 1 Tab by mouth two (2) times a day. prochlorperazine (Compazine) 5 mg tablet   No No  
Sig: Take 1 tab by mouth every 6 hours as needed for nausea or vomiting  
simethicone (Gas-X) 125 mg capsule   Yes No  
Sig: Take 125 mg by mouth four (4) times daily as needed for Flatulence. Facility-Administered Medications: None Hospital Medications: 
Current Facility-Administered Medications Medication Dose Route Frequency  sodium chloride (NS) flush 5-40 mL  5-40 mL IntraVENous Q8H  
 sodium chloride (NS) flush 5-40 mL  5-40 mL IntraVENous PRN  
 acetaminophen (TYLENOL) tablet 650 mg  650 mg Oral Q6H PRN Or  
 acetaminophen (TYLENOL) suppository 650 mg  650 mg Rectal Q6H PRN  promethazine (PHENERGAN) tablet 12.5 mg  12.5 mg Oral Q6H PRN  Or  
 ondansetron (ZOFRAN) injection 4 mg  4 mg IntraVENous Q6H PRN  
  heparin (porcine) injection 5,000 Units  5,000 Units SubCUTAneous Q8H  
 lactated Ringers infusion  125 mL/hr IntraVENous CONTINUOUS  
 pantoprazole (PROTONIX) 40 mg in 0.9% sodium chloride 10 mL injection  40 mg IntraVENous DAILY  morphine injection 2 mg  2 mg IntraVENous Q4H PRN  
 morphine injection 4 mg  4 mg IntraVENous Q4H PRN  
 heparin (porcine) pf 500 Units  500 Units InterCATHeter PRN Social History: 
Social History Tobacco Use  Smoking status: Never Smoker  Smokeless tobacco: Never Used Substance Use Topics  Alcohol use: Not Currently Family History: 
Family History Problem Relation Age of Onset  Breast Cancer Paternal Grandmother 56  Crohn's Disease Mother  Heart Disease Mother  Cancer Father BLADDER  
 Diabetes Father  No Known Problems Son  No Known Problems Daughter  Anesth Problems Neg Hx Review of Systems: 
 
Constitutional: negative fever, negative chills, + weight loss Eyes:   negative visual changes ENT:   negative sore throat, tongue or lip swelling Respiratory:  negative cough, negative dyspnea Cards:  negative for chest pain, palpitations, lower extremity edema GI:   See HPI 
:  negative for frequency, dysuria Integument:  negative for rash and pruritus Heme:  negative for easy bruising and gum/nose bleeding Musculoskel: negative for myalgias,  back pain and muscle weakness Neuro: negative for headaches, dizziness, vertigo Psych:  negative for feelings of anxiety, depression Objective:  
 
Patient Vitals for the past 8 hrs: 
 BP Temp Pulse Resp SpO2  
01/06/21 0810 106/76 98 °F (36.7 °C) (!) 115 16 95 % No intake/output data recorded. 01/04 1901 - 01/06 0700 In: 1000 [I.V.:1000] Out: - PHYSICAL EXAM: 
General: WD, WN. Alert, cooperative, no acute distress, frail HEENT: NC, Atraumatic. PERRLA, EOMI. Anicteric sclerae. Lungs:  CTA Bilaterally.   Port-a-cath to left chest 
 Heart:  Regular  rhythm,  No murmur, Abdomen: Soft, mild distention, hypoactive bowel sounds, tenderness with deep palpation to lower abdomen, no palpable masses Extremities: No edema Neurologic:  CN 2-12 gi, Alert and oriented X 3. No acute neurological distress Psych:   Good insight. Not anxious nor agitated. Data Review Recent Labs 01/06/21 
8652 01/05/21 
2116 WBC 10.5 9.7 HGB 12.5 13.0 HCT 36.0 38.3  357 Recent Labs 01/06/21 
6843 01/05/21 
2116 * 126*  
K 3.2* 3.4*  
CL 91* 87* CO2 25 27 BUN 12 12 CREA 0.32* 0.56 GLU 97 126* PHOS 2.9  --   
CA 8.5 8.9 Recent Labs 01/06/21 
3107 01/05/21 
2116 * 139* TP 7.0 7.1 ALB 2.4* 2.4*  
GLOB 4.6* 4.7* LPSE 32* 34* No results for input(s): INR, PTP, APTT, INREXT in the last 72 hours. Assessment:  
Patient Active Problem List  
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  
 Carcinomatosis (HCC) C80.0  Chemotherapy follow-up examination Z09  Port-A-Cath in place Z95.828  Thrombocytosis (HCC) D47.3  Subphrenic abscess ([address] K65.1  Generalized abdominal pain R10.84  
 Diarrhea R19.7  Insomnia G47.00  Chemotherapy induced diarrhea K52.1, T45.1X5A  History of pleural effusion Z87.09  
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  
 SBO (small bowel obstruction) ([address] K56.609 Plan: Abdominal pain/ Small bowel Obstruction/ Hx of appendiceal cancer/carcinomatosis: 
-CT scan abdomen/pelvis: Small bowel obstruction likely related to adhesion formation in the mid abdomen, with decompressed loops in the right lower quadrant. Diffuse colonic distention and moderate fecal stasis. Interval placement of a sigmoid colon stent. Increased free fluid somewhat loculated in appearance in the right upper 
quadrant. Likely peritoneal disease unchanged in the deep pelvis. Mild bilateral 
hydronephrosis right greater than left. Cholelithiasis -IV hydration 
-Pain management and anti-emetic as needed  
-Plans for flex sig today for possible placement stent within colonic stent  
-Surgery following 
-Oncology consulted Discussed with Dr. Lamont Bumpers I have personally reviewed the history and independently examined the patient. I have reviewed the chart and agree with the documentation recorded by the Mid Level Provider, including the assessment, treatment plan, and disposition. ASSESSMENT AND PLAN: 
Small bowel obstruction and possible obstruction at the level of the sigmoid colon. She is a poor surgical candidate. Would make an attempt to place another stent within the obstructed sigmoid colon stent today to facilitate good flow. It is not very clear at this time whether relieving the sigmoid colon obstruction would help with the proximal obstruction. Keep n.p.o., NG aspiration and IV fluids Thanks for the kind referral 
 
Radha Zhou MD

## 2021-01-06 NOTE — PROGRESS NOTES
Per Dr. Geeta Gilmore ok to give 2mg morphine dose now and once approved by pharmacy give additional 2mg dose morphine.    
 
 
1301-requested rapid covid test

## 2021-01-06 NOTE — PROGRESS NOTES
TRANSFER - IN REPORT: 
 
Verbal report received from [address] [place] [address] Jaylen RN(name) on Yvonne Dooley  being received from ENDO(unit) for routine progression of care Report consisted of patients Situation, Background, Assessment and  
Recommendations(SBAR). Information from the following report(s) SBAR, OR Summary, Procedure Summary, Intake/Output, MAR and Recent Results was reviewed with the receiving nurse. Opportunity for questions and clarification was provided. Assessment completed upon patients arrival to unit and care assumed.

## 2021-01-06 NOTE — ED NOTES
Verbal shift change report given to April, RN (oncoming nurse) by Reid Aviles RN (offgoing nurse). Report included the following information SBAR, ED Summary, MAR and Recent Results.

## 2021-01-06 NOTE — CONSULTS
Palliative Medicine Consult Benjy: [phone] Patient Name: Tawanna Spicer YOB: 1970 Date of Initial Consult: 1/6/21 Reason for Consult: Overwhelming sx Requesting Provider: Andrea Love Primary Care Physician: Edwina Temple MD 
 
 SUMMARY:  
Tawanna Spicer is a 44 y.o. 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 Recently had colonic stent placed at [facility], but has been having ongoing nausea and vomiting, now feculent. GI to see if they can open up stent, surgery recommending NGT - uncertain if surgical intervention can help. Can consider palliative venting PEG. Current medical issues leading to Palliative Medicine involvement include: overwhelming sx. Known to me from admission 12/2020 for bacteremia. At that time I increased Oxycodone to 10mg every 4h prn, and timi before meals. Gabapentin caused confusion. Filled Rx 12/10/20. 
 
  
Social:  to Kelsi. 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 They have a 25 yo and 17 yo. 
  
 PALLIATIVE DIAGNOSES:  
1. Abdominal pain- from cancer as well as SBO 
2. Nausea, emesis 3. Poor po intake, weight loss PLAN:  
1. Pt known to me from recent hospital stay, unfortunately has had acute events w/ SOB at [facility] and again now. 2. Pain: From neoplasm and SOB. 3. At home was taking Oxycodone 10mg every 4h prn pain. Thus approx 60mg daily= 20mg IV morphine. This was working for a while, but likely not absorbing w/ emesis and SBO. 4. Pain better w/ IV morphine 2mg dose, but does not last 4h and pain can get up to 8-9/10 where she is gripping the rails on the bed. 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 5. Start Morphine PCA, no basal, 1.5mg every 10 min demand. 6. Can cont IV pushes for pain not controlled by PCA. 7. Bowel regimen per GI and surgery. They are to place NGT and do colonoscopy today. About to get an enema. 8. Psychosocial support: Pt w/ advanced disease and high sx burden. Was supposed to see my colleague in clinic but for some reason appointment not made. 5. Was able to have some enjoyment over the holidays w/ her 2 children and  in a house in [place], [place].  
10. I will start conversations about AMD and care goals this admissions if pt feels better. 11. Initial consult note routed to primary continuity provider and/or primary health care team members 12. Communicated plan of care with: Palliative IDT, [address] Team incl Jurgen Mendosa and Kike Talamantes RN 
 
 GOALS OF CARE / TREATMENT PREFERENCES:  
 
GOALS OF CARE: 
Patient/Health Care Proxy Stated Goals: Prolong life TREATMENT PREFERENCES:  
Code Status: Full Code Advance Care Planning: 
[x] The [facility] Interdisciplinary Team has updated the ACP Navigator with [address] and Patient Capacity Primary Decision Maker: Omkar Zapata - [facility] - [phone] Advance Care Planning 12/7/2020 Patient's Healthcare Decision Maker is: Verbal statement (Legal Next of Kin remains as decision maker) Confirm Advance Directive None Patient Would Like to Complete Advance Directive No  
Does the patient have other document types - Medical Interventions: Full interventions Other: As far as possible, the palliative care team has discussed with patient / health care proxy about goals of care / treatment preferences for patient. HISTORY:  
 
History obtained from: Pt, chart, staff CHIEF COMPLAINT: Abdominal pain and nausea HPI/SUBJECTIVE: The patient is:  
[x] Verbal and participatory [] Non-participatory due to:  
 
Pt fatigued, awake and alert. Just received 2mg IV morphine, takes pain from 8-->5 but does not last. Neg flatus. Nauseated. Cannot keep po down. Clinical Pain Assessment (nonverbal scale for severity on nonverbal patients):  
Clinical Pain Assessment Severity: 5 Location: Abdomen Character: Aching, diffuse, cramping Duration: Worse over past couple days Effect: Harder to function, eat, sleep Factors: Better w/ medication Frequency: constant Duration: for how long has pt been experiencing pain (e.g., 2 days, 1 month, years) Frequency: how often pain is an issue (e.g., several times per day, once every few days, constant) FUNCTIONAL ASSESSMENT:  
 
Palliative Performance Scale (PPS): PPS: 70 PSYCHOSOCIAL/SPIRITUAL SCREENING:  
 
Palliative IDT has assessed this patient for cultural preferences / practices and a referral made as appropriate to needs (Cultural Services, Patient Advocacy, Ethics, etc.) Any spiritual / Zoroastrianism concerns: 
[] Yes /  [x] No 
 
Caregiver Burnout: 
[] Yes /  [x] No /  [] No Caregiver Present Anticipatory grief assessment:  
[x] Normal  / [] Maladaptive ESAS Anxiety: Anxiety: 0 
 
ESAS Depression: Depression: 0 REVIEW OF SYSTEMS:  
 
Positive and pertinent negative findings in ROS are noted above in HPI. The following systems were [x] reviewed / [] unable to be reviewed as noted in HPI Other findings are noted below. Systems: constitutional, ears/nose/mouth/throat, respiratory, gastrointestinal, genitourinary, musculoskeletal, integumentary, neurologic, psychiatric, endocrine. Positive findings noted below. Modified ESAS Completed by: provider Fatigue: 5 Drowsiness: 0 Depression: 0 Pain: 5 Anxiety: 0 Nausea: 8 Anorexia: 8 Dyspnea: 0 Constipation: Yes PHYSICAL EXAM:  
 
From RN flowsheet: 
Wt Readings from Last 3 Encounters:  
01/05/21 89 lb 11.6 oz (40.7 kg) 12/10/20 100 lb 5 oz (45.5 kg)  
11/18/20 102 lb (46.3 kg) Blood pressure 106/76, pulse (!) 115, temperature 98 °F (36.7 °C), resp. rate 16, weight 89 lb 11.6 oz (40.7 kg), SpO2 95 %. Pain Scale 1: Numeric (0 - 10) Pain Intensity 1: 6 Pain Onset 1: x4 days Pain Location 1: Abdomen Pain Orientation 1: Lower Pain Description 1: Aching Pain Intervention(s) 1: Medication (see MAR)(Per Dr. Mai Dural ok to give 2mg after previous 2mg dose) Last bowel movement, if known: days Constitutional: awake, alert, oriented, fatigued, no sedation or confusion Eyes: pupils equal, anicteric ENMT: no nasal discharge, moist mucous membranes Cardiovascular: regular rhythm Respiratory: breathing not labored, symmetric Gastrointestinal: soft non-tender, do not hear any bowel sounds Musculoskeletal: no deformity, no tenderness to palpation Skin: warm, dry Neurologic: following commands, moving all extremities Psychiatric: full affect, no hallucinations HISTORY:  
 
Principal Problem: 
  SBO (small bowel obstruction) ([address] (1/5/2021) Past Medical History:  
Diagnosis Date  Cancer ([address] APPENDIX CANCER   
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  
Procedure Laterality Date  HX APPENDECTOMY  09/2019  HX GI  09/2019 ILEOCECECTOMY  HX GYN  2003 CYST ON OVARY  HX HYSTERECTOMY  2019  IR THORACENTESIS CATH W IMAGE  11/6/2019  IN ABDOMEN SURGERY PROC UNLISTED Family History Problem Relation Age of Onset  Breast Cancer Paternal Grandmother 56  Crohn's Disease Mother  Heart Disease Mother  Cancer Father BLADDER  
 Diabetes Father  No Known Problems Son  No Known Problems Daughter  Anesth Problems Neg Hx History reviewed, no pertinent family history. Social History Tobacco Use  Smoking status: Never Smoker  Smokeless tobacco: Never Used Substance Use Topics  Alcohol use: Not Currently No Known Allergies Current Facility-Administered Medications Medication Dose Route Frequency  sodium chloride (NS) flush 5-40 mL  5-40 mL IntraVENous Q8H  
 sodium chloride (NS) flush 5-40 mL  5-40 mL IntraVENous PRN  
 acetaminophen (TYLENOL) tablet 650 mg  650 mg Oral Q6H PRN Or  
 acetaminophen (TYLENOL) suppository 650 mg  650 mg Rectal Q6H PRN  promethazine (PHENERGAN) tablet 12.5 mg  12.5 mg Oral Q6H PRN Or  
 ondansetron (ZOFRAN) injection 4 mg  4 mg IntraVENous Q6H PRN  
 heparin (porcine) injection 5,000 Units  5,000 Units SubCUTAneous Q8H  
 lactated Ringers infusion  125 mL/hr IntraVENous CONTINUOUS  
 pantoprazole (PROTONIX) 40 mg in 0.9% sodium chloride 10 mL injection  40 mg IntraVENous DAILY  morphine injection 2 mg  2 mg IntraVENous Q4H PRN  
 morphine injection 4 mg  4 mg IntraVENous Q4H PRN  
 heparin (porcine) pf 500 Units  500 Units InterCATHeter PRN  
 morphine  mg / 30 mL   IntraVENous CONTINUOUS  
 
 
 
 LAB AND IMAGING FINDINGS:  
 
Lab Results Component Value Date/Time WBC 10.5 01/06/2021 08:33 AM  
 HGB 12.5 01/06/2021 08:33 AM  
 PLATELET 212 46/40/6864 08:33 AM  
 
Lab Results Component Value Date/Time  Sodium 127 (L) 01/06/2021 08:33 AM  
 Potassium 3.2 (L) 01/06/2021 08:33 AM  
 Chloride 91 (L) 01/06/2021 08:33 AM  
 CO2 25 01/06/2021 08:33 AM  
 BUN 12 01/06/2021 08:33 AM  
 Creatinine 0.32 (L) 01/06/2021 08:33 AM  
 Calcium 8.5 01/06/2021 08:33 AM  
 Magnesium 2.3 01/06/2021 08:33 AM  
 Phosphorus 2.9 01/06/2021 08:33 AM  
  
Lab Results Component Value Date/Time Alk. phosphatase 145 (H) 01/06/2021 08:33 AM  
 Protein, total 7.0 01/06/2021 08:33 AM  
 Albumin 2.4 (L) 01/06/2021 08:33 AM  
 Globulin 4.6 (H) 01/06/2021 08:33 AM  
 
Lab Results Component Value Date/Time INR 1.0 10/09/2020 02:32 PM  
 Prothrombin time 10.7 10/09/2020 02:32 PM  
  
Lab Results Component Value Date/Time Iron 22 (L) 10/18/2019 05:49 AM  
 Ferritin 483 (H) 10/18/2019 05:49 AM  
  
No results found for: PH, PCO2, PO2 No components found for: [place] No results found for: CPK, CKMB Total time:  
Counseling / coordination time, spent as noted above:  
> 50% counseling / coordination?:  
 
Prolonged service was provided for  []30 min   []75 min in face to face time in the presence of the patient, spent as noted above. Time Start:  
Time End:  
Note: this can only be billed with 62135 (initial) or 88929 (follow up). If multiple start / stop times, list each separately.

## 2021-01-06 NOTE — PROGRESS NOTES
TRANSFER - OUT REPORT: 
 
Verbal report given to [facility], RN(name) on Sharla Ferrera  being transferred to Endoscopy(unit) for ordered procedure Report consisted of patients Situation, Background, Assessment and  
Recommendations(SBAR). Information from the following report(s) SBAR, ED Summary, Intake/Output, MAR and Recent Results was reviewed with the receiving nurse. Lines:  
Venous Access Device Port Upper chest (subclavicular area), left (Active) Central Line Being Utilized Yes 01/06/21 1320 Criteria for Appropriate Use Limited/no vessel suitable for conventional peripheral access 01/06/21 1320 Site Assessment Clean, dry, & intact 01/06/21 1320 Date of Last Dressing Change 01/06/21 01/06/21 1320 Dressing Status New 01/06/21 1244 Dressing Type Bacteriocidal 01/06/21 1244 Date Accessed (Medial Site) 01/06/21 01/06/21 1320 Access Time (Medial [phone] 01/06/21 1244 Access Needle Size (Site #1) 20 G 01/06/21 1244 Access Needle Length (Medial Site) 0.75 inches 01/06/21 1244 Positive Blood Return (Medial Site) Yes 01/06/21 1244 Alcohol Cap Used Yes 01/06/21 1244 Peripheral IV 01/05/21 Left; Anterior Antecubital (Active) Site Assessment Clean, dry, & intact 01/06/21 1320 Phlebitis Assessment 0 01/06/21 1320 Infiltration Assessment 0 01/06/21 1320 Dressing Status Clean, dry, & intact 01/06/21 1320 Dressing Type Transparent 01/06/21 1320 Hub Color/Line Status Pink;Capped 01/06/21 1320 Action Taken Open ports on tubing capped 01/06/21 1320 Alcohol Cap Used Yes 01/06/21 1320 Opportunity for questions and clarification was provided. Patient transported with: 
 bed

## 2021-01-06 NOTE — PROGRESS NOTES
[place]aked[place] 1970 
[id number] Situation: 
 
Scheduled Procedure: Procedure(s): ENDOSCOPY WITH PROSTHESIS OR STENT REMOVAL Verbal report received from: Ginny Thurman RN 
Preoperative diagnosis: Colon Obstruction 
vomitting CT bowel obstruction CA metastatic appendix. Background: 
 
Procedure: Procedure(s): ENDOSCOPY WITH PROSTHESIS OR STENT REMOVAL Physician performing procedure; Dr. Ruth Shaffer SBAR QUESTIONS FLOOR TO ENDO RN 
 
NPO Status/Last PO Intake: since midnight Pregnancy Test:Not applicable If yes, result: none Is the patient taking Blood Thinners: NO Is the patient diabetic:no Does the patient have a Pacemaker/Defibrillator in place?: no  
Does the patient need antibiotics before/during/after procedure: no If the patient is having a colon, How much prep was drank? One enema given per order by floor RN What were the Colon prep results? unknown Does the patient have SCD in place:no Is patient on CONTACT precautions:no Assessment: 
Are the vital signs stable prior to patient coming to ENDO?  no 
Is the patient alert/oriented and able to sign consent for the procedures:yes How does the patient's abdomen feel prior to coming to ENDO? Does the patient have a patient IV in place? yes Recommendation: 
Family or Friend present yes Permission to share finding with Family or Friend yes

## 2021-01-06 NOTE — ANESTHESIA POSTPROCEDURE EVALUATION
Post-Anesthesia Evaluation and Assessment Patient: Bruno Quinones MRN: [id number]  SSN: [id number] YOB: 1970  Age: 44 y.o. Sex: female I have evaluated the patient and they are stable and ready for discharge from the PACU. Cardiovascular Function/Vital Signs Visit Vitals /83 Pulse (!) 106 Temp 37.2 °C (99 °F) Resp 12 Wt 40.7 kg (89 lb 11.6 oz) SpO2 99% BMI 15.89 kg/m² Patient is status post General anesthesia for Procedure(s): SIGMOIDOSCOPY FLEXIBLE. Nausea/Vomiting: None Postoperative hydration reviewed and adequate. Pain: 
Pain Scale 1: Numeric (0 - 10) (01/06/21 1553) Pain Intensity 1: 4 (01/06/21 1553) Managed Neurological Status: At baseline Mental Status, Level of Consciousness: Alert and  oriented to person, place, and time Pulmonary Status:  
O2 Device: Room air (01/06/21 1655) Adequate oxygenation and airway patent Complications related to anesthesia: None Post-anesthesia assessment completed. No concerns Signed By: Zach Batista MD   
 January 6, 2021 Procedure(s): SIGMOIDOSCOPY FLEXIBLE. general 
 
<BSHSIANPOST> INITIAL Post-op Vital signs:  
Vitals Value Taken Time /82 01/06/21 1720 Temp Pulse 105 01/06/21 1722 Resp 11 01/06/21 1722 SpO2 96 % 01/06/21 1722 Vitals shown include unvalidated device data.

## 2021-01-06 NOTE — PROGRESS NOTES
Spiritual Care Assessment/Progress Note [address] 
 
 
NAME: Yvonne Dooley      MRN: [id number] AGE: 44 y.o. SEX: female Baptist Affiliation: Taoist  
Language: [place] 1/6/2021     Total Time (in minutes): 5 Spiritual Assessment begun in [facility] through conversation with: 
  
    []Patient        [] Family    [] Friend(s) Reason for Consult: Palliative Care, Initial/Spiritual Assessment Spiritual beliefs: (Please include comment if needed) 
   [] Identifies with a hay tradition:     
   [] Supported by a hay community:        
   [] Claims no spiritual orientation:       
   [] Seeking spiritual identity:            
   [] Adheres to an individual form of spirituality:       
   [x] Not able to assess:                   
 
    
Identified resources for coping:  
   [] Prayer                           
   [] Music                  [] Guided Imagery 
   [] Family/friends                 [] Pet visits [] Devotional reading                         [x] Unknown 
   [] Other:                                        
 
 
Interventions offered during this visit: (See comments for more details) Patient Interventions: Initial visit Plan of Care: 
 
 [] Support spiritual and/or cultural needs  
 [] Support AMD and/or advance care planning process    
 [] Support grieving process 
 [] Coordinate Rites and/or Rituals  
 [] Coordination with community clergy [] No spiritual needs identified at this time 
 [] Detailed Plan of Care below (See Comments)  [] Make referral to Music Therapy 
[] Make referral to Pet Therapy    
[] Make referral to Addiction services 
[] Make referral to [facility] 
[] Make referral to Spiritual Care Partner 
[] No future visits requested       
[x] Follow up upon further referrals Attempted to visit pt for initial spiritual assessment. Unable to complete assessment at this time, pt sleeping and did not awake. Chaplain Chester MDiv, MS, [facility] 
[phone])

## 2021-01-07 NOTE — PROGRESS NOTES
[facility] 
[address] [place], [address] 
[phone] GI PROGRESS NOTE 
 
 
NAME:   Manjinder Ribeiro :    1970 MRN:    [id number] Assessment/Plan Abdominal pain/ Small bowel Obstruction/Colonic Obstruction/ Hx of appendiceal cancer/carcinomatosis: 
-CT scan abdomen/pelvis: Small bowel obstruction likely related to adhesion formation in the mid abdomen, with decompressed loops in the right lower quadrant. Diffuse colonic distention and moderate fecal stasis. Interval placement of a sigmoid colon stent. Increased free fluid somewhat loculated in appearance in the right upper 
quadrant. Likely peritoneal disease unchanged in the deep pelvis. Mild bilateral 
hydronephrosis right greater than left. Cholelithiasis -IV hydration 
-Pain management and anti-emetic as needed  
-Continue NG tube for LWS for decompression 
-Flex sig 2021: unable to tunnel another stent into current colonic stent due to angulation and narrowing  
-Surgery following 
-Oncology following 
-Palliative following- discussion of hospice, venting PEG, TPN for nutrition, etc   
-Supportive care 
  
Discussed with Dr. Nelsy Brian Patient Active Problem List  
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  
 Carcinomatosis (HCC) C80.0  Chemotherapy follow-up examination Z09  Port-A-Cath in place Z95.828  Thrombocytosis (HCC) D47.3  Subphrenic abscess ([address] K65.1  Generalized abdominal pain R10.84  
 Diarrhea R19.7  Insomnia G47.00  
  Chemotherapy induced diarrhea K52.1, T45.1X5A  History of pleural effusion Z87.09  
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  
 SBO (small bowel obstruction) ([address] K56.609 Subjective:  
 
Damaris Dobbs is a 44 y.o.  female Patient without complaints. NG tube in place to low wall suction. No nausea, no vomiting, abdominal pain improving. No bowel movement Objective: VITALS:  
Last 24hrs VS reviewed since prior hospitalist progress note. Most recent are: 
Visit Vitals /70 (BP 1 Location: Left arm, BP Patient Position: At rest) Pulse (!) 104 Temp 97.4 °F (36.3 °C) Resp 16 Wt 40.7 kg (89 lb 11.6 oz) SpO2 96% BMI 15.89 kg/m² Intake/Output Summary (Last 24 hours) at 1/7/2021 1508 Last data filed at 1/7/2021 1422 Gross per 24 hour Intake 400 ml Output 950 ml Net -550 ml PHYSICAL EXAM: 
General:          WD, WN. Alert, cooperative, no acute distress, frail HEENT:           NC, Atraumatic. PERRLA, EOMI. Anicteric sclerae. Lungs:             CTA Bilaterally. Port-a-cath to left chest 
Heart:              Regular  rhythm,  No murmur, Abdomen:        Soft, mild distention, hypoactive bowel sounds, tenderness with deep palpation to lower abdomen, no palpable masses, NG tube to LWS with copious light brown output Extremities:     No edema Neurologic:      CN 2-12 gi, Alert and oriented X 3. No acute neurological distress Psych:             Good insight. Not anxious nor agitated. 
  
 
  
Lab Data No results found for this or any previous visit (from the past 12 hour(s)). Medications: Reviewed Delores Saavedra NP

## 2021-01-07 NOTE — HOSPICE
In to see patient who is alert and oriented x 4. She reports feeling groggy from pain meds and would like to have her  present for a hospice info session tomorrow at 9 am.  
She states that she will likely have a peg tube placed on Monday so her discharge date is not clear right now. Meeting set for 9 am with liaison at bedside. Hospice info left at bedside for her review. Kimber STANTON [facility] Nurse Liaison [facility] [phone] ( mobile) [phone] ( office)

## 2021-01-07 NOTE — PROGRESS NOTES
Heme/ONC consult Note to follow Saw pt this am and talked with  on speaker phone We reviewed progressive disease and pt's clinical decline We discussed that further chemo would harm more than help We discussed supportive care only// no further procedures or tests Pt and  agree for supportive care No further tests/ procedures/ chemo. Want to talk with palliative care today/ willing to set up hospice Concerned about symptom mgmt and advised we would work on this while here. Fine with hospice consult. Call if questions

## 2021-01-07 NOTE — PROGRESS NOTES
Palliative Medicine Consult Benjy: [phone] ([phone]) Patient Name: Barbara Rosario YOB: 1970 Date of Initial Consult: 1/6/21 Reason for Consult: Overwhelming sx Requesting Provider: Elma Sprague Primary Care Physician: Soila Springer MD 
 
 SUMMARY:  
Barbara Rosario is a 44 y.o. 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 Recently had colonic stent placed at [facility], but has been having ongoing nausea and vomiting, now feculent. GI to see if they can open up stent, surgery recommending NGT - uncertain if surgical intervention can help. Can consider palliative venting PEG. Current medical issues leading to Palliative Medicine involvement include: overwhelming sx. Known to me from admission 12/2020 for bacteremia. At that time I increased Oxycodone to 10mg every 4h prn, and timi before meals. Gabapentin caused confusion. Filled Rx 12/10/20. 
 
  
Social:  to Kels[place]. 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 They have a 23 yo and 15 yo. 
  
 PALLIATIVE DIAGNOSES:  
1. Abdominal pain- from cancer as well as SBO 
2. Nausea, emesis 3. Poor po intake, weight loss PLAN:  
1. Pt known to me from recent hospital stay, unfortunately has had acute events w/ SOB at [facility] and again now. 2. Pain: From neoplasm and SOB. 3. Continue Morphine PCA , no basal, 1.5mg every 10 min demand. Helping a lot. 4. Start Fentanyl patch 12mcg/h every 3 days. Uncertain she will absorb very well due to lack of much adipose tissue. Pt aware of this. 5. When NGT comes out, will change to morphine concentrated SL ggts for better absorption. 6. Nausea/SBO: Improved sx w/ NGT. Stent replacement on 1/6 could not be done. 7. If no improvement in SBO, discussed venting PEG for sx management and to allow small amounts of po at home. Surgery and GI aware. 8. Family asked about nutrition, discussed TPN and TFs not helping pt live longer or better and would recommend comfort diet, even if only small amounts. 9. Could consider short course of TPN while here to get her as good as she can be before goes home w/ hospice. 10. Goals of care: Dr Iglesia Zapata met w/ pt and  Rexene Fothergill today and I meet them both afterwards. Goals are clear for Hospice care. They have not had family/friends on hospice before so discuss in detail the philosophy and day to day care that Hospice would provide. 11. Children do not know about hospice yet, but will tell them next week. 12. Discuss options of care- pt would like to be home when she dies, but could also consider IP for sx management. 13. Code status discussed- recommendation for DNR. Family to talk. 15. Cancer LINC info given, but pt w/ living will and all other legal documents done. 15. Communicated plan of care with: Palliative IDT, [facility] Team incl Jurgen Hernandez, Michael and Otf Parmar and Jeet LAI 
 
 GOALS OF CARE / TREATMENT PREFERENCES:  
 
GOALS OF CARE: 
Patient/Health Care Proxy Stated Goals: Comfort TREATMENT PREFERENCES:  
Code Status: Full Code Advance Care Planning: 
[x] The [facility] Interdisciplinary Team has updated the ACP Navigator with Ariananhgustavo and Patient Capacity Primary Decision Maker: Mariah [facility] - [phone] Advance Care Planning 12/7/2020 Patient's Healthcare Decision Maker is: Verbal statement (Legal Next of Kin remains as decision maker) Confirm Advance Directive None Patient Would Like to Complete Advance Directive No  
Does the patient have other document types - Medical Interventions: Full interventions Other: As far as possible, the palliative care team has discussed with patient / health care proxy about goals of care / treatment preferences for patient. HISTORY:  
 
History obtained from: Pt, chart, staff CHIEF COMPLAINT: Abdominal pain and nausea HPI/SUBJECTIVE: The patient is:  
[x] Verbal and participatory [] Non-participatory due to: Pt feels better after being decompressed w/ NGT. Clinical Pain Assessment (nonverbal scale for severity on nonverbal patients):  
Clinical Pain Assessment Severity: 3 Location: Abdomen Character: Aching, diffuse, cramping Duration: Worse over past couple days Effect: Harder to function, eat, sleep Factors: Better w/ medication Frequency: constant Duration: for how long has pt been experiencing pain (e.g., 2 days, 1 month, years) Frequency: how often pain is an issue (e.g., several times per day, once every few days, constant) FUNCTIONAL ASSESSMENT:  
 
Palliative Performance Scale (PPS): PPS: 70 PSYCHOSOCIAL/SPIRITUAL SCREENING:  
 
Palliative IDT has assessed this patient for cultural preferences / practices and a referral made as appropriate to needs (Cultural Services, Patient Advocacy, Ethics, etc.) Any spiritual / Judaism concerns: 
[] Yes /  [x] No 
 
Caregiver Burnout: 
[] Yes /  [x] No /  [] No Caregiver Present Anticipatory grief assessment:  
[x] Normal  / [] Maladaptive ESAS Anxiety: Anxiety: 0 
 
ESAS Depression: Depression: 0 REVIEW OF SYSTEMS:  
 
Positive and pertinent negative findings in ROS are noted above in HPI. The following systems were [x] reviewed / [] unable to be reviewed as noted in HPI Other findings are noted below. Systems: constitutional, ears/nose/mouth/throat, respiratory, gastrointestinal, genitourinary, musculoskeletal, integumentary, neurologic, psychiatric, endocrine. Positive findings noted below. Modified ESAS Completed by: provider Fatigue: 5 Drowsiness: 0 Depression: 0 Pain: 3 Anxiety: 0 Nausea: 2 Anorexia: 10 Dyspnea: 0 Constipation: Yes PHYSICAL EXAM:  
 
From RN flowsheet: 
Wt Readings from Last 3 Encounters:  
01/05/21 89 lb 11.6 oz (40.7 kg) 12/10/20 100 lb 5 oz (45.5 kg)  
11/18/20 102 lb (46.3 kg) Blood pressure 102/71, pulse (!) 114, temperature 97.8 °F (36.6 °C), resp. rate 16, weight 89 lb 11.6 oz (40.7 kg), SpO2 96 %. Pain Scale 1: Numeric (0 - 10) Pain Intensity 1: 0 Pain Onset 1: x4days Pain Location 1: Abdomen Pain Orientation 1: Lower Pain Description 1: Aching Pain Intervention(s) 1: MD notified (comment)(patient to receive add'l pain meds during procedure. ) Last bowel movement, if known: days Constitutional: awake, alert, oriented, fatigued, no sedation or confusion Eyes: pupils equal, anicteric ENMT: no nasal discharge, moist mucous membranes Cardiovascular: regular rhythm Respiratory: breathing not labored, symmetric Gastrointestinal: soft non-tender, do not hear any bowel sounds Musculoskeletal: no deformity, no tenderness to palpation Skin: warm, dry Neurologic: following commands, moving all extremities Psychiatric: full affect, no hallucinations HISTORY:  
 
Principal Problem: 
  SBO (small bowel obstruction) ([address] (1/5/2021) Past Medical History:  
Diagnosis Date  Cancer ([address] APPENDIX CANCER   
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  
Procedure Laterality Date 2021 Maia Varela N/A 1/6/2021 SIGMOIDOSCOPY FLEXIBLE performed by Radha Burrows MD at [address]  09/2019  HX GI  09/2019 ILEOCECECTOMY  HX GYN  2003 CYST ON OVARY  HX HYSTERECTOMY  2019  IR THORACENTESIS CATH W IMAGE  11/6/2019  [place] ABDOMEN SURGERY PROC UNLISTED Family History Problem Relation Age of Onset  Breast Cancer Paternal Grandmother 56  Crohn's Disease Mother  Heart Disease Mother  Cancer Father BLADDER  
 Diabetes Father  No Known Problems Son  No Known Problems Daughter  Anesth Problems Neg Hx History reviewed, no pertinent family history. Social History Tobacco Use  Smoking status: Never Smoker  Smokeless tobacco: Never Used Substance Use Topics  Alcohol use: Not Currently No Known Allergies Current Facility-Administered Medications Medication Dose Route Frequency  0.9% sodium chloride with KCl 40 mEq/L infusion   IntraVENous CONTINUOUS  
 fentaNYL (DURAGESIC) 12 mcg/hr patch 1 Patch  1 Patch TransDERmal Q72H  sodium chloride (NS) flush 5-40 mL  5-40 mL IntraVENous Q8H  
 sodium chloride (NS) flush 5-40 mL  5-40 mL IntraVENous PRN  
 acetaminophen (TYLENOL) tablet 650 mg  650 mg Oral Q6H PRN Or  
 acetaminophen (TYLENOL) suppository 650 mg  650 mg Rectal Q6H PRN  promethazine (PHENERGAN) tablet 12.5 mg  12.5 mg Oral Q6H PRN Or  
 ondansetron (ZOFRAN) injection 4 mg  4 mg IntraVENous Q6H PRN  
 heparin (porcine) injection 5,000 Units  5,000 Units SubCUTAneous Q8H  
 pantoprazole (PROTONIX) 40 mg in 0.9% sodium chloride 10 mL injection  40 mg IntraVENous DAILY  morphine injection 2 mg  2 mg IntraVENous Q4H PRN  
 morphine injection 4 mg  4 mg IntraVENous Q4H PRN  
 heparin (porcine) pf 500 Units  500 Units InterCATHeter PRN  
 sodium chloride (NS) flush 5-40 mL  5-40 mL IntraVENous Q8H  
 sodium chloride (NS) flush 5-40 mL  5-40 mL IntraVENous PRN  
 morphine  mg / 30 mL   IntraVENous CONTINUOUS  
 iopamidoL (ISOVUE 300) 61 % contrast injection 50 mL  50 mL Other MULTIPLE DOSE GIVEN  
 
 
 
 LAB AND IMAGING FINDINGS:  
 
Lab Results Component Value Date/Time  WBC 10.5 01/06/2021 08:33 AM  
  HGB 12.5 01/06/2021 08:33 AM  
 PLATELET 345 01/06/2021 08:33 AM  
 
Lab Results  
Component Value Date/Time  
 Sodium 127 (L) 01/06/2021 08:33 AM  
 Potassium 3.2 (L) 01/06/2021 08:33 AM  
 Chloride 91 (L) 01/06/2021 08:33 AM  
 CO2 25 01/06/2021 08:33 AM  
 BUN 12 01/06/2021 08:33 AM  
 Creatinine 0.32 (L) 01/06/2021 08:33 AM  
 Calcium 8.5 01/06/2021 08:33 AM  
 Magnesium 2.3 01/06/2021 08:33 AM  
 Phosphorus 2.9 01/06/2021 08:33 AM  
  
Lab Results  
Component Value Date/Time  
 Alk. phosphatase 145 (H) 01/06/2021 08:33 AM  
 Protein, total 7.0 01/06/2021 08:33 AM  
 Albumin 2.4 (L) 01/06/2021 08:33 AM  
 Globulin 4.6 (H) 01/06/2021 08:33 AM  
 
Lab Results  
Component Value Date/Time  
 INR 1.0 10/09/2020 02:32 PM  
 Prothrombin time 10.7 10/09/2020 02:32 PM  
  
Lab Results  
Component Value Date/Time  
 Iron 22 (L) 10/18/2019 05:49 AM  
 Ferritin 483 (H) 10/18/2019 05:49 AM  
  
No results found for: PH, PCO2, PO2 
No components found for: GLPOC  
No results found for: CPK, CKMB  
 
 
   
 
Total time of ACP conversation: 45 min  
Counseling / coordination time, spent as noted above: 40 min  
> 50% counseling / coordination?: yes 
 
Prolonged service was provided for  []30 min   []75 min in face to face time in the presence of the patient, spent as noted above. 
Time Start:  
Time End:  
Note: this can only be billed with 01336 (initial) or 10958 (follow up). 
If multiple start / stop times, list each separately.

## 2021-01-07 NOTE — PROGRESS NOTES
[facility] [place] at [facility] 
[address], [address] [place] W: [phone]  F: [phone] Reason for Consult:  
Edmundo Peng is a 44 y.o. female who is seen today in hospital consult for metastatic appendix cancer and SBO Treatment History:  
· EXPLORATORY LAPAROTOMY SIERRA BSO, TUMOR DEBULKING: ILEOCECAL RESECTION; OMENTECTOMY · FOLFOX 50% DR 12/3/19 - 3/4/20 · Per [facility] recommendation, dropped 5FU Bolus/Leucovorin and increased Oxaliplatin to 60 mg/m2 and 5FU pump tp 1,800 mg/m2 with Cycle 8 on 3/18/20 · Oxaliplatin increased to 85 mg/m2 with Cycle 10 on 4/15/20 - 5/27/20 
· CTs C/A/P 3/20 stable · CTs C/A/P 5/26/20 stable · Chemo held on 6/20/20 d/t thrombocytopenia · Oxaliplatin DR 25% with Cycle 14 on 6/17/20 d/t thrombocytopenia · Oxaliplatin DR 25% more (50% total) with Cycle 15 on 7/8/20 · Chemo held on 7/22/20 d/t thrombocytopenia · CT C/A/P 7/29/20 at [facility] with re demonstrated infiltrative tissue in the pelvis, favored to represented peritoneal carcinomatosis; soft tissue tethering to the colon, multiple loops of small bowel, and the urinary bladder; redemonstrated ill-defined complex fluid in the left upper quadrant - not significantly changed relative to most recent prior study; few clustered small pulmonary nodules are identified in the right lower lobe · Exploratory lap at [facility] 8/20. · Maintenance 5FU (pump only) 8/26/20 - 9/23/20 · CTs 10/20 at [facility] showed slight progression · Added Oxaliplatin back in on 10/7/20 - Current · CT A/P 12/6/20: Suspect peritoneal carcinomatosis STAGE: 4 with carcinomatosis History of Present Illness:  
Edmundo Peng is a 44 y.o. female seen today in hospital consult for metastatic appendix cancer admitted for SBO post colon stent at [facility]. Pt in chair and has NGT. Has procedure yesterday. Pt states she is tired of all procedures/ treatment. Weak overall. Cannot eat and very thin. Wants to call  this am.  
Pt was on FOLFOX chemotherapy but was held since 11/18. No F/C/CP/SOB/. Chronic abd pain/ occ N/V/D. Past Medical History:  
Diagnosis Date  Cancer ([facility] APPENDIX CANCER   
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  
Procedure Laterality Date 2021 Maia Mukherjee Hwjostin N/A 1/6/2021 SIGMOIDOSCOPY FLEXIBLE performed by Sal Sexton MD at [address]  09/2019  HX GI  09/2019 ILEOCECECTOMY  HX GYN  2003 CYST ON OVARY  HX HYSTERECTOMY  2019  IR THORACENTESIS CATH W IMAGE  11/6/2019  [place] ABDOMEN SURGERY PROC UNLISTED Social History Tobacco Use  Smoking status: Never Smoker  Smokeless tobacco: Never Used Substance Use Topics  Alcohol use: Not Currently Family History Problem Relation Age of Onset  Breast Cancer Paternal Grandmother 56  Crohn's Disease Mother  Heart Disease Mother  Cancer Father BLADDER  
 Diabetes Father  No Known Problems Son  No Known Problems Daughter  Anesth Problems Neg Hx Current Facility-Administered Medications Medication Dose Route Frequency  0.9% sodium chloride with KCl 40 mEq/L infusion   IntraVENous CONTINUOUS  
 fentaNYL (DURAGESIC) 12 mcg/hr patch 1 Patch  1 Patch TransDERmal Q72H  sodium chloride (NS) flush 5-40 mL  5-40 mL IntraVENous Q8H  
 sodium chloride (NS) flush 5-40 mL  5-40 mL IntraVENous PRN  
 acetaminophen (TYLENOL) tablet 650 mg  650 mg Oral Q6H PRN Or  
 acetaminophen (TYLENOL) suppository 650 mg  650 mg Rectal Q6H PRN  promethazine (PHENERGAN) tablet 12.5 mg  12.5 mg Oral Q6H PRN  Or  
 ondansetron (ZOFRAN) injection 4 mg  4 mg IntraVENous Q6H PRN  
  heparin (porcine) injection 5,000 Units  5,000 Units SubCUTAneous Q8H  
 pantoprazole (PROTONIX) 40 mg in 0.9% sodium chloride 10 mL injection  40 mg IntraVENous DAILY  morphine injection 2 mg  2 mg IntraVENous Q4H PRN  
 morphine injection 4 mg  4 mg IntraVENous Q4H PRN  
 heparin (porcine) pf 500 Units  500 Units InterCATHeter PRN  
 sodium chloride (NS) flush 5-40 mL  5-40 mL IntraVENous Q8H  
 sodium chloride (NS) flush 5-40 mL  5-40 mL IntraVENous PRN  
 morphine  mg / 30 mL   IntraVENous CONTINUOUS  
 iopamidoL (ISOVUE 300) 61 % contrast injection 50 mL  50 mL Other MULTIPLE DOSE GIVEN No Known Allergies A complete review of systems was obtained, negative except as described above Physical Exam:  
 
Visit Vitals /71 (BP 1 Location: Right arm, BP Patient Position: At rest) Pulse (!) 114 Temp 97.8 °F (36.6 °C) Resp 16 Wt 89 lb 11.6 oz (40.7 kg) LMP  (LMP Unknown) SpO2 96% BMI 15.89 kg/m² ECOG PS: ill appearing WF in NAD General: No distress Eyes: Anicteric sclerae HENT: Atraumaticwith NGT Neck: Supple Resp: Normal respiratory effort GI: slight distended MS: Ambulatory usually Skin: Warm dry. Port site without redness/swelling Psych: Alert, oriented, appropriate affect, normal judgment/insight Results:  
 
Lab Results Component Value Date/Time WBC 10.5 01/06/2021 08:33 AM  
 HGB 12.5 01/06/2021 08:33 AM  
 HCT 36.0 01/06/2021 08:33 AM  
 PLATELET 467 96/76/3027 08:33 AM  
 MCV 93.3 01/06/2021 08:33 AM  
 ABS. NEUTROPHILS 7.5 01/06/2021 08:33 AM  
 Hemoglobin (POC) 10.8 (L) 09/25/2019 04:11 PM  
 Hematocrit (POC) 32 (L) 09/25/2019 12:10 PM  
 
Lab Results Component Value Date/Time  Sodium 127 (L) 01/06/2021 08:33 AM  
 Potassium 3.2 (L) 01/06/2021 08:33 AM  
 Chloride 91 (L) 01/06/2021 08:33 AM  
 CO2 25 01/06/2021 08:33 AM  
 Glucose 97 01/06/2021 08:33 AM  
 BUN 12 01/06/2021 08:33 AM  
 Creatinine 0.32 (L) 01/06/2021 08:33 AM  
 GFR est AA >60 01/06/2021 08:33 AM  
 GFR est non-AA >60 01/06/2021 08:33 AM  
 Calcium 8.5 01/06/2021 08:33 AM  
 Sodium (POC) 137 09/25/2019 12:10 PM  
 Potassium (POC) 3.7 09/25/2019 12:10 PM  
 Chloride (POC) 105 09/25/2019 12:10 PM  
 Glucose (POC) 85 09/25/2019 12:10 PM  
 BUN (POC) 4 (L) 09/25/2019 12:10 PM  
 Creatinine (POC) 0.5 (L) 09/25/2019 12:10 PM  
 Calcium, ionized (POC) 1.14 09/25/2019 12:10 PM  
 
Lab Results Component Value Date/Time Bilirubin, total 0.7 01/06/2021 08:33 AM  
 ALT (SGPT) 16 01/06/2021 08:33 AM  
 Alk. phosphatase 145 (H) 01/06/2021 08:33 AM  
 Protein, total 7.0 01/06/2021 08:33 AM  
 Albumin 2.4 (L) 01/06/2021 08:33 AM  
 Globulin 4.6 (H) 01/06/2021 08:33 AM  
 
CT Results (most recent): 
Results from Hospital Encounter encounter on 01/05/21 CT ABD PELV W CONT Narrative EXAM: CT ABD PELV W CONT INDICATION: Vomiting, history of metastatic cancer COMPARISON: 12/6/2020 CONTRAST: 100 mL of Isovue-370. TECHNIQUE:  
Following the uneventful intravenous administration of contrast, thin axial 
images were obtained through the abdomen and pelvis. Coronal and sagittal 
reconstructions were generated. Oral contrast was not administered. CT dose 
reduction was achieved through use of a standardized protocol tailored for this 
examination and automatic exposure control for dose modulation. FINDINGS:  
LOWER THORAX: No significant abnormality in the incidentally imaged lower chest. 
LIVER: Unchanged 2.5 cm cyst left hepatic lobe. BILIARY TREE: Gallstone. Pericholecystic edema. CBD is not dilated. SPLEEN: within normal limits. PANCREAS: No mass or ductal dilatation. ADRENALS: Unremarkable. KIDNEYS: Mild bilateral hydronephrosis right greater than left. STOMACH: Unremarkable. SMALL BOWEL: Diffuse small bowel distention is now noted with decompressed loops 
in the right lower quadrant. COLON: New stent in the sigmoid colon. The remainder of the colon is distended 
with moderate fecal stasis. APPENDIX: Surgically absent. PERITONEUM: Free fluid in the right abdomen increased compared to the prior 
exam. Mass effect upon the liver and septations are noted compatible with 
loculations. Soft tissue density in the deep pelvis is unchanged. RETROPERITONEUM: No lymphadenopathy or aortic aneurysm. REPRODUCTIVE ORGANS: The uterus is surgically absent. URINARY BLADDER: No mass or calculus. BONES: No destructive bone lesion. ABDOMINAL WALL: No mass or hernia. ADDITIONAL COMMENTS: N/A Impression IMPRESSION: 
Small bowel obstruction likely related to adhesion formation in the mid abdomen, 
with decompressed loops in the right lower quadrant. Diffuse colonic distention 
and moderate fecal stasis. Interval placement of a sigmoid colon stent. Increased free fluid somewhat loculated in appearance in the right upper 
quadrant. Likely peritoneal disease unchanged in the deep pelvis. Mild bilateral 
hydronephrosis right greater than left. Cholelithiasis. Records reviewed and summarized above. Pathology report(s) reviewed above. Radiology report(s) reviewed above. Assessment/PLAN:  
 
1) Stage 4 / Metastatic Appendiceal Cancer Post EXPLORATORY LAPAROTOMY SIERRA BSO, TUMOR DEBULKING: ILEOCECAL RESECTION; OMENTECTOMY 9/25/19 Surgery done for obstruction. Patient choose to have chemo locally and still see [address] [facility] She was on 5FU only from 8/26/20 - 9/23/20. She went back to [address] [facility] in 10/20 and CTs there showed slight progression. Added Oxaliplatin back in on 10/7/20 since patient has not failed this regimen. Last chemo with modified FOLFOX was on 11/18/20. She was due for chemo again on 12/2/20 but held due to patient not feeling well/nausea/vomiting. Pt seen today in hospital consult. Admitted for SBO/ post colonic stent at [address] [facility]. Pt clinically not doing well overall. Saw pt this am and talked with  on speaker phone We reviewed progressive disease and pt's clinical decline We discussed that further chemo would harm more than help We discussed supportive care only// no further procedures or tests Pt and  agree for supportive care No further tests/ procedures/ chemo. Want to talk with palliative care today/ willing to set up hospice Concerned about symptom mgmt and advised we would work on this while here. Fine with hospice consult. 2) Abdominal Pain/Cramping Per Palliative care 3) SBO per GI/ surgery. 4) Hx of Left Pleural Effusion post Thoracentesis x 2 No cytology. Stable on CTs.  
 
5) Chemo Induced Diarrhea Resolved. Will continue to monitor. 6) Chemo Induced Neuropathy Stopped Gabapentin due to confusion/indigestion. Can consider acupuncture with Dr. Gideon Parrish once discharged. 7) Insomnia/Anxiety about Health. Takes Ativan PRN which helps 8) Psychosocial 
Mood good, coping well considering difficult disease. She has great family support. SW support as needed. Will follow. Call if questions. D/w palliative care I appreciate the opportunity to participate in Ms. Irma Jason care.  
 
Signed By: Sanjuanita Chaudhary,

## 2021-01-07 NOTE — PROGRESS NOTES
Patient admitted early this am with abd pain from obstructed colonic stent and SBO 
past medical history significant for metastatic cancer of the appendix, Patient Vitals for the past 12 hrs: 
 Temp Pulse Resp BP SpO2  
01/06/21 1957 98.2 °F (36.8 °C) (!) 104 14 109/77 97 % 01/06/21 1840 98.5 °F (36.9 °C) (!) 104 12 110/77 97 % 01/06/21 1740  98 10 112/85 97 % 01/06/21 1730  98 12 112/80 98 % 01/06/21 1720  100 14 112/82 96 % 01/06/21 1710 98.7 °F (37.1 °C) (!) 111 19 111/86 99 % 01/06/21 1702  (!) 106 12 103/83   
01/06/21 1655  (!) 103 14 111/80 99 % 01/06/21 1553 99 °F (37.2 °C) (!) 104 18 114/83 98 % 01/06/21 1520 97.8 °F (36.6 °C) (!) 103 17 106/69 97 % Consults to oncology, surgery, GI, and palliative care Plans for GI to try and open colonic stent- if unsuccessful may need diverting colostomy Added more options for pain meds, repleted K

## 2021-01-07 NOTE — PROGRESS NOTES
01/07/21 0805 Vital Signs Temp 97.8 °F (36.6 °C) Temp Source Oral  
Pulse (Heart Rate) (!) 114 Heart Rate Source Monitor Resp Rate 16  
O2 Sat (%) 96 % Level of Consciousness Alert /71 MAP (Calculated) 81 BP 1 Method Automatic  
BP 1 Location Right arm BP Patient Position At rest  
MEWS Score 3 Dr. Baldomero Ward notified. She will come see patient.

## 2021-01-07 NOTE — ROUTINE PROCESS
Bedside shift change report given to Tracie Gitelman (oncoming nurse) by Rebecca Conway (offgoing nurse). Report included the following information SBAR.

## 2021-01-07 NOTE — PROGRESS NOTES
[address] [facility] Adult  Hospitalist Group Hospitalist Progress Note Ishan Dang MD 
Answering service: [phone] OR [phone] from in house phone Date of Service:  2021 NAME:  Merlinda Leavell :  1970 MRN:  [id number] Please note that this dictation was completed with [employer], the computer voice recognition software. Quite often unanticipated grammatical, syntax, homophones, and other interpretive errors are inadvertently transcribed by the computer software. Please disregard these errors. Please excuse any errors that have escaped final proofreading. Admission Summary:  
 24-year-old woman with a past medical history significant for metastatic cancer of the appendix, who was in her usual state of health until yesterday when the patient developed abdominal pain. The pain is located at the center of the abdomen, sharp pain, constant with no radiation, 9/10 in severity, associated with nausea, but no vomiting. The patient recently had colonic stent placed at the [facility]. The patient was taken to [facility] Emergency Room at [facility] for further evaluation of the abdominal pain. When the patient arrived at the emergency room, CT scan of the abdomen and pelvis was performed. The CT scan shows evidence of small bowel obstruction. The emergency room physician consulted the general surgeon on-call, admission to the hospitalist service was advised. The patient denies fever, rigors, or chills. She was last admitted to this hospital from 2020 to 12/10/2020. The patient was admitted and treated for sepsis, secondary to E. coli bacteremia. Interval history / Subjective:  
   
Patient seen and examined Pain controlled on pca Hospice consulted now Assessment & Plan:  
 
  Small bowel obstruction. POA  
CT scan results reviewed Bowel rest, serial KUBs, hydration Pain management No surgery indicated per surgery team  
NGT  
S/p colo on 1/7 by Dr Linda Meza  
Due to anatomy another stent was not placed within the previous stent. Procedure was aborted Hyponatremia. , 127 improving This may be due to volume depletion. It could also be related to the patient's metastatic cancer. Hypokalemia 3.2 On iv fluids , add K , changed iv fluids Metastatic cancer of the appendix. Oncology is on board No plans for chemo and palliative and hospice now consulted Started on PCA by palliative team 
 
 
 
Code status: full DVT prophylaxis: 
 
Care Plan discussed with: Patient/Family Anticipated Disposition: Home w/Family Anticipated Discharge: Greater than 48 hours Hospital Problems  Date Reviewed: 1/6/2021 Codes Class Noted POA * (Principal) SBO (small bowel obstruction) ([facility] [address] ICD-10-CM: T41.838 ICD-9-CM: 560.9  1/5/2021 Yes Review of Systems: A comprehensive review of systems was negative except for that written in the HPI. Vital Signs:  
 Last 24hrs VS reviewed since prior progress note. Most recent are: 
Visit Vitals /71 (BP 1 Location: Right arm, BP Patient Position: At rest) Pulse (!) 114 Temp 97.8 °F (36.6 °C) Resp 16 Wt 40.7 kg (89 lb 11.6 oz) SpO2 96% BMI 15.89 kg/m² Intake/Output Summary (Last 24 hours) at 1/7/2021 6009 Last data filed at 1/6/2021 2009 Gross per 24 hour Intake 400 ml Output 250 ml Net 150 ml Physical Examination:  
 
I had a face to face encounter with this patient and independently examined them on 01/07/21 as outlined below: 
     
Constitutional:  No acute distress, ENT:  Oral mucosa moist, oropharynx benign. Resp:  CTA bilaterally. No wheezing/rhonchi/rales. No accessory muscle use CV:  Regular rhythm, normal rate, no murmurs, gallops, rubs GI:  Soft, distented and diffuse tenderness Musculoskeletal:  No edema, warm, Neurologic:  Moves all extremities. Data Review:  
 Review and/or order of clinical lab test 
 
 
Labs:  
 
Recent Labs 01/06/21 
8533 01/05/21 2116 WBC 10.5 9.7 HGB 12.5 13.0 HCT 36.0 38.3  357 Recent Labs 01/06/21 
1135 01/05/21 2116 * 126*  
K 3.2* 3.4*  
CL 91* 87* CO2 25 27 BUN 12 12 CREA 0.32* 0.56 GLU 97 126* CA 8.5 8.9 MG 2.3 2.0 PHOS 2.9  --   
 
Recent Labs 01/06/21 
4284 01/05/21 2116 ALT 16 13 * 139* TBILI 0.7 0.7 TP 7.0 7.1 ALB 2.4* 2.4*  
GLOB 4.6* 4.7* LPSE 32* 34* No results for input(s): INR, PTP, APTT, INREXT in the last 72 hours. No results for input(s): FE, TIBC, PSAT, FERR in the last 72 hours. No results found for: FOL, RBCF No results for input(s): PH, PCO2, PO2 in the last 72 hours. No results for input(s): CPK, CKNDX, TROIQ in the last 72 hours. No lab exists for component: CPKMB No results found for: CHOL, CHOLX, CHLST, CHOLV, HDL, HDLP, LDL, LDLC, DLDLP, TGLX, TRIGL, TRIGP, CHHD, CHHDX Lab Results Component Value Date/Time Glucose (POC) 85 09/25/2019 12:10 PM  
 
Lab Results Component Value Date/Time Color YELLOW 01/05/2021 10:42 PM  
 Appearance CLEAR 01/05/2021 10:42 PM  
 Specific gravity 1.010 01/05/2021 10:42 PM  
 Specific gravity 1.006 10/09/2020 02:06 PM  
 pH (UA) 5.5 01/05/2021 10:42 PM  
 Protein Negative 01/05/2021 10:42 PM  
 Glucose Negative 01/05/2021 10:42 PM  
 Ketone TRACE (A) 01/05/2021 10:42 PM  
 Bilirubin Negative 01/05/2021 10:42 PM  
 Urobilinogen 0.2 01/05/2021 10:42 PM  
 Nitrites Negative 01/05/2021 10:42 PM  
 Leukocyte Esterase Negative 01/05/2021 10:42 PM  
 Epithelial cells FEW 01/05/2021 10:42 PM  
 Bacteria Negative 01/05/2021 10:42 PM  
 WBC 0-4 01/05/2021 10:42 PM  
 RBC 10-20 01/05/2021 10:42 PM  
 
 
 
Medications Reviewed:  
 
Current Facility-Administered Medications Medication Dose Route Frequency  sodium chloride (NS) flush 5-40 mL  5-40 mL IntraVENous Q8H  
 sodium chloride (NS) flush 5-40 mL  5-40 mL IntraVENous PRN  
 acetaminophen (TYLENOL) tablet 650 mg  650 mg Oral Q6H PRN Or  
 acetaminophen (TYLENOL) suppository 650 mg  650 mg Rectal Q6H PRN  promethazine (PHENERGAN) tablet 12.5 mg  12.5 mg Oral Q6H PRN Or  
 ondansetron (ZOFRAN) injection 4 mg  4 mg IntraVENous Q6H PRN  
 heparin (porcine) injection 5,000 Units  5,000 Units SubCUTAneous Q8H  
 lactated Ringers infusion  125 mL/hr IntraVENous CONTINUOUS  
 pantoprazole (PROTONIX) 40 mg in 0.9% sodium chloride 10 mL injection  40 mg IntraVENous DAILY  morphine injection 2 mg  2 mg IntraVENous Q4H PRN  
 morphine injection 4 mg  4 mg IntraVENous Q4H PRN  
 heparin (porcine) pf 500 Units  500 Units InterCATHeter PRN  
 sodium chloride (NS) flush 5-40 mL  5-40 mL IntraVENous Q8H  
 sodium chloride (NS) flush 5-40 mL  5-40 mL IntraVENous PRN  
 morphine  mg / 30 mL   IntraVENous CONTINUOUS  
 iopamidoL (ISOVUE 300) 61 % contrast injection 50 mL  50 mL Other MULTIPLE DOSE GIVEN  
 
______________________________________________________________________ EXPECTED LENGTH OF STAY: 3d 4h 
ACTUAL LENGTH OF STAY:          2 Henry Christopher MD

## 2021-01-07 NOTE — PROGRESS NOTES
Spiritual Care Assessment/Progress Note [address] 
 
 
NAME: Keren Chen      MRN: [id number] AGE: 44 y.o. SEX: female Mu-ism Affiliation: Christianity  
Language: [place] 1/7/2021     Total Time (in minutes): 5 Spiritual Assessment begun in [facility] through conversation with: 
  
    []Patient        [] Family    [] Friend(s) Reason for Consult: Palliative Care, Initial/Spiritual Assessment Spiritual beliefs: (Please include comment if needed) 
   [] Identifies with a hay tradition:     
   [] Supported by a hay community:        
   [] Claims no spiritual orientation:       
   [] Seeking spiritual identity:            
   [] Adheres to an individual form of spirituality:       
   [x] Not able to assess:                   
 
    
Identified resources for coping:  
   [] Prayer                           
   [] Music                  [] Guided Imagery 
   [] Family/friends                 [] Pet visits [] Devotional reading                         [] Unknown 
   [] Other:                                         
 
 
Interventions offered during this visit: (See comments for more details) Patient Interventions: Initial visit Plan of Care: 
 
 [] Support spiritual and/or cultural needs  
 [] Support AMD and/or advance care planning process    
 [] Support grieving process 
 [] Coordinate Rites and/or Rituals  
 [] Coordination with community clergy [] No spiritual needs identified at this time 
 [] Detailed Plan of Care below (See Comments)  [] Make referral to Music Therapy 
[] Make referral to Pet Therapy    
[] Make referral to Addiction services 
[] Make referral to [facility] 
[] Make referral to Spiritual Care Partner 
[] No future visits requested       
[x] Follow up upon further referrals Attempted to visit pt for initial spiritual assessment. Unable to complete assessment at this time, pt sleeping and did not awake. Chaplain Schwartz MDiv, MS, [facility] 
[phone] [phone])

## 2021-01-07 NOTE — PROGRESS NOTES
Physician Progress Note Elizabeth Bañuelos 
CSN #:                  [id number] :                       1970 ADMIT DATE:       2021 8:55 PM 
[address] [place] DATE: 
RESPONDING 
PROVIDER #:        Roxy Troy MD 
 
 
 
 
QUERY TEXT: 
 
Dear Attending, Patient admitted with small bowel obstruction, has metastatic cancer of the appendix. Pt noted to have weight of 40.7 kg, down from 47.3 kg on 20. Chart review shows a height of 5 feet 3 inches with no height recorded on this admission, yielding a BMI of 15.9. Pt noted to have a diagnosis of chronic severe malnutrition in admission 20 through 12/10/20. If possible, please document in progress notes and discharge summary if you are evaluating and /or treating any of the following: The medical record reflects the following: 
Risk Factors: Cancer, SBO, colonic stent Clinical Indicators: - Admission weight = 40.7 with BMI = 15.9 based on 5'3\" height - Previous weights in 2020:  = 44.5 kg,  = 46.4 kg,  = 46.5 kg,  = 47.3 kg,  = 44 kg, 12/10 = 45.5 kg - Weight loss of 14% in one month - ED Provider notes appetite change, multiple episodes of foul-smelling/stool vomitus for 1 day; pt had a small amount of Ensure liquid stay down since her last vomitus along with Colace 
- H&P notes that pt appeared ill, in moderate distress - D/C Summary 12/10/20 notes chronic severe protein malnutrition - Palliative Care consult note on  - Poor PO intake, weight loss - CT abd/pelvis = Small bowel obstruction likely related to adhesion formation in the mid abdomen, with decompressed loops in the right lower quadrant. Diffuse colonic distention and moderate fecal stasis. Interval placement of a sigmoid colon stent. Increased free fluid somewhat loculated in appearance in the right upper quadrant. Likely peritoneal disease unchanged in the deep pelvis. Mild bilateral hydronephrosis right greater than left. Cholelithiasis. - Albumin = 2.4 (taken twice) - Diet = NPO Treatment: Surgery consult, GI consult, Palliative Care consult, Hematology consult, pt weight, diet NPO, dietary supplements PTA, Megace BID PTA, albumin monitoring, imaging studies Elba Perdomo RN, [facility] Clinical  
[phone] ASPEN Criteria - must meet 2 Severe Malnutrition 
- energy intake less than 75% of energy requirement for greater than 1month 
- weight loss greater than 5%/1 month, greater than 7.5%/3 months, greater than10%/6 months, greater than 20%/1 year 
- body fat (loss of SQ fat from the orbits, triceps, or fat overlying the ribs) -  muscle mass (muscle wasting at the temples, clavicles, shoulders, interosseous spaces, scapula, thigh calf) 
- fluid accumulation - (localized or generalized edema of the extremities, vulva, scrotum--weight loss may be masked by edema) 
-  strength measurably decreased by devices standards Options provided: 
-- Chronic protein calorie malnutrition severe 
-- Underweight with BMI of 15.9 
-- Other - I will add my own diagnosis -- Disagree - Not applicable / Not valid -- Disagree - Clinically unable to determine / Unknown 
-- Refer to Clinical Documentation Reviewer PROVIDER RESPONSE TEXT: 
 
This patient is underweight with a BMI of 15.9.  
 
Query created by: Shelley Lorenzo on 1/6/2021 1:09 PM 
 
 
Electronically signed by:  Kiara Lee MD 1/7/2021 5:04 PM

## 2021-01-07 NOTE — PROGRESS NOTES
Bedside and Verbal shift change report given to Ivana Parks RN (oncoming nurse) by Leni Jasso RN (offgoing nurse). Report included the following information SBAR, OR Summary, Procedure Summary, Intake/Output, MAR and Recent Results.

## 2021-01-07 NOTE — PROGRESS NOTES
JESSICA: Referral sent to [address] [place] [address]. Patient lives at home with her  and son. Noted patient currently has NGT. Care Management Interventions PCP Verified by CM: Yes Mode of Transport at Discharge: Other (see comment)(family/car) Transition of Care Consult (CM Consult): Home Hospice [address] [place]: Yes Adanhart Signup: No 
Physical Therapy Consult: No 
Occupational Therapy Consult: No 
Speech Therapy Consult: No 
Current Support Network: Lives with Spouse, Own Home Confirm Follow Up Transport: Family The Patient and/or Patient Representative was Provided with a Choice of Provider and Agrees with the Discharge Plan?: Yes Freedom of Choice List was Provided with Basic Dialogue that Supports the Patient's Individualized Plan of Care/Goals, Treatment Preferences and Shares the Quality Data Associated with the Providers?: Yes Discharge Location Discharge Placement: Home with hospice Reason for Admission:   SBO 
               
RUR Score:   25% PCP: First and Last name:  Johnathan Washington MD 
 Name of Practice:  
 Are you a current patient: Yes/No: yes Approximate date of last visit: 2 years ago Can you participate in a virtual visit if needed: Do you (patient/family) have any concerns for transition/discharge? Not at this time. Plan for utilizing home health: [address] [place] [address]. Current Advanced Directive/Advance Care Plan:  AMD on file. Transition of Care Plan:      Home with hospice. Chart reviewed. CM met with patient at bedside. Patient lives at home with her  and son. Patient stated that she is independent with ADLs and uses no DME. Confirmed demographics on face sheet. Noted consult for hospice. CM discussed hospice with patient and offered freedom of choice, and [facility] CM sent referral via [employer]. Care management will continue to follow. Lawrence Huff, BSW/CRM

## 2021-01-08 NOTE — HOSPICE
[facility] Good Help to Those in Need 
[phone] Patient Name: Damaris Dobbs YOB: 1970 Age: 44 y.o. [facility] RN Note:  Hospice consult noted. Chart reviewed. Plan of care discussed with patients nurse & care manager. In to meet with patient and her , Damir Patino. Discussed Hospice philosophy, general plan of care, levels of care, services and on call procedures. Family information packet provided & reviewed. Currently, plan of care is for patient to have PEG inserted, for comfort. Patient pain management has been titrated and palliative following currently. Pt complains of feeling thirsty, appetite is fair to poor. She denies feeling nauseated since placement of NG. Fentanyl 12 mcg patch started on 1/7/2021; Pt using PCA Morphine for breakthrough. Reviewed options to admit pt as GIP vs home admit once PEG has been placed. Pt has small bowel obstruction, which has added to difficulty with pain management. Patient states that she would like to admit into [facility] care. Plan to continue to follow patient and assist with discharge planning. Family has hospice information and phone number to contact hospice and this liaison over the weekend. Reviewed DME, and plan to order DME prior to discharge home. Thank you for the opportunity to be of service to this patient. Robert Sanchez RN, [facility] Hospice Nurse Liaison [phone] Mobile [phone] Office

## 2021-01-08 NOTE — PROGRESS NOTES
Spiritual Care Assessment/Progress Note [address] 
 
 
NAME: Belén Drew      MRN: [id number] AGE: 44 y.o. SEX: female Yazidi Affiliation: Mormon  
Language: [place] 1/8/2021     Total Time (in minutes): 12 Spiritual Assessment begun in [facility] through conversation with: 
  
    [x]Patient        [] Family    [] Friend(s) Reason for Consult: Palliative Care, Initial/Spiritual Assessment Spiritual beliefs: (Please include comment if needed) [x] Identifies with a hay tradition:     
   [] Supported by a hay community:        
   [] Claims no spiritual orientation:       
   [] Seeking spiritual identity:            
   [] Adheres to an individual form of spirituality:       
   [] Not able to assess:                   
 
    
Identified resources for coping:  
   [x] Prayer                           
   [] Music                  [] Guided Imagery [x] Family/friends                 [] Pet visits [] Devotional reading                         [] Unknown 
   [] Other:                                          
 
 
Interventions offered during this visit: (See comments for more details) Patient Interventions: Affirmation of emotions/emotional suffering, Affirmation of hay, Coping skills reviewed/reinforced, Guidance concerning next steps/process to be expected, Iconic (affirming the presence of God/Higher Power), Normalization of emotional/spiritual concerns, Prayer (actual), Prayer (assurance of) Plan of Care: 
 
 [x] Support spiritual and/or cultural needs  
 [] Support AMD and/or advance care planning process    
 [] Support grieving process 
 [] Coordinate Rites and/or Rituals  
 [] Coordination with community clergy [] No spiritual needs identified at this time 
 [] Detailed Plan of Care below (See Comments)  [] Make referral to Music Therapy 
[] Make referral to Pet Therapy    
[] Make referral to Addiction services [] Make referral to [facility] 
[] Make referral to Spiritual Care Partner 
[] No future visits requested       
[x] Follow up upon further referrals Comments:  for initial support. Pt welcomed visit and shared about her stay. She mentioned being here for a while and feels that now having a discharge time frame is helpful. She mention she is feeling better and grateful for her  being able to visit. Pt requested prayer for her and her family.  provided pastoral listening, support and prayer. Let her know  support and availability. Please contact [phone] [facility] for further support. [address] [employer] [place], MACE 
 [phone]

## 2021-01-08 NOTE — PROGRESS NOTES
Palliative Medicine Consult Benjy: [phone]) Patient Name: Bi Villarreal YOB: 1970 Date of Initial Consult: 1/6/21 Reason for Consult: Overwhelming sx Requesting Provider: Thea Coley Primary Care Physician: Lisa Lindquist MD 
 
 SUMMARY:  
Bi Villarreal is a 44 y.o. 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 Recently had colonic stent placed at [facility], but has been having ongoing nausea and vomiting, now feculent. GI to see if they can open up stent, surgery recommending NGT - uncertain if surgical intervention can help. Can consider palliative venting PEG. Current medical issues leading to Palliative Medicine involvement include: overwhelming sx. Known to me from admission 12/2020 for bacteremia. At that time I increased Oxycodone to 10mg every 4h prn, and timi before meals. Gabapentin caused confusion. Filled Rx 12/10/20. 
 
  
Social:  to Kelsi. 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 They have a 23 yo and 15 yo. 
  
 PALLIATIVE DIAGNOSES:  
1. Abdominal pain- from cancer as well as SBO 
2. Nausea, emesis 3. Poor po intake, weight loss PLAN:  
1. Pt known to me from recent hospital stay, unfortunately has had acute events w/ SOB at [facility] and again now. 2. Pain: From neoplasm and SOB. Pain has improved w/ NGT in place. 3. Morphine PCA , no basal, 1.5mg every 10 min demand. Continue this until venting PEG. 4. Fentanyl patch 12mcg/h every 3 days- started 1/7/21. 
5. When NGT comes out, will change to morphine concentrated SL ggts for better absorption (likely 10mg every 3h prn pain). 6. Goals of care: Conversations on 1/7/21 held and pt and  met w/ hospice today. Plan is for hospice when leaves the hospital.  
7. Code status changed to DNR- DDNR form signed. 8. As pt will be here over the weekend and last time she ate was 5 days ago (only a few bites) , we talked about short course of TPN while she is here to get her as good/strong as she can get before she goes home. Pt has a port, and understands that TPN will not be continued w/ hospice at home. 9. Appreciate primary team ordering TPN. 10. Communicated plan of care with: Palliative IDT, [address] Team incl Dr Elzbieta Joshins Any questions/concerns over the weekend, please call us [phone] for on call doctor, family also has my contact # GOALS OF CARE / TREATMENT PREFERENCES:  
 
GOALS OF CARE: 
Patient/Health Care Proxy Stated Goals: Comfort TREATMENT PREFERENCES:  
Code Status: DNR Advance Care Planning: 
[x] The [facility] Interdisciplinary Team has updated the ACP Navigator with [address] and Patient Capacity Primary Decision Maker: Jasonkizzyal Davekerrie - Spouse - [phone] Advance Care Planning 12/7/2020 Patient's Healthcare Decision Maker is: Verbal statement (Legal Next of Kin remains as decision maker) Confirm Advance Directive None Patient Would Like to Complete Advance Directive No  
Does the patient have other document types - Medical Interventions: Full interventions Other: As far as possible, the palliative care team has discussed with patient / health care proxy about goals of care / treatment preferences for patient. HISTORY:  
 
History obtained from: Pt, chart, staff CHIEF COMPLAINT: Abdominal pain and nausea HPI/SUBJECTIVE: The patient is:  
[x] Verbal and participatory [] Non-participatory due to:  
 
 Pt used 3mg IV morphine in past 2h, pain controlled. Negative flatus. Clinical Pain Assessment (nonverbal scale for severity on nonverbal patients):  
Clinical Pain Assessment Severity: 3 Location: Abdomen Character: Aching, diffuse, cramping Duration: Worse over past couple days Effect: Harder to function, eat, sleep Factors: Better w/ medication Frequency: constant Duration: for how long has pt been experiencing pain (e.g., 2 days, 1 month, years) Frequency: how often pain is an issue (e.g., several times per day, once every few days, constant) FUNCTIONAL ASSESSMENT:  
 
Palliative Performance Scale (PPS): PPS: 70 PSYCHOSOCIAL/SPIRITUAL SCREENING:  
 
Palliative IDT has assessed this patient for cultural preferences / practices and a referral made as appropriate to needs (Cultural Services, Patient Advocacy, Ethics, etc.) Any spiritual / Latter day concerns: 
[] Yes /  [x] No 
 
Caregiver Burnout: 
[] Yes /  [x] No /  [] No Caregiver Present Anticipatory grief assessment:  
[x] Normal  / [] Maladaptive ESAS Anxiety: Anxiety: 0 
 
ESAS Depression: Depression: 0 REVIEW OF SYSTEMS:  
 
Positive and pertinent negative findings in ROS are noted above in HPI. The following systems were [x] reviewed / [] unable to be reviewed as noted in HPI Other findings are noted below. Systems: constitutional, ears/nose/mouth/throat, respiratory, gastrointestinal, genitourinary, musculoskeletal, integumentary, neurologic, psychiatric, endocrine. Positive findings noted below. Modified ESAS Completed by: provider Fatigue: 5 Drowsiness: 0 Depression: 0 Pain: 3 Anxiety: 0 Nausea: 2 Anorexia: 10 Dyspnea: 0 Constipation: Yes Stool Occurrence(s): 1 PHYSICAL EXAM:  
 
From RN flowsheet: 
Wt Readings from Last 3 Encounters:  
01/05/21 89 lb 11.6 oz (40.7 kg) 12/10/20 100 lb 5 oz (45.5 kg)  
11/18/20 102 lb (46.3 kg) Blood pressure 95/61, pulse (!) 110, temperature 97.6 °F (36.4 °C), resp. rate 15, weight 89 lb 11.6 oz (40.7 kg), SpO2 95 %. Pain Scale 1: Numeric (0 - 10) Pain Intensity 1: 0 Pain Onset 1: x4days Pain Location 1: Abdomen Pain Orientation 1: Lower Pain Description 1: Aching Pain Intervention(s) 1: MD notified (comment)(patient to receive add'l pain meds during procedure. ) Last bowel movement, if known: days Constitutional: awake, alert, oriented, fatigued, no sedation or confusion Eyes: pupils equal, anicteric ENMT: no nasal discharge, moist mucous membranes, NGT Cardiovascular: regular rhythm Respiratory: breathing not labored, symmetric Gastrointestinal: soft non-tender, do not hear any bowel sounds Musculoskeletal: no deformity, no tenderness to palpation Skin: warm, dry Neurologic: following commands, moving all extremities Psychiatric: full affect, no hallucinations HISTORY:  
 
Principal Problem: 
  SBO (small bowel obstruction) ([address] (1/5/2021) Past Medical History:  
Diagnosis Date  Cancer ([address] APPENDIX CANCER   
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  
Procedure Laterality Date 2021 Maia Varela N/A 1/6/2021 SIGMOIDOSCOPY FLEXIBLE performed by Rafael Keene MD at [address]  09/2019  HX GI  09/2019 ILEOCECECTOMY  HX GYN  2003 CYST ON OVARY  HX HYSTERECTOMY  2019  IR THORACENTESIS CATH W IMAGE  11/6/2019  DE ABDOMEN SURGERY PROC UNLISTED Family History Problem Relation Age of Onset  Breast Cancer Paternal Grandmother 56  Crohn's Disease Mother  Heart Disease Mother  Cancer Father BLADDER  
 Diabetes Father  No Known Problems Son  No Known Problems Daughter  Anesth Problems Neg Hx History reviewed, no pertinent family history. Social History Tobacco Use  Smoking status: Never Smoker  Smokeless tobacco: Never Used Substance Use Topics  Alcohol use: Not Currently No Known Allergies Current Facility-Administered Medications Medication Dose Route Frequency  TPN ADULT - CENTRAL   IntraVENous CONTINUOUS  
 0.9% sodium chloride with KCl 40 mEq/L infusion   IntraVENous CONTINUOUS  
 fentaNYL (DURAGESIC) 12 mcg/hr patch 1 Patch  1 Patch TransDERmal Q72H  sodium chloride (NS) flush 5-40 mL  5-40 mL IntraVENous Q8H  
 sodium chloride (NS) flush 5-40 mL  5-40 mL IntraVENous PRN  
 acetaminophen (TYLENOL) tablet 650 mg  650 mg Oral Q6H PRN Or  
 acetaminophen (TYLENOL) suppository 650 mg  650 mg Rectal Q6H PRN  promethazine (PHENERGAN) tablet 12.5 mg  12.5 mg Oral Q6H PRN Or  
 ondansetron (ZOFRAN) injection 4 mg  4 mg IntraVENous Q6H PRN  
 heparin (porcine) injection 5,000 Units  5,000 Units SubCUTAneous Q8H  
 pantoprazole (PROTONIX) 40 mg in 0.9% sodium chloride 10 mL injection  40 mg IntraVENous DAILY  morphine injection 2 mg  2 mg IntraVENous Q4H PRN  
 morphine injection 4 mg  4 mg IntraVENous Q4H PRN  
 heparin (porcine) pf 500 Units  500 Units InterCATHeter PRN  
 sodium chloride (NS) flush 5-40 mL  5-40 mL IntraVENous Q8H  
 sodium chloride (NS) flush 5-40 mL  5-40 mL IntraVENous PRN  
 morphine  mg / 30 mL   IntraVENous CONTINUOUS  
 iopamidoL (ISOVUE 300) 61 % contrast injection 50 mL  50 mL Other MULTIPLE DOSE GIVEN  
 
 
 
 LAB AND IMAGING FINDINGS:  
 
Lab Results Component Value Date/Time WBC 10.5 01/06/2021 08:33 AM  
 HGB 12.5 01/06/2021 08:33 AM  
 PLATELET 418 63/19/9936 08:33 AM  
 
Lab Results Component Value Date/Time  Sodium 130 (L) 01/08/2021 03:10 AM  
 Potassium 4.9 01/08/2021 03:10 AM  
 Chloride 103 01/08/2021 03:10 AM  
 CO2 22 01/08/2021 03:10 AM  
 BUN 9 01/08/2021 03:10 AM  
 Creatinine 0.26 (L) 01/08/2021 03:10 AM  
 Calcium 8.0 (L) 01/08/2021 03:10 AM  
 Magnesium 2.3 01/06/2021 08:33 AM  
 Phosphorus 2.9 01/06/2021 08:33 AM  
  
Lab Results Component Value Date/Time Alk. phosphatase 145 (H) 01/06/2021 08:33 AM  
 Protein, total 7.0 01/06/2021 08:33 AM  
 Albumin 2.4 (L) 01/06/2021 08:33 AM  
 Globulin 4.6 (H) 01/06/2021 08:33 AM  
 
Lab Results Component Value Date/Time INR 1.0 10/09/2020 02:32 PM  
 Prothrombin time 10.7 10/09/2020 02:32 PM  
  
Lab Results Component Value Date/Time Iron 22 (L) 10/18/2019 05:49 AM  
 Ferritin 483 (H) 10/18/2019 05:49 AM  
  
No results found for: PH, PCO2, PO2 No components found for: [place] No results found for: CPK, CKMB Prolonged service was provided for  []30 min   []75 min in face to face time in the presence of the patient, spent as noted above. Time Start:  
Time End:  
Note: this can only be billed with 98406 (initial) or 42791 (follow up). If multiple start / stop times, list each separately.

## 2021-01-08 NOTE — PROGRESS NOTES
[facility] [place] [facility] 
[address] [facility], [address] W: [phone]  F: [phone] Reason for Consult:  
Barbara Rosario is a 44 y.o. female who is seen today in hospital consult fu for metastatic appendix cancer and SBO Treatment History:  
· EXPLORATORY LAPAROTOMY SIERRA BSO, TUMOR DEBULKING: ILEOCECAL RESECTION; OMENTECTOMY · FOLFOX 50% DR 12/3/19 - 3/4/20 · Per [facility] recommendation, dropped 5FU Bolus/Leucovorin and increased Oxaliplatin to 60 mg/m2 and 5FU pump tp 1,800 mg/m2 with Cycle 8 on 3/18/20 · Oxaliplatin increased to 85 mg/m2 with Cycle 10 on 4/15/20 - 5/27/20 
· CTs C/A/P 3/20 stable · CTs C/A/P 5/26/20 stable · Chemo held on 6/20/20 d/t thrombocytopenia · Oxaliplatin DR 25% with Cycle 14 on 6/17/20 d/t thrombocytopenia · Oxaliplatin DR 25% more (50% total) with Cycle 15 on 7/8/20 · Chemo held on 7/22/20 d/t thrombocytopenia · CT C/A/P 7/29/20 at [facility] with re demonstrated infiltrative tissue in the pelvis, favored to represented peritoneal carcinomatosis; soft tissue tethering to the colon, multiple loops of small bowel, and the urinary bladder; redemonstrated ill-defined complex fluid in the left upper quadrant - not significantly changed relative to most recent prior study; few clustered small pulmonary nodules are identified in the right lower lobe · Exploratory lap at [facility] 8/20. · Maintenance 5FU (pump only) 8/26/20 - 9/23/20 · CTs 10/20 at [facility] showed slight progression · Added Oxaliplatin back in on 10/7/20 - Current · CT A/P 12/6/20: Suspect peritoneal carcinomatosis STAGE: 4 with carcinomatosis History of Present Illness:  
Barbara Rosario is a 44 y.o. female seen today in hospital consult for metastatic appendix cancer admitted for SBO post colon stent [facility]. Pt in chair and has NGT. Has procedure yesterday. Pt states she is tired of all procedures/ treatment. Weak overall. Cannot eat and very thin. Wants to call  this am.  
Pt was on FOLFOX chemotherapy but was held since 11/18. INTERIM HX: pt seen today for hospital fu. In bed today. States feeling a little better. Met with hospice. Palliative care still seeing pt. Pt is happy with plan of supportive care. May get some TPN prior to going home. No F/C/CP/SOB/. Chronic abd pain/ occ N/V/D. Past Medical History:  
Diagnosis Date  Cancer [address] [address] APPENDIX CANCER   
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  
Procedure Laterality Date 2021 Maia Varela N/A 1/6/2021 SIGMOIDOSCOPY FLEXIBLE performed by Rowena Rashid MD at [address]  09/2019  HX GI  09/2019 ILEOCECECTOMY  HX GYN  2003 CYST ON OVARY  HX HYSTERECTOMY  2019  IR THORACENTESIS CATH W IMAGE  11/6/2019  IA ABDOMEN SURGERY PROC UNLISTED Social History Tobacco Use  Smoking status: Never Smoker  Smokeless tobacco: Never Used Substance Use Topics  Alcohol use: Not Currently Family History Problem Relation Age of Onset  Breast Cancer Paternal Grandmother 56  Crohn's Disease Mother  Heart Disease Mother  Cancer Father BLADDER  
 Diabetes Father  No Known Problems Son  No Known Problems Daughter  Anesth Problems Neg Hx Current Facility-Administered Medications Medication Dose Route Frequency  TPN ADULT - CENTRAL   IntraVENous CONTINUOUS  
 fentaNYL (DURAGESIC) 12 mcg/hr patch 1 Patch  1 Patch TransDERmal Q72H  sodium chloride (NS) flush 5-40 mL  5-40 mL IntraVENous Q8H  
 sodium chloride (NS) flush 5-40 mL  5-40 mL IntraVENous PRN  
 acetaminophen (TYLENOL) tablet 650 mg  650 mg Oral Q6H PRN  Or  
  acetaminophen (TYLENOL) suppository 650 mg  650 mg Rectal Q6H PRN  promethazine (PHENERGAN) tablet 12.5 mg  12.5 mg Oral Q6H PRN Or  
 ondansetron (ZOFRAN) injection 4 mg  4 mg IntraVENous Q6H PRN  
 heparin (porcine) injection 5,000 Units  5,000 Units SubCUTAneous Q8H  
 pantoprazole (PROTONIX) 40 mg in 0.9% sodium chloride 10 mL injection  40 mg IntraVENous DAILY  morphine injection 2 mg  2 mg IntraVENous Q4H PRN  
 morphine injection 4 mg  4 mg IntraVENous Q4H PRN  
 heparin (porcine) pf 500 Units  500 Units InterCATHeter PRN  
 sodium chloride (NS) flush 5-40 mL  5-40 mL IntraVENous Q8H  
 sodium chloride (NS) flush 5-40 mL  5-40 mL IntraVENous PRN  
 morphine  mg / 30 mL   IntraVENous CONTINUOUS  
 iopamidoL (ISOVUE 300) 61 % contrast injection 50 mL  50 mL Other MULTIPLE DOSE GIVEN No Known Allergies A complete review of systems was obtained, negative except as described above Physical Exam:  
 
Visit Vitals BP 93/60 Pulse (!) 106 Temp 98.1 °F (36.7 °C) Resp 15 Wt 89 lb 11.6 oz (40.7 kg) LMP  (LMP Unknown) SpO2 98% BMI 15.89 kg/m² ECOG PS: ill appearing WF in NAD General: No distress Eyes: Anicteric sclerae HENT: Atraumaticwith NGT Neck: Supple Resp: Normal respiratory effort GI: slight distended MS: Ambulatory usually Skin: Warm dry. Psych: Alert, oriented, appropriate affect, normal judgment/insight Results:  
 
Lab Results Component Value Date/Time WBC 10.5 01/06/2021 08:33 AM  
 HGB 12.5 01/06/2021 08:33 AM  
 HCT 36.0 01/06/2021 08:33 AM  
 PLATELET 282 54/08/0050 08:33 AM  
 MCV 93.3 01/06/2021 08:33 AM  
 ABS. NEUTROPHILS 7.5 01/06/2021 08:33 AM  
 Hemoglobin (POC) 10.8 (L) 09/25/2019 04:11 PM  
 Hematocrit (POC) 32 (L) 09/25/2019 12:10 PM  
 
Lab Results Component Value Date/Time  Sodium 130 (L) 01/08/2021 03:10 AM  
 Potassium 4.9 01/08/2021 03:10 AM  
 Chloride 103 01/08/2021 03:10 AM  
 CO2 22 01/08/2021 03:10 AM  
 Glucose 67 01/08/2021 03:10 AM  
 BUN 9 01/08/2021 03:10 AM  
 Creatinine 0.26 (L) 01/08/2021 03:10 AM  
 GFR est AA >60 01/08/2021 03:10 AM  
 GFR est non-AA >60 01/08/2021 03:10 AM  
 Calcium 8.0 (L) 01/08/2021 03:10 AM  
 Sodium (POC) 137 09/25/2019 12:10 PM  
 Potassium (POC) 3.7 09/25/2019 12:10 PM  
 Chloride (POC) 105 09/25/2019 12:10 PM  
 Glucose (POC) 85 09/25/2019 12:10 PM  
 BUN (POC) 4 (L) 09/25/2019 12:10 PM  
 Creatinine (POC) 0.5 (L) 09/25/2019 12:10 PM  
 Calcium, ionized (POC) 1.14 09/25/2019 12:10 PM  
 
Lab Results Component Value Date/Time Bilirubin, total 0.7 01/06/2021 08:33 AM  
 ALT (SGPT) 16 01/06/2021 08:33 AM  
 Alk. phosphatase 145 (H) 01/06/2021 08:33 AM  
 Protein, total 7.0 01/06/2021 08:33 AM  
 Albumin 2.4 (L) 01/06/2021 08:33 AM  
 Globulin 4.6 (H) 01/06/2021 08:33 AM  
 
CT Results (most recent): 
Results from Hospital Encounter encounter on 01/05/21 CT ABD PELV W CONT Narrative EXAM: CT ABD PELV W CONT INDICATION: Vomiting, history of metastatic cancer COMPARISON: 12/6/2020 CONTRAST: 100 mL of Isovue-370. TECHNIQUE:  
Following the uneventful intravenous administration of contrast, thin axial 
images were obtained through the abdomen and pelvis. Coronal and sagittal 
reconstructions were generated. Oral contrast was not administered. CT dose 
reduction was achieved through use of a standardized protocol tailored for this 
examination and automatic exposure control for dose modulation. FINDINGS:  
LOWER THORAX: No significant abnormality in the incidentally imaged lower chest. 
LIVER: Unchanged 2.5 cm cyst left hepatic lobe. BILIARY TREE: Gallstone. Pericholecystic edema. CBD is not dilated. SPLEEN: within normal limits. PANCREAS: No mass or ductal dilatation. ADRENALS: Unremarkable. KIDNEYS: Mild bilateral hydronephrosis right greater than left. STOMACH: Unremarkable. SMALL BOWEL: Diffuse small bowel distention is now noted with decompressed loops 
in the right lower quadrant. COLON: New stent in the sigmoid colon. The remainder of the colon is distended 
with moderate fecal stasis. APPENDIX: Surgically absent. PERITONEUM: Free fluid in the right abdomen increased compared to the prior 
exam. Mass effect upon the liver and septations are noted compatible with 
loculations. Soft tissue density in the deep pelvis is unchanged. RETROPERITONEUM: No lymphadenopathy or aortic aneurysm. REPRODUCTIVE ORGANS: The uterus is surgically absent. URINARY BLADDER: No mass or calculus. BONES: No destructive bone lesion. ABDOMINAL WALL: No mass or hernia. ADDITIONAL COMMENTS: N/A Impression IMPRESSION: 
Small bowel obstruction likely related to adhesion formation in the mid abdomen, 
with decompressed loops in the right lower quadrant. Diffuse colonic distention 
and moderate fecal stasis. Interval placement of a sigmoid colon stent. Increased free fluid somewhat loculated in appearance in the right upper 
quadrant. Likely peritoneal disease unchanged in the deep pelvis. Mild bilateral 
hydronephrosis right greater than left. Cholelithiasis. Records reviewed and summarized above. Pathology report(s) reviewed above. Radiology report(s) reviewed above. Assessment/PLAN:  
 
1) Stage 4 / Metastatic Appendiceal Cancer Post EXPLORATORY LAPAROTOMY SIERRA BSO, TUMOR DEBULKING: ILEOCECAL RESECTION; OMENTECTOMY 9/25/19 Surgery done for obstruction. Patient choose to have chemo locally and still see [facility]. She was on 5FU only from 8/26/20 - 9/23/20. She went back to [facility] in 10/20 and CTs there showed slight progression. Added Oxaliplatin back in on 10/7/20 since patient has not failed this regimen. Last chemo with modified FOLFOX was on 11/18/20. She was due for chemo again on 12/2/20 but held due to patient not feeling well/nausea/vomiting. Pt seen today in hospital fu Admitted for SBO/ post colonic stent [facility]. Pt and  ready for hospice and seen by palliative care/ hospice today. Pt is happy with plan of supportive care. States thinks she is going home on tues. More comfortable today. 2) Abdominal Pain/Cramping Per Palliative care 3) SBO per GI/ surgery. 4) Hx of Left Pleural Effusion post Thoracentesis x 2 No cytology. Stable on CTs.  
 
5) Chemo Induced Diarrhea Resolved. Will continue to monitor. 6) Chemo Induced Neuropathy Stopped Gabapentin due to confusion/indigestion. 7) Insomnia/Anxiety about Health. Takes Ativan PRN which helps 8) Psychosocial 
Mood good, coping well considering difficult disease. She has great family support. SW support as needed. Will follow. Call if questions. I appreciate the opportunity to participate in Ms. Wilbert sorto.  
 
Signed By: Yamilet Mann,

## 2021-01-08 NOTE — PROGRESS NOTES
Bedside and Verbal shift change report given to Polo Luna RN (oncoming nurse) by Chris Licea RN (offgoing nurse). Report included the following information SBAR.

## 2021-01-08 NOTE — ROUTINE PROCESS
Bedside shift change report given to Jeet (oncoming nurse) by Lurdes Dooley (offgoing nurse). Report included the following information SBAR.

## 2021-01-08 NOTE — PROGRESS NOTES
[address] [facility] Adult  Hospitalist Group Hospitalist Progress Note Josue Epstein MD 
Answering service: [phone] OR [phone] from in house phone Date of Service:  2021 NAME:  Judith Villafana :  1970 MRN:  [id number] Please note that this dictation was completed with Gl[employer], the computer voice recognition software. Quite often unanticipated grammatical, syntax, homophones, and other interpretive errors are inadvertently transcribed by the computer software. Please disregard these errors. Please excuse any errors that have escaped final proofreading. Admission Summary:  
 53-year-old woman with a past medical history significant for metastatic cancer of the appendix, who was in her usual state of health until yesterday when the patient developed abdominal pain. The pain is located at the center of the abdomen, sharp pain, constant with no radiation, 9/10 in severity, associated with nausea, but no vomiting. The patient recently had colonic stent placed at the [facility]. The patient was taken to [facility] Emergency Room at [facility] for further evaluation of the abdominal pain. When the patient arrived at the emergency room, CT scan of the abdomen and pelvis was performed. The CT scan shows evidence of small bowel obstruction. The emergency room physician consulted the general surgeon on-call, admission to the hospitalist service was advised. The patient denies fever, rigors, or chills. She was last admitted to this hospital from 2020 to 12/10/2020. The patient was admitted and treated for sepsis, secondary to E. coli bacteremia. Interval history / Subjective:  
   
Patient seen and examined today. Discussed with the  at bedside. Plan is for hospice at home PEG tube for decompression TPN will need temporary measures in the hospital to get her over the hump and discharged to home Patient and family agreed to above plan 
Plan for status over the weekend Assessment & Plan:  
 
  Small bowel obstruction. POA  
CT scan results reviewed Bowel rest, serial KUBs, hydration Pain management No surgery indicated per surgery team  
NGT  
S/p colo on 1/7 by Dr Radha Howard  
Due to anatomy another stent was not placed within the previous stent. Procedure was aborted Plan for PEG for decompression Mon or Tuesday by GI Started TPN , as a bridge Hyponatremia. ,130  improving This may be due to volume depletion. It could also be related to the patient's metastatic cancer. Hypokalemia 3.2 On iv fluids , add K , changed iv fluids Now 4.9 STOP KCL IV Metastatic cancer of the appendix. Oncology is on board No plans for chemo and palliative and hospice now consulted Started on PCA by palliative team 
 
 
 
Code status: full DVT prophylaxis: 
 
Care Plan discussed with: Patient/Family Anticipated Disposition: Home w/Family Anticipated Discharge: Greater than 48 hours Hospital Problems  Date Reviewed: 1/6/2021 Codes Class Noted POA * (Principal) [facility] ICD-10-CM: P57.177 ICD-9-CM: 560.9  1/5/2021 Yes Review of Systems: A comprehensive review of systems was negative except for that written in the HPI. Vital Signs:  
 Last 24hrs VS reviewed since prior progress note. Most recent are: 
Visit Vitals BP 93/60 Pulse (!) 106 Temp 98.1 °F (36.7 °C) Resp 15 Wt 40.7 kg (89 lb 11.6 oz) SpO2 98% BMI 15.89 kg/m² Intake/Output Summary (Last 24 hours) at 1/8/2021 1652 Last data filed at 1/8/2021 1452 Gross per 24 hour Intake  Output 1500 ml Net -1500 ml Physical Examination: I had a face to face encounter with this patient and independently examined them on 01/08/21 as outlined below: 
     
Constitutional:  No acute distress, ENT:  Oral mucosa moist, oropharynx benign. Resp:  CTA bilaterally. No wheezing/rhonchi/rales. No accessory muscle use CV:  Regular rhythm, normal rate, no murmurs, gallops, rubs GI:  Soft, distented and diffuse tenderness Musculoskeletal:  No edema, warm, Neurologic:  Moves all extremities. Data Review:  
 Review and/or order of clinical lab test 
 
 
Labs:  
 
Recent Labs 01/06/21 
3355 01/05/21 
2116 WBC 10.5 9.7 HGB 12.5 13.0 HCT 36.0 38.3  357 Recent Labs 01/08/21 
0310 01/06/21 
4620 01/05/21 2116 * 127* 126*  
K 4.9 3.2* 3.4*  
 91* 87* CO2 22 25 27 BUN 9 12 12 CREA 0.26* 0.32* 0.56 GLU 67 97 126* CA 8.0* 8.5 8.9 MG  --  2.3 2.0 PHOS  --  2.9  --   
 
Recent Labs 01/06/21 
3734 01/05/21 2116 ALT 16 13 * 139* TBILI 0.7 0.7 TP 7.0 7.1 ALB 2.4* 2.4*  
GLOB 4.6* 4.7* LPSE 32* 34* No results for input(s): INR, PTP, APTT, INREXT, INREXT in the last 72 hours. No results for input(s): FE, TIBC, PSAT, FERR in the last 72 hours. No results found for: FOL, RBCF No results for input(s): PH, PCO2, PO2 in the last 72 hours. No results for input(s): CPK, CKNDX, TROIQ in the last 72 hours. No lab exists for component: CPKMB No results found for: CHOL, CHOLX, CHLST, CHOLV, HDL, HDLP, LDL, LDLC, DLDLP, TGLX, TRIGL, TRIGP, CHHD, CHHDX Lab Results Component Value Date/Time Glucose (POC) 85 09/25/2019 12:10 PM  
 
Lab Results Component Value Date/Time  Color YELLOW 01/05/2021 10:42 PM  
 Appearance CLEAR 01/05/2021 10:42 PM  
 Specific gravity 1.010 01/05/2021 10:42 PM  
 Specific gravity 1.006 10/09/2020 02:06 PM  
 pH (UA) 5.5 01/05/2021 10:42 PM  
 Protein Negative 01/05/2021 10:42 PM  
 Glucose Negative 01/05/2021 10:42 PM  
 Ketone TRACE (A) 01/05/2021 10:42 PM  
 Bilirubin Negative 01/05/2021 10:42 PM  
 Urobilinogen 0.2 01/05/2021 10:42 PM  
 Nitrites Negative 01/05/2021 10:42 PM  
 Leukocyte Esterase Negative 01/05/2021 10:42 PM  
 Epithelial cells FEW 01/05/2021 10:42 PM  
 Bacteria Negative 01/05/2021 10:42 PM  
 WBC 0-4 01/05/2021 10:42 PM  
 RBC 10-20 01/05/2021 10:42 PM  
 
 
 
Medications Reviewed:  
 
Current Facility-Administered Medications Medication Dose Route Frequency  TPN ADULT - CENTRAL   IntraVENous CONTINUOUS  
 0.9% sodium chloride with KCl 40 mEq/L infusion   IntraVENous CONTINUOUS  
 fentaNYL (DURAGESIC) 12 mcg/hr patch 1 Patch  1 Patch TransDERmal Q72H  sodium chloride (NS) flush 5-40 mL  5-40 mL IntraVENous Q8H  
 sodium chloride (NS) flush 5-40 mL  5-40 mL IntraVENous PRN  
 acetaminophen (TYLENOL) tablet 650 mg  650 mg Oral Q6H PRN Or  
 acetaminophen (TYLENOL) suppository 650 mg  650 mg Rectal Q6H PRN  promethazine (PHENERGAN) tablet 12.5 mg  12.5 mg Oral Q6H PRN Or  
 ondansetron (ZOFRAN) injection 4 mg  4 mg IntraVENous Q6H PRN  
 heparin (porcine) injection 5,000 Units  5,000 Units SubCUTAneous Q8H  
 pantoprazole (PROTONIX) 40 mg in 0.9% sodium chloride 10 mL injection  40 mg IntraVENous DAILY  morphine injection 2 mg  2 mg IntraVENous Q4H PRN  
 morphine injection 4 mg  4 mg IntraVENous Q4H PRN  
 heparin (porcine) pf 500 Units  500 Units InterCATHeter PRN  
 sodium chloride (NS) flush 5-40 mL  5-40 mL IntraVENous Q8H  
 sodium chloride (NS) flush 5-40 mL  5-40 mL IntraVENous PRN  
 morphine  mg / 30 mL   IntraVENous CONTINUOUS  
 iopamidoL (ISOVUE 300) 61 % contrast injection 50 mL  50 mL Other MULTIPLE DOSE GIVEN  
 
______________________________________________________________________ EXPECTED LENGTH OF STAY: 3d 17h ACTUAL LENGTH OF STAY:          3 Warren Taylor MD

## 2021-01-08 NOTE — PROGRESS NOTES
[address] 
[address] [place], [address] 
[phone] GI PROGRESS NOTE 
 
 
NAME:   Judith Villafana :    1970 MRN:    [id number] Assessment/Plan Abdominal pain/ Small bowel Obstruction/Colonic Obstruction/ Hx of appendiceal cancer/carcinomatosis: 
-CT scan abdomen/pelvis: Small bowel obstruction likely related to adhesion formation in the mid abdomen, with decompressed loops in the right lower quadrant. Diffuse colonic distention and moderate fecal stasis. Interval placement of a sigmoid colon stent. Increased free fluid somewhat loculated in appearance in the right upper 
quadrant. Likely peritoneal disease unchanged in the deep pelvis. Mild bilateral 
hydronephrosis right greater than left. Cholelithiasis -IV hydration 
-Pain management and anti-emetic as needed  
-Flex sig 2021: unable to tunnel another stent into current colonic stent due to angulation and narrowing   
-Continue NG tube for LWS for decompression 
-Repeat imaging with KUB 
-Oncology following 
-Palliative following- discussion of hospice, venting PEG, TPN for nutrition, etc   
-Supportive care 
  
Will discuss with Dr. Bundy Livers Patient Active Problem List  
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  
 Carcinomatosis (HCC) C80.0  Chemotherapy follow-up examination Z09  Port-A-Cath in place Z95.828  Thrombocytosis (HCC) D47.3  Subphrenic abscess ([address] K65.1  Generalized abdominal pain R10.84  
 Diarrhea R19.7  Insomnia G47.00  
  Chemotherapy induced diarrhea K52.1, T45.1X5A  History of pleural effusion Z87.09  
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  
 SBO (small bowel obstruction) ([address] K56.609 Subjective:  
 
Thu Arzola is a 44 y.o.  female Patient reports feeling a little better, abdomen feels a little softer. Stated had very small bowel movement of yesterday- no presence of blood. NG to suction, output of 2200 ml in last 24 hours. Denies nausea, no vomiting, no flatus. Objective: VITALS:  
Last 24hrs VS reviewed since prior hospitalist progress note. Most recent are: 
Visit Vitals BP 95/61 Pulse (!) 110 Temp 97.6 °F (36.4 °C) Resp 15 Wt 40.7 kg (89 lb 11.6 oz) SpO2 95% BMI 15.89 kg/m² Intake/Output Summary (Last 24 hours) at 1/8/2021 1208 Last data filed at 1/8/2021 6562 Gross per 24 hour Intake  Output 2200 ml Net -2200 ml PHYSICAL EXAM: 
General:          WD, WN. Alert, cooperative, no acute distress, frail HEENT:           NC, Atraumatic.  PERRLA, EOMI. Anicteric sclerae. Lungs:             CTA Bilaterally.  Port-a-cath to left chest 
Heart:              Regular  rhythm,  No murmur,  
Abdomen:        Soft, mild distention, bowel sounds, minimal tenderness with deep palpation to lower abdomen, no palpable masses, NG tube to LWS with copious light brown output Extremities:     No edema Neurologic:      CN 2-12 gi, Alert and oriented X 3.  No acute neurological distress Psych:             Good insight. Not anxious nor agitated. Lab Data Recent Results (from the past 12 hour(s)) METABOLIC PANEL, BASIC  Collection Time: 01/08/21  3:10 AM  
 Result Value Ref Range Sodium 130 (L) 136 - 145 mmol/L Potassium 4.9 3.5 - 5.1 mmol/L Chloride 103 97 - 108 mmol/L  
 CO2 22 21 - 32 mmol/L Anion gap 5 5 - 15 mmol/L Glucose 67 65 - 100 mg/dL BUN 9 6 - 20 MG/DL Creatinine 0.26 (L) 0.55 - 1.02 MG/DL  
 BUN/Creatinine ratio 35 (H) 12 - 20 GFR est AA >60 >60 ml/min/1.73m2 GFR est non-AA >60 >60 ml/min/1.73m2 Calcium 8.0 (L) 8.5 - 10.1 MG/DL Medications: Reviewed Moshe Boo NP

## 2021-01-08 NOTE — PROGRESS NOTES
Progress Note Patient: Kai Vance MRN: [id number]  SSN: [id number] YOB: 1970  Age: 44 y.o. Sex: female Admit Date: 2021 1 Day Post-Op Procedure:  Procedure(s): SIGMOIDOSCOPY FLEXIBLE Subjective:  
 
Patient patient feeling a little better today unsure if it is due to NG tube or her pain medicine. Objective:  
 
Visit Vitals /70 (BP 1 Location: Left arm, BP Patient Position: At rest) Pulse (!) 104 Temp 97.4 °F (36.3 °C) Resp 16 Wt 89 lb 11.6 oz (40.7 kg) SpO2 96% BMI 15.89 kg/m² Temp (24hrs), Av.9 °F (36.6 °C), Min:97.4 °F (36.3 °C), Max:98.5 °F (36.9 °C) Physical Exam:   
General-alert in no acute distress NG tube feculent Data Review: images and reports reviewed Lab Review: All lab results for the last 24 hours reviewed. No results found for this or any previous visit (from the past 24 hour(s)). Assessment:  
 
Hospital Problems  Date Reviewed: 2021 Codes Class Noted POA * (Principal) [facility] ICD-10-CM: Z85.994 ICD-9-CM: 560.9  2021 Yes Plan/Recommendations/Medical Decision Making:  
Patient has decided on hospice. She would also like a venting PEG tube placed. Will defer to GI regarding when this will be done. Will sign off call if needed

## 2021-01-09 NOTE — PROGRESS NOTES
[address] [facility] Adult  Hospitalist Group Hospitalist Progress Note Surya Dash NP Answering service: [phone] OR [phone] from in house phone Date of Service:  2021 NAME:  Thu Arzola :  1970 MRN:  [id number] Admission Summary:  
 33-year-old woman with a past medical history significant for metastatic cancer of the appendix, who was in her usual state of health until yesterday when the patient developed abdominal pain. The pain is located at the center of the abdomen, sharp pain, constant with no radiation, 9/10 in severity, associated with nausea, but no vomiting. The patient recently had colonic stent placed at the [facility]. The patient was taken to [facility] Emergency Room at [facility] for further evaluation of the abdominal pain. When the patient arrived at the emergency room, CT scan of the abdomen and pelvis was performed. The CT scan shows evidence of small bowel obstruction. The emergency room physician consulted the general surgeon on-call, admission to the hospitalist service was advised. The patient denies fever, rigors, or chills. She was last admitted to this hospital from 2020 to 12/10/2020. The patient was admitted and treated for sepsis, secondary to E. coli bacteremia. Interval history / Subjective: No issues overnight and she seems to be resting comfortably. TPN started. NGT continues to decompress. Pain controlled on current therapy. Plan for PEG on Monday and then home with Hospice. Assessment & Plan:  
 
Appendix cancer s/p stenting at [facility] now with small bowel obstruction 
- Not a candidate for surgery and planning for palliative care and Hospice 
- NPO with NGT to LIWS  
- S/p colo on  by Dr Suyapa Serrato for PEG for decompression Monday with GI 
- TPN over the weekend - Palliative Care following and using PCA for pain control 
- Oncology following (see Heme/Onc notes for full details of chemo cycles) Hyponatremia, POA  
- Multifactorial -> 127 today - Will monitor daily as high risk for re-feeding syndrome with TPN initiation Moderate nutritional deficit - BMI 15.93 kg/m2 
- TPN Code status: DNR 
DVT prophylaxis: Heparin SQ Care Plan discussed with: Patient/Family, RN, Attending Anticipated Disposition: Home w/Family on Tuesday likely Anticipated Discharge: Greater than 48 hours with Hospice Hospital Problems  Date Reviewed: 1/6/2021 Codes Class Noted POA * (Principal) SBO (small bowel obstruction) ([facility] ICD-10-CM: L22.149 ICD-9-CM: 560.9  1/5/2021 Yes Review of Systems:  
Denies CP or SOB, no current abdominal pain, no N/V, no leg pain, no headache Vital Signs:  
 Last 24hrs VS reviewed since prior progress note. Most recent are: 
Visit Vitals BP 99/69 Pulse (!) 110 Temp 97.8 °F (36.6 °C) Resp 16 Wt 40.8 kg (89 lb 15.2 oz) SpO2 95% BMI 15.93 kg/m² Intake/Output Summary (Last 24 hours) at 1/9/2021 1049 Last data filed at 1/9/2021 8692 Gross per 24 hour Intake 30 ml Output  Net 30 ml Physical Examination:  
 
I had a face to face encounter with this patient and independently examined them on 01/09/21 as outlined below: 
     
General: Anorexic appearing female in NAD 
EENT: EOMI. Anicteric sclerae. Oral mucous moist, oropharynx benign Resp: CTA bilaterally. No wheezing/rhonchi/rales. No accessory muscle use CV: Regular rhythm, normal rate, no murmurs, gallops, rubs GI: Soft, mildly distended, generalized soreness with palpation. Hypoactive/minimal bowel sounds Extremities: No edema, warm, 2+ pulses throughout Neurologic: Moves all extremities. AAOx3, CN II-XII grossly intact Psych: Good insight. Not anxious nor agitated. Skin: Good Turgor, no rashes or ulcers Data Review:  Review and/or order of clinical lab test and imaging 1/9 
 
 
Labs:  
 
No results for input(s): WBC, HGB, HCT, PLT, HGBEXT, HCTEXT, PLTEXT, HGBEXT, HCTEXT, PLTEXT in the last 72 hours. 
Recent Labs  
  01/09/21 
0735 01/08/21 
0310  
* 130*  
K 4.2 4.9  
CL 99 103  
CO2 23 22  
BUN 11 9  
CREA 0.40* 0.26*  
* 67  
CA 8.3* 8.0*  
 
No results for input(s): ALT, AP, TBIL, TBILI, TP, ALB, GLOB, GGT, AML, LPSE in the last 72 hours. 
 
No lab exists for component: SGOT, GPT, AMYP, HLPSE 
No results for input(s): INR, PTP, APTT, INREXT, INREXT in the last 72 hours.  
No results for input(s): FE, TIBC, PSAT, FERR in the last 72 hours.  
No results found for: FOL, RBCF  
No results for input(s): PH, PCO2, PO2 in the last 72 hours. 
No results for input(s): CPK, CKNDX, TROIQ in the last 72 hours. 
 
No lab exists for component: CPKMB 
No results found for: CHOL, CHOLX, CHLST, CHOLV, HDL, HDLP, LDL, LDLC, DLDLP, TGLX, TRIGL, TRIGP, CHHD, CHHDX 
Lab Results  
Component Value Date/Time  
 Glucose (POC) 222 (H) 01/09/2021 06:21 AM  
 Glucose (POC) 205 (H) 01/09/2021 12:04 AM  
 Glucose (POC) 85 09/25/2019 12:10 PM  
 
Lab Results  
Component Value Date/Time  
 Color YELLOW 01/05/2021 10:42 PM  
 Appearance CLEAR 01/05/2021 10:42 PM  
 Specific gravity 1.010 01/05/2021 10:42 PM  
 Specific gravity 1.006 10/09/2020 02:06 PM  
 pH (UA) 5.5 01/05/2021 10:42 PM  
 Protein Negative 01/05/2021 10:42 PM  
 Glucose Negative 01/05/2021 10:42 PM  
 Ketone TRACE (A) 01/05/2021 10:42 PM  
 Bilirubin Negative 01/05/2021 10:42 PM  
 Urobilinogen 0.2 01/05/2021 10:42 PM  
 Nitrites Negative 01/05/2021 10:42 PM  
 Leukocyte Esterase Negative 01/05/2021 10:42 PM  
 Epithelial cells FEW 01/05/2021 10:42 PM  
 Bacteria Negative 01/05/2021 10:42 PM  
 WBC 0-4 01/05/2021 10:42 PM  
 RBC 10-20 01/05/2021 10:42 PM  
 
 
 
Medications Reviewed:  
 
Current Facility-Administered Medications  
Medication Dose Route Frequency  
  insulin lispro (HUMALOG) injection   SubCUTAneous Q6H  
 glucose chewable tablet 16 g  4 Tab Oral PRN  
 dextrose (D50W) injection syrg 12.5-25 g  12.5-25 g IntraVENous PRN  
 glucagon (GLUCAGEN) injection 1 mg  1 mg IntraMUSCular PRN  
 TPN ADULT - CENTRAL AA 5% D15% W/ ELECTROLYTES AND CA   IntraVENous CONTINUOUS  
 TPN ADULT - CENTRAL   IntraVENous CONTINUOUS  
 fentaNYL (DURAGESIC) 12 mcg/hr patch 1 Patch  1 Patch TransDERmal Q72H  sodium chloride (NS) flush 5-40 mL  5-40 mL IntraVENous Q8H  
 sodium chloride (NS) flush 5-40 mL  5-40 mL IntraVENous PRN  
 acetaminophen (TYLENOL) tablet 650 mg  650 mg Oral Q6H PRN Or  
 acetaminophen (TYLENOL) suppository 650 mg  650 mg Rectal Q6H PRN  promethazine (PHENERGAN) tablet 12.5 mg  12.5 mg Oral Q6H PRN Or  
 ondansetron (ZOFRAN) injection 4 mg  4 mg IntraVENous Q6H PRN  
 heparin (porcine) injection 5,000 Units  5,000 Units SubCUTAneous Q8H  
 pantoprazole (PROTONIX) 40 mg in 0.9% sodium chloride 10 mL injection  40 mg IntraVENous DAILY  morphine injection 2 mg  2 mg IntraVENous Q4H PRN  
 morphine injection 4 mg  4 mg IntraVENous Q4H PRN  
 heparin (porcine) pf 500 Units  500 Units InterCATHeter PRN  
 sodium chloride (NS) flush 5-40 mL  5-40 mL IntraVENous Q8H  
 sodium chloride (NS) flush 5-40 mL  5-40 mL IntraVENous PRN  
 morphine  mg / 30 mL   IntraVENous CONTINUOUS  
 iopamidoL (ISOVUE 300) 61 % contrast injection 50 mL  50 mL Other MULTIPLE DOSE GIVEN  
 
______________________________________________________________________ EXPECTED LENGTH OF STAY: 3d 17h ACTUAL LENGTH OF STAY:          4 Kirstin Rosenberg NP

## 2021-01-09 NOTE — PROGRESS NOTES
Bedside and Verbal shift change report given to Moshe Paredes RN (oncoming nurse) by Torres Chappell RN (offgoing nurse). Report included the following information SBAR.

## 2021-01-09 NOTE — PROGRESS NOTES
01/08/21 2200 Vital Signs Temp 98.2 °F (36.8 °C) Temp Source Oral  
Pulse (Heart Rate) (!) 112 Heart Rate Source Brachial;Monitor Resp Rate 16  
O2 Sat (%) 95 % Level of Consciousness Alert /71 MAP (Calculated) 82 MEWS Score 3  
consistent with previous MD aware.

## 2021-01-10 NOTE — HOSPICE
[facility] Good Help to Those in Need 
[phone] Patient Name: Paulita Brunner YOB: 1970 Age: 44 y.o. [facility] RN Note:  Introduced self to patient and spouse who is at bedside. Pt sitting in chair with complaints of poor sleep last night. She denies nausea and reports pain is controlled with Fentanyl & Morphine PCA. Neither patient nor spouse had any hospice related questions at this time. We will continue to follow and assist with discharge planning. Thank you for the opportunity to be of service to this patient.

## 2021-01-10 NOTE — PROGRESS NOTES
[address] [facility] Adult  Hospitalist Group Hospitalist Progress Note Edward Walsh MD 
Answering service: [phone] or [phone] from in house phone Date of Service:  1/10/2021 NAME:  Bruno Quinones :  1970 MRN:  [id number] Admission Summary:  
 78-year-old woman with a past medical history significant for metastatic cancer of the appendix, who was in her usual state of health until yesterday when the patient developed abdominal pain. The pain is located at the center of the abdomen, sharp pain, constant with no radiation, 9/10 in severity, associated with nausea, but no vomiting. The patient recently had colonic stent placed at the [facility]. The patient was taken to [facility] Emergency Room at [facility] for further evaluation of the abdominal pain. When the patient arrived at the emergency room, CT scan of the abdomen and pelvis was performed. The CT scan shows evidence of small bowel obstruction. The emergency room physician consulted the general surgeon on-call, admission to the hospitalist service was advised. The patient denies fever, rigors, or chills. She was last admitted to this hospital from 2020 to 12/10/2020. The patient was admitted and treated for sepsis, secondary to E. coli bacteremia. Interval history / Subjective:  
   
Sitting comfortably in chair. Would like to walk. D/W nursing team if we can temporarily disconnected from NGT under suction to clamp for ambulation and connected back to suction. HR in 110s to 130s. TPN tolerating well. NGT continues to decompress. Pain controlled on current therapy. Plan for PEG on Monday and then home with Hospice. No other concerns or overnight events. Assessment & Plan:  
 
Appendix cancer s/p stenting at [facility] now with small bowel obstruction - Not a candidate for surgery and planning for palliative care and Hospice 
- NPO with NGT to LIWS  
- S/p colo on 1/7 by Dr Crow Weber for PEG for decompression Monday with GI 
- TPN over the weekend - Palliative Care following and using PCA for pain control 
- Oncology following (see Heme/Onc notes for full details of chemo cycles) Hyponatremia, POA  
- Multifactorial -> 127 today - Will monitor daily as high risk for re-feeding syndrome with TPN initiation Moderate nutritional deficit - BMI 15.93 kg/m2 
- TPN 
- PEG tube placement Monday, with transition of TPN to PEG tube per GI/nutrition team 
 
Add some guaifenesin through G-tube if tolerated for some mucolytic effect. Code status: DNR 
DVT prophylaxis: Heparin SQ Care Plan discussed with: Patient/Family, RN, Attending Anticipated Disposition: Home w/Family on Tuesday likely Anticipated Discharge: Greater than 48 hours with Hospice Hospital Problems  Date Reviewed: 1/6/2021 Codes Class Noted POA * (Principal) [facility]) [facility] ICD-10-CM: R35.242 ICD-9-CM: 560.9  1/5/2021 Yes Review of Systems:  
Denies CP or SOB, no current abdominal pain, no N/V, no leg pain, no headache Vital Signs:  
 Last 24hrs VS reviewed since prior progress note. Most recent are: 
Visit Vitals BP 95/63 (BP 1 Location: Left arm, BP Patient Position: Sitting) Pulse (!) 113 Temp 98.2 °F (36.8 °C) Resp 16 Wt 48.2 kg (106 lb 4.8 oz) SpO2 98% BMI 18.83 kg/m² Intake/Output Summary (Last 24 hours) at 1/10/2021 1642 Last data filed at 1/10/2021 5869 Gross per 24 hour Intake  Output 650 ml Net -650 ml Physical Examination:  
 
I had a face to face encounter with this patient and independently examined them on 01/10/21 as outlined below: 
     
General: Anorexic appearing female in NAD 
EENT: EOMI. Anicteric sclerae. Oral mucous moist, oropharynx benign Resp: CTA bilaterally. No wheezing/rhonchi/rales. No accessory muscle use CV: Regular rhythm, normal rate, no murmurs, gallops, rubs GI: Soft, mildly distended, generalized soreness with palpation. Hypoactive/minimal bowel sounds Extremities: No edema, warm, 2+ pulses throughout Neurologic: Moves all extremities. AAOx3, CN II-XII grossly intact Psych: Good insight. Not anxious nor agitated. Skin: Good Turgor, no rashes or ulcers Data Review:  
 Review and/or order of clinical lab test and imaging 1/9 Xr Abd (kub) Result Date: 1/8/2021 IMPRESSION: 1. Mechanical obstruction is not evident on this single supine radiograph, and may have improved since the prior CT. 2. Stable appearance of sigmoid colonic mesh stent with retained fecal material in the rectum and left colon. Ct Abd Pelv W Cont Result Date: 1/5/2021 IMPRESSION: Small bowel obstruction likely related to adhesion formation in the mid abdomen, with decompressed loops in the right lower quadrant. Diffuse colonic distention and moderate fecal stasis. Interval placement of a sigmoid colon stent. Increased free fluid somewhat loculated in appearance in the right upper quadrant. Likely peritoneal disease unchanged in the deep pelvis. Mild bilateral hydronephrosis right greater than left. Cholelithiasis. Xr Chest Velinda Leyden Result Date: 1/6/2021 IMPRESSION: No acute cardiopulmonary process Labs:  
 
No results for input(s): WBC, HGB, HCT, PLT, HGBEXT, HCTEXT, PLTEXT, HGBEXT, HCTEXT, PLTEXT in the last 72 hours. Recent Labs  
  01/10/21 
0747 01/09/21 
0735 01/08/21 
0310 * 127* 130*  
K 3.5 4.2 4.9  
 99 103 CO2 19* 23 22 BUN 9 11 9 CREA 0.41* 0.40* 0.26* * 213* 67  
CA 7.9* 8.3* 8.0*  
MG 1.6  --   --   
PHOS 1.7*  --   --   
 
Recent Labs  
  01/10/21 
0747 ALT 9* * TBILI 0.8 TP 5.3* ALB 1.3*  
GLOB 4.0  
 
 No results for input(s): INR, PTP, APTT, INREXT, INREXT in the last 72 hours. No results for input(s): FE, TIBC, PSAT, FERR in the last 72 hours. No results found for: FOL, RBCF No results for input(s): PH, PCO2, PO2 in the last 72 hours. No results for input(s): CPK, CKNDX, TROIQ in the last 72 hours. No lab exists for component: CPKMB No results found for: CHOL, CHOLX, CHLST, CHOLV, HDL, HDLP, LDL, LDLC, DLDLP, TGLX, TRIGL, TRIGP, CHHD, CHHDX Lab Results Component Value Date/Time Glucose (POC) 199 (H) 01/10/2021 11:41 AM  
 Glucose (POC) 170 (H) 01/10/2021 08:12 AM  
 Glucose (POC) 172 (H) 01/09/2021 11:53 PM  
 Glucose (POC) 202 (H) 01/09/2021 05:57 PM  
 Glucose (POC) 209 (H) 01/09/2021 11:39 AM  
 
Lab Results Component Value Date/Time Color YELLOW 01/05/2021 10:42 PM  
 Appearance CLEAR 01/05/2021 10:42 PM  
 Specific gravity 1.010 01/05/2021 10:42 PM  
 Specific gravity 1.006 10/09/2020 02:06 PM  
 pH (UA) 5.5 01/05/2021 10:42 PM  
 Protein Negative 01/05/2021 10:42 PM  
 Glucose Negative 01/05/2021 10:42 PM  
 Ketone TRACE (A) 01/05/2021 10:42 PM  
 Bilirubin Negative 01/05/2021 10:42 PM  
 Urobilinogen 0.2 01/05/2021 10:42 PM  
 Nitrites Negative 01/05/2021 10:42 PM  
 Leukocyte Esterase Negative 01/05/2021 10:42 PM  
 Epithelial cells FEW 01/05/2021 10:42 PM  
 Bacteria Negative 01/05/2021 10:42 PM  
 WBC 0-4 01/05/2021 10:42 PM  
 RBC 10-20 01/05/2021 10:42 PM  
 
 
 
Medications Reviewed:  
 
Current Facility-Administered Medications Medication Dose Route Frequency  TPN ADULT - CENTRAL AA 5% D15% W/ ELECTROLYTES AND CA   IntraVENous CONTINUOUS  
 sodium chloride 0.9 % bolus infusion 250 mL  250 mL IntraVENous ONCE  
 insulin lispro (HUMALOG) injection   SubCUTAneous Q6H  
 glucose chewable tablet 16 g  4 Tab Oral PRN  
 dextrose (D50W) injection syrg 12.5-25 g  12.5-25 g IntraVENous PRN  
 glucagon (GLUCAGEN) injection 1 mg  1 mg IntraMUSCular PRN  
  TPN ADULT - CENTRAL AA 5% D15% W/ ELECTROLYTES AND CA   IntraVENous CONTINUOUS  
 fentaNYL (DURAGESIC) 12 mcg/hr patch 1 Patch  1 Patch TransDERmal Q72H  sodium chloride (NS) flush 5-40 mL  5-40 mL IntraVENous Q8H  
 sodium chloride (NS) flush 5-40 mL  5-40 mL IntraVENous PRN  
 acetaminophen (TYLENOL) tablet 650 mg  650 mg Oral Q6H PRN Or  
 acetaminophen (TYLENOL) suppository 650 mg  650 mg Rectal Q6H PRN  promethazine (PHENERGAN) tablet 12.5 mg  12.5 mg Oral Q6H PRN Or  
 ondansetron (ZOFRAN) injection 4 mg  4 mg IntraVENous Q6H PRN  
 heparin (porcine) injection 5,000 Units  5,000 Units SubCUTAneous Q8H  
 pantoprazole (PROTONIX) 40 mg in 0.9% sodium chloride 10 mL injection  40 mg IntraVENous DAILY  morphine injection 2 mg  2 mg IntraVENous Q4H PRN  
 morphine injection 4 mg  4 mg IntraVENous Q4H PRN  
 heparin (porcine) pf 500 Units  500 Units InterCATHeter PRN  
 sodium chloride (NS) flush 5-40 mL  5-40 mL IntraVENous Q8H  
 sodium chloride (NS) flush 5-40 mL  5-40 mL IntraVENous PRN  
 morphine  mg / 30 mL   IntraVENous CONTINUOUS  
 iopamidoL (ISOVUE 300) 61 % contrast injection 50 mL  50 mL Other MULTIPLE DOSE GIVEN  
 
______________________________________________________________________ EXPECTED LENGTH OF STAY: 3d 17h ACTUAL LENGTH OF STAY:          5 Devika Benavidez MD

## 2021-01-10 NOTE — PROGRESS NOTES
01/10/21 2539 Vital Signs Temp 99.3 °F (37.4 °C) Temp Source Oral  
Pulse (Heart Rate) (!) 109 Heart Rate Source Brachial  
Resp Rate 16  
O2 Sat (%) 96 % Level of Consciousness Alert /68 MAP (Calculated) 79 BP 1 Method Automatic MEWS Score 3  
consistent with previous, encouraging her to use incentive spirometer, remains on remote tele. MD aware.

## 2021-01-10 NOTE — PROGRESS NOTES
01/10/21 1721 Vitals Temp 98.1 °F (36.7 °C) Pulse (Heart Rate) (!) 117 Resp Rate 16  
O2 Sat (%) 97 % Level of Consciousness Alert BP 98/67 MAP (Calculated) 77 MD aware. Ordered 250 bolus

## 2021-01-11 NOTE — ANESTHESIA PREPROCEDURE EVALUATION
Relevant Problems No relevant active problems Anesthetic History No history of anesthetic complications Review of Systems / Medical History Patient summary reviewed, nursing notes reviewed and pertinent labs reviewed Pulmonary Within defined limits Neuro/Psych Within defined limits Cardiovascular Within defined limits GI/Hepatic/Renal 
Within defined limits Endo/Other Within defined limits Other Findings Physical Exam 
 
Airway Mallampati: II 
TM Distance: > 6 cm Neck ROM: normal range of motion Mouth opening: Normal 
 
 Cardiovascular Regular rate and rhythm,  S1 and S2 normal,  no murmur, click, rub, or gallop Dental 
No notable dental hx Pulmonary Breath sounds clear to auscultation Abdominal 
GI exam deferred Other Findings Anesthetic Plan ASA: 4 Anesthesia type: MAC Anesthetic plan and risks discussed with: Patient

## 2021-01-11 NOTE — ANESTHESIA POSTPROCEDURE EVALUATION
Procedure(s): ESOPHAGOGASTRODUODENOSCOPY (EGD). MAC 
 
<BSHSIANPOST> INITIAL Post-op Vital signs:  
Vitals Value Taken Time BP 82/60 01/11/21 1225 Temp Pulse 100 01/11/21 1225 Resp 24 01/11/21 1225 SpO2 99 % 01/11/21 1225 Vitals shown include unvalidated device data.

## 2021-01-11 NOTE — PROGRESS NOTES
TRANSFER - IN REPORT: 
 
Verbal report received from Kasey Tobin RN(name) on Batsheva De Guzman  being received from IR(unit) for routine progression of care Report consisted of patients Situation, Background, Assessment and  
Recommendations(SBAR). Information from the following report(s) Procedure Summary, Intake/Output and Recent Results was reviewed with the receiving nurse. Opportunity for questions and clarification was provided. Assessment completed upon patients arrival to unit and care assumed.

## 2021-01-11 NOTE — PROGRESS NOTES
Per Dr. Елена Rod place order for chloraseptic 1 spray PRN for throat discomfort r/t ng tube Advised Dr. Елена Rod of mews of 3. No orders at this time Vitals w/ MEWS Score (last day) Date/Time MEWS Score Pulse Resp Temp BP Level of Consciousness SpO2  
 01/11/21 0905  3  (!) 107  16  97.9 °F (36.6 °C)  92/62  Alert  98 % 01/11/21 0416    95  16    (!) 83/50  Alert  96 % 01/11/21 0408  3  (!) 101  16  97.9 °F (36.6 °C)  (!) 82/52  Alert  97 % 01/10/21 2147    (!) 113  16    (!) 91/58  Alert  98 % 01/10/21 2127  4  (!) 122  16  (!) 100.9 °F (38.3 °C)  (!) 89/59  Alert  96 % 01/10/21 1428  4  (!) 113  16  98.2 °F (36.8 °C)  95/63  Alert  98 % 01/10/21 0914  4  (!) 117  16  98.1 °F (36.7 °C)  98/67  Alert  97 % 01/10/21 0347  3  (!) 109  16  99.3 °F (37.4 °C)  100/68  Alert  96 % 01/10/21 0014    (!) 108  16    95/63  Alert    
   
  
 
1013-per lab CBC w/diff clotted. Need to redraw

## 2021-01-11 NOTE — PROGRESS NOTES
01/10/21 2127 Vital Signs Temp (!) 100.9 °F (38.3 °C) Temp Source Oral  
Pulse (Heart Rate) (!) 122 Heart Rate Source Brachial  
Resp Rate 16  
O2 Sat (%) 96 % Level of Consciousness Alert  
BP (!) 89/59 MAP (Calculated) 69 BP 1 Method Automatic  
BP 1 Location Left arm MEWS Score 4 NOTIFIED  NP ON CALL TONIGHT. WILL CONTINUE WITH CARE. PATIENT TRANSITIONING TO HOSPICE TOMORROW.

## 2021-01-11 NOTE — PROCEDURES
[address] [facility]. 
[address] [facility] [address] [place], [address] 
[phone] NAME:  Marden Schilder :   1970 MRN:   [id number] Date/Time:  2021 12:04 PM 
 
Esophagogastroduodenoscopy (EGD) Procedure Note :  Triny Gibbs MD 
 
Staff: Endoscopy Technician-1: General Zaynab 
Endoscopy RN-1: Michelle Mckeon RN Referring Provider:  Carlos Guy MD 
 
Anethesia/Sedation:  MAC anesthesia Procedure Details After infomed consent was obtained for the procedure, with all risks and benefits of procedure explained the patient was taken to the endoscopy suite and placed in the left lateral decubitus position. Following sequential administration of sedation as per above, the gastroscope was inserted into the mouth and advanced under direct vision to second portion of the duodenum. A careful inspection was made as the gastroscope was withdrawn, including a retroflexed view of the proximal stomach; findings and interventions are described below. Findings: 
Esophagus:normal 
Stomach: diffuse gastritis with enlarged gastric body, superficial erosions in antrum. Attempted to transilluminate to place PEG tube, though could not transilluminate through abdominal wall Duodenum/jejunum:normal 
 
Interventions:  none Specimens Removed:  * No specimens in log * Complications: None. EBL:  none Impression:   
See Postoperative diagnosis above Recommendations:  
- Continue pantoprazole. - Will consult radiology re: placement of G-tube.   
 
Triny Gibbs MD

## 2021-01-11 NOTE — PROGRESS NOTES
Palliative medicine~ PEG could not be placed by GI, so IR attempting right now. If it can be placed, I would like to try and get her home tmrw versus Wed. I will see her tomorrow AM to take off PCA. If PEG cannot be placed, will need to try to remove NGT and replace again w/ hospice at home. Discussed w/ Jose A TELLO.

## 2021-01-11 NOTE — PROGRESS NOTES
TRANSFER - IN REPORT: 
 
Verbal report received from [address] [facility] (name) on [facility]  being received from [phone] (unit) for ordered procedure Report consisted of patients Situation, Background, Assessment and  
Recommendations(SBAR). Information from the following report(s) SBAR, Kardex, Intake/Output, MAR and Cardiac Rhythm ST was reviewed with the receiving nurse. Opportunity for questions and clarification was provided. Assessment completed upon patients arrival to unit and care assumed.   
 
 
 
Pt A&O, NPO, Morphine PCA infusing, Heparin SQ held this AM. Consent for PEG signed by , Ivy Gardner, at patient's request.

## 2021-01-11 NOTE — PROGRESS NOTES
TRANSFER - OUT REPORT: 
 
Verbal report given to [employer] (name) on Marden Schilder  being transferred to [facility](unit) for routine post - op Report consisted of patients Situation, Background, Assessment and  
Recommendations(SBAR). Information from the following report(s) SBAR, Kardex and Procedure Summary was reviewed with the receiving nurse. Lines:  
Venous Access Device Port Upper chest (subclavicular area), left (Active) Central Line Being Utilized Yes 01/11/21 1216 Criteria for Appropriate Use Total parenteral nutrition 01/11/21 1216 Site Assessment Clean, dry, & intact 01/11/21 1216 Date of Last Dressing Change 01/09/21 01/10/21 0347 Dressing Status Clean, dry, & intact 01/10/21 0347 Dressing Type Disk with Chlorhexadine gluconate (CHG) 01/09/21 0540 Action Taken Open ports on tubing capped 01/09/21 0540 Date Accessed (Medial Site) 01/06/21 01/09/21 0540 Access Time (Medial [phone] 01/06/21 1244 Access Needle Size (Site #1) 20 G 01/06/21 1244 Access Needle Length (Medial Site) 0.75 inches 01/06/21 1244 Positive Blood Return (Medial Site) Yes 01/10/21 0347 Alcohol Cap Used Yes 01/10/21 0347 Peripheral IV 01/05/21 Left; Anterior Antecubital (Active) Site Assessment Clean, dry, & intact 01/11/21 2093 Phlebitis Assessment 0 01/11/21 0905 Infiltration Assessment 0 01/11/21 0905 Dressing Status Clean, dry, & intact 01/11/21 5410 Dressing Type Transparent 01/11/21 0905 Hub Color/Line Status Pink; Infusing 01/11/21 0905 Action Taken Open ports on tubing capped 01/11/21 0905 Alcohol Cap Used Yes 01/11/21 9298 Opportunity for questions and clarification was provided. Pt to go to IR for PEG placement after endoscopy recovery.

## 2021-01-11 NOTE — PROGRESS NOTES
01/11/21 0408 Vital Signs Temp 97.9 °F (36.6 °C) Temp Source Oral  
Pulse (Heart Rate) (!) 101 Heart Rate Source Brachial  
Resp Rate 16  
O2 Sat (%) 97 % Level of Consciousness Alert  
BP (!) 82/52 MAP (Calculated) (!) 62 BP 1 Method Automatic MEWS Score 3  
consistent with previous, MD aware patient transitioning to hospice care today. Will continue with care.

## 2021-01-11 NOTE — HOSPICE
[employer] [employer] [facility] to Those in Need 
[phone] Patient Name: Keren Chen YOB: 1970 Age: 44 y.o. [employer] RN Note:   
Spoke to patient's nurse who reported that IR was able to place a peg for the patient today. Patient is exhausted from the day and resting now. She recommended letting the patient rest for now and checking in tomorrow about next steps. Nurse is aware that we will be evaluating patient for possible GIP admission tomorrow to transition her to PO/SL meds prior to her discharge home with hospice. Melodie Dominguez RN MSN [facility] Nurse Liaison Kris Matthews [phone] ( mobile) [phone] ( office) Thank you for the opportunity to be of service to this patient.

## 2021-01-11 NOTE — PROGRESS NOTES
[id number] [facility] Adult  Hospitalist Group Hospitalist Progress Note Jenniffer Evans MD 
Answering service: [phone] OR [phone] from in house phone Date of Service:  2021 NAME:  Sharla Ferrera :  1970 MRN:  [id number] Admission Summary:  
 52-year-old woman with a past medical history significant for metastatic cancer of the appendix, who was in her usual state of health until yesterday when the patient developed abdominal pain. The pain is located at the center of the abdomen, sharp pain, constant with no radiation, 9/10 in severity, associated with nausea, but no vomiting. The patient recently had colonic stent placed at the [facility]. The patient was taken to [facility] Emergency Room at [facility] for further evaluation of the abdominal pain. When the patient arrived at the emergency room, CT scan of the abdomen and pelvis was performed. The CT scan shows evidence of small bowel obstruction. The emergency room physician consulted the general surgeon on-call, admission to the hospitalist service was advised. The patient denies fever, rigors, or chills. She was last admitted to this hospital from 2020 to 12/10/2020. The patient was admitted and treated for sepsis, secondary to E. coli bacteremia. Interval history / Subjective:  
   
Patient seen and examined She is doing ok today Some hypotensions and tachycardia is expected given her current condition PEG cannot be placed so IR will atempt to place it Plan to stop PCA and change to patch and po opiods Eventually home hospice either tomorrow or wed Assessment & Plan:  
 
Appendix cancer s/p stenting at [facility] now with small bowel obstruction 
- Not a candidate for surgery and planning for palliative care and Hospice 
- NPO with NGT to LIWS  
- S/p colo on  by Dr Glory Encarnacion - Plan for PEG for decompression but was unsuccessful and so IR is consulted - TPN over the weekend - Palliative Care following and using PCA for pain control, change to patch and oral opiods  
- Oncology following (see Heme/Onc notes for full details of chemo cycles) Plan for home hospice now Hyponatremia, POA  
- due to GI losses from NG tube -> 128 - Will monitor daily as high risk for re-feeding syndrome with TPN initiation Replace phos IV today Moderate nutritional deficit - BMI 15.93 kg/m2 
- TPN Some signs of sepsis , Will treat with fluids for now Plan for comfort and hospice only Code status: DNR 
DVT prophylaxis: Heparin SQ Care Plan discussed with: Patient/Family, RN, Attending Anticipated Disposition: Home w/Family on Tuesday likely Anticipated Discharge: Greater than 48 hours with Hospice Hospital Problems  Date Reviewed: 1/6/2021 Codes Class Noted POA * (Principal) SBO (small bowel obstruction) ([address] ICD-10-CM: M19.973 ICD-9-CM: 560.9  1/5/2021 Yes Review of Systems:  
Denies CP or SOB, no current abdominal pain, no N/V, no leg pain, no headache Vital Signs:  
 Last 24hrs VS reviewed since prior progress note. Most recent are: 
Visit Vitals /77 (BP 1 Location: Left arm, BP Patient Position: At rest) Pulse (!) 119 Temp 98.2 °F (36.8 °C) Resp 20 Wt 48.8 kg (107 lb 8 oz) SpO2 96% BMI 19.04 kg/m² No intake or output data in the 24 hours ending 01/11/21 0582 Physical Examination:  
 
I had a face to face encounter with this patient and independently examined them on 01/11/21 as outlined below: 
     
General: Anorexic appearing female in NAD 
EENT: EOMI. Anicteric sclerae. Oral mucous moist, oropharynx benign Resp: CTA bilaterally. No wheezing/rhonchi/rales. No accessory muscle use CV: Regular rhythm, normal rate, no murmurs, gallops, rubs GI: Soft, mildly distended, generalized soreness with palpation. Hypoactive/minimal bowel sounds Extremities: No edema, warm, 2+ pulses throughout Neurologic: Moves all extremities. AAOx3, CN II-XII grossly intact Psych: Good insight. Not anxious nor agitated. Skin: Good Turgor, no rashes or ulcers Data Review:  
 Review and/or order of clinical lab test and imaging 1/9 Xr Abd (kub) Result Date: 1/8/2021 IMPRESSION: 1. Mechanical obstruction is not evident on this single supine radiograph, and may have improved since the prior CT. 2. Stable appearance of sigmoid colonic mesh stent with retained fecal material in the rectum and left colon. Ir Insert Gastrostomy Tube Perc Result Date: 1/11/2021 IMPRESSION: Uncomplicated 18 [place] gastrostomy tube placement. Ct Abd Pelv W Cont Result Date: 1/5/2021 IMPRESSION: Small bowel obstruction likely related to adhesion formation in the mid abdomen, with decompressed loops in the right lower quadrant. Diffuse colonic distention and moderate fecal stasis. Interval placement of a sigmoid colon stent. Increased free fluid somewhat loculated in appearance in the right upper quadrant. Likely peritoneal disease unchanged in the deep pelvis. Mild bilateral hydronephrosis right greater than left. Cholelithiasis. Xr Chest [facility] Result Date: 1/6/2021 IMPRESSION: No acute cardiopulmonary process Labs:  
 
Recent Labs  
  01/11/21 
1035 WBC 15.4* HGB 9.4* HCT 26.1*  
* Recent Labs  
  01/11/21 
0850 01/10/21 
0747 01/09/21 
0735 * 128* 127* K 3.7 3.5 4.2  100 99 CO2 21 19* 23 BUN 10 9 11 CREA 0.30* 0.41* 0.40* * 168* 213* CA 7.8* 7.9* 8.3*  
MG 2.0 1.6  --   
PHOS  --  1.7*  --   
 
Recent Labs  
  01/11/21 
0850 01/10/21 
0747 ALT 9* 9* * 139* TBILI 0.6 0.8 TP 5.5* 5.3* ALB 1.2* 1.3*  
GLOB 4.3* 4.0  
 
 No results for input(s): INR, PTP, APTT, INREXT, INREXT in the last 72 hours. No results for input(s): FE, TIBC, PSAT, FERR in the last 72 hours. No results found for: FOL, RBCF No results for input(s): PH, PCO2, PO2 in the last 72 hours. No results for input(s): CPK, CKNDX, TROIQ in the last 72 hours. No lab exists for component: CPKMB No results found for: CHOL, CHOLX, CHLST, CHOLV, HDL, HDLP, LDL, LDLC, DLDLP, TGLX, TRIGL, TRIGP, CHHD, CHHDX Lab Results Component Value Date/Time Glucose (POC) 146 (H) 01/11/2021 06:14 AM  
 Glucose (POC) 148 (H) 01/11/2021 12:00 AM  
 Glucose (POC) 169 (H) 01/10/2021 05:57 PM  
 Glucose (POC) 199 (H) 01/10/2021 11:41 AM  
 Glucose (POC) 170 (H) 01/10/2021 08:12 AM  
 
Lab Results Component Value Date/Time Color YELLOW 01/05/2021 10:42 PM  
 Appearance CLEAR 01/05/2021 10:42 PM  
 Specific gravity 1.010 01/05/2021 10:42 PM  
 Specific gravity 1.006 10/09/2020 02:06 PM  
 pH (UA) 5.5 01/05/2021 10:42 PM  
 Protein Negative 01/05/2021 10:42 PM  
 Glucose Negative 01/05/2021 10:42 PM  
 Ketone TRACE (A) 01/05/2021 10:42 PM  
 Bilirubin Negative 01/05/2021 10:42 PM  
 Urobilinogen 0.2 01/05/2021 10:42 PM  
 Nitrites Negative 01/05/2021 10:42 PM  
 Leukocyte Esterase Negative 01/05/2021 10:42 PM  
 Epithelial cells FEW 01/05/2021 10:42 PM  
 Bacteria Negative 01/05/2021 10:42 PM  
 WBC 0-4 01/05/2021 10:42 PM  
 RBC 10-20 01/05/2021 10:42 PM  
 
 
 
Medications Reviewed:  
 
Current Facility-Administered Medications Medication Dose Route Frequency  sodium chloride (NS) flush 5-40 mL  5-40 mL IntraVENous Q8H  
 sodium chloride (NS) flush 5-40 mL  5-40 mL IntraVENous PRN  phenol throat spray (CHLORASEPTIC) 1 Spray  1 Spray Oral PRN  
 sodium phosphate 30 mmol in 0.9% sodium chloride 250 mL infusion   IntraVENous ONCE  
 TPN ADULT - CENTRAL AA 5% D15% W/ ELECTROLYTES AND CA   IntraVENous CONTINUOUS  
  TPN ADULT - CENTRAL AA 5% D15% W/ ELECTROLYTES AND CA   IntraVENous CONTINUOUS  
 guaiFENesin (ROBITUSSIN) 100 mg/5 mL oral liquid 400 mg  400 mg Per G Tube Q4H PRN  
 insulin lispro (HUMALOG) injection   SubCUTAneous Q6H  
 glucose chewable tablet 16 g  4 Tab Oral PRN  
 dextrose (D50W) injection syrg 12.5-25 g  12.5-25 g IntraVENous PRN  
 glucagon (GLUCAGEN) injection 1 mg  1 mg IntraMUSCular PRN  
 fentaNYL (DURAGESIC) 12 mcg/hr patch 1 Patch  1 Patch TransDERmal Q72H  sodium chloride (NS) flush 5-40 mL  5-40 mL IntraVENous Q8H  
 sodium chloride (NS) flush 5-40 mL  5-40 mL IntraVENous PRN  
 acetaminophen (TYLENOL) tablet 650 mg  650 mg Oral Q6H PRN Or  
 acetaminophen (TYLENOL) suppository 650 mg  650 mg Rectal Q6H PRN  promethazine (PHENERGAN) tablet 12.5 mg  12.5 mg Oral Q6H PRN Or  
 ondansetron (ZOFRAN) injection 4 mg  4 mg IntraVENous Q6H PRN  
 heparin (porcine) injection 5,000 Units  5,000 Units SubCUTAneous Q8H  
 pantoprazole (PROTONIX) 40 mg in 0.9% sodium chloride 10 mL injection  40 mg IntraVENous DAILY  morphine injection 2 mg  2 mg IntraVENous Q4H PRN  
 morphine injection 4 mg  4 mg IntraVENous Q4H PRN  
 heparin (porcine) pf 500 Units  500 Units InterCATHeter PRN  
 sodium chloride (NS) flush 5-40 mL  5-40 mL IntraVENous Q8H  
 sodium chloride (NS) flush 5-40 mL  5-40 mL IntraVENous PRN  
 morphine  mg / 30 mL   IntraVENous CONTINUOUS  
 iopamidoL (ISOVUE 300) 61 % contrast injection 50 mL  50 mL Other MULTIPLE DOSE GIVEN  
 
______________________________________________________________________ EXPECTED LENGTH OF STAY: 3d 17h ACTUAL LENGTH OF STAY:          6 Queen Indy MD

## 2021-01-11 NOTE — PROGRESS NOTES

## 2021-01-11 NOTE — PROGRESS NOTES
Pt arrives via stretcher to angio department accompanied by spouse Sandee Rico from endoscopy for peg tube procedure. All assessments completed and consent was reviewed. Education given was regarding procedure, moderate sedation, post-procedure care and  management/follow-up. Opportunity for questions was provided and all questions and concerns were addressed.

## 2021-01-11 NOTE — PROGRESS NOTES
[address] 
[address] [facility] [address] [place], [address] 
[phone] GI PROGRESS NOTE 
 
 
NAME:   Sherial Gottron :    1970 MRN:    [id number] Assessment/Plan Metastatic Appendiceal cancer / carcinomatosis with complications: 
Abdominal pain / Small bowel Obstruction / Colonic Obstruction 
-CT 21 - Small bowel obstruction likely related to adhesion formation in the mid abdomen, with decompressed loops in the right lower quadrant. Diffuse colonic distention and moderate fecal stasis. Interval placement of a sigmoid colon stent. Increased free fluid somewhat loculated in appearance in the right upper quadrant. Likely peritoneal disease unchanged in the deep pelvis. Mild bilateral hydronephrosis right greater than left. Cholelithiasis. -Flex sig 2021: unable to tunnel another stent into current colonic stent due to angulation and narrowing   
- KUB 21 - Mechanical obstruction is not evident on this single supine radiograph, and 
may have improved since the prior CT. Stable appearance of sigmoid colonic mesh stent with retained fecal material in the rectum and left colon. - Currently getting TPN and IVF 
- Pain management per palliative care and hospitalist 
- NGT to intermittent suction for decompression - Plan for endoscopic placement of venting PEG today - tentatively at 1130. If procedure is too complicated, may need IR placement - She has chosen hospice care on discharge Subjective:  
 
Sherial Gottron is a 44 y.o.  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 NGT to intermittent suction with bilious output of 550 cc yesterday into this morning. With the NGT, she has not been nauseated. Distention has resolved. She had a very small BM overnight and the same the night before. Otherwise she is not passing gas. She denies hematochezia. She is to have venting PEG placed today. Objective: VITALS:  
Last 24hrs VS reviewed since prior hospitalist progress note. Most recent are: 
Visit Vitals BP 92/62 (BP 1 Location: Right arm, BP Patient Position: At rest) Pulse (!) 107 Temp 97.9 °F (36.6 °C) Resp 16 Wt 48.8 kg (107 lb 8 oz) SpO2 98% BMI 19.04 kg/m² No intake or output data in the 24 hours ending 01/11/21 0941 PHYSICAL EXAM: 
General:          cachetic, chronically ill appearing HEENT:           NC, Atraumatic. Anicteric sclerae. NGT in place Lungs:             Port-a-cath left chest 
Heart:               
Abdomen:        Soft,no distention, bowel sounds, minimal tenderness with deep palpation to lower abdomen, no palpable masses, NG tube to LWS with copious light brown output Extremities:     No edema Neurologic:      No acute neurological distress. Alert but tired, oriented Psych:             Good insight. Not anxious nor agitated. Lab Data Recent Results (from the past 12 hour(s)) GLUCOSE, POC Collection Time: 01/11/21 12:00 AM  
Result Value Ref Range Glucose (POC) 148 (H) 65 - 100 mg/dL Performed by Wil Pedersen GLUCOSE, POC Collection Time: 01/11/21  6:14 AM  
Result Value Ref Range Glucose (POC) 146 (H) 65 - 100 mg/dL Performed by Wil Pedersen

## 2021-01-12 NOTE — PROGRESS NOTES
Palliative Medicine Consult Benjy: [phone] ([phone]) Patient Name: Eva Tee YOB: 1970 Date of Initial Consult: 1/6/21 Reason for Consult: Overwhelming sx Requesting Provider: Inez Reed Primary Care Physician: Eileen Haddad MD 
 
 SUMMARY:  
Eva Tee is a 44 y.o. with a past history of appendiceal cancer dx 2019 s/p SIERRA/BSO/ileocecal resection/omentectomy, peritoneal carcinomatosis on palliative chemo w/ Dr Inez Reed, L pleural effusion s/p thoracentesis, anxiety, chemo induced neuropathy  who was admitted on 1/5/2021 from home w/ abdominal pain and SBO. Recently had colonic stent placed at [facility], but has been having ongoing nausea and vomiting, now feculent. GI to see if they can open up stent, surgery recommending NGT - uncertain if surgical intervention can help. 1/11- Venting PEG placed by IR. Current medical issues leading to Palliative Medicine involvement include: overwhelming sx. Known to me from admission 12/2020 for bacteremia. At that time I increased Oxycodone to 10mg every 4h prn, and timi before meals. Gabapentin caused confusion. Filled Rx 12/10/20. 
 
  
Social:  to Kelsi. 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 They have a 23 yo and 15 yo. 
  
 PALLIATIVE DIAGNOSES:  
1. Abdominal pain- from cancer as well as SBO 
2. Nausea, emesis 3. Poor po intake, weight loss 4. Fatigue PLAN:  
 
1. Pain: From neoplasm and SOB. Pain has improved w/ NGT in place and venting PEG placed 1/11.  
2. Stop PCA. 3. Cont Fentanyl patch 12mcg/h every 3 days. 4. Start Morphine concentrate 10mg SL every 4h scheduled- pt may refused. Hold for sedation or confusion. 5. Goals of care: Life limited, very fatigued, progressive cancer. TPN has not improved her function- stopping today. 6. Goals are to get home to spend time w/ family incl her children. They have not yet told their kids that pt is going w/ hospice- dtr coming back home from [facility] and they will tell her today in the hospital.  
7. I would like to get pt home as soon as can safetly do- I think that if sx are controlled, hospice can get equipment to the home today and we can consider d/c home late today or early tmrw. 8. Talked w/  Sundeep Rich- pt is not hungry and would not be even w/out SBO due to cancer. Eat for pleasure, bites/sips- she is going to be very weak. I hope that once she gets home she will sleep better and when awake, be alert and interactive. 9. Communicated plan of care with: Palliative IDT, [facility] Team incl Dr Howard TELLO, [facility], [facility] RN w/ hospice GOALS OF CARE / TREATMENT PREFERENCES:  
 
GOALS OF CARE: 
Patient/Health Care Proxy Stated Goals: Comfort TREATMENT PREFERENCES:  
Code Status: DNR Advance Care Planning: 
[x] The [facility] Interdisciplinary Team has updated the ACP Navigator with [place] and Patient Capacity Primary Decision Maker: Leatha Lopez - Spouse - [phone] Advance Care Planning 12/7/2020 Patient's Healthcare Decision Maker is: Verbal statement (Legal Next of Kin remains as decision maker) Confirm Advance Directive None Patient Would Like to Complete Advance Directive No  
Does the patient have other document types - Medical Interventions: Full interventions Other: As far as possible, the palliative care team has discussed with patient / health care proxy about goals of care / treatment preferences for patient. HISTORY:  
 
History obtained from: Pt, chart, staff CHIEF COMPLAINT: Abdominal pain and nausea HPI/SUBJECTIVE: The patient is:  
[x] Verbal and participatory [] Non-participatory due to:  
 
 Pt used 3mg IV morphine in past 2h, pain controlled. Negative flatus. Clinical Pain Assessment (nonverbal scale for severity on nonverbal patients):  
Clinical Pain Assessment Severity: 1 Location: Abdomen Character: Aching, diffuse, cramping Duration: Worse over past couple days Effect: Harder to function, eat, sleep Factors: Better w/ medication Frequency: constant Duration: for how long has pt been experiencing pain (e.g., 2 days, 1 month, years) Frequency: how often pain is an issue (e.g., several times per day, once every few days, constant) FUNCTIONAL ASSESSMENT:  
 
Palliative Performance Scale (PPS): PPS: 30 
 
 
 PSYCHOSOCIAL/SPIRITUAL SCREENING:  
 
Palliative IDT has assessed this patient for cultural preferences / practices and a referral made as appropriate to needs (Cultural Services, Patient Advocacy, Ethics, etc.) Any spiritual / Roman Catholic concerns: 
[] Yes /  [x] No 
 
Caregiver Burnout: 
[] Yes /  [x] No /  [] No Caregiver Present Anticipatory grief assessment:  
[x] Normal  / [] Maladaptive ESAS Anxiety: Anxiety: 0 
 
ESAS Depression: Depression: 0 REVIEW OF SYSTEMS:  
 
Positive and pertinent negative findings in ROS are noted above in HPI. The following systems were [x] reviewed / [] unable to be reviewed as noted in HPI Other findings are noted below. Systems: constitutional, ears/nose/mouth/throat, respiratory, gastrointestinal, genitourinary, musculoskeletal, integumentary, neurologic, psychiatric, endocrine. Positive findings noted below. Modified ESAS Completed by: provider Fatigue: 8 Drowsiness: 0 Depression: 0 Pain: 1 Anxiety: 0 Nausea: 0 Anorexia: 8 Dyspnea: 0 Constipation: Yes Stool Occurrence(s): 1 PHYSICAL EXAM:  
 
From RN flowsheet: 
Wt Readings from Last 3 Encounters:  
01/11/21 107 lb 8 oz (48.8 kg) 12/10/20 100 lb 5 oz (45.5 kg)  
11/18/20 102 lb (46.3 kg) Blood pressure 101/67, pulse (!) 120, temperature 100.4 °F (38 °C), resp. rate 16, weight 107 lb 8 oz (48.8 kg), SpO2 94 %. Pain Scale 1: Numeric (0 - 10) Pain Intensity 1: 0 Pain Onset 1: x4days Pain Location 1: Abdomen Pain Orientation 1: Lower Pain Description 1: Aching Pain Intervention(s) 1: MD notified (comment)(patient to receive add'l pain meds during procedure. ) Last bowel movement, if known: days Constitutional: awake, alert, oriented, fatigued Eyes: pupils equal, anicteric ENMT: no nasal discharge, moist mucous membranes Cardiovascular: regular rhythm Respiratory: breathing not labored, symmetric Gastrointestinal: soft non-tender, do not hear any bowel sounds, PEG Musculoskeletal: no deformity, no tenderness to palpation Skin: warm, dry Neurologic: following commands, moving all extremities Psychiatric: full affect, no hallucinations HISTORY:  
 
Principal Problem: 
  SBO (small bowel obstruction) ([address] (1/5/2021) Past Medical History:  
Diagnosis Date  Cancer ([address] Apendiceal CANCER   
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  
Procedure Laterality Date 2021 Maia Varela N/A 1/6/2021 SIGMOIDOSCOPY FLEXIBLE performed by Randy Fisher MD at [address]  09/2019  HX GI  09/2019 ILEOCECECTOMY  HX GYN  2003 CYST ON OVARY  HX HYSTERECTOMY  2019  IR INSERT GASTROSTOMY TUBE [facility]  1/11/2021  IR THORACENTESIS CATH W IMAGE  11/6/2019  FL ABDOMEN SURGERY PROC UNLISTED Family History Problem Relation Age of Onset  Breast Cancer Paternal Grandmother 56  Crohn's Disease Mother  Heart Disease Mother  Cancer Father BLADDER  
 Diabetes Father  No Known Problems Son  No Known Problems Daughter  Anesth Problems Neg Hx History reviewed, no pertinent family history. Social History Tobacco Use  Smoking status: Never Smoker  Smokeless tobacco: Never Used Substance Use Topics  Alcohol use: Not Currently No Known Allergies Current Facility-Administered Medications Medication Dose Route Frequency  morphine (10 mg/5 mL) 10 mg/5 mL oral solution 10 mg  10 mg Oral Q4H  
 sodium chloride (NS) flush 5-40 mL  5-40 mL IntraVENous Q8H  
 sodium chloride (NS) flush 5-40 mL  5-40 mL IntraVENous PRN  phenol throat spray (CHLORASEPTIC) 1 Spray  1 Spray Oral PRN  
 guaiFENesin (ROBITUSSIN) 100 mg/5 mL oral liquid 400 mg  400 mg Per G Tube Q4H PRN  
 insulin lispro (HUMALOG) injection   SubCUTAneous Q6H  
 glucose chewable tablet 16 g  4 Tab Oral PRN  
 dextrose (D50W) injection syrg 12.5-25 g  12.5-25 g IntraVENous PRN  
 glucagon (GLUCAGEN) injection 1 mg  1 mg IntraMUSCular PRN  
 fentaNYL (DURAGESIC) 12 mcg/hr patch 1 Patch  1 Patch TransDERmal Q72H  sodium chloride (NS) flush 5-40 mL  5-40 mL IntraVENous Q8H  
 sodium chloride (NS) flush 5-40 mL  5-40 mL IntraVENous PRN  
 acetaminophen (TYLENOL) tablet 650 mg  650 mg Oral Q6H PRN Or  
 acetaminophen (TYLENOL) suppository 650 mg  650 mg Rectal Q6H PRN  promethazine (PHENERGAN) tablet 12.5 mg  12.5 mg Oral Q6H PRN Or  
 ondansetron (ZOFRAN) injection 4 mg  4 mg IntraVENous Q6H PRN  
 heparin (porcine) injection 5,000 Units  5,000 Units SubCUTAneous Q8H  
 pantoprazole (PROTONIX) 40 mg in 0.9% sodium chloride 10 mL injection  40 mg IntraVENous DAILY  morphine injection 2 mg  2 mg IntraVENous Q4H PRN  
 morphine injection 4 mg  4 mg IntraVENous Q4H PRN  
 heparin (porcine) pf 500 Units  500 Units InterCATHeter PRN  
 sodium chloride (NS) flush 5-40 mL  5-40 mL IntraVENous Q8H  
 sodium chloride (NS) flush 5-40 mL  5-40 mL IntraVENous PRN  
  iopamidoL (ISOVUE 300) 61 % contrast injection 50 mL  50 mL Other MULTIPLE DOSE GIVEN  
 
 
 
 LAB AND IMAGING FINDINGS:  
 
Lab Results Component Value Date/Time WBC 15.4 (H) 01/11/2021 10:35 AM  
 HGB 9.4 (L) 01/11/2021 10:35 AM  
 PLATELET 953 (L) 94/25/7503 10:35 AM  
 
Lab Results Component Value Date/Time Sodium 128 (L) 01/11/2021 08:50 AM  
 Potassium 3.7 01/11/2021 08:50 AM  
 Chloride 102 01/11/2021 08:50 AM  
 CO2 21 01/11/2021 08:50 AM  
 BUN 10 01/11/2021 08:50 AM  
 Creatinine 0.30 (L) 01/11/2021 08:50 AM  
 Calcium 7.8 (L) 01/11/2021 08:50 AM  
 Magnesium 2.0 01/11/2021 08:50 AM  
 Phosphorus 1.7 (L) 01/10/2021 07:47 AM  
  
Lab Results Component Value Date/Time Alk. phosphatase 118 (H) 01/11/2021 08:50 AM  
 Protein, total 5.5 (L) 01/11/2021 08:50 AM  
 Albumin 1.2 (L) 01/11/2021 08:50 AM  
 Globulin 4.3 (H) 01/11/2021 08:50 AM  
 
Lab Results Component Value Date/Time INR 1.0 10/09/2020 02:32 PM  
 Prothrombin time 10.7 10/09/2020 02:32 PM  
  
Lab Results Component Value Date/Time Iron 22 (L) 10/18/2019 05:49 AM  
 Ferritin 483 (H) 10/18/2019 05:49 AM  
  
No results found for: PH, PCO2, PO2 No components found for: [place] No results found for: CPK, CKMB Prolonged service was provided for  []30 min   []75 min in face to face time in the presence of the patient, spent as noted above. Time Start:  
Time End:  
Note: this can only be billed with [id number] (initial) or [id number] (follow up). If multiple start / stop times, list each separately.

## 2021-01-12 NOTE — ROUTINE PROCESS
Bedside shift change report given to Archana Oliva (oncoming nurse) by Elvia Curling (offgoing nurse). Report included the following information SBAR.

## 2021-01-12 NOTE — PROGRESS NOTES
[address] [facility] Adult  Hospitalist Group Hospitalist Progress Note Magda Rosenthal MD 
Answering service: [phone] or [phone] from in house phone Date of Service:  2021 NAME:  Paulita Brunner :  1970 MRN:  [id number] Admission Summary:  
 66-year-old woman with a past medical history significant for metastatic cancer of the appendix, who was in her usual state of health until yesterday when the patient developed abdominal pain. The pain is located at the center of the abdomen, sharp pain, constant with no radiation, 9/10 in severity, associated with nausea, but no vomiting. The patient recently had colonic stent placed at the [facility]. The patient was taken to [facility] Room at [facility] for further evaluation of the abdominal pain. When the patient arrived at the emergency room, CT scan of the abdomen and pelvis was performed. The CT scan shows evidence of small bowel obstruction. The emergency room physician consulted the general surgeon on-call, admission to the hospitalist service was advised. The patient denies fever, rigors, or chills. She was last admitted to this hospital from 2020 to 12/10/2020. The patient was admitted and treated for sepsis, secondary to E. coli bacteremia. Interval history / Subjective:  
   
Patient seen and examined She is doing ok today TPA and PCA stopped Eventually home hospice tomorrow Now s/p PEG tube Assessment & Plan:  
 
Appendix cancer s/p stenting at [facility] now with small bowel obstruction 
- Not a candidate for surgery and planning for palliative care and Hospice 
- NPO with NGT to [facility]  
- S/p colo on  by Dr Kristofer Guerin for PEG for decompression but was unsuccessful and so IR did it on   
- TPN and PCA stopped  
change to patch and oral opiods Plan for home hospice in am  , hospice team setting up things at home Hyponatremia, POA  
- due to GI losses from NG tube -> 128 TPN stopped Moderate nutritional deficit - BMI 15.93 kg/m2 Discharge in am  
 
 
 
 
Code status: DNR 
DVT prophylaxis: Heparin SQ Care Plan discussed with: Patient/Family, RN, Attending Anticipated Disposition: Home w/Family on Tuesday likely Anticipated Discharge: Greater than 48 hours with Hospice Hospital Problems  Date Reviewed: 1/6/2021 Codes Class Noted POA * (Principal) SBO (small bowel obstruction) ([facility] [address] ICD-10-CM: H02.813 ICD-9-CM: 560.9  1/5/2021 Yes Review of Systems:  
Denies CP or SOB, no current abdominal pain, no N/V, no leg pain, no headache Vital Signs:  
 Last 24hrs VS reviewed since prior progress note. Most recent are: 
Visit Vitals /67 (BP 1 Location: Left arm, BP Patient Position: At rest) Pulse (!) 120 Temp 100.4 °F (38 °C) Resp 16 Wt 48.8 kg (107 lb 8 oz) SpO2 94% BMI 19.04 kg/m² Intake/Output Summary (Last 24 hours) at 1/12/2021 1629 Last data filed at 1/12/2021 1334 Gross per 24 hour Intake  Output 650 ml Net -650 ml Physical Examination:  
 
I had a face to face encounter with this patient and independently examined them on 01/12/21 as outlined below: 
     
General: Anorexic appearing female in NAD 
EENT: EOMI. Anicteric sclerae. Oral mucous moist, oropharynx benign Resp: CTA bilaterally. No wheezing/rhonchi/rales. No accessory muscle use CV: Regular rhythm, normal rate, no murmurs, gallops, rubs GI: Soft, mildly distended, generalized soreness with palpation. Hypoactive/minimal bowel sounds Extremities: No edema, warm, 2+ pulses throughout Neurologic: Moves all extremities. AAOx3, CN II-XII grossly intact Psych: Good insight. Not anxious nor agitated. Skin: Good Turgor, no rashes or ulcers Data Review: Review and/or order of clinical lab test and imaging 1/9 Xr Abd (kub) Result Date: 1/8/2021 IMPRESSION: 1. Mechanical obstruction is not evident on this single supine radiograph, and may have improved since the prior CT. 2. Stable appearance of sigmoid colonic mesh stent with retained fecal material in the rectum and left colon. Ir Insert Gastrostomy Tube Perc Result Date: 1/11/2021 IMPRESSION: Uncomplicated 18 Nigerian gastrostomy tube placement. Ct Abd Pelv W Cont Result Date: 1/5/2021 IMPRESSION: Small bowel obstruction likely related to adhesion formation in the mid abdomen, with decompressed loops in the right lower quadrant. Diffuse colonic distention and moderate fecal stasis. Interval placement of a sigmoid colon stent. Increased free fluid somewhat loculated in appearance in the right upper quadrant. Likely peritoneal disease unchanged in the deep pelvis. Mild bilateral hydronephrosis right greater than left. Cholelithiasis. Xr Chest [facility] Result Date: 1/6/2021 IMPRESSION: No acute cardiopulmonary process Labs:  
 
Recent Labs  
  01/11/21 
1035 WBC 15.4* HGB 9.4* HCT 26.1*  
* Recent Labs  
  01/11/21 
0850 01/10/21 
0747 * 128* K 3.7 3.5  100 CO2 21 19* BUN 10 9 CREA 0.30* 0.41* * 168* CA 7.8* 7.9*  
MG 2.0 1.6 PHOS  --  1.7* Recent Labs  
  01/11/21 
0850 01/10/21 
0747 ALT 9* 9* * 139* TBILI 0.6 0.8 TP 5.5* 5.3* ALB 1.2* 1.3*  
GLOB 4.3* 4.0 No results for input(s): INR, PTP, APTT, INREXT, INREXT in the last 72 hours. No results for input(s): FE, TIBC, PSAT, FERR in the last 72 hours. No results found for: FOL, RBCF No results for input(s): PH, PCO2, PO2 in the last 72 hours. No results for input(s): CPK, CKNDX, TROIQ in the last 72 hours. No lab exists for component: CPKMB No results found for: CHOL, CHOLX, CHLST, CHOLV, HDL, HDLP, LDL, LDLC, DLDLP, TGLX, TRIGL, TRIGP, CHHD, CHHDX Lab Results Component Value Date/Time Glucose (POC) 146 (H) 01/11/2021 06:14 AM  
 Glucose (POC) 148 (H) 01/11/2021 12:00 AM  
 Glucose (POC) 169 (H) 01/10/2021 05:57 PM  
 Glucose (POC) 199 (H) 01/10/2021 11:41 AM  
 Glucose (POC) 170 (H) 01/10/2021 08:12 AM  
 
Lab Results Component Value Date/Time Color YELLOW 01/05/2021 10:42 PM  
 Appearance CLEAR 01/05/2021 10:42 PM  
 Specific gravity 1.010 01/05/2021 10:42 PM  
 Specific gravity 1.006 10/09/2020 02:06 PM  
 pH (UA) 5.5 01/05/2021 10:42 PM  
 Protein Negative 01/05/2021 10:42 PM  
 Glucose Negative 01/05/2021 10:42 PM  
 Ketone TRACE (A) 01/05/2021 10:42 PM  
 Bilirubin Negative 01/05/2021 10:42 PM  
 Urobilinogen 0.2 01/05/2021 10:42 PM  
 Nitrites Negative 01/05/2021 10:42 PM  
 Leukocyte Esterase Negative 01/05/2021 10:42 PM  
 Epithelial cells FEW 01/05/2021 10:42 PM  
 Bacteria Negative 01/05/2021 10:42 PM  
 WBC 0-4 01/05/2021 10:42 PM  
 RBC 10-20 01/05/2021 10:42 PM  
 
 
 
Medications Reviewed:  
 
Current Facility-Administered Medications Medication Dose Route Frequency  morphine (10 mg/5 mL) 10 mg/5 mL oral solution 10 mg  10 mg Oral Q4H  
 sodium chloride (NS) flush 5-40 mL  5-40 mL IntraVENous Q8H  
 sodium chloride (NS) flush 5-40 mL  5-40 mL IntraVENous PRN  phenol throat spray (CHLORASEPTIC) 1 Spray  1 Spray Oral PRN  
 guaiFENesin (ROBITUSSIN) 100 mg/5 mL oral liquid 400 mg  400 mg Per G Tube Q4H PRN  
 insulin lispro (HUMALOG) injection   SubCUTAneous Q6H  
 glucose chewable tablet 16 g  4 Tab Oral PRN  
 dextrose (D50W) injection syrg 12.5-25 g  12.5-25 g IntraVENous PRN  
 glucagon (GLUCAGEN) injection 1 mg  1 mg IntraMUSCular PRN  
 fentaNYL (DURAGESIC) 12 mcg/hr patch 1 Patch  1 Patch TransDERmal Q72H  sodium chloride (NS) flush 5-40 mL  5-40 mL IntraVENous Q8H  
 sodium chloride (NS) flush 5-40 mL  5-40 mL IntraVENous PRN  
 acetaminophen (TYLENOL) tablet 650 mg  650 mg Oral Q6H PRN  Or  
  acetaminophen (TYLENOL) suppository 650 mg  650 mg Rectal Q6H PRN  promethazine (PHENERGAN) tablet 12.5 mg  12.5 mg Oral Q6H PRN Or  
 ondansetron (ZOFRAN) injection 4 mg  4 mg IntraVENous Q6H PRN  
 heparin (porcine) injection 5,000 Units  5,000 Units SubCUTAneous Q8H  
 pantoprazole (PROTONIX) 40 mg in 0.9% sodium chloride 10 mL injection  40 mg IntraVENous DAILY  morphine injection 2 mg  2 mg IntraVENous Q4H PRN  
 morphine injection 4 mg  4 mg IntraVENous Q4H PRN  
 heparin (porcine) pf 500 Units  500 Units InterCATHeter PRN  
 sodium chloride (NS) flush 5-40 mL  5-40 mL IntraVENous Q8H  
 sodium chloride (NS) flush 5-40 mL  5-40 mL IntraVENous PRN  
 iopamidoL (ISOVUE 300) 61 % contrast injection 50 mL  50 mL Other MULTIPLE DOSE GIVEN  
 
______________________________________________________________________ EXPECTED LENGTH OF STAY: 3d 17h ACTUAL LENGTH OF STAY:          7 Radha Jasso MD

## 2021-01-12 NOTE — HOSPICE
[facility] Liaison note: Met with patient and  at bedside. They are awaiting the arrival of their daughter at 2pm today. They are going to have a family discussion regarding hospice care. They have signed consents and are in agreement to return home with hospice care. Equipment will be ordered and delivered today through [facility]. Equipment to be delivered includes hospital bed with full rails, OBT, BSC, walker, wheelchair. Confirmation number is [id number]. Patient has admit slot reserved for 10am, patient to be transported home by private car with . Notified CM of discharge plan. Symptom medications to be ordered through [facility] for pickup by liaison first thing in the morning for discharge. Thank you, 
 
Masood Rojas, RN [facility] [phone] Mobile

## 2021-01-12 NOTE — PROGRESS NOTES
Bedside and Verbal shift change report given to Elvia Curling, RN (oncoming nurse) by Magan Sommer RN (offgoing nurse). Report included the following information SBAR, Procedure Summary, Intake/Output, MAR and Recent Results.

## 2021-01-13 NOTE — HOSPICE
Patient was scheduled to leave the hospital this morning at 930 and an admission at 10 am.  
[facility] - meds reconciled with pharmacist Chelsie. Delivered to patient's room to  Guillaume Sammy. Patient's discharge order had not been placed by hospitalist. Primary nurse Jeet and CM contacted hospitalist for discharge order. Intake notified of delay in patient leaving the hospital.  
Nurse will call me when the patient is ready to leave. Skylar Anderson RN MSN [facility] Nurse Liaison [facility] [phone] ( mobile) [phone] ( office)

## 2021-01-13 NOTE — ROUTINE PROCESS
Bedside shift change report given to Jaret Anne (oncoming nurse) by Amanda Heath (offgoing nurse). Report included the following information SBAR.

## 2021-01-13 NOTE — DISCHARGE INSTRUCTIONS
Discharge Instructions       PATIENT ID: Marden Schilder  MRN: [id number]   YOB: 1970    DATE OF ADMISSION: 1/5/2021  8:55 PM    DATE OF DISCHARGE: 1/13/2021    PRIMARY CARE PROVIDER: Carlos Guy MD     ATTENDING PHYSICIAN: Shefali Venegas MD  DISCHARGING PROVIDER: Warren Taylor MD    To contact this individual call [phone] and ask the  to page. If unavailable ask to be transferred the Adult Hospitalist Department. DISCHARGE DIAGNOSES   Appendix carcinoma     CONSULTATIONS: IP CONSULT TO GENERAL SURGERY  IP CONSULT TO HEMATOLOGY  IP CONSULT TO PALLIATIVE CARE - PROVIDER  IP CONSULT TO GASTROENTEROLOGY  IP CONSULT TO INTERVENTIONAL RADIOLOGY    PROCEDURES/SURGERIES: Procedure(s):  ESOPHAGOGASTRODUODENOSCOPY (EGD)    PENDING TEST RESULTS:   At the time of discharge the following test results are still pending:     FOLLOW UP APPOINTMENTS:   Follow-up Information     Follow up With Specialties Details Why Contact Info    Nithin Eason MD Pediatric Medicine, Internal Medicine In 1 week  [phone]  [phone]             ADDITIONAL CARE RECOMMENDATIONS:   Please follow up with hospice nurse     DIET:comfort feed, sips      ACTIVITY: Activity as tolerated    WOUND CARE:     EQUIPMENT needed:       Radiology      DISCHARGE MEDICATIONS:   See Medication Reconciliation Form    · It is important that you take the medication exactly as they are prescribed. · Keep your medication in the bottles provided by the pharmacist and keep a list of the medication names, dosages, and times to be taken in your wallet. · Do not take other medications without consulting your doctor. NOTIFY YOUR PHYSICIAN FOR ANY OF THE FOLLOWING:   Fever over 101 degrees for 24 hours. Chest pain, shortness of breath, fever, chills, nausea, vomiting, diarrhea, change in mentation, falling, weakness, bleeding.  Severe pain or pain not relieved by medications. Or, any other signs or symptoms that you may have questions about.       DISPOSITION:   xx Home With:   OT  PT  HH  RN       SNF/Inpatient Rehab/LTAC    Independent/assisted living    Hospice    Other:     CDMP Checked:   Yes x     PROBLEM LIST Updated:  Yes x       Signed:   Jenniffer Evans MD  1/13/2021  10:00 AM

## 2021-01-13 NOTE — DISCHARGE SUMMARY
Discharge Summary PATIENT ID: Unk Fish MRN: [id number] YOB: 1970 DATE OF ADMISSION: 1/5/2021  8:55 PM   
DATE OF DISCHARGE: 1/13/2021 PRIMARY CARE PROVIDER: Camron Trujillo MD  
 
ATTENDING PHYSICIAN: Rah Hartmann DISCHARGING PROVIDER: Henry Christopher MD   
To contact this individual call [phone] and ask the  to page. If unavailable ask to be transferred the Adult Hospitalist Department. CONSULTATIONS: IP CONSULT TO GENERAL SURGERY 
IP CONSULT TO HEMATOLOGY 
IP CONSULT TO PALLIATIVE CARE - PROVIDER 
IP CONSULT TO GASTROENTEROLOGY 
IP CONSULT TO INTERVENTIONAL RADIOLOGY PROCEDURES/SURGERIES: Procedure(s): ESOPHAGOGASTRODUODENOSCOPY (EGD) [address] [facility] COURSE:  
 57-year-old woman with a past medical history significant for metastatic cancer of the appendix, who was in her usual state of health until yesterday when the patient developed abdominal pain.  The pain is located at the center of the abdomen, sharp pain, constant with no radiation, 9/10 in severity, associated with nausea, but no vomiting.  The patient recently had colonic stent placed at the [address] [place] [address] Place patient was taken to [facility] Emergency Room at [facility] for further evaluation of the abdominal pain.  When the patient arrived at the emergency room, CT scan of the abdomen and pelvis was performed.  The CT scan shows evidence of small bowel obstruction.  The emergency room physician consulted the general surgeon on-call, admission to the hospitalist service was advised.  The patient denies fever, rigors, or chills. Jerzy Bueno was last admitted to this hospital from 12/06/2020 to 12/10/2020.  The patient was admitted and treated for sepsis, secondary to [address] Xr Abd (kub) Result Date: 1/8/2021 INDICATION: evaluate bowel obstruction, abdominal pain. Metastatic cancer with bowel obstruction on recent CT. EXAM: SINGLE VIEW ABDOMEN RADIOGRAPH. COMPARISON: CT abdomen 1/5/2021. FINDINGS: A supine radiograph of the abdomen shows a nonspecific bowel gas pattern, with small amounts of gas scattered throughout small and large bowel. Retained fecal material is again noted in the sigmoid colon distal to a radiopaque mesh stent, and also in the left colon. Dilated small bowel loops are not evident, although they may be fluid-filled. Gastric tube projects over the epigastrium with its tip in the expected location of the gastric antrum or duodenum. . The bones and soft tissues are within normal limits. Darby Yuan IMPRESSION: 1. Mechanical obstruction is not evident on this single supine radiograph, and may have improved since the prior CT. 2. Stable appearance of sigmoid colonic mesh stent with retained fecal material in the rectum and left colon. Ir Insert Gastrostomy Tube Perc Result Date: 1/11/2021 PERCUTANEOUS GASTROSTOMY TUBE PLACEMENT, 1/11/2021 3:03 PM HISTORY: Gastrostomy tube for decompression : Jasmina Torres M.D. PROCEDURE SUMMARY: 1. Informed consent. 2.  Percutaneous cannulation of the stomach under fluoroscopic guidance, confirmed by contrast injection. 3.  Dilation of the tract and placement of an 25 Syriac balloon retained gastrostomy tube, final position confirmed by contrast injection. Moderate intravenous conscious sedation was supervised by Dr. Adilene Tsang. The patient was independently monitored by a registered nurse assigned to the Department of Radiology using automated blood pressure, EKG, and pulse oximetry. The detail conscious sedation record is stored in the hospital information system. Medication: Versed: 1 mg Fentanyl: 50 mcg Intraprocedure time: 15 minutes DETAILED PROCEDURE AND FINDINGS: After explanation of the planned procedure and its intended benefits, risks, and alternatives to the patient's healthcare proxy, signed written informed consent was obtained. The patient was brought to the angiography suite and placed in the supine position. Conscious sedation was initiated and maintained with continuous vital signs monitoring by nursing and physician staff. The epigastric region was prepped and draped using maximum sterile barrier technique. The stomach was insufflated with air. Local anesthesia was achieved with subcutaneous administration of 2% lidocaine. Percutaneous access to the stomach was then obtained under fluoroscopic guidance with 2 retaining T tacks, and confirmed by contrast injection. A third access was obtained between the 2 T tack sites, confirmed with contrast injection, under fluoroscopic guidance. A wire was passed into the stomach and the needle was removed. A sequential dilator was used to dilate the tract and an 25 Syriac gastrostomy tube was advanced through the peel-away sheath.  The peel-away sheath was removed and the retention balloon was filled with dilute contrast under fluoroscopic guidance. The wire was removed through the gastrostomy tube and contrast injection confirmed intragastric position. The T tacks were secured to the skin and dressing were applied. The tube port was flushed, and the tube was placed to gravity bag drainage. The skin site was bandaged, and the patient was discharged to the inpatient recovery unit in good condition with no immediate complaints or complications. FLUOROSCOPY TIME: 3.9 minutes FLUOROSCOPY DOSE: 12.3 mGy MEDICATIONS: Ancef 2 g  
 
IMPRESSION: Uncomplicated 18 Indonesian gastrostomy tube placement. Ct Abd Pelv W Cont Result Date: 1/5/2021 EXAM: CT ABD PELV W CONT INDICATION: Vomiting, history of metastatic cancer COMPARISON: 12/6/2020 CONTRAST: 100 mL of Isovue-370. TECHNIQUE: Following the uneventful intravenous administration of contrast, thin axial images were obtained through the abdomen and pelvis. Coronal and sagittal reconstructions were generated. Oral contrast was not administered. CT dose reduction was achieved through use of a standardized protocol tailored for this examination and automatic exposure control for dose modulation. FINDINGS: LOWER THORAX: No significant abnormality in the incidentally imaged lower chest. LIVER: Unchanged 2.5 cm cyst left hepatic lobe. BILIARY TREE: Gallstone. Pericholecystic edema. CBD is not dilated. SPLEEN: within normal limits. PANCREAS: No mass or ductal dilatation. ADRENALS: Unremarkable. KIDNEYS: Mild bilateral hydronephrosis right greater than left. STOMACH: Unremarkable. SMALL BOWEL: Diffuse small bowel distention is now noted with decompressed loops in the right lower quadrant. COLON: New stent in the sigmoid colon. The remainder of the colon is distended with moderate fecal stasis. APPENDIX: Surgically absent. PERITONEUM: Free fluid in the right abdomen increased compared to the prior exam. Mass effect upon the liver and septations are noted compatible with loculations. Soft tissue density in the deep pelvis is unchanged. RETROPERITONEUM: No lymphadenopathy or aortic aneurysm. REPRODUCTIVE ORGANS: The uterus is surgically absent. URINARY BLADDER: No mass or calculus. BONES: No destructive bone lesion. ABDOMINAL WALL: No mass or hernia.  ADDITIONAL COMMENTS: N/A  
 
 IMPRESSION: Small bowel obstruction likely related to adhesion formation in the mid abdomen, with decompressed loops in the right lower quadrant. Diffuse colonic distention and moderate fecal stasis. Interval placement of a sigmoid colon stent. Increased free fluid somewhat loculated in appearance in the right upper quadrant. Likely peritoneal disease unchanged in the deep pelvis. Mild bilateral hydronephrosis right greater than left. Cholelithiasis. Ct Abd Pelv W Cont Result Date: 12/7/2020 EXAM: CT ABD PELV W CONT INDICATION: metastatic appendiceal cancer COMPARISON: May 26, 2020 CONTRAST: 100 mL of Isovue-370. TECHNIQUE: Following the uneventful intravenous administration of contrast, thin axial images were obtained through the abdomen and pelvis. Coronal and sagittal reconstructions were generated. Oral contrast was administered. CT dose reduction was achieved through use of a standardized protocol tailored for this examination and automatic exposure control for dose modulation. FINDINGS: LOWER THORAX: Small left pleural effusion with adjacent atelectasis. LIVER: No Change, cyst BILIARY TREE: Gallstones in a nondistended gallbladder. . CBD is not dilated. SPLEEN: within normal limits. PANCREAS: No mass or ductal dilatation. ADRENALS: Unremarkable. KIDNEYS: No mass, calculus, or hydronephrosis. STOMACH: Unremarkable. SMALL BOWEL: No dilatation or wall thickening. COLON: Prominent fecal stasis. PERITONEUM: There is a small amount of ascites in a patient there is multiple area of peritoneal thickening suspicious for peritoneal carcinomatosis or. RETROPERITONEUM: No lymphadenopathy or aortic aneurysm. REPRODUCTIVE ORGANS: URINARY BLADDER: No mass or calculus. BONES: No destructive bone lesion. ABDOMINAL WALL: No mass or hernia. ADDITIONAL COMMENTS: N/A IMPRESSION: Suspect peritoneal carcinomatosis . Xr Chest [facility] Result Date: 1/6/2021 EXAM: XR CHEST PORT INDICATION: metastatic cancer COMPARISON: 12/6/2020 FINDINGS: A portable AP radiograph of the chest was obtained at 817 hours. Less including Port-A-Cath is in stable position. . Previously noted pneumothorax is not identified on this study. No focal airspace disease or interstitial edema. Cherl Spinner Heart size is normal..  The bones and soft tissues are grossly within normal limits. IMPRESSION: No acute cardiopulmonary process Xr Chest [facility] Addendum Date: 12/6/2020 Addendum: Repeat portable chest x-ray was performed, and demonstrates no evidence of pneumothorax. There remains a trace left pleural effusion, improved from the prior study. There is no focal airspace disease. Result Date: 12/6/2020 INDICATION:  fever/chemo/rule out pneumonia COMPARISON: 11/18/2019 FINDINGS: Single AP portable view of the chest obtained at 1528 demonstrates a stable cardiomediastinal silhouette. There is a left chest stacey catheter. There is a skin fold versus small right lateral pneumothorax. There is a trace left pleural effusion, improved. No osseous abnormalities are seen. IMPRESSION: 1. No evidence of pneumonia. 2. Trace left pleural effusion, improved from the prior study. 3. Skinfold versus small right lateral pneumothorax. Repeat chest radiograph recommended. I have telephoned the ER technologist with this request. 
 
 
 
 
Appendix cancer s/p stenting at [facility] now with small bowel obstruction Presented to the hospital for SBO and vomiting  
- Not a candidate for surgery and planning for palliative care and Hospice 
- NPO with NGT to [facility]  
- S/p colo on 1/7 by Dr Austin Johnson and was not able stent again - Plan for PEG for decompression but was unsuccessful by Gi  and so IR did it on 1/12 Was on TPN over the weekend and also PCA pump - TPN and PCA stopped upon discharge  
change to patch and oral opiods per hospice team  
Discharged to hospice team at home  
  
Hyponatremia, POA  
 - due to GI losses from NG tube -> 128 We gave her fluids and TPN  
  
  
Moderate nutritional deficit - BMI 15.93 kg/m2 DISCHARGE DIAGNOSES / PLAN: To hospice at home and pain management according to hospice team    
 
ADDITIONAL CARE RECOMMENDATIONS:  
PER HOSPICE PENDING TEST RESULTS:  
At the time of discharge the following test results are still pending: FOLLOW UP APPOINTMENTS:   
Follow-up Information Follow up With Specialties Details Why Contact Info Rusty Washington MD Pediatric Medicine, Internal Medicine In 1 week  [address] 
[phone] DIET: Regular Diet Oral Nutritional Supplements:  
 
ACTIVITY: Activity as tolerated WOUND CARE:  
 
EQUIPMENT needed:  
 
 
DISCHARGE MEDICATIONS: 
Current Discharge Medication List  
  
CONTINUE these medications which have NOT CHANGED Details  
naloxone (NARCAN) 4 mg/actuation nasal spray Use 1 spray intranasally, then discard. Repeat with new spray every 2 min as needed for opioid overdose symptoms, alternating nostrils. Qty: 1 Each, Refills: 1  
  
simethicone (Gas-X) 125 mg capsule Take 125 mg by mouth four (4) times daily as needed for Flatulence. hyoscyamine SL (LEVSIN/SL) 0.125 mg SL tablet 0.125 mg by SubLINGual route every four (4) hours as needed for Cramping. prochlorperazine (Compazine) 5 mg tablet Take 1 tab by mouth every 6 hours as needed for nausea or vomiting 
Qty: 30 Tab, Refills: 1 Associated Diagnoses: Mucinous adenocarcinoma of appendix ([address] Carcinoma of appendix ([address] Chemotherapy-induced nausea  
  
lidocaine-prilocaine (EMLA) topical cream Apply  to affected area as needed for Pain. Qty: 30 g, Refills: 0 Associated Diagnoses: Mucinous adenocarcinoma of appendix ([address] Carcinoma of appendix ([address]  
  
ondansetron (ZOFRAN ODT) 4 mg disintegrating tablet Take 1 Tab by mouth every eight (8) hours as needed for Nausea. Qty: 60 Tab, Refills: 1 Associated Diagnoses: Mucinous adenocarcinoma of appendix ([facility] Carcinoma of appendix ([facility] STOP taking these medications  
  
 megestroL (MEGACE) 400 mg/10 mL (40 mg/mL) suspension Comments:  
Reason for Stopping:   
   
 oxyCODONE IR (ROXICODONE) 10 mg tab immediate release tablet Comments:  
Reason for Stopping:   
   
 bisacodyL (DULCOLAX) 5 mg EC tablet Comments:  
Reason for Stopping:   
   
 cefTRIAXone 2 gram 2 g IVPB Comments:  
Reason for Stopping:   
   
 potassium chloride (KLOR-CON) 10 mEq tablet Comments:  
Reason for Stopping: LORazepam (ATIVAN) 0.5 mg tablet Comments:  
Reason for Stopping:   
   
 dexAMETHasone (DECADRON) 4 mg tablet Comments:  
Reason for Stopping:   
   
 diphenoxylate-atropine (LomotiL) 2.5-0.025 mg per tablet Comments:  
Reason for Stopping:   
   
 acetaminophen (TYLENOL) 325 mg tablet Comments:  
Reason for Stopping:   
   
  
 
 
 
NOTIFY YOUR PHYSICIAN FOR ANY OF THE FOLLOWING:  
Fever over 101 degrees for 24 hours. Chest pain, shortness of breath, fever, chills, nausea, vomiting, diarrhea, change in mentation, falling, weakness, bleeding. Severe pain or pain not relieved by medications. Or, any other signs or symptoms that you may have questions about. DISPOSITION: 
XX Home With: 
 OT  PT  HH  RN  
  
 Long term SNF/Inpatient Rehab Independent/assisted living Hospice Other:  
 
 
PATIENT CONDITION AT DISCHARGE:  
 
Functional status Poor Deconditioned Independent Cognition X  Lucid Forgetful Dementia Catheters/lines (plus indication) Pham PICC   
 PEG   
X None Code status Full code X DNR   
 
PHYSICAL EXAMINATION AT DISCHARGE: 
General:          Alert, cooperative, no distress, appears stated age. HEENT:           Atraumatic, anicteric sclerae, pink conjunctivae No oral ulcers, mucosa moist, throat clear, dentition fair Neck:               Supple, symmetrical 
Lungs:             Clear to auscultation bilaterally. No Wheezing or Rhonchi. No rales. Chest wall:      No tenderness  No Accessory muscle use. Heart:              Regular  rhythm,  No  murmur   No edema Abdomen:        Soft, non-tender. Not distended. Bowel sounds normal 
Extremities:     No cyanosis. No clubbing,   
                        Skin turgor normal, Capillary refill normal 
Skin:                Not pale. Not Jaundiced  No rashes Psych:             Not anxious or agitated. Neurologic:      Alert, moves all extremities, answers questions appropriately and responds to commands CHRONIC MEDICAL DIAGNOSES: 
Problem List as of 1/13/2021 Date Reviewed: 1/6/2021 Codes Class Noted - Resolved * (Principal) SBO (small bowel obstruction) ([facility] ICD-10-CM: N98.940 ICD-9-CM: 560.9  1/5/2021 - Present Severe protein-calorie malnutrition ([facility] (Chronic) ICD-10-CM: G81 ICD-9-CM: 919  12/7/2020 - Present Abdominal pain ICD-10-CM: R10.9 ICD-9-CM: 789.00  12/6/2020 - Present Fever ICD-10-CM: R50.9 ICD-9-CM: 780.60  12/6/2020 - Present Poor appetite ICD-10-CM: R63.0 ICD-9-CM: 783.0  11/18/2020 - Present Weight loss ICD-10-CM: R63.4 ICD-9-CM: 783.21  11/18/2020 - Present Anxiety about health ICD-10-CM: F41.8 ICD-9-CM: 300.09  11/18/2020 - Present History of hematuria ICD-10-CM: Z87.448 ICD-9-CM: V13.09  11/18/2020 - Present Chemotherapy-induced thrombocytopenia ICD-10-CM: D69.59, T45.1X5A 
ICD-9-CM: 287.49, E933.1  6/17/2020 - Present Chemotherapy-induced nausea ICD-10-CM: R11.0, T45.1X5A 
ICD-9-CM: 787.02, E933.1  5/27/2020 - Present Chemotherapy-induced neuropathy ([facility] ICD-10-CM: G62.0, T45.1X5A 
ICD-9-CM: 357.6, E933.1  5/27/2020 - Present History of pleural effusion ICD-10-CM: Z87.09 
ICD-9-CM: V12.69  5/13/2020 - Present  Insomnia ICD-10-CM: G47.00 
 ICD-9-CM: 780.52  3/4/2020 - Present Chemotherapy induced diarrhea ICD-10-CM: K52.1, T45.1X5A 
ICD-9-CM: 787.91, E933.1  3/4/2020 - Present Generalized abdominal pain ICD-10-CM: R10.84 ICD-9-CM: 789.07  2/19/2020 - Present Diarrhea ICD-10-CM: R19.7 ICD-9-CM: 787.91  2/19/2020 - Present Subphrenic abscess ([facility] ICD-10-CM: K65.1 ICD-9-CM: 947.72  1/8/2020 - Present Cancer of appendix metastatic to intra-abdominal lymph node (HCC) ICD-10-CM: C18.1, C77.2 ICD-9-CM: 153.5, 196.2  12/23/2019 - Present Pleural effusion on left ICD-10-CM: J90 ICD-9-CM: 511.9  12/23/2019 - Present Carcinomatosis ([facility] ICD-10-CM: C80.0 ICD-9-CM: 199.0  12/23/2019 - Present Chemotherapy follow-up examination ICD-10-CM: G61 ICD-9-CM: V67.2  12/23/2019 - Present Port-A-Cath in place ICD-10-CM: Z95.828 ICD-9-CM: V45.89  12/23/2019 - Present Thrombocytosis ([facility] ICD-10-CM: D47.3 ICD-9-CM: 238.71  12/23/2019 - Present Splenic abscess ICD-10-CM: D73.3 ICD-9-CM: 289.59  12/12/2019 - Present Nausea and vomiting ICD-10-CM: R11.2 ICD-9-CM: 787.01  12/9/2019 - Present Splenic infarction ICD-10-CM: D73.5 ICD-9-CM: 289.59  10/15/2019 - Present Carcinoma of appendix ([facility] ICD-10-CM: C18.1 ICD-9-CM: 153.5  10/15/2019 - Present Mucinous adenocarcinoma of appendix ([facility] ICD-10-CM: C18.1 ICD-9-CM: 153.5  9/24/2019 - Present Small bowel obstruction ([facility] ICD-10-CM: B54.569 ICD-9-CM: 560.9  9/24/2019 - Present 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  
Jovanna Blackmon MD 
1/13/2021 
10:02 AM

## 2021-01-13 NOTE — PROGRESS NOTES
JESSICA: Plan for discharge home with [facility] today. Family to transport patient home via car. SHU KiddW/CRM

## 2021-01-13 NOTE — PROGRESS NOTES
Bedside and Verbal shift change report given to Rebecca Conway (oncoming nurse) by Tracie Gitelman (offgoing nurse). Report included the following information SBAR.

## 2021-01-13 NOTE — PROGRESS NOTES
Notified by hospice RN that patient was supposed to be home around 10am for hospice admission. Notified Dr. Paulo Sanchez for discharge order and instructions. De accessed chest port, got patient dressed, and taken down to car by [facility] at 1045.

## 2021-01-18 ENCOUNTER — DOCUMENTATION ONLY (OUTPATIENT)
Dept: ONCOLOGY | Age: 51
End: 2021-01-18

## 2021-01-19 PROBLEM — Z51.5 HOSPICE CARE PATIENT: Status: ACTIVE | Noted: 2021-01-19

## 2021-01-27 ENCOUNTER — APPOINTMENT (OUTPATIENT)
Dept: INFUSION THERAPY | Age: 51
End: 2021-01-27

## 2021-01-29 ENCOUNTER — APPOINTMENT (OUTPATIENT)
Dept: INFUSION THERAPY | Age: 51
End: 2021-01-29

## 2022-11-20 NOTE — ED TRIAGE NOTES
Triage Note: Patient complains of lower abdominal pain and vomiting x 4 days. Patient is currently being treated for cancer. Patient concerned she has a blockage after having a colonic stent placed last Monday at [facility].
no

## 2023-07-22 NOTE — PROGRESS NOTES
Called pt and  with CXR that showed large left pleural effusion  Pt is SOB but not enough to come to ER  Pt/  want us to set up ultrasound guided biopsy via IR  Will order None

## 2024-01-10 NOTE — ANESTHESIA PREPROCEDURE EVALUATION
Relevant Problems No relevant active problems Anesthetic History PONV Review of Systems / Medical History Patient summary reviewed, nursing notes reviewed and pertinent labs reviewed Pulmonary Within defined limits Neuro/Psych Within defined limits Cardiovascular Within defined limits GI/Hepatic/Renal 
Within defined limits Endo/Other Cancer Other Findings Physical Exam 
 
Airway Mallampati: II 
TM Distance: > 6 cm Neck ROM: normal range of motion Mouth opening: Normal 
 
 Cardiovascular Regular rate and rhythm,  S1 and S2 normal,  no murmur, click, rub, or gallop Dental 
No notable dental hx Pulmonary Breath sounds clear to auscultation Abdominal 
GI exam deferred Other Findings Anesthetic Plan ASA: 2 Anesthesia type: MAC Induction: Intravenous Anesthetic plan and risks discussed with: Patient see md note

## 2024-10-15 NOTE — PROGRESS NOTES
01/09/21 2200 Vital Signs Temp 99.3 °F (37.4 °C) Temp Source Oral  
Pulse (Heart Rate) (!) 106 Heart Rate Source Brachial  
Resp Rate 16  
O2 Sat (%) 95 % Level of Consciousness Alert BP 99/67 MAP (Calculated) 78 BP 1 Method Automatic  
BP 1 Location Left arm BP Patient Position At rest  
MEWS Score 3  
consistent with previous will continue with care. MD aware. Sariah Minaya is a 73 year old female presenting for a follow-up of s/p R T11, T12 ICNB (09/30/24) .   Rates pain 4/10  Taking ketamine cream for pain.    Procedure Follow Up: Yes   Date of procedure: 09/30/24  What was the percentage pain relief you got?50% relief   How long did the pain relief last? Still today   Were you able to do more activity? Able to sit down without as much burning/pain    Take less meds? same    Medications, allergies and tobacco use reviewed.  Denies known Latex allergy or symptoms of Latex sensitivity.    REVIEW OF SYSTEMS:  CONSTITUTIONAL: no fever, weight changes, fatigue or drowsiness   HEENT: no dysphagia or vision changes  CARDIOVASCULAR: no chest pain, palpitations or syncope   RESPIRATORY: no cough, shortness of breath or wheezing   GI: no constipation, diarrhea, nausea, or vomiting   : no incontinence, urgency, or hesitancy  MUSCULOSKELETAL: as above  INTEGUMENTARY: no hypersensitivity, discoloration, or rash   NEURO: as above  ENDOCRINE: no temperature intolerance, polyuria, or polydipsia   HEME/LYMPH: no easy bruising, bleeding tendencies, lymphadenopathy   PSYCHIATRIC: no anxiety, depression or insomnia    Are you taking medication as instructed? Yes  Do you have any medication questions/concerns since your last visit?  No  Did the patient bring a friend or family member with them into the room? No  If so, did the patient give explicit permission for the practitioner to discuss their healthcare in front of that friend/family member? N/A

## (undated) DEVICE — REM POLYHESIVE ADULT PATIENT RETURN ELECTRODE: Brand: VALLEYLAB

## (undated) DEVICE — 3M™ TEGADERM™ TRANSPARENT FILM DRESSING FRAME STYLE, 1626W, 4 IN X 4-3/4 IN (10 CM X 12 CM), 50/CT 4CT/CASE: Brand: 3M™ TEGADERM™

## (undated) DEVICE — LAPAROSCOPIC TROCAR SLEEVE/SINGLE USE: Brand: KII® OPTICAL ACCESS SYSTEM

## (undated) DEVICE — Z INACTIVE USE 2527070 DRAPE SURG W40XL44IN UNDERBUTTOCK SMS POLYPR W/ PCH BK DISP

## (undated) DEVICE — AGENT HEMSTAT W2XL14IN OXIDIZED REGENERATED CELOS ABSRB FOR

## (undated) DEVICE — AGENT HEMSTAT 3GM PURIFIED PLNT STARCH PWD ABSRB ARISTA AH

## (undated) DEVICE — (D)PREP SKN CHLRAPRP APPL 26ML -- CONVERT TO ITEM 371833

## (undated) DEVICE — STRAP,POSITIONING,KNEE/BODY,FOAM,4X60": Brand: MEDLINE

## (undated) DEVICE — KIT,1200CC CANISTER,3/16"X6' TUBING: Brand: MEDLINE INDUSTRIES, INC.

## (undated) DEVICE — CLICKLINE SCISSORS INSERT: Brand: CLICKLINE

## (undated) DEVICE — INFECTION CONTROL KIT SYS

## (undated) DEVICE — TRAY PREP DRY W/ PREM GLV 2 APPL 6 SPNG 2 UNDPD 1 OVERWRAP

## (undated) DEVICE — TOWEL SURG W17XL27IN STD BLU COT NONFENESTRATED PREWASHED

## (undated) DEVICE — TUBING HYDR IRR --

## (undated) DEVICE — STRIP SKIN CLSR W0.25XL4IN WHT SPUNBOUND FBR NYL HI ADH

## (undated) DEVICE — MASTISOL ADHESIVE LIQ 2/3ML

## (undated) DEVICE — DRAPE FLD WRM W44XL66IN C6L FOR INTRATEMP SYS THERMABASIN

## (undated) DEVICE — GARMENT,MEDLINE,DVT,INT,CALF,MED, GEN2: Brand: MEDLINE

## (undated) DEVICE — SUTURE VCRL SZ 3-0 L27IN ABSRB UD L26MM SH 1/2 CIR J416H

## (undated) DEVICE — SUTURE SZ 0 27IN 5/8 CIR UR-6  TAPER PT VIOLET ABSRB VICRYL J603H

## (undated) DEVICE — INTENDED FOR TISSUE SEPARATION, AND OTHER PROCEDURES THAT REQUIRE A SHARP SURGICAL BLADE TO PUNCTURE OR CUT.: Brand: BARD-PARKER ® CARBON RIB-BACK BLADES

## (undated) DEVICE — RELOAD STPL L75MM OPN H3.8MM CLS 1.5MM WIRE DIA0.2MM REG

## (undated) DEVICE — SUTURE VCRL SZ 2-0 L36IN ABSRB UD L36MM CT-1 1/2 CIR J945H

## (undated) DEVICE — SURGICAL PROCEDURE KIT GEN LAPAROSCOPY LF

## (undated) DEVICE — Device

## (undated) DEVICE — CANISTER, RIGID, 3000CC: Brand: MEDLINE INDUSTRIES, INC.

## (undated) DEVICE — PAD,SANITARY,11 IN,MAXI,N-STRL,IND WRAP: Brand: MEDLINE

## (undated) DEVICE — TUBING INSUFLTN 10FT LUER -- CONVERT TO ITEM 368568

## (undated) DEVICE — TOWEL,OR,DSP,ST,BLUE,STD,2/PK,40PK/CS: Brand: MEDLINE

## (undated) DEVICE — SUTURE MCRYL SZ 4-0 L27IN ABSRB UD L19MM PS-2 1/2 CIR PRIM Y426H

## (undated) DEVICE — SOLUTION IV 500ML 0.9% SOD CHL FLX CONT

## (undated) DEVICE — DERMABOND SKIN ADH 0.7ML -- DERMABOND ADVANCED 12/BX

## (undated) DEVICE — GUIDEWIRE ENDOSCP L450CM DIA0.035IN STR RND STD STIFF BILI

## (undated) DEVICE — STAPLER INT L75MM CUT LN L73MM STPL LN L77MM BLU B FRM 8

## (undated) DEVICE — DRAPE,LAPAROTOMY,PED,STERILE: Brand: MEDLINE

## (undated) DEVICE — SUTURE VCRL SZ 0 L36IN ABSRB VLT L36MM CT-1 1/2 CIR J346H

## (undated) DEVICE — GLOVE SURG SZ 75 L1212IN FNGR THK138MIL BRN LTX FREE

## (undated) DEVICE — APPLICATOR BNDG 1MM ADH PREMIERPRO EXOFIN

## (undated) DEVICE — STERILE-Z MAYO STAND COVERS CLEAR POLYETHYLENE STERILE UNIVERSAL FIT 20 PER CASE: Brand: STERILE-Z

## (undated) DEVICE — MEDI-VAC YANK SUCT HNDL W/TPRD BULBOUS TIP: Brand: CARDINAL HEALTH

## (undated) DEVICE — MAX-CORE® DISPOSABLE CORE BIOPSY INSTRUMENT, 18G X 20CM: Brand: MAX-CORE

## (undated) DEVICE — STERILE POLYISOPRENE POWDER-FREE SURGICAL GLOVES WITH EMOLLIENT COATING: Brand: PROTEXIS

## (undated) DEVICE — SPECIMEN RETRIEVAL POUCH: Brand: ENDO CATCH GOLD

## (undated) DEVICE — COVER LT HNDL PLAS RIG 1 PER PK

## (undated) DEVICE — SOLUTION IRRIG 1000ML H2O STRL BLT

## (undated) DEVICE — BLADE ELECTRODE: Brand: EDGE

## (undated) DEVICE — FILTER SMK EVAC FLO CLR MEGADYNE

## (undated) DEVICE — Z DISCONTINUED USE 2744636  DRESSING AQUACEL 14 IN ALG W3.5XL14IN POLYUR FLM CVR W/ HYDRCOLL

## (undated) DEVICE — TROCAR SITE CLOSURE DEVICE: Brand: ENDO CLOSE

## (undated) DEVICE — INSUFFLATION NEEDLE: Brand: SURGINEEDLE

## (undated) DEVICE — SUTURE PROL SZ 2-0 L36IN NONABSORBABLE BLU SH L26MM 1/2 CIR 8523H

## (undated) DEVICE — SPONGE LAP 18X18IN STRL -- 5/PK

## (undated) DEVICE — SOLUTION IRRIGATION NACL 0.9% 1000 ML FLX CONTAINER

## (undated) DEVICE — SYRINGE MED 20ML STD CLR PLAS LUERLOCK TIP N CTRL DISP

## (undated) DEVICE — GAUZE,SPONGE,2"X2",8PLY,STERILE,LF,2'S: Brand: MEDLINE

## (undated) DEVICE — APPLIER LIG CLP L13IN 10MM PSTL GRP CONTAIN 15 TI L CLP

## (undated) DEVICE — DRAPE,UTILTY,TAPE,15X26, 4EA/PK: Brand: MEDLINE

## (undated) DEVICE — CURVED, LARGE JAW, OPEN SEALER/DIVIDER NANO-COATED: Brand: LIGASURE IMPACT

## (undated) DEVICE — TROCAR: Brand: KII® OPTICAL ACCESS SYSTEM

## (undated) DEVICE — DISSECTOR RMFG CURVED 5MM --

## (undated) DEVICE — DRAPE XR C ARM 41X74IN LF --

## (undated) DEVICE — TRIPLE LUMEN ERCP CANNULA: Brand: TANDEM XL

## (undated) DEVICE — SOLUTION IV 1000ML 0.9% SOD CHL

## (undated) DEVICE — SLIM BODY SKIN STAPLER: Brand: APPOSE ULC

## (undated) DEVICE — HANDLE LT SNAP ON ULT DURABLE LENS FOR TRUMPF ALC DISPOSABLE

## (undated) DEVICE — TROCAR: Brand: KII® SLEEVE

## (undated) DEVICE — MEDI-VAC NON-CONDUCTIVE SUCTION TUBING: Brand: CARDINAL HEALTH

## (undated) DEVICE — TOTAL TRAY, DB, 100% SILI FOLEY, 16FR 10: Brand: MEDLINE

## (undated) DEVICE — ROCKER SWITCH PENCIL BLADE ELECTRODE, HOLSTER: Brand: EDGE

## (undated) DEVICE — SURGICAL PROCEDURE PACK BASIN MAJ SET CUST NO CAUT

## (undated) DEVICE — NEEDLE HYPO 25GA L1.5IN BVL ORIENTED ECLIPSE

## (undated) DEVICE — SURGICAL PROCEDURE PACK GYN ONCOLOGY CUST ST MARYS LF

## (undated) DEVICE — SUTURE PDS II SZ 1 L96IN ABSRB VLT TP-1 L65MM 1/2 CIR Z880G